# Patient Record
Sex: FEMALE | Race: WHITE | NOT HISPANIC OR LATINO
[De-identification: names, ages, dates, MRNs, and addresses within clinical notes are randomized per-mention and may not be internally consistent; named-entity substitution may affect disease eponyms.]

---

## 2017-05-25 ENCOUNTER — RESULT REVIEW (OUTPATIENT)
Age: 77
End: 2017-05-25

## 2017-05-25 ENCOUNTER — APPOINTMENT (OUTPATIENT)
Dept: HEMATOLOGY ONCOLOGY | Facility: CLINIC | Age: 77
End: 2017-05-25

## 2017-05-25 VITALS
HEART RATE: 91 BPM | BODY MASS INDEX: 29.81 KG/M2 | WEIGHT: 162 LBS | HEIGHT: 62 IN | TEMPERATURE: 98.1 F | RESPIRATION RATE: 14 BRPM | DIASTOLIC BLOOD PRESSURE: 78 MMHG | SYSTOLIC BLOOD PRESSURE: 153 MMHG

## 2017-05-26 ENCOUNTER — OTHER (OUTPATIENT)
Age: 77
End: 2017-05-26

## 2017-05-26 LAB
ALBUMIN SERPL-MCNC: 3.5 G/DL
ALBUMIN/GLOB SERPL: 1.46
ALP SERPL-CCNC: 59 IU/L
ALT SERPL-CCNC: 19 IU/L
ANION GAP SERPL CALC-SCNC: 8 MEQ/L
AST SERPL-CCNC: 33 IU/L
BASOPHILS # BLD: 0.01 TH/MM3
BASOPHILS NFR BLD: 0.5 %
BILIRUB SERPL-MCNC: 1.9 MG/DL
BUN SERPL-MCNC: 8 MG/DL
BUN/CREAT SERPL: 9.2 %
CALCIUM SERPL-MCNC: 9.1 MG/DL
CHLORIDE SERPL-SCNC: 109 MEQ/L
CO2 SERPL-SCNC: 26 MEQ/L
CREAT SERPL-MCNC: 0.87 MG/DL
EOSINOPHIL # BLD: 0.17 TH/MM3
EOSINOPHIL NFR BLD: 8.4 %
ERYTHROCYTE [DISTWIDTH] IN BLOOD BY AUTOMATED COUNT: 15.8 %
GFR SERPL CREATININE-BSD FRML MDRD: 63
GLUCOSE SERPL-MCNC: 131 MG/DL
GRANULOCYTES # BLD: 0.88 TH/MM3
GRANULOCYTES NFR BLD: 43.5 %
HCT VFR BLD AUTO: 36.5 %
HGB BLD-MCNC: 11.8 G/DL
IMM GRANULOCYTES # BLD: 0 TH/MM3
IMM GRANULOCYTES NFR BLD: 0 %
LACTATE DEHYDROGENASE (NORTH): 201 IU/L
LYMPHOCYTES # BLD: 0.67 TH/MM3
LYMPHOCYTES NFR BLD: 33.2 %
MCH RBC QN AUTO: 30 PG
MCHC RBC AUTO-ENTMCNC: 32.3 G/DL
MCV RBC AUTO: 92.9 FL
MONOCYTES # BLD: 0.29 TH/MM3
MONOCYTES NFR BLD: 14.4 %
PLATELET # BLD: 64 TH/MM3
PMV BLD AUTO: 9.7 FL
POTASSIUM SERPL-SCNC: 3.9 MMOL/L
PROT SERPL-MCNC: 5.9 G/DL
RBC # BLD AUTO: 3.93 MIL/MM3
SODIUM SERPL-SCNC: 143 MEQ/L
WBC # BLD: 2.02 TH/MM3

## 2017-05-30 ENCOUNTER — OTHER (OUTPATIENT)
Age: 77
End: 2017-05-30

## 2017-05-30 ENCOUNTER — RESULT REVIEW (OUTPATIENT)
Age: 77
End: 2017-05-30

## 2017-05-30 LAB
ALBUMIN MFR SERPL ELPH: 58.3 %
ALBUMIN SERPL ELPH-MCNC: 3.7 G/DL
ALBUMIN/GLOB SERPL ELPH: 1.4 ZZ
ALPHA1 GLOB MFR SERPL ELPH: 4.6 %
ALPHA1 GLOB SERPL ELPH-MCNC: 0.3 G/DL
ALPHA2 GLOB MFR SERPL ELPH: 8.7 %
ALPHA2 GLOB SERPL ELPH-MCNC: 0.5 G/DL
B-GLOBULIN SERPL ELPH-MCNC: 0.5 G/DL
B2 MICROGLOB SERPL-MCNC: 3.4 MG/L
BETA1 GLOB MFR SERPL ELPH: 8.1 %
GAMMA GLOB MFR SERPL ELPH: 20.3 %
GAMMA GLOB SERPL ELPH-MCNC: 1.3 G/DL
IGA FLD-MCNC: 104 MG/DL
IGG FLD-MCNC: 1380 MG/DL
IGM SERPL-MCNC: 105 MG/DL
INTERPRETATION SERPL IFE-IMP: NORMAL
KAPPA LC FREE SER-MCNC: 19.2 MG/L
KAPPA LC FREE/LAMBDA FREE SER: 1.05
LAMBDA LC FREE SERPL-MCNC: 18.2 MG/L
M PROTEIN MFR SERPL ELPH: 14.4 %
M-SPIKE SPE (NORTH): 0.9 G/DL
PROT PATTERN SERPL ELPH-IMP: 6.3 G/DL
PROT PATTERN SERPL ELPH-IMP: NORMAL
PROT SERPL-MCNC: 6.3 G/DL

## 2017-06-06 ENCOUNTER — OUTPATIENT (OUTPATIENT)
Dept: OUTPATIENT SERVICES | Facility: HOSPITAL | Age: 77
LOS: 1 days | Discharge: HOME | End: 2017-06-06

## 2017-06-06 DIAGNOSIS — M17.10 UNILATERAL PRIMARY OSTEOARTHRITIS, UNSPECIFIED KNEE: ICD-10-CM

## 2017-06-28 DIAGNOSIS — C90.00 MULTIPLE MYELOMA NOT HAVING ACHIEVED REMISSION: ICD-10-CM

## 2017-08-21 ENCOUNTER — RESULT REVIEW (OUTPATIENT)
Age: 77
End: 2017-08-21

## 2017-08-21 ENCOUNTER — OUTPATIENT (OUTPATIENT)
Dept: OUTPATIENT SERVICES | Facility: HOSPITAL | Age: 77
LOS: 1 days | Discharge: HOME | End: 2017-08-21

## 2017-08-21 ENCOUNTER — APPOINTMENT (OUTPATIENT)
Dept: HEMATOLOGY ONCOLOGY | Facility: CLINIC | Age: 77
End: 2017-08-21

## 2017-08-21 VITALS
TEMPERATURE: 97.7 F | RESPIRATION RATE: 14 BRPM | SYSTOLIC BLOOD PRESSURE: 146 MMHG | HEIGHT: 62 IN | BODY MASS INDEX: 28.34 KG/M2 | WEIGHT: 154 LBS | HEART RATE: 73 BPM | DIASTOLIC BLOOD PRESSURE: 69 MMHG

## 2017-08-21 DIAGNOSIS — D69.6 THROMBOCYTOPENIA, UNSPECIFIED: ICD-10-CM

## 2017-08-21 LAB
BASOPHILS # BLD: 0.01 TH/MM3
BASOPHILS NFR BLD: 0.5 %
EOSINOPHIL # BLD: 0.14 TH/MM3
EOSINOPHIL NFR BLD: 7.2 %
ERYTHROCYTE [DISTWIDTH] IN BLOOD BY AUTOMATED COUNT: 15.9 %
GRANULOCYTES # BLD: 0.97 TH/MM3
GRANULOCYTES NFR BLD: 49.7 %
HCT VFR BLD AUTO: 36.5 %
HGB BLD-MCNC: 11.8 G/DL
IMM GRANULOCYTES # BLD: 0 TH/MM3
IMM GRANULOCYTES NFR BLD: 0 %
LYMPHOCYTES # BLD: 0.59 TH/MM3
LYMPHOCYTES NFR BLD: 30.3 %
MCH RBC QN AUTO: 30 PG
MCHC RBC AUTO-ENTMCNC: 32.3 G/DL
MCV RBC AUTO: 92.9 FL
MONOCYTES # BLD: 0.24 TH/MM3
MONOCYTES NFR BLD: 12.3 %
PLATELET # BLD: 52 TH/MM3
PMV BLD AUTO: 10.6 FL
RBC # BLD AUTO: 3.93 MIL/MM3
WBC # BLD: 1.95 TH/MM3

## 2017-08-22 LAB
ALBUMIN SERPL-MCNC: 3.5 G/DL
ALBUMIN/GLOB SERPL: 1.46
ALP SERPL-CCNC: 53 IU/L
ALT SERPL-CCNC: 20 IU/L
ANION GAP SERPL CALC-SCNC: 6 MEQ/L
AST SERPL-CCNC: 33 IU/L
BILIRUB SERPL-MCNC: 1.8 MG/DL
BUN SERPL-MCNC: 12 MG/DL
BUN/CREAT SERPL: 15.2 %
CALCIUM SERPL-MCNC: 9.3 MG/DL
CHLORIDE SERPL-SCNC: 106 MEQ/L
CO2 SERPL-SCNC: 28 MEQ/L
CREAT SERPL-MCNC: 0.79 MG/DL
GFR SERPL CREATININE-BSD FRML MDRD: 71
GLUCOSE SERPL-MCNC: 92 MG/DL
LACTATE DEHYDROGENASE (NORTH): 193 IU/L
POTASSIUM SERPL-SCNC: 4.2 MMOL/L
PROT SERPL-MCNC: 5.9 G/DL
SODIUM SERPL-SCNC: 140 MEQ/L

## 2017-08-23 ENCOUNTER — OTHER (OUTPATIENT)
Age: 77
End: 2017-08-23

## 2017-08-23 LAB
INTERPRETATION SERPL IFE-IMP: DETECTED
KAPPA LC FREE SER-MCNC: 22.9 MG/L
KAPPA LC FREE/LAMBDA FREE SER: 1.21
LAMBDA LC FREE SERPL-MCNC: 19 MG/L

## 2017-08-24 LAB
ALBUMIN MFR SERPL ELPH: 56 %
ALBUMIN SERPL ELPH-MCNC: 3.5 G/DL
ALBUMIN/GLOB SERPL ELPH: 1.2 ZZ
ALPHA1 GLOB MFR SERPL ELPH: 4.4 %
ALPHA1 GLOB SERPL ELPH-MCNC: 0.3 G/DL
ALPHA2 GLOB MFR SERPL ELPH: 8.9 %
ALPHA2 GLOB SERPL ELPH-MCNC: 0.6 G/DL
B-GLOBULIN SERPL ELPH-MCNC: 0.6 G/DL
BETA1 GLOB MFR SERPL ELPH: 8.8 %
GAMMA GLOB MFR SERPL ELPH: 21.9 %
GAMMA GLOB SERPL ELPH-MCNC: 1.4 G/DL
IGA FLD-MCNC: 123 MG/DL
IGG FLD-MCNC: 1430 MG/DL
IGM SERPL-MCNC: 105 MG/DL
M PROTEIN MFR SERPL ELPH: 15.5 %
M-SPIKE SPE (NORTH): 1 G/DL
PROT PATTERN SERPL ELPH-IMP: 6.3 G/DL
PROT PATTERN SERPL ELPH-IMP: NORMAL
PROT SERPL-MCNC: 6.3 G/DL

## 2017-09-10 ENCOUNTER — EMERGENCY (EMERGENCY)
Facility: HOSPITAL | Age: 77
LOS: 0 days | Discharge: HOME | End: 2017-09-10
Admitting: INTERNAL MEDICINE

## 2017-09-10 DIAGNOSIS — Z79.899 OTHER LONG TERM (CURRENT) DRUG THERAPY: ICD-10-CM

## 2017-09-10 DIAGNOSIS — M25.511 PAIN IN RIGHT SHOULDER: ICD-10-CM
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
## 2017-09-10 DIAGNOSIS — M25.532 PAIN IN LEFT WRIST: ICD-10-CM
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
## 2017-09-10 DIAGNOSIS — Z88.1 ALLERGY STATUS TO OTHER ANTIBIOTIC AGENTS STATUS: ICD-10-CM
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
## 2017-09-10 DIAGNOSIS — S70.02XA CONTUSION OF LEFT HIP, INITIAL ENCOUNTER: ICD-10-CM

## 2017-09-10 DIAGNOSIS — M17.10 UNILATERAL PRIMARY OSTEOARTHRITIS, UNSPECIFIED KNEE: ICD-10-CM

## 2017-09-12 ENCOUNTER — OUTPATIENT (OUTPATIENT)
Dept: OUTPATIENT SERVICES | Facility: HOSPITAL | Age: 77
LOS: 1 days | Discharge: HOME | End: 2017-09-12

## 2017-09-12 DIAGNOSIS — C90.00 MULTIPLE MYELOMA NOT HAVING ACHIEVED REMISSION: ICD-10-CM

## 2017-09-12 DIAGNOSIS — M17.10 UNILATERAL PRIMARY OSTEOARTHRITIS, UNSPECIFIED KNEE: ICD-10-CM

## 2017-11-22 ENCOUNTER — OUTPATIENT (OUTPATIENT)
Dept: OUTPATIENT SERVICES | Facility: HOSPITAL | Age: 77
LOS: 1 days | Discharge: HOME | End: 2017-11-22

## 2017-11-22 DIAGNOSIS — Z12.31 ENCOUNTER FOR SCREENING MAMMOGRAM FOR MALIGNANT NEOPLASM OF BREAST: ICD-10-CM

## 2017-11-22 DIAGNOSIS — M17.10 UNILATERAL PRIMARY OSTEOARTHRITIS, UNSPECIFIED KNEE: ICD-10-CM

## 2017-11-30 DIAGNOSIS — M89.9 DISORDER OF BONE, UNSPECIFIED: ICD-10-CM

## 2017-11-30 DIAGNOSIS — Z78.0 ASYMPTOMATIC MENOPAUSAL STATE: ICD-10-CM

## 2017-11-30 DIAGNOSIS — Z09 ENCOUNTER FOR FOLLOW-UP EXAMINATION AFTER COMPLETED TREATMENT FOR CONDITIONS OTHER THAN MALIGNANT NEOPLASM: ICD-10-CM

## 2017-12-29 ENCOUNTER — TRANSCRIPTION ENCOUNTER (OUTPATIENT)
Age: 77
End: 2017-12-29

## 2018-01-18 ENCOUNTER — RESULT REVIEW (OUTPATIENT)
Age: 78
End: 2018-01-18

## 2018-01-18 ENCOUNTER — APPOINTMENT (OUTPATIENT)
Dept: HEMATOLOGY ONCOLOGY | Facility: CLINIC | Age: 78
End: 2018-01-18

## 2018-01-18 ENCOUNTER — OUTPATIENT (OUTPATIENT)
Dept: OUTPATIENT SERVICES | Facility: HOSPITAL | Age: 78
LOS: 1 days | Discharge: HOME | End: 2018-01-18

## 2018-01-18 VITALS
DIASTOLIC BLOOD PRESSURE: 77 MMHG | BODY MASS INDEX: 27.6 KG/M2 | WEIGHT: 150 LBS | HEART RATE: 81 BPM | RESPIRATION RATE: 14 BRPM | TEMPERATURE: 97.3 F | HEIGHT: 62 IN | SYSTOLIC BLOOD PRESSURE: 140 MMHG

## 2018-01-20 ENCOUNTER — TRANSCRIPTION ENCOUNTER (OUTPATIENT)
Age: 78
End: 2018-01-20

## 2018-01-22 ENCOUNTER — OTHER (OUTPATIENT)
Age: 78
End: 2018-01-22

## 2018-01-22 LAB
ALBUMIN MFR SERPL ELPH: 56.4 %
ALBUMIN SERPL ELPH-MCNC: 3.7 G/DL
ALBUMIN SERPL-MCNC: 3.7 G/DL
ALBUMIN/GLOB SERPL ELPH: 1.3
ALBUMIN/GLOB SERPL: 1.54
ALP SERPL-CCNC: 64 IU/L
ALPHA1 GLOB MFR SERPL ELPH: 4.1 %
ALPHA1 GLOB SERPL ELPH-MCNC: 0.3 G/DL
ALPHA2 GLOB MFR SERPL ELPH: 8.4 %
ALPHA2 GLOB SERPL ELPH-MCNC: 0.6 G/DL
ALT SERPL-CCNC: 23 IU/L
ANION GAP SERPL CALC-SCNC: 8 MEQ/L
AST SERPL-CCNC: 43 IU/L
B-GLOBULIN SERPL ELPH-MCNC: 0.5 G/DL
BASOPHILS # BLD: 0.02 TH/MM3
BASOPHILS NFR BLD: 0.9 %
BETA1 GLOB MFR SERPL ELPH: 8.3 %
BILIRUB SERPL-MCNC: 1.9 MG/DL
BUN SERPL-MCNC: 14 MG/DL
BUN/CREAT SERPL: 17.1 %
CALCIUM SERPL-MCNC: 9.4 MG/DL
CHLORIDE SERPL-SCNC: 107 MEQ/L
CO2 SERPL-SCNC: 26 MEQ/L
CREAT SERPL-MCNC: 0.82 MG/DL
DAT RBC QL: NORMAL
EOSINOPHIL # BLD: 0.17 TH/MM3
EOSINOPHIL NFR BLD: 7.6 %
ERYTHROCYTE [DISTWIDTH] IN BLOOD BY AUTOMATED COUNT: 16.1 %
GAMMA GLOB MFR SERPL ELPH: 22.8 %
GAMMA GLOB SERPL ELPH-MCNC: 1.5 G/DL
GFR SERPL CREATININE-BSD FRML MDRD: 67
GLUCOSE SERPL-MCNC: 82 MG/DL
GRANULOCYTES # BLD: 0.87 TH/MM3
GRANULOCYTES NFR BLD: 39 %
HCT VFR BLD AUTO: 34.6 %
HGB BLD-MCNC: 11.9 G/DL
IGA FLD-MCNC: 152 MG/DL
IGG FLD-MCNC: 1490 MG/DL
IGM SERPL-MCNC: 175 MG/DL
IMM GRANULOCYTES # BLD: 0 TH/MM3
IMM GRANULOCYTES NFR BLD: 0 %
KAPPA LC FREE SER-MCNC: 27.7 MG/L
KAPPA LC FREE/LAMBDA FREE SER: 0.97
LACTATE DEHYDROGENASE (NORTH): 217 IU/L
LAMBDA LC FREE SERPL-MCNC: 28.6 MG/L
LYMPHOCYTES # BLD: 0.9 TH/MM3
LYMPHOCYTES NFR BLD: 40.4 %
M PROTEIN MFR SERPL ELPH: 15.1 %
M-SPIKE SPE (NORTH): 1 G/DL
MCH RBC QN AUTO: 29.3 PG
MCHC RBC AUTO-ENTMCNC: 34.4 G/DL
MCV RBC AUTO: 85.2 FL
MONOCYTES # BLD: 0.27 TH/MM3
MONOCYTES NFR BLD: 12.1 %
PLATELET # BLD: 63 TH/MM3
PMV BLD AUTO: 9.8 FL
POTASSIUM SERPL-SCNC: 4.8 MMOL/L
PROT PATTERN SERPL ELPH-IMP: 6.6 G/DL
PROT PATTERN SERPL ELPH-IMP: NORMAL
PROT SERPL-MCNC: 6.1 G/DL
PROT SERPL-MCNC: 6.6 G/DL
RBC # BLD AUTO: 4.06 MIL/MM3
RETICS/RBC NFR: 0.74 %
SODIUM SERPL-SCNC: 141 MEQ/L
WBC # BLD: 2.23 TH/MM3

## 2018-01-25 ENCOUNTER — OUTPATIENT (OUTPATIENT)
Dept: OUTPATIENT SERVICES | Facility: HOSPITAL | Age: 78
LOS: 1 days | Discharge: HOME | End: 2018-01-25

## 2018-01-25 DIAGNOSIS — R63.4 ABNORMAL WEIGHT LOSS: ICD-10-CM

## 2018-01-25 DIAGNOSIS — R93.5 ABNORMAL FINDINGS ON DIAGNOSTIC IMAGING OF OTHER ABDOMINAL REGIONS, INCLUDING RETROPERITONEUM: ICD-10-CM

## 2018-01-25 DIAGNOSIS — K86.9 DISEASE OF PANCREAS, UNSPECIFIED: ICD-10-CM

## 2018-02-04 DIAGNOSIS — M17.10 UNILATERAL PRIMARY OSTEOARTHRITIS, UNSPECIFIED KNEE: ICD-10-CM

## 2018-03-22 DIAGNOSIS — D69.6 THROMBOCYTOPENIA, UNSPECIFIED: ICD-10-CM

## 2018-10-30 ENCOUNTER — LABORATORY RESULT (OUTPATIENT)
Age: 78
End: 2018-10-30

## 2018-10-30 ENCOUNTER — OUTPATIENT (OUTPATIENT)
Dept: OUTPATIENT SERVICES | Facility: HOSPITAL | Age: 78
LOS: 1 days | Discharge: HOME | End: 2018-10-30

## 2018-10-30 ENCOUNTER — APPOINTMENT (OUTPATIENT)
Dept: HEMATOLOGY ONCOLOGY | Facility: CLINIC | Age: 78
End: 2018-10-30

## 2018-10-30 VITALS
WEIGHT: 150 LBS | TEMPERATURE: 97.5 F | HEIGHT: 62 IN | HEART RATE: 80 BPM | DIASTOLIC BLOOD PRESSURE: 59 MMHG | BODY MASS INDEX: 27.6 KG/M2 | RESPIRATION RATE: 14 BRPM | SYSTOLIC BLOOD PRESSURE: 134 MMHG

## 2018-10-30 DIAGNOSIS — R79.89 OTHER SPECIFIED ABNORMAL FINDINGS OF BLOOD CHEMISTRY: ICD-10-CM

## 2018-10-30 LAB
HCT VFR BLD CALC: 37.2 %
HGB BLD-MCNC: 12 G/DL
MCHC RBC-ENTMCNC: 30.3 PG
MCHC RBC-ENTMCNC: 32.3 G/DL
MCV RBC AUTO: 93.9 FL
PLATELET # BLD AUTO: 46 K/UL
PMV BLD: 10.4 FL
RBC # BLD: 3.96 M/UL
RBC # FLD: 15.1 %
RETICS # AUTO: 1.5 %
RETICS AGGREG/RBC NFR: 57.4 K/UL
WBC # FLD AUTO: 2.33 K/UL

## 2018-10-31 LAB
ALBUMIN MFR SERPL ELPH: 55.1 %
ALBUMIN SERPL ELPH-MCNC: 3.8 G/DL
ALBUMIN SERPL-MCNC: 3.6 G/DL
ALBUMIN/GLOB SERPL: 1.2 RATIO
ALP BLD-CCNC: 51 U/L
ALPHA1 GLOB MFR SERPL ELPH: 4.1 %
ALPHA1 GLOB SERPL ELPH-MCNC: 0.3 G/DL
ALPHA2 GLOB MFR SERPL ELPH: 8.9 %
ALPHA2 GLOB SERPL ELPH-MCNC: 0.6 G/DL
ALT SERPL-CCNC: 19 U/L
ANION GAP SERPL CALC-SCNC: 10 MMOL/L
AST SERPL-CCNC: 34 U/L
B-GLOBULIN MFR SERPL ELPH: 8.2 %
B-GLOBULIN SERPL ELPH-MCNC: 0.5 G/DL
BILIRUB SERPL-MCNC: 2.6 MG/DL
BUN SERPL-MCNC: 13 MG/DL
CALCIUM SERPL-MCNC: 9.3 MG/DL
CHLORIDE SERPL-SCNC: 103 MMOL/L
CO2 SERPL-SCNC: 28 MMOL/L
CREAT SERPL-MCNC: 0.8 MG/DL
DEPRECATED KAPPA LC FREE/LAMBDA SER: 1.08 RATIO
GAMMA GLOB FLD ELPH-MCNC: 1.6 G/DL
GAMMA GLOB MFR SERPL ELPH: 23.7 %
GLUCOSE SERPL-MCNC: 105 MG/DL
IGA SER QL IEP: 129 MG/DL
IGG SER QL IEP: 1461 MG/DL
IGM SER QL IEP: 123 MG/DL
INTERPRETATION SERPL IEP-IMP: NORMAL
KAPPA LC CSF-MCNC: 2.35 MG/DL
KAPPA LC SERPL-MCNC: 2.53 MG/DL
LDH SERPL-CCNC: 232
M PROTEIN MFR SERPL ELPH: 17 %
MONOCLON BAND OBS SERPL: 1.1 G/DL
POTASSIUM SERPL-SCNC: 3.9 MMOL/L
PROT SERPL-MCNC: 6.6 G/DL
PROT SERPL-MCNC: 6.6 G/DL
PROT SERPL-MCNC: 6.8 G/DL
SODIUM SERPL-SCNC: 141 MMOL/L

## 2018-11-01 DIAGNOSIS — Z02.9 ENCOUNTER FOR ADMINISTRATIVE EXAMINATIONS, UNSPECIFIED: ICD-10-CM

## 2018-11-30 ENCOUNTER — APPOINTMENT (OUTPATIENT)
Dept: HEMATOLOGY ONCOLOGY | Facility: CLINIC | Age: 78
End: 2018-11-30

## 2018-11-30 ENCOUNTER — OUTPATIENT (OUTPATIENT)
Dept: OUTPATIENT SERVICES | Facility: HOSPITAL | Age: 78
LOS: 1 days | Discharge: HOME | End: 2018-11-30

## 2018-11-30 ENCOUNTER — LABORATORY RESULT (OUTPATIENT)
Age: 78
End: 2018-11-30

## 2018-11-30 ENCOUNTER — RESULT REVIEW (OUTPATIENT)
Age: 78
End: 2018-11-30

## 2018-11-30 DIAGNOSIS — Z85.3 PERSONAL HISTORY OF MALIGNANT NEOPLASM OF BREAST: ICD-10-CM

## 2018-11-30 DIAGNOSIS — Z12.31 ENCOUNTER FOR SCREENING MAMMOGRAM FOR MALIGNANT NEOPLASM OF BREAST: ICD-10-CM

## 2018-11-30 DIAGNOSIS — D69.6 THROMBOCYTOPENIA, UNSPECIFIED: ICD-10-CM

## 2018-11-30 DIAGNOSIS — R79.89 OTHER SPECIFIED ABNORMAL FINDINGS OF BLOOD CHEMISTRY: ICD-10-CM

## 2018-11-30 NOTE — PROCEDURE
[Bone Marrow Biopsy] : bone marrow biopsy [Bone Marrow Aspiration] : bone marrow aspiration  [Patient] : the patient [Patient identification verified] : patient identification verified [Procedure verified and consent obtained] : procedure verified and consent obtained [Laterality verified and correct site marked] : laterality verified and correct site marked [Right] : site: right [Correct positioning] : correct positioning [Left lateral decibitus position] : left lateral decibitus position [The right posterior iliac crest was prepped with betadine and draped, using sterile technique.] : The right posterior iliac crest was prepped with betadine and draped, using sterile technique. [Aspirate] : aspirate [Cytogenetics] : cytogenetics [FISH] : FISH [Biopsy] : biopsy [Flow Cytometry] : flow cytometry [] : The patient was instructed to remove the bandage the following AM. The patient may bathe. Acetaminophen may be taken for discomfort, as per package directions.If there are any other problems, the patient was instructed to call the office. The patient verbalized understanding, and is aware of the office contact numbers.

## 2018-12-05 ENCOUNTER — OUTPATIENT (OUTPATIENT)
Dept: OUTPATIENT SERVICES | Facility: HOSPITAL | Age: 78
LOS: 1 days | Discharge: HOME | End: 2018-12-05

## 2018-12-05 DIAGNOSIS — R92.8 OTHER ABNORMAL AND INCONCLUSIVE FINDINGS ON DIAGNOSTIC IMAGING OF BREAST: ICD-10-CM

## 2018-12-05 LAB — HEMATOPATHOLOGY REPORT: SIGNIFICANT CHANGE UP

## 2018-12-06 DIAGNOSIS — Z12.31 ENCOUNTER FOR SCREENING MAMMOGRAM FOR MALIGNANT NEOPLASM OF BREAST: ICD-10-CM

## 2018-12-06 DIAGNOSIS — Z85.3 PERSONAL HISTORY OF MALIGNANT NEOPLASM OF BREAST: ICD-10-CM

## 2018-12-06 DIAGNOSIS — Z02.9 ENCOUNTER FOR ADMINISTRATIVE EXAMINATIONS, UNSPECIFIED: ICD-10-CM

## 2018-12-10 ENCOUNTER — OTHER (OUTPATIENT)
Age: 78
End: 2018-12-10

## 2018-12-16 ENCOUNTER — FORM ENCOUNTER (OUTPATIENT)
Age: 78
End: 2018-12-16

## 2018-12-17 ENCOUNTER — OUTPATIENT (OUTPATIENT)
Dept: OUTPATIENT SERVICES | Facility: HOSPITAL | Age: 78
LOS: 1 days | Discharge: HOME | End: 2018-12-17

## 2018-12-17 DIAGNOSIS — D47.2 MONOCLONAL GAMMOPATHY: ICD-10-CM

## 2018-12-17 LAB — GLUCOSE BLDC GLUCOMTR-MCNC: 68 MG/DL — LOW (ref 70–99)

## 2018-12-27 ENCOUNTER — TRANSCRIPTION ENCOUNTER (OUTPATIENT)
Age: 78
End: 2018-12-27

## 2018-12-28 DIAGNOSIS — C83.58 LYMPHOBLASTIC (DIFFUSE) LYMPHOMA, LYMPH NODES OF MULTIPLE SITES: ICD-10-CM

## 2019-01-11 ENCOUNTER — OTHER (OUTPATIENT)
Age: 79
End: 2019-01-11

## 2019-01-24 ENCOUNTER — APPOINTMENT (OUTPATIENT)
Dept: OBGYN | Facility: CLINIC | Age: 79
End: 2019-01-24

## 2019-01-29 ENCOUNTER — FORM ENCOUNTER (OUTPATIENT)
Age: 79
End: 2019-01-29

## 2019-01-30 ENCOUNTER — OUTPATIENT (OUTPATIENT)
Dept: OUTPATIENT SERVICES | Facility: HOSPITAL | Age: 79
LOS: 1 days | Discharge: HOME | End: 2019-01-30

## 2019-01-30 VITALS
SYSTOLIC BLOOD PRESSURE: 121 MMHG | DIASTOLIC BLOOD PRESSURE: 63 MMHG | RESPIRATION RATE: 16 BRPM | HEIGHT: 62 IN | HEART RATE: 78 BPM | TEMPERATURE: 99 F | OXYGEN SATURATION: 99 % | WEIGHT: 152.34 LBS

## 2019-01-30 DIAGNOSIS — R93.89 ABNORMAL FINDINGS ON DIAGNOSTIC IMAGING OF OTHER SPECIFIED BODY STRUCTURES: ICD-10-CM

## 2019-01-30 DIAGNOSIS — Z98.890 OTHER SPECIFIED POSTPROCEDURAL STATES: Chronic | ICD-10-CM

## 2019-01-30 DIAGNOSIS — Z01.818 ENCOUNTER FOR OTHER PREPROCEDURAL EXAMINATION: ICD-10-CM

## 2019-01-30 LAB
ALBUMIN SERPL ELPH-MCNC: 3.6 G/DL — SIGNIFICANT CHANGE UP (ref 3.5–5.2)
ALP SERPL-CCNC: 67 U/L — SIGNIFICANT CHANGE UP (ref 30–115)
ALT FLD-CCNC: 21 U/L — SIGNIFICANT CHANGE UP (ref 0–41)
ANION GAP SERPL CALC-SCNC: 13 MMOL/L — SIGNIFICANT CHANGE UP (ref 7–14)
APPEARANCE UR: CLEAR — SIGNIFICANT CHANGE UP
APTT BLD: 36.4 SEC — SIGNIFICANT CHANGE UP (ref 27–39.2)
AST SERPL-CCNC: 36 U/L — SIGNIFICANT CHANGE UP (ref 0–41)
BACTERIA # UR AUTO: ABNORMAL /HPF
BASOPHILS # BLD AUTO: 0.01 K/UL — SIGNIFICANT CHANGE UP (ref 0–0.2)
BASOPHILS NFR BLD AUTO: 0.5 % — SIGNIFICANT CHANGE UP (ref 0–1)
BILIRUB SERPL-MCNC: 2.2 MG/DL — HIGH (ref 0.2–1.2)
BILIRUB UR-MCNC: NEGATIVE — SIGNIFICANT CHANGE UP
BUN SERPL-MCNC: 14 MG/DL — SIGNIFICANT CHANGE UP (ref 10–20)
CALCIUM SERPL-MCNC: 9.3 MG/DL — SIGNIFICANT CHANGE UP (ref 8.5–10.1)
CHLORIDE SERPL-SCNC: 103 MMOL/L — SIGNIFICANT CHANGE UP (ref 98–110)
CO2 SERPL-SCNC: 27 MMOL/L — SIGNIFICANT CHANGE UP (ref 17–32)
COD CRY URNS QL: ABNORMAL /HPF
COLOR SPEC: YELLOW — SIGNIFICANT CHANGE UP
CREAT SERPL-MCNC: 0.8 MG/DL — SIGNIFICANT CHANGE UP (ref 0.7–1.5)
DIFF PNL FLD: ABNORMAL
EOSINOPHIL # BLD AUTO: 0.13 K/UL — SIGNIFICANT CHANGE UP (ref 0–0.7)
EOSINOPHIL NFR BLD AUTO: 6.8 % — SIGNIFICANT CHANGE UP (ref 0–8)
EPI CELLS # UR: ABNORMAL /HPF
GLUCOSE SERPL-MCNC: 124 MG/DL — HIGH (ref 70–99)
GLUCOSE UR QL: NEGATIVE MG/DL — SIGNIFICANT CHANGE UP
HCT VFR BLD CALC: 35.1 % — LOW (ref 37–47)
HGB BLD-MCNC: 11.1 G/DL — LOW (ref 12–16)
IMM GRANULOCYTES NFR BLD AUTO: 0.5 % — HIGH (ref 0.1–0.3)
INR BLD: 1.16 RATIO — SIGNIFICANT CHANGE UP (ref 0.65–1.3)
KETONES UR-MCNC: NEGATIVE — SIGNIFICANT CHANGE UP
LEUKOCYTE ESTERASE UR-ACNC: ABNORMAL
LYMPHOCYTES # BLD AUTO: 0.56 K/UL — LOW (ref 1.2–3.4)
LYMPHOCYTES # BLD AUTO: 29.3 % — SIGNIFICANT CHANGE UP (ref 20.5–51.1)
MCHC RBC-ENTMCNC: 30.3 PG — SIGNIFICANT CHANGE UP (ref 27–31)
MCHC RBC-ENTMCNC: 31.6 G/DL — LOW (ref 32–37)
MCV RBC AUTO: 95.9 FL — SIGNIFICANT CHANGE UP (ref 81–99)
MONOCYTES # BLD AUTO: 0.18 K/UL — SIGNIFICANT CHANGE UP (ref 0.1–0.6)
MONOCYTES NFR BLD AUTO: 9.4 % — HIGH (ref 1.7–9.3)
NEUTROPHILS # BLD AUTO: 1.02 K/UL — LOW (ref 1.4–6.5)
NEUTROPHILS NFR BLD AUTO: 53.5 % — SIGNIFICANT CHANGE UP (ref 42.2–75.2)
NITRITE UR-MCNC: NEGATIVE — SIGNIFICANT CHANGE UP
NRBC # BLD: 0 /100 WBCS — SIGNIFICANT CHANGE UP (ref 0–0)
PH UR: 6 — SIGNIFICANT CHANGE UP (ref 5–8)
PLATELET # BLD AUTO: 55 K/UL — LOW (ref 130–400)
POTASSIUM SERPL-MCNC: 4.1 MMOL/L — SIGNIFICANT CHANGE UP (ref 3.5–5)
POTASSIUM SERPL-SCNC: 4.1 MMOL/L — SIGNIFICANT CHANGE UP (ref 3.5–5)
PROT SERPL-MCNC: 6.5 G/DL — SIGNIFICANT CHANGE UP (ref 6–8)
PROT UR-MCNC: NEGATIVE MG/DL — SIGNIFICANT CHANGE UP
PROTHROM AB SERPL-ACNC: 13.3 SEC — HIGH (ref 9.95–12.87)
RBC # BLD: 3.66 M/UL — LOW (ref 4.2–5.4)
RBC # FLD: 15.9 % — HIGH (ref 11.5–14.5)
RBC CASTS # UR COMP ASSIST: ABNORMAL /HPF
SODIUM SERPL-SCNC: 143 MMOL/L — SIGNIFICANT CHANGE UP (ref 135–146)
SP GR SPEC: 1.02 — SIGNIFICANT CHANGE UP (ref 1.01–1.03)
UROBILINOGEN FLD QL: 0.2 MG/DL — SIGNIFICANT CHANGE UP (ref 0.2–0.2)
WBC # BLD: 1.91 K/UL — LOW (ref 4.8–10.8)
WBC # FLD AUTO: 1.91 K/UL — LOW (ref 4.8–10.8)
WBC UR QL: ABNORMAL /HPF

## 2019-01-30 NOTE — H&P PST ADULT - PMH
Arthritis  OA  Fatty liver  AS PER PATIENT 15YEARS  PT REPORTS- I HAVE AN ENLARGED SPLEEN  LOW PLATELETS.  GERD (gastroesophageal reflux disease)    Malignant neoplasm of areola of left breast in female, unspecified estrogen receptor status

## 2019-01-30 NOTE — H&P PST ADULT - REASON FOR ADMISSION
PT PRESENTS TO PAST WITH NO SOB, CP, PALPITATIONS, DYSURIA, UTI OR URI AT PRESENT.   PT ABLE TO WALK UP 2-3 FLIGHTS OF STEPS WITH NO SOB. VERY SLOW.   AS PER THE PT, THIS IS HIS/HER COMPLETE MEDICAL AND SURGICAL HX, INCLUDING MEDICATIONS PRESCRIBED AND OVER THE COUNTER  PT PRESENTS TO PAST WITH H/O-- ENDOMETRIAL POLYP.

## 2019-01-30 NOTE — H&P PST ADULT - ADMIT DATE
Nutrition Initial Visit    [facility] relating to weight loss. Ht 5'1, wt 228#, BMI 42. Pt w/ PCOS, on Metformin, A1C wnl. Pt reports she has been in the [address] for 1 year, and gained ~50 lbs (80kg/176# when in [facility]). Pt reports eating 3 meals and 1 snack per day. Breakfast  [place] with egg, ham/cheese  Hot oats w/ milk    Lunch  Protein  Rice/pasta/potato  Salad w/ lettuce, carrots    Snack  Banana/Orange  Yogurt    Dinner  Egg/ham/cheese  [place] w/ crackers    If still hungry in evening pt will have water or a smoothie w/ cucumber, pineapple, melon, papaya. Beverages mainly include water and sometimes unsweetened tea. Reviewed Myplate and discussed ideal portions of each food group, especially 1/2 plate non-starchy vegetables since they are low pete, and high fiber, and controlling portions of carb/starchy foods. Suggested a protein component in snacks for increased satiety. Pt reports she likes to dance at home for exercise, and also uses the treadmil for 30 minutes 3-4 days per week. Encouraged pt to do strength exercises as well with hand weights, or pushups/sit ups. Building muscle is beneficial because muscle is more metabolically active than fat. Pt weight at appt was 232.6, which is a slight increase from previous appointment. Pt did not seem discouraged, she reported that her clothes are fitting looser which is great. 30-Jan-2019

## 2019-01-30 NOTE — H&P PST ADULT - RS GEN PE MLT RESP DETAILS PC
breath sounds equal/normal/airway patent/clear to auscultation bilaterally/no intercostal retractions/no rales/good air movement/no chest wall tenderness/respirations non-labored

## 2019-01-31 ENCOUNTER — OTHER (OUTPATIENT)
Age: 79
End: 2019-01-31

## 2019-01-31 DIAGNOSIS — Z96.659 PRESENCE OF UNSPECIFIED ARTIFICIAL KNEE JOINT: ICD-10-CM

## 2019-02-05 ENCOUNTER — RESULT REVIEW (OUTPATIENT)
Age: 79
End: 2019-02-05

## 2019-02-05 ENCOUNTER — OUTPATIENT (OUTPATIENT)
Dept: OUTPATIENT SERVICES | Facility: HOSPITAL | Age: 79
LOS: 1 days | Discharge: HOME | End: 2019-02-05
Payer: MEDICARE

## 2019-02-05 VITALS
DIASTOLIC BLOOD PRESSURE: 75 MMHG | OXYGEN SATURATION: 98 % | RESPIRATION RATE: 18 BRPM | HEART RATE: 86 BPM | TEMPERATURE: 98 F | SYSTOLIC BLOOD PRESSURE: 165 MMHG | HEIGHT: 62 IN | WEIGHT: 152.34 LBS

## 2019-02-05 VITALS
HEART RATE: 77 BPM | OXYGEN SATURATION: 96 % | SYSTOLIC BLOOD PRESSURE: 137 MMHG | RESPIRATION RATE: 19 BRPM | DIASTOLIC BLOOD PRESSURE: 66 MMHG

## 2019-02-05 DIAGNOSIS — D70.4 CYCLIC NEUTROPENIA: ICD-10-CM

## 2019-02-05 DIAGNOSIS — Z98.890 OTHER SPECIFIED POSTPROCEDURAL STATES: Chronic | ICD-10-CM

## 2019-02-05 DIAGNOSIS — N88.8 OTHER SPECIFIED NONINFLAMMATORY DISORDERS OF CERVIX UTERI: ICD-10-CM

## 2019-02-05 PROBLEM — C50.012 MALIGNANT NEOPLASM OF NIPPLE AND AREOLA, LEFT FEMALE BREAST: Chronic | Status: ACTIVE | Noted: 2019-01-30

## 2019-02-05 PROBLEM — K21.9 GASTRO-ESOPHAGEAL REFLUX DISEASE WITHOUT ESOPHAGITIS: Chronic | Status: ACTIVE | Noted: 2019-01-30

## 2019-02-05 PROBLEM — K76.0 FATTY (CHANGE OF) LIVER, NOT ELSEWHERE CLASSIFIED: Chronic | Status: ACTIVE | Noted: 2019-01-30

## 2019-02-05 PROBLEM — M19.90 UNSPECIFIED OSTEOARTHRITIS, UNSPECIFIED SITE: Chronic | Status: ACTIVE | Noted: 2019-01-30

## 2019-02-05 PROCEDURE — 88189 FLOWCYTOMETRY/READ 16 & >: CPT

## 2019-02-05 RX ORDER — ONDANSETRON 8 MG/1
4 TABLET, FILM COATED ORAL ONCE
Qty: 0 | Refills: 0 | Status: DISCONTINUED | OUTPATIENT
Start: 2019-02-05 | End: 2019-02-20

## 2019-02-05 RX ORDER — MORPHINE SULFATE 50 MG/1
2 CAPSULE, EXTENDED RELEASE ORAL
Qty: 0 | Refills: 0 | Status: DISCONTINUED | OUTPATIENT
Start: 2019-02-05 | End: 2019-02-05

## 2019-02-05 RX ORDER — OXYCODONE AND ACETAMINOPHEN 5; 325 MG/1; MG/1
1 TABLET ORAL EVERY 4 HOURS
Qty: 0 | Refills: 0 | Status: DISCONTINUED | OUTPATIENT
Start: 2019-02-05 | End: 2019-02-05

## 2019-02-05 RX ORDER — SODIUM CHLORIDE 9 MG/ML
1000 INJECTION, SOLUTION INTRAVENOUS
Qty: 0 | Refills: 0 | Status: DISCONTINUED | OUTPATIENT
Start: 2019-02-05 | End: 2019-02-20

## 2019-02-05 RX ADMIN — SODIUM CHLORIDE 100 MILLILITER(S): 9 INJECTION, SOLUTION INTRAVENOUS at 11:06

## 2019-02-05 NOTE — ASU DISCHARGE PLAN (ADULT/PEDIATRIC). - NOTIFY
Bleeding that does not stop/GYN Fever>100.4/Pain not relieved by Medications Swelling that continues/Bleeding that does not stop/Unable to Urinate/GYN Fever>100.4/Pain not relieved by Medications

## 2019-02-05 NOTE — BRIEF OPERATIVE NOTE - PROCEDURE
<<-----Click on this checkbox to enter Procedure Hysteroscopy with dilation and curettage of uterus  02/05/2019    Active  LSALONIA

## 2019-02-05 NOTE — ASU DISCHARGE PLAN (ADULT/PEDIATRIC). - SPECIAL INSTRUCTIONS
Nothing in the vagina for 2 weeks (no sex, no tampons, no douching). Avoid tub baths, you may shower.    If you have a fever of 100.4F or greater, severe vaginal bleeding, or severe abdominal pain, call your Ob/Gyn or come to the emergency department immediately.    You may take over the counter Tylenol as needed for pain.    Please follow up at next scheduled visit with Dr. Erickson.

## 2019-02-05 NOTE — CHART NOTE - NSCHARTNOTEFT_GEN_A_CORE
PACU ANESTHESIA ADMISSION NOTE      Procedure: Hysteroscopy with dilation and curettage of uterus/ Bone marrow aspiration and Bx    Post op diagnosis:  Thickened endometrium/ R/O Lymphoma    ____  Intubated  TV:______       Rate: ______      FiO2: ______    _x___  Patent Airway    _x___  Full return of protective reflexes    _x___  Full recovery from anesthesia / back to baseline status    Vitals:            T:  97.4              BP :   148/67             R:    14          Sat:  99%             P: 83      Mental Status:  _x___ Awake   _____ Alert   _____ Drowsy   _____ Sedated    Nausea/Vomiting:  _x___  NO       ______Yes,   See Post - Op Orders         Pain Scale (0-10):  __0___    Treatment: ___ None    _x___ See Post - Op/PCA Orders    Post - Operative Fluids:   __x__ Oral   ____ See Post - Op Orders    Plan: Discharge:   _x___Home       _____Floor     _____Critical Care    _____  Other:_________________    Comments:  No anesthesia issues or complications noted.  Discharge when criteria met.

## 2019-02-06 LAB — SURGICAL PATHOLOGY STUDY: SIGNIFICANT CHANGE UP

## 2019-02-07 LAB — TM INTERPRETATION: SIGNIFICANT CHANGE UP

## 2019-02-08 LAB — HEMATOPATHOLOGY REPORT: SIGNIFICANT CHANGE UP

## 2019-02-11 ENCOUNTER — APPOINTMENT (OUTPATIENT)
Dept: HEMATOLOGY ONCOLOGY | Facility: CLINIC | Age: 79
End: 2019-02-11

## 2019-02-11 VITALS
HEIGHT: 62 IN | TEMPERATURE: 97.8 F | SYSTOLIC BLOOD PRESSURE: 128 MMHG | HEART RATE: 57 BPM | WEIGHT: 135 LBS | DIASTOLIC BLOOD PRESSURE: 74 MMHG | RESPIRATION RATE: 14 BRPM | BODY MASS INDEX: 24.84 KG/M2

## 2019-02-11 VITALS
WEIGHT: 149 LBS | TEMPERATURE: 97.2 F | DIASTOLIC BLOOD PRESSURE: 78 MMHG | SYSTOLIC BLOOD PRESSURE: 122 MMHG | HEIGHT: 62 IN | BODY MASS INDEX: 27.42 KG/M2 | RESPIRATION RATE: 14 BRPM | HEART RATE: 79 BPM

## 2019-02-11 DIAGNOSIS — R93.89 ABNORMAL FINDINGS ON DIAGNOSTIC IMAGING OF OTHER SPECIFIED BODY STRUCTURES: ICD-10-CM

## 2019-02-11 DIAGNOSIS — N85.8 OTHER SPECIFIED NONINFLAMMATORY DISORDERS OF UTERUS: ICD-10-CM

## 2019-02-11 DIAGNOSIS — Z88.1 ALLERGY STATUS TO OTHER ANTIBIOTIC AGENTS STATUS: ICD-10-CM

## 2019-02-11 NOTE — REASON FOR VISIT
[Follow-Up Visit] : a follow-up [Bone Marrow Biopsy] : bone marrow biopsy [Bone Marrow Aspiration] : bone marrow aspiration

## 2019-02-12 NOTE — HISTORY OF PRESENT ILLNESS
[de-identified] : \par 76/F presenting for follow-up for a history of splenomegaly with leukopenia and thrombocytopenia. Since her last visit, she has been evaluated by GI for liver disease as a possible etiology. Fibrosure testing was suggestive of severe liver fibrosis. EGD done showed small esophageal varices and gastric polyps (pathology benign). She was advised nadolol. She sought a second opinion with a different gastroenterologist, who advised regarding false positive results with Fibrosure testing. She had a repeat endoscopy which reportedly was negative for varices. She did not start taking the nadolol. Screening for viral hepatitis, hemochromatosis, Holden's disease, and autoimmune liver disease was negative. Patient was sent for additional blood work by GI. This was performed at an outside laboratory; we will request these results. Currently, she feels well and denies significant history of alcoholism or uncontrolled hypercholesterolemia. \par \par Bone marrow aspiration and biopsy performed in 08/2016 showed the following:\par -plasma cell neoplasm (lambda restricted interstitial plasma cells accounting for ~15% of cells)\par -amyloid staining negative\par -background of relative erythroid hyperplasia\par -FISH positive for CCDN1/IDH rearrangement\par \par Of note, IgG lambda monoclonal protein with M-spike of 0.9 was present in SPEP/SIFE. \par \par PET-CT 6/6/17 Splenomegaly. On the prior CT scan of the abdomen and pelvis done on August 17, 2016 the spleen measured 12.5 cm x 5.6 cm x 16.1 cm, at this time measured 12.5 cm x 7 cm x 15.9 cm. Multiple small nodules are seen in the right upper lobe peripherally new, the largest one measuring 7 mm, FDG avid with a max SUV 3.6 with slight misregistration. Postinflammatory and/or malignancy. \par \par Disease: splenomegaly, monoclonal gammopathy \par  [de-identified] : 10/30/2018 : Patient returns for follow up , she feels well , she denies any B symptoms , no abnormal bleeding , weakness, abdominal pain or increase in girth . No skeletal pain . SHe had an abdominal sonogram today . Last blood work from January showed no change in M spike or free light chains . CBC with stable pancytopenia .\par \par 2/11/19:\par Doing well. No major complaints.\par Denies fever, nausea, vomiting, chest pain, SOB, abdominal pain, bowel and bladder problems.\par S/p BM biopsy. \par \par BM biospy showed, Normocellular marrow (35%) with unremarkable trilineage  hematopoiesis. - Approximately 5% of plasma cells with lambda restriction by flow cytometry. - No evidence of leukemia or lymphoma. Stainable iron is decreased. Ringed sideroblasts are not seen. \par \par PET scan in 12/2018 showed, No definite evidence of pathologic FDG uptake. . Stable splenomegaly, 16.5 cm in length. . Previously described right upper lobe pulmonary nodules have resolved.

## 2019-02-12 NOTE — ASSESSMENT
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 Chronic liver disease less likely , rule out primary splenic lymphoma ,\par \par PLAN:\par - BM biopsy showed normocellular marrow (35%) with unremarkable trilineage  hematopoiesis. 5% plasma cells. No evidence of leukemia or lymphoma.\par - Unknown etiology of splenomegaly. Could be secondary to ?splenic lymphoma. \par - CBC showed stable leukopenia and thrombocytopenia. currently asymptomatic . \par - Discussed the options of watchful waiting vs single agent rituxan vs splenectomy. As she is asymptomatic from splenomegaly we decided on watchful waiting for now. If she becomes symptomatic we will consider doing single agent Rituxan. \par \par All her questions answered in detail.\par \par RTC in 2 months.\par Pt seen and examined with \par \par

## 2019-02-12 NOTE — CONSULT LETTER
[Dear  ___] : Dear  [unfilled], [Courtesy Letter:] : I had the pleasure of seeing your patient, [unfilled], in my office today. [DrGriselda  ___] : Dr. MERCADO [DrGriselda ___] : Dr. MERCADO

## 2019-02-12 NOTE — PHYSICAL EXAM
[Normal] : no peripheral adenopathy appreciated [de-identified] : well preserved female in no acute distress.  [de-identified] : splenomegaly

## 2019-02-21 LAB — CHROM ANALY OVERALL INTERP SPEC-IMP: SIGNIFICANT CHANGE UP

## 2019-03-12 ENCOUNTER — FORM ENCOUNTER (OUTPATIENT)
Age: 79
End: 2019-03-12

## 2019-03-12 ENCOUNTER — APPOINTMENT (OUTPATIENT)
Dept: HEMATOLOGY ONCOLOGY | Facility: CLINIC | Age: 79
End: 2019-03-12

## 2019-03-12 ENCOUNTER — LABORATORY RESULT (OUTPATIENT)
Age: 79
End: 2019-03-12

## 2019-03-12 ENCOUNTER — OUTPATIENT (OUTPATIENT)
Dept: OUTPATIENT SERVICES | Facility: HOSPITAL | Age: 79
LOS: 1 days | Discharge: HOME | End: 2019-03-12

## 2019-03-12 VITALS
SYSTOLIC BLOOD PRESSURE: 137 MMHG | TEMPERATURE: 97.5 F | BODY MASS INDEX: 27.42 KG/M2 | RESPIRATION RATE: 14 BRPM | HEIGHT: 62 IN | HEART RATE: 80 BPM | DIASTOLIC BLOOD PRESSURE: 80 MMHG | WEIGHT: 149 LBS

## 2019-03-12 DIAGNOSIS — D69.6 THROMBOCYTOPENIA, UNSPECIFIED: ICD-10-CM

## 2019-03-12 DIAGNOSIS — Z98.890 OTHER SPECIFIED POSTPROCEDURAL STATES: Chronic | ICD-10-CM

## 2019-03-13 ENCOUNTER — OUTPATIENT (OUTPATIENT)
Dept: OUTPATIENT SERVICES | Facility: HOSPITAL | Age: 79
LOS: 1 days | Discharge: HOME | End: 2019-03-13

## 2019-03-13 DIAGNOSIS — Z98.890 OTHER SPECIFIED POSTPROCEDURAL STATES: Chronic | ICD-10-CM

## 2019-03-13 DIAGNOSIS — R16.1 SPLENOMEGALY, NOT ELSEWHERE CLASSIFIED: ICD-10-CM

## 2019-03-13 LAB
HCT VFR BLD CALC: 38.8 %
HGB BLD-MCNC: 12.7 G/DL
MCHC RBC-ENTMCNC: 30.9 PG
MCHC RBC-ENTMCNC: 32.7 G/DL
MCV RBC AUTO: 94.4 FL
PLATELET # BLD AUTO: 59 K/UL
PMV BLD: 11.2 FL
RBC # BLD: 4.11 M/UL
RBC # FLD: 14.8 %
WBC # FLD AUTO: 2.97 K/UL

## 2019-03-20 NOTE — HISTORY OF PRESENT ILLNESS
[de-identified] : \par 76/F presenting for follow-up for a history of splenomegaly with leukopenia and thrombocytopenia. Since her last visit, she has been evaluated by GI for liver disease as a possible etiology. Fibrosure testing was suggestive of severe liver fibrosis. EGD done showed small esophageal varices and gastric polyps (pathology benign). She was advised nadolol. She sought a second opinion with a different gastroenterologist, who advised regarding false positive results with Fibrosure testing. She had a repeat endoscopy which reportedly was negative for varices. She did not start taking the nadolol. Screening for viral hepatitis, hemochromatosis, Holden's disease, and autoimmune liver disease was negative. Patient was sent for additional blood work by GI. This was performed at an outside laboratory; we will request these results. Currently, she feels well and denies significant history of alcoholism or uncontrolled hypercholesterolemia. \par \par Bone marrow aspiration and biopsy performed in 08/2016 showed the following:\par -plasma cell neoplasm (lambda restricted interstitial plasma cells accounting for ~15% of cells)\par -amyloid staining negative\par -background of relative erythroid hyperplasia\par -FISH positive for CCDN1/IDH rearrangement\par \par Of note, IgG lambda monoclonal protein with M-spike of 0.9 was present in SPEP/SIFE. \par \par PET-CT 6/6/17 Splenomegaly. On the prior CT scan of the abdomen and pelvis done on August 17, 2016 the spleen measured 12.5 cm x 5.6 cm x 16.1 cm, at this time measured 12.5 cm x 7 cm x 15.9 cm. Multiple small nodules are seen in the right upper lobe peripherally new, the largest one measuring 7 mm, FDG avid with a max SUV 3.6 with slight misregistration. Postinflammatory and/or malignancy. \par \par Disease: splenomegaly, monoclonal gammopathy \par  [de-identified] : 10/30/2018 : Patient returns for follow up , she feels well , she denies any B symptoms , no abnormal bleeding , weakness, abdominal pain or increase in girth . No skeletal pain . SHe had an abdominal sonogram today . Last blood work from January showed no change in M spike or free light chains . CBC with stable pancytopenia .\par \par 2/11/19:\par Doing well. No major complaints.\par Denies fever, nausea, vomiting, chest pain, SOB, abdominal pain, bowel and bladder problems.\par S/p BM biopsy. \par \par BM biospy showed, Normocellular marrow (35%) with unremarkable trilineage  hematopoiesis. - Approximately 5% of plasma cells with lambda restriction by flow cytometry. - No evidence of leukemia or lymphoma. Stainable iron is decreased. Ringed sideroblasts are not seen. \par \par PET scan in 12/2018 showed, No definite evidence of pathologic FDG uptake. . Stable splenomegaly, 16.5 cm in length. . Previously described right upper lobe pulmonary nodules have resolved. \par \par 3/12/19:\par C/o LUQ pain, 3/10 on lying on that side\par Denies fever, nausea, vomiting, chest pain, SOB, abdominal pain, bowel and bladder problems.\par C/o feeling cold all the time. \par

## 2019-03-20 NOTE — ASSESSMENT
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 Chronic liver disease less likely , rule out primary splenic lymphoma.\par \par - BM biopsy showed normocellular marrow (35%) with unremarkable trilineage  hematopoiesis. 5% plasma cells. No evidence of leukemia or lymphoma\par \par PLAN:\par - Unknown etiology of splenomegaly. Could be secondary to ?splenic lymphoma. \par - CBC today showed stable leukopenia and thrombocytopenia. \par - Will monitor for now.  \par - C/o mild LUQ pain. Repeat USG abdomen. Prescription given. \par - Will repeat myeloma panel during next visit for monoclonal gammopathy. \par \par All her questions answered in detail.\par \par RTC in 2 months.\par Pt seen and examined with

## 2019-03-20 NOTE — PHYSICAL EXAM
[Normal] : no peripheral adenopathy appreciated [de-identified] : well preserved female in no acute distress.  [de-identified] : splenomegaly

## 2019-04-09 ENCOUNTER — TRANSCRIPTION ENCOUNTER (OUTPATIENT)
Age: 79
End: 2019-04-09

## 2019-06-17 ENCOUNTER — LABORATORY RESULT (OUTPATIENT)
Age: 79
End: 2019-06-17

## 2019-06-17 ENCOUNTER — APPOINTMENT (OUTPATIENT)
Dept: HEMATOLOGY ONCOLOGY | Facility: CLINIC | Age: 79
End: 2019-06-17
Payer: MEDICARE

## 2019-06-17 ENCOUNTER — OUTPATIENT (OUTPATIENT)
Dept: OUTPATIENT SERVICES | Facility: HOSPITAL | Age: 79
LOS: 1 days | Discharge: HOME | End: 2019-06-17
Payer: MEDICARE

## 2019-06-17 VITALS
BODY MASS INDEX: 27.42 KG/M2 | TEMPERATURE: 98.7 F | WEIGHT: 149 LBS | RESPIRATION RATE: 14 BRPM | DIASTOLIC BLOOD PRESSURE: 63 MMHG | HEIGHT: 62 IN | SYSTOLIC BLOOD PRESSURE: 134 MMHG | HEART RATE: 75 BPM

## 2019-06-17 DIAGNOSIS — Z98.890 OTHER SPECIFIED POSTPROCEDURAL STATES: Chronic | ICD-10-CM

## 2019-06-17 DIAGNOSIS — R92.8 OTHER ABNORMAL AND INCONCLUSIVE FINDINGS ON DIAGNOSTIC IMAGING OF BREAST: ICD-10-CM

## 2019-06-17 LAB
ALBUMIN SERPL ELPH-MCNC: 3.6 G/DL
ALP BLD-CCNC: 71 U/L
ALT SERPL-CCNC: 24 U/L
ANION GAP SERPL CALC-SCNC: 9 MMOL/L
AST SERPL-CCNC: 38 U/L
BILIRUB SERPL-MCNC: 2.4 MG/DL
BUN SERPL-MCNC: 20 MG/DL
CALCIUM SERPL-MCNC: 9.2 MG/DL
CHLORIDE SERPL-SCNC: 107 MMOL/L
CO2 SERPL-SCNC: 25 MMOL/L
CREAT SERPL-MCNC: 0.9 MG/DL
GLUCOSE SERPL-MCNC: 119 MG/DL
HCT VFR BLD CALC: 37.4 %
HGB BLD-MCNC: 12 G/DL
LDH SERPL-CCNC: 248
MCHC RBC-ENTMCNC: 30.3 PG
MCHC RBC-ENTMCNC: 32.1 G/DL
MCV RBC AUTO: 94.4 FL
PLATELET # BLD AUTO: 48 K/UL
PMV BLD: 10.6 FL
POTASSIUM SERPL-SCNC: 4 MMOL/L
PROT SERPL-MCNC: 6.5 G/DL
RBC # BLD: 3.96 M/UL
RBC # FLD: 15.2 %
SODIUM SERPL-SCNC: 141 MMOL/L
WBC # FLD AUTO: 2 K/UL

## 2019-06-17 PROCEDURE — G0279: CPT | Mod: 26,LT

## 2019-06-17 PROCEDURE — 99214 OFFICE O/P EST MOD 30 MIN: CPT

## 2019-06-17 PROCEDURE — 77065 DX MAMMO INCL CAD UNI: CPT | Mod: 26,LT

## 2019-06-17 PROCEDURE — 76641 ULTRASOUND BREAST COMPLETE: CPT | Mod: 26,LT

## 2019-06-17 NOTE — HISTORY OF PRESENT ILLNESS
[de-identified] : \par 76/F presenting for follow-up for a history of splenomegaly with leukopenia and thrombocytopenia. Since her last visit, she has been evaluated by GI for liver disease as a possible etiology. Fibrosure testing was suggestive of severe liver fibrosis. EGD done showed small esophageal varices and gastric polyps (pathology benign). She was advised nadolol. She sought a second opinion with a different gastroenterologist, who advised regarding false positive results with Fibrosure testing. She had a repeat endoscopy which reportedly was negative for varices. She did not start taking the nadolol. Screening for viral hepatitis, hemochromatosis, Holden's disease, and autoimmune liver disease was negative. Patient was sent for additional blood work by GI. This was performed at an outside laboratory; we will request these results. Currently, she feels well and denies significant history of alcoholism or uncontrolled hypercholesterolemia. \par \par Bone marrow aspiration and biopsy performed in 08/2016 showed the following:\par -plasma cell neoplasm (lambda restricted interstitial plasma cells accounting for ~15% of cells)\par -amyloid staining negative\par -background of relative erythroid hyperplasia\par -FISH positive for CCDN1/IDH rearrangement\par \par Of note, IgG lambda monoclonal protein with M-spike of 0.9 was present in SPEP/SIFE. \par \par PET-CT 6/6/17 Splenomegaly. On the prior CT scan of the abdomen and pelvis done on August 17, 2016 the spleen measured 12.5 cm x 5.6 cm x 16.1 cm, at this time measured 12.5 cm x 7 cm x 15.9 cm. Multiple small nodules are seen in the right upper lobe peripherally new, the largest one measuring 7 mm, FDG avid with a max SUV 3.6 with slight misregistration. Postinflammatory and/or malignancy. \par \par Disease: splenomegaly, monoclonal gammopathy \par  [de-identified] : 10/30/2018 : Patient returns for follow up , she feels well , she denies any B symptoms , no abnormal bleeding , weakness, abdominal pain or increase in girth . No skeletal pain . SHe had an abdominal sonogram today . Last blood work from January showed no change in M spike or free light chains . CBC with stable pancytopenia .\par \par 06/17/2019 patient returns for follow up for splenomegaly , pancytopenia and monoclonal gammopathy . She feels well and denies new complaints except for bilateral leg pain , worse with activity , no swelling , no relief after she stopped Evista and simvastatin . She denies any B symptoms or new bone pain , Sonogram in march showed spleen 16 cm .\par no abnormal bleeding .

## 2019-06-17 NOTE — PHYSICAL EXAM
[Normal] : RRR, normal S1S2, no murmurs, rubs, gallops [de-identified] : well preserved female in no acute distress.  [de-identified] : pulses adequate .  [de-identified] : spleen palpable 3 cm BCM .

## 2019-06-17 NOTE — ASSESSMENT
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 Chronic liver disease less likely , rule out primary splenic lymphoma ,\par \par - BM biopsy showed normocellular marrow (35%) with unremarkable trilineage  hematopoiesis. 5% plasma cells. No evidence of leukemia or lymphoma.\par \par Today's CBC shows worsening cytopenia , gran : 1000 , platelets : 48 . \par will consider rituxan v/s splenectomy . will discuss with patient .return in 2 months. \par

## 2019-06-18 LAB
DEPRECATED KAPPA LC FREE/LAMBDA SER: 1.17 RATIO
KAPPA LC CSF-MCNC: 2.18 MG/DL
KAPPA LC SERPL-MCNC: 2.55 MG/DL

## 2019-06-19 DIAGNOSIS — Z02.9 ENCOUNTER FOR ADMINISTRATIVE EXAMINATIONS, UNSPECIFIED: ICD-10-CM

## 2019-06-19 LAB
ALBUMIN MFR SERPL ELPH: 56.7 %
ALBUMIN SERPL-MCNC: 3.7 G/DL
ALBUMIN/GLOB SERPL: 1.3 RATIO
ALPHA1 GLOB MFR SERPL ELPH: 3.6 %
ALPHA1 GLOB SERPL ELPH-MCNC: 0.2 G/DL
ALPHA2 GLOB MFR SERPL ELPH: 7.9 %
ALPHA2 GLOB SERPL ELPH-MCNC: 0.5 G/DL
B-GLOBULIN MFR SERPL ELPH: 8.2 %
B-GLOBULIN SERPL ELPH-MCNC: 0.5 G/DL
GAMMA GLOB FLD ELPH-MCNC: 1.5 G/DL
GAMMA GLOB MFR SERPL ELPH: 23.6 %
INTERPRETATION SERPL IEP-IMP: NORMAL
M PROTEIN MFR SERPL ELPH: 15.5 %
MONOCLON BAND OBS SERPL: 1 G/DL
PROT SERPL-MCNC: 6.5 G/DL

## 2019-09-03 ENCOUNTER — LABORATORY RESULT (OUTPATIENT)
Age: 79
End: 2019-09-03

## 2019-09-03 ENCOUNTER — OUTPATIENT (OUTPATIENT)
Dept: OUTPATIENT SERVICES | Facility: HOSPITAL | Age: 79
LOS: 1 days | Discharge: HOME | End: 2019-09-03

## 2019-09-03 ENCOUNTER — APPOINTMENT (OUTPATIENT)
Dept: HEMATOLOGY ONCOLOGY | Facility: CLINIC | Age: 79
End: 2019-09-03
Payer: MEDICARE

## 2019-09-03 VITALS
SYSTOLIC BLOOD PRESSURE: 130 MMHG | BODY MASS INDEX: 27.05 KG/M2 | HEART RATE: 80 BPM | HEIGHT: 62 IN | RESPIRATION RATE: 14 BRPM | DIASTOLIC BLOOD PRESSURE: 62 MMHG | TEMPERATURE: 97.9 F | WEIGHT: 147 LBS

## 2019-09-03 DIAGNOSIS — R92.8 OTHER ABNORMAL AND INCONCLUSIVE FINDINGS ON DIAGNOSTIC IMAGING OF BREAST: ICD-10-CM

## 2019-09-03 DIAGNOSIS — R16.1 SPLENOMEGALY, NOT ELSEWHERE CLASSIFIED: ICD-10-CM

## 2019-09-03 DIAGNOSIS — Z98.890 OTHER SPECIFIED POSTPROCEDURAL STATES: Chronic | ICD-10-CM

## 2019-09-03 LAB
HCT VFR BLD CALC: 38 %
HGB BLD-MCNC: 12.5 G/DL
MCHC RBC-ENTMCNC: 30.7 PG
MCHC RBC-ENTMCNC: 32.9 G/DL
MCV RBC AUTO: 93.4 FL
PLATELET # BLD AUTO: 52 K/UL
PMV BLD: 10 FL
RBC # BLD: 4.07 M/UL
RBC # FLD: 15.5 %
WBC # FLD AUTO: 3.2 K/UL

## 2019-09-03 PROCEDURE — 99214 OFFICE O/P EST MOD 30 MIN: CPT

## 2019-09-04 NOTE — CONSULT LETTER
[Dear  ___] : Dear  [unfilled], [Please see my note below.] : Please see my note below. [Consult Letter:] : I had the pleasure of evaluating your patient, [unfilled]. [Sincerely,] : Sincerely, [Consult Closing:] : Thank you very much for allowing me to participate in the care of this patient.  If you have any questions, please do not hesitate to contact me. [FreeTextEntry3] : Dr Erickson

## 2019-09-04 NOTE — ASSESSMENT
[FreeTextEntry1] : 78 year old female with progressive splenomegaly, MGUS ( BM with 5 % plasma cells) , pancytopenia, currently asymptomatic.\par - BM biopsy showed normocellular marrow (35%) with unremarkable trilineage  hematopoiesis. 5% plasma cells. No evidence of leukemia or lymphoma.\par -Her CBC today showed WBC 3.2, Hb 12, plts 52. Sonogram in march showed spleen 16 cm. Last myeloma panel from Jun 2019 showed stable M-spike of 1 and FLC ratio was 2.55/2.18 = 1.17. \par -Remote h/o breast cancer\par -Elevated t.emilio, unlikely liver cirrhosis. 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 Recommend observation at this point, given the stable counts. Rituxan vs splenectomy can be considered in future. Our preference would be rituxan. She may also benefit from rituixan in case she has ITP. She will f/u with Dr Bruce Fall from [facility] for 2nd opinion.\par Colonoscopy scheduled later this week. Last EGD was 6 years back\par Last mammogram and US were in 6/2019 showed benign calcifications in left breast. Repeat mammogram and breast US in Dec 2019\par RTO in 3 months\par \par \par

## 2019-09-04 NOTE — HISTORY OF PRESENT ILLNESS
[de-identified] : \par 76/F presenting for follow-up for a history of splenomegaly with leukopenia and thrombocytopenia. Since her last visit, she has been evaluated by GI for liver disease as a possible etiology. Fibrosure testing was suggestive of severe liver fibrosis. EGD done showed small esophageal varices and gastric polyps (pathology benign). She was advised nadolol. She sought a second opinion with a different gastroenterologist, who advised regarding false positive results with Fibrosure testing. She had a repeat endoscopy which reportedly was negative for varices. She did not start taking the nadolol. Screening for viral hepatitis, hemochromatosis, Holden's disease, and autoimmune liver disease was negative. Patient was sent for additional blood work by GI. This was performed at an outside laboratory; we will request these results. Currently, she feels well and denies significant history of alcoholism or uncontrolled hypercholesterolemia. \par \par Bone marrow aspiration and biopsy performed in 08/2016 showed the following:\par -plasma cell neoplasm (lambda restricted interstitial plasma cells accounting for ~15% of cells)\par -amyloid staining negative\par -background of relative erythroid hyperplasia\par -FISH positive for CCDN1/IDH rearrangement\par \par Of note, IgG lambda monoclonal protein with M-spike of 0.9 was present in SPEP/SIFE. \par \par PET-CT 6/6/17 Splenomegaly. On the prior CT scan of the abdomen and pelvis done on August 17, 2016 the spleen measured 12.5 cm x 5.6 cm x 16.1 cm, at this time measured 12.5 cm x 7 cm x 15.9 cm. Multiple small nodules are seen in the right upper lobe peripherally new, the largest one measuring 7 mm, FDG avid with a max SUV 3.6 with slight misregistration. Postinflammatory and/or malignancy. \par \par Disease: splenomegaly, monoclonal gammopathy \par  [de-identified] : 10/30/2018 : Patient returns for follow up , she feels well , she denies any B symptoms , no abnormal bleeding , weakness, abdominal pain or increase in girth . No skeletal pain . SHe had an abdominal sonogram today . Last blood work from January showed no change in M spike or free light chains . CBC with stable pancytopenia .\par \par 06/17/2019 patient returns for follow up for splenomegaly , pancytopenia and monoclonal gammopathy . She feels well and denies new complaints except for bilateral leg pain , worse with activity , no swelling , no relief after she stopped Evista and simvastatin . She denies any B symptoms or new bone pain , Sonogram in march showed spleen 16 cm .\par no abnormal bleeding .\par \par 9/3/19: Patient returns for follow up for splenomegaly , pancytopenia and monoclonal gammopathy. She has no fresh complaints. Her CBC today showed WBC 3.2, Hb 12, plts 52. Sonogram in march showed spleen 16 cm. Last myeloma panel from Jun 2019 showed stable M-spike of 1 and FLC ratio was 2.55/2.18 = 1.17.

## 2019-09-04 NOTE — PHYSICAL EXAM
[Restricted in physically strenuous activity but ambulatory and able to carry out work of a light or sedentary nature] : Status 1- Restricted in physically strenuous activity but ambulatory and able to carry out work of a light or sedentary nature, e.g., light house work, office work [Normal] : no peripheral adenopathy appreciated [de-identified] : well preserved female in no acute distress.  [de-identified] : pulses adequate .  [de-identified] : spleen palpable 3 cm BCM .

## 2019-09-09 DIAGNOSIS — D61.818 OTHER PANCYTOPENIA: ICD-10-CM

## 2019-09-09 DIAGNOSIS — D47.2 MONOCLONAL GAMMOPATHY: ICD-10-CM

## 2019-12-09 ENCOUNTER — LABORATORY RESULT (OUTPATIENT)
Age: 79
End: 2019-12-09

## 2019-12-09 ENCOUNTER — OUTPATIENT (OUTPATIENT)
Dept: OUTPATIENT SERVICES | Facility: HOSPITAL | Age: 79
LOS: 1 days | Discharge: HOME | End: 2019-12-09

## 2019-12-09 ENCOUNTER — APPOINTMENT (OUTPATIENT)
Dept: HEMATOLOGY ONCOLOGY | Facility: CLINIC | Age: 79
End: 2019-12-09
Payer: MEDICARE

## 2019-12-09 VITALS
DIASTOLIC BLOOD PRESSURE: 70 MMHG | SYSTOLIC BLOOD PRESSURE: 132 MMHG | RESPIRATION RATE: 14 BRPM | HEIGHT: 62 IN | BODY MASS INDEX: 26.5 KG/M2 | TEMPERATURE: 96.6 F | WEIGHT: 144 LBS | HEART RATE: 86 BPM

## 2019-12-09 DIAGNOSIS — Z98.890 OTHER SPECIFIED POSTPROCEDURAL STATES: Chronic | ICD-10-CM

## 2019-12-09 LAB
ALBUMIN SERPL ELPH-MCNC: 3.7 G/DL
ALP BLD-CCNC: 52 U/L
ALT SERPL-CCNC: 19 U/L
ANION GAP SERPL CALC-SCNC: 14 MMOL/L
AST SERPL-CCNC: 35 U/L
BILIRUB SERPL-MCNC: 3.3 MG/DL
BUN SERPL-MCNC: 14 MG/DL
CALCIUM SERPL-MCNC: 9.4 MG/DL
CHLORIDE SERPL-SCNC: 105 MMOL/L
CO2 SERPL-SCNC: 21 MMOL/L
CREAT SERPL-MCNC: 0.9 MG/DL
GLUCOSE SERPL-MCNC: 106 MG/DL
HCT VFR BLD CALC: 39.3 %
HGB BLD-MCNC: 12.7 G/DL
MCHC RBC-ENTMCNC: 31.2 PG
MCHC RBC-ENTMCNC: 32.3 G/DL
MCV RBC AUTO: 96.6 FL
PLATELET # BLD AUTO: 59 K/UL
PMV BLD: 10.5 FL
POTASSIUM SERPL-SCNC: 4.4 MMOL/L
PROT SERPL-MCNC: 6.6 G/DL
RBC # BLD: 4.07 M/UL
RBC # FLD: 14.8 %
SODIUM SERPL-SCNC: 140 MMOL/L
WBC # FLD AUTO: 3 K/UL

## 2019-12-09 PROCEDURE — 99214 OFFICE O/P EST MOD 30 MIN: CPT

## 2019-12-09 NOTE — ASSESSMENT
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 Chronic liver disease less likely , rule out primary splenic lymphoma ,\par \par - BM biopsy showed normocellular marrow (35%) with unremarkable trilineage  hematopoiesis. 5% plasma cells. No evidence of leukemia or lymphoma.\par \par Today's CBC is stable , continue watchful approach , follow up in 4 months .  \par \par

## 2019-12-09 NOTE — PHYSICAL EXAM
[Normal] : no peripheral adenopathy appreciated [de-identified] : well preserved female in no acute distress.  [de-identified] : spleen palpable 3 cm BCM .  [de-identified] : pulses adequate .

## 2019-12-09 NOTE — HISTORY OF PRESENT ILLNESS
[de-identified] : \par 76/F presenting for follow-up for a history of splenomegaly with leukopenia and thrombocytopenia. Since her last visit, she has been evaluated by GI for liver disease as a possible etiology. Fibrosure testing was suggestive of severe liver fibrosis. EGD done showed small esophageal varices and gastric polyps (pathology benign). She was advised nadolol. She sought a second opinion with a different gastroenterologist, who advised regarding false positive results with Fibrosure testing. She had a repeat endoscopy which reportedly was negative for varices. She did not start taking the nadolol. Screening for viral hepatitis, hemochromatosis, Holden's disease, and autoimmune liver disease was negative. Patient was sent for additional blood work by GI. This was performed at an outside laboratory; we will request these results. Currently, she feels well and denies significant history of alcoholism or uncontrolled hypercholesterolemia. \par \par Bone marrow aspiration and biopsy performed in 08/2016 showed the following:\par -plasma cell neoplasm (lambda restricted interstitial plasma cells accounting for ~15% of cells)\par -amyloid staining negative\par -background of relative erythroid hyperplasia\par -FISH positive for CCDN1/IDH rearrangement\par \par Of note, IgG lambda monoclonal protein with M-spike of 0.9 was present in SPEP/SIFE. \par \par PET-CT 6/6/17 Splenomegaly. On the prior CT scan of the abdomen and pelvis done on August 17, 2016 the spleen measured 12.5 cm x 5.6 cm x 16.1 cm, at this time measured 12.5 cm x 7 cm x 15.9 cm. Multiple small nodules are seen in the right upper lobe peripherally new, the largest one measuring 7 mm, FDG avid with a max SUV 3.6 with slight misregistration. Postinflammatory and/or malignancy. \par \par Disease: splenomegaly, monoclonal gammopathy \par  [de-identified] : 10/30/2018 : Patient returns for follow up , she feels well , she denies any B symptoms , no abnormal bleeding , weakness, abdominal pain or increase in girth . No skeletal pain . SHe had an abdominal sonogram today . Last blood work from January showed no change in M spike or free light chains . CBC with stable pancytopenia .\par \par 06/17/2019 patient returns for follow up for splenomegaly , pancytopenia and monoclonal gammopathy . She feels well and denies new complaints except for bilateral leg pain , worse with activity , no swelling , no relief after she stopped Evista and simvastatin . She denies any B symptoms or new bone pain , Sonogram in march showed spleen 16 cm .\par no abnormal bleeding .\par \par 12/09/2019 Patient returns for follow up , she feels well and denies any B symptoms , abdominal pain or early satiety , she complains only of mild bilateral lower extremity pain after walking , no bleeding or bruising . She was seen by Dr Fall who recommended a watchful approach as long as she remains asymptomatic .

## 2019-12-10 LAB
DEPRECATED KAPPA LC FREE/LAMBDA SER: 1.26 RATIO
KAPPA LC CSF-MCNC: 2.14 MG/DL
KAPPA LC SERPL-MCNC: 2.7 MG/DL

## 2019-12-11 LAB
ALBUMIN MFR SERPL ELPH: 55.6 %
ALBUMIN SERPL-MCNC: 3.7 G/DL
ALBUMIN/GLOB SERPL: 1.3 RATIO
ALPHA1 GLOB MFR SERPL ELPH: 3.4 %
ALPHA1 GLOB SERPL ELPH-MCNC: 0.2 G/DL
ALPHA2 GLOB MFR SERPL ELPH: 7.9 %
ALPHA2 GLOB SERPL ELPH-MCNC: 0.5 G/DL
B-GLOBULIN MFR SERPL ELPH: 8.6 %
B-GLOBULIN SERPL ELPH-MCNC: 0.6 G/DL
GAMMA GLOB FLD ELPH-MCNC: 1.6 G/DL
GAMMA GLOB MFR SERPL ELPH: 24.5 %
INTERPRETATION SERPL IEP-IMP: NORMAL
M PROTEIN MFR SERPL ELPH: 16.8 %
MONOCLON BAND OBS SERPL: 1.1 G/DL
PROT SERPL-MCNC: 6.6 G/DL

## 2019-12-17 ENCOUNTER — FORM ENCOUNTER (OUTPATIENT)
Age: 79
End: 2019-12-17

## 2019-12-18 ENCOUNTER — OUTPATIENT (OUTPATIENT)
Dept: OUTPATIENT SERVICES | Facility: HOSPITAL | Age: 79
LOS: 1 days | Discharge: HOME | End: 2019-12-18
Payer: MEDICARE

## 2019-12-18 DIAGNOSIS — Z98.890 OTHER SPECIFIED POSTPROCEDURAL STATES: Chronic | ICD-10-CM

## 2019-12-18 DIAGNOSIS — R92.8 OTHER ABNORMAL AND INCONCLUSIVE FINDINGS ON DIAGNOSTIC IMAGING OF BREAST: ICD-10-CM

## 2019-12-18 DIAGNOSIS — R91.8 OTHER NONSPECIFIC ABNORMAL FINDING OF LUNG FIELD: ICD-10-CM

## 2019-12-18 PROCEDURE — 77066 DX MAMMO INCL CAD BI: CPT | Mod: 26

## 2019-12-18 PROCEDURE — 71250 CT THORAX DX C-: CPT | Mod: 26

## 2019-12-18 PROCEDURE — G0279: CPT | Mod: 26

## 2019-12-20 DIAGNOSIS — Z02.9 ENCOUNTER FOR ADMINISTRATIVE EXAMINATIONS, UNSPECIFIED: ICD-10-CM

## 2019-12-20 DIAGNOSIS — R91.8 OTHER NONSPECIFIC ABNORMAL FINDING OF LUNG FIELD: ICD-10-CM

## 2020-01-02 ENCOUNTER — RESULT REVIEW (OUTPATIENT)
Age: 80
End: 2020-01-02

## 2020-01-02 ENCOUNTER — OUTPATIENT (OUTPATIENT)
Dept: OUTPATIENT SERVICES | Facility: HOSPITAL | Age: 80
LOS: 1 days | Discharge: HOME | End: 2020-01-02
Payer: MEDICARE

## 2020-01-02 DIAGNOSIS — Z98.890 OTHER SPECIFIED POSTPROCEDURAL STATES: Chronic | ICD-10-CM

## 2020-01-02 PROCEDURE — 88305 TISSUE EXAM BY PATHOLOGIST: CPT | Mod: 26

## 2020-01-02 PROCEDURE — 19081 BX BREAST 1ST LESION STRTCTC: CPT | Mod: LT

## 2020-01-03 LAB — SURGICAL PATHOLOGY STUDY: SIGNIFICANT CHANGE UP

## 2020-01-06 DIAGNOSIS — D24.2 BENIGN NEOPLASM OF LEFT BREAST: ICD-10-CM

## 2020-01-06 DIAGNOSIS — N63.20 UNSPECIFIED LUMP IN THE LEFT BREAST, UNSPECIFIED QUADRANT: ICD-10-CM

## 2020-01-22 DIAGNOSIS — R16.1 SPLENOMEGALY, NOT ELSEWHERE CLASSIFIED: ICD-10-CM

## 2020-01-22 DIAGNOSIS — D61.818 OTHER PANCYTOPENIA: ICD-10-CM

## 2020-01-31 DIAGNOSIS — R92.0 MAMMOGRAPHIC MICROCALCIFICATION FOUND ON DIAGNOSTIC IMAGING OF BREAST: ICD-10-CM

## 2020-05-26 ENCOUNTER — APPOINTMENT (OUTPATIENT)
Dept: HEMATOLOGY ONCOLOGY | Facility: CLINIC | Age: 80
End: 2020-05-26
Payer: MEDICARE

## 2020-05-26 ENCOUNTER — OUTPATIENT (OUTPATIENT)
Dept: OUTPATIENT SERVICES | Facility: HOSPITAL | Age: 80
LOS: 1 days | Discharge: HOME | End: 2020-05-26

## 2020-05-26 ENCOUNTER — LABORATORY RESULT (OUTPATIENT)
Age: 80
End: 2020-05-26

## 2020-05-26 VITALS
RESPIRATION RATE: 16 BRPM | DIASTOLIC BLOOD PRESSURE: 71 MMHG | TEMPERATURE: 97.2 F | HEART RATE: 70 BPM | WEIGHT: 146 LBS | OXYGEN SATURATION: 100 % | BODY MASS INDEX: 26.87 KG/M2 | SYSTOLIC BLOOD PRESSURE: 143 MMHG | HEIGHT: 62 IN

## 2020-05-26 DIAGNOSIS — Z98.890 OTHER SPECIFIED POSTPROCEDURAL STATES: Chronic | ICD-10-CM

## 2020-05-26 LAB
HCT VFR BLD CALC: 38.6 %
HGB BLD-MCNC: 12.4 G/DL
MCHC RBC-ENTMCNC: 31.2 PG
MCHC RBC-ENTMCNC: 32.1 G/DL
MCV RBC AUTO: 97 FL
PLATELET # BLD AUTO: 50 K/UL
PMV BLD: 11.3 FL
RBC # BLD: 3.98 M/UL
RBC # FLD: 15.1 %
WBC # FLD AUTO: 2.36 K/UL

## 2020-05-26 PROCEDURE — 99214 OFFICE O/P EST MOD 30 MIN: CPT

## 2020-05-26 NOTE — ASSESSMENT
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 \par - BM biopsy showed normocellular marrow (35%) with unremarkable trilineage  hematopoiesis. 5% plasma cells. No evidence of leukemia or lymphoma. primary splenic lymphoma not ruled out .\par CBC essentially stable for at least 2 years .\par -Elevated t.emilio, unlikely liver cirrhosis. 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 \par Plan : continue to monitor . follow up in 3 months .

## 2020-05-26 NOTE — HISTORY OF PRESENT ILLNESS
[de-identified] : \par 76/F presenting for follow-up for a history of splenomegaly with leukopenia and thrombocytopenia. Since her last visit, she has been evaluated by GI for liver disease as a possible etiology. Fibrosure testing was suggestive of severe liver fibrosis. EGD done showed small esophageal varices and gastric polyps (pathology benign). She was advised nadolol. She sought a second opinion with a different gastroenterologist, who advised regarding false positive results with Fibrosure testing. She had a repeat endoscopy which reportedly was negative for varices. She did not start taking the nadolol. Screening for viral hepatitis, hemochromatosis, Holden's disease, and autoimmune liver disease was negative. Patient was sent for additional blood work by GI. This was performed at an outside laboratory; we will request these results. Currently, she feels well and denies significant history of alcoholism or uncontrolled hypercholesterolemia. \par \par Bone marrow aspiration and biopsy performed in 08/2016 showed the following:\par -plasma cell neoplasm (lambda restricted interstitial plasma cells accounting for ~15% of cells)\par -amyloid staining negative\par -background of relative erythroid hyperplasia\par -FISH positive for CCDN1/IDH rearrangement\par \par Of note, IgG lambda monoclonal protein with M-spike of 0.9 was present in SPEP/SIFE. \par \par PET-CT 6/6/17 Splenomegaly. On the prior CT scan of the abdomen and pelvis done on August 17, 2016 the spleen measured 12.5 cm x 5.6 cm x 16.1 cm, at this time measured 12.5 cm x 7 cm x 15.9 cm. Multiple small nodules are seen in the right upper lobe peripherally new, the largest one measuring 7 mm, FDG avid with a max SUV 3.6 with slight misregistration. Postinflammatory and/or malignancy. \par \par Disease: splenomegaly, monoclonal gammopathy \par  [de-identified] : 10/30/2018 : Patient returns for follow up , she feels well , she denies any B symptoms , no abnormal bleeding , weakness, abdominal pain or increase in girth . No skeletal pain . SHe had an abdominal sonogram today . Last blood work from January showed no change in M spike or free light chains . CBC with stable pancytopenia .\par \par 06/17/2019 patient returns for follow up for splenomegaly , pancytopenia and monoclonal gammopathy . She feels well and denies new complaints except for bilateral leg pain , worse with activity , no swelling , no relief after she stopped Evista and simvastatin . She denies any B symptoms or new bone pain , Sonogram in march showed spleen 16 cm .\par no abnormal bleeding .\par \par 9/3/19: Patient returns for follow up for splenomegaly , pancytopenia and monoclonal gammopathy. She has no fresh complaints. Her CBC today showed WBC 3.2, Hb 12, plts 52. Sonogram in march showed spleen 16 cm. Last myeloma panel from Jun 2019 showed stable M-spike of 1 and FLC ratio was 2.55/2.18 = 1.17. \par \par 12/09/2019 Patient returns for follow up , she feels well and denies any B symptoms , abdominal pain or early satiety , she complains only of mild bilateral lower extremity pain after walking , no bleeding or bruising. She was seen by Dr Fall who recommended a watchful approach as long as she remains asymptomatic. \par \par 5/26/2020: BRAULIO ESPARZA is a 79 year old female here today for follow up visit for chronic splenomegaly with pancytopenia , no definite evidence of liver disease , early stage myeloma v/s MGUS ( BM with 5 % plasma cells). Patient is asymptomatic at this time. She denies increased fatigue, abnormal bleeding, night sweats, new bone pain, weight loss, or abdominal pain.\par \par

## 2020-05-26 NOTE — PHYSICAL EXAM
[Restricted in physically strenuous activity but ambulatory and able to carry out work of a light or sedentary nature] : Status 1- Restricted in physically strenuous activity but ambulatory and able to carry out work of a light or sedentary nature, e.g., light house work, office work [Normal] : no peripheral adenopathy appreciated [de-identified] : well preserved female in no acute distress.  [de-identified] : pulses adequate .  [de-identified] : spleen not felt on exam today .

## 2020-05-26 NOTE — CONSULT LETTER
[Dear  ___] : Dear  [unfilled], [Consult Letter:] : I had the pleasure of evaluating your patient, [unfilled]. [Please see my note below.] : Please see my note below. [Consult Closing:] : Thank you very much for allowing me to participate in the care of this patient.  If you have any questions, please do not hesitate to contact me. [Sincerely,] : Sincerely, [FreeTextEntry3] : Dr Erickson

## 2020-05-27 LAB
ALBUMIN SERPL ELPH-MCNC: 3.6 G/DL
ALP BLD-CCNC: 57 U/L
ALT SERPL-CCNC: 19 U/L
ANION GAP SERPL CALC-SCNC: 12 MMOL/L
AST SERPL-CCNC: 40 U/L
BILIRUB SERPL-MCNC: 3.6 MG/DL
BUN SERPL-MCNC: 21 MG/DL
CALCIUM SERPL-MCNC: 9.4 MG/DL
CHLORIDE SERPL-SCNC: 105 MMOL/L
CHOLEST SERPL-MCNC: 157 MG/DL
CHOLEST/HDLC SERPL: 2.3 RATIO
CO2 SERPL-SCNC: 25 MMOL/L
CREAT SERPL-MCNC: 1.1 MG/DL
GLUCOSE SERPL-MCNC: 93 MG/DL
HDLC SERPL-MCNC: 68 MG/DL
LDLC SERPL CALC-MCNC: 82 MG/DL
POTASSIUM SERPL-SCNC: 4.6 MMOL/L
PROT SERPL-MCNC: 6.6 G/DL
SODIUM SERPL-SCNC: 142 MMOL/L
TRIGL SERPL-MCNC: 84 MG/DL

## 2020-05-28 LAB — 25(OH)D3 SERPL-MCNC: 44 NG/ML

## 2020-05-29 DIAGNOSIS — R16.1 SPLENOMEGALY, NOT ELSEWHERE CLASSIFIED: ICD-10-CM

## 2020-06-16 ENCOUNTER — RX RENEWAL (OUTPATIENT)
Age: 80
End: 2020-06-16

## 2020-07-03 ENCOUNTER — OUTPATIENT (OUTPATIENT)
Dept: OUTPATIENT SERVICES | Facility: HOSPITAL | Age: 80
LOS: 1 days | Discharge: HOME | End: 2020-07-03
Payer: MEDICARE

## 2020-07-03 ENCOUNTER — RESULT REVIEW (OUTPATIENT)
Age: 80
End: 2020-07-03

## 2020-07-03 DIAGNOSIS — Z98.890 OTHER SPECIFIED POSTPROCEDURAL STATES: Chronic | ICD-10-CM

## 2020-07-03 DIAGNOSIS — C90.00 MULTIPLE MYELOMA NOT HAVING ACHIEVED REMISSION: ICD-10-CM

## 2020-07-03 PROCEDURE — 76700 US EXAM ABDOM COMPLETE: CPT | Mod: 26

## 2020-08-27 ENCOUNTER — OUTPATIENT (OUTPATIENT)
Dept: OUTPATIENT SERVICES | Facility: HOSPITAL | Age: 80
LOS: 1 days | Discharge: HOME | End: 2020-08-27

## 2020-08-27 ENCOUNTER — LABORATORY RESULT (OUTPATIENT)
Age: 80
End: 2020-08-27

## 2020-08-27 ENCOUNTER — APPOINTMENT (OUTPATIENT)
Dept: HEMATOLOGY ONCOLOGY | Facility: CLINIC | Age: 80
End: 2020-08-27
Payer: MEDICARE

## 2020-08-27 VITALS
RESPIRATION RATE: 16 BRPM | BODY MASS INDEX: 25.95 KG/M2 | DIASTOLIC BLOOD PRESSURE: 80 MMHG | SYSTOLIC BLOOD PRESSURE: 133 MMHG | HEIGHT: 62 IN | TEMPERATURE: 97.2 F | WEIGHT: 141 LBS | HEART RATE: 74 BPM

## 2020-08-27 DIAGNOSIS — Z98.890 OTHER SPECIFIED POSTPROCEDURAL STATES: Chronic | ICD-10-CM

## 2020-08-27 LAB
HCT VFR BLD CALC: 37 %
HGB BLD-MCNC: 12.2 G/DL
MCHC RBC-ENTMCNC: 31.3 PG
MCHC RBC-ENTMCNC: 33 G/DL
MCV RBC AUTO: 94.9 FL
PLATELET # BLD AUTO: 66 K/UL
PMV BLD: 10.4 FL
RBC # BLD: 3.9 M/UL
RBC # FLD: 15 %
WBC # FLD AUTO: 3.86 K/UL

## 2020-08-27 PROCEDURE — 99214 OFFICE O/P EST MOD 30 MIN: CPT

## 2020-08-27 NOTE — HISTORY OF PRESENT ILLNESS
[de-identified] : \par 76/F presenting for follow-up for a history of splenomegaly with leukopenia and thrombocytopenia. Since her last visit, she has been evaluated by GI for liver disease as a possible etiology. Fibrosure testing was suggestive of severe liver fibrosis. EGD done showed small esophageal varices and gastric polyps (pathology benign). She was advised nadolol. She sought a second opinion with a different gastroenterologist, who advised regarding false positive results with Fibrosure testing. She had a repeat endoscopy which reportedly was negative for varices. She did not start taking the nadolol. Screening for viral hepatitis, hemochromatosis, Holden's disease, and autoimmune liver disease was negative. Patient was sent for additional blood work by GI. This was performed at an outside laboratory; we will request these results. Currently, she feels well and denies significant history of alcoholism or uncontrolled hypercholesterolemia. \par \par Bone marrow aspiration and biopsy performed in 08/2016 showed the following:\par -plasma cell neoplasm (lambda restricted interstitial plasma cells accounting for ~15% of cells)\par -amyloid staining negative\par -background of relative erythroid hyperplasia\par -FISH positive for CCDN1/IDH rearrangement\par \par Of note, IgG lambda monoclonal protein with M-spike of 0.9 was present in SPEP/SIFE. \par \par PET-CT 6/6/17 Splenomegaly. On the prior CT scan of the abdomen and pelvis done on August 17, 2016 the spleen measured 12.5 cm x 5.6 cm x 16.1 cm, at this time measured 12.5 cm x 7 cm x 15.9 cm. Multiple small nodules are seen in the right upper lobe peripherally new, the largest one measuring 7 mm, FDG avid with a max SUV 3.6 with slight misregistration. Postinflammatory and/or malignancy. \par \par Disease: splenomegaly, monoclonal gammopathy \par  [de-identified] : 10/30/2018 : Patient returns for follow up , she feels well , she denies any B symptoms , no abnormal bleeding , weakness, abdominal pain or increase in girth . No skeletal pain . SHe had an abdominal sonogram today . Last blood work from January showed no change in M spike or free light chains . CBC with stable pancytopenia .\par \par 06/17/2019 patient returns for follow up for splenomegaly , pancytopenia and monoclonal gammopathy . She feels well and denies new complaints except for bilateral leg pain , worse with activity , no swelling , no relief after she stopped Evista and simvastatin . She denies any B symptoms or new bone pain , Sonogram in march showed spleen 16 cm .\par no abnormal bleeding .\par \par 9/3/19: Patient returns for follow up for splenomegaly , pancytopenia and monoclonal gammopathy. She has no fresh complaints. Her CBC today showed WBC 3.2, Hb 12, plts 52. Sonogram in march showed spleen 16 cm. Last myeloma panel from Jun 2019 showed stable M-spike of 1 and FLC ratio was 2.55/2.18 = 1.17. \par \par 12/09/2019 Patient returns for follow up , she feels well and denies any B symptoms , abdominal pain or early satiety , she complains only of mild bilateral lower extremity pain after walking , no bleeding or bruising. She was seen by Dr Fall who recommended a watchful approach as long as she remains asymptomatic. \par \par 5/26/2020: BRAULIO ESPARZA is a 79 year old female here today for follow up visit for chronic splenomegaly with pancytopenia , no definite evidence of liver disease , early stage myeloma v/s MGUS ( BM with 5 % plasma cells). Patient is asymptomatic at this time. She denies increased fatigue, abnormal bleeding, night sweats, new bone pain, weight loss, or abdominal pain.\par \par 08/28/2020 Patient returns for history of splenomegaly and pancytopenia , she offers no complaints except for worsening lower extremity rash ( PPPD ) , No B symptoms , no abnormal bleeding . Spleen size unchanged on ultrasound . \par

## 2020-08-27 NOTE — PHYSICAL EXAM
[Normal] : no peripheral adenopathy appreciated [de-identified] : well preserved female in no acute distress.  [de-identified] : pulses adequate .  [de-identified] : spleen 4 fingers bcm .

## 2020-08-27 NOTE — ASSESSMENT
[FreeTextEntry1] : 1) 78 year old female with  splenomegaly, MGUS ( BM with 5 % plasma cells) , pancytopenia, currently asymptomatic. 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 \par Plan : continue to monitor . follow up in 4 months .

## 2020-09-01 DIAGNOSIS — D61.818 OTHER PANCYTOPENIA: ICD-10-CM

## 2020-09-01 DIAGNOSIS — R16.1 SPLENOMEGALY, NOT ELSEWHERE CLASSIFIED: ICD-10-CM

## 2020-11-16 NOTE — PRE-ANESTHESIA EVALUATION ADULT - WEIGHT IN LBS
Follow-up with ENT in 3 days, call the office to make an appointment. Return the emergency department if your symptoms recur. 152.3

## 2020-12-07 ENCOUNTER — APPOINTMENT (OUTPATIENT)
Dept: HEMATOLOGY ONCOLOGY | Facility: CLINIC | Age: 80
End: 2020-12-07
Payer: MEDICARE

## 2020-12-07 ENCOUNTER — LABORATORY RESULT (OUTPATIENT)
Age: 80
End: 2020-12-07

## 2020-12-07 VITALS
WEIGHT: 144 LBS | HEART RATE: 75 BPM | HEIGHT: 62 IN | SYSTOLIC BLOOD PRESSURE: 143 MMHG | RESPIRATION RATE: 14 BRPM | DIASTOLIC BLOOD PRESSURE: 73 MMHG | BODY MASS INDEX: 26.5 KG/M2 | TEMPERATURE: 97.4 F

## 2020-12-07 LAB
ALBUMIN SERPL ELPH-MCNC: 3.6 G/DL
ALP BLD-CCNC: 68 U/L
ALT SERPL-CCNC: 15 U/L
ANION GAP SERPL CALC-SCNC: 10 MMOL/L
AST SERPL-CCNC: 29 U/L
BILIRUB SERPL-MCNC: 4.4 MG/DL
BUN SERPL-MCNC: 20 MG/DL
CALCIUM SERPL-MCNC: 9.6 MG/DL
CHLORIDE SERPL-SCNC: 107 MMOL/L
CO2 SERPL-SCNC: 25 MMOL/L
CREAT SERPL-MCNC: 1 MG/DL
GLUCOSE SERPL-MCNC: 102 MG/DL
HCT VFR BLD CALC: 40.5 %
HGB BLD-MCNC: 12.9 G/DL
LDH SERPL-CCNC: 284
MCHC RBC-ENTMCNC: 31.3 PG
MCHC RBC-ENTMCNC: 31.9 G/DL
MCV RBC AUTO: 98.3 FL
PLATELET # BLD AUTO: 61 K/UL
PMV BLD: 10.6 FL
POTASSIUM SERPL-SCNC: 3.7 MMOL/L
PROT SERPL-MCNC: 6.4 G/DL
RBC # BLD: 4.12 M/UL
RBC # FLD: 15.9 %
SODIUM SERPL-SCNC: 142 MMOL/L
WBC # FLD AUTO: 3.47 K/UL

## 2020-12-07 PROCEDURE — 99214 OFFICE O/P EST MOD 30 MIN: CPT

## 2020-12-07 RX ORDER — AMOXICILLIN 500 MG/1
500 TABLET, FILM COATED ORAL
Qty: 16 | Refills: 0 | Status: DISCONTINUED | COMMUNITY
Start: 2019-01-31 | End: 2020-12-07

## 2020-12-07 RX ORDER — TRIAMCINOLONE ACETONIDE 5 MG/G
0.5 CREAM TOPICAL 3 TIMES DAILY
Qty: 45 | Refills: 1 | Status: DISCONTINUED | COMMUNITY
Start: 2019-03-14 | End: 2020-12-07

## 2020-12-08 LAB
ALBUMIN SERPL ELPH-MCNC: 3.6 G/DL
ALP BLD-CCNC: 69 U/L
ALT SERPL-CCNC: 16 U/L
AST SERPL-CCNC: 30 U/L
BILIRUB DIRECT SERPL-MCNC: 0.5 MG/DL
BILIRUB DIRECT SERPL-MCNC: 0.5 MG/DL
BILIRUB INDIRECT SERPL-MCNC: 4 MG/DL
BILIRUB SERPL-MCNC: 4.5 MG/DL
BILIRUB SERPL-MCNC: 4.5 MG/DL
DEPRECATED KAPPA LC FREE/LAMBDA SER: 1.35 RATIO
FERRITIN SERPL-MCNC: 240 NG/ML
IRON SATN MFR SERPL: 54 %
IRON SERPL-MCNC: 132 UG/DL
KAPPA LC CSF-MCNC: 3.1 MG/DL
KAPPA LC SERPL-MCNC: 4.2 MG/DL
PROT SERPL-MCNC: 6.4 G/DL
RETICS # AUTO: 2 %
RETICS AGGREG/RBC NFR: 83 K/UL
TIBC SERPL-MCNC: 244 UG/DL
TSH SERPL-ACNC: 3.83 UIU/ML
UIBC SERPL-MCNC: 112 UG/DL

## 2020-12-08 NOTE — ASSESSMENT
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 \par \par Plan :\par - CBC reviewed, counts are stable\par -  Will continue to monitor . follow up in 4 months . \par \par The patient was seen and examined with Dr Erickson who agreed with the above plan

## 2020-12-08 NOTE — HISTORY OF PRESENT ILLNESS
[de-identified] : \par 76/F presenting for follow-up for a history of splenomegaly with leukopenia and thrombocytopenia. Since her last visit, she has been evaluated by GI for liver disease as a possible etiology. Fibrosure testing was suggestive of severe liver fibrosis. EGD done showed small esophageal varices and gastric polyps (pathology benign). She was advised nadolol. She sought a second opinion with a different gastroenterologist, who advised regarding false positive results with Fibrosure testing. She had a repeat endoscopy which reportedly was negative for varices. She did not start taking the nadolol. Screening for viral hepatitis, hemochromatosis, Holdne's disease, and autoimmune liver disease was negative. Patient was sent for additional blood work by GI. This was performed at an outside laboratory; we will request these results. Currently, she feels well and denies significant history of alcoholism or uncontrolled hypercholesterolemia. \par \par Bone marrow aspiration and biopsy performed in 08/2016 showed the following:\par -plasma cell neoplasm (lambda restricted interstitial plasma cells accounting for ~15% of cells)\par -amyloid staining negative\par -background of relative erythroid hyperplasia\par -FISH positive for CCDN1/IDH rearrangement\par \par Of note, IgG lambda monoclonal protein with M-spike of 0.9 was present in SPEP/SIFE. \par \par PET-CT 6/6/17 Splenomegaly. On the prior CT scan of the abdomen and pelvis done on August 17, 2016 the spleen measured 12.5 cm x 5.6 cm x 16.1 cm, at this time measured 12.5 cm x 7 cm x 15.9 cm. Multiple small nodules are seen in the right upper lobe peripherally new, the largest one measuring 7 mm, FDG avid with a max SUV 3.6 with slight misregistration. Postinflammatory and/or malignancy. \par \par Disease: splenomegaly, monoclonal gammopathy \par  [de-identified] : 10/30/2018 : Patient returns for follow up , she feels well , she denies any B symptoms , no abnormal bleeding , weakness, abdominal pain or increase in girth . No skeletal pain . SHe had an abdominal sonogram today . Last blood work from January showed no change in M spike or free light chains . CBC with stable pancytopenia .\par \par 06/17/2019 patient returns for follow up for splenomegaly , pancytopenia and monoclonal gammopathy . She feels well and denies new complaints except for bilateral leg pain , worse with activity , no swelling , no relief after she stopped Evista and simvastatin . She denies any B symptoms or new bone pain , Sonogram in march showed spleen 16 cm .\par no abnormal bleeding .\par \par 9/3/19: Patient returns for follow up for splenomegaly , pancytopenia and monoclonal gammopathy. She has no fresh complaints. Her CBC today showed WBC 3.2, Hb 12, plts 52. Sonogram in march showed spleen 16 cm. Last myeloma panel from Jun 2019 showed stable M-spike of 1 and FLC ratio was 2.55/2.18 = 1.17. \par \par 12/09/2019 Patient returns for follow up , she feels well and denies any B symptoms , abdominal pain or early satiety , she complains only of mild bilateral lower extremity pain after walking , no bleeding or bruising. She was seen by Dr Fall who recommended a watchful approach as long as she remains asymptomatic. \par \par 5/26/2020: BRAULIO ESPARZA is a 79 year old female here today for follow up visit for chronic splenomegaly with pancytopenia , no definite evidence of liver disease , early stage myeloma v/s MGUS ( BM with 5 % plasma cells). Patient is asymptomatic at this time. She denies increased fatigue, abnormal bleeding, night sweats, new bone pain, weight loss, or abdominal pain.\par \par 08/28/2020 Patient returns for history of splenomegaly and pancytopenia , she offers no complaints except for worsening lower extremity rash ( PPPD ) , No B symptoms , no abnormal bleeding . Spleen size unchanged on ultrasound . \par \par 12/7/2020: The patient is here for follow up. SHe has no major complaints. SHe denies any B symptoms , no easy bruising or bleeding. She has chronic lower extremities skin discoloration and is going to see a dermatologist this month

## 2020-12-08 NOTE — PHYSICAL EXAM
[Normal] : normal appearance, no rash, nodules, vesicles, ulcers, erythema [de-identified] : well preserved female in no acute distress.  [de-identified] : pulses adequate .  [de-identified] : spleen 4 fingers bcm .

## 2020-12-09 LAB
ALBUMIN MFR SERPL ELPH: 53.2 %
ALBUMIN SERPL-MCNC: 3.4 G/DL
ALBUMIN/GLOB SERPL: 1.2 RATIO
ALPHA1 GLOB MFR SERPL ELPH: 3.3 %
ALPHA1 GLOB SERPL ELPH-MCNC: 0.2 G/DL
ALPHA2 GLOB MFR SERPL ELPH: 8.3 %
ALPHA2 GLOB SERPL ELPH-MCNC: 0.5 G/DL
B-GLOBULIN MFR SERPL ELPH: 7.7 %
B-GLOBULIN SERPL ELPH-MCNC: 0.5 G/DL
GAMMA GLOB FLD ELPH-MCNC: 1.7 G/DL
GAMMA GLOB MFR SERPL ELPH: 27.5 %
INTERPRETATION SERPL IEP-IMP: NORMAL
M PROTEIN MFR SERPL ELPH: 19.1 %
M PROTEIN SPEC IFE-MCNC: NORMAL
MONOCLON BAND OBS SERPL: 1.2 G/DL
PROT SERPL-MCNC: 6.3 G/DL
PROT SERPL-MCNC: 6.3 G/DL

## 2021-01-15 ENCOUNTER — TRANSCRIPTION ENCOUNTER (OUTPATIENT)
Age: 81
End: 2021-01-15

## 2021-01-16 ENCOUNTER — EMERGENCY (EMERGENCY)
Facility: HOSPITAL | Age: 81
LOS: 0 days | Discharge: HOME | End: 2021-01-16
Attending: EMERGENCY MEDICINE | Admitting: EMERGENCY MEDICINE
Payer: MEDICARE

## 2021-01-16 VITALS
RESPIRATION RATE: 18 BRPM | SYSTOLIC BLOOD PRESSURE: 119 MMHG | OXYGEN SATURATION: 100 % | TEMPERATURE: 98 F | DIASTOLIC BLOOD PRESSURE: 56 MMHG | HEART RATE: 95 BPM

## 2021-01-16 DIAGNOSIS — Z96.653 PRESENCE OF ARTIFICIAL KNEE JOINT, BILATERAL: ICD-10-CM

## 2021-01-16 DIAGNOSIS — Z79.899 OTHER LONG TERM (CURRENT) DRUG THERAPY: ICD-10-CM

## 2021-01-16 DIAGNOSIS — Z90.49 ACQUIRED ABSENCE OF OTHER SPECIFIED PARTS OF DIGESTIVE TRACT: ICD-10-CM

## 2021-01-16 DIAGNOSIS — I10 ESSENTIAL (PRIMARY) HYPERTENSION: ICD-10-CM

## 2021-01-16 DIAGNOSIS — Z98.51 TUBAL LIGATION STATUS: ICD-10-CM

## 2021-01-16 DIAGNOSIS — K21.9 GASTRO-ESOPHAGEAL REFLUX DISEASE WITHOUT ESOPHAGITIS: ICD-10-CM

## 2021-01-16 DIAGNOSIS — U07.1 COVID-19: ICD-10-CM

## 2021-01-16 DIAGNOSIS — R06.02 SHORTNESS OF BREATH: ICD-10-CM

## 2021-01-16 DIAGNOSIS — Z88.1 ALLERGY STATUS TO OTHER ANTIBIOTIC AGENTS STATUS: ICD-10-CM

## 2021-01-16 DIAGNOSIS — Z85.3 PERSONAL HISTORY OF MALIGNANT NEOPLASM OF BREAST: ICD-10-CM

## 2021-01-16 DIAGNOSIS — Z88.8 ALLERGY STATUS TO OTHER DRUGS, MEDICAMENTS AND BIOLOGICAL SUBSTANCES: ICD-10-CM

## 2021-01-16 DIAGNOSIS — Z98.890 OTHER SPECIFIED POSTPROCEDURAL STATES: Chronic | ICD-10-CM

## 2021-01-16 PROCEDURE — 99283 EMERGENCY DEPT VISIT LOW MDM: CPT | Mod: CS

## 2021-01-16 PROCEDURE — 71045 X-RAY EXAM CHEST 1 VIEW: CPT | Mod: 26

## 2021-01-16 NOTE — ED PROVIDER NOTE - CLINICAL SUMMARY MEDICAL DECISION MAKING FREE TEXT BOX
Patient presented with dyspnea x "several months" and cough over the past few days, s/p recent COVID dx. Otherwise HD stable, well appearing, no acute respiratory distress on RA. CXR showed bilateral infiltrates which is likely COVID related, but no focal consolidations to suggest overlying PNA. Patient remained stable on RA, and able to ambulate without difficulty or desaturation. Given the above, will discharge home to quarantine. Patient agreeable with plan. Agrees to return to ED immediately for any new or worsening symptoms.

## 2021-01-16 NOTE — ED PROVIDER NOTE - NS ED MD DISPO DISCHARGE
Home Complex Repair And Flap Additional Text (Will Appearing After The Standard Complex Repair Text): The complex repair was not sufficient to completely close the primary defect. The remaining additional defect was repaired with the flap mentioned below.

## 2021-01-16 NOTE — ED PROVIDER NOTE - OBJECTIVE STATEMENT
81 y/o female with hx HTN, Thrombocytopenia presents to the ED c/o "I've been having SOB for a couple of months. I've seen cardiology and heme-onc. I had a cough and I got a covid test yesterday and it was positive. My family recommended that I get a CXR." no CP/ fever/ chills/ weakness

## 2021-01-16 NOTE — ED PROVIDER NOTE - ATTENDING CONTRIBUTION TO CARE
80 year old female, pmhx as documented, presenting with dyspnea x several months. Patient had a cough over the past few days and had a COVID test yesterday which was positive. Sent in by PMD for CXR. Otherwise denies fevers, chest pain, palpitations, N/V/D, leg swelling or any other symptoms. Has been able to perform activities of daily living.    Vital Signs: I have reviewed the initial vital signs.  Constitutional: NAD, well-nourished, appears stated age, no acute distress.  HEENT: Airway patent, moist MM, no erythema/swelling/deformity of oral structures. EOMI, PERRLA.  CV: regular rate, regular rhythm, well-perfused extremities, 2+ b/l DP and radial pulses equal.  Lungs: BCTA, no increased WOB.  ABD: NTND, no guarding or rebound, no pulsatile mass, no hernias.   MSK: Neck supple, nontender, nl ROM, no stepoff. Chest nontender. Back nontender in TLS spine or to b/l bony structures or flanks. Ext nontender, nl rom, no deformity.   INTEG: Skin warm, dry, no rash.  NEURO: A&Ox3, normal strength, nl sensation throughout, normal speech.   PSYCH: Calm, cooperative, normal affect and interaction.    Patient very well appearing. Will obtain CXR, re-eval.

## 2021-01-16 NOTE — ED PROVIDER NOTE - PATIENT PORTAL LINK FT
You can access the FollowMyHealth Patient Portal offered by [facility] by registering at the following website: [email or web address]. By joining [employer]’s FollowMyHealth portal, you will also be able to view your health information using other applications (apps) compatible with our system.

## 2021-01-16 NOTE — ED PROVIDER NOTE - PROGRESS NOTE DETAILS
Ambulatory pulse ox 95-96%RA Will give home pulse ox- told to return to ED for worsening symptoms- SOB, low pulse ox- patient understands

## 2021-01-16 NOTE — ED PROVIDER NOTE - NSFOLLOWUPINSTRUCTIONS_ED_ALL_ED_FT
Novel Coronavirus (COVID-19)  The Facts  What is a coronavirus?  Coronaviruses are a large family of viruses that cause illnesses ranging from the common cold  to more severe diseases such as Middle East Respiratory Syndrome (MERS) and Severe Acute  Respiratory Syndrome (SARS).  What is Novel Coronavirus (COVID-19)?  COVID-19 is a new strain of Coronavirus that has not been previously identified in humans. COVID-19  was identified in [place], [place], [place] in December 2019 (COVID-19). COVID-19 has  since been identified outside of [place], in a growing number of countries internationally, including  the [place].  Where can I find the most recent information about COVID-19?  The Centers for Disease Control and Prevention (CDC) is closely monitoring the outbreak caused by the  COVID-19. For the latest information about COVID- 19, visit the CDC website at  https://www.cdc.gov/coronavirus/index.html  How are coronaviruses spread?  Coronaviruses can be transmitted from person-to- person, usually after close contact with an infected person,  for example, in a household, workplace, or healthcare setting via droplets that become airborne after a cough  or sneeze by an affected person. These droplets can then infect a nearby person. It is likely transmission also  occurs by touching recently contaminated surfaces.  What are the symptoms of coronavirus infection?  It depends on the virus, but common signs include fever and/or respiratory symptoms such as  cough and shortness of breath. In more severe cases, infection can cause pneumonia, severe acute  respiratory syndrome, kidney failure and even death. Fortunately, most cases of COVID-19 have an  illness no different than the influenza “flu”. With a majority of these patients having mild symptoms  and overall mortality which appears to be not much different than the flu.  Is there a treatment for a COVID-19?  There is no specific treatment for disease caused by COVID-19. However, many of the symptoms can  be treated based on the patient’s clinical condition. Supportive care for infected persons can be highly  effective.  What can I do to protect myself?  Washing your hands, covering your cough, and disinfecting surfaces are the best precautionary  measures. It is also advisable to avoid close contact with anyone showing symptoms of respiratory  illness such as coughing and sneezing. Those with symptoms should wear a surgical mask when  around others.  What can I do to protect those around me?  If you have been identified as someone who may be infected with COVID-19, we recommend you  follow the self-isolation procedures outlined below to protect those around you and limit the spread  of this virus.   March 3, 2020  Recommendations for Patients Advised to Self-Isolate  for Possible COVID-19 Exposure  We recommend the below precautionary steps from now until 14 days from when you  returned from your travel or date of your last known possible contact:  - Do not go to work, school, or public areas. Avoid using public transportation, ride-sharing, or  taxis.  - As much as possible, separate yourself from other people in your home. If you can, you should  stay in a room and away from other people in your home. Also, you should use a separate  bathroom, if available.  - Wear the supplied mask whenever you are around other people.  - If you have a non-urgent medical appointment, please reschedule for a later date. If the  appointment is urgent, please call the healthcare provider and tell them that you are on selfisolation for possible COVID-19. This will help the healthcare provider’s office take steps to keep  other people from getting infected or exposed. If you can reschedule routine appointments, do  so.  - Wash your hands often with soap and water for at least 15 to 20 seconds or clean your hands  with an alcohol-based hand  that contains 60 to 95% alcohol, covering all surfaces of  your hands and rubbing them together until they feel dry. Soap and water should be used  preferentially if hands are visibly dirty.  - Cover your mouth and nose with a tissue when you cough or sneeze. Throw used tissues in a  lined trash can; immediately wash your hands.  - Avoid touching your eyes, nose, and mouth with your hands.  - Avoid sharing personal household items. You should not share dishes, drinking glasses, cups,  eating utensils, towels, or bedding with other people or pets in your home. After using these  items, they should be washed thoroughly with soap and water.  - Clean and disinfect all “high-touch” surfaces every day. High touch surfaces include counters,  tabletops, doorknobs, light switches, remote controls, bathroom fixtures, toilets, phones,  keyboards, tablets, and bedside tables. Also, clean any surfaces that may have blood, stool, or  body fluids on them.       If you develop worsening symptoms:  - If you develop worsening symptoms, such as severe shortness of breath, please call [phone] option #[phone]. They will assist you in determining your next steps.  During your time on self-isolation do the following:  - Work from home if you are able to so.  - Limit social isolation by talking with friends and family on the phone or with face-time  - Talk with friends and relatives who don’t live with you about supporting each other if one  household has to be quarantined. For example, agree to drop groceries or other supplies at the  front door.  - Exercise and spend time outdoors away from others if able to do so.    Why didn’t I get tested for novel coronavirus (COVID-19)?  The number of available tests is very limited so strict rules exist for who is allowed to be tested.  [facility] has been authorized to perform testing and is currently working hard to be  able to start providing the test. Such testing is currently reserved for patients who have had  contact with someone infected with the virus, or those who are very sick a plus those who have  traveled to areas identified by the Centers for Disease Control and Prevention (CD) and will  require hospitalization.  What should I do now?  If you are well enough to be discharged home and are not in a high risk group to have  contracted the COVID-10, you should care for yourself at home exactly like you would if you  have Influenza “flu”. Follow all the standard guidelines about washing your hands, covering  your cough, etc.  You should return to the Emergency Department if you develop worse symptoms, trouble  breathing, chest pain, and/or a fever that doesn’t improve with over the counter  acetaminophen or ibuprofen.

## 2021-01-17 ENCOUNTER — TRANSCRIPTION ENCOUNTER (OUTPATIENT)
Age: 81
End: 2021-01-17

## 2021-01-21 ENCOUNTER — TRANSCRIPTION ENCOUNTER (OUTPATIENT)
Age: 81
End: 2021-01-21

## 2021-01-26 ENCOUNTER — TRANSCRIPTION ENCOUNTER (OUTPATIENT)
Age: 81
End: 2021-01-26

## 2021-03-08 ENCOUNTER — LABORATORY RESULT (OUTPATIENT)
Age: 81
End: 2021-03-08

## 2021-03-08 ENCOUNTER — APPOINTMENT (OUTPATIENT)
Dept: HEMATOLOGY ONCOLOGY | Facility: CLINIC | Age: 81
End: 2021-03-08
Payer: MEDICARE

## 2021-03-08 VITALS
HEART RATE: 85 BPM | WEIGHT: 143 LBS | TEMPERATURE: 97.1 F | BODY MASS INDEX: 26.31 KG/M2 | DIASTOLIC BLOOD PRESSURE: 62 MMHG | SYSTOLIC BLOOD PRESSURE: 134 MMHG | RESPIRATION RATE: 14 BRPM | HEIGHT: 62 IN

## 2021-03-08 LAB
ALBUMIN SERPL ELPH-MCNC: 3.1 G/DL
ALP BLD-CCNC: 47 U/L
ALT SERPL-CCNC: 15 U/L
ANION GAP SERPL CALC-SCNC: 12 MMOL/L
AST SERPL-CCNC: 34 U/L
BILIRUB SERPL-MCNC: 2.9 MG/DL
BUN SERPL-MCNC: 17 MG/DL
CALCIUM SERPL-MCNC: 8.5 MG/DL
CHLORIDE SERPL-SCNC: 104 MMOL/L
CO2 SERPL-SCNC: 22 MMOL/L
CREAT SERPL-MCNC: 1 MG/DL
GLUCOSE SERPL-MCNC: 91 MG/DL
HCT VFR BLD CALC: 33.2 %
HGB BLD-MCNC: 10.7 G/DL
MCHC RBC-ENTMCNC: 32.1 PG
MCHC RBC-ENTMCNC: 32.2 G/DL
MCV RBC AUTO: 99.7 FL
PLATELET # BLD AUTO: 48 K/UL
PMV BLD: 10.1 FL
POTASSIUM SERPL-SCNC: 4.1 MMOL/L
PROT SERPL-MCNC: 5.9 G/DL
RBC # BLD: 3.33 M/UL
RBC # FLD: 15.5 %
SODIUM SERPL-SCNC: 138 MMOL/L
WBC # FLD AUTO: 3 K/UL

## 2021-03-08 PROCEDURE — 99213 OFFICE O/P EST LOW 20 MIN: CPT

## 2021-03-09 NOTE — HISTORY OF PRESENT ILLNESS
[de-identified] : \par 76/F presenting for follow-up for a history of splenomegaly with leukopenia and thrombocytopenia. Since her last visit, she has been evaluated by GI for liver disease as a possible etiology. Fibrosure testing was suggestive of severe liver fibrosis. EGD done showed small esophageal varices and gastric polyps (pathology benign). She was advised nadolol. She sought a second opinion with a different gastroenterologist, who advised regarding false positive results with Fibrosure testing. She had a repeat endoscopy which reportedly was negative for varices. She did not start taking the nadolol. Screening for viral hepatitis, hemochromatosis, Holden's disease, and autoimmune liver disease was negative. Patient was sent for additional blood work by GI. This was performed at an outside laboratory; we will request these results. Currently, she feels well and denies significant history of alcoholism or uncontrolled hypercholesterolemia. \par \par Bone marrow aspiration and biopsy performed in 08/2016 showed the following:\par -plasma cell neoplasm (lambda restricted interstitial plasma cells accounting for ~15% of cells)\par -amyloid staining negative\par -background of relative erythroid hyperplasia\par -FISH positive for CCDN1/IDH rearrangement\par \par Of note, IgG lambda monoclonal protein with M-spike of 0.9 was present in SPEP/SIFE. \par \par PET-CT 6/6/17 Splenomegaly. On the prior CT scan of the abdomen and pelvis done on August 17, 2016 the spleen measured 12.5 cm x 5.6 cm x 16.1 cm, at this time measured 12.5 cm x 7 cm x 15.9 cm. Multiple small nodules are seen in the right upper lobe peripherally new, the largest one measuring 7 mm, FDG avid with a max SUV 3.6 with slight misregistration. Postinflammatory and/or malignancy. \par \par Disease: splenomegaly, monoclonal gammopathy \par  [de-identified] : 10/30/2018 : Patient returns for follow up , she feels well , she denies any B symptoms , no abnormal bleeding , weakness, abdominal pain or increase in girth . No skeletal pain . SHe had an abdominal sonogram today . Last blood work from January showed no change in M spike or free light chains . CBC with stable pancytopenia .\par \par 2019 patient returns for follow up for splenomegaly , pancytopenia and monoclonal gammopathy . She feels well and denies new complaints except for bilateral leg pain , worse with activity , no swelling , no relief after she stopped Evista and simvastatin . She denies any B symptoms or new bone pain , Sonogram in march showed spleen 16 cm .\par no abnormal bleeding .\par \par 9/3/19: Patient returns for follow up for splenomegaly , pancytopenia and monoclonal gammopathy. She has no fresh complaints. Her CBC today showed WBC 3.2, Hb 12, plts 52. Sonogram in march showed spleen 16 cm. Last myeloma panel from 2019 showed stable M-spike of 1 and FLC ratio was 2.55/2.18 = 1.17. \par \par 2019 Patient returns for follow up , she feels well and denies any B symptoms , abdominal pain or early satiety , she complains only of mild bilateral lower extremity pain after walking , no bleeding or bruising. She was seen by Dr Fall who recommended a watchful approach as long as she remains asymptomatic. \par \par 2020: BRAULIO ESPARZA is a 79 year old female here today for follow up visit for chronic splenomegaly with pancytopenia , no definite evidence of liver disease , early stage myeloma v/s MGUS ( BM with 5 % plasma cells). Patient is asymptomatic at this time. She denies increased fatigue, abnormal bleeding, night sweats, new bone pain, weight loss, or abdominal pain.\par \par 2020 Patient returns for history of splenomegaly and pancytopenia , she offers no complaints except for worsening lower extremity rash ( PPPD ) , No B symptoms , no abnormal bleeding . Spleen size unchanged on ultrasound . \par \par 2020: The patient is here for follow up. SHe has no major complaints. SHe denies any B symptoms , no easy bruising or bleeding. She has chronic lower extremities skin discoloration and is going to see a dermatologist this month\par \par 3/8/2021: Patient presents for follow up. She had COVID-19 in January and subsequently has been experiencing fatigue, decreased appetite, increased dyspnea on exertion, and non-productive cough. She has an upcoming appointment with pulmonologist Dr. Martinez. She feels sad because several friends  of COVID-19 over the past few months. Notably, she received her first Moderna vaccine and noticed some hand trembling afterward (not present on today's visit). She has researched potential neurologic effects of COVID-19 vaccination and is very concerned about receiving the second dose. Also reports being significantly overdue for mammogram. She does not wish to follow up with her current gynecologist and has not yet looked for a new one.

## 2021-03-09 NOTE — PHYSICAL EXAM
[Normal] : normal appearance, no rash, nodules, vesicles, ulcers, erythema [de-identified] : well-preserved female in no acute distress.  [de-identified] : wearing LE compression stockings [de-identified] : spleen 4 fingers bcm, non-tender to palpation [de-identified] : No hand tremors present.

## 2021-03-09 NOTE — ASSESSMENT
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 less likely  multiple myeloma.\par - Labs from 12/7/2020 demonstrated WBC 3.47, hemoglobin 12.9 and platelets 61. Repeat CBC from today demonstrated decrease in three cell lines, with WBC of 3, hemoglobin of 10.7, and platelet of 48.\par - 12/7/2020 labs demonstrated SPEP with M spike of 1.2 and serum immunofixation with IgG kappa band. Follow up repeat multiple myeloma panel from today.\par - Advised patient that hand tremors after first COVID-19 vaccine, now resolved, do not indicate a serious neurologic complication. Secondary to age and comorbidities, recommended that patient receive second vaccination as scheduled.\par - Patient is overdue for mammogram. She is currently looking for a new gynecologist. Script for mammogram provided in meantime.\par - Follow up in 3 months.

## 2021-03-09 NOTE — REVIEW OF SYSTEMS
[Fatigue] : fatigue [Cough] : cough [SOB on Exertion] : shortness of breath during exertion [Negative] : Neurological [FreeTextEntry2] : decreased appetite [FreeTextEntry6] : since recovering from COVID-19

## 2021-03-10 ENCOUNTER — OUTPATIENT (OUTPATIENT)
Dept: OUTPATIENT SERVICES | Facility: HOSPITAL | Age: 81
LOS: 1 days | Discharge: HOME | End: 2021-03-10

## 2021-03-10 ENCOUNTER — APPOINTMENT (OUTPATIENT)
Dept: HEMATOLOGY ONCOLOGY | Facility: CLINIC | Age: 81
End: 2021-03-10

## 2021-03-10 ENCOUNTER — LABORATORY RESULT (OUTPATIENT)
Age: 81
End: 2021-03-10

## 2021-03-10 DIAGNOSIS — R16.1 SPLENOMEGALY, NOT ELSEWHERE CLASSIFIED: ICD-10-CM

## 2021-03-10 DIAGNOSIS — D61.818 OTHER PANCYTOPENIA: ICD-10-CM

## 2021-03-10 DIAGNOSIS — Z98.890 OTHER SPECIFIED POSTPROCEDURAL STATES: Chronic | ICD-10-CM

## 2021-03-10 DIAGNOSIS — D47.2 MONOCLONAL GAMMOPATHY: ICD-10-CM

## 2021-03-10 LAB
DEPRECATED KAPPA LC FREE/LAMBDA SER: 1.15 RATIO
HCT VFR BLD CALC: 33.5 %
HGB BLD-MCNC: 10.7 G/DL
IGA SER QL IEP: 192 MG/DL
IGG SER QL IEP: 1789 MG/DL
IGM SER QL IEP: 166 MG/DL
KAPPA LC CSF-MCNC: 6.57 MG/DL
KAPPA LC SERPL-MCNC: 7.58 MG/DL
LDH SERPL-CCNC: 326
MCHC RBC-ENTMCNC: 31.8 PG
MCHC RBC-ENTMCNC: 31.9 G/DL
MCV RBC AUTO: 99.7 FL
PLATELET # BLD AUTO: 74 K/UL
PMV BLD: 10.9 FL
RBC # BLD: 3.36 M/UL
RBC # FLD: 15.3 %
RETICS # AUTO: 2.1 %
RETICS AGGREG/RBC NFR: 69.2 K/UL
WBC # FLD AUTO: 2.56 K/UL

## 2021-03-11 LAB
ALBUMIN MFR SERPL ELPH: 50.2 %
ALBUMIN MFR SERPL ELPH: 52.1 %
ALBUMIN SERPL-MCNC: 3.1 G/DL
ALBUMIN SERPL-MCNC: 3.3 G/DL
ALBUMIN/GLOB SERPL: 1 RATIO
ALBUMIN/GLOB SERPL: 1.1 RATIO
ALPHA1 GLOB MFR SERPL ELPH: 4.1 %
ALPHA1 GLOB MFR SERPL ELPH: 5.2 %
ALPHA1 GLOB SERPL ELPH-MCNC: 0.3 G/DL
ALPHA1 GLOB SERPL ELPH-MCNC: 0.3 G/DL
ALPHA2 GLOB MFR SERPL ELPH: 7.4 %
ALPHA2 GLOB MFR SERPL ELPH: 9.4 %
ALPHA2 GLOB SERPL ELPH-MCNC: 0.5 G/DL
ALPHA2 GLOB SERPL ELPH-MCNC: 0.6 G/DL
B-GLOBULIN MFR SERPL ELPH: 6.1 %
B-GLOBULIN MFR SERPL ELPH: 7.8 %
B-GLOBULIN SERPL ELPH-MCNC: 0.4 G/DL
B-GLOBULIN SERPL ELPH-MCNC: 0.5 G/DL
DEPRECATED KAPPA LC FREE/LAMBDA SER: 0.8 RATIO
FERRITIN SERPL-MCNC: 391 NG/ML
GAMMA GLOB FLD ELPH-MCNC: 1.7 G/DL
GAMMA GLOB FLD ELPH-MCNC: 1.9 G/DL
GAMMA GLOB MFR SERPL ELPH: 27.4 %
GAMMA GLOB MFR SERPL ELPH: 30.3 %
INTERPRETATION SERPL IEP-IMP: NORMAL
INTERPRETATION SERPL IEP-IMP: NORMAL
KAPPA LC CSF-MCNC: 13.18 MG/DL
KAPPA LC SERPL-MCNC: 10.55 MG/DL
M PROTEIN MFR SERPL ELPH: 17.2 %
M PROTEIN MFR SERPL ELPH: 17.4 %
M PROTEIN SPEC IFE-MCNC: NORMAL
MONOCLON BAND OBS SERPL: 1 G/DL
MONOCLON BAND OBS SERPL: 1.1 G/DL
PROT SERPL-MCNC: 6.1 G/DL
PROT SERPL-MCNC: 6.1 G/DL
PROT SERPL-MCNC: 6.3 G/DL
PROT SERPL-MCNC: 6.3 G/DL
VIT B12 SERPL-MCNC: >2000 PG/ML

## 2021-03-20 ENCOUNTER — APPOINTMENT (OUTPATIENT)
Dept: PULMONOLOGY | Facility: CLINIC | Age: 81
End: 2021-03-20
Payer: MEDICARE

## 2021-03-20 VITALS
HEART RATE: 87 BPM | BODY MASS INDEX: 25.58 KG/M2 | OXYGEN SATURATION: 90 % | SYSTOLIC BLOOD PRESSURE: 146 MMHG | RESPIRATION RATE: 14 BRPM | WEIGHT: 139 LBS | DIASTOLIC BLOOD PRESSURE: 86 MMHG | HEIGHT: 62 IN

## 2021-03-20 PROCEDURE — 99204 OFFICE O/P NEW MOD 45 MIN: CPT

## 2021-03-20 NOTE — HISTORY OF PRESENT ILLNESS
[Shortness of Breath] : Shortness of Breath [Initial Evaluation] : an initial evaluation of [Excessive Daytime Sleepiness] : excessive daytime sleepiness [Snoring] : snoring [Sleepy When Sedentary] : sleepy when sedentary [Impaired Concentration] : impaired concentration [Currently Experiencing] : The patient is currently experiencing symptoms. [Good Sleep Hygiene] : good sleep hygiene

## 2021-03-20 NOTE — PHYSICAL EXAM
[No Acute Distress] : no acute distress [Normal Oropharynx] : normal oropharynx [Normal Appearance] : normal appearance [No Neck Mass] : no neck mass [Normal Rate/Rhythm] : normal rate/rhythm [Normal S1, S2] : normal s1, s2 [No Resp Distress] : no resp distress [Clear to Auscultation Bilaterally] : clear to auscultation bilaterally [No Abnormalities] : no abnormalities [Benign] : benign [Normal Gait] : normal gait [No Clubbing] : no clubbing [No Cyanosis] : no cyanosis [No Edema] : no edema [FROM] : FROM [Normal Color/ Pigmentation] : normal color/ pigmentation [No Focal Deficits] : no focal deficits [Oriented x3] : oriented x3 [Normal Affect] : normal affect [TextBox_54] : Aortic systolic murmur

## 2021-03-20 NOTE — ASSESSMENT
[FreeTextEntry1] : PROCTOR with O2 desaturation likely cardiac \par PHTN WHO II \par Possible ABRAHAM \par HO Slightly enlarged pretracheal LN\par ( SP Venous duplex negative )

## 2021-03-26 ENCOUNTER — OUTPATIENT (OUTPATIENT)
Dept: OUTPATIENT SERVICES | Facility: HOSPITAL | Age: 81
LOS: 1 days | Discharge: HOME | End: 2021-03-26

## 2021-03-26 ENCOUNTER — LABORATORY RESULT (OUTPATIENT)
Age: 81
End: 2021-03-26

## 2021-03-26 DIAGNOSIS — Z11.59 ENCOUNTER FOR SCREENING FOR OTHER VIRAL DISEASES: ICD-10-CM

## 2021-03-26 DIAGNOSIS — Z98.890 OTHER SPECIFIED POSTPROCEDURAL STATES: Chronic | ICD-10-CM

## 2021-03-29 ENCOUNTER — OUTPATIENT (OUTPATIENT)
Dept: OUTPATIENT SERVICES | Facility: HOSPITAL | Age: 81
LOS: 1 days | Discharge: HOME | End: 2021-03-29
Payer: MEDICARE

## 2021-03-29 DIAGNOSIS — R06.02 SHORTNESS OF BREATH: ICD-10-CM

## 2021-03-29 DIAGNOSIS — Z98.890 OTHER SPECIFIED POSTPROCEDURAL STATES: Chronic | ICD-10-CM

## 2021-03-29 PROCEDURE — 94060 EVALUATION OF WHEEZING: CPT | Mod: 26

## 2021-03-29 PROCEDURE — 94729 DIFFUSING CAPACITY: CPT | Mod: 26

## 2021-03-29 PROCEDURE — 94727 GAS DIL/WSHOT DETER LNG VOL: CPT | Mod: 26

## 2021-04-01 ENCOUNTER — OUTPATIENT (OUTPATIENT)
Dept: OUTPATIENT SERVICES | Facility: HOSPITAL | Age: 81
LOS: 1 days | Discharge: HOME | End: 2021-04-01
Payer: MEDICARE

## 2021-04-01 ENCOUNTER — RESULT REVIEW (OUTPATIENT)
Age: 81
End: 2021-04-01

## 2021-04-01 DIAGNOSIS — Z98.890 OTHER SPECIFIED POSTPROCEDURAL STATES: Chronic | ICD-10-CM

## 2021-04-01 PROCEDURE — 71250 CT THORAX DX C-: CPT | Mod: 26,MH

## 2021-04-08 DIAGNOSIS — R91.8 OTHER NONSPECIFIC ABNORMAL FINDING OF LUNG FIELD: ICD-10-CM

## 2021-04-20 ENCOUNTER — APPOINTMENT (OUTPATIENT)
Dept: PULMONOLOGY | Facility: CLINIC | Age: 81
End: 2021-04-20
Payer: MEDICARE

## 2021-04-20 VITALS
BODY MASS INDEX: 25.4 KG/M2 | HEART RATE: 81 BPM | RESPIRATION RATE: 14 BRPM | DIASTOLIC BLOOD PRESSURE: 72 MMHG | WEIGHT: 138 LBS | SYSTOLIC BLOOD PRESSURE: 138 MMHG | OXYGEN SATURATION: 95 % | HEIGHT: 62 IN

## 2021-04-20 PROCEDURE — 99213 OFFICE O/P EST LOW 20 MIN: CPT

## 2021-05-04 ENCOUNTER — LABORATORY RESULT (OUTPATIENT)
Age: 81
End: 2021-05-04

## 2021-05-04 ENCOUNTER — APPOINTMENT (OUTPATIENT)
Dept: HEMATOLOGY ONCOLOGY | Facility: CLINIC | Age: 81
End: 2021-05-04

## 2021-05-04 LAB
HCT VFR BLD CALC: 36.6 %
HGB BLD-MCNC: 11.9 G/DL
MCHC RBC-ENTMCNC: 31.2 PG
MCHC RBC-ENTMCNC: 32.5 G/DL
MCV RBC AUTO: 96.1 FL
PLATELET # BLD AUTO: 50 K/UL
PMV BLD: 11.4 FL
RBC # BLD: 3.81 M/UL
RBC # FLD: 15.9 %
WBC # FLD AUTO: 2.89 K/UL

## 2021-05-05 ENCOUNTER — RESULT REVIEW (OUTPATIENT)
Age: 81
End: 2021-05-05

## 2021-05-05 ENCOUNTER — OUTPATIENT (OUTPATIENT)
Dept: OUTPATIENT SERVICES | Facility: HOSPITAL | Age: 81
LOS: 1 days | Discharge: HOME | End: 2021-05-05
Payer: MEDICARE

## 2021-05-05 DIAGNOSIS — Z85.3 PERSONAL HISTORY OF MALIGNANT NEOPLASM OF BREAST: ICD-10-CM

## 2021-05-05 DIAGNOSIS — Z98.890 OTHER SPECIFIED POSTPROCEDURAL STATES: Chronic | ICD-10-CM

## 2021-05-05 PROCEDURE — G0279: CPT | Mod: 26

## 2021-05-05 PROCEDURE — 77066 DX MAMMO INCL CAD BI: CPT | Mod: 26

## 2021-05-17 ENCOUNTER — OUTPATIENT (OUTPATIENT)
Dept: OUTPATIENT SERVICES | Facility: HOSPITAL | Age: 81
LOS: 1 days | Discharge: HOME | End: 2021-05-17

## 2021-05-17 ENCOUNTER — LABORATORY RESULT (OUTPATIENT)
Age: 81
End: 2021-05-17

## 2021-05-17 ENCOUNTER — APPOINTMENT (OUTPATIENT)
Dept: HEMATOLOGY ONCOLOGY | Facility: CLINIC | Age: 81
End: 2021-05-17
Payer: MEDICARE

## 2021-05-17 VITALS
DIASTOLIC BLOOD PRESSURE: 76 MMHG | RESPIRATION RATE: 14 BRPM | HEART RATE: 79 BPM | TEMPERATURE: 97.1 F | WEIGHT: 140 LBS | HEIGHT: 62 IN | BODY MASS INDEX: 25.76 KG/M2 | SYSTOLIC BLOOD PRESSURE: 151 MMHG

## 2021-05-17 DIAGNOSIS — Z98.890 OTHER SPECIFIED POSTPROCEDURAL STATES: Chronic | ICD-10-CM

## 2021-05-17 LAB
ALBUMIN SERPL ELPH-MCNC: 3.6 G/DL
ALP BLD-CCNC: 78 U/L
ALT SERPL-CCNC: 16 U/L
ANION GAP SERPL CALC-SCNC: 13 MMOL/L
AST SERPL-CCNC: 28 U/L
BILIRUB SERPL-MCNC: 4 MG/DL
BUN SERPL-MCNC: 19 MG/DL
CALCIUM SERPL-MCNC: 9.4 MG/DL
CHLORIDE SERPL-SCNC: 107 MMOL/L
CO2 SERPL-SCNC: 22 MMOL/L
CREAT SERPL-MCNC: 1 MG/DL
FERRITIN SERPL-MCNC: 187 NG/ML
GLUCOSE SERPL-MCNC: 89 MG/DL
HCT VFR BLD CALC: 36.4 %
HGB BLD-MCNC: 11.8 G/DL
LDH SERPL-CCNC: 277
MCHC RBC-ENTMCNC: 31.2 PG
MCHC RBC-ENTMCNC: 32.4 G/DL
MCV RBC AUTO: 96.3 FL
PLATELET # BLD AUTO: 48 K/UL
PMV BLD: 9.9 FL
POTASSIUM SERPL-SCNC: 4.1 MMOL/L
PROT SERPL-MCNC: 6.3 G/DL
RBC # BLD: 3.78 M/UL
RBC # FLD: 15.9 %
SODIUM SERPL-SCNC: 142 MMOL/L
VIT B12 SERPL-MCNC: >2000 PG/ML
WBC # FLD AUTO: 2.41 K/UL

## 2021-05-17 PROCEDURE — 99214 OFFICE O/P EST MOD 30 MIN: CPT

## 2021-05-19 NOTE — HISTORY OF PRESENT ILLNESS
[de-identified] : \par 76/F presenting for follow-up for a history of splenomegaly with leukopenia and thrombocytopenia. Since her last visit, she has been evaluated by GI for liver disease as a possible etiology. Fibrosure testing was suggestive of severe liver fibrosis. EGD done showed small esophageal varices and gastric polyps (pathology benign). She was advised nadolol. She sought a second opinion with a different gastroenterologist, who advised regarding false positive results with Fibrosure testing. She had a repeat endoscopy which reportedly was negative for varices. She did not start taking the nadolol. Screening for viral hepatitis, hemochromatosis, Holden's disease, and autoimmune liver disease was negative. Patient was sent for additional blood work by GI. This was performed at an outside laboratory; we will request these results. Currently, she feels well and denies significant history of alcoholism or uncontrolled hypercholesterolemia. \par \par Bone marrow aspiration and biopsy performed in 08/2016 showed the following:\par -plasma cell neoplasm (lambda restricted interstitial plasma cells accounting for ~15% of cells)\par -amyloid staining negative\par -background of relative erythroid hyperplasia\par -FISH positive for CCDN1/IDH rearrangement\par \par Of note, IgG lambda monoclonal protein with M-spike of 0.9 was present in SPEP/SIFE. \par \par PET-CT 6/6/17 Splenomegaly. On the prior CT scan of the abdomen and pelvis done on August 17, 2016 the spleen measured 12.5 cm x 5.6 cm x 16.1 cm, at this time measured 12.5 cm x 7 cm x 15.9 cm. Multiple small nodules are seen in the right upper lobe peripherally new, the largest one measuring 7 mm, FDG avid with a max SUV 3.6 with slight misregistration. Postinflammatory and/or malignancy. \par \par Disease: splenomegaly, monoclonal gammopathy \par  [de-identified] : 10/30/2018 : Patient returns for follow up , she feels well , she denies any B symptoms , no abnormal bleeding , weakness, abdominal pain or increase in girth . No skeletal pain . SHe had an abdominal sonogram today . Last blood work from January showed no change in M spike or free light chains . CBC with stable pancytopenia .\par \par 06/17/2019 patient returns for follow up for splenomegaly , pancytopenia and monoclonal gammopathy . She feels well and denies new complaints except for bilateral leg pain , worse with activity , no swelling , no relief after she stopped Evista and simvastatin . She denies any B symptoms or new bone pain , Sonogram in march showed spleen 16 cm .\par no abnormal bleeding .\par \par 9/3/19: Patient returns for follow up for splenomegaly , pancytopenia and monoclonal gammopathy. She has no fresh complaints. Her CBC today showed WBC 3.2, Hb 12, plts 52. Sonogram in march showed spleen 16 cm. Last myeloma panel from Jun 2019 showed stable M-spike of 1 and FLC ratio was 2.55/2.18 = 1.17. \par \par 12/09/2019 Patient returns for follow up , she feels well and denies any B symptoms , abdominal pain or early satiety , she complains only of mild bilateral lower extremity pain after walking , no bleeding or bruising. She was seen by Dr Fall who recommended a watchful approach as long as she remains asymptomatic. \par \par 5/26/2020: BRAULIO ESPARZA is a 79 year old female here today for follow up visit for chronic splenomegaly with pancytopenia , no definite evidence of liver disease , early stage myeloma v/s MGUS ( BM with 5 % plasma cells). Patient is asymptomatic at this time. She denies increased fatigue, abnormal bleeding, night sweats, new bone pain, weight loss, or abdominal pain.\par \par 08/28/2020 Patient returns for history of splenomegaly and pancytopenia , she offers no complaints except for worsening lower extremity rash ( PPD ) , No B symptoms , no abnormal bleeding . Spleen size unchanged on ultrasound . \par \par 05/17/2021 Patient returns for routine follow up , CBC is stable , She was seen by pulmonary for worsening exertional dyspnea , CT scan shows bronchiectasis and was recommended O2 PRN , as per Dr irwin , she is suspected with pulmonary hypertension , ABRAHAM , she complains of extreme fatigue and daytime somnolence . NO B symptoms . \par

## 2021-05-19 NOTE — ASSESSMENT
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 CBC stable . \par -Exertional dyspnea ( etiology ? ) , fatigue . \par Plan : repeat CT abdomen and pelvis , rule out lymphoma \par          follow up with cardiology and pulmonary ( need for sleep studies ? ) \par

## 2021-05-21 DIAGNOSIS — R16.1 SPLENOMEGALY, NOT ELSEWHERE CLASSIFIED: ICD-10-CM

## 2021-05-21 DIAGNOSIS — D47.2 MONOCLONAL GAMMOPATHY: ICD-10-CM

## 2021-06-01 ENCOUNTER — APPOINTMENT (OUTPATIENT)
Age: 81
End: 2021-06-01
Payer: MEDICARE

## 2021-06-01 VITALS
HEIGHT: 62 IN | SYSTOLIC BLOOD PRESSURE: 148 MMHG | RESPIRATION RATE: 14 BRPM | OXYGEN SATURATION: 91 % | DIASTOLIC BLOOD PRESSURE: 88 MMHG | BODY MASS INDEX: 25.4 KG/M2 | WEIGHT: 138 LBS | HEART RATE: 85 BPM

## 2021-06-01 PROCEDURE — 99213 OFFICE O/P EST LOW 20 MIN: CPT

## 2021-06-01 NOTE — ASSESSMENT
[FreeTextEntry1] : PROCTOR with O2 desaturation likely cardiac \par PHTN WHO II \par Possible ABRAHAM \par ( SP Venous duplex negative )

## 2021-06-02 ENCOUNTER — RESULT REVIEW (OUTPATIENT)
Age: 81
End: 2021-06-02

## 2021-06-02 ENCOUNTER — OUTPATIENT (OUTPATIENT)
Dept: OUTPATIENT SERVICES | Facility: HOSPITAL | Age: 81
LOS: 1 days | Discharge: HOME | End: 2021-06-02
Payer: MEDICARE

## 2021-06-02 DIAGNOSIS — D61.818 OTHER PANCYTOPENIA: ICD-10-CM

## 2021-06-02 DIAGNOSIS — Z98.890 OTHER SPECIFIED POSTPROCEDURAL STATES: Chronic | ICD-10-CM

## 2021-06-02 PROCEDURE — 74177 CT ABD & PELVIS W/CONTRAST: CPT | Mod: 26,MH

## 2021-09-07 ENCOUNTER — APPOINTMENT (OUTPATIENT)
Dept: HEMATOLOGY ONCOLOGY | Facility: CLINIC | Age: 81
End: 2021-09-07
Payer: MEDICARE

## 2021-09-07 ENCOUNTER — LABORATORY RESULT (OUTPATIENT)
Age: 81
End: 2021-09-07

## 2021-09-07 VITALS
RESPIRATION RATE: 14 BRPM | HEIGHT: 61 IN | TEMPERATURE: 98.7 F | DIASTOLIC BLOOD PRESSURE: 69 MMHG | SYSTOLIC BLOOD PRESSURE: 152 MMHG | BODY MASS INDEX: 24.92 KG/M2 | HEART RATE: 83 BPM | WEIGHT: 132 LBS

## 2021-09-07 PROCEDURE — 99213 OFFICE O/P EST LOW 20 MIN: CPT

## 2021-09-08 LAB
ALBUMIN SERPL ELPH-MCNC: 3.5 G/DL
ALP BLD-CCNC: 81 U/L
ALT SERPL-CCNC: 19 U/L
ANION GAP SERPL CALC-SCNC: 12 MMOL/L
AST SERPL-CCNC: 33 U/L
BILIRUB SERPL-MCNC: 4.2 MG/DL
BUN SERPL-MCNC: 21 MG/DL
CALCIUM SERPL-MCNC: 9.4 MG/DL
CHLORIDE SERPL-SCNC: 108 MMOL/L
CO2 SERPL-SCNC: 22 MMOL/L
CREAT SERPL-MCNC: 1.1 MG/DL
FERRITIN SERPL-MCNC: 207 NG/ML
GLUCOSE SERPL-MCNC: 120 MG/DL
HCT VFR BLD CALC: 35.4 %
HGB BLD-MCNC: 11.5 G/DL
MCHC RBC-ENTMCNC: 31.9 PG
MCHC RBC-ENTMCNC: 32.5 G/DL
MCV RBC AUTO: 98.3 FL
PLATELET # BLD AUTO: 49 K/UL
PMV BLD: 9.9 FL
POTASSIUM SERPL-SCNC: 3.7 MMOL/L
PROT SERPL-MCNC: 6.1 G/DL
RBC # BLD: 3.6 M/UL
RBC # FLD: 15.3 %
SODIUM SERPL-SCNC: 142 MMOL/L
TSH SERPL-ACNC: 3.38 UIU/ML
VIT B12 SERPL-MCNC: >2000 PG/ML
WBC # FLD AUTO: 2.92 K/UL

## 2021-09-08 NOTE — PHYSICAL EXAM
[Normal] : no peripheral adenopathy appreciated [de-identified] : well preserved female in no acute distress.  [de-identified] : pulses adequate .  [de-identified] : spleen 4 fingers bcm .  [de-identified] : extensive purpuric lesions both legs .

## 2021-09-08 NOTE — HISTORY OF PRESENT ILLNESS
[de-identified] : \par 76/F presenting for follow-up for a history of splenomegaly with leukopenia and thrombocytopenia. Since her last visit, she has been evaluated by GI for liver disease as a possible etiology. Fibrosure testing was suggestive of severe liver fibrosis. EGD done showed small esophageal varices and gastric polyps (pathology benign). She was advised nadolol. She sought a second opinion with a different gastroenterologist, who advised regarding false positive results with Fibrosure testing. She had a repeat endoscopy which reportedly was negative for varices. She did not start taking the nadolol. Screening for viral hepatitis, hemochromatosis, Holden's disease, and autoimmune liver disease was negative. Patient was sent for additional blood work by GI. This was performed at an outside laboratory; we will request these results. Currently, she feels well and denies significant history of alcoholism or uncontrolled hypercholesterolemia. \par \par Bone marrow aspiration and biopsy performed in 08/2016 showed the following:\par -plasma cell neoplasm (lambda restricted interstitial plasma cells accounting for ~15% of cells)\par -amyloid staining negative\par -background of relative erythroid hyperplasia\par -FISH positive for CCDN1/IDH rearrangement\par \par Of note, IgG lambda monoclonal protein with M-spike of 0.9 was present in SPEP/SIFE. \par \par PET-CT 6/6/17 Splenomegaly. On the prior CT scan of the abdomen and pelvis done on August 17, 2016 the spleen measured 12.5 cm x 5.6 cm x 16.1 cm, at this time measured 12.5 cm x 7 cm x 15.9 cm. Multiple small nodules are seen in the right upper lobe peripherally new, the largest one measuring 7 mm, FDG avid with a max SUV 3.6 with slight misregistration. Postinflammatory and/or malignancy. \par \par Disease: splenomegaly, monoclonal gammopathy \par  [de-identified] : 10/30/2018 : Patient returns for follow up , she feels well , she denies any B symptoms , no abnormal bleeding , weakness, abdominal pain or increase in girth . No skeletal pain . SHe had an abdominal sonogram today . Last blood work from January showed no change in M spike or free light chains . CBC with stable pancytopenia .\par \par 06/17/2019 patient returns for follow up for splenomegaly , pancytopenia and monoclonal gammopathy . She feels well and denies new complaints except for bilateral leg pain , worse with activity , no swelling , no relief after she stopped Evista and simvastatin . She denies any B symptoms or new bone pain , Sonogram in march showed spleen 16 cm .\par no abnormal bleeding .\par \par 9/3/19: Patient returns for follow up for splenomegaly , pancytopenia and monoclonal gammopathy. She has no fresh complaints. Her CBC today showed WBC 3.2, Hb 12, plts 52. Sonogram in march showed spleen 16 cm. Last myeloma panel from Jun 2019 showed stable M-spike of 1 and FLC ratio was 2.55/2.18 = 1.17. \par \par 12/09/2019 Patient returns for follow up , she feels well and denies any B symptoms , abdominal pain or early satiety , she complains only of mild bilateral lower extremity pain after walking , no bleeding or bruising. She was seen by Dr Fall who recommended a watchful approach as long as she remains asymptomatic. \par \par 5/26/2020: BRAULIO ESPARZA is a 79 year old female here today for follow up visit for chronic splenomegaly with pancytopenia , no definite evidence of liver disease , early stage myeloma v/s MGUS ( BM with 5 % plasma cells). Patient is asymptomatic at this time. She denies increased fatigue, abnormal bleeding, night sweats, new bone pain, weight loss, or abdominal pain.\par \par 08/28/2020 Patient returns for history of splenomegaly and pancytopenia , she offers no complaints except for worsening lower extremity rash ( PPD ) , No B symptoms , no abnormal bleeding . Spleen size unchanged on ultrasound . \par \par 05/17/2021 Patient returns for routine follow up , CBC is stable , She was seen by pulmonary for worsening exertional dyspnea , CT scan shows bronchiectasis and was recommended O2 PRN , as per Dr irwin , she is suspected with pulmonary hypertension , ABRAHAM , she complains of extreme fatigue and daytime somnolence . NO B symptoms . \par \par 09/07/2021 Patient returns with complaints of fatigue , slight weight loss, no fever , night sweats , no abnormal bleeding . 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 \par

## 2021-09-08 NOTE — ASSESSMENT
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 \par -ABRAHAM ? \par -Exertional dyspnea ( etiology ? ) , fatigue . weight loss. \par Plan : Will discuss with GI , consider Thrombopoietic agent to increase platelets prior to EGD , colonoscopy and rectal biopsy , differential includes primary splenic lymphoma , amyloidosis .

## 2021-10-14 ENCOUNTER — OUTPATIENT (OUTPATIENT)
Dept: OUTPATIENT SERVICES | Facility: HOSPITAL | Age: 81
LOS: 1 days | Discharge: HOME | End: 2021-10-14

## 2021-10-14 ENCOUNTER — APPOINTMENT (OUTPATIENT)
Dept: HEMATOLOGY ONCOLOGY | Facility: CLINIC | Age: 81
End: 2021-10-14

## 2021-10-14 ENCOUNTER — LABORATORY RESULT (OUTPATIENT)
Age: 81
End: 2021-10-14

## 2021-10-14 DIAGNOSIS — Z98.890 OTHER SPECIFIED POSTPROCEDURAL STATES: Chronic | ICD-10-CM

## 2021-10-14 DIAGNOSIS — D61.818 OTHER PANCYTOPENIA: ICD-10-CM

## 2021-10-14 LAB
ALBUMIN SERPL ELPH-MCNC: 3.5 G/DL
ALP BLD-CCNC: 66 U/L
ALT SERPL-CCNC: 16 U/L
ANION GAP SERPL CALC-SCNC: 12 MMOL/L
AST SERPL-CCNC: 28 U/L
BILIRUB SERPL-MCNC: 3.7 MG/DL
BUN SERPL-MCNC: 19 MG/DL
CALCIUM SERPL-MCNC: 9.1 MG/DL
CHLORIDE SERPL-SCNC: 107 MMOL/L
CO2 SERPL-SCNC: 21 MMOL/L
CREAT SERPL-MCNC: 1.1 MG/DL
GLUCOSE SERPL-MCNC: 168 MG/DL
HCT VFR BLD CALC: 36.7 %
HGB BLD-MCNC: 11.8 G/DL
MCHC RBC-ENTMCNC: 31.7 PG
MCHC RBC-ENTMCNC: 32.2 G/DL
MCV RBC AUTO: 98.7 FL
PLATELET # BLD AUTO: 46 K/UL
PMV BLD: 10.3 FL
POTASSIUM SERPL-SCNC: 4.1 MMOL/L
PROT SERPL-MCNC: 6.3 G/DL
RBC # BLD: 3.72 M/UL
RBC # FLD: 15.8 %
SODIUM SERPL-SCNC: 140 MMOL/L
WBC # FLD AUTO: 2.38 K/UL

## 2021-10-15 LAB
DEPRECATED KAPPA LC FREE/LAMBDA SER: 1.52 RATIO
IGM SER QL IEP: 192 MG/DL
KAPPA LC CSF-MCNC: 3.27 MG/DL
KAPPA LC SERPL-MCNC: 4.97 MG/DL

## 2021-10-26 LAB
ALBUMIN MFR SERPL ELPH: 52.8 %
ALBUMIN SERPL-MCNC: 3.2 G/DL
ALBUMIN/GLOB SERPL: 1.1 RATIO
ALPHA1 GLOB MFR SERPL ELPH: 3.3 %
ALPHA1 GLOB SERPL ELPH-MCNC: 0.2 G/DL
ALPHA2 GLOB MFR SERPL ELPH: 7.5 %
ALPHA2 GLOB SERPL ELPH-MCNC: 0.5 G/DL
B-GLOBULIN MFR SERPL ELPH: 7.9 %
B-GLOBULIN SERPL ELPH-MCNC: 0.5 G/DL
GAMMA GLOB FLD ELPH-MCNC: 1.7 G/DL
GAMMA GLOB MFR SERPL ELPH: 28.5 %
IGA SER QL IEP: 180 MG/DL
IGG SER QL IEP: 1893 MG/DL
INTERPRETATION SERPL IEP-IMP: NORMAL
M PROTEIN MFR SERPL ELPH: 18.5 %
M PROTEIN SPEC IFE-MCNC: NORMAL
MONOCLON BAND OBS SERPL: 1.1 G/DL
PROT SERPL-MCNC: 6.1 G/DL
PROT SERPL-MCNC: 6.1 G/DL

## 2021-12-12 ENCOUNTER — TRANSCRIPTION ENCOUNTER (OUTPATIENT)
Age: 81
End: 2021-12-12

## 2021-12-14 ENCOUNTER — APPOINTMENT (OUTPATIENT)
Age: 81
End: 2021-12-14
Payer: MEDICARE

## 2021-12-14 VITALS
DIASTOLIC BLOOD PRESSURE: 70 MMHG | BODY MASS INDEX: 25.49 KG/M2 | RESPIRATION RATE: 14 BRPM | SYSTOLIC BLOOD PRESSURE: 110 MMHG | WEIGHT: 135 LBS | OXYGEN SATURATION: 98 % | HEART RATE: 76 BPM | HEIGHT: 61 IN

## 2021-12-14 PROCEDURE — 94727 GAS DIL/WSHOT DETER LNG VOL: CPT

## 2021-12-14 PROCEDURE — 94729 DIFFUSING CAPACITY: CPT

## 2021-12-14 PROCEDURE — 99213 OFFICE O/P EST LOW 20 MIN: CPT | Mod: 25

## 2021-12-14 PROCEDURE — 94010 BREATHING CAPACITY TEST: CPT

## 2021-12-14 NOTE — ASSESSMENT
[FreeTextEntry1] : PROCTOR with O2 desaturation likely cardiac, improving \par PHTN WHO II \par Possible ABRAHAM \par ( SP Venous duplex negative ) CT chest no ILD

## 2022-01-17 ENCOUNTER — OUTPATIENT (OUTPATIENT)
Dept: OUTPATIENT SERVICES | Facility: HOSPITAL | Age: 82
LOS: 1 days | Discharge: HOME | End: 2022-01-17

## 2022-01-17 ENCOUNTER — LABORATORY RESULT (OUTPATIENT)
Age: 82
End: 2022-01-17

## 2022-01-17 ENCOUNTER — APPOINTMENT (OUTPATIENT)
Dept: INFUSION THERAPY | Facility: CLINIC | Age: 82
End: 2022-01-17

## 2022-01-17 DIAGNOSIS — Z98.890 OTHER SPECIFIED POSTPROCEDURAL STATES: Chronic | ICD-10-CM

## 2022-01-17 LAB
HCT VFR BLD CALC: 37.4 %
HGB BLD-MCNC: 12.3 G/DL
MCHC RBC-ENTMCNC: 31.6 PG
MCHC RBC-ENTMCNC: 32.9 G/DL
MCV RBC AUTO: 96.1 FL
PLATELET # BLD AUTO: 47 K/UL
PMV BLD: 10.2 FL
RBC # BLD: 3.89 M/UL
RBC # FLD: 16 %
WBC # FLD AUTO: 2.28 K/UL

## 2022-01-18 DIAGNOSIS — D61.818 OTHER PANCYTOPENIA: ICD-10-CM

## 2022-01-18 LAB
APTT BLD: 41.8 SEC
INR PPP: 1.33 RATIO
PT BLD: 15.3 SEC

## 2022-03-28 ENCOUNTER — APPOINTMENT (OUTPATIENT)
Dept: OTOLARYNGOLOGY | Facility: CLINIC | Age: 82
End: 2022-03-28
Payer: MEDICARE

## 2022-03-28 DIAGNOSIS — H61.23 IMPACTED CERUMEN, BILATERAL: ICD-10-CM

## 2022-03-28 PROCEDURE — 69210 REMOVE IMPACTED EAR WAX UNI: CPT

## 2022-03-28 PROCEDURE — 99205 OFFICE O/P NEW HI 60 MIN: CPT | Mod: 25

## 2022-03-28 PROCEDURE — 31231 NASAL ENDOSCOPY DX: CPT

## 2022-03-28 PROCEDURE — 92557 COMPREHENSIVE HEARING TEST: CPT

## 2022-03-28 PROCEDURE — 92550 TYMPANOMETRY & REFLEX THRESH: CPT

## 2022-03-28 NOTE — PHYSICAL EXAM
[Normal] : mucosa is normal [Midline] : trachea located in midline position [de-identified] : b/l cerumen, removed with curette

## 2022-03-28 NOTE — HISTORY OF PRESENT ILLNESS
[de-identified] : Patient presents today with c/o right sided hearing loss. She admits happened after having dental work about a week ago. No otalgia. Dental work was done on the left side. She admits after receiving novocaine injection started having numbness and shoulder pain and sudden hearing loss on right side. denies headache. denies hx of recurrent ear infections. denies otorrhea or fever.

## 2022-03-28 NOTE — ASSESSMENT
[FreeTextEntry1] : I reviewed, interpreted, and discussed the Audiogram done today. Right profound hearing loss.\par \par Part of history and discussion with patient's . \par \par Continue prednisone.\par \par I discussed the benefits of oral steroids for patient's current situation, and made the patient aware of side effects of systemic corticosteroids. I recommended a low sugar and low salt diet while on steroid treatment, while alerting patient about potential temporary increase in sugar levels and blood pressure. Patient aware of risk of impact on sleep quality and mood temporarily while on the medication.\par \par \par I discussed patient's case with Dr Brayden Reyez on the phone today.\par

## 2022-04-01 ENCOUNTER — APPOINTMENT (OUTPATIENT)
Dept: OTOLARYNGOLOGY | Facility: CLINIC | Age: 82
End: 2022-04-01
Payer: MEDICARE

## 2022-04-01 PROCEDURE — 99214 OFFICE O/P EST MOD 30 MIN: CPT

## 2022-04-01 NOTE — HISTORY OF PRESENT ILLNESS
[FreeTextEntry1] : Patient presents today following up on sudden hearing loss. Patient admits no real improvement with steroids. Patient went for MRI of the brain.

## 2022-04-06 ENCOUNTER — APPOINTMENT (OUTPATIENT)
Dept: OTOLARYNGOLOGY | Facility: CLINIC | Age: 82
End: 2022-04-06
Payer: MEDICARE

## 2022-04-06 PROCEDURE — 92557 COMPREHENSIVE HEARING TEST: CPT

## 2022-04-06 PROCEDURE — 99215 OFFICE O/P EST HI 40 MIN: CPT | Mod: 25

## 2022-04-06 NOTE — HISTORY OF PRESENT ILLNESS
[FreeTextEntry1] : Patient presents today following up on sudden hearing loss. Patient had repeat audiogram today. She admits slight improvement with hearing.

## 2022-04-06 NOTE — ASSESSMENT
[FreeTextEntry1] : I reviewed, interpreted, and discussed the Audiogram done today. improvement in hearing loss. \par \par Discussed with the patient an intratympanic injection vs renewing the high dose of steroids. I explained risks yumiko alternatives of each modality. Patient opted for steroids again.\par \par I discussed the benefits of oral steroids for patient's current situation, and made the patient aware of side effects of systemic corticosteroids. I recommended a low sugar and low salt diet while on steroid treatment, while alerting patient about potential temporary increase in sugar levels and blood pressure. Patient aware of risk of impact on sleep quality and mood temporarily while on the medication.\par \par

## 2022-04-15 ENCOUNTER — APPOINTMENT (OUTPATIENT)
Dept: OTOLARYNGOLOGY | Facility: CLINIC | Age: 82
End: 2022-04-15
Payer: MEDICARE

## 2022-04-15 DIAGNOSIS — H91.21 SUDDEN IDIOPATHIC HEARING LOSS, RIGHT EAR: ICD-10-CM

## 2022-04-15 PROCEDURE — 92550 TYMPANOMETRY & REFLEX THRESH: CPT

## 2022-04-15 PROCEDURE — 99213 OFFICE O/P EST LOW 20 MIN: CPT | Mod: 25

## 2022-04-15 PROCEDURE — 92557 COMPREHENSIVE HEARING TEST: CPT

## 2022-04-23 ENCOUNTER — TRANSCRIPTION ENCOUNTER (OUTPATIENT)
Age: 82
End: 2022-04-23

## 2022-04-23 ENCOUNTER — INPATIENT (INPATIENT)
Facility: HOSPITAL | Age: 82
LOS: 2 days | Discharge: HOME HEALTH | End: 2022-04-26
Attending: HOSPITALIST | Admitting: HOSPITALIST
Payer: MEDICARE

## 2022-04-23 VITALS
TEMPERATURE: 98 F | RESPIRATION RATE: 18 BRPM | OXYGEN SATURATION: 95 % | SYSTOLIC BLOOD PRESSURE: 149 MMHG | DIASTOLIC BLOOD PRESSURE: 82 MMHG | HEIGHT: 62 IN | HEART RATE: 97 BPM | WEIGHT: 179.9 LBS

## 2022-04-23 DIAGNOSIS — R60.1 GENERALIZED EDEMA: ICD-10-CM

## 2022-04-23 DIAGNOSIS — H91.91 UNSPECIFIED HEARING LOSS, RIGHT EAR: ICD-10-CM

## 2022-04-23 DIAGNOSIS — L97.811 NON-PRESSURE CHRONIC ULCER OF OTHER PART OF RIGHT LOWER LEG LIMITED TO BREAKDOWN OF SKIN: ICD-10-CM

## 2022-04-23 DIAGNOSIS — I83.018 VARICOSE VEINS OF RIGHT LOWER EXTREMITY WITH ULCER OTHER PART OF LOWER LEG: ICD-10-CM

## 2022-04-23 DIAGNOSIS — Z90.49 ACQUIRED ABSENCE OF OTHER SPECIFIED PARTS OF DIGESTIVE TRACT: ICD-10-CM

## 2022-04-23 DIAGNOSIS — M19.90 UNSPECIFIED OSTEOARTHRITIS, UNSPECIFIED SITE: ICD-10-CM

## 2022-04-23 DIAGNOSIS — K76.6 PORTAL HYPERTENSION: ICD-10-CM

## 2022-04-23 DIAGNOSIS — T38.0X5A ADVERSE EFFECT OF GLUCOCORTICOIDS AND SYNTHETIC ANALOGUES, INITIAL ENCOUNTER: ICD-10-CM

## 2022-04-23 DIAGNOSIS — R16.1 SPLENOMEGALY, NOT ELSEWHERE CLASSIFIED: ICD-10-CM

## 2022-04-23 DIAGNOSIS — K76.0 FATTY (CHANGE OF) LIVER, NOT ELSEWHERE CLASSIFIED: ICD-10-CM

## 2022-04-23 DIAGNOSIS — E80.7 DISORDER OF BILIRUBIN METABOLISM, UNSPECIFIED: ICD-10-CM

## 2022-04-23 DIAGNOSIS — Z88.1 ALLERGY STATUS TO OTHER ANTIBIOTIC AGENTS STATUS: ICD-10-CM

## 2022-04-23 DIAGNOSIS — Z98.890 OTHER SPECIFIED POSTPROCEDURAL STATES: Chronic | ICD-10-CM

## 2022-04-23 DIAGNOSIS — K21.9 GASTRO-ESOPHAGEAL REFLUX DISEASE WITHOUT ESOPHAGITIS: ICD-10-CM

## 2022-04-23 DIAGNOSIS — Z85.3 PERSONAL HISTORY OF MALIGNANT NEOPLASM OF BREAST: ICD-10-CM

## 2022-04-23 DIAGNOSIS — D69.59 OTHER SECONDARY THROMBOCYTOPENIA: ICD-10-CM

## 2022-04-23 DIAGNOSIS — R77.8 OTHER SPECIFIED ABNORMALITIES OF PLASMA PROTEINS: ICD-10-CM

## 2022-04-23 DIAGNOSIS — Z96.653 PRESENCE OF ARTIFICIAL KNEE JOINT, BILATERAL: ICD-10-CM

## 2022-04-23 DIAGNOSIS — E87.79 OTHER FLUID OVERLOAD: ICD-10-CM

## 2022-04-23 DIAGNOSIS — I10 ESSENTIAL (PRIMARY) HYPERTENSION: ICD-10-CM

## 2022-04-23 LAB
ALBUMIN SERPL ELPH-MCNC: 3.2 G/DL — LOW (ref 3.5–5.2)
ALP SERPL-CCNC: 180 U/L — HIGH (ref 30–115)
ALT FLD-CCNC: 37 U/L — SIGNIFICANT CHANGE UP (ref 0–41)
ANION GAP SERPL CALC-SCNC: 7 MMOL/L — SIGNIFICANT CHANGE UP (ref 7–14)
AST SERPL-CCNC: 31 U/L — SIGNIFICANT CHANGE UP (ref 0–41)
BASOPHILS # BLD AUTO: 0.01 K/UL — SIGNIFICANT CHANGE UP (ref 0–0.2)
BASOPHILS NFR BLD AUTO: 0.2 % — SIGNIFICANT CHANGE UP (ref 0–1)
BILIRUB SERPL-MCNC: 4.7 MG/DL — HIGH (ref 0.2–1.2)
BUN SERPL-MCNC: 38 MG/DL — HIGH (ref 10–20)
CALCIUM SERPL-MCNC: 9.4 MG/DL — SIGNIFICANT CHANGE UP (ref 8.5–10.1)
CHLORIDE SERPL-SCNC: 101 MMOL/L — SIGNIFICANT CHANGE UP (ref 98–110)
CK MB CFR SERPL CALC: 5.3 NG/ML — SIGNIFICANT CHANGE UP (ref 0.6–6.3)
CK SERPL-CCNC: 66 U/L — SIGNIFICANT CHANGE UP (ref 0–225)
CO2 SERPL-SCNC: 32 MMOL/L — SIGNIFICANT CHANGE UP (ref 17–32)
CREAT SERPL-MCNC: 1.2 MG/DL — SIGNIFICANT CHANGE UP (ref 0.7–1.5)
EGFR: 45 ML/MIN/1.73M2 — LOW
EOSINOPHIL # BLD AUTO: 0.24 K/UL — SIGNIFICANT CHANGE UP (ref 0–0.7)
EOSINOPHIL NFR BLD AUTO: 4.9 % — SIGNIFICANT CHANGE UP (ref 0–8)
GLUCOSE SERPL-MCNC: 197 MG/DL — HIGH (ref 70–99)
HCT VFR BLD CALC: 41.2 % — SIGNIFICANT CHANGE UP (ref 37–47)
HGB BLD-MCNC: 13.4 G/DL — SIGNIFICANT CHANGE UP (ref 12–16)
IMM GRANULOCYTES NFR BLD AUTO: 0.4 % — HIGH (ref 0.1–0.3)
LYMPHOCYTES # BLD AUTO: 0.85 K/UL — LOW (ref 1.2–3.4)
LYMPHOCYTES # BLD AUTO: 17.5 % — LOW (ref 20.5–51.1)
MAGNESIUM SERPL-MCNC: 2.3 MG/DL — SIGNIFICANT CHANGE UP (ref 1.8–2.4)
MCHC RBC-ENTMCNC: 32.5 G/DL — SIGNIFICANT CHANGE UP (ref 32–37)
MCHC RBC-ENTMCNC: 32.6 PG — HIGH (ref 27–31)
MCV RBC AUTO: 100.2 FL — HIGH (ref 81–99)
MONOCYTES # BLD AUTO: 0.46 K/UL — SIGNIFICANT CHANGE UP (ref 0.1–0.6)
MONOCYTES NFR BLD AUTO: 9.5 % — HIGH (ref 1.7–9.3)
NEUTROPHILS # BLD AUTO: 3.28 K/UL — SIGNIFICANT CHANGE UP (ref 1.4–6.5)
NEUTROPHILS NFR BLD AUTO: 67.5 % — SIGNIFICANT CHANGE UP (ref 42.2–75.2)
NRBC # BLD: 0 /100 WBCS — SIGNIFICANT CHANGE UP (ref 0–0)
NT-PROBNP SERPL-SCNC: 593 PG/ML — HIGH (ref 0–300)
PLATELET # BLD AUTO: 37 K/UL — LOW (ref 130–400)
POTASSIUM SERPL-MCNC: 4.2 MMOL/L — SIGNIFICANT CHANGE UP (ref 3.5–5)
POTASSIUM SERPL-SCNC: 4.2 MMOL/L — SIGNIFICANT CHANGE UP (ref 3.5–5)
PROT SERPL-MCNC: 6.1 G/DL — SIGNIFICANT CHANGE UP (ref 6–8)
RBC # BLD: 4.11 M/UL — LOW (ref 4.2–5.4)
RBC # FLD: 17.1 % — HIGH (ref 11.5–14.5)
SARS-COV-2 RNA SPEC QL NAA+PROBE: SIGNIFICANT CHANGE UP
SODIUM SERPL-SCNC: 140 MMOL/L — SIGNIFICANT CHANGE UP (ref 135–146)
TROPONIN T SERPL-MCNC: 0.02 NG/ML — HIGH
TROPONIN T SERPL-MCNC: 0.03 NG/ML — CRITICAL HIGH
WBC # BLD: 4.86 K/UL — SIGNIFICANT CHANGE UP (ref 4.8–10.8)
WBC # FLD AUTO: 4.86 K/UL — SIGNIFICANT CHANGE UP (ref 4.8–10.8)

## 2022-04-23 PROCEDURE — 76705 ECHO EXAM OF ABDOMEN: CPT | Mod: 26

## 2022-04-23 PROCEDURE — 93010 ELECTROCARDIOGRAM REPORT: CPT

## 2022-04-23 PROCEDURE — 99285 EMERGENCY DEPT VISIT HI MDM: CPT | Mod: FS

## 2022-04-23 PROCEDURE — 99222 1ST HOSP IP/OBS MODERATE 55: CPT

## 2022-04-23 PROCEDURE — 73030 X-RAY EXAM OF SHOULDER: CPT | Mod: 26,RT

## 2022-04-23 PROCEDURE — 71045 X-RAY EXAM CHEST 1 VIEW: CPT | Mod: 26

## 2022-04-23 PROCEDURE — 93970 EXTREMITY STUDY: CPT | Mod: 26

## 2022-04-23 RX ORDER — PREGABALIN 225 MG/1
1000 CAPSULE ORAL DAILY
Refills: 0 | Status: DISCONTINUED | OUTPATIENT
Start: 2022-04-23 | End: 2022-04-26

## 2022-04-23 RX ORDER — OMEGA-3 ACID ETHYL ESTERS 1 G
2 CAPSULE ORAL
Refills: 0 | Status: DISCONTINUED | OUTPATIENT
Start: 2022-04-23 | End: 2022-04-26

## 2022-04-23 RX ORDER — TRAMADOL HYDROCHLORIDE 50 MG/1
50 TABLET ORAL ONCE
Refills: 0 | Status: DISCONTINUED | OUTPATIENT
Start: 2022-04-23 | End: 2022-04-23

## 2022-04-23 RX ORDER — ACETAMINOPHEN 500 MG
650 TABLET ORAL EVERY 6 HOURS
Refills: 0 | Status: DISCONTINUED | OUTPATIENT
Start: 2022-04-23 | End: 2022-04-23

## 2022-04-23 RX ORDER — RALOXIFENE HYDROCHLORIDE 60 MG/1
1 TABLET, COATED ORAL
Qty: 0 | Refills: 0 | DISCHARGE

## 2022-04-23 RX ORDER — ONDANSETRON 8 MG/1
4 TABLET, FILM COATED ORAL EVERY 8 HOURS
Refills: 0 | Status: DISCONTINUED | OUTPATIENT
Start: 2022-04-23 | End: 2022-04-26

## 2022-04-23 RX ORDER — ASCORBIC ACID 60 MG
500 TABLET,CHEWABLE ORAL DAILY
Refills: 0 | Status: DISCONTINUED | OUTPATIENT
Start: 2022-04-23 | End: 2022-04-26

## 2022-04-23 RX ORDER — FUROSEMIDE 40 MG
40 TABLET ORAL EVERY 12 HOURS
Refills: 0 | Status: DISCONTINUED | OUTPATIENT
Start: 2022-04-23 | End: 2022-04-26

## 2022-04-23 RX ORDER — FUROSEMIDE 40 MG
60 TABLET ORAL ONCE
Refills: 0 | Status: COMPLETED | OUTPATIENT
Start: 2022-04-23 | End: 2022-04-23

## 2022-04-23 RX ORDER — FUROSEMIDE 40 MG
40 TABLET ORAL ONCE
Refills: 0 | Status: DISCONTINUED | OUTPATIENT
Start: 2022-04-23 | End: 2022-04-23

## 2022-04-23 RX ORDER — LANOLIN ALCOHOL/MO/W.PET/CERES
3 CREAM (GRAM) TOPICAL AT BEDTIME
Refills: 0 | Status: DISCONTINUED | OUTPATIENT
Start: 2022-04-23 | End: 2022-04-26

## 2022-04-23 RX ORDER — SIMVASTATIN 20 MG/1
1 TABLET, FILM COATED ORAL
Qty: 0 | Refills: 0 | DISCHARGE

## 2022-04-23 RX ORDER — LACTOBACILLUS ACIDOPHILUS 100MM CELL
1 CAPSULE ORAL DAILY
Refills: 0 | Status: DISCONTINUED | OUTPATIENT
Start: 2022-04-23 | End: 2022-04-26

## 2022-04-23 RX ORDER — CHLORHEXIDINE GLUCONATE 213 G/1000ML
1 SOLUTION TOPICAL DAILY
Refills: 0 | Status: DISCONTINUED | OUTPATIENT
Start: 2022-04-23 | End: 2022-04-26

## 2022-04-23 RX ORDER — OMEPRAZOLE 10 MG/1
1 CAPSULE, DELAYED RELEASE ORAL
Qty: 0 | Refills: 0 | DISCHARGE

## 2022-04-23 RX ADMIN — TRAMADOL HYDROCHLORIDE 50 MILLIGRAM(S): 50 TABLET ORAL at 21:40

## 2022-04-23 RX ADMIN — Medication 2 GRAM(S): at 19:13

## 2022-04-23 RX ADMIN — Medication 40 MILLIGRAM(S): at 19:12

## 2022-04-23 RX ADMIN — TRAMADOL HYDROCHLORIDE 50 MILLIGRAM(S): 50 TABLET ORAL at 20:51

## 2022-04-23 RX ADMIN — Medication 60 MILLIGRAM(S): at 13:22

## 2022-04-23 NOTE — H&P ADULT - NSHPPHYSICALEXAM_GEN_ALL_CORE
Vital Signs Last 24 Hrs  T(C): 35.6 (23 Apr 2022 16:25), Max: 36.7 (23 Apr 2022 11:14)  T(F): 96 (23 Apr 2022 16:25), Max: 98 (23 Apr 2022 11:14)  HR: 89 (23 Apr 2022 16:25) (76 - 97)  BP: 145/65 (23 Apr 2022 16:25) (145/65 - 156/68)  RR: 18 (23 Apr 2022 16:25) (18 - 18)  SpO2: 96% (23 Apr 2022 15:02) (95% - 96%)    PHYSICAL EXAM:  GENERAL: elderly, comfortable, NAD, well-developed  HEAD:  Atraumatic, Normocephalic  EYES: EOMI, PERRLA, conjunctiva and sclera clear, +left cheek ecchymosis  NECK: Supple, No JVD  CHEST/LUNG: Clear to auscultation bilaterally; No wheeze  HEART: Regular rate and rhythm; s1,s2  ABDOMEN: Soft, Nontender, Nondistended; Bowel sounds present  EXTREMITIES:  2+ Peripheral Pulses, b/l lower ext pitting edema, +right lower ext blister-dressing in place, +right shoulder ecchymosis  PSYCH: AAOx3  NEUROLOGY: non-focal  SKIN: b/l lower ext brown skin discoloration

## 2022-04-23 NOTE — H&P ADULT - HISTORY OF PRESENT ILLNESS
81y old female with a pmhx of hypertension, fatty liver, enlarged spleen and  thrombocytopenia, presents to the ER complaining of sudden onset of right ear hearing loss since dental implant 1 month ago. pt states placed on daily prednisone 60 mg by ENT now tapered to 20mg  but states noticed b/l legs swelling now progressively worse and associated with abdominal distension, weight and non productive cough.  pt started on lasix 10mg/day increased to 20mg/day with no improvement. pt states weight gain from 133lbs to 150lbs in a month. pt admits to b/l legs pain radiating to groin, right leg blister with clear fluid discharge and b/l legs skin discoloration but denies chest/abdominal pain, headache, SOB, numbness, paraesthesias, n/v, fever or chills.     81y old female with a pmhx of hypertension, fatty liver, enlarged spleen and  thrombocytopenia, presents to the ER complaining of sudden onset of right ear hearing loss since dental implant 1 month ago. pt states placed on daily prednisone 60 mg by ENT now tapered to 20mg  but states noticed b/l legs swelling now progressively worse and associated with abdominal distension, weight gain and non productive cough.  pt started on lasix 10mg/day increased to 20mg/day with no improvement. pt states weight gain from 133lbs to 150lbs in a month. pt admits to b/l legs pain radiating to groin, right leg blister with clear fluid discharge and b/l legs skin discoloration but denies chest/abdominal pain, headache, SOB, numbness, paraesthesias, n/v, fever or chills.

## 2022-04-23 NOTE — ED ADULT NURSE NOTE - NSICDXPASTSURGICALHX_GEN_ALL_CORE_FT
PAST SURGICAL HISTORY:  History of surgery LEFT BREAST LUMPECTOMY  TUBIAL LIGATION  CHOLECYSTECTOMY  RIGHT & LEFT TKR

## 2022-04-23 NOTE — CHART NOTE - NSCHARTNOTEFT_GEN_A_CORE
80 y/o F with PMHx of Thrombocytomia, fatty liver, enlarged spleen, recent dental implant on left side followed by right sided hearing loss currently on Prednisone tapering dose currently on Prednisone 20mg daily presents for worsening b/l LE edema and abdomen swelling. She denies F/C, chest pain, palpitations, SOB, abd pain, N/V. Patient was prescribed lasix 10mg by her PMD without much improvement. Denies any recent travel, calf tenderness, PROCTOR. She reports long history of fatty liver disease but reports that she was told that its "mild."    PE:   Gen: NAD, comfortable, pleasant  HEENT: AT, NC, EOMI, +right sided decreased hearing  CVS: RRR, normal S1s2, no m/r/g, 2-3+ b/l LE edema   Resp: CTAb/l, no w/r/r  Abd: soft, NT, ND, +BS  Neuro: AAOx3, no focal deficit    #b/l LE edema, abdominal swelling likely multifactirial due to long term steroid use, liver failure, r/o underlying CHF  #Elevated bilirubin likely related to hx of fatty liver disease  #Thrombocytopenia likely related to fatty liver disease  #Elevated troponins, less likely ACS- no chest pain, EKG no acute ST changes  #Recent right sided hearing loss- on predisone   -IV lasix 40mg BID  -ECHO  -monitor on tele  -f/u trops   -f/u LE doppler official read  -will trend LFTs  -f/u US abd/liver  -GI eval  -cont prednisone 20mg daily and plan for further taper  -outpatient ENT f/u    Patient was seen and examined at bedside by myself. Case was discussed with PA, agree with her H&P and A/P.

## 2022-04-23 NOTE — ED PROVIDER NOTE - CLINICAL SUMMARY MEDICAL DECISION MAKING FREE TEXT BOX
81-year-old female With past medical history thrombocytopenia, Fatty liver, enlarged spleen who presents to the ER for worsening lower extremity swelling. Significant left greater than right lower extremity edema.  Labs and imaging reviewed.  Duplex negative for DVT, elevated BNP and troponin noted.  Patient was given 60 mg of Lasix and admitted for further mgmt. Recommend 2D echo.

## 2022-04-23 NOTE — ED ADULT NURSE NOTE - NSIMPLEMENTINTERV_GEN_ALL_ED
Implemented All Fall with Harm Risk Interventions:  [place] to call system. Call bell, personal items and telephone within reach. Instruct patient to call for assistance. Room bathroom lighting operational. Non-slip footwear when patient is off stretcher. Physically safe environment: no spills, clutter or unnecessary equipment. Stretcher in lowest position, wheels locked, appropriate side rails in place. Provide visual cue, wrist band, yellow gown, etc. Monitor gait and stability. Monitor for mental status changes and reorient to person, place, and time. Review medications for side effects contributing to fall risk. Reinforce activity limits and safety measures with patient and family. Provide visual clues: red socks.

## 2022-04-23 NOTE — ED PROVIDER NOTE - NS ED ATTENDING STATEMENT MOD
This was a shared visit with the DELILAH. I reviewed and verified the documentation and independently performed the documented:

## 2022-04-23 NOTE — PATIENT PROFILE ADULT - FALL HARM RISK - HARM RISK INTERVENTIONS

## 2022-04-23 NOTE — ED PROVIDER NOTE - ATTENDING APP SHARED VISIT CONTRIBUTION OF CARE
81-year-old female presents to the ER for worsening lower extremity swelling. 81-year-old female With past medical history thrombocytopenia, Fatty liver, enlarged spleen who presents to the ER for worsening lower extremity swelling. She reports falling worsen after she was started on prednisone 60 mg 2 weeks ago for right sided hearing loss.  He has been tapering down she is currently on 20 mg daily. Her doctor had her on lasix 10mg and had her double it. Did not take it today. She reports leakage of clear fluid from her le right lower extremity.  Also reports swelling of her abdomen.  Denies shortness of breath, chest pain, fevers chills, vomiting.    Vitals noted, triage note reviewed    Gen - NAD, Head - NCAT, Pharynx - clear, MMM, Heart - RRR, no m/g/r, Lungs - CTAB, no w/c/r, Abdomen - soft, NT, ND, Skin - No rash, Extremities - FROM, 4+ pitting edema LLE to lower abd, 3+ pitting edema to RLE, no erythema, + ecchymoses to R proximal humerus- appears several days old, mild ecchymoses to L chin, brisk cap refill, Neuro - A&O x3, equal strength and sensation, non-focal exam    a/p:  LE edema  EKG, CXR, labs incl trop, bnp  lasix 60mg x1  reassess

## 2022-04-23 NOTE — ED ADULT NURSE NOTE - NSICDXPASTMEDICALHX_GEN_ALL_CORE_FT
PAST MEDICAL HISTORY:  Arthritis OA    Fatty liver AS PER PATIENT 15YEARS  PT REPORTS- I HAVE AN ENLARGED SPLEEN  LOW PLATELETS.    GERD (gastroesophageal reflux disease)     Malignant neoplasm of areola of left breast in female, unspecified estrogen receptor status

## 2022-04-23 NOTE — H&P ADULT - NS ATTEND AMEND GEN_ALL_CORE FT
82 y/o F with PMHx of Thrombocytomia, fatty liver, enlarged spleen, recent dental implant on left side followed by right sided hearing loss currently on Prednisone tapering dose currently on Prednisone 20mg daily presents for worsening b/l LE edema and abdomen swelling. She denies F/C, chest pain, palpitations, SOB, abd pain, N/V. Patient was prescribed lasix 10mg by her PMD without much improvement. Denies any recent travel, calf tenderness, PROCTOR. She reports long history of fatty liver disease but reports that she was told that its "mild."    PE:   Gen: NAD, comfortable, pleasant  HEENT: AT, NC, EOMI, +right sided decreased hearing  CVS: RRR, normal S1s2, no m/r/g, 2-3+ b/l LE edema   Resp: CTAb/l, no w/r/r  Abd: soft, NT, ND, +BS  Neuro: AAOx3, no focal deficit    #b/l LE edema, abdominal swelling likely multifactirial due to long term steroid use, liver failure, r/o underlying CHF  #Elevated bilirubin likely related to hx of fatty liver disease  #Thrombocytopenia likely related to fatty liver disease  #Elevated troponins, less likely ACS- no chest pain, EKG no acute ST changes  #Recent right sided hearing loss- on predisone   -IV lasix 40mg BID  -ECHO  -monitor on tele  -f/u trops   -f/u LE doppler official read  -will trend LFTs  -f/u US abd/liver  -GI eval  -cont prednisone 20mg daily and plan for further taper  -outpatient ENT f/u    Patient was seen and examined at bedside by myself. Case was discussed with PA, agree with her H&P and A/P.

## 2022-04-23 NOTE — ED PROVIDER NOTE - PHYSICAL EXAMINATION
VITAL SIGNS: I have reviewed nursing notes and confirm.  CONSTITUTIONAL: Well-developed; well-nourished; in no acute distress.   SKIN:  skin exam is warm and dry, no acute rash.    HEAD: Normocephalic; atraumatic.  EYES:  conjunctiva and sclera clear.  ENT: No nasal discharge; airway clear.  NECK: Supple; non tender.  CARD: S1, S2 normal; no murmurs, gallops, or rubs. Regular rate and rhythm.   RESP: No wheezes, rales or rhonchi.  ABD: Normal bowel sounds; soft; non-distended; non-tender  EXT: Normal ROM.  No clubbing, cyanosis 3+ symmetrical pitting edema lower extremities   LYMPH: No acute cervical adenopathy.  NEURO: Alert, oriented, grossly unremarkable  PSYCH: Cooperative, appropriate.

## 2022-04-23 NOTE — ED PROVIDER NOTE - OBJECTIVE STATEMENT
Pt is a 80y/o female with a pmhx of HTN here for eval progressively worsening lower extremity edema x 1 month sicne being started on high dose prednisone taper for right sided hearing loss. Pt denies fever, chills, CP, SOB, N/V/D, abd pain

## 2022-04-23 NOTE — H&P ADULT - NSHPREVIEWOFSYSTEMS_GEN_ALL_CORE
GENERAL: no fever, no chills   HEENT: (+) right ear difficulty hearing, no eye pain, no visual change/discharge, no neck pain/stiffness  RESPIRATORY: (+)cough, no Shortness of Breath  CARDIOVASCULAR: no chest pain, no palpitation, b/l legs edema  GASTROINTESTINAL: No abdominal  pain. (+) abdominal distension, No nausea, vomiting, diarrhea.  GENITOURINARY: No dysuria, frequency, hematuria, or incontinence  NEUROLOGICAL: No headaches, memory loss, loss of strength, numbness, or tremors  SKIN: (+) skin discoloration

## 2022-04-23 NOTE — ED PROVIDER NOTE - PROGRESS NOTE DETAILS
trop 0.03, no previous to compare to,  no ischemic changes on ekg, no chest pain, will admit to elevated trop, progressively worsening leg edema

## 2022-04-23 NOTE — H&P ADULT - NSHPLABSRESULTS_GEN_ALL_CORE
13.4   4.86  )-----------( 37       ( 23 Apr 2022 12:00 )             41.2       04-23    140  |  101  |  38<H>  ----------------------------<  197<H>  4.2   |  32  |  1.2    Ca    9.4      23 Apr 2022 12:00  Mg     2.3     04-23    TPro  6.1  /  Alb  3.2<L>  /  TBili  4.7<H>  /  DBili  x   /  AST  31  /  ALT  37  /  AlkPhos  180<H>  04-23          Magnesium, Serum: 2.3 mg/dL (04-23-22 @ 12:00)                  Lactate Trend      CARDIAC MARKERS ( 23 Apr 2022 12:00 )  x     / 0.03 ng/mL / x     / x     / x

## 2022-04-23 NOTE — ED ADULT NURSE NOTE - HAVE YOU HAD A FIRST COVID-19 BOOSTER?
Problem: Potential for Falls  Goal: Patient will remain free of falls  Description: INTERVENTIONS:  - Educate patient/family on patient safety including physical limitations  - Instruct patient to call for assistance with activity   - Consult OT/PT to assist with strengthening/mobility   - Keep Call bell within reach  - Keep bed low and locked with side rails adjusted as appropriate  - Keep care items and personal belongings within reach  - Initiate and maintain comfort rounds  - Make Fall Risk Sign visible to staff  - Offer Toileting every 2 Hours, in advance of need  - Initiate/Maintain fall alarm  - Obtain necessary fall risk management equipment: fall alarm  - Apply yellow socks and bracelet for high fall risk patients  - Consider moving patient to room near nurses station  Outcome: Progressing     Problem: PAIN - ADULT  Goal: Verbalizes/displays adequate comfort level or baseline comfort level  Description: Interventions:  - Encourage patient to monitor pain and request assistance  - Assess pain using appropriate pain scale  - Administer analgesics based on type and severity of pain and evaluate response  - Implement non-pharmacological measures as appropriate and evaluate response  - Consider cultural and social influences on pain and pain management  - Notify physician/advanced practitioner if interventions unsuccessful or patient reports new pain  Outcome: Progressing     Problem: INFECTION - ADULT  Goal: Absence or prevention of progression during hospitalization  Description: INTERVENTIONS:  - Assess and monitor for signs and symptoms of infection  - Monitor lab/diagnostic results  - Monitor all insertion sites, i e  indwelling lines, tubes, and drains  - Monitor endotracheal if appropriate and nasal secretions for changes in amount and color  - [place] appropriate cooling/warming therapies per order  - Administer medications as ordered  - Instruct and encourage patient and family to use good hand hygiene technique  - Identify and instruct in appropriate isolation precautions for identified infection/condition  Outcome: Progressing  Goal: Absence of fever/infection during neutropenic period  Description: INTERVENTIONS:  - Monitor WBC    Outcome: Progressing     Problem: SAFETY ADULT  Goal: Patient will remain free of falls  Description: INTERVENTIONS:  - Educate patient/family on patient safety including physical limitations  - Instruct patient to call for assistance with activity   - Consult OT/PT to assist with strengthening/mobility   - Keep Call bell within reach  - Keep bed low and locked with side rails adjusted as appropriate  - Keep care items and personal belongings within reach  - Initiate and maintain comfort rounds  - Make Fall Risk Sign visible to staff  - Offer Toileting every 2 Hours, in advance of need  - Initiate/Maintain fall alarm  - Obtain necessary fall risk management equipment: fall alarm  - Apply yellow socks and bracelet for high fall risk patients  - Consider moving patient to room near nurses station  Outcome: Progressing  Goal: Maintain or return to baseline ADL function  Description: INTERVENTIONS:  - Educate patient/family on patient safety including physical limitations  - Instruct patient to call for assistance with activity   - Consult OT/PT to assist with strengthening/mobility   - Keep Call bell within reach  - Keep bed low and locked with side rails adjusted as appropriate  - Keep care items and personal belongings within reach  - Initiate and maintain comfort rounds  - Make Fall Risk Sign visible to staff  - Offer Toileting every 2 Hours, in advance of need  - Initiate/Maintain fall alarm  - Obtain necessary fall risk management equipment: fall alarm  - Apply yellow socks and bracelet for high fall risk patients  - Consider moving patient to room near nurses station  Outcome: Progressing  Goal: Maintains/Returns to pre admission functional level  Description: INTERVENTIONS:  - Perform BMAT or MOVE assessment daily    - Set and communicate daily mobility goal to care team and patient/family/caregiver  - Collaborate with rehabilitation services on mobility goals if consulted  - Perform Range of Motion 3 times a day  - Reposition patient every 2 hours  - Dangle patient 3 times a day  - Stand patient 3 times a day  - Ambulate patient 3 times a day  - Out of bed to chair 3 times a day   - Out of bed for meals 3 times a day  - Out of bed for toileting  - Record patient progress and toleration of activity level   Outcome: Progressing     Problem: DISCHARGE PLANNING  Goal: Discharge to home or other facility with appropriate resources  Description: INTERVENTIONS:  - Identify barriers to discharge w/patient and caregiver  - Arrange for needed discharge resources and transportation as appropriate  - Identify discharge learning needs (meds, wound care, etc )  - Arrange for interpretive services to assist at discharge as needed  - Refer to Case Management Department for coordinating discharge planning if the patient needs post-hospital services based on physician/advanced practitioner order or complex needs related to functional status, cognitive ability, or social support system  Outcome: Progressing     Problem: Knowledge Deficit  Goal: Patient/family/caregiver demonstrates understanding of disease process, treatment plan, medications, and discharge instructions  Description: Complete learning assessment and assess knowledge base    Interventions:  - Provide teaching at level of understanding  - Provide teaching via preferred learning methods  Outcome: Progressing     Problem: MOBILITY - ADULT  Goal: Maintain or return to baseline ADL function  Description: INTERVENTIONS:  - Educate patient/family on patient safety including physical limitations  - Instruct patient to call for assistance with activity   - Consult OT/PT to assist with strengthening/mobility   - Keep Call bell within reach  - Keep bed low and locked with side rails adjusted as appropriate  - Keep care items and personal belongings within reach  - Initiate and maintain comfort rounds  - Make Fall Risk Sign visible to staff  - Offer Toileting every 2 Hours, in advance of need  - Initiate/Maintain fall alarm  - Obtain necessary fall risk management equipment: fall alarm  - Apply yellow socks and bracelet for high fall risk patients  - Consider moving patient to room near nurses station  Outcome: Progressing  Goal: Maintains/Returns to pre admission functional level  Description: INTERVENTIONS:  - Perform BMAT or MOVE assessment daily    - Set and communicate daily mobility goal to care team and patient/family/caregiver  - Collaborate with rehabilitation services on mobility goals if consulted  - Perform Range of Motion 3 times a day  - Reposition patient every 2 hours    - Dangle patient 3 times a day  - Stand patient 3 times a day  - Ambulate patient 3 times a day  - Out of bed to chair 3 times a day   - Out of bed for meals 3 times a day  - Out of bed for toileting  - Record patient progress and toleration of activity level   Outcome: Progressing     Problem: Prexisting or High Potential for Compromised Skin Integrity  Goal: Skin integrity is maintained or improved  Description: INTERVENTIONS:  - Identify patients at risk for skin breakdown  - Assess and monitor skin integrity  - Assess and monitor nutrition and hydration status  - Monitor labs   - Assess for incontinence   - Turn and reposition patient  - Assist with mobility/ambulation  - Relieve pressure over bony prominences  - Avoid friction and shearing  - Provide appropriate hygiene as needed including keeping skin clean and dry  - Evaluate need for skin moisturizer/barrier cream  - Collaborate with interdisciplinary team   - Patient/family teaching  - Consider wound care consult   Outcome: Progressing     Problem: RESPIRATORY - ADULT  Goal: Achieves optimal ventilation and oxygenation  Description: INTERVENTIONS:  - Assess for changes in respiratory status  - Assess for changes in mentation and behavior  - Position to facilitate oxygenation and minimize respiratory effort  - Oxygen administered by appropriate delivery if ordered  - Initiate smoking cessation education as indicated  - Encourage broncho-pulmonary hygiene including cough, deep breathe, Incentive Spirometry  - Assess the need for suctioning and aspirate as needed  - Assess and instruct to report SOB or any respiratory difficulty  - Respiratory Therapy support as indicated  Outcome: Progressing     Problem: Nutrition/Hydration-ADULT  Goal: Nutrient/Hydration intake appropriate for improving, restoring or maintaining nutritional needs  Description: Monitor and assess patient's nutrition/hydration status for malnutrition  Collaborate with interdisciplinary team and initiate plan and interventions as ordered  Monitor patient's weight and dietary intake as ordered or per policy  Utilize nutrition screening tool and intervene as necessary  Determine patient's food preferences and provide high-protein, high-caloric foods as appropriate       INTERVENTIONS:  - Monitor oral intake, urinary output, labs, and treatment plans  - Assess nutrition and hydration status and recommend course of action  - Evaluate amount of meals eaten  - Assist patient with eating if necessary   - Allow adequate time for meals  - Recommend/ encourage appropriate diets, oral nutritional supplements, and vitamin/mineral supplements  - Order, calculate, and assess calorie counts as needed  - Recommend, monitor, and adjust tube feedings and TPN/PPN based on assessed needs  - Assess need for intravenous fluids  - Provide specific nutrition/hydration education as appropriate  - Include patient/family/caregiver in decisions related to nutrition  Outcome: Progressing Yes

## 2022-04-23 NOTE — H&P ADULT - ASSESSMENT
81y old female with a pmhx of hypertension, fatty liver, enlarged spleen and  thrombocytopenia, presents to the ER complaining of sudden onset of right ear hearing loss since dental implant 1 month ago. pt states placed on daily prednisone 60 mg by ENT now tapered to 20mg  but states noticed b/l legs swelling now progressively worse and associated with abdominal distension, weight and non productive cough.  pt started on lasix 10mg/day increased to 20mg/day with no improvement. pt states weight gain from 133lbs to 150lbs in a month. pt admits to b/l legs pain radiating to groin, right leg blister with clear fluid discharge and b/l legs skin discoloration but denies chest/abdominal pain, headache, SOB, numbness, paraesthesias, n/v, fever or chills.    Diagnosis: generalised edema                  possible anasarca  Assesment/plan:   admit to med-surg  labs/ecg/c-xray  IV diuretics  hx of liver disease - will get abd us                               - GI consult  Lower EXT Doppler in ED - neg  elevate b/l legs in bed  wound care of right leg blister    Diagnosis: Elevated troponin I level  Assesment/plan:   tele monitor  trend troponin  cex3  2D echo  cardiology consult    Diagnosis; GERD  Assesment/plan:   continue home meds  GI/DVT prophylaxis  monitor vss  monitor pt 81y old female with a pmhx of hypertension, fatty liver, enlarged spleen and  thrombocytopenia, presents to the ER complaining of sudden onset of right ear hearing loss since dental implant 1 month ago. pt states placed on daily prednisone 60 mg by ENT now tapered to 20mg  but states noticed b/l legs swelling now progressively worse and associated with abdominal distension, weight gain and non productive cough.  pt started on lasix 10mg/day increased to 20mg/day with no improvement. pt states weight gain from 133lbs to 150lbs in a month. pt admits to b/l legs pain radiating to groin, right leg blister with clear fluid discharge and b/l legs skin discoloration but denies chest/abdominal pain, headache, SOB, numbness, paraesthesias, n/v, fever or chills.    Diagnosis: generalised edema                  possible anasarca  Assesment/plan:   admit to med-surg  labs/ecg/c-xray  IV diuretics  hx of liver disease - will get abd us                               - GI consult  Lower EXT Doppler in ED - neg  elevate b/l legs in bed  wound care of right leg blister    Diagnosis: Elevated troponin  level  Assesment/plan:   tele monitor  trend troponin  cex3  2D echo  cardiology consult    Diagnosis; GERD  Assesment/plan:   continue home meds  GI/DVT prophylaxis  monitor vss  monitor pt

## 2022-04-24 LAB
ALBUMIN SERPL ELPH-MCNC: 2.9 G/DL — LOW (ref 3.5–5.2)
ALP SERPL-CCNC: 94 U/L — SIGNIFICANT CHANGE UP (ref 30–115)
ALT FLD-CCNC: 34 U/L — SIGNIFICANT CHANGE UP (ref 0–41)
ANION GAP SERPL CALC-SCNC: 11 MMOL/L — SIGNIFICANT CHANGE UP (ref 7–14)
ANION GAP SERPL CALC-SCNC: 9 MMOL/L — SIGNIFICANT CHANGE UP (ref 7–14)
AST SERPL-CCNC: 29 U/L — SIGNIFICANT CHANGE UP (ref 0–41)
BILIRUB SERPL-MCNC: 6 MG/DL — HIGH (ref 0.2–1.2)
BUN SERPL-MCNC: 35 MG/DL — HIGH (ref 10–20)
BUN SERPL-MCNC: 36 MG/DL — HIGH (ref 10–20)
CALCIUM SERPL-MCNC: 8.3 MG/DL — LOW (ref 8.5–10.1)
CALCIUM SERPL-MCNC: 8.5 MG/DL — SIGNIFICANT CHANGE UP (ref 8.5–10.1)
CHLORIDE SERPL-SCNC: 97 MMOL/L — LOW (ref 98–110)
CHLORIDE SERPL-SCNC: 98 MMOL/L — SIGNIFICANT CHANGE UP (ref 98–110)
CK MB CFR SERPL CALC: 5 NG/ML — SIGNIFICANT CHANGE UP (ref 0.6–6.3)
CK SERPL-CCNC: 79 U/L — SIGNIFICANT CHANGE UP (ref 0–225)
CO2 SERPL-SCNC: 31 MMOL/L — SIGNIFICANT CHANGE UP (ref 17–32)
CO2 SERPL-SCNC: 32 MMOL/L — SIGNIFICANT CHANGE UP (ref 17–32)
CREAT SERPL-MCNC: 1.1 MG/DL — SIGNIFICANT CHANGE UP (ref 0.7–1.5)
CREAT SERPL-MCNC: 1.1 MG/DL — SIGNIFICANT CHANGE UP (ref 0.7–1.5)
EGFR: 50 ML/MIN/1.73M2 — LOW
EGFR: 50 ML/MIN/1.73M2 — LOW
GLUCOSE SERPL-MCNC: 177 MG/DL — HIGH (ref 70–99)
GLUCOSE SERPL-MCNC: 243 MG/DL — HIGH (ref 70–99)
HCT VFR BLD CALC: 40.2 % — SIGNIFICANT CHANGE UP (ref 37–47)
HGB BLD-MCNC: 13.1 G/DL — SIGNIFICANT CHANGE UP (ref 12–16)
MCHC RBC-ENTMCNC: 32.6 G/DL — SIGNIFICANT CHANGE UP (ref 32–37)
MCHC RBC-ENTMCNC: 32.6 PG — HIGH (ref 27–31)
MCV RBC AUTO: 100 FL — HIGH (ref 81–99)
NRBC # BLD: 0 /100 WBCS — SIGNIFICANT CHANGE UP (ref 0–0)
NT-PROBNP SERPL-SCNC: 735 PG/ML — HIGH (ref 0–300)
PLATELET # BLD AUTO: 31 K/UL — LOW (ref 130–400)
POTASSIUM SERPL-MCNC: 3.4 MMOL/L — LOW (ref 3.5–5)
POTASSIUM SERPL-MCNC: 3.8 MMOL/L — SIGNIFICANT CHANGE UP (ref 3.5–5)
POTASSIUM SERPL-SCNC: 3.4 MMOL/L — LOW (ref 3.5–5)
POTASSIUM SERPL-SCNC: 3.8 MMOL/L — SIGNIFICANT CHANGE UP (ref 3.5–5)
PROT SERPL-MCNC: 5.5 G/DL — LOW (ref 6–8)
RBC # BLD: 4.02 M/UL — LOW (ref 4.2–5.4)
RBC # FLD: 17 % — HIGH (ref 11.5–14.5)
SODIUM SERPL-SCNC: 137 MMOL/L — SIGNIFICANT CHANGE UP (ref 135–146)
SODIUM SERPL-SCNC: 141 MMOL/L — SIGNIFICANT CHANGE UP (ref 135–146)
TROPONIN T SERPL-MCNC: 0.02 NG/ML — HIGH
WBC # BLD: 4.25 K/UL — LOW (ref 4.8–10.8)
WBC # FLD AUTO: 4.25 K/UL — LOW (ref 4.8–10.8)

## 2022-04-24 PROCEDURE — 99232 SBSQ HOSP IP/OBS MODERATE 35: CPT

## 2022-04-24 PROCEDURE — 99223 1ST HOSP IP/OBS HIGH 75: CPT

## 2022-04-24 RX ORDER — BACITRACIN ZINC 500 UNIT/G
1 OINTMENT IN PACKET (EA) TOPICAL EVERY 8 HOURS
Refills: 0 | Status: DISCONTINUED | OUTPATIENT
Start: 2022-04-24 | End: 2022-04-26

## 2022-04-24 RX ADMIN — Medication 40 MILLIGRAM(S): at 18:16

## 2022-04-24 RX ADMIN — Medication 2 GRAM(S): at 18:14

## 2022-04-24 RX ADMIN — CHLORHEXIDINE GLUCONATE 1 APPLICATION(S): 213 SOLUTION TOPICAL at 12:02

## 2022-04-24 RX ADMIN — Medication 500 MILLIGRAM(S): at 12:01

## 2022-04-24 RX ADMIN — Medication 1 TABLET(S): at 12:02

## 2022-04-24 RX ADMIN — Medication 2 GRAM(S): at 06:00

## 2022-04-24 RX ADMIN — Medication 1 TABLET(S): at 12:01

## 2022-04-24 RX ADMIN — Medication 40 MILLIGRAM(S): at 05:59

## 2022-04-24 RX ADMIN — Medication 1 APPLICATION(S): at 21:18

## 2022-04-24 RX ADMIN — PREGABALIN 1000 MICROGRAM(S): 225 CAPSULE ORAL at 12:01

## 2022-04-24 RX ADMIN — Medication 20 MILLIGRAM(S): at 05:59

## 2022-04-24 NOTE — CHART NOTE - NSCHARTNOTEFT_GEN_A_CORE
Patient with a RLE lesion   As per patient "I had a bubble and it bursted"   Patient had bacitracin placed    Will continue with bacitracin   Can cleanse with Sterile water   Wound care RN to evaluate

## 2022-04-24 NOTE — CONSULT NOTE ADULT - SUBJECTIVE AND OBJECTIVE BOX
Gastroenterology/Hepatology Consultation      81y Female with liver disease?  Patient is a 81y old  Female who presents with a chief complaint of Generalised edema (24 Apr 2022 12:49)    HPI:    81y old female with a pmhx of hypertension, fatty liver, enlarged spleen and  thrombocytopenia, presents to the ER complaining of sudden onset of right ear hearing loss since dental implant 1 month ago. pt states placed on daily prednisone 60 mg by ENT now tapered to 20mg  but states noticed b/l legs swelling now progressively worse and associated with abdominal distension, weight gain and non productive cough.  pt started on lasix 10mg/day increased to 20mg/day with no improvement. pt states weight gain from 133lbs to 150lbs in a month. pt admits to b/l legs pain radiating to groin, right leg blister with clear fluid discharge and b/l legs skin discoloration but denies chest/abdominal pain, headache, SOB, numbness, paraesthesias, n/v, fever or chills.   (23 Apr 2022 16:03)      No pertinent family history in first degree relatives        Review of system  General:  (-) weight loss, (-) fevers  Eyes:  (-) visual changes  CV:  (-) chest pain  Resp: (-) SOB, (-) wheezing  GI: (-) abdominal pain,  (-) nausea, (-) vomiting, (-) dysphagia, (-) diarrhea, (-) constipation, (-) rectal bleeding, (-) melena, (-) hematemesis.  Neuro: (-) confusion, (-) weakness  Psych:  (-) Hallucinations  Heme:  (-) easy bruisability    Past medical/surgical Hx:  PAST MEDICAL & SURGICAL HISTORY:  GERD (gastroesophageal reflux disease)    Arthritis  OA    Fatty liver  AS PER PATIENT 15YEARS  PT REPORTS- I HAVE AN ENLARGED SPLEEN  LOW PLATELETS.    Malignant neoplasm of areola of left breast in female, unspecified estrogen receptor status    History of surgery  LEFT BREAST LUMPECTOMY  TUBIAL LIGATION  CHOLECYSTECTOMY  RIGHT &amp; LEFT TKR      Home Medications:  Acidophilus oral capsule: 1 cap(s) orally once a day  Calcarb with D 600 mg-400 intl units oral tablet: 1 tab(s) orally 2 times a day  CoQ10 300 mg oral capsule: 1 cap(s) orally once a day  Cranberry oral tablet: orally once a day  Fish Oil 1000 mg oral capsule: 1 cap(s) orally 3 times a day  magnesium carbonate 250 mg oral capsule: orally once a day  Multiple Vitamins oral tablet: 1 tab(s) orally once a day  Vitamin B12 100 mcg oral tablet: 1 tab(s) orally once a day  Vitamin C 500 mg oral tablet: 1 tab(s) orally once a day      Allergies: Cipro (Hives; Rash)  Levaquin (Hives; Rash)      Current Medications:   aluminum hydroxide/magnesium hydroxide/simethicone Suspension 30 milliLiter(s) Oral every 4 hours PRN  ascorbic acid 500 milliGRAM(s) Oral daily  BACItracin   Ointment 1 Application(s) Topical every 8 hours  calcium carbonate 1250 mG  + Vitamin D (OsCal 500 + D) 1 Tablet(s) Oral daily  chlorhexidine 4% Liquid 1 Application(s) Topical daily  cyanocobalamin 1000 MICROGram(s) Oral daily  furosemide   Injectable 40 milliGRAM(s) IV Push every 12 hours  lactobacillus acidophilus 1 Tablet(s) Oral daily  melatonin 3 milliGRAM(s) Oral at bedtime PRN  multivitamin 1 Tablet(s) Oral daily  omega-3-Acid Ethyl Esters 2 Gram(s) Oral two times a day  ondansetron Injectable 4 milliGRAM(s) IV Push every 8 hours PRN  predniSONE   Tablet 20 milliGRAM(s) Oral daily      Physical exam:  T(C): 36.8 (04-24-22 @ 15:49), Max: 36.8 (04-24-22 @ 15:49)  HR: 88 (04-24-22 @ 15:49) (73 - 88)  BP: 143/62 (04-24-22 @ 15:49) (118/- - 143/62)  RR: 18 (04-24-22 @ 15:49) (18 - 18)  SpO2: 94% (04-24-22 @ 07:00) (94% - 94%)  GENERAL: NAD  HEAD:  Atraumatic, Normocephalic  EYES: Sclera:NL  NECK: Supple, no JVD or thyromegaly  CHEST/LUNG: Good bilateral air entry  HEART: normal S1, S2. Regular  ABDOMEN: (-) distended, (-) tender, (-) rebound, (+) BS, (-)HSM  EXTREMITIES: (-) edema  NEUROLOGY: (-) asterixis  SKIN: (-) jaundice      Data:                        13.1   4.25  )-----------( 31       ( 24 Apr 2022 08:29 )             40.2     .0 (04-24-22)    RDW 17.0 (04-24-22)    HGB trend:  13.1  04-24-22 @ 08:29  13.4  04-23-22 @ 12:00        04-24    137  |  97<L>  |  36<H>  ----------------------------<  243<H>  3.8   |  31  |  1.1    Ca    8.5      24 Apr 2022 11:25  Mg     2.3     04-23    TPro  5.5<L>  /  Alb  2.9<L>  /  TBili  6.0<H>  /  DBili  x   /  AST  29  /  ALT  34  /  AlkPhos  94  04-24    Liver panel trend:  TBili 6.0   /   AST 29   /   ALT 34   /   AlkP 94   /   Tptn 5.5   /   Alb 2.9    /   DBili --      04-24  TBili 4.7   /   AST 31   /   ALT 37   /   AlkP 180   /   Tptn 6.1   /   Alb 3.2    /   DBili --      04-23

## 2022-04-24 NOTE — PROGRESS NOTE ADULT - ASSESSMENT
80 y/o F with PMHx of Thrombocytomia, fatty liver, enlarged spleen, recent dental implant on left side followed by right sided hearing loss currently on Prednisone tapering dose currently on Prednisone 20mg daily presents for worsening b/l LE edema and abdomen swelling. She denies F/C, chest pain, palpitations, SOB, abd pain, N/V. Patient was prescribed lasix 10mg by her PMD without much improvement. Denies any recent travel, calf tenderness, PROCTOR. She reports long history of fatty liver disease but reports that she was told that its "mild."    # b/l LE edema, abdominal swelling likely multifactorial due to long term steroid use, liver failure, r/o underlying CHF  # Elevated bilirubin likely related to hx of fatty liver disease  # Thrombocytopenia likely related to fatty liver disease  - IV lasix 40mg BID  - ECHO  - f/u LE doppler official read  - will trend LFTs  - f/u US abd/liver  - GI eval    # Elevated troponins, less likely ACS- no chest pain, EKG no acute ST changes  - trops stable  - will dc telemetry    # Recent right sided hearing loss- on prednisone   - cont prednisone 20mg daily and plan for further taper  - outpatient ENT f/u    Progress Note Handoff  Pending Consults: GI  Pending Tests: ECHO, US abd  Pending Results: clinical improvement   Family Discussion: Patient  Disposition: Home__X___/SNF______/Other_____/Unknown at this time_____  Spent over 35 min reviewing chart and on coordinating patient care during interdisciplinary rounds

## 2022-04-24 NOTE — CONSULT NOTE ADULT - ASSESSMENT
82 yo F  Splenomegaly (cause of thrombocytopenia)  No previous evidence of radiographic cirrhosis (CT in 2021 and US in 2020)  Splenomegaly may be due to portal HTN W or WO cirrhosis- agree with repeat US  Increased total bilirubinemia: please obtain hepatic function panel to fractionate the bili. If D. bili is elevated proceed with chronic liver disease work up (Ferritin, KEVIN, ASMA, AMA, liver soluble Abs, Anti LKM, viral hep panel ( B and C))  May consider MRCP given dilation (though stable and likely secondary to CCY)  Will follow

## 2022-04-25 LAB
ALBUMIN SERPL ELPH-MCNC: 2.8 G/DL — LOW (ref 3.5–5.2)
ALBUMIN SERPL ELPH-MCNC: 2.9 G/DL — LOW (ref 3.5–5.2)
ALP SERPL-CCNC: 111 U/L — SIGNIFICANT CHANGE UP (ref 30–115)
ALP SERPL-CCNC: 113 U/L — SIGNIFICANT CHANGE UP (ref 30–115)
ALT FLD-CCNC: 32 U/L — SIGNIFICANT CHANGE UP (ref 0–41)
ALT FLD-CCNC: 32 U/L — SIGNIFICANT CHANGE UP (ref 0–41)
ANION GAP SERPL CALC-SCNC: 8 MMOL/L — SIGNIFICANT CHANGE UP (ref 7–14)
AST SERPL-CCNC: 29 U/L — SIGNIFICANT CHANGE UP (ref 0–41)
AST SERPL-CCNC: 30 U/L — SIGNIFICANT CHANGE UP (ref 0–41)
BILIRUB DIRECT SERPL-MCNC: 0.6 MG/DL — HIGH (ref 0–0.3)
BILIRUB INDIRECT FLD-MCNC: 5.2 MG/DL — HIGH (ref 0.2–1.2)
BILIRUB SERPL-MCNC: 5.7 MG/DL — HIGH (ref 0.2–1.2)
BILIRUB SERPL-MCNC: 5.8 MG/DL — HIGH (ref 0.2–1.2)
BUN SERPL-MCNC: 40 MG/DL — HIGH (ref 10–20)
CALCIUM SERPL-MCNC: 8.7 MG/DL — SIGNIFICANT CHANGE UP (ref 8.5–10.1)
CHLORIDE SERPL-SCNC: 96 MMOL/L — LOW (ref 98–110)
CO2 SERPL-SCNC: 33 MMOL/L — HIGH (ref 17–32)
CREAT SERPL-MCNC: 1.2 MG/DL — SIGNIFICANT CHANGE UP (ref 0.7–1.5)
EGFR: 45 ML/MIN/1.73M2 — LOW
GLUCOSE SERPL-MCNC: 143 MG/DL — HIGH (ref 70–99)
HCT VFR BLD CALC: 39 % — SIGNIFICANT CHANGE UP (ref 37–47)
HGB BLD-MCNC: 12.3 G/DL — SIGNIFICANT CHANGE UP (ref 12–16)
LDH SERPL L TO P-CCNC: 408 — HIGH (ref 50–242)
MCHC RBC-ENTMCNC: 30.7 PG — SIGNIFICANT CHANGE UP (ref 27–31)
MCHC RBC-ENTMCNC: 31.5 G/DL — LOW (ref 32–37)
MCV RBC AUTO: 97.3 FL — SIGNIFICANT CHANGE UP (ref 81–99)
NRBC # BLD: 0 /100 WBCS — SIGNIFICANT CHANGE UP (ref 0–0)
PLATELET # BLD AUTO: 34 K/UL — LOW (ref 130–400)
POTASSIUM SERPL-MCNC: 3 MMOL/L — LOW (ref 3.5–5)
POTASSIUM SERPL-SCNC: 3 MMOL/L — LOW (ref 3.5–5)
PROT SERPL-MCNC: 5.3 G/DL — LOW (ref 6–8)
PROT SERPL-MCNC: 5.4 G/DL — LOW (ref 6–8)
RBC # BLD: 4.01 M/UL — LOW (ref 4.2–5.4)
RBC # FLD: 16.8 % — HIGH (ref 11.5–14.5)
SODIUM SERPL-SCNC: 137 MMOL/L — SIGNIFICANT CHANGE UP (ref 135–146)
WBC # BLD: 5.48 K/UL — SIGNIFICANT CHANGE UP (ref 4.8–10.8)
WBC # FLD AUTO: 5.48 K/UL — SIGNIFICANT CHANGE UP (ref 4.8–10.8)

## 2022-04-25 PROCEDURE — 99232 SBSQ HOSP IP/OBS MODERATE 35: CPT

## 2022-04-25 PROCEDURE — 93306 TTE W/DOPPLER COMPLETE: CPT | Mod: 26

## 2022-04-25 RX ORDER — POTASSIUM CHLORIDE 20 MEQ
40 PACKET (EA) ORAL ONCE
Refills: 0 | Status: COMPLETED | OUTPATIENT
Start: 2022-04-25 | End: 2022-04-25

## 2022-04-25 RX ADMIN — Medication 40 MILLIGRAM(S): at 18:15

## 2022-04-25 RX ADMIN — Medication 20 MILLIGRAM(S): at 05:01

## 2022-04-25 RX ADMIN — Medication 2 GRAM(S): at 05:02

## 2022-04-25 RX ADMIN — PREGABALIN 1000 MICROGRAM(S): 225 CAPSULE ORAL at 13:28

## 2022-04-25 RX ADMIN — Medication 500 MILLIGRAM(S): at 13:27

## 2022-04-25 RX ADMIN — Medication 2 GRAM(S): at 18:15

## 2022-04-25 RX ADMIN — Medication 40 MILLIGRAM(S): at 05:01

## 2022-04-25 RX ADMIN — Medication 1 TABLET(S): at 13:27

## 2022-04-25 RX ADMIN — CHLORHEXIDINE GLUCONATE 1 APPLICATION(S): 213 SOLUTION TOPICAL at 13:15

## 2022-04-25 RX ADMIN — Medication 1 APPLICATION(S): at 16:00

## 2022-04-25 RX ADMIN — Medication 1 APPLICATION(S): at 05:02

## 2022-04-25 RX ADMIN — Medication 40 MILLIEQUIVALENT(S): at 09:25

## 2022-04-25 RX ADMIN — Medication 1 APPLICATION(S): at 21:15

## 2022-04-25 NOTE — ADVANCED PRACTICE NURSE CONSULT - RECOMMEDATIONS
Plan: Continue bacitracin treatment as ordered   Pressure  injury  preventive  measures  skin care   Assesswound and inform primary provider of any changes   Case discussed with primary Rn  Wound/ ostomy specialist  to f/u as needed     Offloading: [x ] Frequent position changes [ ] Devices/Equipment  Cleansing: [ x] Saline [ ] Soap/Water [ ] Other: ______  Topicals: [x ] Barrier Cream [x ] Antimicrobial [ ] Enzymatic Wound Debridement  Dressings: [ ] Dry, sterile [ ] Foam [ ] Absorbant Pads [ ] Collagenase

## 2022-04-25 NOTE — PROGRESS NOTE ADULT - ASSESSMENT
82 yo F pmh GERD, arthritis admitted with lower extremity edema and elevated troponin levels       Problem 1-Hepatic Steatosis  Elevated T-bili mostly indirect  Splenomegaly (cause of thrombocytopenia)  No previous evidence of radiographic cirrhosis (CT in 2021 and US in 2020)  -patient has hematoma on right upper extremity that can sometimes be source of thrombocytopenia   Rec  -Please consult hematology, patient follows with Dr. Erickson   -Low sodium diet   -Source of splenomegaly remains unclear  -Increased total bilirubinemia: please obtain hepatic function panel to fractionate the bili. If D. bili is elevated proceed with chronic liver disease work up (Ferritin, KEVIN, ASMA, AMA, liver soluble Abs, Anti LKM, viral hep panel ( B and C))  -dietary and lifestyle modifications advised  - Follow up with our [facility] located at [address] [place] [address]. Phone Number: [phone].  80 yo F pmh GERD, arthritis admitted with lower extremity edema and elevated troponin levels       Problem 1-Hepatic Steatosis, abnormal liver functions  Elevated T-bili mostly indirect  Splenomegaly (cause of thrombocytopenia)  No previous evidence of radiographic cirrhosis (CT in 2021 and US in 2020)  -patient has hematoma on right upper extremity that can sometimes be source of thrombocytopenia   Indirect hyperbilirubinemia may be from hemolysis or from effects of hematomas (pt has severe bruising in several areas)      Rec  -Please consult hematology to evaluate for hemolysis/ thrombocytopenia, patient follows with Dr. Erickson   -Low sodium diet   -Source of splenomegaly remains unclear  -Increased total bilirubinemia: please obtain hepatic function panel to fractionate the bili.     Proceed with chronic liver disease work up (Ferritin, KEVIN, ASMA, AMA, liver soluble Abs, Anti LKM, viral hep panel ( B and C))  -dietary and lifestyle modifications advised  - Follow up with our [facility] located at [address] [place] [address]. Phone Number: [phone].

## 2022-04-25 NOTE — PROGRESS NOTE ADULT - ASSESSMENT
80 y/o F with PMHx of Thrombocytomia, fatty liver, enlarged spleen, recent dental implant on left side followed by right sided hearing loss currently on Prednisone tapering dose currently on Prednisone 20mg daily presents for worsening b/l LE edema and abdomen swelling. She denies F/C, chest pain, palpitations, SOB, abd pain, N/V. Patient was prescribed lasix 10mg by her PMD without much improvement. Denies any recent travel, calf tenderness, PROCTOR. She reports long history of fatty liver disease but reports that she was told that its "mild."    # b/l LE edema, abdominal swelling likely multifactorial due to long term steroid use, liver failure, r/o underlying CHF  # Elevated bilirubin likely related to hx of fatty liver disease  # Thrombocytopenia likely related to fatty liver disease  - LE doppler: No evidence of deep venous thrombosis or superficial thrombophlebitis in the bilateral lower extremities  - ECHO: EF 75%; Grade I diastolic dysfunction;   - US abd: Increased echogenicity of the liver which may be seen with hepatic steatosis or hepatocellular disease; Patient is post cholecystectomy.  PLAN  - IV lasix 40mg BID  - will trend LFTs  - GI on board  - will consult hematology, patient follows with Dr. Erickson; source of splenomegaly remains unclear  - will obtain hepatic function panel to fractionate the bili. If D. bili is elevated proceed with chronic liver disease work up (Ferritin, KEVIN, ASMA, AMA, liver soluble Abs, Anti LKM, viral hep panel ( B and C))  - Follow up with our [facility] located at [address] [place] [address]. Phone Number: [phone].     # Elevated troponins, less likely ACS- no chest pain, EKG no acute ST changes  - trops stable  - will dc telemetry    # Recent right sided hearing loss- on prednisone   - cont prednisone 20mg daily and plan for further taper  - outpatient ENT f/u    Progress Note Handoff  Pending Consults: hematology  Pending Tests: None  Pending Results: clinical improvement   Family Discussion: Patient  Disposition: Home__Xlikely dc tomorrow___/SNF______/Other_____/Unknown at this time_____  Spent over 35 min reviewing chart and on coordinating patient care during interdisciplinary rounds

## 2022-04-25 NOTE — PROGRESS NOTE ADULT - NS ATTEND AMEND GEN_ALL_CORE FT
As described above, we are consulted to evaluate the patient for abnormal liver functions.  See above for details  I have reviewed  the above note and agree with the impressions and findings and recommendations

## 2022-04-25 NOTE — ADVANCED PRACTICE NURSE CONSULT - ASSESSMENT
Patient  is a 81y old female with a pmhx of hypertension, fatty liver, enlarged spleen and  thrombocytopenia, presents to the ER complaining of sudden onset of right ear hearing loss since dental implant 1 month ago. pt states placed on daily prednisone 60 mg by ENT now tapered to 20mg  but states noticed b/l legs swelling now progressively worse and associated with abdominal distension, weight gain and non productive cough.  pt started on lasix 10mg/day increased to 20mg/day with no improvement. pt states weight gain from 133lbs to 150lbs in a month. pt admits to b/l legs pain radiating to groin, right leg blister with clear fluid discharge and b/l legs skin discoloration but denies chest/abdominal pain, headache, SOB, numbness, paraesthesias, n/v, fever or chills    PAST MEDICAL & SURGICAL HISTORY:  GERD (gastroesophageal reflux disease)  Arthritis  OA  Fatty liver  AS PER PATIENT 15YEARS  PT REPORTS- I HAVE AN ENLARGED SPLEEN  LOW PLATELETS.  Malignant neoplasm of areola of left breast in female, unspecified estrogen receptor status  History of surgery  LEFT BREAST LUMPECTOMY  TUBIAL LIGATION  CHOLECYSTECTOMY  RIGHT &amp; LEFT TKR      Assessment:  Patient received in bed , A/O responds appropriately to verbal command                       Skin assessed-     Wound #1  Location:  R lateral shin   Type; Partial thickness wound   Size:  2x2  Tissue Description :  Red   Wound Exudate : None    Wound Edge: Intact    Periwound Condition  Dry ,  hemosiderin stain :     Other Etiology:  [ ] Aterial  [x ] Venous- b/L lower extremity hemosiderin stain    [ ] Surgical Incision  [ ] Other: ________

## 2022-04-26 ENCOUNTER — APPOINTMENT (OUTPATIENT)
Dept: OTOLARYNGOLOGY | Facility: CLINIC | Age: 82
End: 2022-04-26

## 2022-04-26 ENCOUNTER — TRANSCRIPTION ENCOUNTER (OUTPATIENT)
Age: 82
End: 2022-04-26

## 2022-04-26 VITALS — SYSTOLIC BLOOD PRESSURE: 127 MMHG | TEMPERATURE: 98 F | DIASTOLIC BLOOD PRESSURE: 57 MMHG | HEART RATE: 86 BPM

## 2022-04-26 LAB
ALBUMIN SERPL ELPH-MCNC: 2.7 G/DL — LOW (ref 3.5–5.2)
ALP SERPL-CCNC: 140 U/L — HIGH (ref 30–115)
ALT FLD-CCNC: 34 U/L — SIGNIFICANT CHANGE UP (ref 0–41)
ANION GAP SERPL CALC-SCNC: 9 MMOL/L — SIGNIFICANT CHANGE UP (ref 7–14)
AST SERPL-CCNC: 33 U/L — SIGNIFICANT CHANGE UP (ref 0–41)
BILIRUB DIRECT SERPL-MCNC: 0.6 MG/DL — HIGH (ref 0–0.3)
BILIRUB INDIRECT FLD-MCNC: 4.7 MG/DL — HIGH (ref 0.2–1.2)
BILIRUB SERPL-MCNC: 5.3 MG/DL — HIGH (ref 0.2–1.2)
BUN SERPL-MCNC: 50 MG/DL — HIGH (ref 10–20)
CALCIUM SERPL-MCNC: 8.6 MG/DL — SIGNIFICANT CHANGE UP (ref 8.5–10.1)
CHLORIDE SERPL-SCNC: 98 MMOL/L — SIGNIFICANT CHANGE UP (ref 98–110)
CO2 SERPL-SCNC: 29 MMOL/L — SIGNIFICANT CHANGE UP (ref 17–32)
CREAT SERPL-MCNC: 1.3 MG/DL — SIGNIFICANT CHANGE UP (ref 0.7–1.5)
EGFR: 41 ML/MIN/1.73M2 — LOW
GLUCOSE SERPL-MCNC: 159 MG/DL — HIGH (ref 70–99)
HCT VFR BLD CALC: 38.1 % — SIGNIFICANT CHANGE UP (ref 37–47)
HGB BLD-MCNC: 12.1 G/DL — SIGNIFICANT CHANGE UP (ref 12–16)
MAGNESIUM SERPL-MCNC: 1.7 MG/DL — LOW (ref 1.8–2.4)
MCHC RBC-ENTMCNC: 31 PG — SIGNIFICANT CHANGE UP (ref 27–31)
MCHC RBC-ENTMCNC: 31.8 G/DL — LOW (ref 32–37)
MCV RBC AUTO: 97.7 FL — SIGNIFICANT CHANGE UP (ref 81–99)
NRBC # BLD: 0 /100 WBCS — SIGNIFICANT CHANGE UP (ref 0–0)
PLATELET # BLD AUTO: 46 K/UL — LOW (ref 130–400)
POTASSIUM SERPL-MCNC: 3.5 MMOL/L — SIGNIFICANT CHANGE UP (ref 3.5–5)
POTASSIUM SERPL-SCNC: 3.5 MMOL/L — SIGNIFICANT CHANGE UP (ref 3.5–5)
PROT SERPL-MCNC: 5.5 G/DL — LOW (ref 6–8)
RBC # BLD: 3.9 M/UL — LOW (ref 4.2–5.4)
RBC # FLD: 17.2 % — HIGH (ref 11.5–14.5)
SODIUM SERPL-SCNC: 136 MMOL/L — SIGNIFICANT CHANGE UP (ref 135–146)
WBC # BLD: 5.6 K/UL — SIGNIFICANT CHANGE UP (ref 4.8–10.8)
WBC # FLD AUTO: 5.6 K/UL — SIGNIFICANT CHANGE UP (ref 4.8–10.8)

## 2022-04-26 PROCEDURE — 99239 HOSP IP/OBS DSCHRG MGMT >30: CPT

## 2022-04-26 RX ORDER — BACITRACIN ZINC 500 UNIT/G
1 OINTMENT IN PACKET (EA) TOPICAL
Qty: 90 | Refills: 0
Start: 2022-04-26 | End: 2022-05-25

## 2022-04-26 RX ORDER — MAGNESIUM SULFATE 500 MG/ML
1 VIAL (ML) INJECTION ONCE
Refills: 0 | Status: COMPLETED | OUTPATIENT
Start: 2022-04-26 | End: 2022-04-26

## 2022-04-26 RX ORDER — FUROSEMIDE 40 MG
1 TABLET ORAL
Qty: 30 | Refills: 0
Start: 2022-04-26 | End: 2022-05-25

## 2022-04-26 RX ADMIN — Medication 1 TABLET(S): at 11:39

## 2022-04-26 RX ADMIN — Medication 1 APPLICATION(S): at 05:07

## 2022-04-26 RX ADMIN — PREGABALIN 1000 MICROGRAM(S): 225 CAPSULE ORAL at 11:43

## 2022-04-26 RX ADMIN — Medication 500 MILLIGRAM(S): at 11:39

## 2022-04-26 RX ADMIN — Medication 100 GRAM(S): at 13:34

## 2022-04-26 RX ADMIN — CHLORHEXIDINE GLUCONATE 1 APPLICATION(S): 213 SOLUTION TOPICAL at 11:43

## 2022-04-26 RX ADMIN — Medication 20 MILLIGRAM(S): at 05:07

## 2022-04-26 RX ADMIN — Medication 2 GRAM(S): at 05:08

## 2022-04-26 RX ADMIN — Medication 40 MILLIGRAM(S): at 05:08

## 2022-04-26 NOTE — PROGRESS NOTE ADULT - SUBJECTIVE AND OBJECTIVE BOX
[place], [place]  81y  Female      Patient is a 81y old  Female who presents with a chief complaint of Generalised edema (23 Apr 2022 16:03)      INTERVAL HPI/OVERNIGHT EVENTS:  Patient seen and examined earlier this morning. She reports she has been urinating alot due to lasix. Denies any new complaints. Denies F/C, CP, palpitations, abd pain.      REVIEW OF SYSTEMS:  CONSTITUTIONAL: No fever, weight loss, or fatigue  EYES: No eye pain, visual disturbances, or discharge  ENMT:  No difficulty hearing, tinnitus, vertigo; No sinus or throat pain  NECK: No pain or stiffness  RESPIRATORY: No cough, wheezing, chills or hemoptysis; No shortness of breath  CARDIOVASCULAR: No chest pain, palpitations, dizziness, or leg swelling  GASTROINTESTINAL: No abdominal or epigastric pain. No nausea, vomiting, or hematemesis; No diarrhea or constipation. No melena or hematochezia.  GENITOURINARY: No dysuria, frequency, hematuria, or incontinence  NEUROLOGICAL: No headaches, memory loss, loss of strength, numbness, or tremors  MUSCULOSKELETAL: No joint pain or swelling; No muscle, back, or extremity pain; +b/l LE swelling      T(C): 35.8 (04-24-22 @ 05:02), Max: 36.1 (04-23-22 @ 15:02)  HR: 73 (04-24-22 @ 05:02) (73 - 89)  BP: 118/57 (04-24-22 @ 05:02) (118/57 - 156/68)  RR: 18 (04-24-22 @ 05:02) (18 - 18)  SpO2: 94% (04-24-22 @ 07:00) (93% - 96%)    PHYSICAL EXAM:  Gen: NAD, comfortable, pleasant  HEENT: AT, NC, EOMI, +right sided decreased hearing  CVS: RRR, normal S1s2, no m/r/g, 2-3+ b/l LE edema   Resp: CTAb/l, no w/r/r  Abd: soft, NT, ND, +BS  Neuro: AAOx3, no focal deficit    Consultant(s) Notes Reviewed:  [x ] YES  [ ] NO  Care Discussed with Consultants/Other Providers [ x] YES  [ ] NO    LAB:                        13.1   4.25  )-----------( 31       ( 24 Apr 2022 08:29 )             40.2     04-24    137  |  97<L>  |  36<H>  ----------------------------<  243<H>  3.8   |  31  |  1.1    Ca    8.5      24 Apr 2022 11:25  Mg     2.3     04-23    TPro  5.5<L>  /  Alb  2.9<L>  /  TBili  6.0<H>  /  DBili  x   /  AST  29  /  ALT  34  /  AlkPhos  94  04-24    LIVER FUNCTIONS - ( 24 Apr 2022 11:25 )  Alb: 2.9 g/dL / Pro: 5.5 g/dL / ALK PHOS: 94 U/L / ALT: 34 U/L / AST: 29 U/L / GGT: x           CARDIAC MARKERS ( 24 Apr 2022 08:29 )  x     / 0.02 ng/mL / 79 U/L / x     / 5.0 ng/mL  CARDIAC MARKERS ( 23 Apr 2022 19:15 )  x     / 0.02 ng/mL / 66 U/L / x     / 5.3 ng/mL  CARDIAC MARKERS ( 23 Apr 2022 12:00 )  x     / 0.03 ng/mL / x     / x     / x                  Drug Dosing Weight  Height (cm): 154.9 (23 Apr 2022 16:25)  Weight (kg): 67 (23 Apr 2022 16:25)  BMI (kg/m2): 27.9 (23 Apr 2022 16:25)  BSA (m2): 1.66 (23 Apr 2022 16:25)  Height (cm): 154.9 (04-23-22 @ 16:25)  Weight (kg): 67 (04-23-22 @ 16:25)  BMI (kg/m2): 27.9 (04-23-22 @ 16:25)  BSA (m2): 1.66 (04-23-22 @ 16:25)  CAPILLARY BLOOD GLUCOSE        I&O's Summary    23 Apr 2022 07:01  -  24 Apr 2022 07:00  --------------------------------------------------------  IN: 0 mL / OUT: 500 mL / NET: -500 mL    24 Apr 2022 07:01  -  24 Apr 2022 12:49  --------------------------------------------------------  IN: 630 mL / OUT: 600 mL / NET: 30 mL          RADIOLOGY & ADDITIONAL TESTS:  Imaging Personally Reviewed:  [x] YES  [ ] NO    HEALTH ISSUES - PROBLEM Dx:          MEDS:  aluminum hydroxide/magnesium hydroxide/simethicone Suspension 30 milliLiter(s) Oral every 4 hours PRN  ascorbic acid 500 milliGRAM(s) Oral daily  calcium carbonate 1250 mG  + Vitamin D (OsCal 500 + D) 1 Tablet(s) Oral daily  chlorhexidine 4% Liquid 1 Application(s) Topical daily  cyanocobalamin 1000 MICROGram(s) Oral daily  furosemide   Injectable 40 milliGRAM(s) IV Push every 12 hours  lactobacillus acidophilus 1 Tablet(s) Oral daily  melatonin 3 milliGRAM(s) Oral at bedtime PRN  multivitamin 1 Tablet(s) Oral daily  omega-3-Acid Ethyl Esters 2 Gram(s) Oral two times a day  ondansetron Injectable 4 milliGRAM(s) IV Push every 8 hours PRN  predniSONE   Tablet 20 milliGRAM(s) Oral daily    
81yFemale  Being followed for hepatic steatosis, elevated T-bili  Interval history: Patient denies nausea, vomiting, hematemesis, melena, blood in stool, diarrhea, constipation, abdominal pain. Patient feeling comfortable tolerating DASH diet.      PAST MEDICAL & SURGICAL HISTORY:   GERD (gastroesophageal reflux disease)    Arthritis  OA    Fatty liver  AS PER PATIENT 15YEARS  PT REPORTS- I HAVE AN ENLARGED SPLEEN  LOW PLATELETS.    Malignant neoplasm of areola of left breast in female, unspecified estrogen receptor status    History of surgery  LEFT BREAST LUMPECTOMY  TUBIAL LIGATION  CHOLECYSTECTOMY  RIGHT &amp; LEFT TKR            Social History: No smoking. No alcohol. No illegal drug use.            MEDICATIONS  (STANDING):  ascorbic acid 500 milliGRAM(s) Oral daily  BACItracin   Ointment 1 Application(s) Topical every 8 hours  calcium carbonate 1250 mG  + Vitamin D (OsCal 500 + D) 1 Tablet(s) Oral daily  chlorhexidine 4% Liquid 1 Application(s) Topical daily  cyanocobalamin 1000 MICROGram(s) Oral daily  furosemide   Injectable 40 milliGRAM(s) IV Push every 12 hours  lactobacillus acidophilus 1 Tablet(s) Oral daily  multivitamin 1 Tablet(s) Oral daily  omega-3-Acid Ethyl Esters 2 Gram(s) Oral two times a day  predniSONE   Tablet 20 milliGRAM(s) Oral daily    MEDICATIONS  (PRN):  aluminum hydroxide/magnesium hydroxide/simethicone Suspension 30 milliLiter(s) Oral every 4 hours PRN Dyspepsia  melatonin 3 milliGRAM(s) Oral at bedtime PRN Insomnia  ondansetron Injectable 4 milliGRAM(s) IV Push every 8 hours PRN Nausea and/or Vomiting      Allergies:   Cipro (Hives; Rash)  Levaquin (Hives; Rash)              REVIEW OF SYSTEMS:  General:  No weight loss, fevers, or chills.  Eyes:  No reported pain or visual changes  ENT:  No sore throat or runny nose.  NECK: No stiffness   CV:  No chest pain or palpitations.  Resp:  No shortness of breath, cough  GI:  No abdominal pain, nausea, vomiting, dysphagia, diarrhea or constipation. No rectal bleeding, melena, or hematemesis.  Neuro:  No tingling, numbness         VITAL SIGNS:   T(F): 96.6 (04-25-22 @ 04:48), Max: 98.6 (04-24-22 @ 19:56)  HR: 82 (04-25-22 @ 04:48) (82 - 92)  BP: 108/53 (04-25-22 @ 04:48) (108/53 - 143/62)  RR: 18 (04-25-22 @ 04:48) (18 - 18)  SpO2: --    PHYSICAL EXAM:  GENERAL: AAOx3, no acute distress.  HEAD:  Atraumatic, Normocephalic  EYES: conjunctiva and sclera clear  NECK: Supple, no JVD or thyromegaly  CHEST/LUNG: Clear to auscultation bilaterally; No wheeze, rhonchi, or rales  HEART: Regular rate and rhythm; normal S1, S2, No murmurs.  ABDOMEN: Soft, nontender, nondistended; Bowel sounds present  NEUROLOGY: No asterixis or tremor.   SKIN: Intact, no jaundice            LABS:                        12.3   5.48  )-----------( 34       ( 25 Apr 2022 05:44 )             39.0     04-25    137  |  96<L>  |  40<H>  ----------------------------<  143<H>  3.0<L>   |  33<H>  |  1.2    Ca    8.7      25 Apr 2022 05:44    TPro  5.3<L>  /  Alb  2.9<L>  /  TBili  5.7<H>  /  DBili  0.6<H>  /  AST  29  /  ALT  32  /  AlkPhos  111  04-25    LIVER FUNCTIONS - ( 25 Apr 2022 05:44 )  Alb: 2.9 g/dL / Pro: 5.3 g/dL / ALK PHOS: 111 U/L / ALT: 32 U/L / AST: 29 U/L / GGT: x               IMAGING:    < from: US Abdomen Upper Quadrant Right (04.23.22 @ 18:32) >    ACC: [id number] EXAM:  US ABDOMEN RT UPR QUADRANT                          PROCEDURE DATE:  04/23/2022          INTERPRETATION:  CLINICAL INFORMATION: Liver disease    COMPARISON: 7/3/2020    TECHNIQUE: Sonography of the right upper quadrant.    FINDINGS:  Liver: Increased echogenicity.  Bile ducts: Normal caliber. Common bile duct measures 7 mm.  Gallbladder: Not visualized reported to be post cholecystectomy.  Pancreas: Visualized portions are within normal limits.  Right kidney: 9.0 cm. No hydronephrosis. 1.6 cm cyst  Ascites: None.  IVC: Visualized portions are within normal limits.    IMPRESSION:  Increased echogenicity of the liver which may be seen with hepatic   steatosis or hepatocellular disease    Patient is post cholecystectomy.        --- End of Report ---            NOEMY NATH MD; Attending Radiologist  This document has been electronically signed. Apr 24 2022 10:38PM    < end of copied text >        
  [place], [place]  81y  Female      Patient is a 81y old  Female who presents with a chief complaint of Generalised edema (23 Apr 2022 16:03)      INTERVAL HPI/OVERNIGHT EVENTS:  Patient seen and examined earlier this morning. She reports she wants to go home soon. Denies any new complaints. Denies F/C, CP, palpitations, abd pain. No acute events overnight. Discharge planning was discussed with her.      REVIEW OF SYSTEMS:  CONSTITUTIONAL: No fever, weight loss, or fatigue  EYES: No eye pain, visual disturbances, or discharge  ENMT:  No difficulty hearing, tinnitus, vertigo; No sinus or throat pain  NECK: No pain or stiffness  RESPIRATORY: No cough, wheezing, chills or hemoptysis; No shortness of breath  CARDIOVASCULAR: No chest pain, palpitations, dizziness, or leg swelling  GASTROINTESTINAL: No abdominal or epigastric pain. No nausea, vomiting, or hematemesis; No diarrhea or constipation. No melena or hematochezia.  GENITOURINARY: No dysuria, frequency, hematuria, or incontinence  NEUROLOGICAL: No headaches, memory loss, loss of strength, numbness, or tremors  MUSCULOSKELETAL: No joint pain or swelling; No muscle, back, or extremity pain; +b/l LE swelling    Vital Signs Last 24 Hrs  T(C): 37 (26 Apr 2022 05:17), Max: 37 (26 Apr 2022 05:17)  T(F): 98.6 (26 Apr 2022 05:17), Max: 98.6 (26 Apr 2022 05:17)  HR: 99 (26 Apr 2022 05:17) (89 - 99)  BP: 122/59 (26 Apr 2022 05:17) (122/59 - 145/65)  BP(mean): --  RR: 16 (26 Apr 2022 05:17) (16 - 18)  SpO2: --  PHYSICAL EXAM:  Gen: NAD, comfortable, pleasant  HEENT: AT, NC, EOMI, +right sided decreased hearing  CVS: RRR, normal S1s2, no m/r/g, 1-2+ b/l LE edema   Resp: CTAb/l, no w/r/r  Abd: soft, NT, ND, +BS  Neuro: AAOx3, no focal deficit    Consultant(s) Notes Reviewed:  [x ] YES  [ ] NO  Care Discussed with Consultants/Other Providers [ x] YES  [ ] NO    LAB:                        12.1   5.60  )-----------( 46       ( 26 Apr 2022 06:05 )             38.1   04-26    136  |  98  |  50<H>  ----------------------------<  159<H>  3.5   |  29  |  1.3    Ca    8.6      26 Apr 2022 06:05  Mg     1.7     04-26    TPro  5.5<L>  /  Alb  2.7<L>  /  TBili  5.3<H>  /  DBili  0.6<H>  /  AST  33  /  ALT  34  /  AlkPhos  140<H>  04-26      Drug Dosing Weight  Height (cm): 154.9 (23 Apr 2022 16:25)  Weight (kg): 67 (23 Apr 2022 16:25)  BMI (kg/m2): 27.9 (23 Apr 2022 16:25)  BSA (m2): 1.66 (23 Apr 2022 16:25)  Height (cm): 154.9 (04-23-22 @ 16:25)  Weight (kg): 67 (04-23-22 @ 16:25)  BMI (kg/m2): 27.9 (04-23-22 @ 16:25)  BSA (m2): 1.66 (04-23-22 @ 16:25)      CAPILLARY BLOOD GLUCOSE          I&O's Summary    23 Apr 2022 07:01  -  24 Apr 2022 07:00  --------------------------------------------------------  IN: 0 mL / OUT: 500 mL / NET: -500 mL    24 Apr 2022 07:01  -  24 Apr 2022 12:49  --------------------------------------------------------  IN: 630 mL / OUT: 600 mL / NET: 30 mL          RADIOLOGY & ADDITIONAL TESTS:  Imaging Personally Reviewed:  [x] YES  [ ] NO    HEALTH ISSUES - PROBLEM Dx:        MEDICATIONS  (STANDING):  ascorbic acid 500 milliGRAM(s) Oral daily  BACItracin   Ointment 1 Application(s) Topical every 8 hours  calcium carbonate 1250 mG  + Vitamin D (OsCal 500 + D) 1 Tablet(s) Oral daily  chlorhexidine 4% Liquid 1 Application(s) Topical daily  cyanocobalamin 1000 MICROGram(s) Oral daily  furosemide   Injectable 40 milliGRAM(s) IV Push every 12 hours  lactobacillus acidophilus 1 Tablet(s) Oral daily  magnesium sulfate  IVPB 1 Gram(s) IV Intermittent once  multivitamin 1 Tablet(s) Oral daily  omega-3-Acid Ethyl Esters 2 Gram(s) Oral two times a day  predniSONE   Tablet 20 milliGRAM(s) Oral daily    MEDICATIONS  (PRN):  aluminum hydroxide/magnesium hydroxide/simethicone Suspension 30 milliLiter(s) Oral every 4 hours PRN Dyspepsia  melatonin 3 milliGRAM(s) Oral at bedtime PRN Insomnia  ondansetron Injectable 4 milliGRAM(s) IV Push every 8 hours PRN Nausea and/or Vomiting  
  [place], [place]  81y  Female      Patient is a 81y old  Female who presents with a chief complaint of Generalised edema (23 Apr 2022 16:03)      INTERVAL HPI/OVERNIGHT EVENTS:  Patient seen and examined earlier this morning. She reports she wants to go home soon. Denies any new complaints. Denies F/C, CP, palpitations, abd pain. No acute events overnight.      REVIEW OF SYSTEMS:  CONSTITUTIONAL: No fever, weight loss, or fatigue  EYES: No eye pain, visual disturbances, or discharge  ENMT:  No difficulty hearing, tinnitus, vertigo; No sinus or throat pain  NECK: No pain or stiffness  RESPIRATORY: No cough, wheezing, chills or hemoptysis; No shortness of breath  CARDIOVASCULAR: No chest pain, palpitations, dizziness, or leg swelling  GASTROINTESTINAL: No abdominal or epigastric pain. No nausea, vomiting, or hematemesis; No diarrhea or constipation. No melena or hematochezia.  GENITOURINARY: No dysuria, frequency, hematuria, or incontinence  NEUROLOGICAL: No headaches, memory loss, loss of strength, numbness, or tremors  MUSCULOSKELETAL: No joint pain or swelling; No muscle, back, or extremity pain; +b/l LE swelling    Vital Signs Last 24 Hrs  T(C): 36.3 (25 Apr 2022 13:02), Max: 37 (24 Apr 2022 19:56)  T(F): 97.3 (25 Apr 2022 13:02), Max: 98.6 (24 Apr 2022 19:56)  HR: 89 (25 Apr 2022 13:02) (82 - 92)  BP: 145/65 (25 Apr 2022 13:02) (108/53 - 145/65)  BP(mean): --  RR: 18 (25 Apr 2022 13:02) (18 - 18)  SpO2: --  PHYSICAL EXAM:  Gen: NAD, comfortable, pleasant  HEENT: AT, NC, EOMI, +right sided decreased hearing  CVS: RRR, normal S1s2, no m/r/g, 2-3+ b/l LE edema   Resp: CTAb/l, no w/r/r  Abd: soft, NT, ND, +BS  Neuro: AAOx3, no focal deficit    Consultant(s) Notes Reviewed:  [x ] YES  [ ] NO  Care Discussed with Consultants/Other Providers [ x] YES  [ ] NO    LAB:                        12.3   5.48  )-----------( 34       ( 25 Apr 2022 05:44 )             39.0   04-25    137  |  96<L>  |  40<H>  ----------------------------<  143<H>  3.0<L>   |  33<H>  |  1.2    Ca    8.7      25 Apr 2022 05:44    TPro  5.3<L>  /  Alb  2.9<L>  /  TBili  5.7<H>  /  DBili  0.6<H>  /  AST  29  /  ALT  32  /  AlkPhos  111  04-25      Drug Dosing Weight  Height (cm): 154.9 (23 Apr 2022 16:25)  Weight (kg): 67 (23 Apr 2022 16:25)  BMI (kg/m2): 27.9 (23 Apr 2022 16:25)  BSA (m2): 1.66 (23 Apr 2022 16:25)  Height (cm): 154.9 (04-23-22 @ 16:25)  Weight (kg): 67 (04-23-22 @ 16:25)  BMI (kg/m2): 27.9 (04-23-22 @ 16:25)  BSA (m2): 1.66 (04-23-22 @ 16:25)  CAPILLARY BLOOD GLUCOSE        I&O's Summary    23 Apr 2022 07:01  -  24 Apr 2022 07:00  --------------------------------------------------------  IN: 0 mL / OUT: 500 mL / NET: -500 mL    24 Apr 2022 07:01  -  24 Apr 2022 12:49  --------------------------------------------------------  IN: 630 mL / OUT: 600 mL / NET: 30 mL          RADIOLOGY & ADDITIONAL TESTS:  Imaging Personally Reviewed:  [x] YES  [ ] NO    HEALTH ISSUES - PROBLEM Dx:        MEDICATIONS  (STANDING):  ascorbic acid 500 milliGRAM(s) Oral daily  BACItracin   Ointment 1 Application(s) Topical every 8 hours  calcium carbonate 1250 mG  + Vitamin D (OsCal 500 + D) 1 Tablet(s) Oral daily  chlorhexidine 4% Liquid 1 Application(s) Topical daily  cyanocobalamin 1000 MICROGram(s) Oral daily  furosemide   Injectable 40 milliGRAM(s) IV Push every 12 hours  lactobacillus acidophilus 1 Tablet(s) Oral daily  multivitamin 1 Tablet(s) Oral daily  omega-3-Acid Ethyl Esters 2 Gram(s) Oral two times a day  predniSONE   Tablet 20 milliGRAM(s) Oral daily    MEDICATIONS  (PRN):  aluminum hydroxide/magnesium hydroxide/simethicone Suspension 30 milliLiter(s) Oral every 4 hours PRN Dyspepsia  melatonin 3 milliGRAM(s) Oral at bedtime PRN Insomnia  ondansetron Injectable 4 milliGRAM(s) IV Push every 8 hours PRN Nausea and/or Vomiting

## 2022-04-26 NOTE — DISCHARGE NOTE PROVIDER - CARE PROVIDERS DIRECT ADDRESSES
[email or web address] [email or web address]

## 2022-04-26 NOTE — DISCHARGE NOTE PROVIDER - NSDCHHATTENDCERT_GEN_ALL_CORE
This was completed and faxed on 6/14/19   Patient's mother informed this has been taken care of.  Copy mailed to home per request.   My signature below certifies that the above stated patient is homebound and upon completion of the Face-To-Face encounter, has the need for intermittent skilled nursing, physical therapy and/or speech or occupational therapy services in their home for their current diagnosis as outlined in their initial plan of care. These services will continue to be monitored by myself or another physician.

## 2022-04-26 NOTE — PHYSICAL THERAPY INITIAL EVALUATION ADULT - ADDITIONAL COMMENTS
pt is 80 y/o female  , lives with   in and apartment  , information obtain from the pt her self , has  Ramp to enter  the  apartment building and Elevator to apartment, Has 13 steps to go downstair bedroom .pt was independent using  with bed mobility , transfer , ambulation ,stair negotiation and basic ADLS w/o AD.

## 2022-04-26 NOTE — PHYSICAL THERAPY INITIAL EVALUATION ADULT - GENERAL OBSERVATIONS, REHAB EVAL
8:30-9:00. Chart reviewed. Order received.  Patient available to be seen for Pt, confirmed with RN. pt encountered  Semi reclined in the bed denies pain, and agrees to participate in session, +Rt arm hematoma + ROMEO Driscoll.

## 2022-04-26 NOTE — PHYSICAL THERAPY INITIAL EVALUATION ADULT - PERTINENT HX OF CURRENT PROBLEM, REHAB EVAL
pt is 80 y/o female with pmhx of HTN , fatty liver , Enlarged spleen and throbocytopenia present with c/o rt ear hearing loss since dental implant 1 month ago and b/l swelling which progressively worse and associated with abdominal distension .

## 2022-04-26 NOTE — PHYSICAL THERAPY INITIAL EVALUATION ADULT - PLANNED THERAPY INTERVENTIONS, PT EVAL
pt is independent with  bed mobility , transfer , ambulation and Stair negotiation using RW for safety , Skill PT not recommended at this time. reconsult PT as indicated

## 2022-04-26 NOTE — DISCHARGE NOTE PROVIDER - NSDCFUADDINST_GEN_ALL_CORE_FT
Things to follow up:  -PCP follow up in 1-2 weeks  -Take lasix 40mg daily until lower extremity swelling/edema resolves  -Elevate lower extremity  -Follow up with your gastroenterologist/ hepatologist and your hematologist Dr. Erickson within 2-4 weeks for further workup of fatty liver, possible portal hypertension, and for thrombocytopenia  -You may need a liver biopsy as outpatient for definitive diagnosis  -You may follow up with our [facility] located at [address] [place] [address]. Phone Number: [phone].   -Take Prednisone 10mg daily for 7 days  -Follow up with your ENT  -Wound care instructions: cleanse wound with Sterile water or sterile saline, apply bacitracin to the wound site and apply Dry, sterile dressing

## 2022-04-26 NOTE — DISCHARGE NOTE PROVIDER - HOSPITAL COURSE
82 y/o F with PMHx of Thrombocytomia, fatty liver, enlarged spleen, recent dental implant on left side followed by right sided hearing loss currently on Prednisone tapering dose currently on Prednisone 20mg daily presents for worsening b/l LE edema and abdomen swelling. She denies F/C, chest pain, palpitations, SOB, abd pain, N/V. Patient was prescribed lasix 10mg by her PMD without much improvement. Denies any recent travel, calf tenderness, PROCTOR. She reports long history of fatty liver disease but reports that she was told that its "mild."  Patient     # b/l LE edema, abdominal swelling likely multifactorial due to long term steroid use, liver failure, r/o underlying CHF  # Elevated bilirubin likely related to hx of fatty liver disease  # Thrombocytopenia likely related to fatty liver disease  - LE doppler: No evidence of deep venous thrombosis or superficial thrombophlebitis in the bilateral lower extremities  - ECHO: EF 75%; Grade I diastolic dysfunction;   - US abd: Increased echogenicity of the liver which may be seen with hepatic steatosis or hepatocellular disease; Patient is post cholecystectomy.  PLAN  - IV lasix 40mg BID, will switch to PO lasix 40mg PO daily upon dc today  - will trend LFTs  - GI on board  - spoke to Dr. Erickson; source of splenomegaly remains unclear, patient has been workup in in past for lymphoma, Gilbert's; currently suspecting portal HTN; liver bx was planned in past but not performed due to low platelets  - will have patient f/u with outpatient hepatologist and Dr. Erickson  - f/u hepatic function panel to fractionate the bili. If D. bili is elevated proceed with chronic liver disease work up (Ferritin, KEVIN, ASMA, AMA, liver soluble Abs, Anti LKM, viral hep panel ( B and C))  - Follow up with our [facility] located at [address] [place] [address]. Phone Number: [phone].     # Elevated troponins, less likely ACS- no chest pain, EKG no acute ST changes  - trops stable  - will dc telemetry    # Recent right sided hearing loss- on prednisone   - cont prednisone 20mg daily and plan for further taper; will discharge home on 10mg Prednisone for 7 days  - outpatient ENT f/u    Progress Note Handoff  Pending Consults: none  Pending Tests: None  Pending Results: none  Family Discussion: Patient  Disposition: Home__[place] dc today___/SNF______/Other_____/Unknown at this time_____  Spent over 35 min reviewing chart and on coordinating patient care during interdisciplinary rounds    80 y/o F with PMHx of Thrombocytomia, fatty liver, enlarged spleen, recent dental implant on left side followed by right sided hearing loss currently on Prednisone tapering dose currently on Prednisone 20mg daily presents for worsening b/l LE edema and abdomen swelling. She denies F/C, chest pain, palpitations, SOB, abd pain, N/V. Patient was prescribed lasix 10mg by her PMD without much improvement. Denies any recent travel, calf tenderness, PROCTOR. She reports long history of fatty liver disease but reports that she was told that its "mild."  Patient was admitted for b/l LE edema, abdominal swelling likely due to long term steroid use; patient was also found to have elevated bilirubin likely related to hx of fatty liver disease and Thrombocytopenia likely related to splenomegaly.  LE doppler: No evidence of deep venous thrombosis or superficial thrombophlebitis in the bilateral lower extremities. ECHO: EF 75%; Grade I diastolic dysfunction. US abd: Increased echogenicity of the liver which may be seen with hepatic steatosis or hepatocellular disease; Patient is post cholecystectomy. Patient was treated with IV lasix with good improvement in her lower extremity swelling. Patient was evaluated by gastroenterology. Case was discussed with patient's primary hematologist and outpatient follow up was recommended. Patient was also evaluated by wound care nurse and PT.  At this time patient is medically stable for a hospital discharge.    Things to follow up:  -PCP follow up in 1-2 weeks  -Take lasix 40mg daily until lower extremity swelling/edema resolves  -Elevate lower extremity  -Follow up with your gastroenterologist/ hepatologist and your hematologist Dr. Erickson within 2-4 weeks for further workup of fatty liver, possible portal hypertension, and for thrombocytopenia  -You may need a liver biopsy as outpatient for definitive diagnosis  -You may follow up with our [facility] located at [address] [place] [address]. Phone Number: [phone].   -Take Prednisone 10mg daily for 7 days  -Follow up with your ENT  -Wound care instructions: cleanse wound with Sterile water or sterile saline, apply bacitracin to the wound site and apply Dry, sterile dressing

## 2022-04-26 NOTE — PHYSICAL THERAPY INITIAL EVALUATION ADULT - PHYSICAL ASSIST/NONPHYSICAL ASSIST: GAIT, REHAB EVAL
Safety, sequencing , hand /foot placement ,safely use AD, Negotiating obstcle during ambulation ./verbal cues

## 2022-04-26 NOTE — PROGRESS NOTE ADULT - ASSESSMENT
82 y/o F with PMHx of Thrombocytomia, fatty liver, enlarged spleen, recent dental implant on left side followed by right sided hearing loss currently on Prednisone tapering dose currently on Prednisone 20mg daily presents for worsening b/l LE edema and abdomen swelling. She denies F/C, chest pain, palpitations, SOB, abd pain, N/V. Patient was prescribed lasix 10mg by her PMD without much improvement. Denies any recent travel, calf tenderness, PROCTOR. She reports long history of fatty liver disease but reports that she was told that its "mild."    # b/l LE edema, abdominal swelling likely multifactorial due to long term steroid use, liver failure, r/o underlying CHF  # Elevated bilirubin likely related to hx of fatty liver disease  # Thrombocytopenia likely related to fatty liver disease  - LE doppler: No evidence of deep venous thrombosis or superficial thrombophlebitis in the bilateral lower extremities  - ECHO: EF 75%; Grade I diastolic dysfunction;   - US abd: Increased echogenicity of the liver which may be seen with hepatic steatosis or hepatocellular disease; Patient is post cholecystectomy.  PLAN  - IV lasix 40mg BID, will switch to PO lasix 40mg PO daily upon dc today  - will trend LFTs  - GI on board  - spoke to Dr. Erickson; source of splenomegaly remains unclear, patient has been workup in in past for lymphoma, Gilbert's; currently suspecting portal HTN; liver bx was planned in past but not performed due to low platelets  - will have patient f/u with outpatient hepatologist and Dr. Erickson  - f/u hepatic function panel to fractionate the bili. If D. bili is elevated proceed with chronic liver disease work up (Ferritin, KEVIN, ASMA, AMA, liver soluble Abs, Anti LKM, viral hep panel ( B and C))  - Follow up with our [facility] located at [address] [place] [address]. Phone Number: [phone].     # Elevated troponins, less likely ACS- no chest pain, EKG no acute ST changes  - trops stable  - will dc telemetry    # Recent right sided hearing loss- on prednisone   - cont prednisone 20mg daily and plan for further taper; will discharge home on 10mg Prednisone for 7 days  - outpatient ENT f/u    Progress Note Handoff  Pending Consults: none  Pending Tests: None  Pending Results: none  Family Discussion: Patient  Disposition: Home__Xlikely dc today___/SNF______/Other_____/Unknown at this time_____  Spent over 35 min reviewing chart and on coordinating patient care during interdisciplinary rounds

## 2022-04-26 NOTE — DISCHARGE NOTE PROVIDER - PROVIDER TOKENS
PROVIDER:[TOKEN:[[id number]],FOLLOWUP:[1 week]],FREE:[LAST:[Gastroenterology/ Hepatologist],PHONE:[(   )    -],FAX:[(   )    -],FOLLOWUP:[1 month]],PROVIDER:[TOKEN:[[id number]],FOLLOWUP:[2 weeks]]

## 2022-04-26 NOTE — DISCHARGE NOTE PROVIDER - CARE PROVIDER_API CALL
Jean Rivas)  Internal Medicine  [address] [place] [id number]  Phone: [phone]  Fax: [phone]  Follow Up Time: 1 week    Gastroenterology/ Hepatologist,   Phone: (   )    -  Fax: (   )    -  Follow Up Time: 1 month    Franky Erickson)  Internal Medicine; Medical Oncology  [address] [place] [address]  Phone: [phone]  Fax: [phone]  Follow Up Time: 2 weeks

## 2022-04-26 NOTE — DISCHARGE NOTE PROVIDER - NSDCMRMEDTOKEN_GEN_ALL_CORE_FT
Acidophilus oral capsule: 1 cap(s) orally once a day  bacitracin 500 units/g topical ointment: 1 application topically every 8 hours  Calcarb with D 600 mg-400 intl units oral tablet: 1 tab(s) orally 2 times a day  CoQ10 300 mg oral capsule: 1 cap(s) orally once a day  Cranberry oral tablet: orally once a day  Fish Oil 1000 mg oral capsule: 1 cap(s) orally 3 times a day  Lasix 40 mg oral tablet: 1 tab(s) orally once a day   magnesium carbonate 250 mg oral capsule: orally once a day  Multiple Vitamins oral tablet: 1 tab(s) orally once a day  predniSONE 10 mg oral tablet: 1 tab(s) orally once a day   Vitamin B12 100 mcg oral tablet: 1 tab(s) orally once a day  Vitamin C 500 mg oral tablet: 1 tab(s) orally once a day

## 2022-04-26 NOTE — DISCHARGE NOTE NURSING/CASE MANAGEMENT/SOCIAL WORK - NSDCPEFALRISK_GEN_ALL_CORE
For information on Fall & Injury Prevention, visit: [email or web address] OR  [email or web address] OR  https://www.cdc.gov/steadi/patient.html

## 2022-04-26 NOTE — DISCHARGE NOTE PROVIDER - NSDCCPCAREPLAN_GEN_ALL_CORE_FT
PRINCIPAL DISCHARGE DIAGNOSIS  Diagnosis: Lower extremity edema  Assessment and Plan of Treatment: Patient was admitted for b/l LE edema, abdominal swelling likely due to long term steroid use; patient was also found to have elevated bilirubin likely related to hx of fatty liver disease and Thrombocytopenia likely related to splenomegaly.  LE doppler: No evidence of deep venous thrombosis or superficial thrombophlebitis in the bilateral lower extremities. ECHO: EF 75%; Grade I diastolic dysfunction. US abd: Increased echogenicity of the liver which may be seen with hepatic steatosis or hepatocellular disease; Patient is post cholecystectomy. Patient was treated with IV lasix with good improvement in her lower extremity swelling. Patient was evaluated by gastroenterology. Case was discussed with patient's primary hematologist and outpatient follow up was recommended. Patient was also evaluated by wound care nurse and PT.  At this time patient is medically stable for a hospital discharge.  Things to follow up:  -PCP follow up in 1-2 weeks  -Take lasix 40mg daily until lower extremity swelling/edema resolves  -Elevate lower extremity  -Follow up with your gastroenterologist/ hepatologist and your hematologist Dr. Erickson within 2-4 weeks for further workup of fatty liver, possible portal hypertension, and for thrombocytopenia  -You may need a liver biopsy as outpatient for definitive diagnosis  -You may follow up with our GI [facility] Clinic located at [address] [place] [address]. Phone Number: [phone].   -Take Prednisone 10mg daily for 7 days  -Follow up with your ENT  -Wound care instructions: cleanse wound with Sterile water or sterile saline, apply bacitracin to the wound site and apply Dry, sterile dressing      SECONDARY DISCHARGE DIAGNOSES  Diagnosis: Elevated troponin I level  Assessment and Plan of Treatment: resolved

## 2022-04-27 LAB
FERRITIN SERPL-MCNC: 267 NG/ML — HIGH (ref 15–150)
HAV IGM SER-ACNC: SIGNIFICANT CHANGE UP
HBV CORE IGM SER-ACNC: SIGNIFICANT CHANGE UP
HBV SURFACE AG SER-ACNC: SIGNIFICANT CHANGE UP
HCV AB S/CO SERPL IA: 0.39 S/CO — SIGNIFICANT CHANGE UP (ref 0–0.99)
HCV AB SERPL-IMP: SIGNIFICANT CHANGE UP
MITOCHONDRIA AB SER-ACNC: SIGNIFICANT CHANGE UP
SMOOTH MUSCLE AB SER-ACNC: SIGNIFICANT CHANGE UP

## 2022-04-28 LAB
LKM AB SER-ACNC: <20.1 UNITS — SIGNIFICANT CHANGE UP (ref 0–20)
SOLUBLE LIVER IGG SER IA-ACNC: 1.1 — SIGNIFICANT CHANGE UP (ref 0–20)

## 2022-05-06 NOTE — CDI QUERY NOTE - NSCDIOTHERTXTBX_GEN_ALL_CORE_HH
H&P 4/24/22:  81y old female with a pmhx of hypertension, fatty liver, enlarged spleen and  thrombocytopenia, presents to the ER complaining of sudden onset of right ear hearing loss since dental implant 1 month ago. pt states placed on daily prednisone 60 mg by ENT now tapered to 20mg  but states noticed b/l legs swelling now progressively worse and associated with abdominal distension, weight gain and non productive cough.  pt started on lasix 10mg/day increased to 20mg/day with no improvement. pt states weight gain from 133lbs to 150lbs in a month.  Assessment:   #b/l LE edema, abdominal swelling likely multifactirial due to long term steroid use, liver failure, r/o underlying CHF    Hospitalist progress notes from 4/24 to 4/26/22:  Assessment: # b/l LE edema, abdominal swelling likely multifactorial due to long term steroid use, liver failure, r/o underlying CHF    Labs:  4/23: Pro-; Troponin .03  4/24 Pro-; Troponin .02    Treatment:   4/23/22 Lasix 60 mg IVP  4/23-4/26 Lasix 40 mg IVP q 12 hours  Diagnostics:  4/23/22 Chest x-ray: Impression:  Interval clearing of bibasilar pulmonary opacities.  No radiographic   evidence of acute cardiopulmonary disease.    4/25/22 echo: Summary:   1. Hyperdynamic global left ventricular systolic function.   2. LV Ejection Fraction by Jackson's Method with a biplane EF of 72 %.   3. Spectral Doppler shows impaired relaxation pattern of left   ventricular myocardial filling (Grade I diastolic dysfunction).   4. Mild left ventricular hypertrophy.   5. Difficult to visualize right atrial chamber. Possible artifact seen   in right atrium.   6. Peak transaortic gradient equals 25.4 mmHg, mean transaortic gradient   equals 16.8 mmHg, the calculated aortic valve area equals 1.83 cm² by the   continuity equation consistent with mild aortic stenosis. The   Dimesionless Index value is 0.68.   7. Mild aortic regurgitati.   8. Mild mitral annular calcification.   9. Moderate thickening and calcification of the posterior mitral valve   leaflet.  10. Trace mitral valve regurgitation.  11. Estimated pulmonary artery systolic pressure is 43.8 mmHg assuming a   right atrial pressure of 3 mmHg, which is consistent with mild pulmonary   hypertension.  12. There is no evidence of pericardial effusion.    QUERY:  Based on the above clinical evidence, can you please clarify if the CHF can be further specified as:    -	Acute diastolic CHF could not be ruled out at the time of discharge  -	Acute diastolic CHF was ruled out  -	Other (please specify)  -	Unable to determine

## 2022-05-12 ENCOUNTER — APPOINTMENT (OUTPATIENT)
Dept: HEMATOLOGY ONCOLOGY | Facility: CLINIC | Age: 82
End: 2022-05-12
Payer: MEDICARE

## 2022-05-12 ENCOUNTER — LABORATORY RESULT (OUTPATIENT)
Age: 82
End: 2022-05-12

## 2022-05-12 VITALS
SYSTOLIC BLOOD PRESSURE: 115 MMHG | DIASTOLIC BLOOD PRESSURE: 58 MMHG | WEIGHT: 152 LBS | HEIGHT: 61 IN | TEMPERATURE: 99 F | HEART RATE: 100 BPM | BODY MASS INDEX: 28.7 KG/M2

## 2022-05-12 PROCEDURE — 99213 OFFICE O/P EST LOW 20 MIN: CPT

## 2022-05-13 LAB
ALBUMIN SERPL ELPH-MCNC: 2.8 G/DL
ALP BLD-CCNC: 75 U/L
ALT SERPL-CCNC: 25 U/L
ANION GAP SERPL CALC-SCNC: 12 MMOL/L
AST SERPL-CCNC: 34 U/L
BILIRUB SERPL-MCNC: 4.5 MG/DL
BUN SERPL-MCNC: 35 MG/DL
CALCIUM SERPL-MCNC: 8.6 MG/DL
CHLORIDE SERPL-SCNC: 101 MMOL/L
CO2 SERPL-SCNC: 26 MMOL/L
CREAT SERPL-MCNC: 1.4 MG/DL
DEPRECATED KAPPA LC FREE/LAMBDA SER: 1.18 RATIO
EGFR: 38 ML/MIN/1.73M2
GLUCOSE SERPL-MCNC: 189 MG/DL
HCT VFR BLD CALC: 35.9 %
HGB BLD-MCNC: 11.6 G/DL
IGA SER QL IEP: 243 MG/DL
IGM SER QL IEP: 197 MG/DL
KAPPA LC CSF-MCNC: 5.1 MG/DL
KAPPA LC SERPL-MCNC: 6 MG/DL
MCHC RBC-ENTMCNC: 31.9 PG
MCHC RBC-ENTMCNC: 32.3 G/DL
MCV RBC AUTO: 98.6 FL
PLATELET # BLD AUTO: 89 K/UL
PMV BLD: 9.7 FL
POTASSIUM SERPL-SCNC: 3.8 MMOL/L
PROT SERPL-MCNC: 5.7 G/DL
RBC # BLD: 3.64 M/UL
RBC # FLD: 18.1 %
SODIUM SERPL-SCNC: 139 MMOL/L
WBC # FLD AUTO: 5.09 K/UL

## 2022-05-16 ENCOUNTER — INPATIENT (INPATIENT)
Facility: HOSPITAL | Age: 82
LOS: 3 days | Discharge: HOME HEALTH | End: 2022-05-20
Attending: HOSPITALIST | Admitting: STUDENT IN AN ORGANIZED HEALTH CARE EDUCATION/TRAINING PROGRAM
Payer: MEDICARE

## 2022-05-16 VITALS
HEART RATE: 98 BPM | WEIGHT: 154.98 LBS | OXYGEN SATURATION: 100 % | TEMPERATURE: 97 F | DIASTOLIC BLOOD PRESSURE: 84 MMHG | RESPIRATION RATE: 18 BRPM | HEIGHT: 61 IN | SYSTOLIC BLOOD PRESSURE: 126 MMHG

## 2022-05-16 DIAGNOSIS — Z98.890 OTHER SPECIFIED POSTPROCEDURAL STATES: Chronic | ICD-10-CM

## 2022-05-16 DIAGNOSIS — Z86.2 PERSONAL HISTORY OF DISEASES OF THE BLOOD AND BLOOD-FORMING ORGANS AND CERTAIN DISORDERS INVOLVING THE IMMUNE MECHANISM: ICD-10-CM

## 2022-05-16 DIAGNOSIS — Z90.49 ACQUIRED ABSENCE OF OTHER SPECIFIED PARTS OF DIGESTIVE TRACT: Chronic | ICD-10-CM

## 2022-05-16 DIAGNOSIS — I49.3 VENTRICULAR PREMATURE DEPOLARIZATION: ICD-10-CM

## 2022-05-16 DIAGNOSIS — E87.70 FLUID OVERLOAD, UNSPECIFIED: ICD-10-CM

## 2022-05-16 LAB
ALBUMIN SERPL ELPH-MCNC: 2.7 G/DL — LOW (ref 3.5–5.2)
ALP SERPL-CCNC: 64 U/L — SIGNIFICANT CHANGE UP (ref 30–115)
ALT FLD-CCNC: 27 U/L — SIGNIFICANT CHANGE UP (ref 0–41)
ANION GAP SERPL CALC-SCNC: 11 MMOL/L — SIGNIFICANT CHANGE UP (ref 7–14)
AST SERPL-CCNC: 52 U/L — HIGH (ref 0–41)
BASOPHILS # BLD AUTO: 0.03 K/UL — SIGNIFICANT CHANGE UP (ref 0–0.2)
BASOPHILS NFR BLD AUTO: 0.6 % — SIGNIFICANT CHANGE UP (ref 0–1)
BILIRUB SERPL-MCNC: 4.4 MG/DL — HIGH (ref 0.2–1.2)
BUN SERPL-MCNC: 35 MG/DL — HIGH (ref 10–20)
CALCIUM SERPL-MCNC: 8 MG/DL — LOW (ref 8.5–10.1)
CHLORIDE SERPL-SCNC: 97 MMOL/L — LOW (ref 98–110)
CK MB CFR SERPL CALC: 2.3 NG/ML — SIGNIFICANT CHANGE UP (ref 0.6–6.3)
CK SERPL-CCNC: 106 U/L — SIGNIFICANT CHANGE UP (ref 0–225)
CO2 SERPL-SCNC: 25 MMOL/L — SIGNIFICANT CHANGE UP (ref 17–32)
CREAT SERPL-MCNC: 1.3 MG/DL — SIGNIFICANT CHANGE UP (ref 0.7–1.5)
EGFR: 41 ML/MIN/1.73M2 — LOW
EOSINOPHIL # BLD AUTO: 0.17 K/UL — SIGNIFICANT CHANGE UP (ref 0–0.7)
EOSINOPHIL NFR BLD AUTO: 3.1 % — SIGNIFICANT CHANGE UP (ref 0–8)
GLUCOSE SERPL-MCNC: 151 MG/DL — HIGH (ref 70–99)
HCT VFR BLD CALC: 33.7 % — LOW (ref 37–47)
HGB BLD-MCNC: 10.6 G/DL — LOW (ref 12–16)
IMM GRANULOCYTES NFR BLD AUTO: 0.4 % — HIGH (ref 0.1–0.3)
LYMPHOCYTES # BLD AUTO: 1.05 K/UL — LOW (ref 1.2–3.4)
LYMPHOCYTES # BLD AUTO: 19.4 % — LOW (ref 20.5–51.1)
MAGNESIUM SERPL-MCNC: 2 MG/DL — SIGNIFICANT CHANGE UP (ref 1.8–2.4)
MCHC RBC-ENTMCNC: 30.7 PG — SIGNIFICANT CHANGE UP (ref 27–31)
MCHC RBC-ENTMCNC: 31.5 G/DL — LOW (ref 32–37)
MCV RBC AUTO: 97.7 FL — SIGNIFICANT CHANGE UP (ref 81–99)
MONOCYTES # BLD AUTO: 0.73 K/UL — HIGH (ref 0.1–0.6)
MONOCYTES NFR BLD AUTO: 13.5 % — HIGH (ref 1.7–9.3)
NEUTROPHILS # BLD AUTO: 3.42 K/UL — SIGNIFICANT CHANGE UP (ref 1.4–6.5)
NEUTROPHILS NFR BLD AUTO: 63 % — SIGNIFICANT CHANGE UP (ref 42.2–75.2)
NRBC # BLD: 0 /100 WBCS — SIGNIFICANT CHANGE UP (ref 0–0)
NT-PROBNP SERPL-SCNC: 1144 PG/ML — HIGH (ref 0–300)
PLATELET # BLD AUTO: 108 K/UL — LOW (ref 130–400)
POTASSIUM SERPL-MCNC: 4.2 MMOL/L — SIGNIFICANT CHANGE UP (ref 3.5–5)
POTASSIUM SERPL-SCNC: 4.2 MMOL/L — SIGNIFICANT CHANGE UP (ref 3.5–5)
PROT SERPL-MCNC: 5.4 G/DL — LOW (ref 6–8)
RBC # BLD: 3.45 M/UL — LOW (ref 4.2–5.4)
RBC # FLD: 17.8 % — HIGH (ref 11.5–14.5)
SARS-COV-2 RNA SPEC QL NAA+PROBE: SIGNIFICANT CHANGE UP
SODIUM SERPL-SCNC: 133 MMOL/L — LOW (ref 135–146)
TROPONIN T SERPL-MCNC: 0.02 NG/ML — HIGH
TROPONIN T SERPL-MCNC: 0.02 NG/ML — HIGH
WBC # BLD: 5.42 K/UL — SIGNIFICANT CHANGE UP (ref 4.8–10.8)
WBC # FLD AUTO: 5.42 K/UL — SIGNIFICANT CHANGE UP (ref 4.8–10.8)

## 2022-05-16 PROCEDURE — 93308 TTE F-UP OR LMTD: CPT | Mod: 26,GC

## 2022-05-16 PROCEDURE — 71045 X-RAY EXAM CHEST 1 VIEW: CPT | Mod: 26

## 2022-05-16 PROCEDURE — 76604 US EXAM CHEST: CPT | Mod: 26,GC

## 2022-05-16 PROCEDURE — 99222 1ST HOSP IP/OBS MODERATE 55: CPT

## 2022-05-16 PROCEDURE — 99285 EMERGENCY DEPT VISIT HI MDM: CPT | Mod: 25,GC

## 2022-05-16 PROCEDURE — 93010 ELECTROCARDIOGRAM REPORT: CPT

## 2022-05-16 RX ORDER — ASCORBIC ACID 60 MG
500 TABLET,CHEWABLE ORAL DAILY
Refills: 0 | Status: DISCONTINUED | OUTPATIENT
Start: 2022-05-16 | End: 2022-05-20

## 2022-05-16 RX ORDER — FUROSEMIDE 40 MG
40 TABLET ORAL ONCE
Refills: 0 | Status: COMPLETED | OUTPATIENT
Start: 2022-05-16 | End: 2022-05-16

## 2022-05-16 RX ORDER — FUROSEMIDE 40 MG
40 TABLET ORAL DAILY
Refills: 0 | Status: DISCONTINUED | OUTPATIENT
Start: 2022-05-17 | End: 2022-05-17

## 2022-05-16 RX ORDER — MAGNESIUM OXIDE 400 MG ORAL TABLET 241.3 MG
400 TABLET ORAL DAILY
Refills: 0 | Status: DISCONTINUED | OUTPATIENT
Start: 2022-05-16 | End: 2022-05-20

## 2022-05-16 RX ADMIN — Medication 40 MILLIGRAM(S): at 17:29

## 2022-05-16 NOTE — H&P ADULT - NS ATTEND AMEND GEN_ALL_CORE FT
On exam General awake, alert NAD, jaundiced sclera, Lungs b/l basal crackles saturating 99 % on 3 LNC Normal resp effort , Heart irregular rhythm, + murmur  Abdomen: soft, non tender distended, Ext +++ edema legs with chronic skin changes and R leg dressing .  - Patient stated that she was told by her doctors that she should never get blood thinner   she stated that she dose not take any prednisone anymore   agree with above plane   cardiac monitor   - cardiology consult for possible new Congestive Heart Failure with elevated ProBNP and volume overload clinically , new PVCs   -  GI consult for elevated emilio, jaundice and hx of portal HTN ? non cirrhotic   c/w lasix   I/O   monitor electolyte   check coagulations   Daily weight   venous duplex LE   DVT ppx , patient refused to get blood thinner given her hx as mentioned above SCD if venous duplex negative   Pt has hx of thrombocytopenia - ? and gammopathy - follow with hematology On exam General awake, alert NAD, jaundiced sclera, Lungs b/l basal crackles saturating 99 % on 3 LNC Normal resp effort , Heart irregular rhythm, + murmur  Abdomen: soft, non tender distended, Ext +++ edema legs with chronic skin changes and R leg dressing .  - Patient stated that she was told by her doctors that she should never get blood thinner , she denies any chest pain   she stated that she dose not take any prednisone anymore   agree with above plane   cardiac monitor   - cardiology consult for possible new Congestive Heart Failure with elevated ProBNP and volume overload clinically , new PVCs   -  GI consult for elevated emilio, jaundice and hx of portal HTN ? non cirrhotic   c/w lasix   I/O   monitor electrolyte   check coagulations   repeat troponin (similar to previous troponin) value  and EKG   Daily weight   venous duplex LE   DVT ppx , patient refused to get blood thinner given her hx as mentioned above SCD if venous duplex negative   Pt has hx of thrombocytopenia - ? and gammopathy - follows with hematology

## 2022-05-16 NOTE — ED PROVIDER NOTE - NS ED ROS FT
Constitutional:  see HPI  Head:  no headache, dizziness, loss of consciousness  Eyes:  no visual changes; no eye pain, redness, or discharge  ENMT:  no ear pain or discharge; no hearing problems; no mouth or throat sores or lesions; no throat pain  Cardiac: no chest pain, tachycardia or palpitations  Respiratory: sob  GI: no nausea, vomiting, diarrhea or abdominal pain  :  no dysuria, frequency, or burning with urination; no change in urine output  MS: LE edema  Neuro: no weakness; no numbness or tingling; no seizure  Skin:  no rashes or color changes; no lacerations or abrasions

## 2022-05-16 NOTE — ED PROVIDER NOTE - OBJECTIVE STATEMENT
82 yo female hx of thrombocytopenia, fatty liver (possible portal hypertension) presenting with worsening lower extremity edema and hypoxia (found to be hypoxic to 87% on RA today), mild sob, abd distension, and weight gain 10-20 pounds over the last 2 weeks. Pt reports compliance with 40 mg lasix. otherwise no chest pain, abd pain, nausea, vomiting, fever.

## 2022-05-16 NOTE — H&P ADULT - NSICDXFAMILYHX_GEN_ALL_CORE_FT
FAMILY HISTORY:  Father  Still living? No  Family history of Parkinson disease, Age at diagnosis: Age Unknown    Mother  Still living? No  Family history of breast cancer in mother, Age at diagnosis: Age Unknown

## 2022-05-16 NOTE — HISTORY OF PRESENT ILLNESS
[de-identified] : \par 76/F presenting for follow-up for a history of splenomegaly with leukopenia and thrombocytopenia. Since her last visit, she has been evaluated by GI for liver disease as a possible etiology. Fibrosure testing was suggestive of severe liver fibrosis. EGD done showed small esophageal varices and gastric polyps (pathology benign). She was advised nadolol. She sought a second opinion with a different gastroenterologist, who advised regarding false positive results with Fibrosure testing. She had a repeat endoscopy which reportedly was negative for varices. She did not start taking the nadolol. Screening for viral hepatitis, hemochromatosis, Holden's disease, and autoimmune liver disease was negative. Patient was sent for additional blood work by GI. This was performed at an outside laboratory; we will request these results. Currently, she feels well and denies significant history of alcoholism or uncontrolled hypercholesterolemia. \par \par Bone marrow aspiration and biopsy performed in 08/2016 showed the following:\par -plasma cell neoplasm (lambda restricted interstitial plasma cells accounting for ~15% of cells)\par -amyloid staining negative\par -background of relative erythroid hyperplasia\par -FISH positive for CCDN1/IDH rearrangement\par \par Of note, IgG lambda monoclonal protein with M-spike of 0.9 was present in SPEP/SIFE. \par \par PET-CT 6/6/17 Splenomegaly. On the prior CT scan of the abdomen and pelvis done on August 17, 2016 the spleen measured 12.5 cm x 5.6 cm x 16.1 cm, at this time measured 12.5 cm x 7 cm x 15.9 cm. Multiple small nodules are seen in the right upper lobe peripherally new, the largest one measuring 7 mm, FDG avid with a max SUV 3.6 with slight misregistration. Postinflammatory and/or malignancy. \par \par Disease: splenomegaly, monoclonal gammopathy \par  [de-identified] : 10/30/2018 : Patient returns for follow up , she feels well , she denies any B symptoms , no abnormal bleeding , weakness, abdominal pain or increase in girth . No skeletal pain . SHe had an abdominal sonogram today . Last blood work from January showed no change in M spike or free light chains . CBC with stable pancytopenia .\par \par 06/17/2019 patient returns for follow up for splenomegaly , pancytopenia and monoclonal gammopathy . She feels well and denies new complaints except for bilateral leg pain , worse with activity , no swelling , no relief after she stopped Evista and simvastatin . She denies any B symptoms or new bone pain , Sonogram in march showed spleen 16 cm .\par no abnormal bleeding .\par \par 9/3/19: Patient returns for follow up for splenomegaly , pancytopenia and monoclonal gammopathy. She has no fresh complaints. Her CBC today showed WBC 3.2, Hb 12, plts 52. Sonogram in march showed spleen 16 cm. Last myeloma panel from Jun 2019 showed stable M-spike of 1 and FLC ratio was 2.55/2.18 = 1.17. \par \par 12/09/2019 Patient returns for follow up , she feels well and denies any B symptoms , abdominal pain or early satiety , she complains only of mild bilateral lower extremity pain after walking , no bleeding or bruising. She was seen by Dr Fall who recommended a watchful approach as long as she remains asymptomatic. \par \par 5/26/2020: BRAULIO ESPARZA is a 79 year old female here today for follow up visit for chronic splenomegaly with pancytopenia , no definite evidence of liver disease , early stage myeloma v/s MGUS ( BM with 5 % plasma cells). Patient is asymptomatic at this time. She denies increased fatigue, abnormal bleeding, night sweats, new bone pain, weight loss, or abdominal pain.\par \par 08/28/2020 Patient returns for history of splenomegaly and pancytopenia , she offers no complaints except for worsening lower extremity rash ( PPD ) , No B symptoms , no abnormal bleeding . Spleen size unchanged on ultrasound . \par \par 05/17/2021 Patient returns for routine follow up , CBC is stable , She was seen by pulmonary for worsening exertional dyspnea , CT scan shows bronchiectasis and was recommended O2 PRN , as per Dr irwin , she is suspected with pulmonary hypertension , ABRAHAM , she complains of extreme fatigue and daytime somnolence . NO B symptoms . \par \par 09/07/2021 Patient returns with complaints of fatigue , slight weight loss, no fever , night sweats , no abnormal bleeding . 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 \par 5/12/22:\par Patient returns for a follow up for  splenomegaly and  pancytopenia .  She is recently had left tooth implant, complicated by  right Ear hearing loss\par She is feeling very  tired , weak,  complaining of LOWER Extremity edema fully wrapped by visiting nurse.\par Patient repots her lower extremities are weeping  fluid , Also she has extended abdomen. No Ascites.  No fever , night sweats .\par  She has bruising  area on right shoulder from her position while doing tooth implant.\par No abnormal bleeding . 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

## 2022-05-16 NOTE — H&P ADULT - NSICDXPASTSURGICALHX_GEN_ALL_CORE_FT
PAST SURGICAL HISTORY:  History of cholecystectomy     History of surgery LEFT BREAST LUMPECTOMY  TUBIAL LIGATION  CHOLECYSTECTOMY  RIGHT & LEFT TKR

## 2022-05-16 NOTE — ED PROVIDER NOTE - NSICDXPASTSURGICALHX_GEN_ALL_CORE_FT
PAST SURGICAL HISTORY:  History of surgery LEFT BREAST LUMPECTOMY  TUBIAL LIGATION  CHOLECYSTECTOMY  RIGHT & LEFT TKR     PAST SURGICAL HISTORY:  History of cholecystectomy     History of surgery LEFT BREAST LUMPECTOMY  TUBIAL LIGATION  CHOLECYSTECTOMY  RIGHT & LEFT TKR

## 2022-05-16 NOTE — ED PROVIDER NOTE - PHYSICAL EXAMINATION
CONSTITUTIONAL: Well-developed; well-nourished; in no acute distress.   SKIN: warm, dry  HEAD: Normocephalic; atraumatic.  EYES: PERRL, EOMI, normal sclera and conjunctiva   ENT: No nasal discharge; airway clear.  NECK: Supple; non tender.  CARD: S1, S2 normal; no murmurs, gallops, or rubs. Regular rate and rhythm.   RESP: crackles b/l  ABD: soft distended, nontender  EXT: b/l LE edema  LYMPH: No acute cervical adenopathy.  NEURO: Alert, oriented, grossly unremarkable  PSYCH: Cooperative, appropriate.

## 2022-05-16 NOTE — ED PROVIDER NOTE - NSICDXPASTMEDICALHX_GEN_ALL_CORE_FT
PAST MEDICAL HISTORY:  Arthritis OA    Fatty liver AS PER PATIENT 15YEARS  PT REPORTS- I HAVE AN ENLARGED SPLEEN  LOW PLATELETS.    GERD (gastroesophageal reflux disease)     Malignant neoplasm of areola of left breast in female, unspecified estrogen receptor status      PAST MEDICAL HISTORY:  Arthritis OA    Blister of right lower leg     Fatty liver AS PER PATIENT 15YEARS  PT REPORTS- I HAVE AN ENLARGED SPLEEN  LOW PLATELETS.    GERD (gastroesophageal reflux disease)     H/O thrombocytopenia     Malignant neoplasm of areola of left breast in female, unspecified estrogen receptor status

## 2022-05-16 NOTE — H&P ADULT - NSICDXPASTMEDICALHX_GEN_ALL_CORE_FT
PAST MEDICAL HISTORY:  Arthritis OA    Blister of right lower leg     Fatty liver AS PER PATIENT 15YEARS  PT REPORTS- I HAVE AN ENLARGED SPLEEN  LOW PLATELETS.    GERD (gastroesophageal reflux disease)     H/O thrombocytopenia     Malignant neoplasm of areola of left breast in female, unspecified estrogen receptor status

## 2022-05-16 NOTE — ASSESSMENT
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 \par -ABRAHAM ? \par -Exertional dyspnea , fatigue . worsening edema,  weight gain. due to portal hypertension ? rule out amyloid . \par echo showed only mild diastolic dysfunction . \par Plan :\par We reviewed Blood work and Ct Scan from the hospital from 4/28/22., echo . \par We will repeat CBC, CMP, MM panel.\par   would need liver biopsy for definitive diagnosis .\par   consider fat biopsy .

## 2022-05-16 NOTE — H&P ADULT - NSHPPHYSICALEXAM_GEN_ALL_CORE
PHYSICAL EXAM:      Constitutional: NAD, A&O x3    Eyes: PERRLA    Respiratory: +air entry, ** + rales at bases, no rhonchi, no wheezes    Cardiovascular: +S1 and S2, **Irregular rate and rhythm, no murmur    Gastrointestinal: +BS, soft, non-tender, not distended    Extremities:  ** Pedal edema, no calf tenderness    Vascular: +dorsal pedis and radial pulses, no extremity cyanosis    Neurological: sensation intact, ROM equal B/L, CN II-XII intact    Skin:  **Bilateral lower leg edema, **Circular 3x3 clean skin wound

## 2022-05-16 NOTE — ED PROVIDER NOTE - CLINICAL SUMMARY MEDICAL DECISION MAKING FREE TEXT BOX
given the patient's presentation most likely consistent with exacerbation of CHF given IV Lasix we obtain labs including EKG and troponin patient has no signs of infection in the bilateral lower extremities nevertheless a 20 pound weight gain in 2 to 3 weeks significant first CHF will need admission for further work-up

## 2022-05-16 NOTE — ED PROVIDER NOTE - NSICDXFAMILYHX_GEN_ALL_CORE_FT
FAMILY HISTORY:  No pertinent family history in first degree relatives     FAMILY HISTORY:  Father  Still living? No  Family history of Parkinson disease, Age at diagnosis: Age Unknown    Mother  Still living? No  Family history of breast cancer in mother, Age at diagnosis: Age Unknown

## 2022-05-16 NOTE — PHYSICAL EXAM
[Normal] : RRR, normal S1S2, no murmurs, rubs, gallops [Ambulatory and capable of all self care but unable to carry out any work activities] : Status 2- Ambulatory and capable of all self care but unable to carry out any work activities. Up and about more than 50% of waking hours [de-identified] : well preserved female in no acute distress.  [de-identified] : pulses adequate .  [de-identified] : spleen 4 fingers bcm .  [de-identified] : extensive purpuric lesions both legs .

## 2022-05-16 NOTE — ED ADULT TRIAGE NOTE - CHIEF COMPLAINT QUOTE
BIBA, as per EMS "two visiting nurses sent in for increase in weight, rales on lung sounds, on diuretic, swelling b/l legs. RA 87%, put on 4L, stating at 96%"

## 2022-05-16 NOTE — H&P ADULT - HISTORY OF PRESENT ILLNESS
82 yo female hx of Thrombocytopenia, fatty liver (possible portal hypertension) presenting with worsening lower extremity edema and hypoxia (found to be hypoxic to 87% on RA today), mild sob, abd distension, and weight gain about 6  pounds over the last 2 weeks. Pt reports compliance with 40 mg lasix PO daily, no chest pain, abd pain, nausea or vomiting.  - patient was admitted  80 yo female hx of Thrombocytopenia, fatty liver (possible portal hypertension) presenting with worsening lower extremity edema and hypoxia (found to be hypoxic to 87% on RA today), mild sob, abd distension, and weight gain about 6  pounds over the last 2 weeks. Pt reports compliance with 40 mg lasix PO daily, no chest pain, abd pain, nausea or vomiting.  - patient was admitted 4/23/22-4/26/22 to [facility] with bilateral lower extremity edema and found to have a RLE water blister, she was medically  managed and discharged home  with VNS for treatment of this leg wound. 80 yo female hx of Thrombocytopenia, fatty liver (possible portal hypertension) presenting with worsening lower extremity edema and hypoxia (found to be hypoxic to 87% on RA today), mild sob, abd distension, and weight gain about 6  pounds over the last 2 weeks. Pt reports compliance with 40 mg lasix PO daily, no chest pain, abd pain, nausea or vomiting.  - patient was admitted 4/23/22-4/26/22 to [facility] with bilateral lower extremity edema and found to have a RLE water blister, she was medically  managed and discharged home  with VNS for treatment of this leg wound and Lasix 40mg PO daily.

## 2022-05-16 NOTE — H&P ADULT - NSHPLABSRESULTS_GEN_ALL_CORE
10.6   5.42  )-----------( 108      ( 16 May 2022 15:26 )             33.7       05-16    133<L>  |  97<L>  |  35<H>  ----------------------------<  151<H>  4.2   |  25  |  1.3    Ca    8.0<L>      16 May 2022 15:26    TPro  5.4<L>  /  Alb  2.7<L>  /  TBili  4.4<H>  /  DBili  x   /  AST  52<H>  /  ALT  27  /  AlkPhos  64  05-16        CARDIAC MARKERS ( 16 May 2022 15:26 )  x     / 0.02 ng/mL        : Xray Chest 1 View- PORTABLE-Urgent (05.16.22 @ 15:34) >    IMPRESSION:    No radiographic evidence of acute cardiopulmonary disease.

## 2022-05-16 NOTE — H&P ADULT - ASSESSMENT
80 y/o female admitted with lower extremity edema and healing RLE skin wound 80 y/o female admitted with Fluid over load + lower extremity edema and healing RLE skin wound 80 y/o female admitted with Fluid over load + lower extremity edema and healing RLE skin wound    Case discussed with Dr Carrillo

## 2022-05-16 NOTE — H&P ADULT - PROBLEM SELECTOR PLAN 2
follow with Hematologist Dr Zapata  - on Prednisone for this Thrombocytopenia: count presently 108 - follow with Hematologist Dr Zapata  - on Prednisone for this Thrombocytopenia: count presently 108 - follow with Hematologist Dr Zapata  - completed Rx Prednisone for this Thrombocytopenia: count presently 108 - follow with Hematologist Dr Zapata  - completed Rx Prednisone for this Thrombocytopenia: count presently 108  - No DVT prophylaxis now due Hx Thrombocytopenia and renal insufficiency

## 2022-05-16 NOTE — ED PROCEDURE NOTE - US IVC
Normal-Respiratory Variation Spine appears normal, range of motion is not limited, no muscle or joint tenderness

## 2022-05-16 NOTE — H&P ADULT - PROBLEM SELECTOR PLAN 1
received Lasix 40mg IV in ER  trend Troponin x 2 more: admission Troponin 0.02  ECHO 4/22/22: EF 72%, normal Systolic and Diastolic function received Lasix 40mg IV in ER, continue 40mg IV daily in AM  trend Troponin x 2 more: admission Troponin 0.02  ECHO 4/22/22: EF 72%, normal Systolic and Diastolic function  N/C 3 LPM,  98 % received Lasix 40mg IV in ER, continue 40mg IV daily in AM  trend Troponin x 2 more: admission Troponin 0.02  ECHO 4/22/22: EF 72%, normal Systolic and Diastolic function  N/C 3 LPM,  98 %  Duplex bilateral lower extremity to R/O DVT received Lasix 40mg IV in ER, continue 40mg IV daily in AM  trend Troponin x 2 more: admission Troponin 0.02  ECHO 4/22/22: EF 72%, normal Systolic and Diastolic function  N/C 3 LPM,  98 %  Duplex bilateral lower extremity to R/O DVT  Strict I+O Q8H  Daily Weights

## 2022-05-16 NOTE — H&P ADULT - PROBLEM SELECTOR PLAN 3
wound care RN evaluation  * patient had a medicated wrap on this RLE wound - wound care RN evaluation  * patient had a medicated wrap on this RLE wound check Mg level due to patient is on Lasix at home  continue supplemental Mg Oxide

## 2022-05-16 NOTE — ED PROVIDER NOTE - ATTENDING CONTRIBUTION TO CARE
I was present for and supervised the key and critical aspects of the procedures performed during the care of the patient. Patient is a 81-year-old female with past medical history of thrombocytopenia presents to the emergency department with worsening lower extremity edema and hypoxia over the past 2 weeks has been worsening she denies any chest pain she denies any cough she denies any fevers or chills patient also notes a weight gain of approximately 10 to 20 pounds over the past 2 weeks she was placed on 40 of Lasix p.o. despite this patient's condition is worsening    On physical exam patient is normocephalic atraumatic pupils equal round react light accommodation extraocular muscle intact oropharynx clear chest reveals scattered rhonchi bilaterally no respiratory distress no accessory use abdomen soft nontender nondistended no guarding no rebound patient does exhibit evidence of edema bilateral pitting edema noted to above the level of the knee 4+ no signs of infection pedal pulses 2+ cap refill 2+    Assessment plan given the patient's presentation most likely consistent with exacerbation of CHF given IV Lasix we obtain labs including EKG and troponin patient has no signs of infection in the bilateral lower extremities nevertheless a 20 pound weight gain in 2 to 3 weeks significant first CHF will need admission for further work-up

## 2022-05-17 LAB
ALBUMIN SERPL ELPH-MCNC: 2.8 G/DL — LOW (ref 3.5–5.2)
ALP SERPL-CCNC: 80 U/L — SIGNIFICANT CHANGE UP (ref 30–115)
ALT FLD-CCNC: 27 U/L — SIGNIFICANT CHANGE UP (ref 0–41)
ANION GAP SERPL CALC-SCNC: 11 MMOL/L — SIGNIFICANT CHANGE UP (ref 7–14)
APTT BLD: 37 SEC — SIGNIFICANT CHANGE UP (ref 27–39.2)
AST SERPL-CCNC: 38 U/L — SIGNIFICANT CHANGE UP (ref 0–41)
BASOPHILS # BLD AUTO: 0.02 K/UL — SIGNIFICANT CHANGE UP (ref 0–0.2)
BASOPHILS NFR BLD AUTO: 0.4 % — SIGNIFICANT CHANGE UP (ref 0–1)
BILIRUB DIRECT SERPL-MCNC: 0.8 MG/DL — HIGH (ref 0–0.3)
BILIRUB INDIRECT FLD-MCNC: 3.8 MG/DL — HIGH (ref 0.2–1.2)
BILIRUB SERPL-MCNC: 4.6 MG/DL — HIGH (ref 0.2–1.2)
BUN SERPL-MCNC: 37 MG/DL — HIGH (ref 10–20)
CALCIUM SERPL-MCNC: 8.2 MG/DL — LOW (ref 8.5–10.1)
CHLORIDE SERPL-SCNC: 97 MMOL/L — LOW (ref 98–110)
CK MB CFR SERPL CALC: 2.4 NG/ML — SIGNIFICANT CHANGE UP (ref 0.6–6.3)
CK SERPL-CCNC: 115 U/L — SIGNIFICANT CHANGE UP (ref 0–225)
CO2 SERPL-SCNC: 28 MMOL/L — SIGNIFICANT CHANGE UP (ref 17–32)
CREAT SERPL-MCNC: 1.6 MG/DL — HIGH (ref 0.7–1.5)
EGFR: 32 ML/MIN/1.73M2 — LOW
EOSINOPHIL # BLD AUTO: 0.16 K/UL — SIGNIFICANT CHANGE UP (ref 0–0.7)
EOSINOPHIL NFR BLD AUTO: 3 % — SIGNIFICANT CHANGE UP (ref 0–8)
GLUCOSE SERPL-MCNC: 187 MG/DL — HIGH (ref 70–99)
HCT VFR BLD CALC: 35.3 % — LOW (ref 37–47)
HGB BLD-MCNC: 11.1 G/DL — LOW (ref 12–16)
IMM GRANULOCYTES NFR BLD AUTO: 0.4 % — HIGH (ref 0.1–0.3)
INR BLD: 1.29 RATIO — SIGNIFICANT CHANGE UP (ref 0.65–1.3)
LYMPHOCYTES # BLD AUTO: 0.86 K/UL — LOW (ref 1.2–3.4)
LYMPHOCYTES # BLD AUTO: 16.3 % — LOW (ref 20.5–51.1)
MAGNESIUM SERPL-MCNC: 1.9 MG/DL — SIGNIFICANT CHANGE UP (ref 1.8–2.4)
MCHC RBC-ENTMCNC: 30.9 PG — SIGNIFICANT CHANGE UP (ref 27–31)
MCHC RBC-ENTMCNC: 31.4 G/DL — LOW (ref 32–37)
MCV RBC AUTO: 98.3 FL — SIGNIFICANT CHANGE UP (ref 81–99)
MONOCYTES # BLD AUTO: 0.53 K/UL — SIGNIFICANT CHANGE UP (ref 0.1–0.6)
MONOCYTES NFR BLD AUTO: 10.1 % — HIGH (ref 1.7–9.3)
NEUTROPHILS # BLD AUTO: 3.67 K/UL — SIGNIFICANT CHANGE UP (ref 1.4–6.5)
NEUTROPHILS NFR BLD AUTO: 69.8 % — SIGNIFICANT CHANGE UP (ref 42.2–75.2)
NRBC # BLD: 0 /100 WBCS — SIGNIFICANT CHANGE UP (ref 0–0)
PLATELET # BLD AUTO: 111 K/UL — LOW (ref 130–400)
POTASSIUM SERPL-MCNC: 3.4 MMOL/L — LOW (ref 3.5–5)
POTASSIUM SERPL-SCNC: 3.4 MMOL/L — LOW (ref 3.5–5)
PROT SERPL-MCNC: 5.6 G/DL — LOW (ref 6–8)
PROTHROM AB SERPL-ACNC: 14.8 SEC — HIGH (ref 9.95–12.87)
RBC # BLD: 3.59 M/UL — LOW (ref 4.2–5.4)
RBC # FLD: 17.8 % — HIGH (ref 11.5–14.5)
SODIUM SERPL-SCNC: 136 MMOL/L — SIGNIFICANT CHANGE UP (ref 135–146)
TROPONIN T SERPL-MCNC: <0.01 NG/ML — SIGNIFICANT CHANGE UP
WBC # BLD: 5.26 K/UL — SIGNIFICANT CHANGE UP (ref 4.8–10.8)
WBC # FLD AUTO: 5.26 K/UL — SIGNIFICANT CHANGE UP (ref 4.8–10.8)

## 2022-05-17 PROCEDURE — 99221 1ST HOSP IP/OBS SF/LOW 40: CPT

## 2022-05-17 PROCEDURE — 99233 SBSQ HOSP IP/OBS HIGH 50: CPT

## 2022-05-17 PROCEDURE — 71045 X-RAY EXAM CHEST 1 VIEW: CPT | Mod: 26

## 2022-05-17 PROCEDURE — 93970 EXTREMITY STUDY: CPT | Mod: 26

## 2022-05-17 RX ORDER — FUROSEMIDE 40 MG
40 TABLET ORAL EVERY 12 HOURS
Refills: 0 | Status: DISCONTINUED | OUTPATIENT
Start: 2022-05-17 | End: 2022-05-20

## 2022-05-17 RX ORDER — POTASSIUM CHLORIDE 20 MEQ
40 PACKET (EA) ORAL ONCE
Refills: 0 | Status: COMPLETED | OUTPATIENT
Start: 2022-05-17 | End: 2022-05-17

## 2022-05-17 RX ADMIN — MAGNESIUM OXIDE 400 MG ORAL TABLET 400 MILLIGRAM(S): 241.3 TABLET ORAL at 12:47

## 2022-05-17 RX ADMIN — Medication 40 MILLIGRAM(S): at 06:05

## 2022-05-17 RX ADMIN — Medication 40 MILLIGRAM(S): at 17:21

## 2022-05-17 RX ADMIN — Medication 1 TABLET(S): at 17:23

## 2022-05-17 RX ADMIN — Medication 500 MILLIGRAM(S): at 12:47

## 2022-05-17 RX ADMIN — Medication 1 TABLET(S): at 06:06

## 2022-05-17 NOTE — ADVANCED PRACTICE NURSE CONSULT - RECOMMEDATIONS
Plan:   Clean wound with saline, pat dry then apply xeroform with Kerlix dressing   Pressure  injury  preventive  measures  skin care   Assess wound and inform primary provider of any changes   Case discussed with primary Rn  Wound/ ostomy specialist  to f/u as needed     Offloading: [x ] Frequent position changes [ ] Devices/Equipment  Cleansing: [ x] Saline [ ] Soap/Water [ ] Other: ______  Topicals: [x ] Barrier Cream [x ] Antimicrobial [ ] Enzymatic Wound Debridement  Dressings: [ ] Dry, sterile [ ] Foam [ ] Absorbant Pads [ ] Collagenase

## 2022-05-17 NOTE — CONSULT NOTE ADULT - ASSESSMENT
80 yo female hx of Thrombocytopenia, fatty liver (possible portal hypertension) presented with lower extremity edema (not improving since last discharge) and hypoxia       Impression:  #Acute on chronic HFpEF?  #Hypoxia    -On clinical exam signs of over volume overload, but not in ADHF. Lungs on ascultation mild B/L rhonchi/rales, likely 2/2 to atelectasis  -Cxr: Not suggestive of pulmonary vascular congestion  -I&O net -1075ml  -B/L LE pitting edema, likely 2/2 low oncotic pressure (hypoalbuminurea, baseline 2.7/2.8), portal HTN?  -ECHO (4/25/2022): EF 72%, Hyperdynamic LV   -BNP 1144    Plan:  -Strict I/Os and standing daily weights, I<O's  -Increase Lasix to 40mg IVP BID. Switch to PO when stable. Monitor BUN/Cr  -Trend BMP 2/2 diuresis and replete as needed  -Incentive spirometer  -Wean of O2 as tolerated     Discussed with Dr Ortega

## 2022-05-17 NOTE — PATIENT PROFILE ADULT - FALL HARM RISK - HARM RISK INTERVENTIONS

## 2022-05-17 NOTE — PROGRESS NOTE ADULT - SUBJECTIVE AND OBJECTIVE BOX
T H I S   I S    N O  T   A    F I N A L I Z E D   N O T E      BRAULIO ESPARZA  81y, Female  Allergy: Cipro (Hives; Rash)  Levaquin (Hives; Rash)    Hospital Day: 1d    Patient seen and examined earlier today.     PMH/PSH:  PAST MEDICAL & SURGICAL HISTORY:  GERD (gastroesophageal reflux disease)      Arthritis  OA      Fatty liver  AS PER PATIENT 15YEARS  PT REPORTS- I HAVE AN ENLARGED SPLEEN  LOW PLATELETS.      Malignant neoplasm of areola of left breast in female, unspecified estrogen receptor status      H/O thrombocytopenia      Blister of right lower leg      History of surgery  LEFT BREAST LUMPECTOMY  TUBIAL LIGATION  CHOLECYSTECTOMY  RIGHT &amp; LEFT TKR      History of cholecystectomy          LAST 24-Hr EVENTS:    VITALS:  T(F): 96.9 (05-17-22 @ 05:12), Max: 98.9 (05-16-22 @ 19:04)  HR: 88 (05-17-22 @ 05:12)  BP: 133/60 (05-17-22 @ 05:12) (103/68 - 133/60)  RR: 20 (05-17-22 @ 05:12)  SpO2: 93% (05-17-22 @ 05:12)          TESTS & MEASUREMENTS:  Weight/BMI  70.3 (05-16-22 @ 13:47)  29.3 (05-16-22 @ 13:47)    05-16-22 @ 07:01  -  05-17-22 @ 07:00  --------------------------------------------------------  IN: 0 mL / OUT: 500 mL / NET: -500 mL    05-17-22 @ 07:01  -  05-17-22 @ 10:49  --------------------------------------------------------  IN: 0 mL / OUT: 575 mL / NET: -575 mL                            10.6   5.42  )-----------( 108      ( 16 May 2022 15:26 )             33.7     PT/INR - ( 17 May 2022 07:06 )   PT: 14.80 sec;   INR: 1.29 ratio         PTT - ( 17 May 2022 07:06 )  PTT:37.0 sec  INR: 1.29 ratio (05-17-22 @ 07:06)    05-16    133<L>  |  97<L>  |  35<H>  ----------------------------<  151<H>  4.2   |  25  |  1.3    Ca    8.0<L>      16 May 2022 15:26  Mg     2.0     05-16    TPro  5.4<L>  /  Alb  2.7<L>  /  TBili  4.4<H>  /  DBili  x   /  AST  52<H>  /  ALT  27  /  AlkPhos  64  05-16    LIVER FUNCTIONS - ( 16 May 2022 15:26 )  Alb: 2.7 g/dL / Pro: 5.4 g/dL / ALK PHOS: 64 U/L / ALT: 27 U/L / AST: 52 U/L / GGT: x           CARDIAC MARKERS ( 17 May 2022 07:06 )  x     / <0.01 ng/mL / 115 U/L / x     / 2.4 ng/mL  CARDIAC MARKERS ( 16 May 2022 21:23 )  x     / 0.02 ng/mL / 106 U/L / x     / 2.3 ng/mL  CARDIAC MARKERS ( 16 May 2022 15:26 )  x     / 0.02 ng/mL / x     / x     / x                    Serum Pro-Brain Natriuretic Peptide: 1144 pg/mL (05-16-22 @ 15:26)    COVID-19 PCR: NotDetec (05-16-22 @ 15:51)                RADIOLOGY, ECG, & ADDITIONAL TESTS:  12 Lead ECG:   Ventricular Rate 87 BPM    Atrial Rate 87 BPM    P-R Interval 142 ms    QRS Duration 82 ms    Q-T Interval 380 ms    QTC Calculation(Bazett) 457 ms    P Axis 17 degrees    R Axis -3 degrees    T Axis 47 degrees    Diagnosis Line Sinus rhythm with occasional Premature ventricular complexes and Premature  atrial complexes  Otherwise normal ECG    Confirmed by Honorio Ortega ([id number]) on 5/17/2022 8:06:35 AM (05-16-22 @ 15:56)      RECENT DIAGNOSTIC ORDERS:  Magnesium, Serum: 11:00 (05-17-22 @ 10:47)  Hepatic Function Panel: 11:00 (05-17-22 @ 10:46)  Basic Metabolic Panel: 11:00 (05-17-22 @ 10:46)  Complete Blood Count + Automated Diff: 11:00 (05-17-22 @ 10:46)  VA Duplex Lower Ext Vein Scan, Bilat: Routine   Indication: Swelling + Leg Edema  Transport: Wheelchair  Exam Completed  Provider's Contact #: [phone] (05-16-22 @ 18:47)  Diet, DASH/TLC:   Sodium & Cholesterol Restricted (05-16-22 @ 18:12)      MEDICATIONS:  MEDICATIONS  (STANDING):  ascorbic acid 500 milliGRAM(s) Oral daily  calcium carbonate 1250 mG  + Vitamin D (OsCal 500 + D) 1 Tablet(s) Oral two times a day  furosemide   Injectable 40 milliGRAM(s) IV Push daily  magnesium oxide 400 milliGRAM(s) Oral daily    MEDICATIONS  (PRN):      HOME MEDICATIONS:  Acidophilus oral capsule (04-23)  bacitracin 500 units/g topical ointment (04-26)  Calcarb with D 600 mg-400 intl units oral tablet (04-23)  CoQ10 300 mg oral capsule (04-23)  Cranberry oral tablet (04-23)  Fish Oil 1000 mg oral capsule (04-23)  Lasix 40 mg oral tablet (04-26)  magnesium carbonate 250 mg oral capsule (04-23)  Multiple Vitamins oral tablet (04-23)  Vitamin B12 100 mcg oral tablet (04-23)  Vitamin C 500 mg oral tablet (04-23)      PHYSICAL EXAM:  GENERAL:   CHEST/LUNG:   HEART:   ABDOMEN:   EXTREMITIES:           [place]  81y, Female  Allergy: Cipro (Hives; Rash)  Levaquin (Hives; Rash)    Hospital Day: 1d    Patient seen and examined earlier today. she stated that she feels better but still having dry cough .     PMH/PSH:  PAST MEDICAL & SURGICAL HISTORY:  GERD (gastroesophageal reflux disease)      Arthritis  OA      Fatty liver  AS PER PATIENT 15YEARS  PT REPORTS- I HAVE AN ENLARGED SPLEEN  LOW PLATELETS.      Malignant neoplasm of areola of left breast in female, unspecified estrogen receptor status      H/O thrombocytopenia      Blister of right lower leg      History of surgery  LEFT BREAST LUMPECTOMY  TUBIAL LIGATION  CHOLECYSTECTOMY  RIGHT &amp; LEFT TKR      History of cholecystectomy          LAST 24-Hr EVENTS:    VITALS:  T(F): 96.9 (05-17-22 @ 05:12), Max: 98.9 (05-16-22 @ 19:04)  HR: 88 (05-17-22 @ 05:12)  BP: 133/60 (05-17-22 @ 05:12) (103/68 - 133/60)  RR: 20 (05-17-22 @ 05:12)  SpO2: 93% (05-17-22 @ 05:12)          TESTS & MEASUREMENTS:  Weight/BMI  70.3 (05-16-22 @ 13:47)  29.3 (05-16-22 @ 13:47)    05-16-22 @ 07:01  -  05-17-22 @ 07:00  --------------------------------------------------------  IN: 0 mL / OUT: 500 mL / NET: -500 mL    05-17-22 @ 07:01  -  05-17-22 @ 10:49  --------------------------------------------------------  IN: 0 mL / OUT: 575 mL / NET: -575 mL                            10.6   5.42  )-----------( 108      ( 16 May 2022 15:26 )             33.7     PT/INR - ( 17 May 2022 07:06 )   PT: 14.80 sec;   INR: 1.29 ratio         PTT - ( 17 May 2022 07:06 )  PTT:37.0 sec  INR: 1.29 ratio (05-17-22 @ 07:06)    05-16    133<L>  |  97<L>  |  35<H>  ----------------------------<  151<H>  4.2   |  25  |  1.3    Ca    8.0<L>      16 May 2022 15:26  Mg     2.0     05-16    TPro  5.4<L>  /  Alb  2.7<L>  /  TBili  4.4<H>  /  DBili  x   /  AST  52<H>  /  ALT  27  /  AlkPhos  64  05-16    LIVER FUNCTIONS - ( 16 May 2022 15:26 )  Alb: 2.7 g/dL / Pro: 5.4 g/dL / ALK PHOS: 64 U/L / ALT: 27 U/L / AST: 52 U/L / GGT: x           CARDIAC MARKERS ( 17 May 2022 07:06 )  x     / <0.01 ng/mL / 115 U/L / x     / 2.4 ng/mL  CARDIAC MARKERS ( 16 May 2022 21:23 )  x     / 0.02 ng/mL / 106 U/L / x     / 2.3 ng/mL  CARDIAC MARKERS ( 16 May 2022 15:26 )  x     / 0.02 ng/mL / x     / x     / x                    Serum Pro-Brain Natriuretic Peptide: 1144 pg/mL (05-16-22 @ 15:26)    COVID-19 PCR: NotDetec (05-16-22 @ 15:51)                RADIOLOGY, ECG, & ADDITIONAL TESTS:  12 Lead ECG:   Ventricular Rate 87 BPM    Atrial Rate 87 BPM    P-R Interval 142 ms    QRS Duration 82 ms    Q-T Interval 380 ms    QTC Calculation(Bazett) 457 ms    P Axis 17 degrees    R Axis -3 degrees    T Axis 47 degrees    Diagnosis Line Sinus rhythm with occasional Premature ventricular complexes and Premature  atrial complexes  Otherwise normal ECG    Confirmed by Honorio Ortega ([id number]) on 5/17/2022 8:06:35 AM (05-16-22 @ 15:56)      RECENT DIAGNOSTIC ORDERS:  Magnesium, Serum: 11:00 (05-17-22 @ 10:47)  Hepatic Function Panel: 11:00 (05-17-22 @ 10:46)  Basic Metabolic Panel: 11:00 (05-17-22 @ 10:46)  Complete Blood Count + Automated Diff: 11:00 (05-17-22 @ 10:46)  VA Duplex Lower Ext Vein Scan, Bilat: Routine   Indication: Swelling + Leg Edema  Transport: Wheelchair  Exam Completed  Provider's Contact #: [phone] (05-16-22 @ 18:47)  Diet, DASH/TLC:   Sodium & Cholesterol Restricted (05-16-22 @ 18:12)      MEDICATIONS:  MEDICATIONS  (STANDING):  ascorbic acid 500 milliGRAM(s) Oral daily  calcium carbonate 1250 mG  + Vitamin D (OsCal 500 + D) 1 Tablet(s) Oral two times a day  furosemide   Injectable 40 milliGRAM(s) IV Push daily  magnesium oxide 400 milliGRAM(s) Oral daily    MEDICATIONS  (PRN):      HOME MEDICATIONS:  Acidophilus oral capsule (04-23)  bacitracin 500 units/g topical ointment (04-26)  Calcarb with D 600 mg-400 intl units oral tablet (04-23)  CoQ10 300 mg oral capsule (04-23)  Cranberry oral tablet (04-23)  Fish Oil 1000 mg oral capsule (04-23)  Lasix 40 mg oral tablet (04-26)  magnesium carbonate 250 mg oral capsule (04-23)  Multiple Vitamins oral tablet (04-23)  Vitamin B12 100 mcg oral tablet (04-23)  Vitamin C 500 mg oral tablet (04-23)      PHYSICAL EXAM:  General awake, alert NAD, jaundiced sclera,   Lungs b/l basal crackles -3 LNC Normal resp effort ,   Heart irregular rhythm, + murmur  Abdomen: soft, non tender distended,   Ext +++ edema legs with chronic skin changes and R leg dressing .

## 2022-05-17 NOTE — ADVANCED PRACTICE NURSE CONSULT - ASSESSMENT
Patient  is a 81y old female with a pmhx of hypertension, fatty liver, enlarged spleen and  thrombocytopenia, presents to the ER complaining of sudden onset of right ear hearing loss since dental implant 1 month ago. pt states placed on daily prednisone 60 mg by ENT now tapered to 20mg  but states noticed b/l legs swelling now progressively worse and associated with abdominal distension, weight gain and non productive cough.  pt started on lasix 10mg/day increased to 20mg/day with no improvement. pt states weight gain from 133lbs to 150lbs in a month. pt admits to b/l legs pain radiating to groin, right leg blister with clear fluid discharge and b/l legs skin discoloration but denies chest/abdominal pain, headache, SOB, numbness, paraesthesias, n/v, fever or chills    PAST MEDICAL & SURGICAL HISTORY:  GERD (gastroesophageal reflux disease)  Arthritis  OA  Fatty liver  AS PER PATIENT 15YEARS  PT REPORTS- I HAVE AN ENLARGED SPLEEN  LOW PLATELETS.  Malignant neoplasm of areola of left breast in female, unspecified estrogen receptor status  History of surgery  LEFT BREAST LUMPECTOMY  TUBIAL LIGATION  CHOLECYSTECTOMY  RIGHT &amp; LEFT TKR      Assessment:  Patient received in bed , A/O responds appropriately to verbal command                       Skin assessed-  Kerlix dressing gently removed     Wound #1  Location:  R lateral shin   Type; Partial thickness wound   Size:  2x2  Tissue Description :  Red   Wound Exudate : None    Wound Edge: Intact    Periwound Condition  Dry ,  hemosiderin stain :     Other Etiology:  [ ] Aterial  [x ] Venous- b/L lower extremity hemosiderin stain    [ ] Surgical Incision  [ ] Other: ________    - Xeroform with Kerlix dressing re-applied

## 2022-05-17 NOTE — PROGRESS NOTE ADULT - ASSESSMENT
80 yo female hx of Thrombocytopenia, fatty liver (possible portal hypertension) presenting with worsening lower extremity edema and hypoxia (found to be hypoxic to 87% on RA today), mild sob, abd distension, and weight gain about       []Problem: Acute hypoxic resp failure liklky secondary to Fluid overload, unclear cause possible acute on chronic Heart Failure with Preserved Ejection Fraction    Troponin trended down  admission Troponin 0.02-->0.01   ECHO 4/22/22: EF 72%, , grade one Diastolic disfunction  N/C 3 LPM,  98 %  Duplex bilateral lower extremity to R/O DVT  Strict I+O Q8H  Daily Weights.  -c/w lasix IV   cardiology consult appreciated - lasix iv q 12 , Pulmonary consult   GI consult for elevated emilio, jaundice and hx of portal HTN ? non cirrhotic   I/O   will check RVP       []Problem: H/O thrombocytopenia. ? and gammopathy   follow with Hematologist Dr Zapata  - completed Rx Prednisone for this Thrombocytopenia: count presently 108  - No DVT prophylaxis now due Hx Thrombocytopenia ( The patient refused dvt ppx as well stated that she was told never to take blood thinner) .    [] Chronic R lower leg wound    wound care RN evaluation      DVT ppx , patient refused to get blood thinner given her hx as mentioned above SCD if venous duplex negative      80 yo female hx of Thrombocytopenia, fatty liver (possible portal hypertension) presenting with worsening lower extremity edema and hypoxia (found to be hypoxic to 87% on RA today), mild sob, abd distension, and weight gain about       []Problem: Acute hypoxic resp failure liklky secondary to Fluid overload, unclear cause possible acute on chronic Heart Failure with Preserved Ejection Fraction    Troponin trended down  admission Troponin 0.02-->0.01   ECHO 4/22/22: EF 72%, , grade one Diastolic disfunction  N/C 3 LPM,  98 %  Duplex bilateral lower extremity to R/O DVT  Strict I+O Q8H  Daily Weights.  -c/w lasix IV   cardiology consult appreciated - lasix iv q 12 , Pulmonary consult   GI consult for elevated emilio, jaundice and hx of portal HTN ? non cirrhotic   I/O   will check RVP and procalcitonin for dry cough       []Problem: H/O thrombocytopenia. ? and gammopathy   follow with Hematologist Dr Zapata  - completed Rx Prednisone for this Thrombocytopenia: count presently 108  - No DVT prophylaxis now due Hx Thrombocytopenia ( The patient refused dvt ppx as well stated that she was told never to take blood thinner) .    [] Chronic R lower leg wound    wound care RN evaluation      DVT ppx , patient refused to get blood thinner given her hx as mentioned above SCD if venous duplex negative      80 yo female hx of Thrombocytopenia, fatty liver (possible portal hypertension) presenting with worsening lower extremity edema and hypoxia (found to be hypoxic to 87% on RA today), mild sob, abd distension, and weight gain about       []Problem: Acute hypoxic resp failure liklky secondary to Fluid overload, unclear cause possible acute on chronic Heart Failure with Preserved Ejection Fraction    Troponin trended down  admission Troponin 0.02-->0.01   ECHO 4/22/22: EF 72%, , grade one Diastolic disfunction  N/C 3 LPM,  98 %  Duplex bilateral lower extremity to R/O DVT  Strict I+O Q8H  Daily Weights.  -c/w lasix IV   cardiology consult appreciated - lasix iv q 12 , Pulmonary consult   GI consult for elevated emilio, jaundice and hx of portal HTN ? non cirrhotic   I/O   will check RVP and procalcitonin for dry cough       []Problem: H/O thrombocytopenia. ? and gammopathy   follow with Hematologist Dr Zapata  - completed Rx Prednisone for this Thrombocytopenia: count presently 108  - No DVT prophylaxis now due Hx Thrombocytopenia ( The patient refused dvt ppx as well stated that she was told never to take blood thinner) .    [] Chronic R lower leg wound    wound care RN evaluation      DVT ppx , patient refused to get blood thinner given her hx as mentioned above SCD if venous duplex negative     Hand off, f/u RVP, GI/Pulmonary/ cardiology consult

## 2022-05-17 NOTE — CONSULT NOTE ADULT - NS ATTEND AMEND GEN_ALL_CORE FT
Patient seen and examined. Pertinent labs, imaging and telemetry reviewed. I agree with the above:     Patient still with some SOB but improved. She also is complaining of cough. Nonproductive and has been worsening.   LE edema also worsening and gained 20 lbs at home despite diuretics.   TTE reviewed with normal EF and only grade I DD which would not explain current edema.   Lung exam with crackles which clear with cough, likely 2/2 to atelectasis.   Minimal troponemia likely due to demand. EKG nonischemic.     81F with fatty liver, possible portal HTN, thrombocytopenia and LE edema presents with worsening LE edema and hypoxia.     Hypoxia: Does not appear to be cardiogenic in nature. No pulmonary edema on exam or CXR  -Agree with repeat RVP/COVID test.  -Pulmonary consult.   -Incentive spirometer.       LE edema: Likely not cardiogenic in nature given only grade I diastolic dysfunction and without pulmonary edema on exam.   -GI consult for portal HTN evaluation.   -Increase lasix 40mg IVP BID.   -Check lytes BID with repletion as needed.    Discussed with NP and patient.

## 2022-05-18 DIAGNOSIS — G47.33 OBSTRUCTIVE SLEEP APNEA (ADULT) (PEDIATRIC): ICD-10-CM

## 2022-05-18 DIAGNOSIS — I27.20 PULMONARY HYPERTENSION, UNSPECIFIED: ICD-10-CM

## 2022-05-18 LAB
ALBUMIN MFR SERPL ELPH: 48.3 %
ALBUMIN SERPL ELPH-MCNC: 3 G/DL — LOW (ref 3.5–5.2)
ALBUMIN SERPL-MCNC: 2.8 G/DL
ALBUMIN/GLOB SERPL: 1 RATIO
ALP SERPL-CCNC: 66 U/L — SIGNIFICANT CHANGE UP (ref 30–115)
ALPHA1 GLOB MFR SERPL ELPH: 6.3 %
ALPHA1 GLOB SERPL ELPH-MCNC: 0.4 G/DL
ALPHA2 GLOB MFR SERPL ELPH: 10 %
ALPHA2 GLOB SERPL ELPH-MCNC: 0.6 G/DL
ALT FLD-CCNC: 26 U/L — SIGNIFICANT CHANGE UP (ref 0–41)
ANION GAP SERPL CALC-SCNC: 12 MMOL/L — SIGNIFICANT CHANGE UP (ref 7–14)
AST SERPL-CCNC: 38 U/L — SIGNIFICANT CHANGE UP (ref 0–41)
B-GLOBULIN MFR SERPL ELPH: 9.4 %
B-GLOBULIN SERPL ELPH-MCNC: 0.5 G/DL
BASOPHILS # BLD AUTO: 0.04 K/UL — SIGNIFICANT CHANGE UP (ref 0–0.2)
BASOPHILS NFR BLD AUTO: 0.5 % — SIGNIFICANT CHANGE UP (ref 0–1)
BILIRUB SERPL-MCNC: 4.7 MG/DL — HIGH (ref 0.2–1.2)
BUN SERPL-MCNC: 37 MG/DL — HIGH (ref 10–20)
CALCIUM SERPL-MCNC: 8.5 MG/DL — SIGNIFICANT CHANGE UP (ref 8.5–10.1)
CHLORIDE SERPL-SCNC: 99 MMOL/L — SIGNIFICANT CHANGE UP (ref 98–110)
CO2 SERPL-SCNC: 28 MMOL/L — SIGNIFICANT CHANGE UP (ref 17–32)
CREAT SERPL-MCNC: 1.3 MG/DL — SIGNIFICANT CHANGE UP (ref 0.7–1.5)
DEPRECATED KAPPA LC FREE/LAMBDA SER: 1.18 RATIO
EGFR: 41 ML/MIN/1.73M2 — LOW
EOSINOPHIL # BLD AUTO: 0.35 K/UL — SIGNIFICANT CHANGE UP (ref 0–0.7)
EOSINOPHIL NFR BLD AUTO: 4.7 % — SIGNIFICANT CHANGE UP (ref 0–8)
GAMMA GLOB FLD ELPH-MCNC: 1.5 G/DL
GAMMA GLOB MFR SERPL ELPH: 26 %
GLUCOSE SERPL-MCNC: 142 MG/DL — HIGH (ref 70–99)
HCT VFR BLD CALC: 37.3 % — SIGNIFICANT CHANGE UP (ref 37–47)
HGB BLD-MCNC: 11.8 G/DL — LOW (ref 12–16)
IGA SER QL IEP: 243 MG/DL
IGG SER QL IEP: 1442 MG/DL
IGM SER QL IEP: 197 MG/DL
IMM GRANULOCYTES NFR BLD AUTO: 0.3 % — SIGNIFICANT CHANGE UP (ref 0.1–0.3)
INTERPRETATION SERPL IEP-IMP: NORMAL
KAPPA LC CSF-MCNC: 5.1 MG/DL
KAPPA LC SERPL-MCNC: 6 MG/DL
LYMPHOCYTES # BLD AUTO: 1.46 K/UL — SIGNIFICANT CHANGE UP (ref 1.2–3.4)
LYMPHOCYTES # BLD AUTO: 19.8 % — LOW (ref 20.5–51.1)
M PROTEIN MFR SERPL ELPH: NORMAL
M PROTEIN SPEC IFE-MCNC: NORMAL
MCHC RBC-ENTMCNC: 30.8 PG — SIGNIFICANT CHANGE UP (ref 27–31)
MCHC RBC-ENTMCNC: 31.6 G/DL — LOW (ref 32–37)
MCV RBC AUTO: 97.4 FL — SIGNIFICANT CHANGE UP (ref 81–99)
MONOCLON BAND OBS SERPL: NORMAL
MONOCYTES # BLD AUTO: 0.77 K/UL — HIGH (ref 0.1–0.6)
MONOCYTES NFR BLD AUTO: 10.4 % — HIGH (ref 1.7–9.3)
NEUTROPHILS # BLD AUTO: 4.73 K/UL — SIGNIFICANT CHANGE UP (ref 1.4–6.5)
NEUTROPHILS NFR BLD AUTO: 64.3 % — SIGNIFICANT CHANGE UP (ref 42.2–75.2)
NRBC # BLD: 0 /100 WBCS — SIGNIFICANT CHANGE UP (ref 0–0)
NT-PROBNP SERPL-SCNC: 1190 PG/ML — HIGH (ref 0–300)
PLATELET # BLD AUTO: 141 K/UL — SIGNIFICANT CHANGE UP (ref 130–400)
POTASSIUM SERPL-MCNC: 3.2 MMOL/L — LOW (ref 3.5–5)
POTASSIUM SERPL-SCNC: 3.2 MMOL/L — LOW (ref 3.5–5)
PROT SERPL-MCNC: 5.7 G/DL
PROT SERPL-MCNC: 5.9 G/DL — LOW (ref 6–8)
RAPID RVP RESULT: SIGNIFICANT CHANGE UP
RBC # BLD: 3.83 M/UL — LOW (ref 4.2–5.4)
RBC # FLD: 17.6 % — HIGH (ref 11.5–14.5)
SARS-COV-2 RNA SPEC QL NAA+PROBE: SIGNIFICANT CHANGE UP
SODIUM SERPL-SCNC: 139 MMOL/L — SIGNIFICANT CHANGE UP (ref 135–146)
WBC # BLD: 7.37 K/UL — SIGNIFICANT CHANGE UP (ref 4.8–10.8)
WBC # FLD AUTO: 7.37 K/UL — SIGNIFICANT CHANGE UP (ref 4.8–10.8)

## 2022-05-18 PROCEDURE — 99233 SBSQ HOSP IP/OBS HIGH 50: CPT

## 2022-05-18 PROCEDURE — 99232 SBSQ HOSP IP/OBS MODERATE 35: CPT

## 2022-05-18 RX ORDER — GUAIFENESIN/DEXTROMETHORPHAN 600MG-30MG
10 TABLET, EXTENDED RELEASE 12 HR ORAL EVERY 6 HOURS
Refills: 0 | Status: DISCONTINUED | OUTPATIENT
Start: 2022-05-18 | End: 2022-05-20

## 2022-05-18 RX ORDER — POTASSIUM CHLORIDE 20 MEQ
40 PACKET (EA) ORAL ONCE
Refills: 0 | Status: COMPLETED | OUTPATIENT
Start: 2022-05-18 | End: 2022-05-18

## 2022-05-18 RX ORDER — POTASSIUM CHLORIDE 20 MEQ
20 PACKET (EA) ORAL
Refills: 0 | Status: COMPLETED | OUTPATIENT
Start: 2022-05-18 | End: 2022-05-18

## 2022-05-18 RX ADMIN — Medication 1 TABLET(S): at 10:37

## 2022-05-18 RX ADMIN — Medication 40 MILLIEQUIVALENT(S): at 18:50

## 2022-05-18 RX ADMIN — Medication 20 MILLIEQUIVALENT(S): at 10:37

## 2022-05-18 RX ADMIN — Medication 20 MILLIEQUIVALENT(S): at 13:27

## 2022-05-18 RX ADMIN — Medication 500 MILLIGRAM(S): at 13:28

## 2022-05-18 RX ADMIN — Medication 40 MILLIGRAM(S): at 06:13

## 2022-05-18 RX ADMIN — Medication 40 MILLIGRAM(S): at 18:33

## 2022-05-18 RX ADMIN — Medication 40 MILLIEQUIVALENT(S): at 04:46

## 2022-05-18 RX ADMIN — MAGNESIUM OXIDE 400 MG ORAL TABLET 400 MILLIGRAM(S): 241.3 TABLET ORAL at 13:27

## 2022-05-18 RX ADMIN — Medication 1 TABLET(S): at 18:33

## 2022-05-18 NOTE — CONSULT NOTE ADULT - SUBJECTIVE AND OBJECTIVE BOX
Patient is a 81y old  Female who presents with a chief complaint of     HPI:  82 yo female hx of Thrombocytopenia, fatty liver (possible portal hypertension) presenting with worsening lower extremity edema and hypoxia (found to be hypoxic to 87% on RA today), mild sob, abd distension, and weight gain about 6  pounds over the last 2 weeks. Pt reports compliance with 40 mg lasix PO daily, no chest pain, abd pain, nausea or vomiting.  - patient was admitted 4/23/22-4/26/22 to [facility] with bilateral lower extremity edema and found to have a RLE water blister, she was medically  managed and discharged home  with VNS for treatment of this leg wound and Lasix 40mg PO daily. (16 May 2022 17:43)      PAST MEDICAL & SURGICAL HISTORY:  GERD (gastroesophageal reflux disease)      Arthritis  OA      Fatty liver  AS PER PATIENT 15YEARS  PT REPORTS- I HAVE AN ENLARGED SPLEEN  LOW PLATELETS.      Malignant neoplasm of areola of left breast in female, unspecified estrogen receptor status      H/O thrombocytopenia      Blister of right lower leg      History of surgery  LEFT BREAST LUMPECTOMY  TUBIAL LIGATION  CHOLECYSTECTOMY  RIGHT &amp; LEFT TKR      History of cholecystectomy          FAMILY HISTORY:  Family history of breast cancer in mother (Mother)    Family history of Parkinson disease (Father)      Family history: No family cardiovascular system   Occupation:  Alochol: Denied  Smoking: Denied  Drug Use: Denied  Marital Status:           Allergies    Cipro (Hives; Rash)  Levaquin (Hives; Rash)    Intolerances        Home Medications:  Acidophilus oral capsule: 1 cap(s) orally once a day (23 Apr 2022 12:08)  Calcarb with D 600 mg-400 intl units oral tablet: 1 tab(s) orally 2 times a day (23 Apr 2022 12:08)  CoQ10 300 mg oral capsule: 1 cap(s) orally once a day (23 Apr 2022 12:08)  Cranberry oral tablet: orally once a day (23 Apr 2022 12:08)  Fish Oil 1000 mg oral capsule: 1 cap(s) orally 3 times a day (23 Apr 2022 12:08)  magnesium carbonate 250 mg oral capsule: orally once a day (23 Apr 2022 12:08)  Multiple Vitamins oral tablet: 1 tab(s) orally once a day (23 Apr 2022 12:08)  Vitamin B12 100 mcg oral tablet: 1 tab(s) orally once a day (23 Apr 2022 12:08)  Vitamin C 500 mg oral tablet: 1 tab(s) orally once a day (23 Apr 2022 12:08)      ROS: as in HPI; All other systems reviewed are negative        PHYSICAL EXAM:  Vital Signs Last 24 Hrs  T(C): 36.2 (18 May 2022 05:00), Max: 36.2 (18 May 2022 05:00)  T(F): 97.2 (18 May 2022 05:00), Max: 97.2 (18 May 2022 05:00)  HR: 86 (18 May 2022 05:00) (86 - 94)  BP: 102/53 (18 May 2022 05:00) (102/53 - 134/64)  BP(mean): --  RR: 18 (18 May 2022 05:00) (18 - 18)  SpO2: --      GENERAL: NAD, well-groomed, well-developed  HEAD:  Atraumatic, Normocephalic  EYES: EOMI, PERRLA, conjunctiva and sclera clear  ENMT: No tonsillar erythema, exudates, or enlargement; Moist mucous membranes, Good dentition, No lesions  NECK: Supple, No JVD, Normal thyroid  NERVOUS SYSTEM:  Alert & Oriented X3, Good concentration; Motor Strength 5/5 B/L upper and lower extremities; DTRs 2+ intact and symmetric  CHEST/LUNG:  decrease bibasilar   HEART: Regular rate and rhythm; No murmurs, rubs, or gallops  ABDOMEN: Soft, Nontender, Nondistended; Bowel sounds present  EXTREMITIES:  1 edema  LYMPH: No lymphadenopathy noted  SKIN: No rashes or lesions    HOSPITAL MEDICATIONS:  MEDICATIONS  (STANDING):  ascorbic acid 500 milliGRAM(s) Oral daily  calcium carbonate 1250 mG  + Vitamin D (OsCal 500 + D) 1 Tablet(s) Oral two times a day  furosemide   Injectable 40 milliGRAM(s) IV Push every 12 hours  magnesium oxide 400 milliGRAM(s) Oral daily  potassium chloride    Tablet ER 20 milliEquivalent(s) Oral every 2 hours    MEDICATIONS  (PRN):      LABS:                        11.8   7.37  )-----------( 141      ( 18 May 2022 06:38 )             37.3     05-18    139  |  99  |  37<H>  ----------------------------<  142<H>  3.2<L>   |  28  |  1.3    Ca    8.5      18 May 2022 06:38  Mg     1.9     05-17    TPro  5.9<L>  /  Alb  3.0<L>  /  TBili  4.7<H>  /  DBili  x   /  AST  38  /  ALT  26  /  AlkPhos  66  05-18    PT/INR - ( 17 May 2022 07:06 )   PT: 14.80 sec;   INR: 1.29 ratio         PTT - ( 17 May 2022 07:06 )  PTT:37.0 sec              RADIOLOGY: no infiltrate   [ ] Reviewed and interpreted by me    ECHO:    Point of Care Ultrasound Findings;    PFT:    
HPI:  80 yo female hx of Thrombocytopenia, fatty liver (possible portal hypertension) presenting with worsening lower extremity edema and hypoxia (found to be hypoxic to 87% on RA today), mild sob, abd distension, and weight gain about 6  pounds over the last 2 weeks. Pt reports compliance with 40 mg lasix PO daily, no chest pain, abd pain, nausea or vomiting.  - patient was admitted 4/23/22-4/26/22 to [facility] with bilateral lower extremity edema and found to have a RLE water blister, she was medically  managed and discharged home  with VNS for treatment of this leg wound and Lasix 40mg PO daily. (16 May 2022 17:43)        HPI-Cardiology   Pt evaluated at bedside with Dr Ortega. Currently admitted in telemetry for evaluation of hypoxia and LE edema. Pt states that she was here in April and was discharged with Lasix for the LE edema. Pt sates that there hasn't been an improvement in the edema on her legs and they look the same, and also states that she has noticed that she has been gaining weight. Pt also endorses increase in abd distention. Pt denies any CP, SOB, palpitations, dizziness/lightheadedness, nausea/vomiting or abd pain.   for Radiology tests and hospital records, were reviewed, as well as previous notes on this patient.       PAST MEDICAL & SURGICAL HISTORY  GERD (gastroesophageal reflux disease)    Arthritis  OA    Fatty liver  AS PER PATIENT 15YEARS  PT REPORTS- I HAVE AN ENLARGED SPLEEN  LOW PLATELETS.    Malignant neoplasm of areola of left breast in female, unspecified estrogen receptor status    H/O thrombocytopenia    Blister of right lower leg    History of surgery  LEFT BREAST LUMPECTOMY  TUBIAL LIGATION  CHOLECYSTECTOMY  RIGHT &amp; LEFT TKR    History of cholecystectomy        FAMILY HISTORY:  FAMILY HISTORY:  Family history of breast cancer in mother (Mother)    Family history of Parkinson disease (Father)        SOCIAL HISTORY:  []smoker-denies  []Alcohol-denies  []Drug-denies      ALLERGIES:  Cipro (Hives; Rash)  Levaquin (Hives; Rash)      MEDICATIONS:  MEDICATIONS  (STANDING):  ascorbic acid 500 milliGRAM(s) Oral daily  calcium carbonate 1250 mG  + Vitamin D (OsCal 500 + D) 1 Tablet(s) Oral two times a day  furosemide   Injectable 40 milliGRAM(s) IV Push daily  magnesium oxide 400 milliGRAM(s) Oral daily    MEDICATIONS  (PRN):      HOME MEDICATIONS:  Home Medications:  Acidophilus oral capsule: 1 cap(s) orally once a day (23 Apr 2022 12:08)  Calcarb with D 600 mg-400 intl units oral tablet: 1 tab(s) orally 2 times a day (23 Apr 2022 12:08)  CoQ10 300 mg oral capsule: 1 cap(s) orally once a day (23 Apr 2022 12:08)  Cranberry oral tablet: orally once a day (23 Apr 2022 12:08)  Fish Oil 1000 mg oral capsule: 1 cap(s) orally 3 times a day (23 Apr 2022 12:08)  magnesium carbonate 250 mg oral capsule: orally once a day (23 Apr 2022 12:08)  Multiple Vitamins oral tablet: 1 tab(s) orally once a day (23 Apr 2022 12:08)  Vitamin B12 100 mcg oral tablet: 1 tab(s) orally once a day (23 Apr 2022 12:08)  Vitamin C 500 mg oral tablet: 1 tab(s) orally once a day (23 Apr 2022 12:08)      VITALS:   T(F): 96.9 (05-17 @ 05:12), Max: 98.9 (05-16 @ 19:04)  HR: 88 (05-17 @ 05:12) (85 - 98)  BP: 133/60 (05-17 @ 05:12) (103/68 - 133/60)  BP(mean): --  RR: 20 (05-17 @ 05:12) (18 - 20)  SpO2: 93% (05-17 @ 05:12) (93% - 100%)    I&O's Summary    16 May 2022 07:01  -  17 May 2022 07:00  --------------------------------------------------------  IN: 0 mL / OUT: 500 mL / NET: -500 mL    17 May 2022 07:01  -  17 May 2022 11:17  --------------------------------------------------------  IN: 0 mL / OUT: 575 mL / NET: -575 mL        REVIEW OF SYSTEMS:  See HPI      PHYSICAL EXAM:  NEURO: patient is awake , alert and oriented  GEN: Not in acute distress  NECK: no thyroid enlargement, no JVD  LUNGS: Mild rales/rhonchi noted B/L at bases    CARDIOVASCULAR: S1/S2 present, RRR , no murmurs or rubs, no carotid bruits,  + PP bilaterally  ABD: Soft, non-tender, distended, +BS  EXT: B/L LE pitting edema +4  SKIN: Intact    LABS:                        10.6   5.42  )-----------( 108      ( 16 May 2022 15:26 )             33.7     05-16    133<L>  |  97<L>  |  35<H>  ----------------------------<  151<H>  4.2   |  25  |  1.3    Ca    8.0<L>      16 May 2022 15:26  Mg     2.0     05-16    TPro  5.4<L>  /  Alb  2.7<L>  /  TBili  4.4<H>  /  DBili  x   /  AST  52<H>  /  ALT  27  /  AlkPhos  64  05-16    PT/INR - ( 17 May 2022 07:06 )   PT: 14.80 sec;   INR: 1.29 ratio         PTT - ( 17 May 2022 07:06 )  PTT:37.0 sec  Troponin T, Serum: <0.01 ng/mL (05-17-22 @ 07:06)  Creatine Kinase, Serum: 115 U/L (05-17-22 @ 07:06)  Troponin T, Serum: 0.02 ng/mL *H* (05-16-22 @ 21:23)  Creatine Kinase, Serum: 106 U/L (05-16-22 @ 21:23)  Troponin T, Serum: 0.02 ng/mL *H* (05-16-22 @ 15:26)    CARDIAC MARKERS ( 17 May 2022 07:06 )  x     / <0.01 ng/mL / 115 U/L / x     / 2.4 ng/mL  CARDIAC MARKERS ( 16 May 2022 21:23 )  x     / 0.02 ng/mL / 106 U/L / x     / 2.3 ng/mL  CARDIAC MARKERS ( 16 May 2022 15:26 )  x     / 0.02 ng/mL / x     / x     / x            Serum Pro-Brain Natriuretic Peptide: 1144 pg/mL (05-16-22 @ 15:26)      RADIOLOGY:  -CXR:  < from: Xray Chest 1 View AP/PA (05.17.22 @ 08:55) >  Impression:    No radiographic evidence of acute cardiopulmonary disease.        --- End of Report ---    < end of copied text >      ECHO:  < from: TTE Echo Complete w/o Contrast w/ Doppler (04.25.22 @ 08:38) >    Summary:   1. Hyperdynamic global left ventricular systolic function.   2. LV Ejection Fraction by Jackson's Method with a biplane EF of 72 %.   3. Spectral Doppler shows impaired relaxation pattern of left   ventricular myocardial filling (Grade I diastolic dysfunction).   4. Mild left ventricular hypertrophy.   5. Difficult to visualize right atrial chamber. Possible artifact seen   in right atrium.   6. Peak transaortic gradient equals 25.4 mmHg, mean transaortic gradient   equals 16.8 mmHg, the calculated aortic valve area equals 1.83 cm² by the   continuity equation consistent with mild aortic stenosis. The   Dimesionless Index value is 0.68.   7. Mild aortic regurgitation.   8. Mild mitral annular calcification.   9. Moderate thickening and calcification of the posterior mitral valve   leaflet.  10. Trace mitral valve regurgitation.  11. Estimated pulmonary artery systolic pressure is 43.8 mmHg assuming a   right atrial pressure of 3 mmHg, which is consistent with mild pulmonary   hypertension.  12. There is no evidence of pericardial effusion.    < end of copied text >    ECG:  < from: 12 Lead ECG (05.16.22 @ 15:56) >  rhythm with occasional Premature ventricular complexes and Premature  atrial complexes  Otherwise normal ECG    Confirmed by Honorio Ortega ([id number]) on 5/17/2022 8:06:35 AM    < end of copied text >

## 2022-05-18 NOTE — CONSULT NOTE ADULT - ASSESSMENT
Impression:          Plan:   Impression:  SOB   pulm htn   ?? mariia         Plan:   patient been followed by Dr Martinez on home oxygen   pending sleep study   PFT reviewed normal except decrease dlco go with pulm htn   keep pox > 92 %   consider gentle diuresis josé antonio lower ext edema   doppler lower ext   follow up as outpatient with Dr martinez patient now at her baseline

## 2022-05-18 NOTE — PROGRESS NOTE ADULT - ASSESSMENT
82 yo female hx of Thrombocytopenia, fatty liver (possible portal hypertension) presenting with worsening lower extremity edema and hypoxia (found to be hypoxic to 87% on RA today), mild sob, abd distension, and weight gain about     # Acute hypoxic respiratory failure likely secondary to Fluid overload, likely related to portal hypertension, less likely CHF  # Mild grade I diastolic dysfunction- less likely acute decompensated CHF  # Mild elevation of troponins likely demand ischemia  # pulm htn// possible ?? mariia   - No pulmonary edema on exam or CXR  - ECHO 4/22/22: EF 72%, , grade one Diastolic disfunction  - LE duplex: no DVT  PLAN  - cardio eval appreciated  - pulm on board; patient follows with Dr Martinez on home oxygen   - outpatient sleep study  - incentive spirometer  - GI was consulted for portal HTN evaluation: In view of questionable varices and cirrhosis, can consider outpatient fibroscan and EGD after seeing hepatology clinic  - cont lasix 40mg IVP BID  - Strict I+O Q8H  - Daily Weights    # H/O thrombocytopenia. ? and gammopathy  - follow with Hematologist Dr Zapata  - completed Rx Prednisone for this Thrombocytopenia: count presently 108  - No DVT prophylaxis now due Hx Thrombocytopenia ( The patient refused dvt ppx as well stated that she was told never to take blood thinner) .    # Chronic R lower leg wound   - wound care RN evaluation    DVT ppx , patient refused to get blood thinner given her hx as mentioned above SCD if venous duplex negative     Dispo: currently acute, likely dc in 3 days 82 yo female hx of Thrombocytopenia, fatty liver (possible portal hypertension) presenting with worsening lower extremity edema and hypoxia (found to be hypoxic to 87% on RA today), mild sob, abd distension, and weight gain about     # Acute hypoxic respiratory failure likely secondary to Fluid overload, likely related to portal hypertension, less likely CHF  # Mild grade I diastolic dysfunction- less likely acute decompensated CHF  # Mild elevation of troponins likely demand ischemia  # pulm htn// possible ?? mariia   - No pulmonary edema on exam or CXR  - ECHO 4/22/22: EF 72%, , grade one Diastolic disfunction  - LE duplex: no DVT  PLAN  - cardio eval appreciated  - pulm on board; patient follows with Dr Martinez on home oxygen   - outpatient sleep study  - incentive spirometer  - GI was consulted for portal HTN evaluation: In view of questionable varices and cirrhosis, can consider outpatient fibroscan and EGD after seeing hepatology clinic  - cont lasix 40mg IVP BID  - f/u AM CXR  - Strict I+O Q8H  - Daily Weights    # H/O thrombocytopenia. ? and gammopathy  - follow with Hematologist Dr Zapata  - completed Rx Prednisone for this Thrombocytopenia: count presently 108  - No DVT prophylaxis now due Hx Thrombocytopenia ( The patient refused dvt ppx as well stated that she was told never to take blood thinner) .    # Chronic R lower leg wound   - wound care RN evaluation    DVT ppx , patient refused to get blood thinner given her hx as mentioned above SCD if venous duplex negative     Dispo: currently acute, likely dc in 3 days

## 2022-05-18 NOTE — PROGRESS NOTE ADULT - SUBJECTIVE AND OBJECTIVE BOX
BRAULIO ESPARZA  81y, Female  Allergy: Cipro (Hives; Rash)  Levaquin (Hives; Rash)    Hospital Day: 2d    Patient seen and examined earlier today. she stated that she feels better but still having dry cough and some shortness of breath; denies F/C, CP, palpitations, N/V, abd pain. Reports of lower extremity swelling.    ROS: All systems were reviewed and are otherwise negative      LAST 24-Hr EVENTS:    VITALS:  Vital Signs Last 24 Hrs  T(C): 36.3 (18 May 2022 13:17), Max: 36.3 (18 May 2022 13:17)  T(F): 97.3 (18 May 2022 13:17), Max: 97.3 (18 May 2022 13:17)  HR: 97 (18 May 2022 13:17) (86 - 97)  BP: 136/70 (18 May 2022 13:17) (102/53 - 136/70)  BP(mean): --  RR: 16 (18 May 2022 13:17) (16 - 18)  SpO2: --    TESTS & MEASUREMENTS:                        11.8   7.37  )-----------( 141      ( 18 May 2022 06:38 )             37.3   05-18    139  |  99  |  37<H>  ----------------------------<  142<H>  3.2<L>   |  28  |  1.3    Ca    8.5      18 May 2022 06:38  Mg     1.9     05-17    TPro  5.9<L>  /  Alb  3.0<L>  /  TBili  4.7<H>  /  DBili  x   /  AST  38  /  ALT  26  /  AlkPhos  66  05-18        RADIOLOGY, ECG, & ADDITIONAL TESTS:  12 Lead ECG:   Ventricular Rate 87 BPM    Atrial Rate 87 BPM    P-R Interval 142 ms    QRS Duration 82 ms    Q-T Interval 380 ms    QTC Calculation(Bazett) 457 ms    P Axis 17 degrees    R Axis -3 degrees    T Axis 47 degrees    Diagnosis Line Sinus rhythm with occasional Premature ventricular complexes and Premature  atrial complexes  Otherwise normal ECG    Confirmed by Honorio Ortega ([id number]) on 5/17/2022 8:06:35 AM (05-16-22 @ 15:56)      RECENT DIAGNOSTIC ORDERS:  Magnesium, Serum: 11:00 (05-17-22 @ 10:47)  Hepatic Function Panel: 11:00 (05-17-22 @ 10:46)  Basic Metabolic Panel: 11:00 (05-17-22 @ 10:46)  Complete Blood Count + Automated Diff: 11:00 (05-17-22 @ 10:46)  VA Duplex Lower Ext Vein Scan, Bilat: Routine   Indication: Swelling + Leg Edema  Transport: Wheelchair  Exam Completed  Provider's Contact #: [phone] (05-16-22 @ 18:47)  Diet, DASH/TLC:   Sodium & Cholesterol Restricted (05-16-22 @ 18:12)      MEDICATIONS:  MEDICATIONS  (STANDING):  ascorbic acid 500 milliGRAM(s) Oral daily  calcium carbonate 1250 mG  + Vitamin D (OsCal 500 + D) 1 Tablet(s) Oral two times a day  furosemide   Injectable 40 milliGRAM(s) IV Push every 12 hours  magnesium oxide 400 milliGRAM(s) Oral daily  potassium chloride    Tablet ER 40 milliEquivalent(s) Oral once    MEDICATIONS  (PRN):  guaifenesin/dextromethorphan Oral Liquid 10 milliLiter(s) Oral every 6 hours PRN Cough        PHYSICAL EXAM:  General awake, alert NAD, jaundiced sclera,   Lungs: CTAb/l, no w/r/r LNC Normal resp effort ,   Heart: regular rate and rhythm, + murmur  Abdomen: soft, non tender distended,   Ext 2+ b/l LE edema with chronic skin changes and R leg dressing  Neuro: AAOX3, no focal deficit

## 2022-05-18 NOTE — CHART NOTE - NSCHARTNOTEFT_GEN_A_CORE
Patient has been previously seen by gastroenterology. In view of questionable varices and cirrhosis, can consider outpatient fibroscan and EGD after seeing hepatology clinic. Please f/u as outpatient. Can c/w diuresis as per primary team with lasix. No inpatient management.  Patient on droplet infection, R/O COVID and RVP.

## 2022-05-19 LAB
ALBUMIN SERPL ELPH-MCNC: 2.4 G/DL — LOW (ref 3.5–5.2)
ALP SERPL-CCNC: 59 U/L — SIGNIFICANT CHANGE UP (ref 30–115)
ALT FLD-CCNC: 23 U/L — SIGNIFICANT CHANGE UP (ref 0–41)
ANION GAP SERPL CALC-SCNC: 10 MMOL/L — SIGNIFICANT CHANGE UP (ref 7–14)
AST SERPL-CCNC: 33 U/L — SIGNIFICANT CHANGE UP (ref 0–41)
BILIRUB SERPL-MCNC: 3.7 MG/DL — HIGH (ref 0.2–1.2)
BUN SERPL-MCNC: 37 MG/DL — HIGH (ref 10–20)
CALCIUM SERPL-MCNC: 8 MG/DL — LOW (ref 8.5–10.1)
CHLORIDE SERPL-SCNC: 100 MMOL/L — SIGNIFICANT CHANGE UP (ref 98–110)
CO2 SERPL-SCNC: 28 MMOL/L — SIGNIFICANT CHANGE UP (ref 17–32)
CREAT SERPL-MCNC: 1.2 MG/DL — SIGNIFICANT CHANGE UP (ref 0.7–1.5)
EGFR: 45 ML/MIN/1.73M2 — LOW
GLUCOSE SERPL-MCNC: 145 MG/DL — HIGH (ref 70–99)
HCT VFR BLD CALC: 32.3 % — LOW (ref 37–47)
HGB BLD-MCNC: 10.2 G/DL — LOW (ref 12–16)
MAGNESIUM SERPL-MCNC: 1.8 MG/DL — SIGNIFICANT CHANGE UP (ref 1.8–2.4)
MCHC RBC-ENTMCNC: 31.1 PG — HIGH (ref 27–31)
MCHC RBC-ENTMCNC: 31.6 G/DL — LOW (ref 32–37)
MCV RBC AUTO: 98.5 FL — SIGNIFICANT CHANGE UP (ref 81–99)
NRBC # BLD: 0 /100 WBCS — SIGNIFICANT CHANGE UP (ref 0–0)
PLATELET # BLD AUTO: 95 K/UL — LOW (ref 130–400)
POTASSIUM SERPL-MCNC: 3.5 MMOL/L — SIGNIFICANT CHANGE UP (ref 3.5–5)
POTASSIUM SERPL-SCNC: 3.5 MMOL/L — SIGNIFICANT CHANGE UP (ref 3.5–5)
PROCALCITONIN SERPL-MCNC: 0.27 NG/ML — HIGH (ref 0.02–0.1)
PROT SERPL-MCNC: 5.1 G/DL — LOW (ref 6–8)
RBC # BLD: 3.28 M/UL — LOW (ref 4.2–5.4)
RBC # FLD: 17.3 % — HIGH (ref 11.5–14.5)
SODIUM SERPL-SCNC: 138 MMOL/L — SIGNIFICANT CHANGE UP (ref 135–146)
WBC # BLD: 4.49 K/UL — LOW (ref 4.8–10.8)
WBC # FLD AUTO: 4.49 K/UL — LOW (ref 4.8–10.8)

## 2022-05-19 PROCEDURE — 71045 X-RAY EXAM CHEST 1 VIEW: CPT | Mod: 26

## 2022-05-19 PROCEDURE — 99232 SBSQ HOSP IP/OBS MODERATE 35: CPT

## 2022-05-19 RX ORDER — POTASSIUM CHLORIDE 20 MEQ
40 PACKET (EA) ORAL ONCE
Refills: 0 | Status: COMPLETED | OUTPATIENT
Start: 2022-05-19 | End: 2022-05-19

## 2022-05-19 RX ORDER — MAGNESIUM SULFATE 500 MG/ML
1 VIAL (ML) INJECTION ONCE
Refills: 0 | Status: COMPLETED | OUTPATIENT
Start: 2022-05-19 | End: 2022-05-19

## 2022-05-19 RX ADMIN — Medication 100 GRAM(S): at 14:01

## 2022-05-19 RX ADMIN — Medication 500 MILLIGRAM(S): at 11:16

## 2022-05-19 RX ADMIN — Medication 1 TABLET(S): at 17:40

## 2022-05-19 RX ADMIN — Medication 40 MILLIEQUIVALENT(S): at 14:03

## 2022-05-19 RX ADMIN — Medication 10 MILLILITER(S): at 04:00

## 2022-05-19 RX ADMIN — Medication 40 MILLIGRAM(S): at 17:40

## 2022-05-19 RX ADMIN — Medication 10 MILLILITER(S): at 11:16

## 2022-05-19 RX ADMIN — MAGNESIUM OXIDE 400 MG ORAL TABLET 400 MILLIGRAM(S): 241.3 TABLET ORAL at 11:17

## 2022-05-19 RX ADMIN — Medication 1 TABLET(S): at 05:09

## 2022-05-19 RX ADMIN — Medication 10 MILLILITER(S): at 19:56

## 2022-05-19 RX ADMIN — Medication 40 MILLIGRAM(S): at 05:10

## 2022-05-19 NOTE — PROGRESS NOTE ADULT - SUBJECTIVE AND OBJECTIVE BOX
BRAULIO ESPARZA  81y, Female  Allergy: Cipro (Hives; Rash)  Levaquin (Hives; Rash)    Hospital Day: 2d    Patient seen and examined earlier today. she stated that she feels better but still having dry cough and some shortness of breath; denies F/C, CP, palpitations, N/V, abd pain. Reports of lower extremity swelling slightly improved.    ROS: All systems were reviewed and are otherwise negative      LAST 24-Hr EVENTS:    VITALS:  Vital Signs Last 24 Hrs  T(C): 36.9 (19 May 2022 04:20), Max: 36.9 (19 May 2022 04:20)  T(F): 98.4 (19 May 2022 04:20), Max: 98.4 (19 May 2022 04:20)  HR: 92 (19 May 2022 04:20) (92 - 100)  BP: 120/58 (19 May 2022 04:20) (115/65 - 136/70)  BP(mean): --  RR: 16 (19 May 2022 04:20) (16 - 16)  SpO2: 96% (19 May 2022 03:04) (96% - 96%)    TESTS & MEASUREMENTS:                        10.2   4.49  )-----------( 95       ( 19 May 2022 06:13 )             32.3   05-19    138  |  100  |  37<H>  ----------------------------<  145<H>  3.5   |  28  |  1.2    Ca    8.0<L>      19 May 2022 06:13  Mg     1.8     05-19    TPro  5.1<L>  /  Alb  2.4<L>  /  TBili  3.7<H>  /  DBili  x   /  AST  33  /  ALT  23  /  AlkPhos  59  05-19      RADIOLOGY, ECG, & ADDITIONAL TESTS:  12 Lead ECG:   Ventricular Rate 87 BPM    Atrial Rate 87 BPM    P-R Interval 142 ms    QRS Duration 82 ms    Q-T Interval 380 ms    QTC Calculation(Bazett) 457 ms    P Axis 17 degrees    R Axis -3 degrees    T Axis 47 degrees    Diagnosis Line Sinus rhythm with occasional Premature ventricular complexes and Premature  atrial complexes  Otherwise normal ECG    Confirmed by Honorio Ortega ([id number]) on 5/17/2022 8:06:35 AM (05-16-22 @ 15:56)      RECENT DIAGNOSTIC ORDERS:  Magnesium, Serum: 11:00 (05-17-22 @ 10:47)  Hepatic Function Panel: 11:00 (05-17-22 @ 10:46)  Basic Metabolic Panel: 11:00 (05-17-22 @ 10:46)  Complete Blood Count + Automated Diff: 11:00 (05-17-22 @ 10:46)  VA Duplex Lower Ext Vein Scan, Bilat: Routine   Indication: Swelling + Leg Edema  Transport: Wheelchair  Exam Completed  Provider's Contact #: [phone] (05-16-22 @ 18:47)  Diet, DASH/TLC:   Sodium & Cholesterol Restricted (05-16-22 @ 18:12)      MEDICATIONS:  MEDICATIONS  (STANDING):  ascorbic acid 500 milliGRAM(s) Oral daily  calcium carbonate 1250 mG  + Vitamin D (OsCal 500 + D) 1 Tablet(s) Oral two times a day  furosemide   Injectable 40 milliGRAM(s) IV Push every 12 hours  magnesium oxide 400 milliGRAM(s) Oral daily  magnesium sulfate  IVPB 1 Gram(s) IV Intermittent once  potassium chloride    Tablet ER 40 milliEquivalent(s) Oral once    MEDICATIONS  (PRN):  guaifenesin/dextromethorphan Oral Liquid 10 milliLiter(s) Oral every 6 hours PRN Cough        PHYSICAL EXAM:  General awake, alert NAD, jaundiced sclera,   Lungs: CTAb/l, no w/r/r LNC Normal resp effort ,   Heart: regular rate and rhythm, + murmur  Abdomen: soft, non tender distended,   Ext 2+ b/l LE edema with chronic skin changes and R leg dressing  Neuro: AAOX3, no focal deficit

## 2022-05-19 NOTE — PROGRESS NOTE ADULT - ASSESSMENT
82 yo female hx of Thrombocytopenia, fatty liver (possible portal hypertension) presenting with worsening lower extremity edema and hypoxia (found to be hypoxic to 87% on RA today), mild sob, abd distension, and weight gain about     # Acute hypoxic respiratory failure likely secondary to Fluid overload, likely related to portal hypertension, less likely CHF  # Mild grade I diastolic dysfunction- less likely acute decompensated CHF  # Mild elevation of troponins likely demand ischemia  # pulm htn// possible ?? mariia   - No pulmonary edema on exam or CXR  - ECHO 4/22/22: EF 72%, , grade one Diastolic disfunction  - LE duplex: no DVT  PLAN  - cardio eval appreciated  - pulm on board; patient follows with Dr Martinez on home oxygen   - outpatient sleep study  - incentive spirometer  - GI was consulted for portal HTN evaluation: In view of questionable varices and cirrhosis, can consider outpatient fibroscan and EGD after seeing hepatology clinic  - cont lasix 40mg IVP BID, will likely switch to PO in next 24-48 hours  - Strict I+O Q8H  - Daily Weights    # H/O thrombocytopenia. ? and gammopathy  - follow with Hematologist Dr Zapata  - completed Rx Prednisone for this Thrombocytopenia: count presently 108  - No DVT prophylaxis now due Hx Thrombocytopenia ( The patient refused dvt ppx as well stated that she was told never to take blood thinner) .    # Chronic R lower leg wound   - wound care RN evaluation    DVT ppx , patient refused to get blood thinner given her hx as mentioned above SCD if venous duplex negative     Dispo: likely dc in 24-48 hours

## 2022-05-20 ENCOUNTER — TRANSCRIPTION ENCOUNTER (OUTPATIENT)
Age: 82
End: 2022-05-20

## 2022-05-20 VITALS
HEART RATE: 97 BPM | TEMPERATURE: 97 F | RESPIRATION RATE: 16 BRPM | DIASTOLIC BLOOD PRESSURE: 56 MMHG | SYSTOLIC BLOOD PRESSURE: 116 MMHG

## 2022-05-20 LAB
ALBUMIN SERPL ELPH-MCNC: 2.5 G/DL — LOW (ref 3.5–5.2)
ALP SERPL-CCNC: 57 U/L — SIGNIFICANT CHANGE UP (ref 30–115)
ALT FLD-CCNC: 24 U/L — SIGNIFICANT CHANGE UP (ref 0–41)
ANION GAP SERPL CALC-SCNC: 11 MMOL/L — SIGNIFICANT CHANGE UP (ref 7–14)
AST SERPL-CCNC: 32 U/L — SIGNIFICANT CHANGE UP (ref 0–41)
BILIRUB SERPL-MCNC: 3.4 MG/DL — HIGH (ref 0.2–1.2)
BUN SERPL-MCNC: 35 MG/DL — HIGH (ref 10–20)
CALCIUM SERPL-MCNC: 8.6 MG/DL — SIGNIFICANT CHANGE UP (ref 8.5–10.1)
CHLORIDE SERPL-SCNC: 101 MMOL/L — SIGNIFICANT CHANGE UP (ref 98–110)
CO2 SERPL-SCNC: 27 MMOL/L — SIGNIFICANT CHANGE UP (ref 17–32)
CREAT SERPL-MCNC: 1.4 MG/DL — SIGNIFICANT CHANGE UP (ref 0.7–1.5)
EGFR: 38 ML/MIN/1.73M2 — LOW
GLUCOSE SERPL-MCNC: 121 MG/DL — HIGH (ref 70–99)
HCT VFR BLD CALC: 32.9 % — LOW (ref 37–47)
HGB BLD-MCNC: 10.1 G/DL — LOW (ref 12–16)
MAGNESIUM SERPL-MCNC: 2 MG/DL — SIGNIFICANT CHANGE UP (ref 1.8–2.4)
MCHC RBC-ENTMCNC: 30 PG — SIGNIFICANT CHANGE UP (ref 27–31)
MCHC RBC-ENTMCNC: 30.7 G/DL — LOW (ref 32–37)
MCV RBC AUTO: 97.6 FL — SIGNIFICANT CHANGE UP (ref 81–99)
NRBC # BLD: 0 /100 WBCS — SIGNIFICANT CHANGE UP (ref 0–0)
PLATELET # BLD AUTO: 109 K/UL — LOW (ref 130–400)
POTASSIUM SERPL-MCNC: 4 MMOL/L — SIGNIFICANT CHANGE UP (ref 3.5–5)
POTASSIUM SERPL-SCNC: 4 MMOL/L — SIGNIFICANT CHANGE UP (ref 3.5–5)
PROT SERPL-MCNC: 5.4 G/DL — LOW (ref 6–8)
RBC # BLD: 3.37 M/UL — LOW (ref 4.2–5.4)
RBC # FLD: 17.2 % — HIGH (ref 11.5–14.5)
SODIUM SERPL-SCNC: 139 MMOL/L — SIGNIFICANT CHANGE UP (ref 135–146)
WBC # BLD: 4.84 K/UL — SIGNIFICANT CHANGE UP (ref 4.8–10.8)
WBC # FLD AUTO: 4.84 K/UL — SIGNIFICANT CHANGE UP (ref 4.8–10.8)

## 2022-05-20 PROCEDURE — 99239 HOSP IP/OBS DSCHRG MGMT >30: CPT

## 2022-05-20 RX ORDER — FUROSEMIDE 40 MG
1 TABLET ORAL
Qty: 60 | Refills: 0
Start: 2022-05-20 | End: 2022-06-18

## 2022-05-20 RX ORDER — GUAIFENESIN/DEXTROMETHORPHAN 600MG-30MG
10 TABLET, EXTENDED RELEASE 12 HR ORAL
Qty: 280 | Refills: 0
Start: 2022-05-20 | End: 2022-05-26

## 2022-05-20 RX ADMIN — Medication 500 MILLIGRAM(S): at 12:16

## 2022-05-20 RX ADMIN — Medication 40 MILLIGRAM(S): at 12:16

## 2022-05-20 RX ADMIN — Medication 1 TABLET(S): at 06:18

## 2022-05-20 RX ADMIN — MAGNESIUM OXIDE 400 MG ORAL TABLET 400 MILLIGRAM(S): 241.3 TABLET ORAL at 12:16

## 2022-05-20 RX ADMIN — Medication 10 MILLILITER(S): at 14:38

## 2022-05-20 NOTE — DISCHARGE NOTE PROVIDER - NSDCMRMEDTOKEN_GEN_ALL_CORE_FT
Acidophilus oral capsule: 1 cap(s) orally once a day  bacitracin 500 units/g topical ointment: 1 application topically every 8 hours  Calcarb with D 600 mg-400 intl units oral tablet: 1 tab(s) orally 2 times a day  CoQ10 300 mg oral capsule: 1 cap(s) orally once a day  Cranberry oral tablet: orally once a day  Fish Oil 1000 mg oral capsule: 1 cap(s) orally 3 times a day  guaifenesin-dextromethorphan 100 mg-10 mg/5 mL oral liquid: 10 milliliter(s) orally every 6 hours, As needed, Cough  Lasix 40 mg oral tablet: 1 tab(s) orally 2 times a day   magnesium carbonate 250 mg oral capsule: orally once a day  Multiple Vitamins oral tablet: 1 tab(s) orally once a day  Vitamin B12 100 mcg oral tablet: 1 tab(s) orally once a day  Vitamin C 500 mg oral tablet: 1 tab(s) orally once a day

## 2022-05-20 NOTE — PHYSICAL THERAPY INITIAL EVALUATION ADULT - ADDITIONAL COMMENTS
pt is 82 y/o old  female , lives with   in [facility] apartment   , information obtain from the pt her self  , has no  steps to enter with elevator to 4 th floor apartment  , has 13 steps to go down to bedromm and shower   , pt was independent  with bed mobility , transfer , ambulation , and basic ADLS using AD . Need Assist for nego stairs

## 2022-05-20 NOTE — DISCHARGE NOTE NURSING/CASE MANAGEMENT/SOCIAL WORK - PATIENT PORTAL LINK FT
You can access the FollowMyHealth Patient Portal offered by [facility] by registering at the following website: [email or web address]. By joining [employer]’s FollowMyHealth portal, you will also be able to view your health information using other applications (apps) compatible with our system.

## 2022-05-20 NOTE — DISCHARGE NOTE PROVIDER - NSDCFUADDINST_GEN_ALL_CORE_FT
Things to follow up:  -PCP follow up in 1-2 weeks  -Take lasix 40mg twice a day  -Follow up with your PCP in 1-2 weeks to monitor renal function and electrolytes while on lasix  -Follow up with Dr. Erickson within 2-4 weeks  -Follow up with Dr. Martinez within 1-2 weeks for an outpatient sleep study test  -Follow up with your hepatologist within 2-4 weeks as outpatient for possible fibroscan and EGD

## 2022-05-20 NOTE — DISCHARGE NOTE PROVIDER - HOSPITAL COURSE
80 yo female hx of Thrombocytopenia, fatty liver (possible portal hypertension) presenting with worsening lower extremity edema and hypoxia (found to be hypoxic to 87% on RA today), mild sob, abd distension, and weight gain. Patient was admitted for acute hypoxic respiratory failure likely secondary to Fluid overload, likely related to portal hypertension, less likely CHF. Her ECHO shwoed Mild grade I diastolic dysfunction- less likely acute decompensated CHF. She had Mild elevation of troponins likely demand ischemia. Her LE duplex showed no DVT; her CXR showed on pulmonary edema. She was evaluated by cardiology and pulmonology. She was treated with IV lasix with improvement in her lower extremity edema. She was also evaluated by gastroenterology for portal HTN and In view of questionable varices and cirrhosis,  outpatient fibroscan and EGD after seeing hepatology clinic was recommended. At this time she is medically stable for a hospital discharge.    Things to follow up:  -PCP follow up in 1-2 weeks  -Take lasix 40mg twice a day  -Follow up with your PCP in 1-2 weeks to monitor renal function and electrolytes while on lasix  -Follow up with Dr. Erickson within 2-4 weeks  -Follow up with Dr. Martinez within 1-2 weeks for an outpatient sleep study test  -Follow up with your hepatologist within 2-4 weeks as outpatient for possible fibroscan and EGD

## 2022-05-20 NOTE — DISCHARGE NOTE PROVIDER - PROVIDER TOKENS
PROVIDER:[TOKEN:[[id number]],FOLLOWUP:[2 weeks]],PROVIDER:[TOKEN:[[id number]],FOLLOWUP:[1 week]],PROVIDER:[TOKEN:[[id number]],FOLLOWUP:[1 week]],FREE:[LAST:[Hepatologist/ gastroenterologist],PHONE:[(   )    -],FAX:[(   )    -],FOLLOWUP:[2 weeks]]

## 2022-05-20 NOTE — PROGRESS NOTE ADULT - SUBJECTIVE AND OBJECTIVE BOX
BRAULIO ESPARZA  81y, Female  Allergy: Cipro (Hives; Rash)  Levaquin (Hives; Rash)    Hospital Day: 4d    Patient seen and examined earlier today. she stated that she feels better but still having dry cough and some shortness of breath; denies F/C, CP, palpitations, N/V, abd pain. Reports of lower extremity swelling slightly improved. Discharge planning for today was discussed with her.    ROS: All systems were reviewed and are otherwise negative      LAST 24-Hr EVENTS:    VITALS:  Vital Signs Last 24 Hrs  T(C): 36 (20 May 2022 12:14), Max: 36.1 (20 May 2022 04:20)  T(F): 96.8 (20 May 2022 12:14), Max: 97 (20 May 2022 04:20)  HR: 97 (20 May 2022 12:14) (89 - 97)  BP: 116/56 (20 May 2022 12:14) (104/51 - 125/60)  BP(mean): --  RR: 16 (20 May 2022 12:14) (16 - 18)  SpO2: 93% (19 May 2022 19:58) (93% - 93%)    TESTS & MEASUREMENTS:                        10.1   4.84  )-----------( 109      ( 20 May 2022 06:16 )             32.9   05-20    139  |  101  |  35<H>  ----------------------------<  121<H>  4.0   |  27  |  1.4    Ca    8.6      20 May 2022 06:16  Mg     2.0     05-20    TPro  5.4<L>  /  Alb  2.5<L>  /  TBili  3.4<H>  /  DBili  x   /  AST  32  /  ALT  24  /  AlkPhos  57  05-20        RADIOLOGY, ECG, & ADDITIONAL TESTS:  12 Lead ECG:   Ventricular Rate 87 BPM    Atrial Rate 87 BPM    P-R Interval 142 ms    QRS Duration 82 ms    Q-T Interval 380 ms    QTC Calculation(Bazett) 457 ms    P Axis 17 degrees    R Axis -3 degrees    T Axis 47 degrees    Diagnosis Line Sinus rhythm with occasional Premature ventricular complexes and Premature  atrial complexes  Otherwise normal ECG    Confirmed by Honorio Ortega ([id number]) on 5/17/2022 8:06:35 AM (05-16-22 @ 15:56)      RECENT DIAGNOSTIC ORDERS:  Magnesium, Serum: 11:00 (05-17-22 @ 10:47)  Hepatic Function Panel: 11:00 (05-17-22 @ 10:46)  Basic Metabolic Panel: 11:00 (05-17-22 @ 10:46)  Complete Blood Count + Automated Diff: 11:00 (05-17-22 @ 10:46)  VA Duplex Lower Ext Vein Scan, Bilat: Routine   Indication: Swelling + Leg Edema  Transport: Wheelchair  Exam Completed  Provider's Contact #: [phone] (05-16-22 @ 18:47)  Diet, DASH/TLC:   Sodium & Cholesterol Restricted (05-16-22 @ 18:12)      MEDICATIONS:  MEDICATIONS  (STANDING):  ascorbic acid 500 milliGRAM(s) Oral daily  calcium carbonate 1250 mG  + Vitamin D (OsCal 500 + D) 1 Tablet(s) Oral two times a day  furosemide   Injectable 40 milliGRAM(s) IV Push every 12 hours  magnesium oxide 400 milliGRAM(s) Oral daily    MEDICATIONS  (PRN):  guaifenesin/dextromethorphan Oral Liquid 10 milliLiter(s) Oral every 6 hours PRN Cough      PHYSICAL EXAM:  General awake, alert NAD, jaundiced sclera,   HEENT: AT, NC, EOMI, MMM  Lungs: CTAb/l, no w/r/r LNC Normal resp effort ,   Heart: regular rate and rhythm, + murmur  Abdomen: soft, non tender distended,   Ext 1+ b/l LE edema with chronic skin changes and R leg dressing  Neuro: AAOX3, no focal deficit

## 2022-05-20 NOTE — PHYSICAL THERAPY INITIAL EVALUATION ADULT - PERTINENT HX OF CURRENT PROBLEM, REHAB EVAL
Pt is 80 y/o female w/pmhx of thrombocytopenia, fatty liver , present with worsening lower extremity edema and hypoxia , SOB and abd. distension

## 2022-05-20 NOTE — DISCHARGE NOTE PROVIDER - CARE PROVIDERS DIRECT ADDRESSES
[email or web address],DirectAddress_Unknown

## 2022-05-20 NOTE — PHYSICAL THERAPY INITIAL EVALUATION ADULT - GENERAL OBSERVATIONS, REHAB EVAL
10:45 -11:30 Chart reviewed. Order received.  Patient available to be seen for Pt, confirmed with RN. pt encountered  Sitting at recliner , denies pain, and agrees to participate in session, +HEplock ,+02 2-3 LPM via NC  ,NAD.

## 2022-05-20 NOTE — DISCHARGE NOTE PROVIDER - NSDCCPCAREPLAN_GEN_ALL_CORE_FT
PRINCIPAL DISCHARGE DIAGNOSIS  Diagnosis: Fluid overload  Assessment and Plan of Treatment: Patient was admitted for acute hypoxic respiratory failure likely secondary to Fluid overload, likely related to portal hypertension, less likely CHF. Her ECHO shwoed Mild grade I diastolic dysfunction- less likely acute decompensated CHF. She had Mild elevation of troponins likely demand ischemia. Her LE duplex showed no DVT; her CXR showed on pulmonary edema. She was evaluated by cardiology and pulmonology. She was treated with IV lasix with improvement in her lower extremity edema. She was also evaluated by gastroenterology for portal HTN and In view of questionable varices and cirrhosis,  outpatient fibroscan and EGD after seeing hepatology clinic was recommended. At this time she is medically stable for a hospital discharge.  Things to follow up:  -PCP follow up in 1-2 weeks  -Take lasix 40mg twice a day  -Follow up with your PCP in 1-2 weeks to monitor renal function and electrolytes while on lasix  -Follow up with Dr. Erickson within 2-4 weeks  -Follow up with Dr. Martinez within 1-2 weeks for an outpatient sleep study test  -Follow up with your hepatologist within 2-4 weeks as outpatient for possible fibroscan and EGD

## 2022-05-20 NOTE — DISCHARGE NOTE PROVIDER - CARE PROVIDER_API CALL
Franky Erickson)  Internal Medicine; Medical Oncology  [address] [address]  Phone: [phone]  Fax: [phone]  Follow Up Time: 2 weeks    Diego Martinez)  Critical Care Medicine; Pulmonary Disease; Sleep Medicine  [address] [place] [address]  Phone: [phone]  Fax: [phone]  Follow Up Time: 1 week    Jean Rivas)  Internal Medicine  [address] [place] [address]  Phone: [phone]  Fax: [phone]  Follow Up Time: 1 week    Hepatologist/ gastroenterologist,   Phone: (   )    -  Fax: (   )    -  Follow Up Time: 2 weeks

## 2022-05-20 NOTE — PROGRESS NOTE ADULT - ASSESSMENT
80 yo female hx of Thrombocytopenia, fatty liver (possible portal hypertension) presenting with worsening lower extremity edema and hypoxia (found to be hypoxic to 87% on RA today), mild sob, abd distension, and weight gain about     # Acute hypoxic respiratory failure likely secondary to Fluid overload, likely related to portal hypertension, less likely CHF  # Mild grade I diastolic dysfunction- less likely acute decompensated CHF  # Mild elevation of troponins likely demand ischemia  # pulm htn// possible ?? mariia   - No pulmonary edema on exam or CXR  - ECHO 4/22/22: EF 72%, , grade one Diastolic disfunction  - LE duplex: no DVT  PLAN  - cardio eval appreciated  - pulm on board; patient follows with Dr Martinez on home oxygen   - outpatient sleep study  - incentive spirometer  - GI was consulted for portal HTN evaluation: In view of questionable varices and cirrhosis, can consider outpatient fibroscan and EGD after seeing hepatology clinic  - will switch to lasix 60mg PO daily upon dc  - Strict I+O Q8H  - Daily Weights    # H/O thrombocytopenia. ? and gammopathy  - follow with Hematologist Dr Erickson  - completed Rx Prednisone for this Thrombocytopenia: count presently 108  - No DVT prophylaxis now due Hx Thrombocytopenia ( The patient refused dvt ppx as well stated that she was told never to take blood thinner) .    # Chronic R lower leg wound   - wound care RN evaluation    DVT ppx , patient refused to get blood thinner given her hx as mentioned above SCD if venous duplex negative     Dispo: likely dc home today 80 yo female hx of Thrombocytopenia, fatty liver (possible portal hypertension) presenting with worsening lower extremity edema and hypoxia (found to be hypoxic to 87% on RA today), mild sob, abd distension, and weight gain about     # Acute hypoxic respiratory failure likely secondary to Fluid overload, likely related to portal hypertension, less likely CHF  # Mild grade I diastolic dysfunction- less likely acute decompensated CHF  # Mild elevation of troponins likely demand ischemia  # pulm htn// possible ?? mariia   - No pulmonary edema on exam or CXR  - ECHO 4/22/22: EF 72%, , grade one Diastolic disfunction  - LE duplex: no DVT  PLAN  - cardio eval appreciated  - pulm on board; patient follows with Dr Martinez on home oxygen   - outpatient sleep study  - incentive spirometer  - GI was consulted for portal HTN evaluation: In view of questionable varices and cirrhosis, can consider outpatient fibroscan and EGD after seeing hepatology clinic  - will switch to lasix 40mg PO BID upon dc and outpatient f/u with PCP to monitor renal function  - Strict I+O Q8H  - Daily Weights    # H/O thrombocytopenia. ? and gammopathy  - follow with Hematologist Dr Erickson  - completed Rx Prednisone for this Thrombocytopenia: count presently 108  - No DVT prophylaxis now due Hx Thrombocytopenia ( The patient refused dvt ppx as well stated that she was told never to take blood thinner) .    # Chronic R lower leg wound   - wound care RN evaluation    DVT ppx , patient refused to get blood thinner given her hx as mentioned above SCD if venous duplex negative     Dispo: likely dc home today

## 2022-05-20 NOTE — DISCHARGE NOTE PROVIDER - NSDCFUSCHEDAPPT_GEN_ALL_CORE_FT
Diego Martinez  [facility]  PULMED [address]  Scheduled Appointment: 06/14/2022    Franky Erickson  [facility]  HEMON[facility] [address]  Scheduled Appointment: 07/14/2022

## 2022-05-20 NOTE — DISCHARGE NOTE NURSING/CASE MANAGEMENT/SOCIAL WORK - NSDCPEFALRISK_GEN_ALL_CORE
For information on Fall & Injury Prevention, visit: [email or web address] OR  [email or web address] OR  https://www.cdc.gov/steadi/patient.html

## 2022-05-21 ENCOUNTER — EMERGENCY (EMERGENCY)
Facility: HOSPITAL | Age: 82
LOS: 1 days | Discharge: HOME | End: 2022-05-23
Attending: EMERGENCY MEDICINE | Admitting: EMERGENCY MEDICINE
Payer: MEDICARE

## 2022-05-21 VITALS
OXYGEN SATURATION: 83 % | DIASTOLIC BLOOD PRESSURE: 62 MMHG | HEART RATE: 102 BPM | SYSTOLIC BLOOD PRESSURE: 129 MMHG | WEIGHT: 154.98 LBS | TEMPERATURE: 100 F | RESPIRATION RATE: 26 BRPM | HEIGHT: 61 IN

## 2022-05-21 DIAGNOSIS — Z80.3 FAMILY HISTORY OF MALIGNANT NEOPLASM OF BREAST: ICD-10-CM

## 2022-05-21 DIAGNOSIS — Z99.81 DEPENDENCE ON SUPPLEMENTAL OXYGEN: ICD-10-CM

## 2022-05-21 DIAGNOSIS — J96.21 ACUTE AND CHRONIC RESPIRATORY FAILURE WITH HYPOXIA: ICD-10-CM

## 2022-05-21 DIAGNOSIS — Z98.890 OTHER SPECIFIED POSTPROCEDURAL STATES: Chronic | ICD-10-CM

## 2022-05-21 DIAGNOSIS — Z90.49 ACQUIRED ABSENCE OF OTHER SPECIFIED PARTS OF DIGESTIVE TRACT: Chronic | ICD-10-CM

## 2022-05-21 DIAGNOSIS — Z20.822 CONTACT WITH AND (SUSPECTED) EXPOSURE TO COVID-19: ICD-10-CM

## 2022-05-21 DIAGNOSIS — R50.9 FEVER, UNSPECIFIED: ICD-10-CM

## 2022-05-21 DIAGNOSIS — K76.0 FATTY (CHANGE OF) LIVER, NOT ELSEWHERE CLASSIFIED: ICD-10-CM

## 2022-05-21 DIAGNOSIS — M19.90 UNSPECIFIED OSTEOARTHRITIS, UNSPECIFIED SITE: ICD-10-CM

## 2022-05-21 DIAGNOSIS — Z88.1 ALLERGY STATUS TO OTHER ANTIBIOTIC AGENTS STATUS: ICD-10-CM

## 2022-05-21 DIAGNOSIS — Z90.49 ACQUIRED ABSENCE OF OTHER SPECIFIED PARTS OF DIGESTIVE TRACT: ICD-10-CM

## 2022-05-21 DIAGNOSIS — I50.30 UNSPECIFIED DIASTOLIC (CONGESTIVE) HEART FAILURE: ICD-10-CM

## 2022-05-21 DIAGNOSIS — Z82.0 FAMILY HISTORY OF EPILEPSY AND OTHER DISEASES OF THE NERVOUS SYSTEM: ICD-10-CM

## 2022-05-21 DIAGNOSIS — K21.9 GASTRO-ESOPHAGEAL REFLUX DISEASE WITHOUT ESOPHAGITIS: ICD-10-CM

## 2022-05-21 DIAGNOSIS — D69.6 THROMBOCYTOPENIA, UNSPECIFIED: ICD-10-CM

## 2022-05-21 DIAGNOSIS — Z96.653 PRESENCE OF ARTIFICIAL KNEE JOINT, BILATERAL: ICD-10-CM

## 2022-05-21 DIAGNOSIS — R77.8 OTHER SPECIFIED ABNORMALITIES OF PLASMA PROTEINS: ICD-10-CM

## 2022-05-21 DIAGNOSIS — Z87.891 PERSONAL HISTORY OF NICOTINE DEPENDENCE: ICD-10-CM

## 2022-05-21 DIAGNOSIS — Z85.3 PERSONAL HISTORY OF MALIGNANT NEOPLASM OF BREAST: ICD-10-CM

## 2022-05-21 DIAGNOSIS — L98.8 OTHER SPECIFIED DISORDERS OF THE SKIN AND SUBCUTANEOUS TISSUE: ICD-10-CM

## 2022-05-21 DIAGNOSIS — R06.02 SHORTNESS OF BREATH: ICD-10-CM

## 2022-05-21 DIAGNOSIS — I27.20 PULMONARY HYPERTENSION, UNSPECIFIED: ICD-10-CM

## 2022-05-21 DIAGNOSIS — I11.0 HYPERTENSIVE HEART DISEASE WITH HEART FAILURE: ICD-10-CM

## 2022-05-21 PROBLEM — S80.821A BLISTER (NONTHERMAL), RIGHT LOWER LEG, INITIAL ENCOUNTER: Chronic | Status: ACTIVE | Noted: 2022-05-16

## 2022-05-21 PROBLEM — Z86.2 PERSONAL HISTORY OF DISEASES OF THE BLOOD AND BLOOD-FORMING ORGANS AND CERTAIN DISORDERS INVOLVING THE IMMUNE MECHANISM: Chronic | Status: ACTIVE | Noted: 2022-05-16

## 2022-05-21 LAB
ALBUMIN SERPL ELPH-MCNC: 2.4 G/DL — LOW (ref 3.5–5.2)
ALP SERPL-CCNC: 71 U/L — SIGNIFICANT CHANGE UP (ref 30–115)
ALT FLD-CCNC: 25 U/L — SIGNIFICANT CHANGE UP (ref 0–41)
ANION GAP SERPL CALC-SCNC: 11 MMOL/L — SIGNIFICANT CHANGE UP (ref 7–14)
APPEARANCE UR: ABNORMAL
AST SERPL-CCNC: 44 U/L — HIGH (ref 0–41)
BACTERIA # UR AUTO: ABNORMAL
BASE EXCESS BLDV CALC-SCNC: 9 MMOL/L — HIGH (ref -2–3)
BASOPHILS # BLD AUTO: 0.04 K/UL — SIGNIFICANT CHANGE UP (ref 0–0.2)
BASOPHILS NFR BLD AUTO: 0.6 % — SIGNIFICANT CHANGE UP (ref 0–1)
BILIRUB SERPL-MCNC: 2.7 MG/DL — HIGH (ref 0.2–1.2)
BILIRUB UR-MCNC: NEGATIVE — SIGNIFICANT CHANGE UP
BUN SERPL-MCNC: 39 MG/DL — HIGH (ref 10–20)
CA-I SERPL-SCNC: 1.17 MMOL/L — SIGNIFICANT CHANGE UP (ref 1.15–1.33)
CALCIUM SERPL-MCNC: 8.2 MG/DL — LOW (ref 8.5–10.1)
CHLORIDE SERPL-SCNC: 98 MMOL/L — SIGNIFICANT CHANGE UP (ref 98–110)
CO2 SERPL-SCNC: 24 MMOL/L — SIGNIFICANT CHANGE UP (ref 17–32)
COLOR SPEC: YELLOW — SIGNIFICANT CHANGE UP
CREAT SERPL-MCNC: 1.4 MG/DL — SIGNIFICANT CHANGE UP (ref 0.7–1.5)
DIFF PNL FLD: ABNORMAL
EGFR: 38 ML/MIN/1.73M2 — LOW
EOSINOPHIL # BLD AUTO: 0.34 K/UL — SIGNIFICANT CHANGE UP (ref 0–0.7)
EOSINOPHIL NFR BLD AUTO: 5.5 % — SIGNIFICANT CHANGE UP (ref 0–8)
EPI CELLS # UR: 8 /HPF — HIGH (ref 0–5)
GAS PNL BLDV: 134 MMOL/L — LOW (ref 136–145)
GAS PNL BLDV: SIGNIFICANT CHANGE UP
GAS PNL BLDV: SIGNIFICANT CHANGE UP
GLUCOSE SERPL-MCNC: 158 MG/DL — HIGH (ref 70–99)
GLUCOSE UR QL: NEGATIVE — SIGNIFICANT CHANGE UP
HCO3 BLDV-SCNC: 34 MMOL/L — HIGH (ref 22–29)
HCT VFR BLD CALC: 35.6 % — LOW (ref 37–47)
HCT VFR BLDA CALC: 40 % — SIGNIFICANT CHANGE UP (ref 39–51)
HGB BLD CALC-MCNC: 13.4 G/DL — SIGNIFICANT CHANGE UP (ref 12.6–17.4)
HGB BLD-MCNC: 11.3 G/DL — LOW (ref 12–16)
HYALINE CASTS # UR AUTO: 64 /LPF — HIGH (ref 0–7)
IMM GRANULOCYTES NFR BLD AUTO: 0.6 % — HIGH (ref 0.1–0.3)
KETONES UR-MCNC: SIGNIFICANT CHANGE UP
LACTATE BLDV-MCNC: 2.1 MMOL/L — HIGH (ref 0.5–2)
LACTATE SERPL-SCNC: 2.1 MMOL/L — HIGH (ref 0.7–2)
LEUKOCYTE ESTERASE UR-ACNC: ABNORMAL
LYMPHOCYTES # BLD AUTO: 1 K/UL — LOW (ref 1.2–3.4)
LYMPHOCYTES # BLD AUTO: 16.2 % — LOW (ref 20.5–51.1)
MCHC RBC-ENTMCNC: 31.2 PG — HIGH (ref 27–31)
MCHC RBC-ENTMCNC: 31.7 G/DL — LOW (ref 32–37)
MCV RBC AUTO: 98.3 FL — SIGNIFICANT CHANGE UP (ref 81–99)
MONOCYTES # BLD AUTO: 0.8 K/UL — HIGH (ref 0.1–0.6)
MONOCYTES NFR BLD AUTO: 12.9 % — HIGH (ref 1.7–9.3)
NEUTROPHILS # BLD AUTO: 3.96 K/UL — SIGNIFICANT CHANGE UP (ref 1.4–6.5)
NEUTROPHILS NFR BLD AUTO: 64.2 % — SIGNIFICANT CHANGE UP (ref 42.2–75.2)
NITRITE UR-MCNC: NEGATIVE — SIGNIFICANT CHANGE UP
NRBC # BLD: 0 /100 WBCS — SIGNIFICANT CHANGE UP (ref 0–0)
NT-PROBNP SERPL-SCNC: 1750 PG/ML — HIGH (ref 0–300)
PCO2 BLDV: 44 MMHG — HIGH (ref 39–42)
PH BLDV: 7.49 — HIGH (ref 7.32–7.43)
PH UR: 6 — SIGNIFICANT CHANGE UP (ref 5–8)
PLATELET # BLD AUTO: 135 K/UL — SIGNIFICANT CHANGE UP (ref 130–400)
PO2 BLDV: 31 MMHG — SIGNIFICANT CHANGE UP
POTASSIUM BLDV-SCNC: 4.4 MMOL/L — SIGNIFICANT CHANGE UP (ref 3.5–5.1)
POTASSIUM SERPL-MCNC: 4.4 MMOL/L — SIGNIFICANT CHANGE UP (ref 3.5–5)
POTASSIUM SERPL-SCNC: 4.4 MMOL/L — SIGNIFICANT CHANGE UP (ref 3.5–5)
PROT SERPL-MCNC: 5.3 G/DL — LOW (ref 6–8)
PROT UR-MCNC: ABNORMAL
RAPID RVP RESULT: SIGNIFICANT CHANGE UP
RBC # BLD: 3.62 M/UL — LOW (ref 4.2–5.4)
RBC # FLD: 17.5 % — HIGH (ref 11.5–14.5)
RBC CASTS # UR COMP ASSIST: SIGNIFICANT CHANGE UP /HPF (ref 0–4)
SAO2 % BLDV: 38.1 % — SIGNIFICANT CHANGE UP
SARS-COV-2 RNA SPEC QL NAA+PROBE: SIGNIFICANT CHANGE UP
SODIUM SERPL-SCNC: 133 MMOL/L — LOW (ref 135–146)
SP GR SPEC: 1.02 — SIGNIFICANT CHANGE UP (ref 1.01–1.03)
TROPONIN T SERPL-MCNC: 0.02 NG/ML — HIGH
UROBILINOGEN FLD QL: SIGNIFICANT CHANGE UP
WBC # BLD: 6.18 K/UL — SIGNIFICANT CHANGE UP (ref 4.8–10.8)
WBC # FLD AUTO: 6.18 K/UL — SIGNIFICANT CHANGE UP (ref 4.8–10.8)
WBC UR QL: SIGNIFICANT CHANGE UP /HPF (ref 0–5)

## 2022-05-21 PROCEDURE — 93010 ELECTROCARDIOGRAM REPORT: CPT

## 2022-05-21 PROCEDURE — 99220: CPT | Mod: FS

## 2022-05-21 PROCEDURE — 71045 X-RAY EXAM CHEST 1 VIEW: CPT | Mod: 26

## 2022-05-21 RX ORDER — ACETAMINOPHEN 500 MG
650 TABLET ORAL ONCE
Refills: 0 | Status: COMPLETED | OUTPATIENT
Start: 2022-05-21 | End: 2022-05-21

## 2022-05-21 RX ORDER — FUROSEMIDE 40 MG
40 TABLET ORAL ONCE
Refills: 0 | Status: COMPLETED | OUTPATIENT
Start: 2022-05-21 | End: 2022-05-21

## 2022-05-21 RX ADMIN — Medication 650 MILLIGRAM(S): at 17:56

## 2022-05-21 RX ADMIN — Medication 650 MILLIGRAM(S): at 18:30

## 2022-05-21 RX ADMIN — Medication 40 MILLIGRAM(S): at 17:56

## 2022-05-21 NOTE — ED PROVIDER NOTE - OBJECTIVE STATEMENT
82 yo f with pmh of thrombocytopenia, breast ca, portal htn?, fluid overload, sent by [facility] for hypoxia at home.  as per note from [facility] pt was 86% on RA and 91% on 4L.  pt was told her concentrator maxes out at 4L and sent to hospital because she needs more oxygen.  pt says has had oxygen concentrator since last year from dr. voss, but doesn't know why.  says she only started using it yesterday since she was dc home from being admitted.  pt admits feels sob.  no increase in leg swelling.  noted ?febrile in triage.  no n/v/d, no abd pain, no cp.  recent TTE as per EMR shows grade I diastolic dysfunction.  cardiologist dr. kuhn

## 2022-05-21 NOTE — ED ADULT NURSE NOTE - OBJECTIVE STATEMENT
Patient sent by visiting nurse for hypoxia at home with a saturation of 91% on 4LNC and 86% on RA.  Patient reports SOB.  BL LE edema +4 noted however patient denies any increase in swelling. Denies n/v/d, abd pain, CP, fever.

## 2022-05-21 NOTE — ED PROVIDER NOTE - CHILD ABUSE FACILITY
Anesthesia Evaluation     Patient summary reviewed and Nursing notes reviewed   no history of anesthetic complications:  NPO Solid Status: > 8 hours  NPO Liquid Status: > 8 hours           Airway   Mallampati: III  TM distance: >3 FB  Neck ROM: full  No difficulty expected  Dental      Pulmonary - negative pulmonary ROS and normal exam   Cardiovascular - normal exam    (+) hypertension,       Neuro/Psych- negative ROS  GI/Hepatic/Renal/Endo    (+) morbid obesity,      Musculoskeletal     Abdominal    Substance History      OB/GYN          Other      history of cancer                    Anesthesia Plan    ASA 3     general   (Rsi)  intravenous induction     Anesthetic plan, all risks, benefits, and alternatives have been provided, discussed and informed consent has been obtained with: patient.    Plan discussed with CRNA.      
[facility]

## 2022-05-21 NOTE — ED PROVIDER NOTE - PROGRESS NOTE DETAILS
pt goes down to 84% on RA, 94% on 3L, 97% on 4L discussed case with dr. elias, recommends obs, diuresis and will reassess in AM

## 2022-05-21 NOTE — ED PROVIDER NOTE - NS ED ROS FT
Review of Systems:  	•	CONSTITUTIONAL - no fever, no diaphoresis, no weight change  	•	SKIN - no rash  	•	HEMATOLOGIC - no bleeding, no bruising  	•	EYES - no eye pain, no blurred vision  	•	ENT - no change in hearing, no pain  	•	RESPIRATORY - + shortness of breath, no cough  	•	CARDIAC - no chest pain, no palpitations  	•	GI - no abd pain, no nausea, no vomiting, no diarrhea, no constipation, no bleeding  	•	 - no dysuria, no hematuria, no flank pain, no urinary retention  	•	MUSCULOSKELETAL - no joint paint, +leg swelling  	•	NEUROLOGIC - no focal weakness or numbness, no headache, no paresthesias, no dizziness, no facial droop, no slurred speech, no vision changes  All other systems negative, unless specified in HPI

## 2022-05-21 NOTE — ED ADULT TRIAGE NOTE - AS HEIGHT TYPE
No additional lab - ecg done on 6/7/2018 is good .  
No additional orders needed, please advise.  
Patient was made aware that appt on 10/24 was changed to a pre-op.  
Surgery Date: 10/29/18  Location: [facility]  Surgery Type: Colonoscopy under Mac   Surgeon: Dr Mayes    Patient has 3 month follow up scheduled on 10/24 with labs prior on 10/17 that can be changed to a pre op.  Per surgeon orders EKG needed in the last 6 months if older that 50.  Last one was 6/7//18.  Please advise if any pre op testing is needed, order, and route back to PSR for scheduling.        
stated

## 2022-05-21 NOTE — ED PROVIDER NOTE - PHYSICAL EXAMINATION
VITAL SIGNS: I have reviewed nursing notes and confirm.  CONSTITUTIONAL: Well-developed; well-nourished; in no acute distress.  SKIN: Skin exam is warm and dry, no acute rash.  HEAD: Normocephalic; atraumatic.  EYES: PERRL, EOM intact; conjunctiva and sclera clear.  ENT: No nasal discharge; airway clear. TMs clear.  NECK: Supple; non tender.  CARD: S1, S2 normal; no murmurs, gallops, or rubs. Regular rate and rhythm.  RESP: mild rales at bases  ABD: Normal bowel sounds; soft; non-distended; non-tender;  EXT: Normal ROM. No clubbing, cyanosis, +3 pitting edema b/l, venous stasis dermatitis  NEURO: aox3, cn2-12 grossly normal, 5/5 strength all ext, sensation intact, finger to nose normal, romberg neg, normal gait, no nystagmus  PSYCH: Cooperative, appropriate.

## 2022-05-21 NOTE — ED PROVIDER NOTE - CLINICAL SUMMARY MEDICAL DECISION MAKING FREE TEXT BOX
Pt with fluid overload, iv diuresis, also found low grade fever with neg rvp and ua.  pt to go to obs and be reassessed by medicine in AM

## 2022-05-22 DIAGNOSIS — I27.20 PULMONARY HYPERTENSION, UNSPECIFIED: ICD-10-CM

## 2022-05-22 DIAGNOSIS — R77.8 OTHER SPECIFIED ABNORMALITIES OF PLASMA PROTEINS: ICD-10-CM

## 2022-05-22 DIAGNOSIS — J96.21 ACUTE AND CHRONIC RESPIRATORY FAILURE WITH HYPOXIA: ICD-10-CM

## 2022-05-22 LAB
CULTURE RESULTS: SIGNIFICANT CHANGE UP
SPECIMEN SOURCE: SIGNIFICANT CHANGE UP

## 2022-05-22 PROCEDURE — 99226: CPT

## 2022-05-22 RX ORDER — FUROSEMIDE 40 MG
40 TABLET ORAL
Refills: 0 | Status: DISCONTINUED | OUTPATIENT
Start: 2022-05-22 | End: 2022-05-23

## 2022-05-22 RX ORDER — FUROSEMIDE 40 MG
40 TABLET ORAL ONCE
Refills: 0 | Status: COMPLETED | OUTPATIENT
Start: 2022-05-22 | End: 2022-05-22

## 2022-05-22 RX ADMIN — Medication 40 MILLIGRAM(S): at 15:10

## 2022-05-22 NOTE — ED CDU PROVIDER INITIAL DAY NOTE - OBJECTIVE STATEMENT
81y F pmh thrombocytopenia, breast ca, portal htn, CHF, sent by [facility] for hypoxia. Pt was recently admitted for CHF exacerbation tx'd with lasix. Today found to be hypoxic at home to 86% on RA, was placed on her max Home O2 of 4L with improvement to 91%. Sent to ED for increased oxygen demand. Now presents with continued sob. Pt had TTE on prior admission with grade I diastolic dysfunction.  Cardiologist Dr. Abrams

## 2022-05-22 NOTE — ED CDU PROVIDER INITIAL DAY NOTE - MEDICAL DECISION MAKING DETAILS
82 y/o F pmh Breast CA, thrombocytopenia, fatty liver, HTN, pulmonary hypertension, CHF, placed in OBS for SOB/ desat. Was discharged on 5/20, had been admitted for similar.    labs reviewed. Trop, bnp elevated- at baseline, COVID neg.  No acute ED events.     Pt placed in OBS for further O2 therapy, Medicine consult.

## 2022-05-22 NOTE — CONSULT NOTE ADULT - ASSESSMENT
81/F with Thrombocytopenia, fatty liver (possible portal hypertension), ?ABRAHAM pending sleep study, pulmonary hypertension WHO class II - on supplemental O2 during ambulation, recently admitted to hospital with respiratory failure due to fluid overload - treated with diuresis presenting with worsening PROCTOR and episode of hypoxia.   Patient currently in obs unit.     # Acute on chronic hypoxic respiratory failure - multifactorial from fluid overload (CHF and portal HTN), pulmonary hypertension and untreated ABRAHAM + deconditioning   - initially placed on 4L, now on 2L NC at rest saturating 92-96%  - no distress on exam   - s/p lasix 40 iv given in ed (patient recently sent home on lasix 40 bid at home)   - chest xray - Low lung volumes with unchanged small bibasilar opacities/atelectasis.  - can give additional dose of iv lasix and restart home dose at this time   - will hold off antibiotics at this time as no signs of infection   - patient will probably need aggressive pulmonary rehab on discharge as this is likely acute on chronic deconditioning   - pulmonary consult     # Elevated troponin   - at baseline compare to previous admission     # H/O thrombocytopenia. ? and gammopathy  - follow with Hematologist Dr Erickson  - at baseline     # Chronic R lower leg wound   - wound care RN evaluation    # DVT ppx   - patient has refused chemical proph in past multiple times, can start on heparin subq/lovenox if agreeable    81/F with Thrombocytopenia, fatty liver (possible portal hypertension), ?ABRAHAM pending sleep study, pulmonary hypertension WHO class II - on supplemental O2 during ambulation, recently admitted to hospital with respiratory failure due to fluid overload - treated with diuresis presenting with worsening PROCTOR and episode of hypoxia.   Patient currently in obs unit.     # Acute on chronic hypoxic respiratory failure - multifactorial from fluid overload (CHF and portal HTN), pulmonary hypertension and untreated ABRAHAM + deconditioning   - initially placed on 4L, now on 2L NC at rest saturating 92-96%  - no distress on exam   - s/p lasix 40 iv given in ed (patient recently sent home on lasix 40 bid at home)   - chest xray - Low lung volumes with unchanged small bibasilar opacities/atelectasis.  - can give additional dose of iv lasix and restart home dose at this time   - will hold off antibiotics at this time as no signs of infection   - patient will probably need aggressive pulmonary rehab on discharge as this is likely acute on chronic deconditioning   - pulmonary consult     # Elevated troponin   - at baseline compare to previous admission     # H/O thrombocytopenia. ? and gammopathy  - follow with Hematologist Dr Erickson  - at baseline     # Chronic R lower leg wound   - wound care RN evaluation    # DVT ppx   - patient has refused chemical proph in past multiple times, can start on heparin subq/lovenox if agreeable     Dispo: currently patient is on 2L NC - if remains stable on or below 2L can be discharge home with pulmonary and physical rehab

## 2022-05-22 NOTE — ED CDU PROVIDER INITIAL DAY NOTE - NSICDXPASTSURGICALHX_GEN_ALL_CORE_FT
PAST SURGICAL HISTORY:  History of cholecystectomy     History of surgery LEFT BREAST LUMPECTOMY  TUBIAL LIGATION  CHOLECYSTECTOMY  RIGHT & LEFT TKR    
No

## 2022-05-22 NOTE — ED CDU PROVIDER INITIAL DAY NOTE - PHYSICAL EXAMINATION
CONST: NAD  EYES: Sclera and conjunctiva clear.   ENT: No nasal discharge. Oropharynx normal appearing  NECK: Non-tender, no meningeal signs. normal ROM. supple   CARD: S1 S2; No jvd  RESP: Rales b/l. spO2 96% on 4L NC  GI: Soft, non-tender, non-distended. no cva tenderness. normal BS  MS: B/L LE edema. Normal ROM in all extremities. pulses equal  SKIN: Warm, dry, no acute rashes. Good turgor  NEURO: A&Ox3, No focal deficits. Strength 5/5 with no sensory deficits.

## 2022-05-22 NOTE — CONSULT NOTE ADULT - SUBJECTIVE AND OBJECTIVE BOX
BRAULIO ESPARZA  81y/Female  [facility] ED    Patient is a 81y old  Female who presents with a chief complaint of sob and hypoxia     INTERVAL HPI/OVERNIGHT EVENTS: patient presented to ed with sob and hypoxia. patient was recently admitted to hospital with acute hypoxic respiratory failure 2/2 CHF exacerbation and pulmonary htn. patient noted to desatting to 84 on room air at home so was sent to hospital by visiting nurse.     On exam this morning patient on 4L nasal canula satting %, denies any distress.   Supplemental O2 tapered down to 2L and pulse ox of 95-99 noted after about 30 mins   on reassessment attempted to take off supplemental O2 after which patient started to drop down to 87-89 % at rest, placed back on 2L NC with improvement in sats to > 92%.     REVIEW OF SYSTEMS: negative  Vital Signs Last 24 Hrs  T(C): 36.3 (22 May 2022 07:52), Max: 38.2 (21 May 2022 13:30)  T(F): 97.3 (22 May 2022 07:52), Max: 100.7 (21 May 2022 13:30)  HR: 75 (22 May 2022 07:52) (71 - 102)  BP: 111/58 (22 May 2022 07:52) (109/59 - 129/62)  BP(mean): --  RR: 16 (22 May 2022 07:52) (16 - 26)  SpO2: 98% (22 May 2022 07:52) (83% - 100%)    PHYSICAL EXAM:   NAD; Normocephalic;   LUNGS - no wheezing, scattered crakles   HEART: S1 S2+   ABDOMEN: Soft, Nontender, non distended  EXTREMITIES: no cyanosis; no edema  NERVOUS SYSTEM:  Awake and alert; no focal neuro deficits appreciated    LABS:                        11.3   6.18  )-----------( 135      ( 21 May 2022 14:10 )             35.6     05-21    133<L>  |  98  |  39<H>  ----------------------------<  158<H>  4.4   |  24  |  1.4    Ca    8.2<L>      21 May 2022 15:13    TPro  5.3<L>  /  Alb  2.4<L>  /  TBili  2.7<H>  /  DBili  x   /  AST  44<H>  /  ALT  25  /  AlkPhos  71  05-21      Urinalysis Basic - ( 21 May 2022 19:07 )    Color: Yellow / Appearance: Slightly Turbid / S.024 / pH: x  Gluc: x / Ketone: Trace  / Bili: Negative / Urobili: <2 mg/dL   Blood: x / Protein: 30 mg/dL / Nitrite: Negative   Leuk Esterase: Moderate / RBC: 10-12 /HPF / WBC 4-6 /HPF   Sq Epi: x / Non Sq Epi: 8 /HPF / Bacteria: Few      CAPILLARY BLOOD GLUCOSE          Medications:  MEDICATIONS  (STANDING):    MEDICATIONS  (PRN):

## 2022-05-22 NOTE — ED CDU PROVIDER INITIAL DAY NOTE - NS ED ROS FT
Constitutional: (-) fever  Eyes/ENT: (-) blurry vision, (-) epistaxis  Cardiovascular: (-) chest pain, (-) syncope  Respiratory: (+) SOB, (-) cough  Gastrointestinal: (-) vomiting, (-) diarrhea  : (-) dysuria, (-) hematuria  Musculoskeletal: (-) neck pain, (-) back pain, (-) joint pain  Integumentary: (-) rash, (-) edema  Neurological: (-) headache, (-) altered mental status  Allergic/Immunologic: (-) pruritus

## 2022-05-22 NOTE — ED CDU PROVIDER INITIAL DAY NOTE - NS ED ATTENDING STATEMENT MOD
I have seen and examined this patient and fully participated in the care of this patient as the teaching attending.  The service was shared with the DELILAH.  I reviewed and verified the documentation and independently performed the documented:

## 2022-05-22 NOTE — ED CDU PROVIDER INITIAL DAY NOTE - PROGRESS NOTE DETAILS
patient signed out from vinayak wilson, no complaint awaiting hospitalist reevaluation will continue to monitor I spoke w/ Dr. Mcgowan. She was able to ween pt down from 4L to 2L NC. She recc cont observation, Pulm consult, IV lasix 40mg. patient comfortable, no complaint, will continue to monitor patient resting comfortably. no complaints. will continue to observe

## 2022-05-23 VITALS
HEART RATE: 93 BPM | OXYGEN SATURATION: 96 % | RESPIRATION RATE: 18 BRPM | SYSTOLIC BLOOD PRESSURE: 136 MMHG | DIASTOLIC BLOOD PRESSURE: 63 MMHG

## 2022-05-23 PROCEDURE — 99217: CPT | Mod: FS

## 2022-05-23 RX ORDER — CEFPODOXIME PROXETIL 100 MG
1 TABLET ORAL
Qty: 20 | Refills: 0
Start: 2022-05-23 | End: 2022-06-01

## 2022-05-23 RX ADMIN — Medication 40 MILLIGRAM(S): at 06:37

## 2022-05-23 NOTE — ED CDU PROVIDER SUBSEQUENT DAY NOTE - MEDICAL DECISION MAKING DETAILS
81-year-old female presents to the ED for increasing oxygen requirements.  Patient was noted to be 86% on room air as per nursing home and sent in for evaluation.  Patient was evaluated by the initial team and placed in observation s/p for diuretic therapy and continued oxygen supplementation.  Patient was evaluated by me on May 23, 2020 2 in the morning and noted to be resting comfortably on 2 L nasal cannula with a saturation of 97% lung sounds clear with chronic lower extremity edema noted.  Patient is otherwise resting comfortably with no other notable physical exam findings.  Previous labs reviewed noted to have mild hyponatremia with elevated BNP–1750, moderate leuk esterase.  Currently patient denies any dysuria but however prior history of a possible low-grade fever will treat for potential UTI.  Status of patient's fluid overload status and diuresis conveyed to patient.  Advised to take antibiotics.  Case discussed with hospitalist who was in collaboration on this case.  No current intervention.  Advised continuous diuresis at nursing home.  Discharged home with return precautions.

## 2022-05-23 NOTE — ED CDU PROVIDER DISPOSITION NOTE - PROVIDER TOKENS
PROVIDER:[TOKEN:[[id number]],FOLLOWUP:[1-3 Days]],PROVIDER:[TOKEN:[[id number]],FOLLOWUP:[1-3 Days]]

## 2022-05-23 NOTE — ED CDU PROVIDER DISPOSITION NOTE - PATIENT PORTAL LINK FT
You can access the FollowMyHealth Patient Portal offered by [facility] by registering at the following website: [email or web address]. By joining [facility]’s FollowMyHealth portal, you will also be able to view your health information using other applications (apps) compatible with our system. You can access the FollowMyHealth Patient Portal offered by [facility] by registering at the following website: [email or web address]. By joining [facility]’s FollowMyHealth portal, you will also be able to view your health information using other applications (apps) compatible with our system.

## 2022-05-23 NOTE — ED CDU PROVIDER SUBSEQUENT DAY NOTE - WR ORDER STATUS 1
----- Message from Audrey Francisco sent at 5/24/2018 12:30 PM CDT -----  [phone] ... Asking for the Dr to request med record's ...from [facility] in [place]  
Resulted

## 2022-05-23 NOTE — ED ADULT NURSE REASSESSMENT NOTE - NS ED NURSE REASSESS COMMENT FT1
PT report received from ASHLEY Mix. PT is a&ox4 and in no distress. Denies pain. Vitals stable. IVL. Remains on continuous cardiac monitoring. Fall precautions in place. Call bell within reach.
Patient assessed.  VSS.  Per MD, SpO2 acceptable if above 90%.  Lung sounds clear and equal bilaterally.  She reports fatigue but no chest pain or shortness of breath at this time.  Patient remains on continuous cardiac monitoring and pulse oximetry.  Safety and comfort measures provided.
Pt report received from ASHLEY Guerrero. Patient a&ox4, in no distress. Vitals stable. IVL. Breathing unlabored and spontaneously on 4L NC. Fall precautions in place. Call bell within reach.
Pt is here for SOb with chf exacerbation. IV lasix given for fluid overload. Pt stated she feels "much better after lasix" and no SOB. Pt is on 2L nc oxygen sat wnr.  Comfort measure in place. will continue to monitor pt.
Patient educated on medical treatment plan and medications. Patient is GCS of 15.

## 2022-05-23 NOTE — ED CDU PROVIDER DISPOSITION NOTE - NSFOLLOWUPCLINICS_GEN_ALL_ED_FT
[facility] Pulmonolgy and Sleep Medicine  Pulmonology  [address] [place] [address], [facility] [address]  [place] [address]  Phone: [phone]  Fax:   Follow Up Time: 1-3 Days

## 2022-05-23 NOTE — ED CDU PROVIDER SUBSEQUENT DAY NOTE - PROGRESS NOTE DETAILS
patient resting comfortably. no complaints. will continue to observe discussed case with dr elias, hospitalist, recommend discharge to NH on 2L with pulmonology and rehab. patient w low grade fever yesterday, +UA, will treat.   patient states she feels well.

## 2022-05-23 NOTE — ED CDU PROVIDER DISPOSITION NOTE - NSFOLLOWUPINSTRUCTIONS_ED_ALL_ED_FT
Why do your lungs fill with fluid when you have heart failure?  When you have heart failure, your heart does not pump as well as it should. It does not squeeze or fill as well as before. As a result, the heart struggles to keep blood moving, but it lags behind. The organs in your body might not get as much blood as they used to, especially when you exercise. Also, fluid builds up in the body.    Fluid can build up in the feet and ankles. That's why people with heart failure sometimes get swollen ankles (figure 1). Fluid can also build up in the lungs. That's why people with heart failure sometimes have trouble breathing. Having fluid in the lungs can be life-threatening. Fluid in the lungs is the number 1 reason people with heart failure end up in the hospital. But it is often avoidable.    To keep heart failure from getting worse, and to keep yourself breathing as well as possible, one of the most important things you can do is to keep your body from holding onto extra fluid. That's why it is so important to take your medicines for heart failure and look for warning signs. These 2 things are explained more below.    Why are medicines for heart failure so important?  Medicines to treat heart failure can help you feel better and live longer. They can help keep fluid out of your lungs and help you breathe better. Missing just one dose of medicine can make a big difference in how you feel.    One type of heart failure medicine is a "diuretic" (sometimes called a water pill). A diuretic helps the body get rid of extra salt and fluid. That way fluid is less likely to build up in the ankles, belly, or – most importantly – the lungs.    Some people skip their diuretic because they do not like having to go to the bathroom all the time. But if you are going to the bathroom a lot to urinate, that means the medicine is working. It means your lungs are less likely to fill with fluid.    Ask your doctor or nurse which of your medicines is a diuretic. The most commonly used diuretic for heart failure is called "furosemide" (brand name: Lasix).    Are there warning signs that my lungs are filling with fluid?  Yes, often there are! When heart failure is getting worse, the body gives off the following clues:    ?Weight gain    ?Increased swelling in the feet, ankles, legs, or other parts of the body    ?Increased tiredness or trouble breathing    If you spot one or more of these signs, you should take action. Your body is telling you that things are starting to go wrong.    You and your doctor or nurse can write up an "action plan." The action plan will tell you what to do if you spot signs that your heart failure is getting worse (figure 2 and figure 3).    Why must I weigh myself every day?  Water is heavy, so your weight will go up if your body is holding onto extra fluid. If you weigh yourself every day, you will know right away when fluid starts to build up. Weight gain can be an early sign that your heart failure is getting worse. If you spot a weight change, you can take steps to keep the problem from getting worse.    Here's what you should do:    ?Weigh yourself every morning after you urinate and before you eat or drink.    ?Use the same scale and wear roughly the same amount of clothing (or wear nothing) every time you weigh yourself.    ?Keep a chart near the scale and write down your weight every day.    ?Each time you write down your weight, check if the number has gone up from the day before and the week before. If so, follow your action plan.    For most people, a weight gain of 2 to 3 pounds in 1 day is cause for concern. Gaining 5 pounds in 1 week is also a bad sign. Weight gains like these are often signs that your body is holding onto extra fluid.    Why must I check for swelling every day?  If you become swollen, that's another sign that your body is holding onto extra fluid. If any part of your body looks more swollen than usual, don't ignore it. Even if you do not feel any differently than usual, this might be a sign that your heart failure is worse.    Follow an action plan!  Now that you know how to spot the clues that your heart failure is getting worse, you need to know what to do if you notice those changes. Ask your doctor or nurse to fill out an action plan with you. An action plan is a set of instructions on what to do if you have changes in symptoms (figure 2 and figure 3).    Take your action plan seriously. Keep it on the refrigerator or someplace where you can easily find it. If you follow your action plan closely, you might be able to avoid a trip to the hospital or know when it is important to call an ambulance to go to the hospital.    Urinary Tract Infection    A urinary tract infection (UTI) is an infection of any part of the urinary tract, which includes the kidneys, ureters, bladder, and urethra. Risk factors include ignoring your need to urinate, wiping back to front if female, being an uncircumcised male, and having diabetes or a weak immune system. Symptoms include frequent urination, pain or burning with urination, foul smelling urine, cloudy urine, pain in the lower abdomen, blood in the urine, and fever. If you were prescribed an antibiotic medicine, take it as told by your health care provider. Do not stop taking the antibiotic even if you start to feel better.    SEEK IMMEDIATE MEDICAL CARE IF YOU HAVE THE FOLLOWING SYMPTOMS: severe back or abdominal pain, inability to keep fluids or medicine down, dizziness/lightheadedness, or a change in mental status.

## 2022-05-23 NOTE — ED CDU PROVIDER DISPOSITION NOTE - CARE PROVIDERS DIRECT ADDRESSES
[email or web address] [email or web address]

## 2022-05-24 ENCOUNTER — APPOINTMENT (OUTPATIENT)
Dept: CARE COORDINATION | Facility: HOME HEALTH | Age: 82
End: 2022-05-24
Payer: MEDICARE

## 2022-05-24 VITALS
SYSTOLIC BLOOD PRESSURE: 120 MMHG | RESPIRATION RATE: 16 BRPM | DIASTOLIC BLOOD PRESSURE: 60 MMHG | BODY MASS INDEX: 28.81 KG/M2 | HEART RATE: 88 BPM | WEIGHT: 152.5 LBS | OXYGEN SATURATION: 96 %

## 2022-05-24 PROCEDURE — 99348 HOME/RES VST EST LOW MDM 30: CPT

## 2022-05-25 RX ORDER — METOPROLOL TARTRATE 25 MG/1
25 TABLET, FILM COATED ORAL
Qty: 90 | Refills: 0 | Status: DISCONTINUED | COMMUNITY
Start: 2020-02-11 | End: 2022-05-25

## 2022-05-25 NOTE — ASSESSMENT
[FreeTextEntry1] : Patient is an 80 y/o female enrolled in the [facility] TCM program s/p a recent discharge for fluid overload at [facility] 5/17- 5/20 , and a representation for possible UTI on abrx.  She has a PMH of thrombocytopenia , sleep apnea , uses 02 at home for extertion followed by pulmonary. On arrival patient is alert in NAD denies c/p, sob, wt gain  , palpitations, fever , LE swelling is present but improved \par

## 2022-05-25 NOTE — COUNSELING
[de-identified] : Pt was informed about CN’s role/ STARS program and overview of transitional care reviewed with patient. In addition, yellow contact card was provided. Pt educated on topics of importance such as compliance with all provider visits, prescribed medication regimen, and low salt diet. Pt encouraged calling CN with any issues, concerns or questions, also educated to notify CN if experiencing CP, SOB , cough, increased mucus/phlegm production, abdominal discomfort/swelling, difficulty sleeping or lying flat, fever, chills, fatigue, weight gain of 2-3lbs in 24 hours or 5lbs in one week,  dizziness, lightheadedness, n/v/d/c, swelling to extremities and/or any signs of CHF exacerbation as reviewed. Reassurance provided. Will continue to monitor\par \par

## 2022-05-25 NOTE — PHYSICAL EXAM
[No Acute Distress] : no acute distress [Well-Appearing] : well-appearing [No Respiratory Distress] : no respiratory distress  [No Accessory Muscle Use] : no accessory muscle use [Clear to Auscultation] : lungs were clear to auscultation bilaterally [Clear Bilaterally] : the lungs were clear to auscultation bilaterally [Rales / Crackles Bilateral] : no rales or crackles were heard [Normal Rate] : normal rate  [Regular Rhythm] : with a regular rhythm [Normal S1, S2] : normal S1 and S2 [Non Tender] : non-tender [Non-distended] : non-distended [No Focal Deficits] : no focal deficits [Normal Affect] : the affect was normal [Alert and Oriented x3] : oriented to person, place, and time [Normal Insight/Judgement] : insight and judgment were intact [de-identified] : trace edema [de-identified] : uses walker [de-identified] : healing wound to LE

## 2022-05-25 NOTE — ED ADULT TRIAGE NOTE - GLASGOW COMA SCALE: BEST VERBAL RESPONSE, MLM
Agency/Facility Name: [facility]  Plan or Request: DPA left vm regarding referral.    1110- Spoke To: Andreina  Agency/Facility Name: [facility]  Plan or Request: declined / insurance exceeds      1111- Agency/Facility Name: [facility]  Plan or Request: DPA left vm regarding referral.         (V5) oriented

## 2022-05-25 NOTE — HISTORY OF PRESENT ILLNESS
[FreeTextEntry1] : follow up Fluid overload [de-identified] : Patient is an 80 y/o female enrolled in the hospitals TCM program s/p a recent discharge for fluid overload at [facility] 5/17- 5/20 , and a representation for possible UTI on abrx.  She has a PMH of thrombocytopenia , sleep apnea , uses 02 at home for extertion followed by pulmonary. On arrival patient is alert in NAD denies c/p, sob, wt gain  , palpitations, fever , LE swelling is present but improved \par \par Hospital Course as per SCM: 80 yo female hx of Thrombocytopenia, fatty liver (possible portal hypertension) presenting with worsening lower extremity edema and hypoxia (found to be hypoxic to 87% on RA today), mild sob, abd distension, and weight gain. Patient was admitted for acute hypoxic respiratory failure likely secondary to Fluid overload, likely related to portal hypertension, less likely CHF. Her ECHO shwoed Mild grade I diastolic dysfunction- less likely acute decompensated CHF. She had Mild elevation of troponins likely demand ischemia. Her LE duplex showed no DVT; her CXR showed on pulmonary edema. She was evaluated by cardiology and pulmonology. She was treated with IV lasix with improvement in her lower extremity edema. She was also evaluated by gastroenterology for portal HTN and In view of questionable varices and cirrhosis, outpatient fibroscan and EGD after seeing hepatology clinic was recommended. At this time she is medically stable for a hospital discharge.\par Things to follow up:\par -PCP follow up in 1-2 weeks\par -Take lasix 40mg twice a day\par -Follow up with your PCP in 1-2 weeks to monitor renal function and electrolytes while on lasix\par -Follow up with Dr. Erickson within 2-4 weeks\par -Follow up with Dr. Martinez within 1-2 weeks for an outpatient sleep study test\par -Follow up with your hepatologist within 2-4 weeks as outpatient for possible fibroscan and EGD

## 2022-05-26 DIAGNOSIS — Z88.1 ALLERGY STATUS TO OTHER ANTIBIOTIC AGENTS STATUS: ICD-10-CM

## 2022-05-26 DIAGNOSIS — I24.8 OTHER FORMS OF ACUTE ISCHEMIC HEART DISEASE: ICD-10-CM

## 2022-05-26 DIAGNOSIS — E87.70 FLUID OVERLOAD, UNSPECIFIED: ICD-10-CM
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
## 2022-05-26 DIAGNOSIS — Z96.653 PRESENCE OF ARTIFICIAL KNEE JOINT, BILATERAL: ICD-10-CM

## 2022-05-26 DIAGNOSIS — I27.20 PULMONARY HYPERTENSION, UNSPECIFIED: ICD-10-CM

## 2022-05-26 DIAGNOSIS — E83.42 HYPOMAGNESEMIA: ICD-10-CM

## 2022-05-26 DIAGNOSIS — Z79.52 LONG TERM (CURRENT) USE OF SYSTEMIC STEROIDS: ICD-10-CM

## 2022-05-26 DIAGNOSIS — S80.821A BLISTER (NONTHERMAL), RIGHT LOWER LEG, INITIAL ENCOUNTER: ICD-10-CM

## 2022-05-26 DIAGNOSIS — J96.01 ACUTE RESPIRATORY FAILURE WITH HYPOXIA: ICD-10-CM

## 2022-05-26 DIAGNOSIS — K76.6 PORTAL HYPERTENSION: ICD-10-CM

## 2022-05-26 DIAGNOSIS — D69.6 THROMBOCYTOPENIA, UNSPECIFIED: ICD-10-CM
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
## 2022-05-26 DIAGNOSIS — Z85.3 PERSONAL HISTORY OF MALIGNANT NEOPLASM OF BREAST: ICD-10-CM

## 2022-05-26 DIAGNOSIS — K21.9 GASTRO-ESOPHAGEAL REFLUX DISEASE WITHOUT ESOPHAGITIS: ICD-10-CM

## 2022-05-26 DIAGNOSIS — Z90.49 ACQUIRED ABSENCE OF OTHER SPECIFIED PARTS OF DIGESTIVE TRACT: ICD-10-CM

## 2022-05-26 DIAGNOSIS — G47.33 OBSTRUCTIVE SLEEP APNEA (ADULT) (PEDIATRIC): ICD-10-CM

## 2022-05-27 LAB
CULTURE RESULTS: SIGNIFICANT CHANGE UP
CULTURE RESULTS: SIGNIFICANT CHANGE UP
SPECIMEN SOURCE: SIGNIFICANT CHANGE UP
SPECIMEN SOURCE: SIGNIFICANT CHANGE UP

## 2022-06-14 ENCOUNTER — APPOINTMENT (OUTPATIENT)
Age: 82
End: 2022-06-14
Payer: MEDICARE

## 2022-06-14 VITALS
DIASTOLIC BLOOD PRESSURE: 80 MMHG | OXYGEN SATURATION: 91 % | WEIGHT: 161 LBS | SYSTOLIC BLOOD PRESSURE: 140 MMHG | HEIGHT: 61 IN | RESPIRATION RATE: 14 BRPM | BODY MASS INDEX: 30.4 KG/M2 | HEART RATE: 85 BPM

## 2022-06-14 PROCEDURE — 99214 OFFICE O/P EST MOD 30 MIN: CPT

## 2022-06-14 NOTE — ASSESSMENT
[FreeTextEntry1] : PROCTOR with O2 desaturation likely cardiac\par More than 30 pds of water weight gain \par PHTN WHO II \par Possible ABRAHAM \par ( SP Venous duplex negative ) CT chest no ILD

## 2022-06-14 NOTE — PHYSICAL EXAM
[No Acute Distress] : no acute distress [Normal Oropharynx] : normal oropharynx [Normal Appearance] : normal appearance [No Neck Mass] : no neck mass [Normal Rate/Rhythm] : normal rate/rhythm [Normal S1, S2] : normal s1, s2 [No Resp Distress] : no resp distress [Clear to Auscultation Bilaterally] : clear to auscultation bilaterally [No Abnormalities] : no abnormalities [Benign] : benign [Normal Gait] : normal gait [No Clubbing] : no clubbing [No Cyanosis] : no cyanosis [FROM] : FROM [3+ Pitting] : 3+ pitting [Normal Color/ Pigmentation] : normal color/ pigmentation [No Focal Deficits] : no focal deficits [Oriented x3] : oriented x3 [Normal Affect] : normal affect [TextBox_54] : Aortic systolic murmur

## 2022-06-15 ENCOUNTER — APPOINTMENT (OUTPATIENT)
Dept: CARDIOLOGY | Facility: CLINIC | Age: 82
End: 2022-06-15

## 2022-06-15 VITALS
TEMPERATURE: 97.8 F | HEIGHT: 61 IN | SYSTOLIC BLOOD PRESSURE: 120 MMHG | OXYGEN SATURATION: 94 % | BODY MASS INDEX: 30.78 KG/M2 | HEART RATE: 81 BPM | DIASTOLIC BLOOD PRESSURE: 64 MMHG | WEIGHT: 163 LBS

## 2022-06-15 PROCEDURE — 99205 OFFICE O/P NEW HI 60 MIN: CPT

## 2022-06-15 PROCEDURE — 93000 ELECTROCARDIOGRAM COMPLETE: CPT

## 2022-06-15 RX ORDER — BENZONATATE 100 MG/1
100 CAPSULE ORAL 3 TIMES DAILY
Refills: 0 | Status: DISCONTINUED | COMMUNITY
End: 2022-06-15

## 2022-06-15 RX ORDER — PREDNISONE 10 MG/1
10 TABLET ORAL
Qty: 50 | Refills: 0 | Status: DISCONTINUED | COMMUNITY
Start: 2022-03-28 | End: 2022-06-15

## 2022-06-15 RX ORDER — BACITRACIN 500 [IU]/G
500 OINTMENT TOPICAL TWICE DAILY
Refills: 0 | Status: DISCONTINUED | COMMUNITY
End: 2022-06-15

## 2022-06-15 RX ORDER — GUAIFENESIN 100 MG/5ML
100 LIQUID ORAL EVERY 4 HOURS
Refills: 0 | Status: DISCONTINUED | COMMUNITY
End: 2022-06-15

## 2022-06-15 RX ORDER — FUROSEMIDE 40 MG/1
40 TABLET ORAL
Refills: 0 | Status: DISCONTINUED | COMMUNITY
End: 2022-06-15

## 2022-06-15 RX ORDER — CEFPODOXIME PROXETIL 100 MG/1
100 TABLET, FILM COATED ORAL
Refills: 0 | Status: DISCONTINUED | COMMUNITY
End: 2022-06-15

## 2022-06-15 RX ORDER — HYDROCORTISONE 1 %
12 CREAM (GRAM) TOPICAL
Qty: 400 | Refills: 0 | Status: DISCONTINUED | COMMUNITY
Start: 2021-01-11 | End: 2022-06-15

## 2022-06-22 NOTE — HISTORY OF PRESENT ILLNESS
[FreeTextEntry1] : 82 y/o female with a PMHx of Pulmonary HTN, leukopenia, thrombocytopenia, ABRAHAM, on home O2, splenomegaly, Liver fibrosis. Here to establish care for pulmonary HTN.\par \par Since March she has been admitted to the hospital multiple times due to hypoxia BLE edema, and fluid overload.\par \par The patient reports up to February 2022 she had no limitations on her activity tolerance. Now, she requires to stop between steps when climbing a flight of stairs or doing house chores at times because of the heaviness on her legs. She had a BLE duplex which was negative for DVT. She does not check her BP at home, and has gained 14 lbs over the past month. She denies CP, bleeding, SOB at rest, PND, abdominal discomfort, LH/dizziness, palpitations, and syncope. \par \par She takes her medications as directed and tries to follow a low sodium diet.

## 2022-06-27 ENCOUNTER — APPOINTMENT (OUTPATIENT)
Dept: CARDIOLOGY | Facility: CLINIC | Age: 82
End: 2022-06-27
Payer: MEDICARE

## 2022-06-27 PROCEDURE — XXXXX: CPT | Mod: 1L

## 2022-06-27 NOTE — HISTORY OF PRESENT ILLNESS
[FreeTextEntry1] : Patient presents today following up on sudden hearing loss. Patient admits no real improvement with steroids. Patient c/o swelling in her legs from steroids.

## 2022-06-27 NOTE — ASSESSMENT
[FreeTextEntry1] : I reviewed, interpreted, and discussed the Audiogram done today. 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 Has had minimal improvement after treatment. \par MRI showed no neuro-otologic abnormality.\par \par I recommended seeing a neuro-otologist at this point and to continue weaning herself off steroids gradually. \par

## 2022-06-29 ENCOUNTER — INPATIENT (INPATIENT)
Facility: HOSPITAL | Age: 82
LOS: 8 days | Discharge: HOME HEALTH | End: 2022-07-08
Attending: INTERNAL MEDICINE | Admitting: INTERNAL MEDICINE

## 2022-06-29 VITALS
DIASTOLIC BLOOD PRESSURE: 64 MMHG | TEMPERATURE: 98 F | WEIGHT: 181 LBS | HEIGHT: 61 IN | SYSTOLIC BLOOD PRESSURE: 139 MMHG | RESPIRATION RATE: 17 BRPM | OXYGEN SATURATION: 96 % | HEART RATE: 84 BPM

## 2022-06-29 DIAGNOSIS — Z88.8 ALLERGY STATUS TO OTHER DRUGS, MEDICAMENTS AND BIOLOGICAL SUBSTANCES STATUS: ICD-10-CM

## 2022-06-29 DIAGNOSIS — D47.2 MONOCLONAL GAMMOPATHY: ICD-10-CM

## 2022-06-29 DIAGNOSIS — K21.9 GASTRO-ESOPHAGEAL REFLUX DISEASE WITHOUT ESOPHAGITIS: ICD-10-CM

## 2022-06-29 DIAGNOSIS — R18.8 OTHER ASCITES: ICD-10-CM

## 2022-06-29 DIAGNOSIS — D61.818 OTHER PANCYTOPENIA: ICD-10-CM

## 2022-06-29 DIAGNOSIS — I35.0 NONRHEUMATIC AORTIC (VALVE) STENOSIS: ICD-10-CM

## 2022-06-29 DIAGNOSIS — I85.00 ESOPHAGEAL VARICES WITHOUT BLEEDING: ICD-10-CM

## 2022-06-29 DIAGNOSIS — Z98.890 OTHER SPECIFIED POSTPROCEDURAL STATES: Chronic | ICD-10-CM

## 2022-06-29 DIAGNOSIS — I70.203 UNSPECIFIED ATHEROSCLEROSIS OF NATIVE ARTERIES OF EXTREMITIES, BILATERAL LEGS: ICD-10-CM

## 2022-06-29 DIAGNOSIS — E66.9 OBESITY, UNSPECIFIED: ICD-10-CM

## 2022-06-29 DIAGNOSIS — K76.6 PORTAL HYPERTENSION: ICD-10-CM

## 2022-06-29 DIAGNOSIS — K74.02 HEPATIC FIBROSIS, ADVANCED FIBROSIS: ICD-10-CM

## 2022-06-29 DIAGNOSIS — L03.116 CELLULITIS OF LEFT LOWER LIMB: ICD-10-CM

## 2022-06-29 DIAGNOSIS — Z88.1 ALLERGY STATUS TO OTHER ANTIBIOTIC AGENTS STATUS: ICD-10-CM

## 2022-06-29 DIAGNOSIS — L97.919 NON-PRESSURE CHRONIC ULCER OF UNSPECIFIED PART OF RIGHT LOWER LEG WITH UNSPECIFIED SEVERITY: ICD-10-CM

## 2022-06-29 DIAGNOSIS — L03.115 CELLULITIS OF RIGHT LOWER LIMB: ICD-10-CM

## 2022-06-29 DIAGNOSIS — G47.33 OBSTRUCTIVE SLEEP APNEA (ADULT) (PEDIATRIC): ICD-10-CM

## 2022-06-29 DIAGNOSIS — Z99.81 DEPENDENCE ON SUPPLEMENTAL OXYGEN: ICD-10-CM

## 2022-06-29 DIAGNOSIS — I87.2 VENOUS INSUFFICIENCY (CHRONIC) (PERIPHERAL): ICD-10-CM

## 2022-06-29 DIAGNOSIS — Z90.49 ACQUIRED ABSENCE OF OTHER SPECIFIED PARTS OF DIGESTIVE TRACT: Chronic | ICD-10-CM

## 2022-06-29 DIAGNOSIS — E03.9 HYPOTHYROIDISM, UNSPECIFIED: ICD-10-CM

## 2022-06-29 DIAGNOSIS — L97.929 NON-PRESSURE CHRONIC ULCER OF UNSPECIFIED PART OF LEFT LOWER LEG WITH UNSPECIFIED SEVERITY: ICD-10-CM

## 2022-06-29 DIAGNOSIS — R16.1 SPLENOMEGALY, NOT ELSEWHERE CLASSIFIED: ICD-10-CM

## 2022-06-29 DIAGNOSIS — E87.70 FLUID OVERLOAD, UNSPECIFIED: ICD-10-CM

## 2022-06-29 LAB
ALBUMIN SERPL ELPH-MCNC: 2.8 G/DL — LOW (ref 3.5–5.2)
ALP SERPL-CCNC: 51 U/L — SIGNIFICANT CHANGE UP (ref 30–115)
ALT FLD-CCNC: 16 U/L — SIGNIFICANT CHANGE UP (ref 0–41)
ANION GAP SERPL CALC-SCNC: 11 MMOL/L — SIGNIFICANT CHANGE UP (ref 7–14)
APTT BLD: 32.9 SEC — SIGNIFICANT CHANGE UP (ref 27–39.2)
AST SERPL-CCNC: 35 U/L — SIGNIFICANT CHANGE UP (ref 0–41)
BASOPHILS # BLD AUTO: 0.01 K/UL — SIGNIFICANT CHANGE UP (ref 0–0.2)
BASOPHILS NFR BLD AUTO: 0.3 % — SIGNIFICANT CHANGE UP (ref 0–1)
BILIRUB DIRECT SERPL-MCNC: 0.8 MG/DL — HIGH (ref 0–0.3)
BILIRUB INDIRECT FLD-MCNC: 3.3 MG/DL — HIGH (ref 0.2–1.2)
BILIRUB SERPL-MCNC: 4.1 MG/DL — HIGH (ref 0.2–1.2)
BUN SERPL-MCNC: 33 MG/DL — HIGH (ref 10–20)
CALCIUM SERPL-MCNC: 8.7 MG/DL — SIGNIFICANT CHANGE UP (ref 8.5–10.1)
CHLORIDE SERPL-SCNC: 97 MMOL/L — LOW (ref 98–110)
CO2 SERPL-SCNC: 30 MMOL/L — SIGNIFICANT CHANGE UP (ref 17–32)
CREAT SERPL-MCNC: 1.2 MG/DL — SIGNIFICANT CHANGE UP (ref 0.7–1.5)
EGFR: 45 ML/MIN/1.73M2 — LOW
EOSINOPHIL # BLD AUTO: 0.17 K/UL — SIGNIFICANT CHANGE UP (ref 0–0.7)
EOSINOPHIL NFR BLD AUTO: 5.9 % — SIGNIFICANT CHANGE UP (ref 0–8)
GLUCOSE SERPL-MCNC: 114 MG/DL — HIGH (ref 70–99)
HCT VFR BLD CALC: 31.8 % — LOW (ref 37–47)
HGB BLD-MCNC: 10.2 G/DL — LOW (ref 12–16)
IMM GRANULOCYTES NFR BLD AUTO: 0.3 % — SIGNIFICANT CHANGE UP (ref 0.1–0.3)
INR BLD: 1.32 RATIO — HIGH (ref 0.65–1.3)
LACTATE SERPL-SCNC: 1.8 MMOL/L — SIGNIFICANT CHANGE UP (ref 0.7–2)
LYMPHOCYTES # BLD AUTO: 0.71 K/UL — LOW (ref 1.2–3.4)
LYMPHOCYTES # BLD AUTO: 24.7 % — SIGNIFICANT CHANGE UP (ref 20.5–51.1)
MCHC RBC-ENTMCNC: 29.7 PG — SIGNIFICANT CHANGE UP (ref 27–31)
MCHC RBC-ENTMCNC: 32.1 G/DL — SIGNIFICANT CHANGE UP (ref 32–37)
MCV RBC AUTO: 92.7 FL — SIGNIFICANT CHANGE UP (ref 81–99)
MONOCYTES # BLD AUTO: 0.36 K/UL — SIGNIFICANT CHANGE UP (ref 0.1–0.6)
MONOCYTES NFR BLD AUTO: 12.5 % — HIGH (ref 1.7–9.3)
NEUTROPHILS # BLD AUTO: 1.62 K/UL — SIGNIFICANT CHANGE UP (ref 1.4–6.5)
NEUTROPHILS NFR BLD AUTO: 56.3 % — SIGNIFICANT CHANGE UP (ref 42.2–75.2)
NRBC # BLD: 0 /100 WBCS — SIGNIFICANT CHANGE UP (ref 0–0)
PLATELET # BLD AUTO: 70 K/UL — LOW (ref 130–400)
POTASSIUM SERPL-MCNC: 3.2 MMOL/L — LOW (ref 3.5–5)
POTASSIUM SERPL-SCNC: 3.2 MMOL/L — LOW (ref 3.5–5)
PROT SERPL-MCNC: 5.9 G/DL — LOW (ref 6–8)
PROTHROM AB SERPL-ACNC: 15.1 SEC — HIGH (ref 9.95–12.87)
RBC # BLD: 3.43 M/UL — LOW (ref 4.2–5.4)
RBC # FLD: 15.7 % — HIGH (ref 11.5–14.5)
SARS-COV-2 RNA SPEC QL NAA+PROBE: SIGNIFICANT CHANGE UP
SODIUM SERPL-SCNC: 138 MMOL/L — SIGNIFICANT CHANGE UP (ref 135–146)
WBC # BLD: 2.88 K/UL — LOW (ref 4.8–10.8)
WBC # FLD AUTO: 2.88 K/UL — LOW (ref 4.8–10.8)

## 2022-06-29 PROCEDURE — 73590 X-RAY EXAM OF LOWER LEG: CPT | Mod: 26,50

## 2022-06-29 PROCEDURE — 99285 EMERGENCY DEPT VISIT HI MDM: CPT | Mod: FS

## 2022-06-29 PROCEDURE — 93010 ELECTROCARDIOGRAM REPORT: CPT

## 2022-06-29 RX ORDER — POTASSIUM CHLORIDE 20 MEQ
40 PACKET (EA) ORAL ONCE
Refills: 0 | Status: COMPLETED | OUTPATIENT
Start: 2022-06-29 | End: 2022-06-29

## 2022-06-29 RX ORDER — PIPERACILLIN AND TAZOBACTAM 4; .5 G/20ML; G/20ML
4.5 INJECTION, POWDER, LYOPHILIZED, FOR SOLUTION INTRAVENOUS ONCE
Refills: 0 | Status: COMPLETED | OUTPATIENT
Start: 2022-06-29 | End: 2022-06-29

## 2022-06-29 RX ADMIN — Medication 40 MILLIEQUIVALENT(S): at 20:55

## 2022-06-29 RX ADMIN — PIPERACILLIN AND TAZOBACTAM 25 GRAM(S): 4; .5 INJECTION, POWDER, LYOPHILIZED, FOR SOLUTION INTRAVENOUS at 19:01

## 2022-06-29 NOTE — H&P ADULT - ASSESSMENT
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    #Cellulitis of the B/L LE 2/2 chronic LE edema    - Patient is afebrile, hemodynamically stable, SIRS not present on admission   - s/p Zosyn in the ED   - F/u Blood culture   - F/u Wound care consult   - F/u Xray to r/o OM   - starting on Vancomycin   - F/u ID consult     #Hepatocellular disease  #splenomegaly with leukopenia and thrombocytopenia  - Patient follows Dr. Erickson for splenomegaly and pancytopenia who is recommending liver biopsy.   - Patient was seen by GI in the past and Fibrosure testing was suggestive of severe liver fibrosis, had EGD done showing small esophageal varices and gastric polyps, and patient was advised to start Nadolol. Patient sought a second opinion and repeated EGD reportedly was negative for varices Patient never took Nadolol. Screening for viral hepatitis, hemachromatosis og' s disease, and autoimmune lives disease was negative.   - TPro  5.9<L>  /  Alb  2.8<L>  /  TBili  4.1<H>  /  DBili  0.8<H>  /  AST  35  /  ALT  16  /  AlkPhos  51   - PT: 15.10 sec; INR: 1.32 ratio; PTT: 32.9 sec  - F/u hepatology consult   - F/u [facility]     #PHTN WHO II  #Possible ABRAHAM on Home O2   - Follows Dr. Simpson   - was recently seen by Dr. Paredes   - ECHO 4/22/22: EF 72%, , grade one Diastolic disfunction  - started on Torsemide 20mg QD   - Strict Is and Os   - Monitor Daily weight     #Misc   - DVT prophylaxis: Lovenox  - GI prophylaxis: not indicated   - Diet: DASH   - Activity: IAT   - Disposition: Admit to medicine

## 2022-06-29 NOTE — H&P ADULT - NSHPPHYSICALEXAM_GEN_ALL_CORE
LOS: 1d    VITALS:   T(C): 36.8 (06-29-22 @ 16:04), Max: 36.8 (06-29-22 @ 16:04)  HR: 84 (06-29-22 @ 16:04) (84 - 84)  BP: 139/64 (06-29-22 @ 16:04) (139/64 - 139/64)  RR: 17 (06-29-22 @ 16:04) (17 - 17)  SpO2: 96% (06-29-22 @ 16:04) (96% - 96%)    GENERAL: NAD, lying in bed comfortably  HEAD:  Atraumatic, Normocephalic  EYES: EOMI, PERRLA, conjunctiva and sclera clear  ENT: Moist mucous membranes  NECK: Supple, No JVD  CHEST/LUNG: Clear to auscultation bilaterally; No rales, rhonchi, wheezing, or rubs. Unlabored respirations  HEART: Regular rate and rhythm; No murmurs, rubs, or gallops  ABDOMEN: BSx4; Soft, nontender, +distended  EXTREMITIES:  2+ Peripheral Pulses, b/l lower ext pitting edema, +B/L lower ext blister and cellulitis with abscess  NERVOUS SYSTEM:  A&Ox3, no focal deficits

## 2022-06-29 NOTE — ED PROVIDER NOTE - OBJECTIVE STATEMENT
81 y.o. female with a PMH of hepatocellular disease and thrombocytopenia presented to the ER c/o increased redness to LLE today.  Pt reports visiting nurse sent her to ER.  Pt denies fever, chills, purulent d/c, chest pain, dizziness, palpitations, worsening SOB.  Pt on at home NC.  No other complaints.

## 2022-06-29 NOTE — H&P ADULT - NSICDXPASTSURGICALHX_GEN_ALL_CORE_FT
Admit to Orthopaedic Service.  Presents today for elective   Pt medically stable and cleared for procedure today by Dr. Jimenez and Dr. Urias PAST SURGICAL HISTORY:  History of cholecystectomy     History of surgery LEFT BREAST LUMPECTOMY  TUBIAL LIGATION  CHOLECYSTECTOMY  RIGHT & LEFT TKR     Admit to Orthopaedic Service.  Presents today for elective R TKR  Pt medically stable and cleared for procedure today by Dr. Jimenez and Dr. Urias

## 2022-06-29 NOTE — H&P ADULT - ATTENDING COMMENTS
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 Patient endorses feeling weak when doing her daily ADLs, patient denies SOB. Patient denies any chest pain, palpitation. Patient denies fever, chills, headache, dizziness. Patient denies abdominal pain but endorsing constipation. Patient denies n/v. Patient was recently seen by Dr. Paredes for weight gain, and started on Torsemide 20mg qd. Patient follows Dr. Erickson for splenomegaly and pancytopenia who is recomending liver biopsy. Patient was seen by GI in the past and Fibrosure testing was suggestive of severe liver fibrosis, had EGD done showing small esophageal varices and gastric polyps, and patient was advised to start Nadolol. Patient sought a second opinion and repeated EGD reportedly was negative for varices Patient never took Nadolol. Screening for viral hepatitis, hemachromatosis og' s disease, and autoimmune lives disease was negative.   patient was admitted 5/16/2022 - 5/20/2022 for worsening lower extremity edema and hypoxia was medically  managed and discharged home  with VNS for treatment of this leg wound and supplemental oxygen   ED vitals T(F): 98.2, HR: 84, BP: 139/64, RR: 17, SpO2: 96% on RA   Labs significant for pancytopenia Hb 10.2, WBC 2.88, PLT 70, Lactate 1.8  TPro  5.9<L>  /  Alb  2.8<L>  /  TBili  4.1<H>  /  DBili  0.8<H>  /  AST  35  /  ALT  16  /  AlkPhos  51   PT: 15.10 sec; INR: 1.32 ratio; PTT: 32.9 sec    138  |  97<L>  |  33<H>  ----------------------------<  114<H>  3.2<L>   |  30  |  1.2    Ca    8.7      29 Jun 2022 18:26         (29 Jun 2022 23:01)    REVIEW OF SYSTEMS: see cc/HPI  CONSTITUTIONAL: No weakness, fevers or chills  EYES/ENT: No visual changes;  No vertigo or throat pain   NECK: No pain or stiffness  RESPIRATORY: No cough, wheezing, hemoptysis; No shortness of breath  CARDIOVASCULAR: No chest pain or palpitations, (+) pulm hypertension   GASTROINTESTINAL: No abdominal or epigastric pain. No nausea, vomiting, or hematemesis; No diarrhea or constipation. No melena or hematochezia.  GENITOURINARY: No dysuria, frequency or hematuria  NEUROLOGICAL: No numbness or weakness  SKIN: No itching, rashes, bilateral venous stasis and skin blistering and ulceration     Physical Exam:   General: WN/WD NAD  Neurology: A&Ox3, nonfocal, follows commands  Eyes: PERRLA/ EOMI  ENT/Neck: Neck supple, trachea midline, No JVD  Respiratory: CTA B/L, No wheezing, rales, rhonchi  CV: Normal rate regular rhythm, S1S2, no murmurs, rubs or gallops  Abdominal: Soft, NT, ND +BS,   Extremities: Bilateral lower ext. edema, chronic venous stasis changes and skin ulcerations, + peripheral pulses  Skin: No Rashes, Hematoma, Ecchymosis  Incisions:   Tubes:    A/p  Bilateral LE cellulitis w/ underlying chronic venous stasis changes  -IV abx   -check blood cultures   -will need local care/ silvadene cream     Hepatocellular disease / Splenomegaly w/ Leukopenia and thrombocytopenia   W/u in the past had been inconclusive ( see above summary)  - Hepatology eval   -RUQ U/S     Pulm HTN   ABRAHAM   -c/w Torsemide / Is and Os / fluid and Na restriction     DVT prophylaxis

## 2022-06-29 NOTE — ED PROVIDER NOTE - SKIN, MLM
(+) bilateral edema with chronic stasis changes, (+) mild erythema RLE, (+) chronic wounds noted bilaterally, DP pulses noted bilaterally

## 2022-06-29 NOTE — ED ADULT NURSE REASSESSMENT NOTE - NS ED NURSE REASSESS COMMENT FT1
Report received from Sola RAMIREZ. Pt received aaox3 in NAD, argatroban infusion via IV pump at 16.9ml/HR with no s/s of infiltrate noted.

## 2022-06-29 NOTE — ED ADULT NURSE NOTE - OBJECTIVE STATEMENT
81 year old female alert and oriented c/o b/l le swelling with open wound, per patient she has been on oral antibiotics and has a visiting nurse come for wound care but wounds are now infected. Sent in by PMD. No s.s of distress noted.

## 2022-06-29 NOTE — H&P ADULT - NSHPREVIEWOFSYSTEMS_GEN_ALL_CORE
REVIEW OF SYSTEMS:    CONSTITUTIONAL: +weakness, fevers or chills  EYES/ENT:  No visual changes;  No vertigo or throat pain   NECK:  No pain or stiffness  RESPIRATORY: +cough, wheezing, hemoptysis; No shortness of breath  CARDIOVASCULAR:  No chest pain or palpitations  GASTROINTESTINAL:  No abdominal or epigastric pain. No nausea, vomiting, or hematemesis; No diarrhea, +constipation. No melena or hematochezia.  GENITOURINARY:  No dysuria, frequency or hematuria  NEUROLOGICAL:  No numbness or weakness  SKIN:  No itching, rashes

## 2022-06-29 NOTE — H&P ADULT - HISTORY OF PRESENT ILLNESS
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 Patient endorses feeling weak when doing her daily ADLs, patient denies SOB. Patient denies any chest pain, palpitation. Patient denies fever, chills, headache, dizziness. Patient denies abdominal pain but endorsing constipation. Patient denies n/v. Patient was recently seen by Dr. Paredes for weight gain, and started on Torsemide 20mg qd. Patient follows Dr. Erickson for splenomegaly and pancytopenia who is recomending liver biopsy. Patient was seen by GI in the past and Fibrosure testing was suggestive of severe liver fibrosis     - patient was admitted 4/23/22-4/26/22 to [facility] with bilateral lower extremity edema and found to have a RLE water blister, she was medically  managed and discharged home  with VNS for treatment of this leg wound and Lasix 40mg PO daily.   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 Patient endorses feeling weak when doing her daily ADLs, patient denies SOB. Patient denies any chest pain, palpitation. Patient denies fever, chills, headache, dizziness. Patient denies abdominal pain but endorsing constipation. Patient denies n/v. Patient was recently seen by Dr. Paredes for weight gain, and started on Torsemide 20mg qd. Patient follows Dr. Erickson for splenomegaly and pancytopenia who is recomending liver biopsy. Patient was seen by GI in the past and Fibrosure testing was suggestive of severe liver fibrosis, had EGD done showing small esophageal varices and gastric polyps, and patient was advised to start Nadolol. Patient sought a second opinion and repeated EGD reportedly was negative for varices Patient never took Nadolol. Screening for viral hepatitis, hemachromatosis og' s disease, and autoimmune lives disease was negative.   patient was admitted 5/16/2022 - 5/20/2022 for worsening lower extremity edema and hypoxia was medically  managed and discharged home  with VNS for treatment of this leg wound and supplemental oxygen     ED vitals     T(C): 36.8 (06-29-22 @ 16:04), Max: 36.8 (06-29-22 @ 16:04)  T(F): 98.2 (06-29-22 @ 16:04), Max: 98.2 (06-29-22 @ 16:04)  HR: 84 (06-29-22 @ 16:04) (84 - 84)  BP: 139/64 (06-29-22 @ 16:04) (139/64 - 139/64)  RR: 17 (06-29-22 @ 16:04) (17 - 17)  SpO2: 96% (06-29-22 @ 16:04) (96% - 96%)  Wt(kg): --   This is a 80 y/o F with PMHx of hepatocellular disease, splenomegaly with leukopenia and thrombocytopenia, 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 Patient endorses feeling weak when doing her daily ADLs, patient denies SOB. Patient denies any chest pain, palpitation. Patient denies fever, chills, headache, dizziness. Patient denies abdominal pain but endorsing constipation. Patient denies n/v. Patient was recently seen by Dr. Paredes for weight gain, and started on Torsemide 20mg qd. Patient follows Dr. Erickson for splenomegaly and pancytopenia who is recomending liver biopsy. Patient was seen by GI in the past and Fibrosure testing was suggestive of severe liver fibrosis, had EGD done showing small esophageal varices and gastric polyps, and patient was advised to start Nadolol. Patient sought a second opinion and repeated EGD reportedly was negative for varices Patient never took Nadolol. Screening for viral hepatitis, hemachromatosis og' s disease, and autoimmune lives disease was negative.   patient was admitted 5/16/2022 - 5/20/2022 for worsening lower extremity edema and hypoxia was medically  managed and discharged home  with VNS for treatment of this leg wound and supplemental oxygen   ED vitals T(F): 98.2, HR: 84, BP: 139/64, RR: 17, SpO2: 96% on RA   Labs significant for pancytopenia Hb 10.2, WBC 2.88, PLT 70, Lactate 1.8  TPro  5.9<L>  /  Alb  2.8<L>  /  TBili  4.1<H>  /  DBili  0.8<H>  /  AST  35  /  ALT  16  /  AlkPhos  51   PT: 15.10 sec; INR: 1.32 ratio; PTT: 32.9 sec    138  |  97<L>  |  33<H>  ----------------------------<  114<H>  3.2<L>   |  30  |  1.2    Ca    8.7      29 Jun 2022 18:26

## 2022-06-29 NOTE — ED PROVIDER NOTE - CLINICAL SUMMARY MEDICAL DECISION MAKING FREE TEXT BOX
I personally evaluated the patient. I reviewed the Resident´s or Physician Assistant´s note (as assigned above), and agree with the findings and plan except as documented in my note.  Patient evaluated for lower extremity erythema.  Labs, x-rays performed in the ED.  Patient started on antibiotics.  Admitted to medicine for further evaluation and treatment.

## 2022-06-29 NOTE — ED PROVIDER NOTE - ATTENDING APP SHARED VISIT CONTRIBUTION OF CARE
81-year-old female past medical history of hepatocellular disease, thrombocytopenia presents with left lower extremity redness.  Patient has history of chronic lower extremity wounds and erythema.  Patient states her visiting nurse noted that the wounds were worse today.  No fever/chills, no nausea/vomiting/diarrhea, no chest pain, no shortness of breath, no leg swelling, no dizziness, no lightheadedness, no decreased range of motion, no trauma to the area.    CONSTITUTIONAL: Well-developed; well-nourished; in no acute distress. Sitting up and providing appropriate history and physical examination  SKIN: + Erythema and open wounds to bilateral lower extremities.  No crepitus, no induration, no fluctuance.  Otherwise skin exam is warm and dry, no acute rash.  HEAD: Normocephalic; atraumatic.  EYES: PERRL, 3 mm bilateral, no nystagmus, EOM intact; conjunctiva and sclera clear.  ENT: No nasal discharge; airway clear.  NECK: Supple; non tender. + full passive ROM in all directions. No JVD  CARD: S1, S2 normal; no murmurs, gallops, or rubs. Regular rate and rhythm. + Symmetric Strong Pulses  RESP: No wheezes, rales or rhonchi. Good air movement bilaterally  ABD: soft; non-distended; non-tender. No Rebound, No Guarding, No signs of peritonitis, No CVA tenderness. No pulsatile abdominal mass. + Strong and Symmetric Pulses  EXT: Normal ROM. No clubbing, cyanosis or edema. Dp and Pt Pulses intact. Cap refill less than 3 seconds  NEURO: CN 2-12 intact, normal finger to nose, normal romberg, no sensory or motor deficits, Alert, oriented, grossly unremarkable. No Focal deficits. GCS 15. NIH 0  PSYCH: Cooperative, appropriate.

## 2022-06-30 LAB
ALBUMIN SERPL ELPH-MCNC: 2.5 G/DL — LOW (ref 3.5–5.2)
ALP SERPL-CCNC: 46 U/L — SIGNIFICANT CHANGE UP (ref 30–115)
ALT FLD-CCNC: 15 U/L — SIGNIFICANT CHANGE UP (ref 0–41)
ANION GAP SERPL CALC-SCNC: 10 MMOL/L — SIGNIFICANT CHANGE UP (ref 7–14)
AST SERPL-CCNC: 34 U/L — SIGNIFICANT CHANGE UP (ref 0–41)
BILIRUB SERPL-MCNC: 3.3 MG/DL — HIGH (ref 0.2–1.2)
BUN SERPL-MCNC: 32 MG/DL — HIGH (ref 10–20)
CALCIUM SERPL-MCNC: 8.2 MG/DL — LOW (ref 8.5–10.1)
CHLORIDE SERPL-SCNC: 102 MMOL/L — SIGNIFICANT CHANGE UP (ref 98–110)
CHOLEST SERPL-MCNC: 138 MG/DL — SIGNIFICANT CHANGE UP
CO2 SERPL-SCNC: 30 MMOL/L — SIGNIFICANT CHANGE UP (ref 17–32)
CREAT SERPL-MCNC: 1.3 MG/DL — SIGNIFICANT CHANGE UP (ref 0.7–1.5)
EGFR: 41 ML/MIN/1.73M2 — LOW
GLUCOSE SERPL-MCNC: 92 MG/DL — SIGNIFICANT CHANGE UP (ref 70–99)
HCT VFR BLD CALC: 29.1 % — LOW (ref 37–47)
HDLC SERPL-MCNC: 43 MG/DL — LOW
HGB BLD-MCNC: 9.2 G/DL — LOW (ref 12–16)
LIPID PNL WITH DIRECT LDL SERPL: 76 MG/DL — SIGNIFICANT CHANGE UP
MAGNESIUM SERPL-MCNC: 1.7 MG/DL — LOW (ref 1.8–2.4)
MCHC RBC-ENTMCNC: 29.9 PG — SIGNIFICANT CHANGE UP (ref 27–31)
MCHC RBC-ENTMCNC: 31.6 G/DL — LOW (ref 32–37)
MCV RBC AUTO: 94.5 FL — SIGNIFICANT CHANGE UP (ref 81–99)
MRSA PCR RESULT.: NEGATIVE — SIGNIFICANT CHANGE UP
NON HDL CHOLESTEROL: 95 MG/DL — SIGNIFICANT CHANGE UP
NRBC # BLD: 0 /100 WBCS — SIGNIFICANT CHANGE UP (ref 0–0)
PLATELET # BLD AUTO: 54 K/UL — LOW (ref 130–400)
POTASSIUM SERPL-MCNC: 3 MMOL/L — LOW (ref 3.5–5)
POTASSIUM SERPL-SCNC: 3 MMOL/L — LOW (ref 3.5–5)
PROT SERPL-MCNC: 5.2 G/DL — LOW (ref 6–8)
RBC # BLD: 3.08 M/UL — LOW (ref 4.2–5.4)
RBC # FLD: 15.6 % — HIGH (ref 11.5–14.5)
SODIUM SERPL-SCNC: 142 MMOL/L — SIGNIFICANT CHANGE UP (ref 135–146)
TRIGL SERPL-MCNC: 93 MG/DL — SIGNIFICANT CHANGE UP
WBC # BLD: 2.12 K/UL — LOW (ref 4.8–10.8)
WBC # FLD AUTO: 2.12 K/UL — LOW (ref 4.8–10.8)

## 2022-06-30 PROCEDURE — 99232 SBSQ HOSP IP/OBS MODERATE 35: CPT | Mod: GC

## 2022-06-30 PROCEDURE — 76705 ECHO EXAM OF ABDOMEN: CPT | Mod: 26

## 2022-06-30 PROCEDURE — 93970 EXTREMITY STUDY: CPT | Mod: 26

## 2022-06-30 PROCEDURE — 93925 LOWER EXTREMITY STUDY: CPT | Mod: 26

## 2022-06-30 PROCEDURE — 99222 1ST HOSP IP/OBS MODERATE 55: CPT

## 2022-06-30 RX ORDER — POTASSIUM CHLORIDE 20 MEQ
20 PACKET (EA) ORAL
Refills: 0 | Status: COMPLETED | OUTPATIENT
Start: 2022-06-30 | End: 2022-06-30

## 2022-06-30 RX ORDER — FUROSEMIDE 40 MG
20 TABLET ORAL
Refills: 0 | Status: DISCONTINUED | OUTPATIENT
Start: 2022-06-30 | End: 2022-07-01

## 2022-06-30 RX ORDER — OMEGA-3 ACID ETHYL ESTERS 1 G
1 CAPSULE ORAL
Qty: 0 | Refills: 0 | DISCHARGE

## 2022-06-30 RX ORDER — ASCORBIC ACID 60 MG
1 TABLET,CHEWABLE ORAL
Qty: 0 | Refills: 0 | DISCHARGE

## 2022-06-30 RX ORDER — UBIDECARENONE 100 MG
1 CAPSULE ORAL
Qty: 0 | Refills: 0 | DISCHARGE

## 2022-06-30 RX ORDER — LACTOBACILLUS ACIDOPHILUS 100MM CELL
1 CAPSULE ORAL
Qty: 0 | Refills: 0 | DISCHARGE

## 2022-06-30 RX ORDER — MAGNESIUM SULFATE 500 MG/ML
1 VIAL (ML) INJECTION ONCE
Refills: 0 | Status: COMPLETED | OUTPATIENT
Start: 2022-06-30 | End: 2022-06-30

## 2022-06-30 RX ORDER — VANCOMYCIN HCL 1 G
1000 VIAL (EA) INTRAVENOUS EVERY 24 HOURS
Refills: 0 | Status: DISCONTINUED | OUTPATIENT
Start: 2022-06-30 | End: 2022-06-30

## 2022-06-30 RX ORDER — PREGABALIN 225 MG/1
1 CAPSULE ORAL
Qty: 0 | Refills: 0 | DISCHARGE

## 2022-06-30 RX ORDER — ENOXAPARIN SODIUM 100 MG/ML
40 INJECTION SUBCUTANEOUS EVERY 24 HOURS
Refills: 0 | Status: DISCONTINUED | OUTPATIENT
Start: 2022-06-30 | End: 2022-07-01

## 2022-06-30 RX ORDER — MAGNESIUM CARBONATE 54 MG/5 ML
0 LIQUID (ML) ORAL
Qty: 0 | Refills: 0 | DISCHARGE

## 2022-06-30 RX ORDER — CEFAZOLIN SODIUM 1 G
1000 VIAL (EA) INJECTION EVERY 8 HOURS
Refills: 0 | Status: DISCONTINUED | OUTPATIENT
Start: 2022-06-30 | End: 2022-07-07

## 2022-06-30 RX ADMIN — Medication 20 MILLIEQUIVALENT(S): at 13:06

## 2022-06-30 RX ADMIN — Medication 20 MILLIGRAM(S): at 05:55

## 2022-06-30 RX ADMIN — Medication 100 MILLIGRAM(S): at 22:03

## 2022-06-30 RX ADMIN — Medication 250 MILLIGRAM(S): at 05:52

## 2022-06-30 RX ADMIN — Medication 20 MILLIGRAM(S): at 18:23

## 2022-06-30 RX ADMIN — ENOXAPARIN SODIUM 40 MILLIGRAM(S): 100 INJECTION SUBCUTANEOUS at 05:59

## 2022-06-30 RX ADMIN — Medication 20 MILLIEQUIVALENT(S): at 17:33

## 2022-06-30 RX ADMIN — Medication 100 GRAM(S): at 17:35

## 2022-06-30 RX ADMIN — Medication 20 MILLIEQUIVALENT(S): at 22:03

## 2022-06-30 NOTE — CONSULT NOTE ADULT - ASSESSMENT
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 Pt was noted to have pancytopenia, splenomegaly and elevated bilirubin w/ concern for cirrhosis and Hepatology was consulted for management    #Decompensated liver cirrhosis (+EV, +Jaundice, ?HCC, -HE, ?Ascites, ?SBP, -HRS) possibly d/t NAFLD/CURRIE - r/o other etiologies  MELD-Na Score - 17  Rachel Score - B  CLD workup thus far - -AMA, -hepatitis, -ASMA, -Anti LKM, -Soluble Liver Ag  Echo: mild portal htn. no evidence of right heart failure  EGD (2017): small esophageal varices. fundic gland polyps    Plan  - Please obtain CTAP w/ IV Contrast - for better evaluation of liver and ascites  - Please send Iron studies   - Please obtain Quantiferon level  - Please send KEVIN, immunoglobulin panel  - Please send CMV PCR, EBV PCR, HSV IgM  - Please send Serum Drug screen and Utox  - Please obtain RUQ sono with Doppler  - Please avoid hepatotoxic medications  - Monitor LFTs and iNR qdaily  - avoid sedatives and opioids     # Esophageal varices  - will need repeat EGD as outpatient     Coagulopathy:  Monitor INR    # HCC screening:  Please f/u as outpatient for US abdomen and AFP every 6 months.    # Lifestyle modifications  Calorie intake 25-40 Kcal/kg/day  Protein intake: 1.2-1.5 gm/kg/day  Avoid smoking, alcohol, NSAIDS.    #Vaccinations:  Please f/u in clinic for being uptodate with HAV/HBV/Influenza/Pneumococcal vaccines.   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 Pt was noted to have pancytopenia, splenomegaly and elevated bilirubin w/ concern for cirrhosis and Hepatology was consulted for management    #Decompensated liver cirrhosis (+EV, +Jaundice, ?HCC, -HE, ?Ascites, ?SBP, -HRS) possibly d/t NAFLD/CURRIE - r/o other etiologies  MELD-Na Score - 17  Rachel Score - B  CLD workup thus far - -AMA, -hepatitis, -ASMA, -Anti LKM, -Soluble Liver Ag  Echo: mild portal htn. no evidence of right heart failure  EGD (2017): small esophageal varices. fundic gland polyps  FIbrosure - advanced fibrosis    Plan  - Please obtain CTAP w/ IV Contrast - for better evaluation of liver and ascites  - Please send Iron studies   - Please obtain Quantiferon level  - Please send KEVIN, immunoglobulin panel  - Please send CMV PCR, EBV PCR, HSV IgM  - Please send Serum Drug screen and Utox  - Please obtain RUQ sono with Doppler  - Please avoid hepatotoxic medications  - Monitor LFTs and iNR qdaily  - avoid sedatives and opioids     # Esophageal varices  - will need repeat EGD as outpatient     Coagulopathy:  Monitor INR    # HCC screening:  Please f/u as outpatient for US abdomen and AFP every 6 months.    # Lifestyle modifications  Calorie intake 25-40 Kcal/kg/day  Protein intake: 1.2-1.5 gm/kg/day  Avoid smoking, alcohol, NSAIDS.    #Vaccinations:  Please f/u in clinic for being uptodate with HAV/HBV/Influenza/Pneumococcal vaccines.   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 Pt was noted to have pancytopenia, splenomegaly and elevated bilirubin w/ concern for cirrhosis and Hepatology was consulted for management    #Decompensated liver cirrhosis (+EV, +Jaundice, ?HCC, -HE, ?Ascites, ?SBP, -HRS) possibly d/t NAFLD/CURRIE - r/o other etiologies  MELD-Na Score - 17  Rachel Score - B  CLD workup thus far - -AMA, -hepatitis, -ASMA, -Anti LKM, -Soluble Liver Ag  Echo: mild portal htn. no evidence of right heart failure  EGD (2017): small esophageal varices. fundic gland polyps  FIbrosure - advanced fibrosis    Plan  - Please obtain CTAP w/ IV Contrast - for better evaluation of liver and ascites  - Please send Iron studies   - Please obtain Quantiferon level  - Please send KEVIN, immunoglobulin panel  - Please send CMV PCR, EBV PCR, HSV IgM  - Please send Serum Drug screen and Utox  - Please obtain RUQ sono with Doppler  - Please avoid hepatotoxic medications  - Monitor LFTs and iNR qdaily  - avoid sedatives and opioids   - will need Liver biopsy if workup is negative    # Esophageal varices  - will need repeat EGD as outpatient     Coagulopathy:  Monitor INR    # HCC screening:  Please f/u as outpatient for US abdomen and AFP every 6 months.    # Lifestyle modifications  Calorie intake 25-40 Kcal/kg/day  Protein intake: 1.2-1.5 gm/kg/day  Avoid smoking, alcohol, NSAIDS.    #Vaccinations:  Please f/u in clinic for being uptodate with HAV/HBV/Influenza/Pneumococcal vaccines.

## 2022-06-30 NOTE — CONSULT NOTE ADULT - ASSESSMENT
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 All final recommendations to follow after interview and examination of the patient. Please follow recommendations noted below.    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, Possible ABRAHAM presents with c/o increased b/l LE edema    IMPRESSION  #Stasis ulcers with cellulitis   #Sepsis ruled out on admission   #Obesity BMI (kg/m2): 34.2  #Abx allergy: Cipro (Hives; Rash)  Levaquin (Hives; Rash)    Creatinine, Serum: 1.3 (06-30-22 @ 06:11)    Weight (kg): 82.1 (06-29-22 @ 16:04)    RECOMMENDATIONS  - D/C Vanc  - Cefazolin 1g q8h IV x 7 days, if d/c prior PO keflex 500mg four times daily  - MRSA nares  - local wound care  - elevation and compression  - rule out PAD    If any questions, please call or send a message on [employer] Teams  Please continue to update ID with any pertinent new laboratory or radiographic findings  Spectra [phone]

## 2022-06-30 NOTE — ADVANCED PRACTICE NURSE CONSULT - RECOMMEDATIONS
1. BLE wounds- Cleanse wound beds w/ hypocholorus wound cleanser/Vashe gently pat dry. Apply Cavilon no-sting barrier film to wound edges & periwound to prevent maceration. Apply silver hdyrofiber w/ Aquacel Ag to absorb wound exudate & prophylactically provide anti-microbial properties to wound bed  (upon removal of old dressing, it will be in gel form since it gels when in contact w/ wound exudate). Cover w/ ABD pad & wrap w/ Kerlex dressing. Change dressing daily and prn for strike-through drainage or soiling.  -Recommend vascular consult for further evaluation & treatment of ? underlying vascular/venous disease.   -Assess skin/wound qshift, report changes to primary provider.     Plan of Care: Primary RN Manasa made aware of above recs. Spoke w/  covering/primary MD Richey (at [id number]) in regards to above.  Signing off on patient, no further needs/recs from [facility] service at this time. Staff RN to perform routine skin/wound assessment and manage wound care. Questions or concerns or if wound worsens and reconsult needed, please contact [facility], Spectra #[phone].

## 2022-06-30 NOTE — PROGRESS NOTE ADULT - SUBJECTIVE AND OBJECTIVE BOX
OVERNIGHT EVENTS: NAD.       VITAL SIGNS:  Vital Signs Last 24 Hrs  T(C): 36.8 (30 Jun 2022 14:00), Max: 36.8 (30 Jun 2022 14:00)  T(F): 98.3 (30 Jun 2022 14:00), Max: 98.3 (30 Jun 2022 14:00)  HR: 79 (30 Jun 2022 14:00) (69 - 79)  BP: 127/60 (30 Jun 2022 14:00) (108/51 - 127/60)  BP(mean): --  RR: 18 (30 Jun 2022 14:00) (18 - 18)  SpO2: 97% (30 Jun 2022 14:00) (95% - 100%)    PHYSICAL EXAM:    General: Well developed, well nourished, no acute distress  HEENT: NC/AT; PERRL, +scleral icterus  Neck: supple, no JVD  Cardiovascular: +S1/S2, RRR, no murmurs, rubs, gallops  Respiratory: diminished breath sound B/L  Gastrointestinal: soft, NT/ND; +BSx4      MEDICATIONS:  MEDICATIONS  (STANDING):  ceFAZolin   IVPB 1000 milliGRAM(s) IV Intermittent every 8 hours  enoxaparin Injectable 40 milliGRAM(s) SubCutaneous every 24 hours  furosemide   Injectable 20 milliGRAM(s) IV Push two times a day  potassium chloride   Powder 20 milliEquivalent(s) Oral every 2 hours    MEDICATIONS  (PRN):      ALLERGIES:  Allergies    Cipro (Hives; Rash)  Levaquin (Hives; Rash)    Intolerances        LABS:                        9.2    2.12  )-----------( 54       ( 30 Jun 2022 06:11 )             29.1     06-30    142  |  102  |  32<H>  ----------------------------<  92  3.0<L>   |  30  |  1.3    Ca    8.2<L>      30 Jun 2022 06:11  Mg     1.7     06-30    TPro  5.2<L>  /  Alb  2.5<L>  /  TBili  3.3<H>  /  DBili  x   /  AST  34  /  ALT  15  /  AlkPhos  46  06-30    PT/INR - ( 29 Jun 2022 18:26 )   PT: 15.10 sec;   INR: 1.32 ratio         PTT - ( 29 Jun 2022 18:26 )  PTT:32.9 sec    CAPILLARY BLOOD GLUCOSE          RADIOLOGY & ADDITIONAL TESTS: Reviewed.    PLAN:

## 2022-06-30 NOTE — CONSULT NOTE ADULT - SUBJECTIVE AND OBJECTIVE BOX
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 Patient endorses feeling weak when doing her daily ADLs, patient denies SOB. Patient denies any chest pain, palpitation. Patient denies fever, chills, headache, dizziness. Patient denies abdominal pain but endorsing constipation. Patient denies n/v. Patient was recently seen by Dr. Paredes for weight gain, and started on Torsemide 20mg qd. Patient follows Dr. Erickson for splenomegaly and pancytopenia who is recomending liver biopsy. Patient was seen by GI in the past and Fibrosure testing was suggestive of severe liver fibrosis, had EGD done showing small esophageal varices and gastric polyps, and patient was advised to start Nadolol. Patient sought a second opinion and repeated EGD reportedly was negative for varices Patient never took Nadolol. Screening for viral hepatitis, hemachromatosis og' s disease, and autoimmune lives disease was negative.   patient was admitted 5/16/2022 - 5/20/2022 for worsening lower extremity edema and hypoxia was medically  managed and discharged home  with VNS for treatment of this leg wound and supplemental oxygen   ED vitals T(F): 98.2, HR: 84, BP: 139/64, RR: 17, SpO2: 96% on RA   Labs significant for pancytopenia Hb 10.2, WBC 2.88, PLT 70, Lactate 1.8  TPro  5.9<L>  /  Alb  2.8<L>  /  TBili  4.1<H>  /  DBili  0.8<H>  /  AST  35  /  ALT  16  /  AlkPhos  51   PT: 15.10 sec; INR: 1.32 ratio; PTT: 32.9 sec    138  |  97<L>  |  33<H>  ----------------------------<  114<H>  3.2<L>   |  30  |  1.2    Ca    8.7      29 Jun 2022 18:26         (29 Jun 2022 23:01)        Prior EGD: (2017): small varices. fundic gland polyp    Prior Colonoscopy: 7 years ago polyps removed per patient      PAST MEDICAL & SURGICAL HISTORY:  GERD (gastroesophageal reflux disease)      Arthritis  OA      Fatty liver  AS PER PATIENT 15YEARS  PT REPORTS- I HAVE AN ENLARGED SPLEEN  LOW PLATELETS.      Malignant neoplasm of areola of left breast in female, unspecified estrogen receptor status      H/O thrombocytopenia      Blister of right lower leg      History of surgery  LEFT BREAST LUMPECTOMY  TUBIAL LIGATION  CHOLECYSTECTOMY  RIGHT &amp; LEFT TKR      History of cholecystectomy            FAMILY HISTORY:  Family history of breast cancer in mother (Mother)    Family history of Parkinson disease (Father)        Social History:  Tobacco: denies   Alcohol: denies   Drugs: denies    Home Medications:  torsemide 20 mg oral tablet: 1 tab(s) orally once a day (30 Jun 2022 00:05)        MEDICATIONS  (STANDING):  enoxaparin Injectable 40 milliGRAM(s) SubCutaneous every 24 hours  magnesium sulfate  IVPB 1 Gram(s) IV Intermittent once  potassium chloride   Powder 20 milliEquivalent(s) Oral every 2 hours  torsemide 20 milliGRAM(s) Oral daily  vancomycin  IVPB 1000 milliGRAM(s) IV Intermittent every 24 hours    MEDICATIONS  (PRN):      Allergies  Cipro (Hives; Rash)  Levaquin (Hives; Rash)              Physical Examination:  T(C): 35.8 (06-30-22 @ 05:00), Max: 36.8 (06-29-22 @ 16:04)  HR: 69 (06-30-22 @ 05:00) (69 - 84)  BP: 108/51 (06-30-22 @ 05:00) (108/51 - 139/64)  RR: 18 (06-30-22 @ 05:00) (17 - 18)  SpO2: 100% (06-30-22 @ 05:00) (95% - 100%)  Height (cm): 154.9 (06-29-22 @ 16:04)  Weight (kg): 82.1 (06-29-22 @ 16:04)        GENERAL: AAOx3, no acute distress.  HEAD:  Atraumatic, Normocephalic  EYES: conjunctiva and sclera clear  NECK: Supple, no JVD or thyromegaly  CHEST/LUNG:  reduced breath sounds b/l  HEART: Regular rate and rhythm; normal S1, S2, No murmurs.  ABDOMEN: Soft, nontender, nondistended; Bowel sounds present  NEUROLOGY: No asterixis or tremor.         Data:                        9.2    2.12  )-----------( 54       ( 30 Jun 2022 06:11 )             29.1     Hgb Trend:  9.2  06-30-22 @ 06:11  10.2  06-29-22 @ 18:26      06-30    142  |  102  |  32<H>  ----------------------------<  92  3.0<L>   |  30  |  1.3    Ca    8.2<L>      30 Jun 2022 06:11  Mg     1.7     06-30    TPro  5.2<L>  /  Alb  2.5<L>  /  TBili  3.3<H>  /  DBili  x   /  AST  34  /  ALT  15  /  AlkPhos  46  06-30    Liver panel trend:  TBili 3.3   /   AST 34   /   ALT 15   /   AlkP 46   /   Tptn 5.2   /   Alb 2.5    /   DBili --      06-30  TBili 4.1   /   AST 35   /   ALT 16   /   AlkP 51   /   Tptn 5.9   /   Alb 2.8    /   DBili 0.8      06-29      PT/INR - ( 29 Jun 2022 18:26 )   PT: 15.10 sec;   INR: 1.32 ratio         PTT - ( 29 Jun 2022 18:26 )  PTT:32.9 sec        Radiology:       Gastroenterology Consultation:    Patient is a 81y old  Female who presents with a chief complaint of Lower extremity Edema and cellulitis (30 Jun 2022 08:46)        Admitted on: 06-29-22      HPI:    This is a 80 y/o F with PMHx of splenomegaly with leukopenia and thrombocytopenia 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 Patient endorses feeling weak when doing her daily ADLs, patient denies SOB. Patient denies any chest pain, palpitation. Patient denies fever, chills, headache, dizziness. Patient denies abdominal pain but endorsing constipation. Patient denies n/v. Patient was recently seen by Dr. Paredes for weight gain, and started on Torsemide 20mg qd. Patient follows Dr. Erickson for splenomegaly and pancytopenia who is recomending liver biopsy. Patient was seen by GI in the past and Fibrosure testing was suggestive of severe liver fibrosis, had EGD done showing small esophageal varices and gastric polyps, and patient was advised to start Nadolol. Patient sought a second opinion and repeated EGD reportedly was negative for varices Patient never took Nadolol. Screening for viral hepatitis, hemachromatosis og' s disease, and autoimmune lives disease was negative.   patient was admitted 5/16/2022 - 5/20/2022 for worsening lower extremity edema and hypoxia was medically  managed and discharged home  with VNS for treatment of this leg wound and supplemental oxygen   ED vitals T(F): 98.2, HR: 84, BP: 139/64, RR: 17, SpO2: 96% on RA   Labs significant for pancytopenia Hb 10.2, WBC 2.88, PLT 70, Lactate 1.8  TPro  5.9<L>  /  Alb  2.8<L>  /  TBili  4.1<H>  /  DBili  0.8<H>  /  AST  35  /  ALT  16  /  AlkPhos  51   PT: 15.10 sec; INR: 1.32 ratio; PTT: 32.9 sec    138  |  97<L>  |  33<H>  ----------------------------<  114<H>  3.2<L>   |  30  |  1.2    Ca    8.7      29 Jun 2022 18:26         (29 Jun 2022 23:01)        Prior EGD: (2017): small varices. fundic gland polyp    Prior Colonoscopy: 7 years ago polyps removed per patient      PAST MEDICAL & SURGICAL HISTORY:  GERD (gastroesophageal reflux disease)      Arthritis  OA      Fatty liver  AS PER PATIENT 15YEARS  PT REPORTS- I HAVE AN ENLARGED SPLEEN  LOW PLATELETS.      Malignant neoplasm of areola of left breast in female, unspecified estrogen receptor status      H/O thrombocytopenia      Blister of right lower leg      History of surgery  LEFT BREAST LUMPECTOMY  TUBIAL LIGATION  CHOLECYSTECTOMY  RIGHT &amp; LEFT TKR      History of cholecystectomy            FAMILY HISTORY:  Family history of breast cancer in mother (Mother)    Family history of Parkinson disease (Father)        Social History:  Tobacco: denies   Alcohol: denies   Drugs: denies    Home Medications:  torsemide 20 mg oral tablet: 1 tab(s) orally once a day (30 Jun 2022 00:05)        MEDICATIONS  (STANDING):  enoxaparin Injectable 40 milliGRAM(s) SubCutaneous every 24 hours  magnesium sulfate  IVPB 1 Gram(s) IV Intermittent once  potassium chloride   Powder 20 milliEquivalent(s) Oral every 2 hours  torsemide 20 milliGRAM(s) Oral daily  vancomycin  IVPB 1000 milliGRAM(s) IV Intermittent every 24 hours    MEDICATIONS  (PRN):      Allergies  Cipro (Hives; Rash)  Levaquin (Hives; Rash)              Physical Examination:  T(C): 35.8 (06-30-22 @ 05:00), Max: 36.8 (06-29-22 @ 16:04)  HR: 69 (06-30-22 @ 05:00) (69 - 84)  BP: 108/51 (06-30-22 @ 05:00) (108/51 - 139/64)  RR: 18 (06-30-22 @ 05:00) (17 - 18)  SpO2: 100% (06-30-22 @ 05:00) (95% - 100%)  Height (cm): 154.9 (06-29-22 @ 16:04)  Weight (kg): 82.1 (06-29-22 @ 16:04)        GENERAL: AAOx3, no acute distress.  HEAD:  Atraumatic, Normocephalic  EYES: Scleral icterus  NECK: Supple, no JVD or thyromegaly  CHEST/LUNG:  reduced breath sounds b/l  HEART: Regular rate and rhythm; normal S1, S2, No murmurs.  ABDOMEN: Soft, nontender, nondistended; Bowel sounds present  NEUROLOGY: No asterixis or tremor.         Data:                        9.2    2.12  )-----------( 54       ( 30 Jun 2022 06:11 )             29.1     Hgb Trend:  9.2  06-30-22 @ 06:11  10.2  06-29-22 @ 18:26      06-30    142  |  102  |  32<H>  ----------------------------<  92  3.0<L>   |  30  |  1.3    Ca    8.2<L>      30 Jun 2022 06:11  Mg     1.7     06-30    TPro  5.2<L>  /  Alb  2.5<L>  /  TBili  3.3<H>  /  DBili  x   /  AST  34  /  ALT  15  /  AlkPhos  46  06-30    Liver panel trend:  TBili 3.3   /   AST 34   /   ALT 15   /   AlkP 46   /   Tptn 5.2   /   Alb 2.5    /   DBili --      06-30  TBili 4.1   /   AST 35   /   ALT 16   /   AlkP 51   /   Tptn 5.9   /   Alb 2.8    /   DBili 0.8      06-29      PT/INR - ( 29 Jun 2022 18:26 )   PT: 15.10 sec;   INR: 1.32 ratio         PTT - ( 29 Jun 2022 18:26 )  PTT:32.9 sec        Radiology:

## 2022-06-30 NOTE — PROGRESS NOTE ADULT - ASSESSMENT
This is a 80 y/o F with PMHx of hepatocellular disease, splenomegaly with leukopenia and thrombocytopenia, 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    #Cellulitis of the B/L LE 2/2 chronic LE edema    - Patient is afebrile, hemodynamically stable, SIRS not present on admission   - Ancef IV 1g q8 (day 1/7-if D/C prior, PO keflex 500mg 4x/day)  - Appreciate ostomy care reccs   - F/u Blood culture   - X-ray: subcutaneous infiltration without gas.      #Hepatocellular disease  #splenomegaly with leukopenia and thrombocytopenia  - Patient follows Dr. Erickson for splenomegaly and pancytopenia who is recommending liver biopsy.   - Patient was seen by GI in the past and Fibrosure testing was suggestive of severe liver fibrosis, had EGD done showing small esophageal varices and gastric polyps, and patient was advised to start Nadolol. Patient sought a second opinion and repeated EGD reportedly was negative for varices Patient never took Nadolol. Screening for viral hepatitis, hemachromatosis og' s disease, and autoimmune lives disease was negative.   - TPro  5.9<L>  /  Alb  2.8<L>  /  TBili  4.1<H>  /  DBili  0.8<H>  /  AST  35  /  ALT  16  /  AlkPhos  51   - PT: 15.10 sec; INR: 1.32 ratio; PTT: 32.9 sec  - hepatology reccs appreciated  - F/u RUQ US     #PHTN WHO II  #Possible ABRAHAM on Home O2   - Follows Dr. Simpson   - was recently seen by Dr. Paredes   - ECHO 4/22/22: EF 72%, , grade one Diastolic disfunction  - On Lasix 20 BID  - Strict Is and Os   - Monitor Daily weight     #Misc   - DVT prophylaxis: Lovenox  - GI prophylaxis: not indicated   - Diet: DASH   - Activity: IAT   - Disposition: Medicine

## 2022-06-30 NOTE — ADVANCED PRACTICE NURSE CONSULT - ASSESSMENT
80 y/o F with PMHx of hepatocellular disease, splenomegaly with leukopenia and thrombocytopenia, PHTN WHO II, Possible ABRAHAM presents (6/29)  with c/o increased b/l LE edema and cellulitis. Patient endorses feeling weak when doing her daily ADLs, patient denies SOB. Patient denies any chest pain, palpitation. Patient denies fever, chills, headache, dizziness. Patient denies abdominal pain but endorsing constipation. Patient denies n/v. Patient was recently seen by Dr. Paredes for weight gain, and started on Torsemide 20mg qd. Patient follows Dr. Erickson for splenomegaly and pancytopenia who is recommending liver biopsy. Patient was seen by GI in the past and Fibrosure testing was suggestive of severe liver fibrosis, had EGD done showing small esophageal varices and gastric polyps, and patient was advised to start Nadolol. Patient sought a second opinion and repeated EGD reportedly was negative for varices Patient never took Nadolol. Screening for viral hepatitis, hemachromatosis og' s disease, and autoimmune lives disease was negative.   patient was admitted 5/16/2022 - 5/20/2022 for worsening lower extremity edema and hypoxia was medically  managed   Currently admitted to medicine with primary diagnosis of lower extremity edema & cellulitis, today is hospital day-1d; on 3A, being managed for Cellulitis of the B/L LE 2/2 chronic LE edema; Hepatocellular disease; splenomegaly with leukopenia and thrombocytopenia; PHTN WHO II; Possible ABRAHAM on Home O2.     Received patient  on 3A, sitting up at edge of bed, awake, A&Ox3, made aware of purpose of WOCN visit, agreeable to consult. Patient reports BLE wounds present x 3 months- managed at home by home care nursing services w/ 2-3x week dressings changes. Patient also reports seeing vascular MD (patient cannot recall MD name at this time) ~ 3 weeks w/ no follow-up appointment at this time.       RLE w/ multiple dried, gelled silver hydrofiber Aquacel Ag dressings to wound beds in place, LLE wrapped w/ kerlex dressings. All dressings gently removed from wound bed w/ saline irrigation.    BLEs edematous, hyperpigmented with dry crusted skin throughout presenting as venous statis dermatitis. Multiple full thickness wounds throughout BLEs presenting as venous statis ulcerations- wound beds irregularly shaped, marbleized w/ dark pink & yellow subcutaneous tissue, edges irregular, periwound hyperpigmented w/ dry crusting skin, removed hydrofiber dressing in gelled form, patient also reports wounds "drain a lot". No odor, induration, erythema, warmth, or crepitus on assessment. Patient reports tenderness to open wound beds upon wound cleansing.

## 2022-06-30 NOTE — CONSULT NOTE ADULT - SUBJECTIVE AND OBJECTIVE BOX
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 Patient endorses feeling weak when doing her daily ADLs, patient denies SOB. Patient denies any chest pain, palpitation. Patient denies fever, chills, headache, dizziness. Patient denies abdominal pain but endorsing constipation. Patient denies n/v. Patient was recently seen by Dr. Paredes for weight gain, and started on Torsemide 20mg qd. Patient follows Dr. Erickson for splenomegaly and pancytopenia who is recomending liver biopsy. Patient was seen by GI in the past and Fibrosure testing was suggestive of severe liver fibrosis, had EGD done showing small esophageal varices and gastric polyps, and patient was advised to start Nadolol. Patient sought a second opinion and repeated EGD reportedly was negative for varices Patient never took Nadolol. Screening for viral hepatitis, hemachromatosis og' s disease, and autoimmune lives disease was negative.   patient was admitted 5/16/2022 - 5/20/2022 for worsening lower extremity edema and hypoxia was medically  managed and discharged home  with VNS for treatment of this leg wound and supplemental oxygen   ED vitals T(F): 98.2, HR: 84, BP: 139/64, RR: 17, SpO2: 96% on RA   Labs significant for pancytopenia Hb 10.2, WBC 2.88, PLT 70, Lactate 1.8  TPro  5.9<L>  /  Alb  2.8<L>  /  TBili  4.1<H>  /  DBili  0.8<H>  /  AST  35  /  ALT  16  /  AlkPhos  51   PT: 15.10 sec; INR: 1.32 ratio; PTT: 32.9 sec    138  |  97<L>  |  33<H>  ----------------------------<  114<H>  3.2<L>   |  30  |  1.2    Ca    8.7      29 Jun 2022 18:26         (29 Jun 2022 23:01)      INFECTIOUS DISEASE HISTORY:  ID consulted for bilateral cellulitis- currently on vanco   Afebrile   No leukocytosis     PMH  PAST MEDICAL & SURGICAL HISTORY:  GERD (gastroesophageal reflux disease)      Arthritis  OA      Fatty liver  AS PER PATIENT 15YEARS  PT REPORTS- I HAVE AN ENLARGED SPLEEN  LOW PLATELETS.      Malignant neoplasm of areola of left breast in female, unspecified estrogen receptor status      H/O thrombocytopenia      Blister of right lower leg      History of surgery  LEFT BREAST LUMPECTOMY  TUBIAL LIGATION  CHOLECYSTECTOMY  RIGHT &amp; LEFT TKR      History of cholecystectomy          FAMILY HISTORY  No pertinent family history in first degree relatives    Family history of breast cancer in mother (Mother)    Family history of Parkinson disease (Father)        SOCIAL HISTORY  Social History:  Tobacco use:  No  EtOH use: Occasional  Illicit drug use: No  Marital Status:  (16 May 2022 17:43)        ROS  ***    VITALS:  T(F): 96.4, Max: 98.2 (06-29-22 @ 16:04)  HR: 69  BP: 108/51  RR: 18Vital Signs Last 24 Hrs  T(C): 35.8 (30 Jun 2022 05:00), Max: 36.8 (29 Jun 2022 16:04)  T(F): 96.4 (30 Jun 2022 05:00), Max: 98.2 (29 Jun 2022 16:04)  HR: 69 (30 Jun 2022 05:00) (69 - 84)  BP: 108/51 (30 Jun 2022 05:00) (108/51 - 139/64)  BP(mean): --  RR: 18 (30 Jun 2022 05:00) (17 - 18)  SpO2: 100% (30 Jun 2022 05:00) (95% - 100%)    PHYSICAL EXAM:  ***    TESTS & MEASUREMENTS:                        9.2    2.12  )-----------( 54       ( 30 Jun 2022 06:11 )             29.1     06-30    142  |  102  |  32<H>  ----------------------------<  92  3.0<L>   |  30  |  1.3    Ca    8.2<L>      30 Jun 2022 06:11  Mg     1.7     06-30    TPro  5.2<L>  /  Alb  2.5<L>  /  TBili  3.3<H>  /  DBili  x   /  AST  34  /  ALT  15  /  AlkPhos  46  06-30      LIVER FUNCTIONS - ( 30 Jun 2022 06:11 )  Alb: 2.5 g/dL / Pro: 5.2 g/dL / ALK PHOS: 46 U/L / ALT: 15 U/L / AST: 34 U/L / GGT: x               Culture - Urine (collected 05-21-22 @ 19:07)  Source: Clean Catch Clean Catch (Midstream)  Final Report (05-22-22 @ 22:52):    <10,000 CFU/mL Normal Urogenital Anali    Culture - Blood (collected 05-21-22 @ 16:15)  Source: .Blood Blood  Final Report (05-27-22 @ 02:00):    No Growth Final    Culture - Blood (collected 05-21-22 @ 14:10)  Source: .Blood Blood  Final Report (05-27-22 @ 02:00):    No Growth Final        Lactate, Blood: 1.8 mmol/L (06-29-22 @ 18:26)      INFECTIOUS DISEASES TESTING  COVID-19 PCR: NotDetec (06-29-22 @ 21:31)  Rapid RVP Result: NotDetec (05-21-22 @ 16:00)  Procalcitonin, Serum: 0.27 ng/mL (05-18-22 @ 06:38)  Rapid RVP Result: NotDetec (05-17-22 @ 14:00)  COVID-19 PCR: NotDetec (05-16-22 @ 15:51)  Hepatitis B Surface Antigen: Nonreact (04-26-22 @ 06:05)  Hepatitis C Virus Interpretation: Nonreact (04-26-22 @ 06:05)  COVID-19 PCR: NotDetec (04-23-22 @ 12:02)      INFLAMMATORY MARKERS      RADIOLOGY & ADDITIONAL TESTS:  I have personally reviewed the last Chest xray  CXR      CT      CARDIOLOGY TESTING  12 Lead ECG:   Ventricular Rate 78 BPM    Atrial Rate 78 BPM    P-R Interval 144 ms    QRS Duration 90 ms    Q-T Interval 440 ms    QTC Calculation(Bazett) 501 ms    P Axis 35 degrees    R Axis -19 degrees    T Axis 64 degrees    Diagnosis Line Sinus rhythm with occasional Premature ventricular complexes and Premature  atrial complexes  Nonspecific ST and T wave abnormality  Abnormal ECG    Confirmed by Alex Reagan ([id number]) on 6/30/2022 7:35:38 AM (06-29-22 @ 19:37)      MEDICATIONS  enoxaparin Injectable 40 SubCutaneous every 24 hours  torsemide 20 Oral daily  vancomycin  IVPB 1000 IV Intermittent every 24 hours        ANTIBIOTICS:  vancomycin  IVPB 1000 milliGRAM(s) IV Intermittent every 24 hours      ALLERGIES:  Cipro (Hives; Rash)  Levaquin (Hives; Rash)         BRAULIO ESPARZA  81y, Female  Allergy: Cipro (Hives; Rash)  Levaquin (Hives; Rash)      CHIEF COMPLAINT:   Lower extremity Edema and cellulitis (29 Jun 2022 23:01)      LOS  1d    HPI  HPI:  This is a 82 y/o F with PMHx of hepatocellular disease, splenomegaly with leukopenia and thrombocytopenia, 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 Patient endorses feeling weak when doing her daily ADLs, patient denies SOB. Patient denies any chest pain, palpitation. Patient denies fever, chills, headache, dizziness. Patient denies abdominal pain but endorsing constipation. Patient denies n/v. Patient was recently seen by Dr. Paredes for weight gain, and started on Torsemide 20mg qd. Patient follows Dr. Erickson for splenomegaly and pancytopenia who is recomending liver biopsy. Patient was seen by GI in the past and Fibrosure testing was suggestive of severe liver fibrosis, had EGD done showing small esophageal varices and gastric polyps, and patient was advised to start Nadolol. Patient sought a second opinion and repeated EGD reportedly was negative for varices Patient never took Nadolol. Screening for viral hepatitis, hemachromatosis og' s disease, and autoimmune lives disease was negative.   patient was admitted 5/16/2022 - 5/20/2022 for worsening lower extremity edema and hypoxia was medically  managed and discharged home  with VNS for treatment of this leg wound and supplemental oxygen   ED vitals T(F): 98.2, HR: 84, BP: 139/64, RR: 17, SpO2: 96% on RA   Labs significant for pancytopenia Hb 10.2, WBC 2.88, PLT 70, Lactate 1.8  TPro  5.9<L>  /  Alb  2.8<L>  /  TBili  4.1<H>  /  DBili  0.8<H>  /  AST  35  /  ALT  16  /  AlkPhos  51   PT: 15.10 sec; INR: 1.32 ratio; PTT: 32.9 sec    138  |  97<L>  |  33<H>  ----------------------------<  114<H>  3.2<L>   |  30  |  1.2    Ca    8.7      29 Jun 2022 18:26         (29 Jun 2022 23:01)      INFECTIOUS DISEASE HISTORY:  ID consulted for bilateral cellulitis- currently on vanco   Afebrile   No leukocytosis     PMH  PAST MEDICAL & SURGICAL HISTORY:  GERD (gastroesophageal reflux disease)      Arthritis  OA      Fatty liver  AS PER PATIENT 15YEARS  PT REPORTS- I HAVE AN ENLARGED SPLEEN  LOW PLATELETS.      Malignant neoplasm of areola of left breast in female, unspecified estrogen receptor status      H/O thrombocytopenia      Blister of right lower leg      History of surgery  LEFT BREAST LUMPECTOMY  TUBIAL LIGATION  CHOLECYSTECTOMY  RIGHT &amp; LEFT TKR      History of cholecystectomy          FAMILY HISTORY  No pertinent family history in first degree relatives    Family history of breast cancer in mother (Mother)    Family history of Parkinson disease (Father)        SOCIAL HISTORY  Social History:  Tobacco use:  No  EtOH use: Occasional  Illicit drug use: No  Marital Status:  (16 May 2022 17:43)        ROS  General: Denies rigors, nightsweats  HEENT: Denies headache, rhinorrhea, sore throat, eye pain  CV: Denies CP, palpitations  PULM: Denies wheezing, hemoptysis  GI: Denies hematemesis, hematochezia, melena  : Denies discharge, hematuria  MSK: Denies arthralgias, myalgias  SKIN: as noted above   NEURO: Denies paresthesias, weakness  PSYCH: Denies depression, anxiety     VITALS:  T(F): 96.4, Max: 98.2 (06-29-22 @ 16:04)  HR: 69  BP: 108/51  RR: 18Vital Signs Last 24 Hrs  T(C): 35.8 (30 Jun 2022 05:00), Max: 36.8 (29 Jun 2022 16:04)  T(F): 96.4 (30 Jun 2022 05:00), Max: 98.2 (29 Jun 2022 16:04)  HR: 69 (30 Jun 2022 05:00) (69 - 84)  BP: 108/51 (30 Jun 2022 05:00) (108/51 - 139/64)  BP(mean): --  RR: 18 (30 Jun 2022 05:00) (17 - 18)  SpO2: 100% (30 Jun 2022 05:00) (95% - 100%)    PHYSICAL EXAM:  Gen: NAD, resting in bed  HEENT: Normocephalic, atraumatic  Neck: supple, no lymphadenopathy  CV: Regular rate & regular rhythm  Lungs: decreased BS at bases, no fremitus  Abdomen: Soft, BS present  Ext: Warm, well perfused b/l LE lymphedema/stasis dermatitis, bilateral superficial weeping ulcers  Neuro: non focal, awake  Skin: no rash, no erythema  Lines: no phlebitis     TESTS & MEASUREMENTS:                        9.2    2.12  )-----------( 54       ( 30 Jun 2022 06:11 )             29.1     06-30    142  |  102  |  32<H>  ----------------------------<  92  3.0<L>   |  30  |  1.3    Ca    8.2<L>      30 Jun 2022 06:11  Mg     1.7     06-30    TPro  5.2<L>  /  Alb  2.5<L>  /  TBili  3.3<H>  /  DBili  x   /  AST  34  /  ALT  15  /  AlkPhos  46  06-30      LIVER FUNCTIONS - ( 30 Jun 2022 06:11 )  Alb: 2.5 g/dL / Pro: 5.2 g/dL / ALK PHOS: 46 U/L / ALT: 15 U/L / AST: 34 U/L / GGT: x               Culture - Urine (collected 05-21-22 @ 19:07)  Source: Clean Catch Clean Catch (Midstream)  Final Report (05-22-22 @ 22:52):    <10,000 CFU/mL Normal Urogenital Anali    Culture - Blood (collected 05-21-22 @ 16:15)  Source: .Blood Blood  Final Report (05-27-22 @ 02:00):    No Growth Final    Culture - Blood (collected 05-21-22 @ 14:10)  Source: .Blood Blood  Final Report (05-27-22 @ 02:00):    No Growth Final        Lactate, Blood: 1.8 mmol/L (06-29-22 @ 18:26)      INFECTIOUS DISEASES TESTING  COVID-19 PCR: NotDetec (06-29-22 @ 21:31)  Rapid RVP Result: NotDetec (05-21-22 @ 16:00)  Procalcitonin, Serum: 0.27 ng/mL (05-18-22 @ 06:38)  Rapid RVP Result: NotDetec (05-17-22 @ 14:00)  COVID-19 PCR: NotDetec (05-16-22 @ 15:51)  Hepatitis B Surface Antigen: Nonreact (04-26-22 @ 06:05)  Hepatitis C Virus Interpretation: Nonreact (04-26-22 @ 06:05)  COVID-19 PCR: NotDetec (04-23-22 @ 12:02)      INFLAMMATORY MARKERS      RADIOLOGY & ADDITIONAL TESTS:  I have personally reviewed the last Chest xray  CXR      CT      CARDIOLOGY TESTING  12 Lead ECG:   Ventricular Rate 78 BPM    Atrial Rate 78 BPM    P-R Interval 144 ms    QRS Duration 90 ms    Q-T Interval 440 ms    QTC Calculation(Bazett) 501 ms    P Axis 35 degrees    R Axis -19 degrees    T Axis 64 degrees    Diagnosis Line Sinus rhythm with occasional Premature ventricular complexes and Premature  atrial complexes  Nonspecific ST and T wave abnormality  Abnormal ECG    Confirmed by Alex Reagan ([id number]) on 6/30/2022 7:35:38 AM (06-29-22 @ 19:37)      MEDICATIONS  enoxaparin Injectable 40 SubCutaneous every 24 hours  torsemide 20 Oral daily  vancomycin  IVPB 1000 IV Intermittent every 24 hours        ANTIBIOTICS:  vancomycin  IVPB 1000 milliGRAM(s) IV Intermittent every 24 hours      ALLERGIES:  Cipro (Hives; Rash)  Levaquin (Hives; Rash)

## 2022-06-30 NOTE — PROGRESS NOTE ADULT - ATTENDING COMMENTS
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    #Cellulitis of the B/L LE 2/2 chronic LE edema    - Patient is afebrile, hemodynamically stable, SIRS not present on admission   - Ancef IV 1g q8 (day 1/7-if D/C prior, PO keflex 500mg 4x/day)  - LWC  - F/u Blood culture   - X-ray: subcutaneous infiltration without gas.      #Hepatocellular disease  #splenomegaly with leukopenia and thrombocytopenia  - Patient follows Dr. Erickson for splenomegaly and pancytopenia who is recommending liver biopsy.   - Liver w/u per hepatology > Outpatient f/u    #Chronic venous stasis:  likely d/t impaired IVC drainage 2/2 Liver fibrosis  AT Home uses torsemide 20 QD.   While here, try lasix 20 IV BID. strict I/O.    #PHTN WHO II  #Possible ABRAHAM on Home O2   - Follows Dr. Simpson   - was recently seen by Dr. Paredes   - ECHO 4/22/22: EF 72%, , grade one Diastolic disfunction    #Misc   - DVT prophylaxis: Lovenox  - GI prophylaxis: not indicated   - Diet: DASH   - Activity: IAT   - Disposition: Medicine

## 2022-06-30 NOTE — PATIENT PROFILE ADULT - FALL HARM RISK - HARM RISK INTERVENTIONS

## 2022-07-01 ENCOUNTER — TRANSCRIPTION ENCOUNTER (OUTPATIENT)
Age: 82
End: 2022-07-01

## 2022-07-01 LAB
ALBUMIN SERPL ELPH-MCNC: 3.1 G/DL — LOW (ref 3.5–5.2)
ALP SERPL-CCNC: 76 U/L — SIGNIFICANT CHANGE UP (ref 30–115)
ALT FLD-CCNC: 18 U/L — SIGNIFICANT CHANGE UP (ref 0–41)
ANION GAP SERPL CALC-SCNC: 12 MMOL/L — SIGNIFICANT CHANGE UP (ref 7–14)
AST SERPL-CCNC: 38 U/L — SIGNIFICANT CHANGE UP (ref 0–41)
BILIRUB SERPL-MCNC: 2.9 MG/DL — HIGH (ref 0.2–1.2)
BUN SERPL-MCNC: 30 MG/DL — HIGH (ref 10–20)
CALCIUM SERPL-MCNC: 8.6 MG/DL — SIGNIFICANT CHANGE UP (ref 8.5–10.1)
CHLORIDE SERPL-SCNC: 103 MMOL/L — SIGNIFICANT CHANGE UP (ref 98–110)
CMV DNA CSF QL NAA+PROBE: SIGNIFICANT CHANGE UP
CMV DNA SPEC NAA+PROBE-LOG#: SIGNIFICANT CHANGE UP LOG10IU/ML
CO2 SERPL-SCNC: 28 MMOL/L — SIGNIFICANT CHANGE UP (ref 17–32)
CREAT SERPL-MCNC: 1.3 MG/DL — SIGNIFICANT CHANGE UP (ref 0.7–1.5)
EGFR: 41 ML/MIN/1.73M2 — LOW
FERRITIN SERPL-MCNC: 258 NG/ML — HIGH (ref 15–150)
GLUCOSE SERPL-MCNC: 119 MG/DL — HIGH (ref 70–99)
HCT VFR BLD CALC: 32.5 % — LOW (ref 37–47)
HGB BLD-MCNC: 10.4 G/DL — LOW (ref 12–16)
IRON SATN MFR SERPL: 34 % — SIGNIFICANT CHANGE UP (ref 15–50)
IRON SATN MFR SERPL: 69 UG/DL — SIGNIFICANT CHANGE UP (ref 35–150)
MAGNESIUM SERPL-MCNC: 1.9 MG/DL — SIGNIFICANT CHANGE UP (ref 1.8–2.4)
MCHC RBC-ENTMCNC: 30.8 PG — SIGNIFICANT CHANGE UP (ref 27–31)
MCHC RBC-ENTMCNC: 32 G/DL — SIGNIFICANT CHANGE UP (ref 32–37)
MCV RBC AUTO: 96.2 FL — SIGNIFICANT CHANGE UP (ref 81–99)
NRBC # BLD: 0 /100 WBCS — SIGNIFICANT CHANGE UP (ref 0–0)
PLATELET # BLD AUTO: 76 K/UL — LOW (ref 130–400)
POTASSIUM SERPL-MCNC: 3.5 MMOL/L — SIGNIFICANT CHANGE UP (ref 3.5–5)
POTASSIUM SERPL-SCNC: 3.5 MMOL/L — SIGNIFICANT CHANGE UP (ref 3.5–5)
PROT SERPL-MCNC: 6.3 G/DL — SIGNIFICANT CHANGE UP (ref 6–8)
RBC # BLD: 3.38 M/UL — LOW (ref 4.2–5.4)
RBC # FLD: 15.9 % — HIGH (ref 11.5–14.5)
SODIUM SERPL-SCNC: 143 MMOL/L — SIGNIFICANT CHANGE UP (ref 135–146)
TIBC SERPL-MCNC: 202 UG/DL — LOW (ref 220–430)
UIBC SERPL-MCNC: 133 UG/DL — SIGNIFICANT CHANGE UP (ref 110–370)
WBC # BLD: 2.7 K/UL — LOW (ref 4.8–10.8)
WBC # FLD AUTO: 2.7 K/UL — LOW (ref 4.8–10.8)

## 2022-07-01 PROCEDURE — 99233 SBSQ HOSP IP/OBS HIGH 50: CPT

## 2022-07-01 PROCEDURE — 74177 CT ABD & PELVIS W/CONTRAST: CPT | Mod: 26

## 2022-07-01 RX ORDER — FUROSEMIDE 40 MG
40 TABLET ORAL EVERY 12 HOURS
Refills: 0 | Status: DISCONTINUED | OUTPATIENT
Start: 2022-07-01 | End: 2022-07-03

## 2022-07-01 RX ORDER — HEPARIN SODIUM 5000 [USP'U]/ML
5000 INJECTION INTRAVENOUS; SUBCUTANEOUS EVERY 12 HOURS
Refills: 0 | Status: DISCONTINUED | OUTPATIENT
Start: 2022-07-01 | End: 2022-07-08

## 2022-07-01 RX ORDER — SPIRONOLACTONE 25 MG/1
100 TABLET, FILM COATED ORAL DAILY
Refills: 0 | Status: DISCONTINUED | OUTPATIENT
Start: 2022-07-01 | End: 2022-07-08

## 2022-07-01 RX ADMIN — Medication 20 MILLIGRAM(S): at 05:37

## 2022-07-01 RX ADMIN — Medication 100 MILLIGRAM(S): at 05:36

## 2022-07-01 RX ADMIN — Medication 100 MILLIGRAM(S): at 21:49

## 2022-07-01 RX ADMIN — Medication 40 MILLIGRAM(S): at 17:11

## 2022-07-01 RX ADMIN — Medication 100 MILLIGRAM(S): at 13:35

## 2022-07-01 NOTE — PROGRESS NOTE ADULT - SUBJECTIVE AND OBJECTIVE BOX
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 ROS notable for constipation. Followed by Cards Dr Paredes and seen for weight gain, started on Torsemide 20mg QD.       Pt has had two EGDs, the first in 2017 which led to recommendation of taking Nadolol which pt did not do, the second of which apparently revealed no varices. Screening for viral hep, hemachromatosis, Holden's dz, autoimmune liver dz all negative. Fibrosure w advanced fibrosis, MELD-Na 17, Rachel score B.       Overnight events      Subjective complaints      Present Today:   - Sesay:  No [  ], Yes [  ] : Indication:     - Type of IV Access:       .. CVC/Piccline:  No [  ], Yes [  ] : Indication:       .. Midline: No [  ], Yes [  ] : Indication:                                              OBJECTIVE  PAST MEDICAL & SURGICAL HISTORY  GERD (gastroesophageal reflux disease)    Arthritis  OA    Fatty liver  AS PER PATIENT 15YEARS  PT REPORTS- I HAVE AN ENLARGED SPLEEN  LOW PLATELETS.    Malignant neoplasm of areola of left breast in female, unspecified estrogen receptor status    H/O thrombocytopenia    Blister of right lower leg    History of surgery  LEFT BREAST LUMPECTOMY  TUBIAL LIGATION  CHOLECYSTECTOMY  RIGHT &amp; LEFT TKR    History of cholecystectomy                                                ALLERGIES:  Cipro (Hives; Rash)  Levaquin (Hives; Rash)                           HOME MEDICATIONS  Home Medications:  torsemide 20 mg oral tablet: 1 tab(s) orally once a day (30 Jun 2022 00:05)                           MEDICATIONS:  STANDING MEDICATIONS  ceFAZolin   IVPB 1000 milliGRAM(s) IV Intermittent every 8 hours  enoxaparin Injectable 40 milliGRAM(s) SubCutaneous every 24 hours  furosemide   Injectable 20 milliGRAM(s) IV Push two times a day    PRN MEDICATIONS                                            ------------------------------------------------------------  VITAL SIGNS: Last 24 Hours  T(C): 36.4 (30 Jun 2022 23:24), Max: 36.8 (30 Jun 2022 14:00)  T(F): 97.6 (30 Jun 2022 23:24), Max: 98.3 (30 Jun 2022 14:00)  HR: 77 (30 Jun 2022 23:24) (77 - 82)  BP: 116/61 (30 Jun 2022 23:24) (112/52 - 127/60)  BP(mean): --  RR: 18 (30 Jun 2022 23:24) (18 - 20)  SpO2: 98% (30 Jun 2022 23:24) (94% - 99%)                                               LABS:                        9.2    2.12  )-----------( 54       ( 30 Jun 2022 06:11 )             29.1     06-30    142  |  102  |  32<H>  ----------------------------<  92  3.0<L>   |  30  |  1.3    Ca    8.2<L>      30 Jun 2022 06:11  Mg     1.7     06-30    TPro  5.2<L>  /  Alb  2.5<L>  /  TBili  3.3<H>  /  DBili  x   /  AST  34  /  ALT  15  /  AlkPhos  46  06-30    PT/INR - ( 29 Jun 2022 18:26 )   PT: 15.10 sec;   INR: 1.32 ratio         PTT - ( 29 Jun 2022 18:26 )  PTT:32.9 sec            Culture - Blood (collected 29 Jun 2022 22:19)  Source: .Blood Blood  Preliminary Report (01 Jul 2022 02:02):    No growth to date.    Culture - Blood (collected 29 Jun 2022 22:19)  Source: .Blood Blood  Preliminary Report (01 Jul 2022 02:02):    No growth to date.                                                    RADIOLOGY:      U/S RUQ 6/30/3033:   Liver: The liver is cirrhotic with mild to moderate perihepatic ascites. The right lobe of liver measures 12.1 cm.  Bile ducts: Normal caliber. Common bile duct measures 1 cm  Gallbladder: Status post cholecystectomy.  Pancreas: Visualized portions are within normal limits.  Right kidney: 8.7 cm. No hydronephrosis.  Ascites: Mild to moderate perihepatic ascites.  IVC: Visualized portions are within normal limits.  IMPRESSION: Cirrhotic liver. Ascites. Status post cholecystectomy.      B/l XR Tib/fib 6/29/2022:   Prior bilateral total knee arthroplasty with intact hardware. There is no acute fracture.  There is no dislocation. Diffuse subcutaneous   infiltration without gas.  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 ROS notable for constipation. Followed by Cards Dr Paredes and seen for weight gain, started on Torsemide 20mg QD.     Pt has had two EGDs, the first in 2017 which led to recommendation of taking Nadolol which pt did not do, the second of which apparently revealed no varices. Screening for viral hep, hemachromatosis, Holden's dz, autoimmune liver dz all negative. Fibrosure w advanced fibrosis, MELD-Na 17, Rachel score B.       Overnight events  As above    Subjective complaints  (+) BLE swelling and pain    Present Today:   - Sesay:  No [ X ], Yes [  ] : Indication:     - Type of IV Access:       .. CVC/Piccline:  No [ X ], Yes [  ] : Indication:       .. Midline: No [ X ], Yes [  ] : Indication:                                              OBJECTIVE  PAST MEDICAL & SURGICAL HISTORY  GERD (gastroesophageal reflux disease)    Arthritis  OA    Fatty liver  AS PER PATIENT 15YEARS  PT REPORTS- I HAVE AN ENLARGED SPLEEN  LOW PLATELETS.    Malignant neoplasm of areola of left breast in female, unspecified estrogen receptor status    H/O thrombocytopenia    Blister of right lower leg    History of surgery  LEFT BREAST LUMPECTOMY  TUBIAL LIGATION  CHOLECYSTECTOMY  RIGHT &amp; LEFT TKR    History of cholecystectomy                                                ALLERGIES:  Cipro (Hives; Rash)  Levaquin (Hives; Rash)                           HOME MEDICATIONS  Home Medications:  torsemide 20 mg oral tablet: 1 tab(s) orally once a day (30 Jun 2022 00:05)                           MEDICATIONS:  STANDING MEDICATIONS  ceFAZolin   IVPB 1000 milliGRAM(s) IV Intermittent every 8 hours  enoxaparin Injectable 40 milliGRAM(s) SubCutaneous every 24 hours  furosemide   Injectable 20 milliGRAM(s) IV Push two times a day    PRN MEDICATIONS                                            ------------------------------------------------------------  VITAL SIGNS: Last 24 Hours  T(C): 36.4 (30 Jun 2022 23:24), Max: 36.8 (30 Jun 2022 14:00)  T(F): 97.6 (30 Jun 2022 23:24), Max: 98.3 (30 Jun 2022 14:00)  HR: 77 (30 Jun 2022 23:24) (77 - 82)  BP: 116/61 (30 Jun 2022 23:24) (112/52 - 127/60)  BP(mean): --  RR: 18 (30 Jun 2022 23:24) (18 - 20)  SpO2: 98% (30 Jun 2022 23:24) (94% - 99%)                                               LABS:                        9.2    2.12  )-----------( 54       ( 30 Jun 2022 06:11 )             29.1     06-30    142  |  102  |  32<H>  ----------------------------<  92  3.0<L>   |  30  |  1.3    Ca    8.2<L>      30 Jun 2022 06:11  Mg     1.7     06-30    TPro  5.2<L>  /  Alb  2.5<L>  /  TBili  3.3<H>  /  DBili  x   /  AST  34  /  ALT  15  /  AlkPhos  46  06-30    PT/INR - ( 29 Jun 2022 18:26 )   PT: 15.10 sec;   INR: 1.32 ratio         PTT - ( 29 Jun 2022 18:26 )  PTT:32.9 sec            Culture - Blood (collected 29 Jun 2022 22:19)  Source: .Blood Blood  Preliminary Report (01 Jul 2022 02:02):    No growth to date.    Culture - Blood (collected 29 Jun 2022 22:19)  Source: .Blood Blood  Preliminary Report (01 Jul 2022 02:02):    No growth to date.                                                    RADIOLOGY:      U/S RUQ 6/30/3033:   Liver: The liver is cirrhotic with mild to moderate perihepatic ascites. The right lobe of liver measures 12.1 cm.  Bile ducts: Normal caliber. Common bile duct measures 1 cm  Gallbladder: Status post cholecystectomy.  Pancreas: Visualized portions are within normal limits.  Right kidney: 8.7 cm. No hydronephrosis.  Ascites: Mild to moderate perihepatic ascites.  IVC: Visualized portions are within normal limits.  IMPRESSION: Cirrhotic liver. Ascites. Status post cholecystectomy.      B/l XR Tib/fib 6/29/2022:   Prior bilateral total knee arthroplasty with intact hardware. There is no acute fracture.  There is no dislocation. Diffuse subcutaneous   infiltration without gas.  No acute fracture or dislocation      PHYSICAL EXAM:  GENERAL: NAD, lying in bed comfortably, well groomed   HEAD:  Atraumatic, Normocephalic  EYES: EOMI, sclera clear - no icterus   ENT: Moist mucous membranes  NECK: Supple  CHEST/LUNG: Clear to auscultation anteriorly bilaterally; Unlabored respirations  HEART: Regular rate and rhythm; No murmurs, rubs, or gallops  ABDOMEN: (+)BS; Softly distended abd, no appreciable TTP, no appreciable mass.   EXTREMITIES:  2+ Peripheral Pulses, brisk capillary refill. No clubbing, cyanosis. Significant edema through BLE up to abd.   NERVOUS SYSTEM:  A&Ox3, no focal deficits   SKIN: BLE wrapped in dressings and ACE wraps - left in place. Surrounding edema as noted.

## 2022-07-01 NOTE — CHART NOTE - NSCHARTNOTEFT_GEN_A_CORE
Request for vascular cardiology consult received.     Stopped by patient's room, but she was off the unit.     Will stop by again tomorrow, 7/2/22.     Melissa Barillas MD  Vascular Cardiology Attending  Please text or call via MS Teams with questions

## 2022-07-01 NOTE — DISCHARGE NOTE NURSING/CASE MANAGEMENT/SOCIAL WORK - NSDCPEFALRISK_GEN_ALL_CORE
For information on Fall & Injury Prevention, visit: [email or web address] OR  [email or web address] OR  https://www.cdc.gov/steadi/patient.html

## 2022-07-01 NOTE — PROGRESS NOTE ADULT - ASSESSMENT
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    # Cellulitis of the B/L LE 2/2 chronic LE edema    Patient is afebrile, hemodynamically stable, SIRS/Sepsis not present on admission   ID noted  abx: Ancef IV 1g q8 (for 7 days - if D/C prior, PO keflex 500mg 4x/day)  follow WOCN reccs   Blood culture neg x2  X-ray: subcutaneous infiltration without gas.  need edema control  local wound care  ace wrap and elevate legs    # liver cirrhosis of unclear etiology w/ splenomegaly with leukopenia and thrombocytopenia  Patient follows Dr. Erickson for splenomegaly and pancytopenia who is recommending liver biopsy.   Patient was seen by GI in the past and Fibrosure testing was suggestive of severe liver fibrosis  had EGD done showing small esophageal varices and gastric polyps  patient was advised to start Nadolol. Patient sought a second opinion and repeated EGD reportedly was negative for varices Patient never took Nadolol.  will need rpt EGD as outpt  prior screening for viral hepatitis, hemachromatosis, og' s disease, and autoimmune lives disease was negative.   hepatology reccs appreciated  send Iron studies   obtain Quantiferon level  send KEVIN, immunoglobulin panel  send CMV PCR, EBV PCR, HSV IgM  send Serum Drug screen and Utox  will need Liver biopsy if workup is negative  RUQ US: mod ascites; liver cirr  CT A/P: done, f/u read  Echo: nl EF (doubt passive congestion)  start inderal 10mg po q12  start aldactone 100mg po q24  for now: Lasix 40mg iv q12 - need to decr once edema better  fluid restrict to 1-1.5 L/day  watch BMP for HRS  watch BP for HoTN    # poss PVD  A Dupl noted  vasc eval  no asa for now    # PHTN WHO II; Possible ABRAHAM on Home O2   Follows Dr. Martinez   was recently seen by Dr. Paredes   ECHO 4/22/22: EF 72%, Grade 1 diastolic dysfunction; mild AS; mild AR; mild Pulm HTN  NC O2 prn    # DVT prophylaxis: hep sc 5000 q12    # GI prophylaxis: not indicated     # Activity: Independent    # Code: full    Disposition: abx: f/u CT A/P; echo; diuretics; local care; CLD w/u; f/u hepatology;   eventually, pt will go home, might need visit RN for wound care, might need home PT if decond - f/u CM

## 2022-07-01 NOTE — DISCHARGE NOTE NURSING/CASE MANAGEMENT/SOCIAL WORK - PATIENT PORTAL LINK FT
You can access the FollowMyHealth Patient Portal offered by [facility] by registering at the following website: [email or web address]. By joining [employer]’s FollowMyHealth portal, you will also be able to view your health information using other applications (apps) compatible with our system.

## 2022-07-01 NOTE — PROGRESS NOTE ADULT - ASSESSMENT
80 y/o woman w PMHx of hepatocellular disease/cirrhosis, splenomegaly w leukopenia and thrombocytopenia, followed by Dr Erickson who has rec'd liver bx, ?known small esophageal varices and gastric polyps, PHTN WHO II, poss ABRAHAM, referred by visiting RN to ED for BLE edema and cellulitis w associated weakness. ROS notable for constipation. Followed by Thomas Paredes and seen for weight gain, started on Torsemide 20mg QD.     Pt has had two EGDs, the first in 2017 which led to recommendation of taking Nadolol which pt did not do, the second of which apparently revealed no varices. Screening for viral hep, hemachromatosis, Holden's dz, autoimmune liver dz all negative. Fibrosure w advanced fibrosis, MELD-Na 17, Rachel score B.       #Cellulitis  Wound care by advanced practice nurse consult   Pending BCx:   VA duplex Vein:   VA duplex Artery:     #Cirrhosis   ID: d/c Vanc, Cefazolin 1g q8 IV x7d, "if d/c prior PO Keflec 500mg 4x/d" Pending MRSA nares (neg), r/o PAD (pending results)  Hepatology: Rec for CT Abd/Pelvis to document cirrhosis (done). Pending iron studies (ferritin 267), QF gold, KEVIN, Ig panel, CMV PCR, EBV PCR, HSV IgM, serum drug screen, utox, RUQ sono w doppler (done) - if all neg, liver bx.   Daily LFTs, INR.   Avoid hepatotoxic agents, sedatives, opioids.     #pulmHTN/ABRAHAM  Continue torsemide, I&Os, fluid restrict, Na restrict                                                                                ----------------------------------------------------  # DVT prophylaxis:   # GI prophylaxis:   # Diet:   # Activity:   # Code status:   # Disposition:                                                                            --------------------------------------------------------    # Handoff:      80 y/o woman w PMHx of hepatocellular disease/cirrhosis, splenomegaly w leukopenia and thrombocytopenia, followed by Dr Erickson who has rec'd liver bx, ?known small esophageal varices and gastric polyps, PHTN WHO II, poss ABRAHAM, referred by visiting RN to ED for BLE edema and cellulitis w associated weakness. ROS notable for constipation. Followed by Thomas Paredes and seen for weight gain, started on Torsemide 20mg QD.     Pt has had two EGDs, the first in 2017 which led to recommendation of taking Nadolol which pt did not do, the second of which apparently revealed no varices. Screening for viral hep, hemachromatosis, Holden's dz, autoimmune liver dz all negative. Fibrosure w advanced fibrosis, MELD-Na 17, Rachel score B.       #Cellulitis 2/2 chronic BLE edema/ venous stasis   Wound care by advanced practice nurse consult   Pending BCx: NGDT x2  VA duplex Vein: no DVT   VA duplex Artery: (+) PAD   Vascular consulted   ID: abx: Ancef IV 1g q8 (for 7 days - if D/C prior, PO keflex 500mg 4x/day)    #Cirrhosis   ID: d/c Vanc, Cefazolin 1g q8 IV x7d, "if d/c prior PO Keflec 500mg 4x/d" Pending MRSA nares (neg)  Hepatology: Rec for CT Abd/Pelvis to document cirrhosis (done). Pending iron studies (ferritin 267), QF gold, KEVIN, Ig panel, CMV PCR, EBV PCR, HSV IgM, serum drug screen, utox, RUQ sono w doppler (done) - if all neg, liver bx.   Daily LFTs, INR.   Avoid hepatotoxic agents, sedatives, opioids.     #pulmHTN/ABRAHAM  Continue torsemide, I&Os, fluid restrict, Na restrict  start inderal 10mg po q12  start aldactone 100mg po q24  for now: Lasix 40mg iv q12 - need to decr once edema better  fluid restrict to 1-1.5 L/day  watch BMP for HRS  watch BP for HoTN                                                                                ----------------------------------------------------  # DVT prophylaxis:   # GI prophylaxis:   # Diet:   # Activity:   # Code status:   # Disposition:                                                                            --------------------------------------------------------    # Handoff:

## 2022-07-01 NOTE — PROGRESS NOTE ADULT - SUBJECTIVE AND OBJECTIVE BOX
VILMA [place]  81y  Female  ***My note supersedes ALL resident notes that I sign.  My corrections for their notes are in my note.***    I can be reached directly on [employer] [id number]. My office number is [phone]. My personal cell number is [phone].    INTERVAL EVENTS: Here for f/u of edema. Pt has severe edema from toes into abd. She looks fairly well compensated, though. No encephalopathy. Pt says edema is causing skin in legs to break open. Pt has pain in both legs. Pt has O2 at home, but does NOT use it that much. Says her RA PO is usually >90%. Pt able to eat/drink.    T(F): 97.3 (07-01-22 @ 12:36), Max: 98.1 (06-30-22 @ 23:23)  HR: 79 (07-01-22 @ 12:36) (77 - 82)  BP: 120/58 (07-01-22 @ 12:36) (112/52 - 124/52)  RR: 18 (07-01-22 @ 12:36) (18 - 20)  SpO2: 98% (07-01-22 @ 07:54) (94% - 99%)    Gen: NAD; + NC O2  HEENT: PERRL, EOMI, mouth clr, nose clr  Neck: no nodes, + JVD, thyroid nl  lungs: bibas crackles  hrt: s1 s2 rrr no murmur  abd: soft, NT/ND, no HS megaly; + sev ascites  ext: 3-4+ edema entire legs, + b/l cellulitis of legs w/ erythema, warmth and pain; dressing in place; no c/c  neuro: aa ox3, cn intact, can move all 4 ext    LABS:                      10.4    (    96.2   2.70  )-----------( ---------      76       ( 01 Jul 2022 07:30 )             32.5    (    15.9     143   (   103   (   119      07-01-22 @ 07:30  ----------------------               3.5   (   28   (   30                             -----                        1.3  Ca  8.6   Mg  1.9    P   --     LFT  6.3  (  2.9  (  38       07-01-22 @ 07:30  -------------------------  3.1  (  76  (  18    PT/INR - ( 29 Jun 2022 18:26 )   PT: 15.10 sec;   INR: 1.32 ratio    PTT - ( 29 Jun 2022 18:26 )  PTT: 32.9 sec    Culture - Blood (collected 06-29-22 @ 22:19)  Source: .Blood Blood  Preliminary Report (07-01-22 @ 02:02):    No growth to date.    Culture - Blood (collected 06-29-22 @ 22:19)  Source: .Blood Blood  Preliminary Report (07-01-22 @ 02:02):    No growth to date.    RADIOLOGY & ADDITIONAL TESTS:  < from: VA Duplex Lower Extrem Arterial, Bilat (06.30.22 @ 19:54) >  Impression:  Mild to moderate multisegmental peripheral arterial disease.  Diminished blood flow in the bilateral peroneal and right posterior tibial arteries.  Moderate to severe stenosis of the right anterior tibial artery.    < end of copied text >    < from: VA Duplex Lower Ext Vein Scan, Bilat (06.30.22 @ 19:53) >  Impression:    No evidence of deep venous thrombosis or superficial thrombophlebitis in the bilateral lower extremities.    < end of copied text >    < from: US Abdomen Upper Quadrant Right (06.30.22 @ 19:40) >  Ascites: Mild to moderate perihepatic ascites.  IVC: Visualized portions are within normal limits.    IMPRESSION:  Cirrhotic liver. Ascites. Status post cholecystectomy.    < end of copied text >    < from: Xray Tibia + Fibula 2 Views, Bilateral (06.29.22 @ 21:56) >  IMPRESSION:     No acute fracture or dislocation    < end of copied text >    < from: TTE Echo Complete w/o Contrast w/ Doppler (04.25.22 @ 08:38) >  Summary:   1. Hyperdynamic global left ventricular systolic function.   2. LV Ejection Fraction by Jackson's Method with a biplane EF of 72 %.   3. Spectral Doppler shows impaired relaxation pattern of left   ventricular myocardial filling (Grade I diastolic dysfunction).   4. Mild left ventricular hypertrophy.   5. Difficult to visualize right atrial chamber. Possible artifact seen in right atrium.   6. Peak transaortic gradient equals 25.4 mmHg, mean transaortic gradient   equals 16.8 mmHg, the calculated aortic valve area equals 1.83 cm² by the   continuity equation consistent with mild aortic stenosis. The   Dimesionless Index value is 0.68.   7. Mild aortic regurgitation.   8. Mild mitral annular calcification.   9. Moderate thickening and calcification of the posterior mitral valve leaflet.  10. Trace mitral valve regurgitation.  11. Estimated pulmonary artery systolic pressure is 43.8 mmHg assuming a   right atrial pressure of 3 mmHg, which is consistent with mild pulmonary hypertension.  12. There is no evidence of pericardial effusion.    < end of copied text >    MEDICATIONS:  ceFAZolin   IVPB 1000 milliGRAM(s) IV Intermittent every 8 hours    enoxaparin Injectable 40 milliGRAM(s) SubCutaneous every 24 hours  furosemide   Injectable 40 milliGRAM(s) IV Push every 12 hours  spironolactone 100 milliGRAM(s) Oral daily

## 2022-07-02 DIAGNOSIS — K76.0 FATTY (CHANGE OF) LIVER, NOT ELSEWHERE CLASSIFIED: ICD-10-CM

## 2022-07-02 DIAGNOSIS — K21.9 GASTRO-ESOPHAGEAL REFLUX DISEASE WITHOUT ESOPHAGITIS: ICD-10-CM

## 2022-07-02 DIAGNOSIS — Z86.2 PERSONAL HISTORY OF DISEASES OF THE BLOOD AND BLOOD-FORMING ORGANS AND CERTAIN DISORDERS INVOLVING THE IMMUNE MECHANISM: ICD-10-CM

## 2022-07-02 LAB
ANION GAP SERPL CALC-SCNC: 12 MMOL/L — SIGNIFICANT CHANGE UP (ref 7–14)
BUN SERPL-MCNC: 27 MG/DL — HIGH (ref 10–20)
CALCIUM SERPL-MCNC: 8.5 MG/DL — SIGNIFICANT CHANGE UP (ref 8.5–10.1)
CHLORIDE SERPL-SCNC: 101 MMOL/L — SIGNIFICANT CHANGE UP (ref 98–110)
CO2 SERPL-SCNC: 28 MMOL/L — SIGNIFICANT CHANGE UP (ref 17–32)
CREAT SERPL-MCNC: 1.1 MG/DL — SIGNIFICANT CHANGE UP (ref 0.7–1.5)
EBV EA AB SER IA-ACNC: >150 U/ML — HIGH
EBV EA AB TITR SER IF: POSITIVE
EBV EA IGG SER-ACNC: POSITIVE
EBV NA IGG SER IA-ACNC: >600 U/ML — HIGH
EBV PATRN SPEC IB-IMP: SIGNIFICANT CHANGE UP
EBV VCA IGG AVIDITY SER QL IA: POSITIVE
EBV VCA IGM SER IA-ACNC: 25 U/ML — SIGNIFICANT CHANGE UP
EBV VCA IGM SER IA-ACNC: >750 U/ML — HIGH
EBV VCA IGM TITR FLD: NEGATIVE — SIGNIFICANT CHANGE UP
EGFR: 50 ML/MIN/1.73M2 — LOW
GLUCOSE SERPL-MCNC: 110 MG/DL — HIGH (ref 70–99)
POTASSIUM SERPL-MCNC: 3.2 MMOL/L — LOW (ref 3.5–5)
POTASSIUM SERPL-SCNC: 3.2 MMOL/L — LOW (ref 3.5–5)
SODIUM SERPL-SCNC: 141 MMOL/L — SIGNIFICANT CHANGE UP (ref 135–146)

## 2022-07-02 PROCEDURE — 99223 1ST HOSP IP/OBS HIGH 75: CPT

## 2022-07-02 PROCEDURE — 99232 SBSQ HOSP IP/OBS MODERATE 35: CPT

## 2022-07-02 RX ORDER — POTASSIUM CHLORIDE 20 MEQ
40 PACKET (EA) ORAL EVERY 4 HOURS
Refills: 0 | Status: COMPLETED | OUTPATIENT
Start: 2022-07-02 | End: 2022-07-02

## 2022-07-02 RX ADMIN — Medication 100 MILLIGRAM(S): at 14:34

## 2022-07-02 RX ADMIN — Medication 40 MILLIEQUIVALENT(S): at 21:48

## 2022-07-02 RX ADMIN — Medication 40 MILLIGRAM(S): at 17:26

## 2022-07-02 RX ADMIN — Medication 40 MILLIEQUIVALENT(S): at 17:26

## 2022-07-02 RX ADMIN — Medication 40 MILLIGRAM(S): at 06:24

## 2022-07-02 RX ADMIN — Medication 40 MILLIEQUIVALENT(S): at 14:58

## 2022-07-02 RX ADMIN — SPIRONOLACTONE 100 MILLIGRAM(S): 25 TABLET, FILM COATED ORAL at 06:24

## 2022-07-02 RX ADMIN — Medication 100 MILLIGRAM(S): at 21:48

## 2022-07-02 RX ADMIN — Medication 100 MILLIGRAM(S): at 06:24

## 2022-07-02 NOTE — CONSULT NOTE ADULT - SUBJECTIVE AND OBJECTIVE BOX
Vascular Cardiology Consult Note    [email or web address]       CC:  leg swelling    HPI:    81F with cirrhosis w/ severe ascites/leg edema; splenomegaly with leukopenia and thrombocytopenia, PHTN WHO II, Possible ABRAHAM presents with admitted for increased b/l LE edema and concern for cellulitis. Vascular cardiology consulted for LE edema and PAD.     Patient reports leg swelling since April 2022. She attributes it to prednisone. Swelling has caused blisters and oozing of her shins. No wounds on feet. Denies claudication, but cannot walk far due to leg heaviness.     Non- smoker. No history of CAD.     Allergies    Cipro (Hives; Rash)  Levaquin (Hives; Rash)    Intolerances    	    MEDICATIONS:  furosemide   Injectable 40 milliGRAM(s) IV Push every 12 hours  heparin   Injectable 5000 Unit(s) SubCutaneous every 12 hours  propranolol 10 milliGRAM(s) Oral every 12 hours  spironolactone 100 milliGRAM(s) Oral daily  ceFAZolin   IVPB 1000 milliGRAM(s) IV Intermittent every 8 hours      PAST MEDICAL & SURGICAL HISTORY:  GERD (gastroesophageal reflux disease)      Arthritis  OA      Fatty liver  AS PER PATIENT 15YEARS  PT REPORTS- I HAVE AN ENLARGED SPLEEN  LOW PLATELETS.      Malignant neoplasm of areola of left breast in female, unspecified estrogen receptor status      H/O thrombocytopenia      Blister of right lower leg      History of surgery  LEFT BREAST LUMPECTOMY  TUBIAL LIGATION  CHOLECYSTECTOMY  RIGHT &amp; LEFT TKR      History of cholecystectomy          FAMILY HISTORY:  Family history of breast cancer in mother (Mother)    Family history of Parkinson disease (Father)        SOCIAL HISTORY:  unchanged    REVIEW OF SYSTEMS:  CONSTITUTIONAL: No fever, weight loss, or fatigue  EYES: No eye pain, visual disturbances, or discharge  ENT:  No difficulty hearing, tinnitus, vertigo; No sinus or throat pain  NECK: No pain or stiffness  RESPIRATORY:  no SOB  CARDIOVASCULAR:  no CP  GASTROINTESTINAL: No abdominal or epigastric pain. No nausea, vomiting, or hematemesis; No diarrhea or constipation. No melena or hematochezia.  GENITOURINARY: No dysuria, frequency, hematuria, or incontinence  NEUROLOGICAL: No headaches, memory loss, loss of strength, numbness, or tremors  SKIN: leg swelling, oozing from legs, blisters on legs  LYMPH Nodes: No enlarged glands  ENDOCRINE: No heat or cold intolerance; No hair loss  MUSCULOSKELETAL: No joint pain or swelling; No muscle, back, or extremity pain  PSYCHIATRIC: No depression, anxiety, mood swings, or difficulty sleeping  HEME/LYMPH: No easy bruising, or bleeding gums  ALLERGY AND IMMUNOLOGIC: No hives or eczema	    [ x] All others negative	  [ ] Unable to obtain    PHYSICAL EXAM:  T(C): 36.2 (07-02-22 @ 13:53), Max: 36.5 (07-02-22 @ 05:56)  HR: 84 (07-02-22 @ 13:53) (83 - 85)  BP: 108/52 (07-02-22 @ 13:53) (98/49 - 122/58)  RR: 18 (07-02-22 @ 13:53) (18 - 18)  SpO2: 98% (07-01-22 @ 20:04) (98% - 98%)  Wt(kg): --  I&O's Summary      Appearance:  sitting up, speaking in complete sentences	  HEENT:   Normal oral mucosa, PERRL, EOMI	  Lymphatic: No lymphadenopathy  Cardiovascular:  RRR, nl S1, S2, no m/r/g  Respiratory:  CTA b/l, no crackles  Psychiatry:  AAO x 3  Gastrointestinal:  Soft, Non-tender, + BS	  Skin: hyperpigmentation of legs due to chronic venous stasis changes  Neurologic:  non-focal  Extremities:  3+ pitting edema b/l    Vascular Pulse Exam:  Right DP: [x]palpable []non-palpable []audible      Left DP :   [x]palpable []non-palpable []audible  Right PT: []palpable [x] non-palpable []audible   Left PT:  [] palpable [x] non-palpable []audible         Foot Exam:  no wounds on feet, significant pitting edema noted      LABS:	 	    CBC Full  -  ( 01 Jul 2022 07:30 )  WBC Count : 2.70 K/uL  Hemoglobin : 10.4 g/dL  Hematocrit : 32.5 %  Platelet Count - Automated : 76 K/uL  Mean Cell Volume : 96.2 fL  Mean Cell Hemoglobin : 30.8 pg  Mean Cell Hemoglobin Concentration : 32.0 g/dL  Auto Neutrophil # : x  Auto Lymphocyte # : x  Auto Monocyte # : x  Auto Eosinophil # : x  Auto Basophil # : x  Auto Neutrophil % : x  Auto Lymphocyte % : x  Auto Monocyte % : x  Auto Eosinophil % : x  Auto Basophil % : x    07-02    141  |  101  |  27<H>  ----------------------------<  110<H>  3.2<L>   |  28  |  1.1  07-01    143  |  103  |  30<H>  ----------------------------<  119<H>  3.5   |  28  |  1.3    Ca    8.5      02 Jul 2022 07:01  Ca    8.6      01 Jul 2022 07:30  Mg     1.9     07-01    TPro  6.3  /  Alb  3.1<L>  /  TBili  2.9<H>  /  DBili  x   /  AST  38  /  ALT  18  /  AlkPhos  76  07-01    < from: VA Duplex Lower Extrem Arterial, Bilat (06.30.22 @ 19:54) >  Impression:  Mild to moderate multisegmental peripheral arterial disease.  Diminished blood flow in the bilateral peroneal and right posterior   tibial arteries.  Moderate to severe stenosis of the right anterior tibial artery    < end of copied text >  < from: VA Duplex Lower Ext Vein Scan, Bilat (06.30.22 @ 19:53) >  Impression:    No evidence of deep venous thrombosis or superficial thrombophlebitis in   the bilateral lower extremities.    < end of copied text >

## 2022-07-02 NOTE — PROGRESS NOTE ADULT - ASSESSMENT
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    # Cellulitis of the B/L LE 2/2 chronic LE edema    Patient is afebrile, hemodynamically stable, SIRS/Sepsis not present on admission   ID noted  abx: Ancef IV 1g q8 (for 7 days - if D/C prior, PO keflex 500mg 4x/day)  follow WOCN reccs   Blood culture neg x2  X-ray: subcutaneous infiltration without gas.  need edema control  local wound care  ace wrap and elevate legs    # liver cirrhosis of unclear etiology w/ splenomegaly with leukopenia and thrombocytopenia  Patient follows Dr. Erickson for splenomegaly and pancytopenia who is recommending liver biopsy.   Patient was seen by GI in the past and Fibrosure testing was suggestive of severe liver fibrosis  had EGD done showing small esophageal varices and gastric polyps  patient was advised to start Nadolol. Patient sought a second opinion and repeated EGD reportedly was negative for varices Patient never took Nadolol.  will need rpt EGD as outpt  prior screening for viral hepatitis, hemachromatosis, og' s disease, and autoimmune lives disease was negative.   hepatology reccs appreciated  Irone studies noted   obtain Quantiferon level  send KEVIN, immunoglobulin panel  EBV and hepatitis panel noted  send Serum Drug screen and Utox  will need Liver biopsy if workup is negative  RUQ US: mod ascites; liver cirr  CT A/P: done, read noted   Echo: nl EF (doubt passive congestion)  start inderal 10mg po q12  start aldactone 100mg po q24  for now: Lasix 40mg iv q12 - need to decr once edema better  fluid restrict to 1-1.5 L/day  watch BMP for HRS  watch BP for HoTN    # poss PVD  A Dupl noted  vasc eval  no asa for now    # PHTN WHO II; Possible ABRAHAM on Home O2   Follows Dr. Martinez   was recently seen by Dr. Paredes   ECHO 4/22/22: EF 72%, Grade 1 diastolic dysfunction; mild AS; mild AR; mild Pulm HTN  NC O2 prn  Vascular Cardiology recommending increasing Lasix but pt hypokalemic . Will replace k first    # DVT prophylaxis: hep sc 5000 q12    # GI prophylaxis: not indicated     # Activity: Independent    # Code: full    Disposition: abx:  echo; diuretics; local care; CLD w/u; f/u hepatology;   eventually, pt will go home, might need visit RN for wound care, might need home PT if decond - f/u CM

## 2022-07-02 NOTE — CONSULT NOTE ADULT - REASON FOR ADMISSION
Lower extremity Edema and cellulitis

## 2022-07-02 NOTE — CONSULT NOTE ADULT - ASSESSMENT
Assessment:  #Lower extremity edema  - 3+ bilaterally, venous stasis, legs do not appear infected  - TTE 4/2022 nl LVEF, Grade 1 diastolic dysfunction  - Multifactorial: venous insufficiency, cirrhosis, possible lymphedema component   #PAD  - Moderate-severe infrapopliteal disease bilaterally, recommend medical management at this time  - LDL at goal  #Cirrhosis  #Pancytopenia    Plan:  - Replete electrolytes and maintain K>4, Mg>2  - Increase Lasix to 80 mg IV BID  - Check TSH  - Aspirin 81 mg daily when able   - Will follow    Melissa Barillas MD  Vascular Cardiology   Please text or call via MS Teams with questions

## 2022-07-02 NOTE — PROGRESS NOTE ADULT - SUBJECTIVE AND OBJECTIVE BOX
SUBJECTIVE:    Patient is a 81y old Female who presents with a chief complaint of Lower extremity Edema and cellulitis (02 Jul 2022 14:58)    Currently admitted to medicine with the primary diagnosis of Cellulitis of right lower extremity       Today is hospital day 3d. This morning she is resting comfortably in bed and reports no new issues or overnight events.     PAST MEDICAL & SURGICAL HISTORY  GERD (gastroesophageal reflux disease)    Arthritis  OA    Fatty liver  AS PER PATIENT 15YEARS  PT REPORTS- I HAVE AN ENLARGED SPLEEN  LOW PLATELETS.    Malignant neoplasm of areola of left breast in female, unspecified estrogen receptor status    H/O thrombocytopenia    Blister of right lower leg    History of surgery  LEFT BREAST LUMPECTOMY  TUBIAL LIGATION  CHOLECYSTECTOMY  RIGHT &amp; LEFT TKR    History of cholecystectomy      SOCIAL HISTORY:  Negative for smoking/alcohol/drug use.     ALLERGIES:  Cipro (Hives; Rash)  Levaquin (Hives; Rash)    MEDICATIONS:  STANDING MEDICATIONS  ceFAZolin   IVPB 1000 milliGRAM(s) IV Intermittent every 8 hours  furosemide   Injectable 40 milliGRAM(s) IV Push every 12 hours  heparin   Injectable 5000 Unit(s) SubCutaneous every 12 hours  propranolol 10 milliGRAM(s) Oral every 12 hours  spironolactone 100 milliGRAM(s) Oral daily    PRN MEDICATIONS    VITALS:   T(F): 97.2  HR: 84  BP: 108/52  RR: 18  SpO2: 98%    PHYSICAL EXAM:  GEN: No acute distress  LUNGS: Clear to auscultation bilaterally   HEART: S1/S2 present. RRR.   ABD: distented  NEURO: AAOX3      LABS:                        10.4   2.70  )-----------( 76       ( 01 Jul 2022 07:30 )             32.5     07-02    141  |  101  |  27<H>  ----------------------------<  110<H>  3.2<L>   |  28  |  1.1    Ca    8.5      02 Jul 2022 07:01  Mg     1.9     07-01    TPro  6.3  /  Alb  3.1<L>  /  TBili  2.9<H>  /  DBili  x   /  AST  38  /  ALT  18  /  AlkPhos  76  07-01                Culture - Blood (collected 29 Jun 2022 22:19)  Source: .Blood Blood  Preliminary Report (01 Jul 2022 02:02):    No growth to date.    Culture - Blood (collected 29 Jun 2022 22:19)  Source: .Blood Blood  Preliminary Report (01 Jul 2022 02:02):    No growth to date.            RADIOLOGY:

## 2022-07-03 LAB
ANION GAP SERPL CALC-SCNC: 10 MMOL/L — SIGNIFICANT CHANGE UP (ref 7–14)
BUN SERPL-MCNC: 26 MG/DL — HIGH (ref 10–20)
CALCIUM SERPL-MCNC: 9 MG/DL — SIGNIFICANT CHANGE UP (ref 8.5–10.1)
CHLORIDE SERPL-SCNC: 102 MMOL/L — SIGNIFICANT CHANGE UP (ref 98–110)
CO2 SERPL-SCNC: 29 MMOL/L — SIGNIFICANT CHANGE UP (ref 17–32)
CREAT SERPL-MCNC: 1.2 MG/DL — SIGNIFICANT CHANGE UP (ref 0.7–1.5)
EGFR: 45 ML/MIN/1.73M2 — LOW
GLUCOSE SERPL-MCNC: 100 MG/DL — HIGH (ref 70–99)
POTASSIUM SERPL-MCNC: 5 MMOL/L — SIGNIFICANT CHANGE UP (ref 3.5–5)
POTASSIUM SERPL-SCNC: 5 MMOL/L — SIGNIFICANT CHANGE UP (ref 3.5–5)
SODIUM SERPL-SCNC: 141 MMOL/L — SIGNIFICANT CHANGE UP (ref 135–146)

## 2022-07-03 PROCEDURE — 99232 SBSQ HOSP IP/OBS MODERATE 35: CPT

## 2022-07-03 RX ORDER — FUROSEMIDE 40 MG
80 TABLET ORAL
Refills: 0 | Status: DISCONTINUED | OUTPATIENT
Start: 2022-07-03 | End: 2022-07-06

## 2022-07-03 RX ADMIN — Medication 100 MILLIGRAM(S): at 21:31

## 2022-07-03 RX ADMIN — Medication 100 MILLIGRAM(S): at 13:48

## 2022-07-03 RX ADMIN — Medication 80 MILLIGRAM(S): at 13:48

## 2022-07-03 RX ADMIN — HEPARIN SODIUM 5000 UNIT(S): 5000 INJECTION INTRAVENOUS; SUBCUTANEOUS at 05:10

## 2022-07-03 RX ADMIN — Medication 40 MILLIGRAM(S): at 05:09

## 2022-07-03 RX ADMIN — SPIRONOLACTONE 100 MILLIGRAM(S): 25 TABLET, FILM COATED ORAL at 05:10

## 2022-07-03 RX ADMIN — Medication 100 MILLIGRAM(S): at 05:05

## 2022-07-03 NOTE — PROGRESS NOTE ADULT - ASSESSMENT
Assessment:  #Lower extremity edema  - 3+ bilaterally, venous stasis, legs do not appear infected  - TTE 4/2022 nl LVEF, Grade 1 diastolic dysfunction  - Multifactorial: venous insufficiency, cirrhosis, possible lymphedema component   #PAD  - Moderate-severe infrapopliteal disease bilaterally, recommend medical management at this time  - LDL at goal  #Cirrhosis  #Pancytopenia    Plan:  - Continue Lasix to 80 mg IV BID  - Replete electrolytes and maintain K>4, Mg>2  - Monitor renal function  - Check TSH  - Aspirin 81 mg daily when able   - Will follow    Melissa Barillas MD  Vascular Cardiology   Please text or call via MS Teams with questions

## 2022-07-03 NOTE — PROGRESS NOTE ADULT - SUBJECTIVE AND OBJECTIVE BOX
Patient is a 81y old  Female who presents with a chief complaint of Lower extremity Edema and cellulitis (03 Jul 2022 13:57)      Patient seen and examined at bedside.    ALLERGIES:  Cipro (Hives; Rash)  Levaquin (Hives; Rash)    MEDICATIONS:  ceFAZolin   IVPB 1000 milliGRAM(s) IV Intermittent every 8 hours  furosemide   Injectable 80 milliGRAM(s) IV Push two times a day  heparin   Injectable 5000 Unit(s) SubCutaneous every 12 hours  propranolol 10 milliGRAM(s) Oral every 12 hours  spironolactone 100 milliGRAM(s) Oral daily    Vital Signs Last 24 Hrs  T(F): 96.9 (03 Jul 2022 13:22), Max: 98.1 (02 Jul 2022 21:09)  HR: 67 (03 Jul 2022 13:22) (65 - 77)  BP: 102/50 (03 Jul 2022 13:22) (102/50 - 124/58)  RR: 18 (03 Jul 2022 13:22) (18 - 19)  SpO2: --  I&O's Summary      PHYSICAL EXAM:  General: NAD, A/O x 3  ENT: MMM  Neck: Supple, No JVD  Lungs: Clear to auscultation bilaterally  Cardio: RRR, S1/S2, 3/6 systolic murmur   Abdomen: Soft, Nontender, Nondistended; Bowel sounds present  Extremities: No cyanosis, 3/6 LE edema     LABS:                        10.4   2.70  )-----------( 76       ( 01 Jul 2022 07:30 )             32.5     07-03    141  |  102  |  26  ----------------------------<  100  5.0   |  29  |  1.2    Ca    9.0      03 Jul 2022 07:33  Mg     1.9     07-01    TPro  6.3  /  Alb  3.1  /  TBili  2.9  /  DBili  x   /  AST  38  /  ALT  18  /  AlkPhos  76  07-01              06-30 Chol 138 mg/dL LDL -- HDL 43 mg/dL Trig 93 mg/dL                      Culture - Blood (collected 29 Jun 2022 22:19)  Source: .Blood Blood  Preliminary Report (01 Jul 2022 02:02):    No growth to date.    Culture - Blood (collected 29 Jun 2022 22:19)  Source: .Blood Blood  Preliminary Report (01 Jul 2022 02:02):    No growth to date.      COVID-19 PCR: NotDetec (06-29-22 @ 21:31)      RADIOLOGY & ADDITIONAL TESTS:    Care Discussed with Consultants/Other Providers:

## 2022-07-03 NOTE — PROGRESS NOTE ADULT - SUBJECTIVE AND OBJECTIVE BOX
Vascular Cardiology  Progress note     [email or web address]     CC:  leg swelling    INTERVAL HISTORY:  LE edema about the same. No chest pain, SOB.          Allergies    Cipro (Hives; Rash)  Levaquin (Hives; Rash)    Intolerances    	    MEDICATIONS:  furosemide   Injectable 80 milliGRAM(s) IV Push two times a day  heparin   Injectable 5000 Unit(s) SubCutaneous every 12 hours  propranolol 10 milliGRAM(s) Oral every 12 hours  spironolactone 100 milliGRAM(s) Oral daily    ceFAZolin   IVPB 1000 milliGRAM(s) IV Intermittent every 8 hours                PAST MEDICAL & SURGICAL HISTORY:  GERD (gastroesophageal reflux disease)      Arthritis  OA      Fatty liver  AS PER PATIENT 15YEARS  PT REPORTS- I HAVE AN ENLARGED SPLEEN  LOW PLATELETS.      Malignant neoplasm of areola of left breast in female, unspecified estrogen receptor status      H/O thrombocytopenia      Blister of right lower leg      History of surgery  LEFT BREAST LUMPECTOMY  TUBIAL LIGATION  CHOLECYSTECTOMY  RIGHT &amp; LEFT TKR      History of cholecystectomy          FAMILY HISTORY:  Family history of breast cancer in mother (Mother)    Family history of Parkinson disease (Father)        SOCIAL HISTORY:  unchanged    REVIEW OF SYSTEMS:  CONSTITUTIONAL: No fever, weight loss, or fatigue  EYES: No eye pain, visual disturbances, or discharge  ENT:  No difficulty hearing, tinnitus, vertigo; No sinus or throat pain  NECK: No pain or stiffness  RESPIRATORY: No cough, wheezing, chills or hemoptysis; No Shortness of Breath  CARDIOVASCULAR: No chest pain, palpitations, passing out, dizziness,  GASTROINTESTINAL: No abdominal or epigastric pain. No nausea, vomiting, or hematemesis; No diarrhea or constipation. No melena or hematochezia.  GENITOURINARY: No dysuria, frequency, hematuria, or incontinence  NEUROLOGICAL: No headaches, memory loss, loss of strength, numbness, or tremors  SKIN: LE wounds wrapped  LYMPH Nodes: No enlarged glands  ENDOCRINE: No heat or cold intolerance; No hair loss  MUSCULOSKELETAL: No joint pain or swelling; No muscle, back, or extremity pain  PSYCHIATRIC: No depression, anxiety, mood swings, or difficulty sleeping  HEME/LYMPH: No easy bruising, or bleeding gums  ALLERGY AND IMMUNOLOGIC: No hives or eczema	    [ x] All others negative	  [ ] Unable to obtain    PHYSICAL EXAM:  T(C): 36.1 (07-03-22 @ 13:22), Max: 36.7 (07-02-22 @ 21:09)  HR: 67 (07-03-22 @ 13:22) (65 - 77)  BP: 102/50 (07-03-22 @ 13:22) (102/50 - 124/58)  RR: 18 (07-03-22 @ 13:22) (18 - 19)  SpO2: --  Wt(kg): --  I&O's Summary    Appearance:  sitting up, speaking in complete sentences	  HEENT:   Normal oral mucosa, PERRL, EOMI	  Cardiovascular:  RRR, nl S1, S2, no m/r/g  Respiratory:  CTA b/l, no crackles  Psychiatry:  AAO x 3  Gastrointestinal:  Soft, Non-tender, + BS	  Skin: hyperpigmentation of legs due to chronic venous stasis changes  Neurologic:  non-focal  Extremities:  3+ pitting edema b/l    Vascular Pulse Exam:  Right DP: [x]palpable []non-palpable []audible      Left DP :   [x]palpable []non-palpable []audible  Right PT: []palpable [x] non-palpable []audible   Left PT:  [] palpable [x] non-palpable []audible         Foot Exam:  no wounds on feet, significant pitting edema noted  Right DP: []palpable []non-palpable []audible      Left DP :   []palpable []non-palpable []audible  Right PT: []palpable [] non-palpable []audible   Left PT:  [] palpable [] non-palpable []audible      LABS:	 	      07-03    141  |  102  |  26<H>  ----------------------------<  100<H>  5.0   |  29  |  1.2  07-02    141  |  101  |  27<H>  ----------------------------<  110<H>  3.2<L>   |  28  |  1.1    Ca    9.0      03 Jul 2022 07:33  Ca    8.5      02 Jul 2022 07:01

## 2022-07-03 NOTE — PROGRESS NOTE ADULT - ASSESSMENT
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 ROS notable for constipation. Followed by Cards Dr Paredes and seen for weight gain, started on Torsemide 20mg QD.     Pt has had two EGDs, the first in 2017 which led to recommendation of taking Nadolol which pt did not do, the second of which apparently revealed no varices. Screening for viral hep, hemachromatosis, Holden's dz, autoimmune liver dz all negative. Fibrosure w advanced fibrosis, MELD-Na 17, Rachel score B.       #Cellulitis 2/2 chronic BLE edema/ venous stasis   Wound care by advanced practice nurse consult   Pending BCx 6/29/22: NGDT x2  VA duplex Vein: no DVT, VA duplex Artery: (+) PAD   Vascular consulted: maintain K>4, Mg>2, Increase Lasix to 80 mg IV BID. Check TSH, ASA 81 QD when able   ID: Ancef IV 1g q8 (for 7 days - if D/C prior, PO keflex 500mg 4x/day)    #Cirrhosis   ID: d/c Vanc, Cefazolin 1g q8 IV x7d, if d/c prior to completion, PO Keflex 500mg 4x/d. MRSA nares (neg)  Hepatology: Rec for CT Abd/Pelvis to document cirrhosis (done). Pending iron studies (ferritin 267), QF gold, KEVIN, Ig panel, CMV PCR, EBV PCR, HSV IgM, serum drug screen, utox, RUQ sono w doppler (done) - if all neg, liver bx.   Daily LFTs, INR.   Avoid hepatotoxic agents, sedatives, opioids.     #pulmHTN/ABRAHAM  Continue torsemide, I&Os, fluid restrict 1-1.5L/d, Na restrict  Start propanolol 10mg po q12, spironolactone 100mg po q24  watch BMP for hepatorenal syndr, watch BP for hypotension

## 2022-07-03 NOTE — PROGRESS NOTE ADULT - ASSESSMENT
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    # Cellulitis of the B/L LE 2/2 chronic LE edema    Patient is afebrile, hemodynamically stable, SIRS/Sepsis not present on admission   ID noted  abx: Ancef IV 1g q8 (for 7 days - if D/C prior, PO keflex 500mg 4x/day)  follow WOCN reccs   Blood culture neg x2  X-ray: subcutaneous infiltration without gas.  need edema control  local wound care  ace wrap and elevate legs    # liver cirrhosis of unclear etiology w/ splenomegaly with leukopenia and thrombocytopenia  Patient follows Dr. Erickson for splenomegaly and pancytopenia who is recommending liver biopsy.   Patient was seen by GI in the past and Fibrosure testing was suggestive of severe liver fibrosis  had EGD done showing small esophageal varices and gastric polyps  patient was advised to start Nadolol. Patient sought a second opinion and repeated EGD reportedly was negative for varices Patient never took Nadolol.  will need rpt EGD as outpt  prior screening for viral hepatitis, hemachromatosis, og' s disease, and autoimmune lives disease was negative.   hepatology reccs appreciated  Iron studies noted   Quantiferon level-pending   KEVIN, immunoglobulin panel-pending   EBV and hepatitis panel noted  send Serum Drug screen and Utox  will need Liver biopsy if workup is negative  RUQ US: mod ascites; liver cirr  CT A/P: done, read noted   Echo: nl EF (doubt passive congestion)  start inderal 10mg po q12  start aldactone 100mg po q24  for now: Lasix 80mg iv q12 - need to decr once edema better  fluid restrict to 1-1.5 L/day  watch BMP for HRS  watch BP for HoTN    # poss PVD  A Dupl noted  vasc eval  no asa for now    # PHTN WHO II; Possible ABRAHAM on Home O2   Follows Dr. Martinez   was recently seen by Dr. Paredes   ECHO 4/22/22: EF 72%, Grade 1 diastolic dysfunction; mild AS; mild AR; mild Pulm HTN  NC O2 prn  Vascular Cardiology recommending increasing Lasix but pt hypokalemic . Will replace k first    # DVT prophylaxis: hep sc 5000 q12    # GI prophylaxis: not indicated     # Activity: Independent    # Code: full    Disposition: abx:  echo; diuretics; local care; CLD w/u; f/u hepatology;   eventually, pt will go home, might need visit RN for wound care, might need home PT if decond - f/u CM

## 2022-07-03 NOTE — PROGRESS NOTE ADULT - SUBJECTIVE AND OBJECTIVE BOX
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 ROS notable for constipation. Followed by Cards Dr Paredes and seen for weight gain, started on Torsemide 20mg QD.     Pt has had two EGDs, the first in 2017 which led to recommendation of taking Nadolol which pt did not do, the second of which apparently revealed no varices. Screening for viral hep, hemachromatosis, Holden's dz, autoimmune liver dz all negative. Fibrosure w advanced fibrosis, MELD-Na 17, Rachel score B.       Overnight events  As above    Subjective complaints  (+) BLE swelling and pain    Present Today:   - Sesay:  No [ X ], Yes [  ] : Indication:     - Type of IV Access:       .. CVC/Piccline:  No [ X ], Yes [  ] : Indication:       .. Midline: No [ X ], Yes [  ] : Indication:                                              OBJECTIVE  PAST MEDICAL & SURGICAL HISTORY  GERD (gastroesophageal reflux disease)    Arthritis  OA    Fatty liver  AS PER PATIENT 15YEARS  PT REPORTS- I HAVE AN ENLARGED SPLEEN  LOW PLATELETS.    Malignant neoplasm of areola of left breast in female, unspecified estrogen receptor status    H/O thrombocytopenia    Blister of right lower leg    History of surgery  LEFT BREAST LUMPECTOMY  TUBIAL LIGATION  CHOLECYSTECTOMY  RIGHT &amp; LEFT TKR    History of cholecystectomy                                                ALLERGIES:  Cipro (Hives; Rash)  Levaquin (Hives; Rash)                           HOME MEDICATIONS  Home Medications:  torsemide 20 mg oral tablet: 1 tab(s) orally once a day (30 Jun 2022 00:05)                           MEDICATIONS:  STANDING MEDICATIONS  ceFAZolin   IVPB 1000 milliGRAM(s) IV Intermittent every 8 hours  enoxaparin Injectable 40 milliGRAM(s) SubCutaneous every 24 hours  furosemide   Injectable 20 milliGRAM(s) IV Push two times a day    PRN MEDICATIONS                                            ------------------------------------------------------------  VITAL SIGNS: Last 24 Hours  T(C): 36.4 (30 Jun 2022 23:24), Max: 36.8 (30 Jun 2022 14:00)  T(F): 97.6 (30 Jun 2022 23:24), Max: 98.3 (30 Jun 2022 14:00)  HR: 77 (30 Jun 2022 23:24) (77 - 82)  BP: 116/61 (30 Jun 2022 23:24) (112/52 - 127/60)  BP(mean): --  RR: 18 (30 Jun 2022 23:24) (18 - 20)  SpO2: 98% (30 Jun 2022 23:24) (94% - 99%)                                               LABS:                        9.2    2.12  )-----------( 54       ( 30 Jun 2022 06:11 )             29.1     06-30    142  |  102  |  32<H>  ----------------------------<  92  3.0<L>   |  30  |  1.3    Ca    8.2<L>      30 Jun 2022 06:11  Mg     1.7     06-30    TPro  5.2<L>  /  Alb  2.5<L>  /  TBili  3.3<H>  /  DBili  x   /  AST  34  /  ALT  15  /  AlkPhos  46  06-30    PT/INR - ( 29 Jun 2022 18:26 )   PT: 15.10 sec;   INR: 1.32 ratio         PTT - ( 29 Jun 2022 18:26 )  PTT:32.9 sec            Culture - Blood (collected 29 Jun 2022 22:19)  Source: .Blood Blood  Preliminary Report (01 Jul 2022 02:02):    No growth to date.    Culture - Blood (collected 29 Jun 2022 22:19)  Source: .Blood Blood  Preliminary Report (01 Jul 2022 02:02):    No growth to date.                                                    RADIOLOGY:      U/S RUQ 6/30/3033:   Liver: The liver is cirrhotic with mild to moderate perihepatic ascites. The right lobe of liver measures 12.1 cm.  Bile ducts: Normal caliber. Common bile duct measures 1 cm  Gallbladder: Status post cholecystectomy.  Pancreas: Visualized portions are within normal limits.  Right kidney: 8.7 cm. No hydronephrosis.  Ascites: Mild to moderate perihepatic ascites.  IVC: Visualized portions are within normal limits.  IMPRESSION: Cirrhotic liver. Ascites. Status post cholecystectomy.      B/l XR Tib/fib 6/29/2022:   Prior bilateral total knee arthroplasty with intact hardware. There is no acute fracture.  There is no dislocation. Diffuse subcutaneous   infiltration without gas.  No acute fracture or dislocation      PHYSICAL EXAM:  GENERAL: NAD, lying in bed comfortably, well groomed   HEAD:  Atraumatic, Normocephalic  EYES: EOMI, sclera clear - no icterus   ENT: Moist mucous membranes  NECK: Supple  CHEST/LUNG: Clear to auscultation anteriorly bilaterally; Unlabored respirations  HEART: Regular rate and rhythm; No murmurs, rubs, or gallops  ABDOMEN: (+)BS; Softly distended abd, no appreciable TTP, no appreciable mass.   EXTREMITIES:  2+ Peripheral Pulses, brisk capillary refill. No clubbing, cyanosis. Significant edema through BLE up to abd.   NERVOUS SYSTEM:  A&Ox3, no focal deficits   SKIN: BLE wrapped in dressings and ACE wraps - left in place. Surrounding edema as noted.

## 2022-07-04 LAB
ALBUMIN SERPL ELPH-MCNC: 3.1 G/DL — LOW (ref 3.5–5.2)
ALP SERPL-CCNC: 69 U/L — SIGNIFICANT CHANGE UP (ref 30–115)
ALT FLD-CCNC: 8 U/L — SIGNIFICANT CHANGE UP (ref 0–41)
ANION GAP SERPL CALC-SCNC: 9 MMOL/L — SIGNIFICANT CHANGE UP (ref 7–14)
AST SERPL-CCNC: 33 U/L — SIGNIFICANT CHANGE UP (ref 0–41)
BASOPHILS # BLD AUTO: 0.03 K/UL — SIGNIFICANT CHANGE UP (ref 0–0.2)
BASOPHILS NFR BLD AUTO: 0.6 % — SIGNIFICANT CHANGE UP (ref 0–1)
BILIRUB SERPL-MCNC: 2.7 MG/DL — HIGH (ref 0.2–1.2)
BUN SERPL-MCNC: 30 MG/DL — HIGH (ref 10–20)
CALCIUM SERPL-MCNC: 9.2 MG/DL — SIGNIFICANT CHANGE UP (ref 8.5–10.1)
CHLORIDE SERPL-SCNC: 102 MMOL/L — SIGNIFICANT CHANGE UP (ref 98–110)
CO2 SERPL-SCNC: 30 MMOL/L — SIGNIFICANT CHANGE UP (ref 17–32)
CREAT SERPL-MCNC: 1.2 MG/DL — SIGNIFICANT CHANGE UP (ref 0.7–1.5)
EGFR: 45 ML/MIN/1.73M2 — LOW
EOSINOPHIL # BLD AUTO: 0.45 K/UL — SIGNIFICANT CHANGE UP (ref 0–0.7)
EOSINOPHIL NFR BLD AUTO: 9 % — HIGH (ref 0–8)
GAMMA INTERFERON BACKGROUND BLD IA-ACNC: 0.01 IU/ML — SIGNIFICANT CHANGE UP
GLUCOSE SERPL-MCNC: 141 MG/DL — HIGH (ref 70–99)
HCT VFR BLD CALC: 36.7 % — LOW (ref 37–47)
HGB BLD-MCNC: 11.5 G/DL — LOW (ref 12–16)
IMM GRANULOCYTES NFR BLD AUTO: 0.4 % — HIGH (ref 0.1–0.3)
LYMPHOCYTES # BLD AUTO: 1.16 K/UL — LOW (ref 1.2–3.4)
LYMPHOCYTES # BLD AUTO: 23.2 % — SIGNIFICANT CHANGE UP (ref 20.5–51.1)
M TB IFN-G BLD-IMP: NEGATIVE — SIGNIFICANT CHANGE UP
M TB IFN-G CD4+ BCKGRND COR BLD-ACNC: 0 IU/ML — SIGNIFICANT CHANGE UP
M TB IFN-G CD4+CD8+ BCKGRND COR BLD-ACNC: 0 IU/ML — SIGNIFICANT CHANGE UP
MAGNESIUM SERPL-MCNC: 2 MG/DL — SIGNIFICANT CHANGE UP (ref 1.8–2.4)
MCHC RBC-ENTMCNC: 29.7 PG — SIGNIFICANT CHANGE UP (ref 27–31)
MCHC RBC-ENTMCNC: 31.3 G/DL — LOW (ref 32–37)
MCV RBC AUTO: 94.8 FL — SIGNIFICANT CHANGE UP (ref 81–99)
MONOCYTES # BLD AUTO: 0.51 K/UL — SIGNIFICANT CHANGE UP (ref 0.1–0.6)
MONOCYTES NFR BLD AUTO: 10.2 % — HIGH (ref 1.7–9.3)
NEUTROPHILS # BLD AUTO: 2.82 K/UL — SIGNIFICANT CHANGE UP (ref 1.4–6.5)
NEUTROPHILS NFR BLD AUTO: 56.6 % — SIGNIFICANT CHANGE UP (ref 42.2–75.2)
NRBC # BLD: 0 /100 WBCS — SIGNIFICANT CHANGE UP (ref 0–0)
PLATELET # BLD AUTO: 109 K/UL — LOW (ref 130–400)
POTASSIUM SERPL-MCNC: 4.4 MMOL/L — SIGNIFICANT CHANGE UP (ref 3.5–5)
POTASSIUM SERPL-SCNC: 4.4 MMOL/L — SIGNIFICANT CHANGE UP (ref 3.5–5)
PROT SERPL-MCNC: 6.3 G/DL — SIGNIFICANT CHANGE UP (ref 6–8)
QUANT TB PLUS MITOGEN MINUS NIL: 2.83 IU/ML — SIGNIFICANT CHANGE UP
RBC # BLD: 3.87 M/UL — LOW (ref 4.2–5.4)
RBC # FLD: 16.1 % — HIGH (ref 11.5–14.5)
SARS-COV-2 RNA SPEC QL NAA+PROBE: SIGNIFICANT CHANGE UP
SODIUM SERPL-SCNC: 141 MMOL/L — SIGNIFICANT CHANGE UP (ref 135–146)
TSH SERPL-MCNC: 7.2 UIU/ML — HIGH (ref 0.27–4.2)
WBC # BLD: 4.99 K/UL — SIGNIFICANT CHANGE UP (ref 4.8–10.8)
WBC # FLD AUTO: 4.99 K/UL — SIGNIFICANT CHANGE UP (ref 4.8–10.8)

## 2022-07-04 PROCEDURE — 99232 SBSQ HOSP IP/OBS MODERATE 35: CPT

## 2022-07-04 RX ADMIN — Medication 80 MILLIGRAM(S): at 13:27

## 2022-07-04 RX ADMIN — Medication 100 MILLIGRAM(S): at 06:37

## 2022-07-04 RX ADMIN — SPIRONOLACTONE 100 MILLIGRAM(S): 25 TABLET, FILM COATED ORAL at 06:38

## 2022-07-04 RX ADMIN — Medication 100 MILLIGRAM(S): at 13:27

## 2022-07-04 RX ADMIN — Medication 80 MILLIGRAM(S): at 06:37

## 2022-07-04 RX ADMIN — Medication 100 MILLIGRAM(S): at 21:26

## 2022-07-04 NOTE — PROGRESS NOTE ADULT - ASSESSMENT
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    # Cellulitis of the B/L LE 2/2 chronic LE edema    Patient is afebrile, hemodynamically stable, SIRS/Sepsis not present on admission   ID noted  abx: Ancef IV 1g q8 (for 7 days - if D/C prior, PO keflex 500mg 4x/day)  follow WOCN reccs   Blood culture neg x2  X-ray: subcutaneous infiltration without gas.  need edema control  local wound care  ace wrap and elevate legs    # liver cirrhosis of unclear etiology w/ splenomegaly with leukopenia and thrombocytopenia  Patient follows Dr. Erickson for splenomegaly and pancytopenia who is recommending liver biopsy.   Patient was seen by GI in the past and Fibrosure testing was suggestive of severe liver fibrosis  had EGD done showing small esophageal varices and gastric polyps  patient was advised to start Nadolol. Patient sought a second opinion and repeated EGD reportedly was negative for varices Patient never took Nadolol.  will need rpt EGD as outpt  prior screening for viral hepatitis, hemachromatosis, og' s disease, and autoimmune lives disease was negative.   hepatology reccs appreciated  Iron studies noted   Pending KEVIN, Ig panel, follow up HSV IgM, serum drug screen, utox, if all neg, liver bx.    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 Martinez   was recently seen by Dr. Paredes   ECHO 4/22/22: EF 72%, Grade 1 diastolic dysfunction; mild AS; mild AR; mild Pulm HTN  NC O2 prn  Vascular Cardiology recommending increasing Lasix but pt hypokalemic . Will replace k first    # DVT prophylaxis: hep sc 5000 q12    # GI prophylaxis: not indicated     # Activity: Independent    #Hypothyroidism  -TSH elevated  -T3, T4 ordered    # Code: full    Disposition: t3, t4, outpatient diuresis plan   eventually, pt will go home, might need visit RN for wound care, might need home PT if decond - f/u CM

## 2022-07-04 NOTE — PROGRESS NOTE ADULT - ASSESSMENT
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 ROS notable for constipation. Followed by Thomas Paredes and seen for weight gain, started on Torsemide 20mg QD.   Pt has had two EGDs, the first in 2017 which led to recommendation of taking Nadolol which pt did not do, the second of which apparently revealed no varices. Screening for viral hep, hemachromatosis, Holden's dz, autoimmune liver dz all negative. Fibrosure w advanced fibrosis, MELD-Na 17, Rachel score B.       # Cellulitis 2/2 chronic BLE edema/ venous stasis   - Wound care by advanced practice nurse consult   - Pending BCx 6/29/22: NGDT x2  - VA duplex Vein: no DVT, VA duplex Artery: (+) PAD   - Vascular consulted: maintain K>4, Mg>2, Increase Lasix to 80 mg IV BID. Check TSH, ASA 81 QD when able   - ID: Ancef IV 1g q8 (for 7 days - if D/C prior, PO keflex 500mg 4x/day)    #Cirrhosis   - ID: d/c Vanc, Cefazolin 1g q8 IV x7d, if d/c prior to completion, PO Keflex 500mg 4x/d. MRSA nares (neg)  - Hepatology: Rec for CT Abd/Pelvis to document cirrhosis (done). Pending iron studies (ferritin 267), QF gold, KEVIN, Ig panel, CMV PCR, EBV PCR, HSV IgM, serum drug screen, utox, RUQ sono w doppler (done) - if all neg, liver bx.   - Daily LFTs, INR.   - Avoid hepatotoxic agents, sedatives, opioids.     # pulm HTN/ABRAHAM  - Continue lasix, I&Os, fluid restrict 1-1.5L/d, Na restrict  - Started propanolol 10mg po q12, spironolactone 100mg po q24  watch BMP for hepatorenal syndr, watch BP for hypotension  - OP Follow up with Dr. Martinez, Dr. Paredes    # Diet: DASH/TLC  # DVT prophylaxis: hep sc 5000 q12  # GI prophylaxis: not indicated   # Activity: Independent  # Code: full       82 y/o woman w PMHx of hepatocellular disease/cirrhosis, splenomegaly w leukopenia and thrombocytopenia, followed by Dr Erickson who has rec'd liver bx, ?known small esophageal varices and gastric polyps, PHTN WHO II, poss ABRAHAM, referred by visiting RN to ED for BLE edema and cellulitis w associated weakness. ROS notable for constipation. Followed by Cards Dr Paredes and seen for weight gain, started on Torsemide 20mg QD.   Pt has had two EGDs, the first in 2017 which led to recommendation of taking Nadolol which pt did not do, the second of which apparently revealed no varices. Screening for viral hep, hemachromatosis, Holden's dz, autoimmune liver dz all negative. Fibrosure w advanced fibrosis, MELD-Na 17, Rachel score B.       # Cellulitis 2/2 chronic BLE edema/ venous stasis   - Wound care by advanced practice nurse consult   - Pending BCx 6/29/22: NGDT x2  - VA duplex Vein: no DVT, VA duplex Artery: (+) PAD   - Vascular consulted: maintain K>4, Mg>2, Increase Lasix to 80 mg IV BID. Check TSH, ASA 81 QD when able   - ID: Ancef IV 1g q8 (for 7 days - if D/C prior, PO keflex 500mg 4x/day) (Started 06/30)    # Cirrhosis   - ID: d/c Vanc, Cefazolin 1g q8 IV x7d, if d/c prior to completion, PO Keflex 500mg 4x/d. MRSA nares (neg)  - Hepatology: Rec for CT Abd/Pelvis - Showed - Sequela of cirrhosis including nodular hepatic contour, splenic/gastroesophageal varices and small volume of ascites surrounding the liver, spleen. Additional ascites within the dependent portion of the pelvis.   - Pending KEVIN, Ig panel, follow up HSV IgM, serum drug screen, utox, if all neg, liver bx.   - Iron studies - unremarkable, CMV PCR - negative, Quantiferon gold - negative, EBV PCR - c/w past infection, Drug screen - negative  - Daily LFTs, INR  - Avoid hepatotoxic agents, sedatives, opioids    # Pulm HTN/ABRAHAM  - Continue lasix, I&Os, fluid restrict 1-1.5L/d, Na restrict  - Started propanolol 10mg po q12, spironolactone 100mg po q24  watch BMP for hepatorenal syndr, watch BP for hypotension  - OP Follow up with Dr. Martinez, Dr. Paredes    # Diet: DASH/TLC  # DVT prophylaxis: hep sc 5000 q12  # GI prophylaxis: not indicated   # Activity: Independent  # Code: full       82 y/o woman w PMHx of hepatocellular disease/cirrhosis, splenomegaly w leukopenia and thrombocytopenia, followed by Dr Erickson who has rec'd liver bx, ?known small esophageal varices and gastric polyps, PHTN WHO II, poss ABRAHAM, referred by visiting RN to ED for BLE edema and cellulitis w associated weakness. ROS notable for constipation. Followed by Thomas Paredes and seen for weight gain, started on Torsemide 20mg QD.   Pt has had two EGDs, the first in 2017 which led to recommendation of taking Nadolol which pt did not do, the second of which apparently revealed no varices. Screening for viral hep, hemachromatosis, Holden's dz, autoimmune liver dz all negative. Fibrosure w advanced fibrosis, MELD-Na 17, Rachel score B.       # Cellulitis 2/2 chronic BLE edema/ venous stasis   - Wound care by advanced practice nurse consult   - Pending BCx 6/29/22: NGDT x2  - VA duplex Vein: no DVT, VA duplex Artery: (+) PAD   - Vascular consulted: maintain K>4, Mg>2, continue with Lasix to 80 mg IV BID. Check TSH, ASA 81 QD when able   - ID: Ancef IV 1g q8 (for 7 days - if D/C prior, PO keflex 500mg 4x/day) (Started 06/30)    # Cirrhosis   - ID: d/c Vanc, Cefazolin 1g q8 IV x7d, if d/c prior to completion, PO Keflex 500mg 4x/d. MRSA nares (neg)  - Hepatology: Rec for CT Abd/Pelvis - Showed - Sequela of cirrhosis including nodular hepatic contour, splenic/gastroesophageal varices and small volume of ascites surrounding the liver, spleen. Additional ascites within the dependent portion of the pelvis.   - Pending KEVIN, Ig panel, follow up HSV IgM, serum drug screen, utox, if all neg, liver bx.   - Iron studies - unremarkable, CMV PCR - negative, Quantiferon gold - negative, EBV PCR - c/w past infection, Drug screen - negative  - Daily LFTs, INR  - Avoid hepatotoxic agents, sedatives, opioids  - Hepatology/GI follow up - ?EGG, ?Liver biopsy    # Pulm HTN/ABRAHAM  - Continue lasix, I&Os, fluid restrict 1-1.5L/d, Na restrict  - Started propanolol 10mg po q12, spironolactone 100mg po q24  watch BMP for hepatorenal syndr, watch BP for hypotension  - OP Follow up with Dr. Martinez, Dr. Paredes    # Diet: DASH/TLC  # DVT prophylaxis: hep sc 5000 q12  # GI prophylaxis: not indicated   # Activity: Independent  # Code: full       82 y/o woman w PMHx of hepatocellular disease/cirrhosis, splenomegaly w leukopenia and thrombocytopenia, followed by Dr Erickson who has rec'd liver bx, ?known small esophageal varices and gastric polyps, PHTN WHO II, poss ABRAHAM, referred by visiting RN to ED for BLE edema and cellulitis w associated weakness. ROS notable for constipation. Followed by Thomas Paredes and seen for weight gain, started on Torsemide 20mg QD.   Pt has had two EGDs, the first in 2017 which led to recommendation of taking Nadolol which pt did not do, the second of which apparently revealed no varices. Screening for viral hep, hemachromatosis, Holden's dz, autoimmune liver dz all negative. Fibrosure w advanced fibrosis, MELD-Na 17, Rachel score B.       # Cellulitis 2/2 chronic BLE edema/ venous stasis   - Wound care by advanced practice nurse consult   - Pending BCx 6/29/22: NGDT x2  - VA duplex Vein: no DVT, VA duplex Artery: (+) PAD   - Vascular following : maintain K>4, Mg>2, continue with Lasix to 80 mg IV BID. Check TSH, ASA 81 QD when able   - ID: Ancef IV 1g q8 (for 7 days - if D/C prior, PO keflex 500mg 4x/day) (Started 06/30)    # Cirrhosis   - ID: d/c Vanc, Cefazolin 1g q8 IV x7d, if d/c prior to completion, PO Keflex 500mg 4x/d. MRSA nares (neg)  - Hepatology: Rec for CT Abd/Pelvis - Showed - Sequela of cirrhosis including nodular hepatic contour, splenic/gastroesophageal varices and small volume of ascites surrounding the liver, spleen. Additional ascites within the dependent portion of the pelvis.   - Pending KEVIN, Ig panel, follow up HSV IgM, serum drug screen, utox, if all neg, liver bx.   - Iron studies - unremarkable, CMV PCR - negative, Quantiferon gold - negative, EBV PCR - c/w past infection, Drug screen - negative  - Daily LFTs, INR  - Avoid hepatotoxic agents, sedatives, opioids  - Discussed with GI - will need outpatient EGD and liver biopsy if CLD workup unrevealing.     # Pulm HTN/ABRAHAM  - Continue lasix, I&Os, fluid restrict 1-1.5L/d, Na restrict  - Started propanolol 10mg po q12, spironolactone 100mg po q24  watch BMP for hepatorenal syndr, watch BP for hypotension  - OP Follow up with Dr. Martinez, Dr. Paredes    # Diet: DASH/TLC  # DVT prophylaxis: hep sc 5000 q12  # GI prophylaxis: not indicated   # Activity: Independent  # Code: full

## 2022-07-04 NOTE — PROGRESS NOTE ADULT - SUBJECTIVE AND OBJECTIVE BOX
SUBJECTIVE:   LENGTH OF HOSPITAL STAY: 5d    CHIEF COMPLAINT:  Patient is a 81y old  Female who presents with a chief complaint of Lower extremity Edema and cellulitis (03 Jul 2022 16:50)      DIAGNOSIS: Lower extremity cellulitis, Decompensated liver cirrhosis      Events over the past 24 hours:  Patient was seen at the bedside this morning. She is sitting comfortably on the bed. There were no acute events overnight.   She denies any chest pain, shortness of breath, cough, fever, chills, abdominal pain, nausea, vomiting, diarrhea, dizziness or headache, itching, rashes. The lower extremity swelling has improved, she denies any pain on lower extremities.     REVIEW OF SYSTEMS  Negative except as above      OBJECTIVE:  VITALS:   T(F): 97.6 (07-04 @ 05:01), Max: 98 (07-03 @ 20:14)  HR: 71 (07-04 @ 05:01) (67 - 71)  BP: 118/52 (07-04 @ 05:01) (102/50 - 118/52)  RR: 18 (07-04 @ 05:01) (18 - 18)  SpO2: 95% (07-03 @ 19:58) (95% - 95%)    I&O's:       PHYSICAL EXAM:  General: No acute distress; Pallor (-), Icterus (-), Cyanosis (-), Clubbing (-)  HEENT: Normocephalic, atraumatic, PERRLA, EOMI  PULM: Bilaterally equal and clear breath sounds, wheeze (-), rubs (-), crackles (-)  CVS: Normal S1 and S2, 3/6 systolic murmur (+), rubs (-), gallops (-)   GI: Soft, distended, nontender, BS +  MSK: bilateral LE Edema (++), wrapped in dressing, chronic venostatic changes on the exposed skin, no muscle, bone or joint tenderness noted  SKIN: Warm and well perfused  NEURO:  Alert and Oriented x 3; No gross focal neurological deficit noted      LABS:                        11.5   4.99  )-----------( 109      ( 04 Jul 2022 07:42 )             36.7             07-04    141  |  102  |  30<H>  ----------------------------<  141<H>  4.4   |  30  |  1.2    Ca    9.2      04 Jul 2022 07:42  Mg     2.0     07-04    TPro  6.3  /  Alb  3.1<L>  /  TBili  2.7<H>  /  DBili  x   /  AST  33  /  ALT  8   /  AlkPhos  69  07-04    LIVER FUNCTIONS - ( 04 Jul 2022 07:42 )  Alb: 3.1 g/dL / Pro: 6.3 g/dL / ALK PHOS: 69 U/L / ALT: 8 U/L / AST: 33 U/L / GGT: x                   Blood Glucose:        RADIOLOGY & ADDITIONAL TESTS:      U/S RUQ 6/30/3033:   Liver: The liver is cirrhotic with mild to moderate perihepatic ascites. The right lobe of liver measures 12.1 cm.  Bile ducts: Normal caliber. Common bile duct measures 1 cm  Gallbladder: Status post cholecystectomy.  Pancreas: Visualized portions are within normal limits.  Right kidney: 8.7 cm. No hydronephrosis.  Ascites: Mild to moderate perihepatic ascites.  IVC: Visualized portions are within normal limits.  IMPRESSION: Cirrhotic liver. Ascites. Status post cholecystectomy.      B/l XR Tib/fib 6/29/2022:   Prior bilateral total knee arthroplasty with intact hardware. There is no acute fracture.  There is no dislocation. Diffuse subcutaneous   infiltration without gas.  No acute fracture or dislocation      ALLERGIES:  Cipro (Hives; Rash)  Levaquin (Hives; Rash)      CURRENT MEDICATIONS:  STANDING MEDICATIONS  ceFAZolin   IVPB 1000 milliGRAM(s) IV Intermittent every 8 hours  furosemide   Injectable 80 milliGRAM(s) IV Push two times a day  heparin   Injectable 5000 Unit(s) SubCutaneous every 12 hours  propranolol 10 milliGRAM(s) Oral every 12 hours  spironolactone 100 milliGRAM(s) Oral daily    PRN MEDICATIONS             SUBJECTIVE:   LENGTH OF HOSPITAL STAY: 5d    CHIEF COMPLAINT:  Patient is a 81y old  Female who presents with a chief complaint of Lower extremity Edema and cellulitis (03 Jul 2022 16:50)      DIAGNOSIS: Lower extremity cellulitis, Decompensated liver cirrhosis      Events over the past 24 hours:  Patient was seen at the bedside this morning. She is sitting comfortably on the bed. There were no acute events overnight.   She denies any chest pain, shortness of breath, cough, fever, chills, abdominal pain, nausea, vomiting, diarrhea, dizziness or headache, itching, rashes. The lower extremity swelling has improved, she denies any pain on lower extremities.     REVIEW OF SYSTEMS  Negative except as above      OBJECTIVE:  VITALS:   T(F): 97.6 (07-04 @ 05:01), Max: 98 (07-03 @ 20:14)  HR: 71 (07-04 @ 05:01) (67 - 71)  BP: 118/52 (07-04 @ 05:01) (102/50 - 118/52)  RR: 18 (07-04 @ 05:01) (18 - 18)  SpO2: 95% (07-03 @ 19:58) (95% - 95%)    I&O's:       PHYSICAL EXAM:  General: No acute distress; Pallor (-), Icterus (-), Cyanosis (-), Clubbing (-)  HEENT: Normocephalic, atraumatic, PERRLA, EOMI  PULM: Bilaterally equal and clear breath sounds, wheeze (-), rubs (-), crackles (-)  CVS: Normal S1 and S2, 3/6 systolic murmur (+), rubs (-), gallops (-)   GI: Soft, distended, nontender, BS +  MSK: bilateral LE Edema (++), wrapped in dressing, chronic venostatic changes on the exposed skin, no muscle, bone or joint tenderness noted  SKIN: Warm and well perfused  NEURO:  Alert and Oriented x 3; No gross focal neurological deficit noted      LABS:                        11.5   4.99  )-----------( 109      ( 04 Jul 2022 07:42 )             36.7             07-04    141  |  102  |  30<H>  ----------------------------<  141<H>  4.4   |  30  |  1.2    Ca    9.2      04 Jul 2022 07:42  Mg     2.0     07-04    TPro  6.3  /  Alb  3.1<L>  /  TBili  2.7<H>  /  DBili  x   /  AST  33  /  ALT  8   /  AlkPhos  69  07-04    LIVER FUNCTIONS - ( 04 Jul 2022 07:42 )  Alb: 3.1 g/dL / Pro: 6.3 g/dL / ALK PHOS: 69 U/L / ALT: 8 U/L / AST: 33 U/L / GGT: x                   Blood Glucose:        RADIOLOGY & ADDITIONAL TESTS:      U/S RUQ 6/30/3033:   Liver: The liver is cirrhotic with mild to moderate perihepatic ascites. The right lobe of liver measures 12.1 cm.  Bile ducts: Normal caliber. Common bile duct measures 1 cm  Gallbladder: Status post cholecystectomy.  Pancreas: Visualized portions are within normal limits.  Right kidney: 8.7 cm. No hydronephrosis.  Ascites: Mild to moderate perihepatic ascites.  IVC: Visualized portions are within normal limits.  IMPRESSION: Cirrhotic liver. Ascites. Status post cholecystectomy.      B/l XR Tib/fib 6/29/2022:   Prior bilateral total knee arthroplasty with intact hardware. There is no acute fracture.  There is no dislocation. Diffuse subcutaneous   infiltration without gas.  No acute fracture or dislocation    < from: CT Abdomen and Pelvis w/ IV Cont (07.01.22 @ 14:57) >  IMPRESSION:    Sequela of cirrhosis including nodular hepatic contour,   splenic/gastroesophageal varices and small volume of ascites surrounding   the liver, spleen.    Additional ascites within the dependent portion of the pelvis.    < end of copied text >      ALLERGIES:  Cipro (Hives; Rash)  Levaquin (Hives; Rash)      CURRENT MEDICATIONS:  STANDING MEDICATIONS  ceFAZolin   IVPB 1000 milliGRAM(s) IV Intermittent every 8 hours  furosemide   Injectable 80 milliGRAM(s) IV Push two times a day  heparin   Injectable 5000 Unit(s) SubCutaneous every 12 hours  propranolol 10 milliGRAM(s) Oral every 12 hours  spironolactone 100 milliGRAM(s) Oral daily    PRN MEDICATIONS

## 2022-07-04 NOTE — PROGRESS NOTE ADULT - SUBJECTIVE AND OBJECTIVE BOX
Patient is a 81y old  Female who presents with a chief complaint of Lower extremity Edema and cellulitis (04 Jul 2022 13:28)      Patient seen and examined at bedside.    ALLERGIES:  Cipro (Hives; Rash)  Levaquin (Hives; Rash)    MEDICATIONS:  ceFAZolin   IVPB 1000 milliGRAM(s) IV Intermittent every 8 hours  furosemide   Injectable 80 milliGRAM(s) IV Push two times a day  heparin   Injectable 5000 Unit(s) SubCutaneous every 12 hours  propranolol 10 milliGRAM(s) Oral every 12 hours  silver sulfADIAZINE 1% Cream 1 Application(s) Topical daily  spironolactone 100 milliGRAM(s) Oral daily    Vital Signs Last 24 Hrs  T(F): 97.6 (04 Jul 2022 05:01), Max: 98 (03 Jul 2022 20:14)  HR: 71 (04 Jul 2022 05:01) (67 - 71)  BP: 118/52 (04 Jul 2022 05:01) (108/52 - 118/52)  RR: 18 (04 Jul 2022 05:01) (18 - 18)  SpO2: 95% (03 Jul 2022 19:58) (95% - 95%)  I&O's Summary      PHYSICAL EXAM:  General: NAD, A/O x 3  ENT: MMM  Neck: Supple, No JVD  Lungs: Clear to auscultation bilaterally, no crackles   Cardio: RRR, S1/S2, 2/6 blowing murmur   Abdomen: Soft, Nontender, +distended; Bowel sounds present  Extremities: No cyanosis, +3 LE edema     LABS:                        11.5   4.99  )-----------( 109      ( 04 Jul 2022 07:42 )             36.7     07-04    141  |  102  |  30  ----------------------------<  141  4.4   |  30  |  1.2    Ca    9.2      04 Jul 2022 07:42  Mg     2.0     07-04    TPro  6.3  /  Alb  3.1  /  TBili  2.7  /  DBili  x   /  AST  33  /  ALT  8   /  AlkPhos  69  07-04              06-30 Chol 138 mg/dL LDL -- HDL 43 mg/dL Trig 93 mg/dL  TSH 7.20   TSH with FT4 reflex --  Total T3 --                      Culture - Blood (collected 29 Jun 2022 22:19)  Source: .Blood Blood  Preliminary Report (01 Jul 2022 02:02):    No growth to date.    Culture - Blood (collected 29 Jun 2022 22:19)  Source: .Blood Blood  Preliminary Report (01 Jul 2022 02:02):    No growth to date.      COVID-19 PCR: NotDetec (07-04-22 @ 12:29)  COVID-19 PCR: NotDetec (06-29-22 @ 21:31)      RADIOLOGY & ADDITIONAL TESTS:    Care Discussed with Consultants/Other Providers:

## 2022-07-04 NOTE — PROGRESS NOTE ADULT - SUBJECTIVE AND OBJECTIVE BOX
Vascular Cardiology  Progress note     EMAIL [email or web address]     CC:  leg swelling    INTERVAL HISTORY:     She thinks her leg swelling is starting to improve. Reports good UOP.       Allergies    Cipro (Hives; Rash)  Levaquin (Hives; Rash)    Intolerances    	    MEDICATIONS:  furosemide   Injectable 80 milliGRAM(s) IV Push two times a day  heparin   Injectable 5000 Unit(s) SubCutaneous every 12 hours  propranolol 10 milliGRAM(s) Oral every 12 hours  spironolactone 100 milliGRAM(s) Oral daily    ceFAZolin   IVPB 1000 milliGRAM(s) IV Intermittent every 8 hours                PAST MEDICAL & SURGICAL HISTORY:  GERD (gastroesophageal reflux disease)      Arthritis  OA      Fatty liver  AS PER PATIENT 15YEARS  PT REPORTS- I HAVE AN ENLARGED SPLEEN  LOW PLATELETS.      Malignant neoplasm of areola of left breast in female, unspecified estrogen receptor status      H/O thrombocytopenia      Blister of right lower leg      History of surgery  LEFT BREAST LUMPECTOMY  TUBIAL LIGATION  CHOLECYSTECTOMY  RIGHT &amp; LEFT TKR      History of cholecystectomy          FAMILY HISTORY:  Family history of breast cancer in mother (Mother)    Family history of Parkinson disease (Father)        SOCIAL HISTORY:  unchanged    REVIEW OF SYSTEMS:  CONSTITUTIONAL: No fever, weight loss, or fatigue  EYES: No eye pain, visual disturbances, or discharge  ENT:  No difficulty hearing, tinnitus, vertigo; No sinus or throat pain  NECK: No pain or stiffness  RESPIRATORY: No cough, wheezing, chills or hemoptysis; No Shortness of Breath  CARDIOVASCULAR: No chest pain, palpitations, passing out, dizziness,  GASTROINTESTINAL: No abdominal or epigastric pain. No nausea, vomiting, or hematemesis; No diarrhea or constipation. No melena or hematochezia.  GENITOURINARY: No dysuria, frequency, hematuria, or incontinence  NEUROLOGICAL: No headaches, memory loss, loss of strength, numbness, or tremors  SKIN: blisters on legs  LYMPH Nodes: No enlarged glands  ENDOCRINE: No heat or cold intolerance; No hair loss  MUSCULOSKELETAL: No joint pain or swelling; No muscle, back, or extremity pain  PSYCHIATRIC: No depression, anxiety, mood swings, or difficulty sleeping  HEME/LYMPH: No easy bruising, or bleeding gums  ALLERGY AND IMMUNOLOGIC: No hives or eczema	    [ x] All others negative	  [ ] Unable to obtain    PHYSICAL EXAM:  T(C): 36.4 (07-04-22 @ 05:01), Max: 36.7 (07-03-22 @ 20:14)  HR: 71 (07-04-22 @ 05:01) (67 - 71)  BP: 118/52 (07-04-22 @ 05:01) (108/52 - 118/52)  RR: 18 (07-04-22 @ 05:01) (18 - 18)  SpO2: 95% (07-03-22 @ 19:58) (95% - 95%)  Wt(kg): --  I&O's Summary      Appearance:  sitting up, speaking in complete sentences	  HEENT:   Normal oral mucosa, PERRL, EOMI	  Cardiovascular:  RRR, nl S1, S2, no m/r/g  Respiratory:  CTA b/l, no crackles  Psychiatry:  AAO x 3  Gastrointestinal:  Soft, Non-tender, + BS	  Skin: hyperpigmentation of legs due to chronic venous stasis changes  Neurologic:  non-focal  Extremities:  3+ pitting edema b/l, starting to see wrinkles on toes    Vascular Pulse Exam:  Right DP: [x]palpable []non-palpable []audible      Left DP :   [x]palpable []non-palpable []audible  Right PT: []palpable [x] non-palpable []audible   Left PT:  [] palpable [x] non-palpable []audible         LABS:	 	    CBC Full  -  ( 04 Jul 2022 07:42 )  WBC Count : 4.99 K/uL  Hemoglobin : 11.5 g/dL  Hematocrit : 36.7 %  Platelet Count - Automated : 109 K/uL  Mean Cell Volume : 94.8 fL  Mean Cell Hemoglobin : 29.7 pg  Mean Cell Hemoglobin Concentration : 31.3 g/dL  Auto Neutrophil # : 2.82 K/uL  Auto Lymphocyte # : 1.16 K/uL  Auto Monocyte # : 0.51 K/uL  Auto Eosinophil # : 0.45 K/uL  Auto Basophil # : 0.03 K/uL  Auto Neutrophil % : 56.6 %  Auto Lymphocyte % : 23.2 %  Auto Monocyte % : 10.2 %  Auto Eosinophil % : 9.0 %  Auto Basophil % : 0.6 %    07-04    141  |  102  |  30<H>  ----------------------------<  141<H>  4.4   |  30  |  1.2  07-03    141  |  102  |  26<H>  ----------------------------<  100<H>  5.0   |  29  |  1.2    Ca    9.2      04 Jul 2022 07:42  Ca    9.0      03 Jul 2022 07:33  Mg     2.0     07-04    TPro  6.3  /  Alb  3.1<L>  /  TBili  2.7<H>  /  DBili  x   /  AST  33  /  ALT  8   /  AlkPhos  69  07-04

## 2022-07-05 LAB
ALBUMIN SERPL ELPH-MCNC: 2.9 G/DL — LOW (ref 3.5–5.2)
ALP SERPL-CCNC: 71 U/L — SIGNIFICANT CHANGE UP (ref 30–115)
ALT FLD-CCNC: <5 U/L — SIGNIFICANT CHANGE UP (ref 0–41)
ANION GAP SERPL CALC-SCNC: 9 MMOL/L — SIGNIFICANT CHANGE UP (ref 7–14)
AST SERPL-CCNC: 29 U/L — SIGNIFICANT CHANGE UP (ref 0–41)
BASOPHILS # BLD AUTO: 0.03 K/UL — SIGNIFICANT CHANGE UP (ref 0–0.2)
BASOPHILS NFR BLD AUTO: 0.8 % — SIGNIFICANT CHANGE UP (ref 0–1)
BILIRUB DIRECT SERPL-MCNC: 0.6 MG/DL — HIGH (ref 0–0.3)
BILIRUB SERPL-MCNC: 2.5 MG/DL — HIGH (ref 0.2–1.2)
BUN SERPL-MCNC: 37 MG/DL — HIGH (ref 10–20)
CALCIUM SERPL-MCNC: 9.5 MG/DL — SIGNIFICANT CHANGE UP (ref 8.5–10.1)
CHLORIDE SERPL-SCNC: 102 MMOL/L — SIGNIFICANT CHANGE UP (ref 98–110)
CO2 SERPL-SCNC: 30 MMOL/L — SIGNIFICANT CHANGE UP (ref 17–32)
CREAT SERPL-MCNC: 1.4 MG/DL — SIGNIFICANT CHANGE UP (ref 0.7–1.5)
CULTURE RESULTS: SIGNIFICANT CHANGE UP
CULTURE RESULTS: SIGNIFICANT CHANGE UP
EGFR: 38 ML/MIN/1.73M2 — LOW
EOSINOPHIL # BLD AUTO: 0.32 K/UL — SIGNIFICANT CHANGE UP (ref 0–0.7)
EOSINOPHIL NFR BLD AUTO: 8.9 % — HIGH (ref 0–8)
GLUCOSE SERPL-MCNC: 111 MG/DL — HIGH (ref 70–99)
HCT VFR BLD CALC: 32.1 % — LOW (ref 37–47)
HGB BLD-MCNC: 10.2 G/DL — LOW (ref 12–16)
IGA FLD-MCNC: 255 MG/DL — SIGNIFICANT CHANGE UP (ref 84–499)
IGG FLD-MCNC: 1611 MG/DL — SIGNIFICANT CHANGE UP (ref 610–1660)
IGM SERPL-MCNC: 180 MG/DL — SIGNIFICANT CHANGE UP (ref 35–242)
IMM GRANULOCYTES NFR BLD AUTO: 0.3 % — SIGNIFICANT CHANGE UP (ref 0.1–0.3)
INR BLD: 1.26 RATIO — SIGNIFICANT CHANGE UP (ref 0.65–1.3)
KAPPA LC SER QL IFE: 5.79 MG/DL — HIGH (ref 0.33–1.94)
KAPPA/LAMBDA FREE LIGHT CHAIN RATIO, SERUM: 1.13 RATIO — SIGNIFICANT CHANGE UP (ref 0.26–1.65)
LAMBDA LC SER QL IFE: 5.13 MG/DL — HIGH (ref 0.57–2.63)
LYMPHOCYTES # BLD AUTO: 0.92 K/UL — LOW (ref 1.2–3.4)
LYMPHOCYTES # BLD AUTO: 25.6 % — SIGNIFICANT CHANGE UP (ref 20.5–51.1)
MAGNESIUM SERPL-MCNC: 2 MG/DL — SIGNIFICANT CHANGE UP (ref 1.8–2.4)
MCHC RBC-ENTMCNC: 29.9 PG — SIGNIFICANT CHANGE UP (ref 27–31)
MCHC RBC-ENTMCNC: 31.8 G/DL — LOW (ref 32–37)
MCV RBC AUTO: 94.1 FL — SIGNIFICANT CHANGE UP (ref 81–99)
MONOCYTES # BLD AUTO: 0.43 K/UL — SIGNIFICANT CHANGE UP (ref 0.1–0.6)
MONOCYTES NFR BLD AUTO: 12 % — HIGH (ref 1.7–9.3)
NEUTROPHILS # BLD AUTO: 1.88 K/UL — SIGNIFICANT CHANGE UP (ref 1.4–6.5)
NEUTROPHILS NFR BLD AUTO: 52.4 % — SIGNIFICANT CHANGE UP (ref 42.2–75.2)
NRBC # BLD: 0 /100 WBCS — SIGNIFICANT CHANGE UP (ref 0–0)
PLATELET # BLD AUTO: 101 K/UL — LOW (ref 130–400)
POTASSIUM SERPL-MCNC: 4.2 MMOL/L — SIGNIFICANT CHANGE UP (ref 3.5–5)
POTASSIUM SERPL-SCNC: 4.2 MMOL/L — SIGNIFICANT CHANGE UP (ref 3.5–5)
PROT SERPL-MCNC: 6.5 G/DL — SIGNIFICANT CHANGE UP (ref 6–8)
PROTHROM AB SERPL-ACNC: 14.5 SEC — HIGH (ref 9.95–12.87)
RBC # BLD: 3.41 M/UL — LOW (ref 4.2–5.4)
RBC # FLD: 16.1 % — HIGH (ref 11.5–14.5)
SODIUM SERPL-SCNC: 141 MMOL/L — SIGNIFICANT CHANGE UP (ref 135–146)
SPECIMEN SOURCE: SIGNIFICANT CHANGE UP
SPECIMEN SOURCE: SIGNIFICANT CHANGE UP
T3 SERPL-MCNC: 61 NG/DL — LOW (ref 80–200)
T4 AB SER-ACNC: 7.8 UG/DL — SIGNIFICANT CHANGE UP (ref 4.6–12)
WBC # BLD: 3.59 K/UL — LOW (ref 4.8–10.8)
WBC # FLD AUTO: 3.59 K/UL — LOW (ref 4.8–10.8)

## 2022-07-05 PROCEDURE — 99232 SBSQ HOSP IP/OBS MODERATE 35: CPT

## 2022-07-05 RX ORDER — LEVOTHYROXINE SODIUM 125 MCG
25 TABLET ORAL DAILY
Refills: 0 | Status: DISCONTINUED | OUTPATIENT
Start: 2022-07-05 | End: 2022-07-08

## 2022-07-05 RX ADMIN — Medication 1 APPLICATION(S): at 11:33

## 2022-07-05 RX ADMIN — Medication 80 MILLIGRAM(S): at 13:17

## 2022-07-05 RX ADMIN — Medication 100 MILLIGRAM(S): at 21:06

## 2022-07-05 RX ADMIN — Medication 80 MILLIGRAM(S): at 05:48

## 2022-07-05 RX ADMIN — HEPARIN SODIUM 5000 UNIT(S): 5000 INJECTION INTRAVENOUS; SUBCUTANEOUS at 05:48

## 2022-07-05 RX ADMIN — SPIRONOLACTONE 100 MILLIGRAM(S): 25 TABLET, FILM COATED ORAL at 05:48

## 2022-07-05 RX ADMIN — Medication 100 MILLIGRAM(S): at 13:13

## 2022-07-05 RX ADMIN — Medication 100 MILLIGRAM(S): at 05:49

## 2022-07-05 RX ADMIN — HEPARIN SODIUM 5000 UNIT(S): 5000 INJECTION INTRAVENOUS; SUBCUTANEOUS at 17:08

## 2022-07-05 NOTE — PROGRESS NOTE ADULT - SUBJECTIVE AND OBJECTIVE BOX
Vascular Cardiology  Progress note     EMAIL [email or web address]     CC:  LE edema    INTERVAL HISTORY:     Continues to have LE edema that is slowly improving. Ascites improved.       Allergies    Cipro (Hives; Rash)  Levaquin (Hives; Rash)    Intolerances    	    MEDICATIONS:  furosemide   Injectable 80 milliGRAM(s) IV Push two times a day  heparin   Injectable 5000 Unit(s) SubCutaneous every 12 hours  propranolol 10 milliGRAM(s) Oral every 12 hours  spironolactone 100 milliGRAM(s) Oral daily    ceFAZolin   IVPB 1000 milliGRAM(s) IV Intermittent every 8 hours            silver sulfADIAZINE 1% Cream 1 Application(s) Topical daily      PAST MEDICAL & SURGICAL HISTORY:  GERD (gastroesophageal reflux disease)      Arthritis  OA      Fatty liver  AS PER PATIENT 15YEARS  PT REPORTS- I HAVE AN ENLARGED SPLEEN  LOW PLATELETS.      Malignant neoplasm of areola of left breast in female, unspecified estrogen receptor status      H/O thrombocytopenia      Blister of right lower leg      History of surgery  LEFT BREAST LUMPECTOMY  TUBIAL LIGATION  CHOLECYSTECTOMY  RIGHT &amp; LEFT TKR      History of cholecystectomy          FAMILY HISTORY:  Family history of breast cancer in mother (Mother)    Family history of Parkinson disease (Father)        SOCIAL HISTORY:  unchanged    REVIEW OF SYSTEMS:  CONSTITUTIONAL: No fever, weight loss, or fatigue  EYES: No eye pain, visual disturbances, or discharge  ENT:  No difficulty hearing, tinnitus, vertigo; No sinus or throat pain  NECK: No pain or stiffness  RESPIRATORY: No cough, wheezing, chills or hemoptysis; No Shortness of Breath  CARDIOVASCULAR: No chest pain, palpitations, passing out, dizziness,  GASTROINTESTINAL: No abdominal or epigastric pain. No nausea, vomiting, or hematemesis; No diarrhea or constipation. No melena or hematochezia.  GENITOURINARY: No dysuria, frequency, hematuria, or incontinence  NEUROLOGICAL: No headaches, memory loss, loss of strength, numbness, or tremors  SKIN: blisters on shins are starting to heal  LYMPH Nodes: No enlarged glands  ENDOCRINE: No heat or cold intolerance; No hair loss  MUSCULOSKELETAL: No joint pain or swelling; No muscle, back, or extremity pain  PSYCHIATRIC: No depression, anxiety, mood swings, or difficulty sleeping  HEME/LYMPH: No easy bruising, or bleeding gums  ALLERGY AND IMMUNOLOGIC: No hives or eczema	    [ x] All others negative	  [ ] Unable to obtain    PHYSICAL EXAM:  T(C): 36.4 (07-05-22 @ 04:00), Max: 36.7 (07-04-22 @ 19:49)  HR: 64 (07-05-22 @ 04:00) (64 - 70)  BP: 103/55 (07-05-22 @ 04:00) (103/55 - 113/56)  RR: 18 (07-05-22 @ 04:00) (18 - 18)  SpO2: --  Wt(kg): --  I&O's Summary      Appearance:  sitting up, speaking in complete sentences	  HEENT:   Normal oral mucosa, PERRL, EOMI	  Cardiovascular:  RRR, nl S1, S2, no m/r/g  Respiratory:  CTA b/l, no crackles  Psychiatry:  AAO x 3  Gastrointestinal:  Soft, Non-tender, + BS	  Skin: shins wrapped in ACE bandage  Neurologic:  non-focal  Extremities:  3+ pitting edema b/l, starting to see wrinkles on toes    Vascular Pulse Exam:  Right DP: [x]palpable []non-palpable []audible      Left DP :   [x]palpable []non-palpable []audible  Right PT: []palpable [x] non-palpable []audible   Left PT:  [] palpable [x] non-palpable []audible      LABS:	 	    CBC Full  -  ( 05 Jul 2022 08:17 )  WBC Count : 3.59 K/uL  Hemoglobin : 10.2 g/dL  Hematocrit : 32.1 %  Platelet Count - Automated : 101 K/uL  Mean Cell Volume : 94.1 fL  Mean Cell Hemoglobin : 29.9 pg  Mean Cell Hemoglobin Concentration : 31.8 g/dL  Auto Neutrophil # : 1.88 K/uL  Auto Lymphocyte # : 0.92 K/uL  Auto Monocyte # : 0.43 K/uL  Auto Eosinophil # : 0.32 K/uL  Auto Basophil # : 0.03 K/uL  Auto Neutrophil % : 52.4 %  Auto Lymphocyte % : 25.6 %  Auto Monocyte % : 12.0 %  Auto Eosinophil % : 8.9 %  Auto Basophil % : 0.8 %    07-05    141  |  102  |  37<H>  ----------------------------<  111<H>  4.2   |  30  |  1.4  07-04    141  |  102  |  30<H>  ----------------------------<  141<H>  4.4   |  30  |  1.2    Ca    9.5      05 Jul 2022 08:17  Ca    9.2      04 Jul 2022 07:42  Mg     2.0     07-05  Mg     2.0     07-04    TPro  6.5  /  Alb  2.9<L>  /  TBili  2.5<H>  /  DBili  0.6<H>  /  AST  29  /  ALT  <5  /  AlkPhos  71  07-05  TPro  6.3  /  Alb  3.1<L>  /  TBili  2.7<H>  /  DBili  x   /  AST  33  /  ALT  8   /  AlkPhos  69  07-04

## 2022-07-05 NOTE — PROGRESS NOTE ADULT - SUBJECTIVE AND OBJECTIVE BOX
Gastroenterology progress note:     Patient is a 81y old  Female who presents with a chief complaint of Lower extremity Edema and cellulitis (04 Jul 2022 18:32)       Admitted on: 06-29-22    We are following the patient for cirrhosis      Interval History:  Denies any complaints        PAST MEDICAL & SURGICAL HISTORY:  GERD (gastroesophageal reflux disease)      Arthritis  OA      Fatty liver  AS PER PATIENT 15YEARS  PT REPORTS- I HAVE AN ENLARGED SPLEEN  LOW PLATELETS.      Malignant neoplasm of areola of left breast in female, unspecified estrogen receptor status      H/O thrombocytopenia      Blister of right lower leg      History of surgery  LEFT BREAST LUMPECTOMY  TUBIAL LIGATION  CHOLECYSTECTOMY  RIGHT &amp; LEFT TKR      History of cholecystectomy          MEDICATIONS  (STANDING):  ceFAZolin   IVPB 1000 milliGRAM(s) IV Intermittent every 8 hours  furosemide   Injectable 80 milliGRAM(s) IV Push two times a day  heparin   Injectable 5000 Unit(s) SubCutaneous every 12 hours  propranolol 10 milliGRAM(s) Oral every 12 hours  silver sulfADIAZINE 1% Cream 1 Application(s) Topical daily  spironolactone 100 milliGRAM(s) Oral daily    MEDICATIONS  (PRN):      Allergies  Cipro (Hives; Rash)  Levaquin (Hives; Rash)      Review of Systems:   Cardiovascular:  No Chest Pain, No Palpitations  Respiratory:  No Cough, No Dyspnea  Gastrointestinal:  As described in HPI    Physical Examination:  T(C): 36.4 (07-05-22 @ 04:00), Max: 36.7 (07-04-22 @ 19:49)  HR: 64 (07-05-22 @ 04:00) (64 - 70)  BP: 103/55 (07-05-22 @ 04:00) (103/55 - 113/56)  RR: 18 (07-05-22 @ 04:00) (18 - 18)  SpO2: --      Constitutional: No acute distress.  Respiratory:  No signs of respiratory distress. Lung sounds are clear bilaterally.  Cardiovascular:  S1 S2, Regular rate and rhythm.  Abdominal: Abdomen is soft, symmetric, and non-tender without distention.   Skin: No rashes, No Jaundice.        Data:                        10.2   3.59  )-----------( 101      ( 05 Jul 2022 08:17 )             32.1     Hgb trend:  10.2  07-05-22 @ 08:17  11.5  07-04-22 @ 07:42      07-04    141  |  102  |  30<H>  ----------------------------<  141<H>  4.4   |  30  |  1.2    Ca    9.2      04 Jul 2022 07:42  Mg     2.0     07-04    TPro  6.3  /  Alb  3.1<L>  /  TBili  2.7<H>  /  DBili  x   /  AST  33  /  ALT  8   /  AlkPhos  69  07-04    Liver panel trend:  TBili 2.7   /   AST 33   /   ALT 8   /   AlkP 69   /   Tptn 6.3   /   Alb 3.1    /   DBili --      07-04  TBili 2.9   /   AST 38   /   ALT 18   /   AlkP 76   /   Tptn 6.3   /   Alb 3.1    /   DBili --      07-01  TBili 3.3   /   AST 34   /   ALT 15   /   AlkP 46   /   Tptn 5.2   /   Alb 2.5    /   DBili --      06-30  TBili 4.1   /   AST 35   /   ALT 16   /   AlkP 51   /   Tptn 5.9   /   Alb 2.8    /   DBili 0.8      06-29             Radiology:    < from: CT Abdomen and Pelvis w/ IV Cont (07.01.22 @ 14:57) >    FINDINGS:    LOWER CHEST: Bibasilar dependent atelectasis.    HEPATOBILIARY: Nodular hepatic contour and perihepatic ascites,   compatible with cirrhosis. Post cholecystectomy.    SPLEEN: Splenomegaly measuring up to 16.8 cm in the craniocaudal   dimension (series 601, image 31). Perisplenic fluid.    PANCREAS: Unremarkable.    ADRENAL GLANDS: Unremarkable.    KIDNEYS: Symmetric enhancement bilaterally. No evidence of   hydronephrosis. Stable right renal lower pole hypodensity.    ABDOMINOPELVIC NODES: Unremarkable.    PELVIC ORGANS: Unremarkable.    PERITONEUM/MESENTERY/BOWEL: Sigmoid diverticulosis. Ascites within the   dependent portions of the pelvis. No obstruction or intraperitoneal free   air.    BONES/SOFT TISSUES: Degenerative changes to the thoracolumbar spine.    VASCULAR: Splenic and gastroesophageal varices. Patent splenic, superior   mesenteric and portal veins.      IMPRESSION:    Sequela of cirrhosis including nodular hepatic contour,   splenic/gastroesophageal varices and small volume of ascites surrounding   the liver, spleen.    Additional ascites within the dependent portion of the pelvis.    --- End of Report ---    < end of copied text >

## 2022-07-05 NOTE — PROGRESS NOTE ADULT - ASSESSMENT
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    # Cellulitis of the B/L LE 2/2 chronic LE edema    Patient is afebrile, hemodynamically stable, SIRS/Sepsis not present on admission   ID noted  abx: Ancef IV 1g q8 (for 7 days - if D/C prior, PO keflex 500mg 4x/day) - dc date 7/6  follow WOCN reccs   Blood culture neg x2  X-ray: subcutaneous infiltration without gas.  need edema control  local wound care  ace wrap and elevate legs    # liver cirrhosis of unclear etiology w/ splenomegaly with leukopenia and thrombocytopenia  Patient follows Dr. Erickson for splenomegaly and pancytopenia who is recommending liver biopsy.   Patient was seen by GI in the past and Fibrosure testing was suggestive of severe liver fibrosis  had EGD done showing small esophageal varices and gastric polyps  patient was advised to start Nadolol. Patient sought a second opinion and repeated EGD reportedly was negative for varices Patient never took Nadolol.  will need rpt EGD as outpt  prior screening for viral hepatitis, hemachromatosis, og' s disease, and autoimmune lives disease was negative.   hepatology reccs appreciated  Iron studies noted   Pending KEVIN, Ig panel, follow up HSV IgM, serum drug screen, utox, if all neg, liver bx.    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 Martinez   was recently seen by Dr. Paredes   ECHO 4/22/22: EF 72%, Grade 1 diastolic dysfunction; mild AS; mild AR; mild Pulm HTN  NC O2 prn  Vascular Cardiology appreciated wants to continue IV lasix 80mg bid for today assess if po ok 7/6  Monitor creatinine been slowly rising     # DVT prophylaxis: hep sc 5000 q12    # GI prophylaxis: not indicated     # Activity: Independent    #Hypothyroidism  -TSH elevated  -T3 decreased   - will start levothyroxine, recommend repeat thyroid panel in 6 weeks      # Code: full    Disposition: t3, t4, outpatient diuresis plan   eventually, pt will go home, might need visit RN for wound care, might need home PT if decond - f/u CM  Pending: diuresis, creatinine level, stop antibiotics 7/6 possible dc home 7/6 with home care

## 2022-07-05 NOTE — PROGRESS NOTE ADULT - SUBJECTIVE AND OBJECTIVE BOX
Patient is a 81y old  Female who presents with a chief complaint of Lower extremity Edema and cellulitis (05 Jul 2022 15:21)      Patient seen and examined at bedside.    ALLERGIES:  Cipro (Hives; Rash)  Levaquin (Hives; Rash)    MEDICATIONS:  ceFAZolin   IVPB 1000 milliGRAM(s) IV Intermittent every 8 hours  furosemide   Injectable 80 milliGRAM(s) IV Push two times a day  heparin   Injectable 5000 Unit(s) SubCutaneous every 12 hours  propranolol 10 milliGRAM(s) Oral every 12 hours  silver sulfADIAZINE 1% Cream 1 Application(s) Topical daily  spironolactone 100 milliGRAM(s) Oral daily    Vital Signs Last 24 Hrs  T(F): 98.7 (05 Jul 2022 14:38), Max: 98.7 (05 Jul 2022 14:38)  HR: 72 (05 Jul 2022 14:38) (64 - 72)  BP: 123/58 (05 Jul 2022 14:38) (103/55 - 123/58)  RR: 18 (05 Jul 2022 14:38) (18 - 18)  SpO2: --  I&O's Summary      PHYSICAL EXAM:  General: NAD, A/O x 3  ENT: MMM  Neck: Supple, No JVD  Lungs: Clear to auscultation bilaterally, no crackles   Cardio: RRR, S1/S2, 2/6 murmur   Abdomen: Soft, Nontender, +distended; Bowel sounds present  Extremities: No cyanosis, +3 LE edema     LABS:                        10.2   3.59  )-----------( 101      ( 05 Jul 2022 08:17 )             32.1     07-05    141  |  102  |  37  ----------------------------<  111  4.2   |  30  |  1.4    Ca    9.5      05 Jul 2022 08:17  Mg     2.0     07-05    TPro  6.5  /  Alb  2.9  /  TBili  2.5  /  DBili  0.6  /  AST  29  /  ALT  <5  /  AlkPhos  71  07-05      PT/INR - ( 05 Jul 2022 08:17 )   PT: 14.50 sec;   INR: 1.26 ratio                 06-30 Chol 138 mg/dL LDL -- HDL 43 mg/dL Trig 93 mg/dL  TSH --   TSH with FT4 reflex --  Total T3 61                      Culture - Blood (collected 29 Jun 2022 22:19)  Source: .Blood Blood  Final Report (05 Jul 2022 02:01):    No Growth Final    Culture - Blood (collected 29 Jun 2022 22:19)  Source: .Blood Blood  Final Report (05 Jul 2022 02:00):    No Growth Final      COVID-19 PCR: NotDetec (07-04-22 @ 12:29)  COVID-19 PCR: NotDetec (06-29-22 @ 21:31)      RADIOLOGY & ADDITIONAL TESTS:    Care Discussed with Consultants/Other Providers:

## 2022-07-05 NOTE — PROGRESS NOTE ADULT - ASSESSMENT
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 Pt was noted to have pancytopenia, splenomegaly and elevated bilirubin w/ concern for cirrhosis and Hepatology was consulted for management    #)Decompensated liver cirrhosis (+EV, +Jaundice, ?HCC, -HE) possibly d/t NAFLD/CURRIE - r/o other etiologies  MELD-Na Score - 17  Rachel Score - B  CLD workup thus far - -AMA, -hepatitis, -ASMA, -Anti LKM, -Soluble Liver Ag  Echo: mild portal htn. no evidence of right heart failure  EGD (2017): small esophageal varices. fundic gland polyps  FIbrosure - advanced fibrosis  CT scan showed Sequela of cirrhosis including nodular hepatic contour, splenic/gastroesophageal varices and small volume of ascites surrounding the liver, spleen.    Recs:   -Monitor LFTs and iNR qdaily  -avoid sedatives and opioids   -Pending KEVIN, Herpes, immunoglobulins  -will need Liver biopsy if workup is negative can be done outpatient vs inpatient   -will need repeat EGD as outpatient   -check AFP  Lifestyle modifications  Calorie intake 25-40 Kcal/kg/day  Protein intake: 1.2-1.5 gm/kg/day  Avoid smoking, alcohol, NSAIDS.

## 2022-07-05 NOTE — PROGRESS NOTE ADULT - ATTENDING COMMENTS
81 year old with hx of MGUS  cirrhosis admitted with infection of LE. No hx of T2DM  No jaundice   Abdomen soft non tender   LE b/l ace wrap    Awaiting work up of CLD  With ascites if plan for liver biopsy will need TJ liver biopsy    Recs:   -Monitor LFTs and iNR qdaily  -avoid sedatives and opioids   -Pending KEVIN, Herpes, immunoglobulins  -will need Liver biopsy if workup is negative can be done outpatient vs inpatient   -will need repeat EGD as outpatient   -check AFP  Lifestyle modifications  Calorie intake 25-40 Kcal/kg/day  Protein intake: 1.2-1.5 gm/kg/day  Avoid smoking, alcohol, NSAIDS. 81 year old with hx of MGUS  cirrhosis admitted with infection of LE. No hx of T2DM  No jaundice   Abdomen soft non tender   LE b/l ace wrap    Awaiting work up of CLD  With ascites if plan for liver biopsy will need TJ liver biopsy

## 2022-07-05 NOTE — PROGRESS NOTE ADULT - PROBLEM SELECTOR PROBLEM 5
R/O GERD (gastroesophageal reflux disease)

## 2022-07-05 NOTE — PROGRESS NOTE ADULT - ASSESSMENT
Assessment:  #Lower extremity edema  - 3+ bilaterally, venous stasis, legs do not appear infected  - TTE 4/2022 nl LVEF, Grade 1 diastolic dysfunction  - Multifactorial: venous insufficiency, cirrhosis, possible lymphedema component   #PAD  - Moderate-severe infrapopliteal disease bilaterally, recommend medical management at this time  - LDL at goal  #Cirrhosis  #Pancytopenia    Plan:  - Continues to have 3+ pitting edema to the knee, continue Lasix to 80 mg IV BID  - Replete electrolytes and maintain K>4, Mg>2  - Monitor renal function (Cr 1.2 -> 1.4)  - Aspirin 81 mg daily when able   - Will follow    Melissa Barillas MD  Vascular Cardiology   Please text or call via MS Teams with questions

## 2022-07-05 NOTE — PROGRESS NOTE ADULT - SUBJECTIVE AND OBJECTIVE BOX
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 Patient endorses feeling weak when doing her daily ADLs, patient denies SOB. Patient denies any chest pain, palpitation. Patient denies fever, chills, headache, dizziness. Patient denies abdominal pain but endorsing constipation. Patient denies n/v. Patient was recently seen by Dr. Paredes for weight gain, and started on Torsemide 20mg qd. Patient follows Dr. Erickson for splenomegaly and pancytopenia who is recomending liver biopsy. Patient was seen by GI in the past and Fibrosure testing was suggestive of severe liver fibrosis, had EGD done showing small esophageal varices and gastric polyps, and patient was advised to start Nadolol. Patient sought a second opinion and repeated EGD reportedly was negative for varices Patient never took Nadolol. Screening for viral hepatitis, hemachromatosis og' s disease, and autoimmune lives disease was negative.   patient was admitted 5/16/2022 - 5/20/2022 for worsening lower extremity edema and hypoxia was medically  managed and discharged home  with VNS for treatment of this leg wound and supplemental oxygen   ED vitals T(F): 98.2, HR: 84, BP: 139/64, RR: 17, SpO2: 96% on RA   Labs significant for pancytopenia Hb 10.2, WBC 2.88, PLT 70, Lactate 1.8  TPro  5.9<L>  /  Alb  2.8<L>  /  TBili  4.1<H>  /  DBili  0.8<H>  /  AST  35  /  ALT  16  /  AlkPhos  51   PT: 15.10 sec; INR: 1.32 ratio; PTT: 32.9 sec    138  |  97<L>  |  33<H>  ----------------------------<  114<H>  3.2<L>   |  30  |  1.2    Ca    8.7      29 Jun 2022 18:26         (29 Jun 2022 23:01)      SUBJECTIVE  Patient is a 81y old Female who presents with a chief complaint of Lower extremity Edema and cellulitis (05 Jul 2022 12:31)  Currently admitted to medicine with the primary diagnosis of Cellulitis of right lower extremity      INTERVAL HPI AND OVERNIGHT EVENTS:  Patient was examined and seen at bedside. This morning she is resting comfortably in bed and reports no issues or overnight events. She reported mild itching in her lower limbs which resolved after changing the dressing     REVIEW OF SYMPTOMS:  CONSTITUTIONAL: No weakness, fevers or chills; No headaches  EYES: No visual changes, eye pain, or discharge  ENT: No vertigo; No ear pain or change in hearing; No sore throat or difficulty swallowing  NECK: No pain or stiffness  RESPIRATORY: No cough, wheezing, or hemoptysis; No shortness of breath  CARDIOVASCULAR: No chest pain or palpitations  GASTROINTESTINAL: No abdominal or epigastric pain; No nausea, vomiting, or hematemesis; No diarrhea or constipation; No melena or hematochezia  GENITOURINARY: No dysuria, frequency or hematuria  MUSCULOSKELETAL: No joint pain, no muscle pain, no weakness  NEUROLOGICAL: No numbness or weakness  SKIN: No itching or rashes    OBJECTIVE  PAST MEDICAL & SURGICAL HISTORY  GERD (gastroesophageal reflux disease)    Arthritis  OA    Fatty liver  AS PER PATIENT 15YEARS  PT REPORTS- I HAVE AN ENLARGED SPLEEN  LOW PLATELETS.    Malignant neoplasm of areola of left breast in female, unspecified estrogen receptor status    H/O thrombocytopenia    Blister of right lower leg    History of surgery  LEFT BREAST LUMPECTOMY  TUBIAL LIGATION  CHOLECYSTECTOMY  RIGHT &amp; LEFT TKR    History of cholecystectomy      ALLERGIES:  Cipro (Hives; Rash)  Levaquin (Hives; Rash)    MEDICATIONS:  STANDING MEDICATIONS  ceFAZolin   IVPB 1000 milliGRAM(s) IV Intermittent every 8 hours  furosemide   Injectable 80 milliGRAM(s) IV Push two times a day  heparin   Injectable 5000 Unit(s) SubCutaneous every 12 hours  propranolol 10 milliGRAM(s) Oral every 12 hours  silver sulfADIAZINE 1% Cream 1 Application(s) Topical daily  spironolactone 100 milliGRAM(s) Oral daily    PRN MEDICATIONS      VITAL SIGNS: Last 24 Hours  T(C): 37.1 (05 Jul 2022 14:38), Max: 37.1 (05 Jul 2022 14:38)  T(F): 98.7 (05 Jul 2022 14:38), Max: 98.7 (05 Jul 2022 14:38)  HR: 72 (05 Jul 2022 14:38) (64 - 72)  BP: 123/58 (05 Jul 2022 14:38) (103/55 - 123/58)  BP(mean): --  RR: 18 (05 Jul 2022 14:38) (18 - 18)  SpO2: --    LABS:                        10.2   3.59  )-----------( 101      ( 05 Jul 2022 08:17 )             32.1     07-05    141  |  102  |  37<H>  ----------------------------<  111<H>  4.2   |  30  |  1.4    Ca    9.5      05 Jul 2022 08:17  Mg     2.0     07-05    TPro  6.5  /  Alb  2.9<L>  /  TBili  2.5<H>  /  DBili  0.6<H>  /  AST  29  /  ALT  <5  /  AlkPhos  71  07-05    PT/INR - ( 05 Jul 2022 08:17 )   PT: 14.50 sec;   INR: 1.26 ratio           PHYSICAL EXAM:  CONSTITUTIONAL: No acute distress, well-developed, well-groomed, AAOx3  HEAD: Atraumatic, normocephalic  EYES: EOM intact, PERRLA, conjunctiva and sclera clear  ENT: Supple, no masses, no thyromegaly, no bruits, no JVD; moist mucous membranes  PULMONARY: Clear to auscultation bilaterally; no wheezes, rales, or rhonchi  CARDIOVASCULAR: Regular rate and rhythm; no murmurs, rubs, or gallops  GASTROINTESTINAL: Soft, non-tender, non-distended; bowel sounds present  MUSCULOSKELETAL: 2+ peripheral pulses; no clubbing, no cyanosis, no edema  NEUROLOGY: non-focal  SKIN: No rashes or lesions; warm and dry    ASSESSMENT & PLAN  · Assessment	  This is a 82 y/o F with PMHx of cirrhosis w/ severe ascites/leg edema; splenomegaly with leukopenia and thrombocytopenia, 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      # Cellulitis 2/2 chronic BLE edema/ venous stasis   - Wound care by advanced practice nurse consult   - Pending BCx 6/29/22: NGDT x2  - VA duplex Vein: no DVT, VA duplex Artery: (+) PAD   - Vascular following : maintain K>4, Mg>2, continue with Lasix to 80 mg IV BID.  TSH (7.2), check T3, T4 , ASA 81 QD when able   - ID: Ancef IV 1g q8 (for 7 days - if D/C prior, PO keflex 500mg 4x/day) (Started 06/30)    # Cirrhosis   - Hepatology: Rec for CT Abd/Pelvis - Showed - Sequela of cirrhosis including nodular hepatic contour, splenic/gastroesophageal varices and small volume of ascites surrounding the liver, spleen. Additional ascites within the dependent portion of the pelvis.   CMV PCR - negative, Quantiferon gold - negative, EBV PCR - c/w past infection, serum Drug screen - negative, utox neg  RUQ US: mod ascites; liver cirrhosis   KEVIN, immunoglobulin panel-pending,   will need Liver biopsy if workup is negative  - Daily LFTs, INR  - Avoid hepatotoxic agents, sedatives, opioids  - Discussed with GI - will need outpatient EGD and liver biopsy if CLD workup unrevealing.   - c.w inderal 10 mg Po Q12H and aldactone 100 mg PO daily   - watch BMP for hepatorenal syndr, watch BP for hypotension    #possible PVD  - arterial duplex: Mild to moderate multisegmental peripheral arterial disease.  Diminished blood flow in the bilateral peroneal and right posterior tibial arteries.  Moderate to severe stenosis of the right anterior tibial artery  - Vascular cardiology recommending aspirin when able to ( low platelets )     # Pulm HTN/ABRAHAM  - Continue lasix, I&Os, fluid restrict 1-1.5L/d, Na restrict  - ECHO 4/22/22: EF 72%, grade 1 diastolic dysfunction, mild PHTN, mild AS, mild AR   - OP Follow up with Dr. Martinez, Dr. Paredes    #possible hypothyroidism  - TSH 7.2  -f/u T3, T4     # Diet: DASH/TLC  # DVT prophylaxis: hep sc 5000 q12  # GI prophylaxis: not indicated   # Activity: Independent  # Code: full  # Dispo: home pending improvement of LLE on IV lasix

## 2022-07-06 ENCOUNTER — TRANSCRIPTION ENCOUNTER (OUTPATIENT)
Age: 82
End: 2022-07-06

## 2022-07-06 LAB
ALBUMIN SERPL ELPH-MCNC: 2.9 G/DL — LOW (ref 3.5–5.2)
ALP SERPL-CCNC: 74 U/L — SIGNIFICANT CHANGE UP (ref 30–115)
ALT FLD-CCNC: <5 U/L — SIGNIFICANT CHANGE UP (ref 0–41)
ANA PAT FLD IF-IMP: ABNORMAL
ANA TITR SER: ABNORMAL
ANION GAP SERPL CALC-SCNC: 11 MMOL/L — SIGNIFICANT CHANGE UP (ref 7–14)
AST SERPL-CCNC: 29 U/L — SIGNIFICANT CHANGE UP (ref 0–41)
BASOPHILS # BLD AUTO: 0.03 K/UL — SIGNIFICANT CHANGE UP (ref 0–0.2)
BASOPHILS NFR BLD AUTO: 0.7 % — SIGNIFICANT CHANGE UP (ref 0–1)
BILIRUB SERPL-MCNC: 2.3 MG/DL — HIGH (ref 0.2–1.2)
BUN SERPL-MCNC: 41 MG/DL — HIGH (ref 10–20)
CALCIUM SERPL-MCNC: 9.3 MG/DL — SIGNIFICANT CHANGE UP (ref 8.5–10.1)
CHLORIDE SERPL-SCNC: 100 MMOL/L — SIGNIFICANT CHANGE UP (ref 98–110)
CO2 SERPL-SCNC: 30 MMOL/L — SIGNIFICANT CHANGE UP (ref 17–32)
CREAT SERPL-MCNC: 1.4 MG/DL — SIGNIFICANT CHANGE UP (ref 0.7–1.5)
EGFR: 38 ML/MIN/1.73M2 — LOW
EOSINOPHIL # BLD AUTO: 0.29 K/UL — SIGNIFICANT CHANGE UP (ref 0–0.7)
EOSINOPHIL NFR BLD AUTO: 7 % — SIGNIFICANT CHANGE UP (ref 0–8)
GLUCOSE SERPL-MCNC: 115 MG/DL — HIGH (ref 70–99)
HCT VFR BLD CALC: 34 % — LOW (ref 37–47)
HGB BLD-MCNC: 10.6 G/DL — LOW (ref 12–16)
IMM GRANULOCYTES NFR BLD AUTO: 0.5 % — HIGH (ref 0.1–0.3)
LYMPHOCYTES # BLD AUTO: 1 K/UL — LOW (ref 1.2–3.4)
LYMPHOCYTES # BLD AUTO: 24.1 % — SIGNIFICANT CHANGE UP (ref 20.5–51.1)
MAGNESIUM SERPL-MCNC: 1.9 MG/DL — SIGNIFICANT CHANGE UP (ref 1.8–2.4)
MCHC RBC-ENTMCNC: 29.6 PG — SIGNIFICANT CHANGE UP (ref 27–31)
MCHC RBC-ENTMCNC: 31.2 G/DL — LOW (ref 32–37)
MCV RBC AUTO: 95 FL — SIGNIFICANT CHANGE UP (ref 81–99)
MONOCYTES # BLD AUTO: 0.4 K/UL — SIGNIFICANT CHANGE UP (ref 0.1–0.6)
MONOCYTES NFR BLD AUTO: 9.6 % — HIGH (ref 1.7–9.3)
NEUTROPHILS # BLD AUTO: 2.41 K/UL — SIGNIFICANT CHANGE UP (ref 1.4–6.5)
NEUTROPHILS NFR BLD AUTO: 58.1 % — SIGNIFICANT CHANGE UP (ref 42.2–75.2)
NRBC # BLD: 0 /100 WBCS — SIGNIFICANT CHANGE UP (ref 0–0)
PLATELET # BLD AUTO: 96 K/UL — LOW (ref 130–400)
POTASSIUM SERPL-MCNC: 3.9 MMOL/L — SIGNIFICANT CHANGE UP (ref 3.5–5)
POTASSIUM SERPL-SCNC: 3.9 MMOL/L — SIGNIFICANT CHANGE UP (ref 3.5–5)
PROT SERPL-MCNC: 6.2 G/DL — SIGNIFICANT CHANGE UP (ref 6–8)
RBC # BLD: 3.58 M/UL — LOW (ref 4.2–5.4)
RBC # FLD: 16.6 % — HIGH (ref 11.5–14.5)
SODIUM SERPL-SCNC: 141 MMOL/L — SIGNIFICANT CHANGE UP (ref 135–146)
WBC # BLD: 4.15 K/UL — LOW (ref 4.8–10.8)
WBC # FLD AUTO: 4.15 K/UL — LOW (ref 4.8–10.8)

## 2022-07-06 PROCEDURE — 99232 SBSQ HOSP IP/OBS MODERATE 35: CPT

## 2022-07-06 RX ORDER — PROPRANOLOL HCL 160 MG
1 CAPSULE, EXTENDED RELEASE 24HR ORAL
Qty: 60 | Refills: 0
Start: 2022-07-06 | End: 2022-08-04

## 2022-07-06 RX ORDER — SPIRONOLACTONE 25 MG/1
1 TABLET, FILM COATED ORAL
Qty: 30 | Refills: 0
Start: 2022-07-06 | End: 2022-08-04

## 2022-07-06 RX ORDER — FUROSEMIDE 40 MG
80 TABLET ORAL EVERY 12 HOURS
Refills: 0 | Status: DISCONTINUED | OUTPATIENT
Start: 2022-07-06 | End: 2022-07-07

## 2022-07-06 RX ORDER — FUROSEMIDE 40 MG
60 TABLET ORAL
Refills: 0 | Status: DISCONTINUED | OUTPATIENT
Start: 2022-07-06 | End: 2022-07-06

## 2022-07-06 RX ORDER — LEVOTHYROXINE SODIUM 125 MCG
1 TABLET ORAL
Qty: 30 | Refills: 0
Start: 2022-07-06 | End: 2022-08-04

## 2022-07-06 RX ORDER — FUROSEMIDE 40 MG
1 TABLET ORAL
Qty: 80 | Refills: 0
Start: 2022-07-06 | End: 2022-08-14

## 2022-07-06 RX ADMIN — SPIRONOLACTONE 100 MILLIGRAM(S): 25 TABLET, FILM COATED ORAL at 05:28

## 2022-07-06 RX ADMIN — Medication 100 MILLIGRAM(S): at 13:32

## 2022-07-06 RX ADMIN — Medication 100 MILLIGRAM(S): at 21:43

## 2022-07-06 RX ADMIN — Medication 1 APPLICATION(S): at 13:26

## 2022-07-06 RX ADMIN — Medication 80 MILLIGRAM(S): at 17:51

## 2022-07-06 RX ADMIN — Medication 60 MILLIGRAM(S): at 13:32

## 2022-07-06 RX ADMIN — Medication 100 MILLIGRAM(S): at 05:28

## 2022-07-06 RX ADMIN — Medication 80 MILLIGRAM(S): at 05:28

## 2022-07-06 RX ADMIN — HEPARIN SODIUM 5000 UNIT(S): 5000 INJECTION INTRAVENOUS; SUBCUTANEOUS at 17:51

## 2022-07-06 RX ADMIN — Medication 25 MICROGRAM(S): at 05:28

## 2022-07-06 NOTE — PROGRESS NOTE ADULT - ATTENDING COMMENTS
81 year old with hx of MGUS  cirrhosis admitted with infection of LE. No hx of T2DM  No jaundice   Abdomen soft non tender   LE b/l ace wrap mild edema   Testing negative for viral hepatitis autoimmune liver disease  Low albumin levels. Bumetanide more effective or could give IV albumin as well.   Has perihepatic ascites so percutaneous liver biopsy will be problem and will need TJ liver biopsy  OP follow up and will consider liver biopsy

## 2022-07-06 NOTE — DISCHARGE NOTE PROVIDER - ATTENDING DISCHARGE PHYSICAL EXAMINATION:
PHYSICAL EXAM:    T(F): 96.8, Max: 96.9 (07-07-22 @ 20:10)  HR: 67  BP: 101/49  BP(mean): 68  RR: 18  SpO2: --  GENERAL: NAD, well-developed  HEAD:  Atraumatic, Normocephalic  EYES: EOMI, PERRLA, conjunctiva and sclera clear  NECK: Supple, No JVD  CHEST/LUNG: Clear to auscultation bilaterally; No wheeze  HEART: Regular rate and rhythm; No murmurs, rubs, or gallops  ABDOMEN: Soft, Nontender, Nondistended; Bowel sounds present  EXTREMITIES:  2+ Peripheral Pulses, No clubbing, + bilateral LE edema  PSYCH: AAOx3  NEUROLOGY: non-focal  SKIN: No rashes or lesions

## 2022-07-06 NOTE — PROGRESS NOTE ADULT - ASSESSMENT
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      # Cellulitis 2/2 chronic BLE edema/ venous stasis   - Wound care by advanced practice nurse consult   - Pending BCx 6/29/22: NGDT x2  - VA duplex Vein: no DVT, VA duplex Artery: (+) PAD   - Vascular following : maintain K>4, Mg>2, continued with Lasix to 80 mg IV BID.  TSH (7.2), check T3, T4 , ASA 81 QD when able   - ID: Ancef IV 1g q8 (for 7 days - if D/C prior, PO keflex 500mg 4x/day) (Started 06/30)  - Cardiology recommends further diuresis - continue IV lasix 80 mg BID.     # Cirrhosis   - Hepatology: Rec for CT Abd/Pelvis - Showed - Sequela of cirrhosis including nodular hepatic contour, splenic/gastroesophageal varices and small volume of ascites surrounding the liver, spleen. Additional ascites within the dependent portion of the pelvis.   CMV PCR - negative, Quantiferon gold - negative, EBV PCR - c/w past infection, serum Drug screen - negative, utox neg  RUQ US: mod ascites; liver cirrhosis   KEVIN, immunoglobulin panel-pending,   will need Liver biopsy if workup is negative  - Daily LFTs, INR  - Avoid hepatotoxic agents, sedatives, opioids  - Discussed with GI - will need outpatient EGD and liver biopsy if CLD workup unrevealing.   - c.w inderal 10 mg Po Q12H and aldactone 100 mg PO daily   - watch BMP for hepatorenal syndr, watch BP for hypotension  - Explained to followup with hepatology as outpatient and get liver biopsy done.    #possible PVD  - arterial duplex: Mild to moderate multisegmental peripheral arterial disease.  Diminished blood flow in the bilateral peroneal and right posterior tibial arteries.  Moderate to severe stenosis of the right anterior tibial artery  Hold aspirin for now(i/v/o Low platelet, plan for liver Bx)    # Pulm HTN/ABRAHAM  - Continue lasix, I&Os, fluid restrict 1-1.5L/d, Na restrict  - ECHO 4/22/22: EF 72%, grade 1 diastolic dysfunction, mild PHTN, mild AS, mild AR   - OP Follow up with Dr. Martinez, Dr. Paredes    #possible hypothyroidism  - TSH 7.2  -f/u T3, T4

## 2022-07-06 NOTE — PROGRESS NOTE ADULT - ASSESSMENT
Assessment:  #Lower extremity edema  - 3+ bilaterally, venous stasis, legs do not appear infected  - TTE 4/2022 nl LVEF, Grade 1 diastolic dysfunction  - Multifactorial: venous insufficiency, cirrhosis  #PAD  - Moderate-severe infrapopliteal disease bilaterally, recommend medical management at this time  - LDL at goal  #Cirrhosis  #Pancytopenia    Plan:  - Continues to have 3+ pitting edema to the knee but legs are softer and no longer weeping  - Continue Lasix to 80 mg IV BID  - Replete electrolytes and maintain K>4, Mg>2  - Monitor renal function (Cr 1.2 -> 1.4 -> 1.4)  - Aspirin 81 mg daily when able   - Will follow    Melissa Barillas MD  Vascular Cardiology   Please text or call via MS Teams with questions

## 2022-07-06 NOTE — PROGRESS NOTE ADULT - SUBJECTIVE AND OBJECTIVE BOX
BRAULIO ESPARZA 81y Female  MRN#: [id number]   CODE STATUS:________    Hospital Day: 7d    Pt is currently admitted with the primary diagnosis of     SUBJECTIVE  Hospital Course    Overnight events   None significant    Subjective complaints   Patient doing well. Says leg swelling better than yesterday.  Present Today:   - Lázaro:  No [  ], Yes [   ] : Indication:     - Type of IV Access:       .. CVC/Piccline:  No [  ], Yes [   ] : Indication:       .. Midline: No [  ], Yes [   ] : Indication:                                             ----------------------------------------------------------  OBJECTIVE  PAST MEDICAL & SURGICAL HISTORY  GERD (gastroesophageal reflux disease)    Arthritis  OA    Fatty liver  AS PER PATIENT 15YEARS  PT REPORTS- I HAVE AN ENLARGED SPLEEN  LOW PLATELETS.    Malignant neoplasm of areola of left breast in female, unspecified estrogen receptor status    H/O thrombocytopenia    Blister of right lower leg    History of surgery  LEFT BREAST LUMPECTOMY  TUBIAL LIGATION  CHOLECYSTECTOMY  RIGHT &amp; LEFT TKR    History of cholecystectomy                                              -----------------------------------------------------------  ALLERGIES:  Cipro (Hives; Rash)  Levaquin (Hives; Rash)                                            ------------------------------------------------------------    HOME MEDICATIONS  Home Medications:  silver sulfADIAZINE 1% topical cream: 1 application topically once a day (06 Jul 2022 11:55)                           MEDICATIONS:  STANDING MEDICATIONS  ceFAZolin   IVPB 1000 milliGRAM(s) IV Intermittent every 8 hours  furosemide   Injectable 80 milliGRAM(s) IV Push every 12 hours  heparin   Injectable 5000 Unit(s) SubCutaneous every 12 hours  levothyroxine 25 MICROGram(s) Oral daily  propranolol 10 milliGRAM(s) Oral every 12 hours  silver sulfADIAZINE 1% Cream 1 Application(s) Topical daily  spironolactone 100 milliGRAM(s) Oral daily    PRN MEDICATIONS                                            ------------------------------------------------------------  VITAL SIGNS: Last 24 Hours  T(C): 36.3 (06 Jul 2022 14:20), Max: 36.8 (06 Jul 2022 05:22)  T(F): 97.4 (06 Jul 2022 14:20), Max: 98.2 (06 Jul 2022 05:22)  HR: 68 (06 Jul 2022 14:20) (68 - 77)  BP: 93/55 (06 Jul 2022 14:20) (93/55 - 108/55)  BP(mean): --  RR: 19 (06 Jul 2022 14:20) (18 - 19)  SpO2: --                                             --------------------------------------------------------------  LABS:                        10.6   4.15  )-----------( 96       ( 06 Jul 2022 07:46 )             34.0     07-06    141  |  100  |  41<H>  ----------------------------<  115<H>  3.9   |  30  |  1.4    Ca    9.3      06 Jul 2022 07:46  Mg     1.9     07-06    TPro  6.2  /  Alb  2.9<L>  /  TBili  2.3<H>  /  DBili  x   /  AST  29  /  ALT  <5  /  AlkPhos  74  07-06    PT/INR - ( 05 Jul 2022 08:17 )   PT: 14.50 sec;   INR: 1.26 ratio                                                                   -------------------------------------------------------------  RADIOLOGY:                                            --------------------------------------------------------------    PHYSICAL EXAM:  General:   HEENT:  LUNGS:  HEART:  ABDOMEN:  EXT:  NEURO:  SKIN:          BRAULIO ESPARZA 81y Female  MRN#: [id number]   CODE STATUS:________    Hospital Day: 7d    Pt is currently admitted with the primary diagnosis of b/l lower limb swelling    SUBJECTIVE  Hospital Course  This is a 80 y/o F with PMHx of hepatocellular disease, splenomegaly with leukopenia and thrombocytopenia, 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 Patient endorses feeling weak when doing her daily ADLs, patient denies SOB. Patient denies any chest pain, palpitation. Patient denies fever, chills, headache, dizziness. Patient denies abdominal pain but endorsing constipation. Patient denies n/v. Patient was recently seen by Dr. Paredes for weight gain, and started on Torsemide 20mg qd. Patient follows Dr. Erickson for splenomegaly and pancytopenia who is recomending liver biopsy. Patient was seen by GI in the past and Fibrosure testing was suggestive of severe liver fibrosis, had EGD done showing small esophageal varices and gastric polyps, and patient was advised to start Nadolol. Patient sought a second opinion and repeated EGD reportedly was negative for varices Patient never took Nadolol. Screening for viral hepatitis, hemachromatosis og' s disease, and autoimmune lives disease was negative.   patient was admitted 5/16/2022 - 5/20/2022 for worsening lower extremity edema and hypoxia was medically  managed and discharged home  with VNS for treatment of this leg wound and supplemental oxygen   ED vitals T(F): 98.2, HR: 84, BP: 139/64, RR: 17, SpO2: 96% on RA   Labs significant for pancytopenia Hb 10.2, WBC 2.88, PLT 70, Lactate 1.8  TPro  5.9<L>  /  Alb  2.8<L>  /  TBili  4.1<H>  /  DBili  0.8<H>  /  AST  35  /  ALT  16  /  AlkPhos  51   PT: 15.10 sec; INR: 1.32 ratio; PTT: 32.9 sec    138  |  97<L>  |  33<H>  ----------------------------<  114<H>  3.2<L>   |  30  |  1.2    Ca    8.7      29 Jun 2022 18:26    Overnight events   None significant    Subjective complaints   Patient doing well. Says leg swelling better than yesterday.  Present Today:   - Lázaro:  No [  ], Yes [   ] : Indication:     - Type of IV Access:       .. CVC/Piccline:  No [  ], Yes [   ] : Indication:       .. Midline: No [  ], Yes [   ] : Indication:                                             ----------------------------------------------------------  OBJECTIVE  PAST MEDICAL & SURGICAL HISTORY  GERD (gastroesophageal reflux disease)    Arthritis  OA    Fatty liver  AS PER PATIENT 15YEARS  PT REPORTS- I HAVE AN ENLARGED SPLEEN  LOW PLATELETS.    Malignant neoplasm of areola of left breast in female, unspecified estrogen receptor status    H/O thrombocytopenia    Blister of right lower leg    History of surgery  LEFT BREAST LUMPECTOMY  TUBIAL LIGATION  CHOLECYSTECTOMY  RIGHT &amp; LEFT TKR    History of cholecystectomy                                              -----------------------------------------------------------  ALLERGIES:  Cipro (Hives; Rash)  Levaquin (Hives; Rash)                                            ------------------------------------------------------------    HOME MEDICATIONS  Home Medications:  silver sulfADIAZINE 1% topical cream: 1 application topically once a day (06 Jul 2022 11:55)                           MEDICATIONS:  STANDING MEDICATIONS  ceFAZolin   IVPB 1000 milliGRAM(s) IV Intermittent every 8 hours  furosemide   Injectable 80 milliGRAM(s) IV Push every 12 hours  heparin   Injectable 5000 Unit(s) SubCutaneous every 12 hours  levothyroxine 25 MICROGram(s) Oral daily  propranolol 10 milliGRAM(s) Oral every 12 hours  silver sulfADIAZINE 1% Cream 1 Application(s) Topical daily  spironolactone 100 milliGRAM(s) Oral daily    PRN MEDICATIONS                                            ------------------------------------------------------------  VITAL SIGNS: Last 24 Hours  T(C): 36.3 (06 Jul 2022 14:20), Max: 36.8 (06 Jul 2022 05:22)  T(F): 97.4 (06 Jul 2022 14:20), Max: 98.2 (06 Jul 2022 05:22)  HR: 68 (06 Jul 2022 14:20) (68 - 77)  BP: 93/55 (06 Jul 2022 14:20) (93/55 - 108/55)  BP(mean): --  RR: 19 (06 Jul 2022 14:20) (18 - 19)  SpO2: --                                             --------------------------------------------------------------  LABS:                        10.6   4.15  )-----------( 96       ( 06 Jul 2022 07:46 )             34.0     07-06    141  |  100  |  41<H>  ----------------------------<  115<H>  3.9   |  30  |  1.4    Ca    9.3      06 Jul 2022 07:46  Mg     1.9     07-06    TPro  6.2  /  Alb  2.9<L>  /  TBili  2.3<H>  /  DBili  x   /  AST  29  /  ALT  <5  /  AlkPhos  74  07-06    PT/INR - ( 05 Jul 2022 08:17 )   PT: 14.50 sec;   INR: 1.26 ratio                                                                   -------------------------------------------------------------  RADIOLOGY:                                            --------------------------------------------------------------    PHYSICAL EXAM:  General:   HEENT:  LUNGS:  HEART:  ABDOMEN:  EXT:  NEURO:  SKIN:          BRAULIO ESPARZA 81y Female  MRN#: [id number]   CODE STATUS:________    Hospital Day: 7d    Pt is currently admitted with the primary diagnosis of b/l lower limb swelling    SUBJECTIVE  Hospital Course  This is a 80 y/o F with PMHx of hepatocellular disease, splenomegaly with leukopenia and thrombocytopenia, 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 Patient endorses feeling weak when doing her daily ADLs, patient denies SOB. Patient denies any chest pain, palpitation. Patient denies fever, chills, headache, dizziness. Patient denies abdominal pain but endorsing constipation. Patient denies n/v. Patient was recently seen by Dr. Paredes for weight gain, and started on Torsemide 20mg qd. Patient follows Dr. Erickson for splenomegaly and pancytopenia who is recomending liver biopsy. Patient was seen by GI in the past and Fibrosure testing was suggestive of severe liver fibrosis, had EGD done showing small esophageal varices and gastric polyps, and patient was advised to start Nadolol. Patient sought a second opinion and repeated EGD reportedly was negative for varices Patient never took Nadolol. Screening for viral hepatitis, hemachromatosis og' s disease, and autoimmune lives disease was negative.   patient was admitted 5/16/2022 - 5/20/2022 for worsening lower extremity edema and hypoxia was medically  managed and discharged home  with VNS for treatment of this leg wound and supplemental oxygen   ED vitals T(F): 98.2, HR: 84, BP: 139/64, RR: 17, SpO2: 96% on RA   Labs significant for pancytopenia Hb 10.2, WBC 2.88, PLT 70, Lactate 1.8  TPro  5.9<L>  /  Alb  2.8<L>  /  TBili  4.1<H>  /  DBili  0.8<H>  /  AST  35  /  ALT  16  /  AlkPhos  51   PT: 15.10 sec; INR: 1.32 ratio; PTT: 32.9 sec    138  |  97<L>  |  33<H>  ----------------------------<  114<H>  3.2<L>   |  30  |  1.2    Ca    8.7      29 Jun 2022 18:26    Overnight events   None significant    Subjective complaints   Patient doing well. Says leg swelling better than yesterday.  Present Today:   - Lázaro:  No [  ], Yes [   ] : Indication:     - Type of IV Access:       .. CVC/Piccline:  No [  ], Yes [   ] : Indication:       .. Midline: No [  ], Yes [   ] : Indication:                                             ----------------------------------------------------------  OBJECTIVE  PAST MEDICAL & SURGICAL HISTORY  GERD (gastroesophageal reflux disease)    Arthritis  OA    Fatty liver  AS PER PATIENT 15YEARS  PT REPORTS- I HAVE AN ENLARGED SPLEEN  LOW PLATELETS.    Malignant neoplasm of areola of left breast in female, unspecified estrogen receptor status    H/O thrombocytopenia    Blister of right lower leg    History of surgery  LEFT BREAST LUMPECTOMY  TUBIAL LIGATION  CHOLECYSTECTOMY  RIGHT &amp; LEFT TKR    History of cholecystectomy                                              -----------------------------------------------------------  ALLERGIES:  Cipro (Hives; Rash)  Levaquin (Hives; Rash)                                            ------------------------------------------------------------    HOME MEDICATIONS  Home Medications:  silver sulfADIAZINE 1% topical cream: 1 application topically once a day (06 Jul 2022 11:55)                           MEDICATIONS:  STANDING MEDICATIONS  ceFAZolin   IVPB 1000 milliGRAM(s) IV Intermittent every 8 hours  furosemide   Injectable 80 milliGRAM(s) IV Push every 12 hours  heparin   Injectable 5000 Unit(s) SubCutaneous every 12 hours  levothyroxine 25 MICROGram(s) Oral daily  propranolol 10 milliGRAM(s) Oral every 12 hours  silver sulfADIAZINE 1% Cream 1 Application(s) Topical daily  spironolactone 100 milliGRAM(s) Oral daily    PRN MEDICATIONS                                            ------------------------------------------------------------  VITAL SIGNS: Last 24 Hours  T(C): 36.3 (06 Jul 2022 14:20), Max: 36.8 (06 Jul 2022 05:22)  T(F): 97.4 (06 Jul 2022 14:20), Max: 98.2 (06 Jul 2022 05:22)  HR: 68 (06 Jul 2022 14:20) (68 - 77)  BP: 93/55 (06 Jul 2022 14:20) (93/55 - 108/55)  BP(mean): --  RR: 19 (06 Jul 2022 14:20) (18 - 19)  SpO2: --                                             --------------------------------------------------------------  LABS:                        10.6   4.15  )-----------( 96       ( 06 Jul 2022 07:46 )             34.0     07-06    141  |  100  |  41<H>  ----------------------------<  115<H>  3.9   |  30  |  1.4    Ca    9.3      06 Jul 2022 07:46  Mg     1.9     07-06    TPro  6.2  /  Alb  2.9<L>  /  TBili  2.3<H>  /  DBili  x   /  AST  29  /  ALT  <5  /  AlkPhos  74  07-06    PT/INR - ( 05 Jul 2022 08:17 )   PT: 14.50 sec;   INR: 1.26 ratio                                                                   -------------------------------------------------------------  RADIOLOGY:                                            --------------------------------------------------------------    PHYSICAL EXAM:  CONSTITUTIONAL: No acute distress, well-developed, well-groomed, AAOx3  HEAD: Atraumatic, normocephalic  EYES: EOM intact, PERRLA, conjunctiva and sclera clear  ENT: Supple, no masses, no thyromegaly, no bruits, no JVD; moist mucous membranes  PULMONARY: Clear to auscultation bilaterally; no wheezes, rales, or rhonchi  CARDIOVASCULAR: Regular rate and rhythm; no murmurs, rubs, or gallops  GASTROINTESTINAL: Soft, non-tender, non-distended; bowel sounds present  MUSCULOSKELETAL: 2+ peripheral pulses; no clubbing, no cyanosis, no edema  NEUROLOGY: non-focal  SKIN: No rashes or lesions; warm and dry

## 2022-07-06 NOTE — PROGRESS NOTE ADULT - ATTENDING COMMENTS
# Cellulitis of the B/L LE 2/2 chronic LE edema     ID noted  finish antibx course today  follow WOCN reccs   need edema control  local wound care  ace wrap and elevate legs    # liver cirrhosis of unclear etiology w/ splenomegaly with leukopenia and thrombocytopenia  Patient follows Dr. Erickson for splenomegaly and pancytopenia who is recommending liver biopsy.   Patient was seen by GI in the past and Fibrosure testing was suggestive of severe liver fibrosis  had EGD done showing small esophageal varices and gastric polyps  patient was advised to start Nadolol. Patient sought a second opinion and repeated EGD reportedly was negative for varices Patient never took Nadolol.  will need rpt EGD as outpt  hepatology reccs appreciated  KEVIN, immunoglobulin panel-pending   will need Liver biopsy if workup is negative  RUQ US: mod ascites; liver cirr  CT A/P: done, read noted   Echo noted  for now: c/w Lasix 80mg iv q12  fluid restrict to 1-1.5 L/day    # poss PVD  A Dupl noted  no asa for now    # PHTN WHO II; Possible ABRAHAM on Home O2   Follows Dr. Martinez   was recently seen by Dr. Paredes   ECHO 4/22/22: EF 72%, Grade 1 diastolic dysfunction; mild AS; mild AR; mild Pulm HTN  NC O2 prn  Vascular Cardiology appreciated wants to continue IV lasix 80mg bid for few more days before safe discharge   Monitor creatinine and kidney function    # DVT prophylaxis: hep sc 5000 q12    # GI prophylaxis: not indicated     # Activity: Independent    #Hypothyroidism  -TSH elevated  -T3 decreased   - will start levothyroxine, recommend repeat thyroid panel in 6 weeks      # Code: full    Disposition: outpatient diuresis plan   eventually, pt will go home with visit RN for wound care- f/u CM  Pending: IV diuresis, creatinine level, possible DC home 7/8 if improvement in edema

## 2022-07-06 NOTE — DISCHARGE NOTE PROVIDER - CARE PROVIDER_API CALL
Violetta Barillas (MD; MPH)  Internal Medicine  [address] [place] [address], [address]  [place] [address]  Phone: [phone]  Fax: [phone]  Follow Up Time: 1 week

## 2022-07-06 NOTE — PROGRESS NOTE ADULT - ASSESSMENT
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 Pt was noted to have pancytopenia, splenomegaly and elevated bilirubin w/ concern for cirrhosis and Hepatology was consulted for management    #)Decompensated liver cirrhosis (+EV, +Jaundice, ?HCC, -HE) possibly d/t NAFLD/CURRIE - r/o other etiologies  MELD-Na Score - 17  Rachel Score - B  CLD workup thus far - -AMA, -hepatitis, -ASMA, -Anti LKM, -Soluble Liver Ag  Echo: mild portal htn. no evidence of right heart failure  EGD (2017): small esophageal varices. fundic gland polyps  FIbrosure - advanced fibrosis  CT scan showed Sequela of cirrhosis including nodular hepatic contour, splenic/gastroesophageal varices and small volume of ascites surrounding the liver, spleen.    Recs:   -Monitor LFTs and iNR qdaily  -avoid sedatives and opioids   -Pending KEVIN, Herpes  -will need Liver biopsy if workup is negative can be done outpatient vs inpatient   -will need repeat EGD as outpatient   -check AFP  Lifestyle modifications  Calorie intake 25-40 Kcal/kg/day  Protein intake: 1.2-1.5 gm/kg/day  Avoid smoking, alcohol, NSAIDS. 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 Pt was noted to have pancytopenia, splenomegaly and elevated bilirubin w/ concern for cirrhosis and Hepatology was consulted for management    #)Decompensated liver cirrhosis (+EV, +Jaundice, ?HCC, -HE) possibly d/t NAFLD/CURRIE - r/o other etiologies  MELD-Na Score - 17  Rachel Score - B  CLD workup thus far - -AMA, -hepatitis, -ASMA, -Anti LKM, -Soluble Liver Ag, normal immunoglobulins  Echo: mild portal htn. no evidence of right heart failure  EGD (2017): small esophageal varices. fundic gland polyps  FIbrosure - advanced fibrosis  CT scan showed Sequela of cirrhosis including nodular hepatic contour, splenic/gastroesophageal varices and small volume of ascites surrounding the liver, spleen.    Recs:   -Monitor LFTs and iNR qdaily  -avoid sedatives and opioids   -Pending KEVIN, Herpes  -will need Liver biopsy if workup is negative can be done outpatient vs inpatient   -will need repeat EGD as outpatient   -check AFP  Lifestyle modifications  Calorie intake 25-40 Kcal/kg/day  Protein intake: 1.2-1.5 gm/kg/day  Avoid smoking, alcohol, NSAIDS.

## 2022-07-06 NOTE — DISCHARGE NOTE PROVIDER - NSDCMRMEDTOKEN_GEN_ALL_CORE_FT
torsemide 20 mg oral tablet: 1 tab(s) orally once a day   Aldactone 100 mg oral tablet: 1 tab(s) orally once a day   furosemide 40 mg oral tablet: 2 tabs orallly twice a day for two days followed by one tablet orally twice a day to continue  levothyroxine 25 mcg (0.025 mg) oral tablet: 1 tab(s) orally once a day  propranolol 10 mg oral tablet: 1 tab(s) orally every 12 hours  silver sulfADIAZINE 1% topical cream: 1 application topically once a day   Aldactone 100 mg oral tablet: 1 tab(s) orally once a day   furosemide 40 mg oral tablet: 1 tabs orallly twice a day for four days followed by one tablet orally once a day to continue  levothyroxine 25 mcg (0.025 mg) oral tablet: 1 tab(s) orally once a day  propranolol 10 mg oral tablet: 1 tab(s) orally every 12 hours  silver sulfADIAZINE 1% topical cream: 1 application topically once a day

## 2022-07-06 NOTE — DISCHARGE NOTE PROVIDER - HOSPITAL COURSE
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      # Cellulitis 2/2 chronic BLE edema/ venous stasis   - Wound care by advanced practice nurse consult   - Pending BCx 6/29/22: NGDT x2  - VA duplex Vein: no DVT, VA duplex Artery: (+) PAD   - Vascular following : maintain K>4, Mg>2, continued with Lasix to 80 mg IV BID.  TSH (7.2), check T3, T4 , ASA 81 QD when able   - ID: Ancef IV 1g q8 (for 7 days - if D/C prior, PO keflex 500mg 4x/day) (Started 06/30)  - Discharged on PO lasix    # Cirrhosis   - Hepatology: Rec for CT Abd/Pelvis - Showed - Sequela of cirrhosis including nodular hepatic contour, splenic/gastroesophageal varices and small volume of ascites surrounding the liver, spleen. Additional ascites within the dependent portion of the pelvis.   CMV PCR - negative, Quantiferon gold - negative, EBV PCR - c/w past infection, serum Drug screen - negative, utox neg  RUQ US: mod ascites; liver cirrhosis   KEVIN, immunoglobulin panel-pending,   will need Liver biopsy if workup is negative  - Daily LFTs, INR  - Avoid hepatotoxic agents, sedatives, opioids  - Discussed with GI - will need outpatient EGD and liver biopsy if CLD workup unrevealing.   - c.w inderal 10 mg Po Q12H and aldactone 100 mg PO daily   - watch BMP for hepatorenal syndr, watch BP for hypotension  - Explained to followup with hepatology as outpatient and get liver biopsy done.    #possible PVD  - arterial duplex: Mild to moderate multisegmental peripheral arterial disease.  Diminished blood flow in the bilateral peroneal and right posterior tibial arteries.  Moderate to severe stenosis of the right anterior tibial artery  Vascular cardiology suggests 80 mg lasix PO BID for 2 days f/b 40 mg BID and followup at outpatient cardiology in 1 week for kidney function evaluation    # Pulm HTN/ABRAHAM  - Continue lasix, I&Os, fluid restrict 1-1.5L/d, Na restrict  - ECHO 4/22/22: EF 72%, grade 1 diastolic dysfunction, mild PHTN, mild AS, mild AR   - OP Follow up with Dr. Martinez, Dr. Paredes    #possible hypothyroidism  - TSH 7.2  -f/u T3, T4    This is a 82 y/o F with PMHx of cirrhosis w/ severe ascites/leg edema; splenomegaly with leukopenia and thrombocytopenia, 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      # Cellulitis 2/2 chronic BLE edema/ venous stasis   - Wound care by advanced practice nurse consult   - Pending BCx 6/29/22: NGDT x2  - VA duplex Vein: no DVT, VA duplex Artery: (+) PAD   - Vascular following : maintain K>4, Mg>2, continued with Lasix to 80 mg IV BID.  TSH (7.2), check T3, T4 , ASA 81 QD when able   - ID: Ancef IV 1g q8 (for 7 days - if D/C prior, PO keflex 500mg 4x/day) (Started 06/30)  - Discharged on PO lasix    # Cirrhosis   - Hepatology: Rec for CT Abd/Pelvis - Showed - Sequela of cirrhosis including nodular hepatic contour, splenic/gastroesophageal varices and small volume of ascites surrounding the liver, spleen. Additional ascites within the dependent portion of the pelvis.   CMV PCR - negative, Quantiferon gold - negative, EBV PCR - c/w past infection, serum Drug screen - negative, utox neg  RUQ US: mod ascites; liver cirrhosis   KEVIN, immunoglobulin panel-pending,   will need Liver biopsy if workup is negative  - Daily LFTs, INR  - Avoid hepatotoxic agents, sedatives, opioids  - Discussed with GI - will need outpatient EGD and liver biopsy if CLD workup unrevealing.   - c.w inderal 10 mg Po Q12H and aldactone 100 mg PO daily   - watch BMP for hepatorenal syndr, watch BP for hypotension  - Explained to followup with hepatology as outpatient and get liver biopsy done.    #possible PVD  - arterial duplex: Mild to moderate multisegmental peripheral arterial disease.  Diminished blood flow in the bilateral peroneal and right posterior tibial arteries.  Moderate to severe stenosis of the right anterior tibial artery  Vascular cardiology suggests 80 mg lasix PO BID for 2 days f/b 40 mg BID and followup at outpatient cardiology in 1 week for kidney function evaluation  Hold aspirin for now(i/v/o Low platelet, plan for liver Bx)    # Pulm HTN/ABRAHAM  - Continue lasix, I&Os, fluid restrict 1-1.5L/d, Na restrict  - ECHO 4/22/22: EF 72%, grade 1 diastolic dysfunction, mild PHTN, mild AS, mild AR   - OP Follow up with Dr. Martinez, Dr. Paredes    #possible hypothyroidism  - TSH 7.2  -f/u T3, T4    This is a 80 y/o F with PMHx of cirrhosis w/ severe ascites/leg edema; splenomegaly with leukopenia and thrombocytopenia, 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      # Cellulitis 2/2 chronic BLE edema/ venous stasis   - Wound care by advanced practice nurse consult   - Pending BCx 6/29/22: NGDT x2  - VA duplex Vein: no DVT, VA duplex Artery: (+) PAD   - Vascular following : maintain K>4, Mg>2, continued with Lasix to 80 mg IV BID.  TSH (7.2), check T3, T4 , ASA 81 QD when able   - ID: Ancef IV 1g q8 (for 7 days - if D/C prior, PO keflex 500mg 4x/day) (Started 06/30)  - Discharged on PO lasix continue lasix 40mg twice a day for 4 days and then 40mg once a day to continue. Follow-up with cardiologist within 2 weeks for medication adjustment and kidney function evaluation    # Cirrhosis   - Hepatology: Rec for CT Abd/Pelvis - Showed - Sequela of cirrhosis including nodular hepatic contour, splenic/gastroesophageal varices and small volume of ascites surrounding the liver, spleen. Additional ascites within the dependent portion of the pelvis.   CMV PCR - negative, Quantiferon gold - negative, EBV PCR - c/w past infection, serum Drug screen - negative, utox neg  RUQ US: mod ascites; liver cirrhosis   KEVIN, immunoglobulin panel-pending,   will need Liver biopsy if workup is negative  - Daily LFTs, INR  - Avoid hepatotoxic agents, sedatives, opioids  - Discussed with GI - will need outpatient EGD and liver biopsy if CLD workup unrevealing.   - c.w inderal 10 mg Po Q12H and aldactone 100 mg PO daily   - watch BMP for hepatorenal syndr, watch BP for hypotension  - Explained to followup with hepatology as outpatient and get liver biopsy done.    #possible PVD  - arterial duplex: Mild to moderate multisegmental peripheral arterial disease.  Diminished blood flow in the bilateral peroneal and right posterior tibial arteries.  Moderate to severe stenosis of the right anterior tibial artery  Vascular cardiology suggests 80 mg lasix PO BID for 2 days f/b 40 mg BID and followup at outpatient cardiology in 1 week for kidney function evaluation  Hold aspirin for now(i/v/o Low platelet, plan for liver Bx)    # Pulm HTN/ABRAHAM  - Continue lasix, I&Os, fluid restrict 1-1.5L/d, Na restrict  - ECHO 4/22/22: EF 72%, grade 1 diastolic dysfunction, mild PHTN, mild AS, mild AR   - OP Follow up with Dr. Martinez, Dr. Paredes    #possible hypothyroidism  - TSH 7.2  -f/u T3, T4

## 2022-07-06 NOTE — DISCHARGE NOTE PROVIDER - NSDCCPCAREPLAN_GEN_ALL_CORE_FT
PRINCIPAL DISCHARGE DIAGNOSIS  Diagnosis: Cellulitis of right lower extremity  Assessment and Plan of Treatment: Completed antibiotic course in the hospital. Continue the medications at home for edema. Continue wound care.  Take the prescribed antibiotic medicine you are given as directed until it is gone. Take it even if you feel better. It treats the infection and stops it from  Not taking all the medicine can make future infections hard to treat. Keep the infected area clean. When possible, raise the infected area above the level of your heart. This helps keep swelling down. Apply clean bandages as advised. Wash your hands often to prevent spreading the infection. In the future, wash your hands before and after you touch cuts, scratches, or bandages. This will help prevent infection.   Call your doctor or returnt o the emergency room or call 911 immediately if you have any of the following: Difficulty or pain when moving the joints above or below the infected area, Discharge or pus draining from the area, rever of 100.4°F (38°C) or higher, or as directed by your healthcare provider, pain that gets worse in or around the infected site, redness that gets worse in or around the infected area, particularly if the area of redness expands to a wider area, shaking chills, swelling of the infected area, vomiting.         PRINCIPAL DISCHARGE DIAGNOSIS  Diagnosis: Cellulitis of right lower extremity  Assessment and Plan of Treatment: Completed antibiotic course in the hospital. Continue the medications at home for edema. Continue wound care. The water pills(lasix) will help the swelling of the legs subside. Continue to take the medication as prescribed and follow-up as outpatient with cardiologist in 1 week for re-assessment.  Take the prescribed antibiotic medicine you are given as directed until it is gone. Take it even if you feel better. It treats the infection and stops it from  Not taking all the medicine can make future infections hard to treat. Keep the infected area clean. When possible, raise the infected area above the level of your heart. This helps keep swelling down. Apply clean bandages as advised. Wash your hands often to prevent spreading the infection. In the future, wash your hands before and after you touch cuts, scratches, or bandages. This will help prevent infection.   Call your doctor or returnt o the emergency room or call 911 immediately if you have any of the following: Difficulty or pain when moving the joints above or below the infected area, Discharge or pus draining from the area, rever of 100.4°F (38°C) or higher, or as directed by your healthcare provider, pain that gets worse in or around the infected site, redness that gets worse in or around the infected area, particularly if the area of redness expands to a wider area, shaking chills, swelling of the infected area, vomiting.        SECONDARY DISCHARGE DIAGNOSES  Diagnosis: Liver cirrhosis  Assessment and Plan of Treatment: Your abdominal scans suggest fibrosis of the liver. Please follow-up with Dr. Erickson as an outpatient for possible liver biopsy to understad what is causing the fibrosis and repeat endoscopy to see any dialatation of blood vessels in the esophagus(food pipe).

## 2022-07-06 NOTE — ED CDU PROVIDER INITIAL DAY NOTE - DATE/TIME 4
The patient has been re-examined and I agree with the above assessment or I updated with my findings. 22-May-2022 22:48

## 2022-07-06 NOTE — PROGRESS NOTE ADULT - SUBJECTIVE AND OBJECTIVE BOX
Vascular Cardiology  Progress note     EMAIL [email or web address]     CC:  [email or web address]    INTERVAL HISTORY:     LE edema slowly improving. Good urine output. LEs no longer weeping.       Allergies    Cipro (Hives; Rash)  Levaquin (Hives; Rash)    Intolerances    	    MEDICATIONS:  furosemide   Injectable 80 milliGRAM(s) IV Push every 12 hours  heparin   Injectable 5000 Unit(s) SubCutaneous every 12 hours  propranolol 10 milliGRAM(s) Oral every 12 hours  spironolactone 100 milliGRAM(s) Oral daily    ceFAZolin   IVPB 1000 milliGRAM(s) IV Intermittent every 8 hours          levothyroxine 25 MICROGram(s) Oral daily    silver sulfADIAZINE 1% Cream 1 Application(s) Topical daily      PAST MEDICAL & SURGICAL HISTORY:  GERD (gastroesophageal reflux disease)      Arthritis  OA      Fatty liver  AS PER PATIENT 15YEARS  PT REPORTS- I HAVE AN ENLARGED SPLEEN  LOW PLATELETS.      Malignant neoplasm of areola of left breast in female, unspecified estrogen receptor status      H/O thrombocytopenia      Blister of right lower leg      History of surgery  LEFT BREAST LUMPECTOMY  TUBIAL LIGATION  CHOLECYSTECTOMY  RIGHT &amp; LEFT TKR      History of cholecystectomy          FAMILY HISTORY:  Family history of breast cancer in mother (Mother)    Family history of Parkinson disease (Father)        SOCIAL HISTORY:  unchanged    REVIEW OF SYSTEMS:  CONSTITUTIONAL: No fever, weight loss, or fatigue  EYES: No eye pain, visual disturbances, or discharge  ENT:  No difficulty hearing, tinnitus, vertigo; No sinus or throat pain  NECK: No pain or stiffness  RESPIRATORY: No cough, wheezing, chills or hemoptysis; No Shortness of Breath  CARDIOVASCULAR: No chest pain, palpitations, passing out, dizziness  GASTROINTESTINAL: No abdominal or epigastric pain. No nausea, vomiting, or hematemesis; No diarrhea or constipation. No melena or hematochezia.  GENITOURINARY: No dysuria, frequency, hematuria, or incontinence  NEUROLOGICAL: No headaches, memory loss, loss of strength, numbness, or tremors  SKIN: blisters on LEs scabbed over, no longer weeping  LYMPH Nodes: No enlarged glands  ENDOCRINE: No heat or cold intolerance; No hair loss  MUSCULOSKELETAL: No joint pain or swelling; No muscle, back, or extremity pain  PSYCHIATRIC: No depression, anxiety, mood swings, or difficulty sleeping  HEME/LYMPH: No easy bruising, or bleeding gums  ALLERGY AND IMMUNOLOGIC: No hives or eczema	    [ x] All others negative	  [ ] Unable to obtain    PHYSICAL EXAM:  T(C): 36.3 (07-06-22 @ 14:20), Max: 36.8 (07-06-22 @ 05:22)  HR: 68 (07-06-22 @ 14:20) (68 - 77)  BP: 93/55 (07-06-22 @ 14:20) (93/55 - 108/55)  RR: 19 (07-06-22 @ 14:20) (18 - 19)  SpO2: --  Wt(kg): --  I&O's Summary      Appearance:  sitting up, speaking in complete sentences	  HEENT:   Normal oral mucosa, PERRL, EOMI	  Cardiovascular:  RRR, nl S1, S2, no m/r/g  Respiratory:  CTA b/l, no crackles  Psychiatry:  AAO x 3  Gastrointestinal:  Soft, Non-tender, + BS	  Skin: shins wrapped in ACE bandage  Neurologic:  non-focal  Extremities:  3+ pitting edema b/l, starting to see wrinkles on toes    Vascular Pulse Exam:  Right DP: [x]palpable []non-palpable []audible      Left DP :   [x]palpable []non-palpable []audible  Right PT: []palpable [x] non-palpable []audible   Left PT:  [] palpable [x] non-palpable []audible      LABS:	 	    CBC Full  -  ( 06 Jul 2022 07:46 )  WBC Count : 4.15 K/uL  Hemoglobin : 10.6 g/dL  Hematocrit : 34.0 %  Platelet Count - Automated : 96 K/uL  Mean Cell Volume : 95.0 fL  Mean Cell Hemoglobin : 29.6 pg  Mean Cell Hemoglobin Concentration : 31.2 g/dL  Auto Neutrophil # : 2.41 K/uL  Auto Lymphocyte # : 1.00 K/uL  Auto Monocyte # : 0.40 K/uL  Auto Eosinophil # : 0.29 K/uL  Auto Basophil # : 0.03 K/uL  Auto Neutrophil % : 58.1 %  Auto Lymphocyte % : 24.1 %  Auto Monocyte % : 9.6 %  Auto Eosinophil % : 7.0 %  Auto Basophil % : 0.7 %    07-06    141  |  100  |  41<H>  ----------------------------<  115<H>  3.9   |  30  |  1.4  07-05    141  |  102  |  37<H>  ----------------------------<  111<H>  4.2   |  30  |  1.4    Ca    9.3      06 Jul 2022 07:46  Ca    9.5      05 Jul 2022 08:17  Mg     1.9     07-06  Mg     2.0     07-05    TPro  6.2  /  Alb  2.9<L>  /  TBili  2.3<H>  /  DBili  x   /  AST  29  /  ALT  <5  /  AlkPhos  74  07-06  TPro  6.5  /  Alb  2.9<L>  /  TBili  2.5<H>  /  DBili  0.6<H>  /  AST  29  /  ALT  <5  /  AlkPhos  71  07-05

## 2022-07-06 NOTE — PROGRESS NOTE ADULT - SUBJECTIVE AND OBJECTIVE BOX
Gastroenterology progress note:     Patient is a 81y old  Female who presents with a chief complaint of Lower extremity Edema and cellulitis (05 Jul 2022 17:53)       Admitted on: 06-29-22    We are following the patient for cirrhosis      Interval History:  no overnight events  Denies nay nausea, vomiting, abdominal pain       PAST MEDICAL & SURGICAL HISTORY:  GERD (gastroesophageal reflux disease)      Arthritis  OA      Fatty liver  AS PER PATIENT 15YEARS  PT REPORTS- I HAVE AN ENLARGED SPLEEN  LOW PLATELETS.      Malignant neoplasm of areola of left breast in female, unspecified estrogen receptor status      H/O thrombocytopenia      Blister of right lower leg      History of surgery  LEFT BREAST LUMPECTOMY  TUBIAL LIGATION  CHOLECYSTECTOMY  RIGHT &amp; LEFT TKR      History of cholecystectomy          MEDICATIONS  (STANDING):  ceFAZolin   IVPB 1000 milliGRAM(s) IV Intermittent every 8 hours  furosemide   Injectable 80 milliGRAM(s) IV Push two times a day  heparin   Injectable 5000 Unit(s) SubCutaneous every 12 hours  levothyroxine 25 MICROGram(s) Oral daily  propranolol 10 milliGRAM(s) Oral every 12 hours  silver sulfADIAZINE 1% Cream 1 Application(s) Topical daily  spironolactone 100 milliGRAM(s) Oral daily    MEDICATIONS  (PRN):      Allergies  Cipro (Hives; Rash)  Levaquin (Hives; Rash)      Review of Systems:   Cardiovascular:  No Chest Pain, No Palpitations  Respiratory:  No Cough, No Dyspnea  Gastrointestinal:  As described in HPI    Physical Examination:  T(C): 36.8 (07-06-22 @ 05:22), Max: 37.1 (07-05-22 @ 14:38)  HR: 74 (07-06-22 @ 05:22) (72 - 77)  BP: 108/55 (07-06-22 @ 05:22) (101/49 - 123/58)  RR: 18 (07-06-22 @ 05:22) (18 - 18)  SpO2: --      Constitutional: No acute distress.  Respiratory:  No signs of respiratory distress. Lung sounds are clear bilaterally.  Cardiovascular:  S1 S2, Regular rate and rhythm.  Abdominal: Abdomen is soft, symmetric, and non-tender without distention.   Skin: No rashes, No Jaundice.        Data:                        10.6   4.15  )-----------( 96       ( 06 Jul 2022 07:46 )             34.0     Hgb trend:  10.6  07-06-22 @ 07:46  10.2  07-05-22 @ 08:17  11.5  07-04-22 @ 07:42      07-05    141  |  102  |  37<H>  ----------------------------<  111<H>  4.2   |  30  |  1.4    Ca    9.5      05 Jul 2022 08:17  Mg     2.0     07-05    TPro  6.5  /  Alb  2.9<L>  /  TBili  2.5<H>  /  DBili  0.6<H>  /  AST  29  /  ALT  <5  /  AlkPhos  71  07-05    Liver panel trend:  TBili 2.5   /   AST 29   /   ALT <5   /   AlkP 71   /   Tptn 6.5   /   Alb 2.9    /   DBili 0.6      07-05  TBili 2.7   /   AST 33   /   ALT 8   /   AlkP 69   /   Tptn 6.3   /   Alb 3.1    /   DBili --      07-04  TBili 2.9   /   AST 38   /   ALT 18   /   AlkP 76   /   Tptn 6.3   /   Alb 3.1    /   DBili --      07-01  TBili 3.3   /   AST 34   /   ALT 15   /   AlkP 46   /   Tptn 5.2   /   Alb 2.5    /   DBili --      06-30  TBili 4.1   /   AST 35   /   ALT 16   /   AlkP 51   /   Tptn 5.9   /   Alb 2.8    /   DBili 0.8      06-29      PT/INR - ( 05 Jul 2022 08:17 )   PT: 14.50 sec;   INR: 1.26 ratio                Radiology:

## 2022-07-06 NOTE — DISCHARGE NOTE PROVIDER - NSDCFUSCHEDAPPT_GEN_ALL_CORE_FT
Nicole Salgado  [facility]  CARDIOLOGY [address]  Scheduled Appointment: 07/12/2022    Franky Erickson  [facility]  HEMONC [address]  Scheduled Appointment: 07/14/2022    Diego Martinez  [facility]  PULMMED [address]  Scheduled Appointment: 09/15/2022

## 2022-07-07 LAB
ALBUMIN SERPL ELPH-MCNC: 2.9 G/DL — LOW (ref 3.5–5.2)
ALP SERPL-CCNC: 70 U/L — SIGNIFICANT CHANGE UP (ref 30–115)
ALT FLD-CCNC: <5 U/L — SIGNIFICANT CHANGE UP (ref 0–41)
ANION GAP SERPL CALC-SCNC: 9 MMOL/L — SIGNIFICANT CHANGE UP (ref 7–14)
AST SERPL-CCNC: 29 U/L — SIGNIFICANT CHANGE UP (ref 0–41)
BASOPHILS # BLD AUTO: 0.03 K/UL — SIGNIFICANT CHANGE UP (ref 0–0.2)
BASOPHILS NFR BLD AUTO: 0.8 % — SIGNIFICANT CHANGE UP (ref 0–1)
BILIRUB SERPL-MCNC: 2 MG/DL — HIGH (ref 0.2–1.2)
BUN SERPL-MCNC: 42 MG/DL — HIGH (ref 10–20)
CALCIUM SERPL-MCNC: 8.9 MG/DL — SIGNIFICANT CHANGE UP (ref 8.5–10.1)
CHLORIDE SERPL-SCNC: 99 MMOL/L — SIGNIFICANT CHANGE UP (ref 98–110)
CO2 SERPL-SCNC: 30 MMOL/L — SIGNIFICANT CHANGE UP (ref 17–32)
CREAT SERPL-MCNC: 1.5 MG/DL — SIGNIFICANT CHANGE UP (ref 0.7–1.5)
DRUG SCREEN, SERUM: SIGNIFICANT CHANGE UP
EGFR: 35 ML/MIN/1.73M2 — LOW
EOSINOPHIL # BLD AUTO: 0.28 K/UL — SIGNIFICANT CHANGE UP (ref 0–0.7)
EOSINOPHIL NFR BLD AUTO: 7 % — SIGNIFICANT CHANGE UP (ref 0–8)
GLUCOSE SERPL-MCNC: 107 MG/DL — HIGH (ref 70–99)
HCT VFR BLD CALC: 32.8 % — LOW (ref 37–47)
HGB BLD-MCNC: 10.3 G/DL — LOW (ref 12–16)
IMM GRANULOCYTES NFR BLD AUTO: 0.3 % — SIGNIFICANT CHANGE UP (ref 0.1–0.3)
LYMPHOCYTES # BLD AUTO: 1.31 K/UL — SIGNIFICANT CHANGE UP (ref 1.2–3.4)
LYMPHOCYTES # BLD AUTO: 32.8 % — SIGNIFICANT CHANGE UP (ref 20.5–51.1)
MAGNESIUM SERPL-MCNC: 1.9 MG/DL — SIGNIFICANT CHANGE UP (ref 1.8–2.4)
MCHC RBC-ENTMCNC: 29.9 PG — SIGNIFICANT CHANGE UP (ref 27–31)
MCHC RBC-ENTMCNC: 31.4 G/DL — LOW (ref 32–37)
MCV RBC AUTO: 95.1 FL — SIGNIFICANT CHANGE UP (ref 81–99)
MONOCYTES # BLD AUTO: 0.39 K/UL — SIGNIFICANT CHANGE UP (ref 0.1–0.6)
MONOCYTES NFR BLD AUTO: 9.8 % — HIGH (ref 1.7–9.3)
NEUTROPHILS # BLD AUTO: 1.98 K/UL — SIGNIFICANT CHANGE UP (ref 1.4–6.5)
NEUTROPHILS NFR BLD AUTO: 49.3 % — SIGNIFICANT CHANGE UP (ref 42.2–75.2)
NRBC # BLD: 0 /100 WBCS — SIGNIFICANT CHANGE UP (ref 0–0)
PLATELET # BLD AUTO: 91 K/UL — LOW (ref 130–400)
POTASSIUM SERPL-MCNC: 3.8 MMOL/L — SIGNIFICANT CHANGE UP (ref 3.5–5)
POTASSIUM SERPL-SCNC: 3.8 MMOL/L — SIGNIFICANT CHANGE UP (ref 3.5–5)
PROT SERPL-MCNC: 6.2 G/DL — SIGNIFICANT CHANGE UP (ref 6–8)
RBC # BLD: 3.45 M/UL — LOW (ref 4.2–5.4)
RBC # FLD: 16.8 % — HIGH (ref 11.5–14.5)
SODIUM SERPL-SCNC: 138 MMOL/L — SIGNIFICANT CHANGE UP (ref 135–146)
WBC # BLD: 4 K/UL — LOW (ref 4.8–10.8)
WBC # FLD AUTO: 4 K/UL — LOW (ref 4.8–10.8)

## 2022-07-07 PROCEDURE — 99232 SBSQ HOSP IP/OBS MODERATE 35: CPT

## 2022-07-07 RX ADMIN — Medication 1 APPLICATION(S): at 12:30

## 2022-07-07 RX ADMIN — Medication 25 MICROGRAM(S): at 06:15

## 2022-07-07 RX ADMIN — HEPARIN SODIUM 5000 UNIT(S): 5000 INJECTION INTRAVENOUS; SUBCUTANEOUS at 06:15

## 2022-07-07 RX ADMIN — Medication 100 MILLIGRAM(S): at 05:17

## 2022-07-07 NOTE — PROGRESS NOTE ADULT - SUBJECTIVE AND OBJECTIVE BOX
[place], [place]    Patient is a 81y old  Female who presents with a chief complaint of Lower extremity Edema and cellulitis (07 Jul 2022 08:46)    HPI:  This is a 82 y/o F with PMHx of hepatocellular disease, splenomegaly with leukopenia and thrombocytopenia, 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 Patient endorses feeling weak when doing her daily ADLs, patient denies SOB. Patient denies any chest pain, palpitation. Patient denies fever, chills, headache, dizziness. Patient denies abdominal pain but endorsing constipation. Patient denies n/v. Patient was recently seen by Dr. Paredes for weight gain, and started on Torsemide 20mg qd. Patient follows Dr. Erickson for splenomegaly and pancytopenia who is recomending liver biopsy. Patient was seen by GI in the past and Fibrosure testing was suggestive of severe liver fibrosis, had EGD done showing small esophageal varices and gastric polyps, and patient was advised to start Nadolol. Patient sought a second opinion and repeated EGD reportedly was negative for varices Patient never took Nadolol. Screening for viral hepatitis, hemachromatosis og' s disease, and autoimmune lives disease was negative.   patient was admitted 5/16/2022 - 5/20/2022 for worsening lower extremity edema and hypoxia was medically  managed and discharged home  with VNS for treatment of this leg wound and supplemental oxygen   ED vitals T(F): 98.2, HR: 84, BP: 139/64, RR: 17, SpO2: 96% on RA   Labs significant for pancytopenia Hb 10.2, WBC 2.88, PLT 70, Lactate 1.8  TPro  5.9<L>  /  Alb  2.8<L>  /  TBili  4.1<H>  /  DBili  0.8<H>  /  AST  35  /  ALT  16  /  AlkPhos  51   PT: 15.10 sec; INR: 1.32 ratio; PTT: 32.9 sec    138  |  97<L>  |  33<H>  ----------------------------<  114<H>  3.2<L>   |  30  |  1.2    Ca    8.7      29 Jun 2022 18:26         (29 Jun 2022 23:01)    INTERVAL HPI/OVERNIGHT EVENTS:    No events overnight. still complains of LE edema    ROS: All ROS negative except    PHYSICAL EXAM:  T(C): 36.4, Max: 36.5 (07-06-22 @ 20:18)  HR: 100 (61 - 100)  BP: 131/89 (92/44 - 140/63)  RR: 18 (18 - 18)  SpO2: --    GENERAL: NAD  NECK: Supple, No JVD  PULMONARY/CHEST: No rales, rhonchi, wheezing  CARDIOVASC: Regular rate and rhythm; No murmurs  GI/ABDOMEN: Soft, Nontender, Nondistended; Bowel sounds present  EXTREMITIES: bilateral LE pitting edema,  SKIN: No rashes or lesions  NERVOUS SYSTEM:  Alert & Oriented X3, no gross neurological  deficit     Consultant(s) Notes Reviewed by me.   Care Discussed with Consultants/Other Providers     LABS:                        10.3   4.00  )-----------( 91       ( 07 Jul 2022 08:23 )             32.8     07-07    138  |  99  |  42<H>  ----------------------------<  107<H>  3.8   |  30  |  1.5    Ca    8.9      07 Jul 2022 08:23  Mg     1.9     07-07    TPro  6.2  /  Alb  2.9<L>  /  TBili  2.0<H>  /  DBili  x   /  AST  29  /  ALT  <5  /  AlkPhos  70  07-07        CAPILLARY BLOOD GLUCOSE                RADIOLOGY & ADDITIONAL TESTS:      MEDICATIONS  (STANDING):  heparin   Injectable 5000 Unit(s) SubCutaneous every 12 hours  levothyroxine 25 MICROGram(s) Oral daily  propranolol 10 milliGRAM(s) Oral every 12 hours  silver sulfADIAZINE 1% Cream 1 Application(s) Topical daily  spironolactone 100 milliGRAM(s) Oral daily    MEDICATIONS  (PRN):

## 2022-07-07 NOTE — PROGRESS NOTE ADULT - SUBJECTIVE AND OBJECTIVE BOX
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 Patient endorses feeling weak when doing her daily ADLs, patient denies SOB. Patient denies any chest pain, palpitation. Patient denies fever, chills, headache, dizziness. Patient denies abdominal pain but endorsing constipation. Patient denies n/v. Patient was recently seen by Dr. Paredes for weight gain, and started on Torsemide 20mg qd. Patient follows Dr. Erickson for splenomegaly and pancytopenia who is recomending liver biopsy. Patient was seen by GI in the past and Fibrosure testing was suggestive of severe liver fibrosis, had EGD done showing small esophageal varices and gastric polyps, and patient was advised to start Nadolol. Patient sought a second opinion and repeated EGD reportedly was negative for varices Patient never took Nadolol. Screening for viral hepatitis, hemachromatosis og' s disease, and autoimmune lives disease was negative.   patient was admitted 5/16/2022 - 5/20/2022 for worsening lower extremity edema and hypoxia was medically  managed and discharged home  with VNS for treatment of this leg wound and supplemental oxygen   ED vitals T(F): 98.2, HR: 84, BP: 139/64, RR: 17, SpO2: 96% on RA   Labs significant for pancytopenia Hb 10.2, WBC 2.88, PLT 70, Lactate 1.8  TPro  5.9<L>  /  Alb  2.8<L>  /  TBili  4.1<H>  /  DBili  0.8<H>  /  AST  35  /  ALT  16  /  AlkPhos  51   PT: 15.10 sec; INR: 1.32 ratio; PTT: 32.9 sec    138  |  97<L>  |  33<H>  ----------------------------<  114<H>  3.2<L>   |  30  |  1.2    Ca    8.7      29 Jun 2022 18:26    Overnight events   None significant    Subjective complaints   Patient doing well. Patient wanted to know about the diagnosis of hypothyroidism, explained the same.  Present Today:   - Lázaro:  No [  ], Yes [   ] : Indication:     - Type of IV Access:       .. CVC/Piccline:  No [  ], Yes [   ] : Indication:       .. Midline: No [  ], Yes [   ] : Indication:                                             ----------------------------------------------------------  OBJECTIVE  PAST MEDICAL & SURGICAL HISTORY  GERD (gastroesophageal reflux disease)    Arthritis  OA    Fatty liver  AS PER PATIENT 15YEARS  PT REPORTS- I HAVE AN ENLARGED SPLEEN  LOW PLATELETS.    Malignant neoplasm of areola of left breast in female, unspecified estrogen receptor status    H/O thrombocytopenia    Blister of right lower leg    History of surgery  LEFT BREAST LUMPECTOMY  TUBIAL LIGATION  CHOLECYSTECTOMY  RIGHT &amp; LEFT TKR    History of cholecystectomy                                              -----------------------------------------------------------  ALLERGIES:  Cipro (Hives; Rash)  Levaquin (Hives; Rash)                                            ------------------------------------------------------------    HOME MEDICATIONS  Home Medications:  silver sulfADIAZINE 1% topical cream: 1 application topically once a day (06 Jul 2022 11:55)                           MEDICATIONS:  STANDING MEDICATIONS  heparin   Injectable 5000 Unit(s) SubCutaneous every 12 hours  levothyroxine 25 MICROGram(s) Oral daily  propranolol 10 milliGRAM(s) Oral every 12 hours  silver sulfADIAZINE 1% Cream 1 Application(s) Topical daily  spironolactone 100 milliGRAM(s) Oral daily    PRN MEDICATIONS                                            ------------------------------------------------------------  VITAL SIGNS: Last 24 Hours  T(C): 36.4 (07 Jul 2022 13:53), Max: 36.5 (06 Jul 2022 20:18)  T(F): 97.5 (07 Jul 2022 13:53), Max: 97.7 (06 Jul 2022 20:18)  HR: 100 (07 Jul 2022 13:53) (61 - 100)  BP: 131/89 (07 Jul 2022 13:53) (92/44 - 140/63)  BP(mean): --  RR: 18 (07 Jul 2022 13:53) (18 - 18)  SpO2: --                                             --------------------------------------------------------------  LABS:                        10.3   4.00  )-----------( 91       ( 07 Jul 2022 08:23 )             32.8     07-07    138  |  99  |  42<H>  ----------------------------<  107<H>  3.8   |  30  |  1.5    Ca    8.9      07 Jul 2022 08:23  Mg     1.9     07-07    TPro  6.2  /  Alb  2.9<L>  /  TBili  2.0<H>  /  DBili  x   /  AST  29  /  ALT  <5  /  AlkPhos  70  07-07                                                              -------------------------------------------------------------  RADIOLOGY:                                            --------------------------------------------------------------    PHYSICAL EXAM:  CONSTITUTIONAL: No acute distress, well-developed, well-groomed, AAOx3  HEAD: Atraumatic, normocephalic  EYES: EOM intact, PERRLA, conjunctiva and sclera clear  ENT: Supple, no masses, no thyromegaly, no bruits, no JVD; moist mucous membranes  PULMONARY: Clear to auscultation bilaterally; no wheezes, rales, or rhonchi  CARDIOVASCULAR: Regular rate and rhythm; no murmurs, rubs, or gallops  GASTROINTESTINAL: Soft, non-tender, non-distended; bowel sounds present  MUSCULOSKELETAL: 2+ peripheral pulses; no clubbing, no cyanosis, no edema  NEUROLOGY: non-focal  SKIN: No rashes or lesions; warm and dry

## 2022-07-07 NOTE — PROGRESS NOTE ADULT - SUBJECTIVE AND OBJECTIVE BOX
Vascular Cardiology  Progress note     EMAIL [email or web address]     CC:  leg swelling    INTERVAL HISTORY:    Surprised by her new diagnosis of hypothyroidism. LE wounds improving. Continues to have leg swelling.          Allergies    Cipro (Hives; Rash)  Levaquin (Hives; Rash)    Intolerances    	    MEDICATIONS:  furosemide   Injectable 80 milliGRAM(s) IV Push every 12 hours  heparin   Injectable 5000 Unit(s) SubCutaneous every 12 hours  propranolol 10 milliGRAM(s) Oral every 12 hours  spironolactone 100 milliGRAM(s) Oral daily            levothyroxine 25 MICROGram(s) Oral daily    silver sulfADIAZINE 1% Cream 1 Application(s) Topical daily      PAST MEDICAL & SURGICAL HISTORY:  GERD (gastroesophageal reflux disease)      Arthritis  OA      Fatty liver  AS PER PATIENT 15YEARS  PT REPORTS- I HAVE AN ENLARGED SPLEEN  LOW PLATELETS.      Malignant neoplasm of areola of left breast in female, unspecified estrogen receptor status      H/O thrombocytopenia      Blister of right lower leg      History of surgery  LEFT BREAST LUMPECTOMY  TUBIAL LIGATION  CHOLECYSTECTOMY  RIGHT &amp; LEFT TKR      History of cholecystectomy          FAMILY HISTORY:  Family history of breast cancer in mother (Mother)    Family history of Parkinson disease (Father)        SOCIAL HISTORY:  unchanged    REVIEW OF SYSTEMS:  CONSTITUTIONAL: No fever, weight loss, or fatigue  EYES: No eye pain, visual disturbances, or discharge  ENT:  No difficulty hearing, tinnitus, vertigo; No sinus or throat pain  NECK: No pain or stiffness  RESPIRATORY: No cough, wheezing, chills or hemoptysis; No Shortness of Breath  CARDIOVASCULAR: No chest pain, palpitations, passing out, dizziness,   GASTROINTESTINAL: No abdominal or epigastric pain. No nausea, vomiting, or hematemesis; No diarrhea or constipation. No melena or hematochezia.  GENITOURINARY: No dysuria, frequency, hematuria, or incontinence  NEUROLOGICAL: No headaches, memory loss, loss of strength, numbness, or tremors  SKIN: see above  LYMPH Nodes: No enlarged glands  ENDOCRINE: No heat or cold intolerance; No hair loss  MUSCULOSKELETAL: No joint pain or swelling; No muscle, back, or extremity pain  PSYCHIATRIC: No depression, anxiety, mood swings, or difficulty sleeping  HEME/LYMPH: No easy bruising, or bleeding gums  ALLERGY AND IMMUNOLOGIC: No hives or eczema	    [ x] All others negative	  [ ] Unable to obtain    PHYSICAL EXAM:  T(C): 35.9 (07-07-22 @ 06:04), Max: 36.5 (07-06-22 @ 20:18)  HR: 61 (07-07-22 @ 06:04) (61 - 77)  BP: 92/44 (07-07-22 @ 06:04) (92/44 - 140/63)  RR: 18 (07-07-22 @ 06:04) (18 - 19)  SpO2: --  Wt(kg): --  I&O's Summary      Appearance:  sitting up, speaking in complete sentences	  HEENT:   Normal oral mucosa, PERRL, EOMI	  Cardiovascular:  RRR, nl S1, S2, no m/r/g  Respiratory:  CTA b/l, no crackles  Psychiatry:  AAO x 3  Gastrointestinal:  Soft, Non-tender, + BS, abdominal swelling improved  Skin: shins wrapped in ACE bandage  Neurologic:  non-focal  Extremities:  3+ pitting edema b/l, starting to see wrinkles on toes        LABS:	 	    CBC Full  -  ( 06 Jul 2022 07:46 )  WBC Count : 4.15 K/uL  Hemoglobin : 10.6 g/dL  Hematocrit : 34.0 %  Platelet Count - Automated : 96 K/uL  Mean Cell Volume : 95.0 fL  Mean Cell Hemoglobin : 29.6 pg  Mean Cell Hemoglobin Concentration : 31.2 g/dL  Auto Neutrophil # : 2.41 K/uL  Auto Lymphocyte # : 1.00 K/uL  Auto Monocyte # : 0.40 K/uL  Auto Eosinophil # : 0.29 K/uL  Auto Basophil # : 0.03 K/uL  Auto Neutrophil % : 58.1 %  Auto Lymphocyte % : 24.1 %  Auto Monocyte % : 9.6 %  Auto Eosinophil % : 7.0 %  Auto Basophil % : 0.7 %    07-06    141  |  100  |  41<H>  ----------------------------<  115<H>  3.9   |  30  |  1.4    Ca    9.3      06 Jul 2022 07:46  Mg     1.9     07-06    TPro  6.2  /  Alb  2.9<L>  /  TBili  2.3<H>  /  DBili  x   /  AST  29  /  ALT  <5  /  AlkPhos  74  07-06

## 2022-07-07 NOTE — PROGRESS NOTE ADULT - ASSESSMENT
Assessment:  #Lower extremity edema  - 3+ bilaterally, venous stasis, legs do not appear infected  - TTE 4/2022 nl LVEF, Grade 1 diastolic dysfunction  - Multifactorial: venous insufficiency, cirrhosis  #PAD  - Moderate-severe infrapopliteal disease bilaterally, recommend medical management at this time  - LDL at goal  #Cirrhosis  #Pancytopenia  #Hypothyroidism    Plan:  - If renal function remains stable, continue Lasix 80 mg IV BID  - Replete electrolytes and maintain K>4, Mg>2  - Monitor renal function (Cr 1.2 -> 1.4 -> 1.4)  - Aspirin 81 mg in the future if/when PLTs are stable and after liver biopsy  - Discussed with Dr. Regina Barillas MD  Vascular Cardiology   Please text or call via MS Teams with questions

## 2022-07-07 NOTE — PROGRESS NOTE ADULT - ASSESSMENT
# Cellulitis of the B/L LE 2/2 chronic LE edema     ID noted  completed antibx course 7/6  follow WOCN reccs   need edema control  local wound care  ace wrap and elevate legs    # liver cirrhosis of unclear etiology w/ splenomegaly with leukopenia and thrombocytopenia  Patient follows Dr. Erickson for splenomegaly and pancytopenia who is recommending liver biopsy.   Patient was seen by GI in the past and Fibrosure testing was suggestive of severe liver fibrosis  had EGD done showing small esophageal varices and gastric polyps  patient was advised to start Nadolol. Patient sought a second opinion and repeated EGD reportedly was negative for varices Patient never took Nadolol.  will need rpt EGD as outpt  hepatology reccs appreciated  KEVIN, immunoglobulin panel-pending   will need Liver biopsy if workup is negative  RUQ US: mod ascites; liver cirr  CT A/P: done, read noted   Echo noted  for now: c/w Lasix 80mg iv q12  fluid restrict to 1-1.5 L/day    # poss PVD  A Dupl noted  no asa for now    # PHTN WHO II; Possible ABRAHAM on Home O2   Follows Dr. Martinez   was recently seen by Dr. Paredes   ECHO 4/22/22: EF 72%, Grade 1 diastolic dysfunction; mild AS; mild AR; mild Pulm HTN  NC O2 prn  Vascular Cardiology appreciated and they are aware of current Cr 1.5  - wants to continue IV lasix 80mg bid until am  - repeat bmp to Monitor creatinine and kidney function  - possible transition to PO lasix in am    # DVT prophylaxis: hep sc 5000 q12    # GI prophylaxis: not indicated     # Activity: Independent    #Hypothyroidism  -TSH elevated  -T3 decreased   - c/w levothyroxine, recommend repeat thyroid panel in 6 weeks      # Code: full    Disposition: outpatient diuresis plan   eventually, pt will go home with visit RN for wound care- f/u CM  Pending: IV diuresis, creatinine level, possible DC home 7/8 if improvement in edema .

## 2022-07-07 NOTE — PROGRESS NOTE ADULT - ASSESSMENT
Attestation Statements:  I have personally seen and examined the patient.  I fully participated in the care of this patient.  I have made amendments to the documentation where necessary, and agree with the history, physical exam, and plan as documented by the Resident.     # Cellulitis of the B/L LE 2/2 chronic LE edema     ID noted  finish antibx course today  follow WOCN reccs   need edema control  local wound care  ace wrap and elevate legs  - Cr 1.5 hold IV Lasix 7/7, give one more dose of lasix IV 7/8 if Cr same or lower.    # liver cirrhosis of unclear etiology w/ splenomegaly with leukopenia and thrombocytopenia  Patient follows Dr. Erickson for splenomegaly and pancytopenia who is recommending liver biopsy.   Patient was seen by GI in the past and Fibrosure testing was suggestive of severe liver fibrosis  had EGD done showing small esophageal varices and gastric polyps  patient was advised to start Nadolol. Patient sought a second opinion and repeated EGD reportedly was negative for varices Patient never took Nadolol.  will need rpt EGD as outpt  hepatology reccs appreciated  KEVIN, immunoglobulin panel-pending   will need Liver biopsy if workup is negative  RUQ US: mod ascites; liver cirr  CT A/P: done, read noted   Echo noted  for now: c/w Lasix 80mg iv q12  fluid restrict to 1-1.5 L/day    # poss PVD  A Dupl noted  no asa for now    # PHTN WHO II; Possible ABRAHAM on Home O2   Follows Dr. Martinez   was recently seen by Dr. Paredes   ECHO 4/22/22: EF 72%, Grade 1 diastolic dysfunction; mild AS; mild AR; mild Pulm HTN  NC O2 prn  Vascular Cardiology appreciated wants to continue IV lasix 80mg bid for few more days before safe discharge   Monitor creatinine and kidney function    # DVT prophylaxis: hep sc 5000 q12    # GI prophylaxis: not indicated     # Activity: Independent    #Hypothyroidism  -TSH elevated  -T3 decreased   - will start levothyroxine, recommend repeat thyroid panel in 6 weeks      # Code: full    Disposition: outpatient diuresis plan   eventually, pt will go home with visit RN for wound care- f/u CM  Pending: IV diuresis, creatinine level, possible DC home 7/8 if improvement in edema .

## 2022-07-08 VITALS — SYSTOLIC BLOOD PRESSURE: 115 MMHG | HEART RATE: 66 BPM | DIASTOLIC BLOOD PRESSURE: 54 MMHG

## 2022-07-08 LAB
ANION GAP SERPL CALC-SCNC: 10 MMOL/L — SIGNIFICANT CHANGE UP (ref 7–14)
ANISOCYTOSIS BLD QL: SLIGHT — SIGNIFICANT CHANGE UP
BASOPHILS # BLD AUTO: 0.03 K/UL — SIGNIFICANT CHANGE UP (ref 0–0.2)
BASOPHILS NFR BLD AUTO: 0.9 % — SIGNIFICANT CHANGE UP (ref 0–1)
BUN SERPL-MCNC: 41 MG/DL — HIGH (ref 10–20)
CALCIUM SERPL-MCNC: 8.7 MG/DL — SIGNIFICANT CHANGE UP (ref 8.5–10.1)
CHLORIDE SERPL-SCNC: 103 MMOL/L — SIGNIFICANT CHANGE UP (ref 98–110)
CO2 SERPL-SCNC: 28 MMOL/L — SIGNIFICANT CHANGE UP (ref 17–32)
CREAT SERPL-MCNC: 1.3 MG/DL — SIGNIFICANT CHANGE UP (ref 0.7–1.5)
EGFR: 41 ML/MIN/1.73M2 — LOW
EOSINOPHIL # BLD AUTO: 0.26 K/UL — SIGNIFICANT CHANGE UP (ref 0–0.7)
EOSINOPHIL NFR BLD AUTO: 8.6 % — HIGH (ref 0–8)
GLUCOSE SERPL-MCNC: 136 MG/DL — HIGH (ref 70–99)
HCT VFR BLD CALC: 31.5 % — LOW (ref 37–47)
HGB BLD-MCNC: 10.1 G/DL — LOW (ref 12–16)
HSV1 AB FLD QL: NEGATIVE — SIGNIFICANT CHANGE UP
HSV2 AB FLD-ACNC: NEGATIVE — SIGNIFICANT CHANGE UP
LYMPHOCYTES # BLD AUTO: 0.44 K/UL — LOW (ref 1.2–3.4)
LYMPHOCYTES # BLD AUTO: 14.7 % — LOW (ref 20.5–51.1)
MAGNESIUM SERPL-MCNC: 2 MG/DL — SIGNIFICANT CHANGE UP (ref 1.8–2.4)
MANUAL SMEAR VERIFICATION: SIGNIFICANT CHANGE UP
MCHC RBC-ENTMCNC: 31 PG — SIGNIFICANT CHANGE UP (ref 27–31)
MCHC RBC-ENTMCNC: 32.1 G/DL — SIGNIFICANT CHANGE UP (ref 32–37)
MCV RBC AUTO: 96.6 FL — SIGNIFICANT CHANGE UP (ref 81–99)
MONOCYTES # BLD AUTO: 0.1 K/UL — SIGNIFICANT CHANGE UP (ref 0.1–0.6)
MONOCYTES NFR BLD AUTO: 3.4 % — SIGNIFICANT CHANGE UP (ref 1.7–9.3)
NEUTROPHILS # BLD AUTO: 2.1 K/UL — SIGNIFICANT CHANGE UP (ref 1.4–6.5)
NEUTROPHILS NFR BLD AUTO: 70.7 % — SIGNIFICANT CHANGE UP (ref 42.2–75.2)
OVALOCYTES BLD QL SMEAR: SLIGHT — SIGNIFICANT CHANGE UP
PLAT MORPH BLD: NORMAL — SIGNIFICANT CHANGE UP
PLATELET # BLD AUTO: 85 K/UL — LOW (ref 130–400)
POIKILOCYTOSIS BLD QL AUTO: SLIGHT — SIGNIFICANT CHANGE UP
POLYCHROMASIA BLD QL SMEAR: SLIGHT — SIGNIFICANT CHANGE UP
POTASSIUM SERPL-MCNC: 3.9 MMOL/L — SIGNIFICANT CHANGE UP (ref 3.5–5)
POTASSIUM SERPL-SCNC: 3.9 MMOL/L — SIGNIFICANT CHANGE UP (ref 3.5–5)
PROMYELOCYTES # FLD: 1.7 % — HIGH (ref 0–0)
RBC # BLD: 3.26 M/UL — LOW (ref 4.2–5.4)
RBC # FLD: 16.8 % — HIGH (ref 11.5–14.5)
RBC BLD AUTO: ABNORMAL
SODIUM SERPL-SCNC: 141 MMOL/L — SIGNIFICANT CHANGE UP (ref 135–146)
WBC # BLD: 2.97 K/UL — LOW (ref 4.8–10.8)
WBC # FLD AUTO: 2.97 K/UL — LOW (ref 4.8–10.8)

## 2022-07-08 PROCEDURE — 99232 SBSQ HOSP IP/OBS MODERATE 35: CPT

## 2022-07-08 PROCEDURE — 99238 HOSP IP/OBS DSCHRG MGMT 30/<: CPT

## 2022-07-08 RX ORDER — FUROSEMIDE 40 MG
80 TABLET ORAL ONCE
Refills: 0 | Status: COMPLETED | OUTPATIENT
Start: 2022-07-08 | End: 2022-07-08

## 2022-07-08 RX ORDER — FUROSEMIDE 40 MG
1 TABLET ORAL
Qty: 60 | Refills: 0
Start: 2022-07-08 | End: 2022-08-06

## 2022-07-08 RX ADMIN — Medication 80 MILLIGRAM(S): at 15:10

## 2022-07-08 RX ADMIN — Medication 1 APPLICATION(S): at 11:50

## 2022-07-08 RX ADMIN — Medication 25 MICROGRAM(S): at 05:24

## 2022-07-08 RX ADMIN — HEPARIN SODIUM 5000 UNIT(S): 5000 INJECTION INTRAVENOUS; SUBCUTANEOUS at 05:25

## 2022-07-08 NOTE — PROGRESS NOTE ADULT - PROVIDER SPECIALTY LIST ADULT
Hospitalist
Internal Medicine
Vascular Cardiology
Hepatology
Hospitalist
Internal Medicine
Hepatology
Internal Medicine
Internal Medicine
Vascular Cardiology
Hospitalist
Internal Medicine
Vascular Cardiology
Hospitalist

## 2022-07-08 NOTE — PROGRESS NOTE ADULT - SUBJECTIVE AND OBJECTIVE BOX
Vascular Cardiology  Progress note     EMAIL [email or web address]     CC:  leg swelling    INTERVAL HISTORY:     LE edema continues to improve. No longer has blisters on LEs.       Allergies    Cipro (Hives; Rash)  Levaquin (Hives; Rash)    Intolerances    	    MEDICATIONS:  heparin   Injectable 5000 Unit(s) SubCutaneous every 12 hours  propranolol 10 milliGRAM(s) Oral every 12 hours  spironolactone 100 milliGRAM(s) Oral daily            levothyroxine 25 MICROGram(s) Oral daily    silver sulfADIAZINE 1% Cream 1 Application(s) Topical daily      PAST MEDICAL & SURGICAL HISTORY:  GERD (gastroesophageal reflux disease)      Arthritis  OA      Fatty liver  AS PER PATIENT 15YEARS  PT REPORTS- I HAVE AN ENLARGED SPLEEN  LOW PLATELETS.      Malignant neoplasm of areola of left breast in female, unspecified estrogen receptor status      H/O thrombocytopenia      Blister of right lower leg      History of surgery  LEFT BREAST LUMPECTOMY  TUBIAL LIGATION  CHOLECYSTECTOMY  RIGHT &amp; LEFT TKR      History of cholecystectomy          FAMILY HISTORY:  Family history of breast cancer in mother (Mother)    Family history of Parkinson disease (Father)        SOCIAL HISTORY:  unchanged    REVIEW OF SYSTEMS:  CONSTITUTIONAL: No fever, weight loss, or fatigue  EYES: No eye pain, visual disturbances, or discharge  ENT:  No difficulty hearing, tinnitus, vertigo; No sinus or throat pain  NECK: No pain or stiffness  RESPIRATORY: No cough, wheezing, chills or hemoptysis; No Shortness of Breath  CARDIOVASCULAR: No chest pain, palpitations, passing out, dizziness  GASTROINTESTINAL: No abdominal or epigastric pain. No nausea, vomiting, or hematemesis; No diarrhea or constipation. No melena or hematochezia.  GENITOURINARY: No dysuria, frequency, hematuria, or incontinence  NEUROLOGICAL: No headaches, memory loss, loss of strength, numbness, or tremors  SKIN: blisters have scabbed over  LYMPH Nodes: No enlarged glands  ENDOCRINE: No heat or cold intolerance; No hair loss  MUSCULOSKELETAL: No joint pain or swelling; No muscle, back, or extremity pain  PSYCHIATRIC: No depression, anxiety, mood swings, or difficulty sleeping  HEME/LYMPH: No easy bruising, or bleeding gums  ALLERGY AND IMMUNOLOGIC: No hives or eczema	    [ x] All others negative	  [ ] Unable to obtain    PHYSICAL EXAM:  T(C): 36.1 (07-08-22 @ 04:33), Max: 36.4 (07-07-22 @ 13:53)  HR: 63 (07-08-22 @ 07:56) (63 - 100)  BP: 118/54 (07-08-22 @ 07:56) (100/49 - 131/89)  RR: 18 (07-08-22 @ 04:33) (18 - 18)  SpO2: --  Wt(kg): --  I&O's Summary      Appearance:  sitting up, speaking in complete sentences	  HEENT:   Normal oral mucosa, PERRL, EOMI	  Cardiovascular:  RRR, nl S1, S2, no m/r/g  Respiratory:  CTA b/l, no crackles  Psychiatry:  AAO x 3  Gastrointestinal:  Soft, Non-tender, + BS, abdominal swelling improved  Skin: shins wrapped in ACE bandage  Neurologic:  non-focal  Extremities:  3+ pitting edema b/l, starting to see wrinkles on toes and dorsum of the foot b/l    LABS:	 	    CBC Full  -  ( 08 Jul 2022 09:10 )  WBC Count : 2.97 K/uL  Hemoglobin : 10.1 g/dL  Hematocrit : 31.5 %  Platelet Count - Automated : 85 K/uL  Mean Cell Volume : 96.6 fL  Mean Cell Hemoglobin : 31.0 pg  Mean Cell Hemoglobin Concentration : 32.1 g/dL  Auto Neutrophil # : 2.10 K/uL  Auto Lymphocyte # : 0.44 K/uL  Auto Monocyte # : 0.10 K/uL  Auto Eosinophil # : 0.26 K/uL  Auto Basophil # : 0.03 K/uL  Auto Neutrophil % : 70.7 %  Auto Lymphocyte % : 14.7 %  Auto Monocyte % : 3.4 %  Auto Eosinophil % : 8.6 %  Auto Basophil % : 0.9 %    07-08    141  |  103  |  41<H>  ----------------------------<  136<H>  3.9   |  28  |  1.3  07-07    138  |  99  |  42<H>  ----------------------------<  107<H>  3.8   |  30  |  1.5    Ca    8.7      08 Jul 2022 09:10  Ca    8.9      07 Jul 2022 08:23  Mg     2.0     07-08  Mg     1.9     07-07    TPro  6.2  /  Alb  2.9<L>  /  TBili  2.0<H>  /  DBili  x   /  AST  29  /  ALT  <5  /  AlkPhos  70  07-07

## 2022-07-08 NOTE — PROGRESS NOTE ADULT - REASON FOR ADMISSION
Lower extremity Edema and cellulitis

## 2022-07-08 NOTE — PROGRESS NOTE ADULT - ASSESSMENT
# Cellulitis of the B/L LE 2/2 chronic LE edema     ID noted  completed antibx course 7/6  follow WOCN reccs   need edema control  local wound care  ace wrap and elevate legs    # liver cirrhosis of unclear etiology w/ splenomegaly with leukopenia and thrombocytopenia  Patient follows Dr. Erickson for splenomegaly and pancytopenia who is recommending liver biopsy.   Patient was seen by GI in the past and Fibrosure testing was suggestive of severe liver fibrosis  had EGD done showing small esophageal varices and gastric polyps  patient was advised to start Nadolol. Patient sought a second opinion and repeated EGD reportedly was negative for varices Patient never took Nadolol.  will need rpt EGD as outpt  hepatology reccs appreciated  KEVIN, immunoglobulin panel-pending   will need Liver biopsy if workup is negative  RUQ US: mod ascites; liver cirr  CT A/P: done, read noted   Echo noted  for now: c/w Lasix  fluid restrict to 1-1.5 L/day    # poss PVD  A Dupl noted  no asa for now    # PHTN WHO II; Possible ABRAHAM on Home O2   Follows Dr. Martinze   was recently seen by Dr. Paredes   ECHO 4/22/22: EF 72%, Grade 1 diastolic dysfunction; mild AS; mild AR; mild Pulm HTN  NC O2 prn  Vascular Cardiology follow up appreciated  plan to give one dose IV lasix 80mg today then after dc can continue lasix 40mg po bid for 4 days followed by 40mg po qd after that.  OP cardiology follow up in 1-2 weeks    # DVT prophylaxis: hep sc 5000 q12    # GI prophylaxis: not indicated     # Activity: Independent    #Hypothyroidism  -TSH elevated  -T3 decreased   - c/w levothyroxine, recommend repeat thyroid panel in 6 weeks      # Code: full    Disposition: outpatient diuresis plan as mentioned above  eventually, pt will go home with visit RN for wound care- f/u CM

## 2022-07-08 NOTE — PROGRESS NOTE ADULT - ASSESSMENT
Assessment:  #Lower extremity edema  - 3+ bilaterally, venous stasis, legs do not appear infected  - TTE 4/2022 nl LVEF, Grade 1 diastolic dysfunction  - Multifactorial: venous insufficiency, cirrhosis  #PAD  - Moderate-severe infrapopliteal disease bilaterally, recommend medical management at this time  - LDL at goal  #Cirrhosis  #Pancytopenia  #Hypothyroidism    Plan:  - If renal function remains stable, give Lasix 80 mg IV x1  - Discharge on Lasix 40 mg PO BID x4 days, then Lasix 40 mg daily  - Repeat BMP next week  - Replete electrolytes and maintain K>4, Mg>2  - Aspirin 81 mg in the future if/when PLTs are stable and after liver biopsy  - F/u with PCP in 1-2 weeks  - f/u with me in 2 weeks      Melissa Barillas MD  Vascular Cardiology   Please text or call via MS Teams with questions

## 2022-07-08 NOTE — PROGRESS NOTE ADULT - SUBJECTIVE AND OBJECTIVE BOX
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 Patient endorses feeling weak when doing her daily ADLs, patient denies SOB. Patient denies any chest pain, palpitation. Patient denies fever, chills, headache, dizziness. Patient denies abdominal pain but endorsing constipation. Patient denies n/v. Patient was recently seen by Dr. Paredes for weight gain, and started on Torsemide 20mg qd. Patient follows Dr. Erickson for splenomegaly and pancytopenia who is recomending liver biopsy. Patient was seen by GI in the past and Fibrosure testing was suggestive of severe liver fibrosis, had EGD done showing small esophageal varices and gastric polyps, and patient was advised to start Nadolol. Patient sought a second opinion and repeated EGD reportedly was negative for varices Patient never took Nadolol. Screening for viral hepatitis, hemachromatosis og' s disease, and autoimmune lives disease was negative.   patient was admitted 5/16/2022 - 5/20/2022 for worsening lower extremity edema and hypoxia was medically  managed and discharged home  with VNS for treatment of this leg wound and supplemental oxygen   ED vitals T(F): 98.2, HR: 84, BP: 139/64, RR: 17, SpO2: 96% on RA   Labs significant for pancytopenia Hb 10.2, WBC 2.88, PLT 70, Lactate 1.8  TPro  5.9<L>  /  Alb  2.8<L>  /  TBili  4.1<H>  /  DBili  0.8<H>  /  AST  35  /  ALT  16  /  AlkPhos  51   PT: 15.10 sec; INR: 1.32 ratio; PTT: 32.9 sec    138  |  97<L>  |  33<H>  ----------------------------<  114<H>  3.2<L>   |  30  |  1.2    Ca    8.7      29 Jun 2022 18:26         (29 Jun 2022 23:01)    INTERVAL HPI/OVERNIGHT EVENTS:    No events. feels better today    ROS: All ROS negative except    PHYSICAL EXAM:  T(C): 36.1, Max: 36.4 (07-07-22 @ 13:53)  HR: 63 (63 - 100)  BP: 118/54 (100/49 - 131/89)  RR: 18 (18 - 18)  SpO2: --    GENERAL: NAD  NECK: Supple, No JVD  PULMONARY/CHEST: No rales, rhonchi, wheezing  CARDIOVASC: Regular rate and rhythm; No murmurs  GI/ABDOMEN: Soft, Nontender, Nondistended; Bowel sounds present  EXTREMITIES:  No clubbing, bilateral LE edema  SKIN: No rashes or lesions  NERVOUS SYSTEM:  Alert & Oriented X3, no gross neurological  deficit     Consultant(s) Notes Reviewed by me.   Care Discussed with Consultants/Other Providers     LABS:                        10.1   2.97  )-----------( 85       ( 08 Jul 2022 09:10 )             31.5     07-08    141  |  103  |  41<H>  ----------------------------<  136<H>  3.9   |  28  |  1.3    Ca    8.7      08 Jul 2022 09:10  Mg     2.0     07-08    TPro  6.2  /  Alb  2.9<L>  /  TBili  2.0<H>  /  DBili  x   /  AST  29  /  ALT  <5  /  AlkPhos  70  07-07        CAPILLARY BLOOD GLUCOSE                RADIOLOGY & ADDITIONAL TESTS:      MEDICATIONS  (STANDING):  furosemide   Injectable 80 milliGRAM(s) IV Push once  heparin   Injectable 5000 Unit(s) SubCutaneous every 12 hours  levothyroxine 25 MICROGram(s) Oral daily  propranolol 10 milliGRAM(s) Oral every 12 hours  silver sulfADIAZINE 1% Cream 1 Application(s) Topical daily  spironolactone 100 milliGRAM(s) Oral daily    MEDICATIONS  (PRN):

## 2022-07-12 ENCOUNTER — APPOINTMENT (OUTPATIENT)
Dept: CARDIOLOGY | Facility: CLINIC | Age: 82
End: 2022-07-12

## 2022-07-12 VITALS
HEART RATE: 65 BPM | BODY MASS INDEX: 26.81 KG/M2 | DIASTOLIC BLOOD PRESSURE: 60 MMHG | SYSTOLIC BLOOD PRESSURE: 100 MMHG | WEIGHT: 142 LBS | HEIGHT: 61 IN

## 2022-07-12 DIAGNOSIS — J96.11 CHRONIC RESPIRATORY FAILURE WITH HYPOXIA: ICD-10-CM

## 2022-07-12 DIAGNOSIS — R06.02 SHORTNESS OF BREATH: ICD-10-CM

## 2022-07-12 DIAGNOSIS — G47.33 OBSTRUCTIVE SLEEP APNEA (ADULT) (PEDIATRIC): ICD-10-CM

## 2022-07-12 PROCEDURE — 93000 ELECTROCARDIOGRAM COMPLETE: CPT

## 2022-07-12 PROCEDURE — 99213 OFFICE O/P EST LOW 20 MIN: CPT

## 2022-07-12 RX ORDER — TORSEMIDE 20 MG/1
20 TABLET ORAL
Qty: 90 | Refills: 2 | Status: DISCONTINUED | COMMUNITY
Start: 2022-06-15 | End: 2022-07-12

## 2022-07-12 RX ORDER — SPIRONOLACTONE 25 MG/1
25 TABLET ORAL DAILY
Qty: 30 | Refills: 4 | Status: DISCONTINUED | COMMUNITY
Start: 2022-06-29 | End: 2022-07-12

## 2022-07-14 ENCOUNTER — LABORATORY RESULT (OUTPATIENT)
Age: 82
End: 2022-07-14

## 2022-07-14 ENCOUNTER — OUTPATIENT (OUTPATIENT)
Dept: OUTPATIENT SERVICES | Facility: HOSPITAL | Age: 82
LOS: 1 days | Discharge: HOME | End: 2022-07-14

## 2022-07-14 ENCOUNTER — APPOINTMENT (OUTPATIENT)
Dept: HEMATOLOGY ONCOLOGY | Facility: CLINIC | Age: 82
End: 2022-07-14

## 2022-07-14 VITALS
HEIGHT: 61 IN | RESPIRATION RATE: 14 BRPM | BODY MASS INDEX: 26.43 KG/M2 | DIASTOLIC BLOOD PRESSURE: 56 MMHG | SYSTOLIC BLOOD PRESSURE: 117 MMHG | WEIGHT: 140 LBS | TEMPERATURE: 97.1 F | HEART RATE: 66 BPM

## 2022-07-14 DIAGNOSIS — Z98.890 OTHER SPECIFIED POSTPROCEDURAL STATES: Chronic | ICD-10-CM

## 2022-07-14 DIAGNOSIS — R91.8 OTHER NONSPECIFIC ABNORMAL FINDING OF LUNG FIELD: ICD-10-CM

## 2022-07-14 DIAGNOSIS — D61.818 OTHER PANCYTOPENIA: ICD-10-CM

## 2022-07-14 DIAGNOSIS — N39.0 URINARY TRACT INFECTION, SITE NOT SPECIFIED: ICD-10-CM

## 2022-07-14 DIAGNOSIS — R16.1 SPLENOMEGALY, NOT ELSEWHERE CLASSIFIED: ICD-10-CM

## 2022-07-14 DIAGNOSIS — Z90.49 ACQUIRED ABSENCE OF OTHER SPECIFIED PARTS OF DIGESTIVE TRACT: Chronic | ICD-10-CM

## 2022-07-14 LAB
ALBUMIN SERPL ELPH-MCNC: 3.2 G/DL
ALP BLD-CCNC: 71 U/L
ALT SERPL-CCNC: 10 U/L
ANION GAP SERPL CALC-SCNC: 11 MMOL/L
AST SERPL-CCNC: 30 U/L
BILIRUB SERPL-MCNC: 3.8 MG/DL
BUN SERPL-MCNC: 40 MG/DL
CALCIUM SERPL-MCNC: 9 MG/DL
CHLORIDE SERPL-SCNC: 99 MMOL/L
CO2 SERPL-SCNC: 30 MMOL/L
CREAT SERPL-MCNC: 1.5 MG/DL
EGFR: 35 ML/MIN/1.73M2
GLUCOSE SERPL-MCNC: 156 MG/DL
MAGNESIUM SERPL-MCNC: 2.1 MG/DL
POTASSIUM SERPL-SCNC: 3.8 MMOL/L
PROT SERPL-MCNC: 6.4 G/DL
SODIUM SERPL-SCNC: 140 MMOL/L

## 2022-07-14 PROCEDURE — 99213 OFFICE O/P EST LOW 20 MIN: CPT

## 2022-07-15 LAB
ANION GAP SERPL CALC-SCNC: 11 MMOL/L
BUN SERPL-MCNC: 33 MG/DL
CALCIUM SERPL-MCNC: 8.5 MG/DL
CHLORIDE SERPL-SCNC: 101 MMOL/L
CO2 SERPL-SCNC: 29 MMOL/L
CREAT SERPL-MCNC: 1.3 MG/DL
EGFR: 41 ML/MIN/1.73M2
GLUCOSE SERPL-MCNC: 111 MG/DL
HCT VFR BLD CALC: 35.1 %
HGB BLD-MCNC: 11.1 G/DL
MAGNESIUM RBC-MCNC: 5.2 MG/DL
MCHC RBC-ENTMCNC: 30.2 PG
MCHC RBC-ENTMCNC: 31.6 G/DL
MCV RBC AUTO: 95.6 FL
PLATELET # BLD AUTO: 79 K/UL
PMV BLD: 10.5 FL
POTASSIUM SERPL-SCNC: 3.4 MMOL/L
RBC # BLD: 3.67 M/UL
RBC # FLD: 16.9 %
SODIUM SERPL-SCNC: 141 MMOL/L
WBC # FLD AUTO: 4.2 K/UL

## 2022-07-16 NOTE — ASSESSMENT
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 \par -ABRAHAM ? \par -Exertional dyspnea , fatigue . worsening edema,  weight gain. due to portal hypertension ? rule out amyloid . \par echo showed only mild diastolic dysfunction . \par Plan :\par We reviewed Blood work with stable component now.\par Recommend to follow up with cardiology.\par Follow up with GI  \par We will repeat CBC, CMP, MM panel.\par   would need liver biopsy for definitive diagnosis  in the near future but not now. \par    Patient seen and examined with  who agree for the above plan. \par

## 2022-07-16 NOTE — HISTORY OF PRESENT ILLNESS
[de-identified] : 10/30/2018 : Patient returns for follow up , she feels well , she denies any B symptoms , no abnormal bleeding , weakness, abdominal pain or increase in girth . No skeletal pain . SHe had an abdominal sonogram today . Last blood work from January showed no change in M spike or free light chains . CBC with stable pancytopenia .\par \par 06/17/2019 patient returns for follow up for splenomegaly , pancytopenia and monoclonal gammopathy . She feels well and denies new complaints except for bilateral leg pain , worse with activity , no swelling , no relief after she stopped Evista and simvastatin . She denies any B symptoms or new bone pain , Sonogram in march showed spleen 16 cm .\par no abnormal bleeding .\par \par 9/3/19: Patient returns for follow up for splenomegaly , pancytopenia and monoclonal gammopathy. She has no fresh complaints. Her CBC today showed WBC 3.2, Hb 12, plts 52. Sonogram in march showed spleen 16 cm. Last myeloma panel from Jun 2019 showed stable M-spike of 1 and FLC ratio was 2.55/2.18 = 1.17. \par \par 12/09/2019 Patient returns for follow up , she feels well and denies any B symptoms , abdominal pain or early satiety , she complains only of mild bilateral lower extremity pain after walking , no bleeding or bruising. She was seen by Dr Fall who recommended a watchful approach as long as she remains asymptomatic. \par \par 5/26/2020: BRAULIO ESPARZA is a 79 year old female here today for follow up visit for chronic splenomegaly with pancytopenia , no definite evidence of liver disease , early stage myeloma v/s MGUS ( BM with 5 % plasma cells). Patient is asymptomatic at this time. She denies increased fatigue, abnormal bleeding, night sweats, new bone pain, weight loss, or abdominal pain.\par \par 08/28/2020 Patient returns for history of splenomegaly and pancytopenia , she offers no complaints except for worsening lower extremity rash ( PPD ) , No B symptoms , no abnormal bleeding . Spleen size unchanged on ultrasound . \par \par 05/17/2021 Patient returns for routine follow up , CBC is stable , She was seen by pulmonary for worsening exertional dyspnea , CT scan shows bronchiectasis and was recommended O2 PRN , as per Dr irwin , she is suspected with pulmonary hypertension , ABRAHAM , she complains of extreme fatigue and daytime somnolence . NO B symptoms . \par \par 09/07/2021 Patient returns with complaints of fatigue , slight weight loss, no fever , night sweats , no abnormal bleeding . 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 \par 5/12/22:\par Patient returns for a follow up for  splenomegaly and  pancytopenia .  She is recently had left tooth implant, complicated by  right Ear hearing loss\par She is feeling very  tired , weak,  complaining of LOWER Extremity edema fully wrapped by visiting nurse.\par Patient repots her lower extremities are weeping  fluid , Also she has extended abdomen. No Ascites.  No fever , night sweats .\par  She has bruising  area on right shoulder from her position while doing tooth implant.\par No abnormal bleeding . 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 \par  7/14/22:\par Patient returns for a follow up for  splenomegaly and  pancytopenia .  She had a recent hospital admission for Ascites and lower extremities edema. She started on Torsemide 20mg qd\par Patient denies n/v. Patient was recently seen Dr. Paredes for weight gain, and she continue on diuretics Lasix 40 mg and Aldactone once daily.\par She is feeling very  tired , weak,   LOWER Extremity fully wrapped by visiting nurse.\par She is asking for possible Liver Biopsy, Cbc reviewed with stable Hemogram. No bleeding or bruises.  [de-identified] : \par 76/F presenting for follow-up for a history of splenomegaly with leukopenia and thrombocytopenia. Since her last visit, she has been evaluated by GI for liver disease as a possible etiology. Fibrosure testing was suggestive of severe liver fibrosis. EGD done showed small esophageal varices and gastric polyps (pathology benign). She was advised nadolol. She sought a second opinion with a different gastroenterologist, who advised regarding false positive results with Fibrosure testing. She had a repeat endoscopy which reportedly was negative for varices. She did not start taking the nadolol. Screening for viral hepatitis, hemochromatosis, Holden's disease, and autoimmune liver disease was negative. Patient was sent for additional blood work by GI. This was performed at an outside laboratory; we will request these results. Currently, she feels well and denies significant history of alcoholism or uncontrolled hypercholesterolemia. \par \par Bone marrow aspiration and biopsy performed in 08/2016 showed the following:\par -plasma cell neoplasm (lambda restricted interstitial plasma cells accounting for ~15% of cells)\par -amyloid staining negative\par -background of relative erythroid hyperplasia\par -FISH positive for CCDN1/IDH rearrangement\par \par Of note, IgG lambda monoclonal protein with M-spike of 0.9 was present in SPEP/SIFE. \par \par PET-CT 6/6/17 Splenomegaly. On the prior CT scan of the abdomen and pelvis done on August 17, 2016 the spleen measured 12.5 cm x 5.6 cm x 16.1 cm, at this time measured 12.5 cm x 7 cm x 15.9 cm. Multiple small nodules are seen in the right upper lobe peripherally new, the largest one measuring 7 mm, FDG avid with a max SUV 3.6 with slight misregistration. Postinflammatory and/or malignancy. \par \par Disease: splenomegaly, monoclonal gammopathy \par

## 2022-07-16 NOTE — PHYSICAL EXAM
[Ambulatory and capable of all self care but unable to carry out any work activities] : Status 2- Ambulatory and capable of all self care but unable to carry out any work activities. Up and about more than 50% of waking hours [Normal] : no peripheral adenopathy appreciated [de-identified] : well preserved female in no acute distress.  [de-identified] : pulses adequate .  [de-identified] : spleen 4 fingers bcm .  [de-identified] : extensive purpuric lesions both legs .

## 2022-07-18 DIAGNOSIS — D47.2 MONOCLONAL GAMMOPATHY: ICD-10-CM

## 2022-07-21 ENCOUNTER — APPOINTMENT (OUTPATIENT)
Dept: CARDIOLOGY | Facility: CLINIC | Age: 82
End: 2022-07-21

## 2022-07-21 VITALS
DIASTOLIC BLOOD PRESSURE: 60 MMHG | HEIGHT: 61 IN | WEIGHT: 129.8 LBS | SYSTOLIC BLOOD PRESSURE: 114 MMHG | TEMPERATURE: 97.1 F | HEART RATE: 75 BPM | BODY MASS INDEX: 24.51 KG/M2 | OXYGEN SATURATION: 93 %

## 2022-07-21 DIAGNOSIS — I87.2 VENOUS INSUFFICIENCY (CHRONIC) (PERIPHERAL): ICD-10-CM

## 2022-07-21 DIAGNOSIS — I89.0 LYMPHEDEMA, NOT ELSEWHERE CLASSIFIED: ICD-10-CM

## 2022-07-21 PROCEDURE — 99214 OFFICE O/P EST MOD 30 MIN: CPT

## 2022-07-21 RX ORDER — HYDROCORTISONE 1 %
12 CREAM (GRAM) TOPICAL TWICE DAILY
Qty: 1 | Refills: 2 | Status: ACTIVE | COMMUNITY
Start: 2022-07-21 | End: 1900-01-01

## 2022-07-21 NOTE — PHYSICAL EXAM
[Well Developed] : well developed [Well Nourished] : well nourished [No Acute Distress] : no acute distress [Normal Conjunctiva] : normal conjunctiva [Normal S1, S2] : normal S1, S2 [No Rub] : no rub [No Gallop] : no gallop [Murmur] : murmur [Clear Lung Fields] : clear lung fields [Good Air Entry] : good air entry [No Respiratory Distress] : no respiratory distress  [Soft] : abdomen soft [Non Tender] : non-tender [Moves all extremities] : moves all extremities [No Focal Deficits] : no focal deficits [Normal Speech] : normal speech [No ulcers] : no ulcers [No cyanosis] : no cyanosis [Diminished] : diminished bilaterally [2+] : Left: 2+ [Calf] : positive on the calf [4] : Left: 4 [Present] : Left Lower Extremity: present [Right Foot] : Right Foot: positive [Left Foot] : Left Foot: positive [Absent] : Left Lower Extremity: absent [de-identified] : 2/6 systolic

## 2022-07-21 NOTE — REVIEW OF SYSTEMS
[Feeling Fatigued] : not feeling fatigued [SOB] : no shortness of breath [Dyspnea on exertion] : dyspnea during exertion [Chest Discomfort] : no chest discomfort [Lower Ext Edema] : lower extremity edema [Leg Claudication] : no intermittent leg claudication [Palpitations] : no palpitations [Orthopnea] : no orthopnea [PND] : no PND [Syncope] : no syncope [Abdominal Pain] : no abdominal pain [Change In Color Of Skin] : change in skin color

## 2022-07-21 NOTE — HISTORY OF PRESENT ILLNESS
[FreeTextEntry1] : 81F with ?cirrhosis of unclear c/b severe ascites/leg edema, splenomegaly, pancytopenia, possible ABRAHAM here for hospital f/u\par \par Doing well. LE edema much improved. Blisters have healed. Legs no longer weaping. No claudication, rest pain, wounds on feet. \par \par She does not know her medications/diuretic regimen. Per pharmacy, she is taking lasix 40 mg PO daily. \par \par 160 lb -> 129 lb\par Weighing herself daily, weight has been steady\par Voids 2x at night, often during \par \par 7/14/22 CMP K 3.8, Cr 1.5, albumin 3.2, AST 30, ALT 10, Mg 2.1\par WBC 4.2, Hgb 11.1, PLTS 79K\par \par Home care nurse has ended. Patient applying silvadene. Is not sure if she needs to continue ACE wrap for compression. \par \par \par \par

## 2022-07-21 NOTE — ASSESSMENT
[FreeTextEntry1] : Assessment:\par #LE edema, CEAP C4\par - TTE 4/22 nl LVEF, G1DD\par - Multifactorial: chronic venous insufficiency, ?cirrhosis, lymphedema\par - Blisters on shins have healed\par - Continues to have 2+ edema, hyperpigmentation bilaterally, +Stemmer sign b/l\par #PAD\par - Moderate- severe infrapopliteal disease bilaterally\par #Cirrhosis of unclear etiology\par #Pancytopenia\par #Hypothyroidism\par \par Plan:\par - Increase Lasix to 40 mg BID x3 days, then resume 40 mg daily\par - Aspirin 81 mg daily for PAD in the future if/when PLTs are stable and after liver biopsy\par - Discontinue Silvadene\par - Start ammonium lactate BID\par - Check MICHEL/PVR, may be a candidate for lymphedema pump if MICHEL/TBI not significantly reduced\par - Continue to ACE wrap legs for gentle compression, elevate legs\par - F/u with hematology and GI\par - Return to clinic in 2 months or sooner PRN

## 2022-07-27 PROBLEM — G47.33 OBSTRUCTIVE SLEEP APNEA, ADULT: Status: ACTIVE | Noted: 2021-03-20

## 2022-07-27 PROBLEM — J96.11 CHRONIC RESPIRATORY FAILURE WITH HYPOXIA: Status: ACTIVE | Noted: 2021-03-20

## 2022-07-27 PROBLEM — R06.02 SHORTNESS OF BREATH: Status: ACTIVE | Noted: 2021-03-20

## 2022-07-27 NOTE — HISTORY OF PRESENT ILLNESS
[FreeTextEntry1] : 82 y/o female with a PMHx of Pulmonary HTN, leukopenia, thrombocytopenia, ABRAHAM, on home O2, splenomegaly, Liver fibrosis. Here to establish care for pulmonary HTN.\par \par Since March she has been admitted to the hospital multiple times due to hypoxia BLE edema, and fluid overload.\par \par Patient is coming for post discharge follow up. She feels well, no c/o significant SOB or chest pain. She reports still having LE edema. \par \par She takes her medications as directed and tries to follow a low sodium diet. \par \par \par

## 2022-08-04 ENCOUNTER — APPOINTMENT (OUTPATIENT)
Dept: GASTROENTEROLOGY | Facility: CLINIC | Age: 82
End: 2022-08-04

## 2022-08-04 VITALS — HEIGHT: 61 IN | BODY MASS INDEX: 24.35 KG/M2 | WEIGHT: 129 LBS

## 2022-08-04 VITALS — BODY MASS INDEX: 23.05 KG/M2 | WEIGHT: 122 LBS

## 2022-08-04 DIAGNOSIS — Z82.49 FAMILY HISTORY OF ISCHEMIC HEART DISEASE AND OTHER DISEASES OF THE CIRCULATORY SYSTEM: ICD-10-CM

## 2022-08-04 DIAGNOSIS — Z78.9 OTHER SPECIFIED HEALTH STATUS: ICD-10-CM

## 2022-08-04 DIAGNOSIS — Z80.3 FAMILY HISTORY OF MALIGNANT NEOPLASM OF BREAST: ICD-10-CM

## 2022-08-04 PROCEDURE — 99215 OFFICE O/P EST HI 40 MIN: CPT

## 2022-08-05 PROBLEM — Z82.49 FAMILY HISTORY OF CORONARY ARTERY DISEASE: Status: ACTIVE | Noted: 2022-08-05

## 2022-08-05 PROBLEM — Z80.3 FAMILY HISTORY OF MALIGNANT NEOPLASM OF BREAST: Status: ACTIVE | Noted: 2022-08-05

## 2022-08-05 PROBLEM — Z78.9 NON-SMOKER: Status: ACTIVE | Noted: 2022-08-05

## 2022-08-05 NOTE — PHYSICAL EXAM
[Splenomegaly] : splenomegaly [General Appearance - Alert] : alert [Sclera] : the sclera and conjunctiva were normal [Neck Cervical Mass (___cm)] : no neck mass was observed [Thyroid Diffuse Enlargement] : the thyroid was not enlarged [Thyroid Nodule] : there were no palpable thyroid nodules [Respiration, Rhythm And Depth] : normal respiratory rhythm and effort [Auscultation Breath Sounds / Voice Sounds] : lungs were clear to auscultation bilaterally [Heart Sounds] : normal S1 and S2 [Murmurs] : no murmurs [Abdomen Soft] : soft [Abdomen Tenderness] : non-tender [Nail Clubbing] : no clubbing  or cyanosis of the fingernails [] : no rash [No Focal Deficits] : no focal deficits [Oriented To Time, Place, And Person] : oriented to person, place, and time [Scleral Icterus] : No Scleral Icterus [Spider Angioma] : No spider angioma(s) were observed [Abdominal  Ascites] : no ascites [Liver Palpable ___ Finger Breadths Below Costal Margin] : was not palpable below costal margin [Asterixis] : no asterixis observed [Jaundice] : No jaundice [FreeTextEntry1] : kyphotic

## 2022-08-05 NOTE — ASSESSMENT
[FreeTextEntry1] : 81 year old hx of pancytopenia MGUS portal HTN PHTN seen post hospital discharge for possible cirrhosis\par \par Portal HTN possible cirrhosis\par Has low plat count and splenomegaly since 2016 70 K.  \par AST ALT ratio reversal Fibrosure suggestive of cirrhosis in 2016 FIB 4 9.8 suggestive of F3-F4 fibrosis. \par EGD from prior notes reportedly small varices. \par Low albumin since 2022 April. Normal INR. CT abdomen in 2022 July shows nodular contour of liver.'\par CLD work up  Hep B C negative. KEVIN+ Ig ASMA AMA negative \par Patient has perihepatic ascites so cannot do fibroscan as will show high LS. \par Echo in April 2022 showed mild PHTN with est PA pressure 43\par Patient likely has cirrhosis with PHTN. NAFLD is possible or congestive hepatopathy from PHTN\par She would need TJ liver biopsy with measurement of portal pressures . Patient is quite frail so will not pursue liver biopsy at this time. \par \par \par MELD 7  Child  B\par \par HE - none\par Ascites - perihepatic. Continue furosemide for leg edema. 2 g Na restricted diet\par No need for spironolactone for now.\par HCC screening - US abdomen AFP every 6 months. US abdomen done in June. \par Variceal screening - Has low platelet. Frail lady. Will defer EGD for now.\par Vaccination status - Was not immune in 2016. Will recheck  immune status.\par Coagulopathy - none\par LT referral -  Frail 81 year old. Not a candidate.\par \par Plan\par Low Na diet Protein 1.5 g/kg\par Early breakfast late night snack small multiple meals\par No NSAID \par Limit tylenol intake to 2 g per day\par Avoid raw foods especially shellfish\par CBC CMP INR on follow up\par \par Health maintenance\par Colonoscopy - beyond screening. \par Vaccination - pneumococcal and seasonal flu shot\par \par Follow up in 1 - 2 weeks. \par

## 2022-08-05 NOTE — HISTORY OF PRESENT ILLNESS
[___] : Performed [unfilled] [IV Drug Use] : no IV drug use [Tattoo] : no tattoos [Body Piercing] : no body piercing [Cocaine Use] : no cocaine use [de-identified] : Patient was referred for portal hypertension and possible cirrhosis but only aware here to see for liver issues. \par Patient was noted to have splenomegaly and pancytopenia  in 2016. She was evaluated by hematology. 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 Fibrosure showed high fibrosis and EGD showed small varices.She was seen by pulmonary and in 2021 and diagnosed with pulmonary HTN.\par \par Patient and  report that was well unto Feb 2020 when she was ambulant and self caring. She had dental work done and developed R sided hearing loss. Was evaluated by ENT and treated with prednisone. This lead to leg swelling and was admitted to [facility] in April and [facility] in May and treated with diuretics. Patient was admitted with leg swelling and possible cellulitis in June and was evaluated by hepatology. Imaging was suggestive of cirrhosis.\par Patient is not doing well. She is fatigued and sleepy all the time. Reports RHONA. No orthopnea or PND. Still has leg swelling. No chest pain. Feels weak and difficulty with ambulation. Feels dizzy at times with diuretics. \par Reports hoarse voice. \par Had gained wt earlier with swelling but now losing weight. Wt has dropped from 160 lb to 122 lb over past few months. Patient was obese few years ago but lost weight with diet control. \par No Fhx of cirrhosis or liver cancer [de-identified] : HEPATOBILIARY: Nodular hepatic contour and perihepatic ascites, \par compatible with cirrhosis. Post cholecystectomy.\par \par SPLEEN: Splenomegaly measuring up to 16.8 cm in the craniocaudal \par dimension (series 601, image 31). Perisplenic fluid.\par  [de-identified] : Echo 1. Hyperdynamic global left ventricular systolic function.\par  2. LV Ejection Fraction by Jackson's Method with a biplane EF of 72 %.\par  3. Spectral Doppler shows impaired relaxation pattern of left \par ventricular myocardial filling (Grade I diastolic dysfunction).\par  4. Mild left ventricular hypertrophy.\par  5. Difficult to visualize right atrial chamber. Possible artifact seen \par in right atrium.\par  6. Peak transaortic gradient equals 25.4 mmHg, mean transaortic gradient \par equals 16.8 mmHg, the calculated aortic valve area equals 1.83 cm² by the \par continuity equation consistent with mild aortic stenosis. The \par Dimesionless Index value is 0.68.\par  7. Mild aortic regurgitation.\par  8. Mild mitral annular calcification.\par  9. Moderate thickening and calcification of the posterior mitral valve \par leaflet.\par 10. Trace mitral valve regurgitation.\par 11. Estimated pulmonary artery systolic pressure is 43.8 mmHg assuming a \par right atrial pressure of 3 mmHg, which is consistent with mild pulmonary \par hypertension.\par 12. There is no evidence of pericardial effusion.\par

## 2022-08-05 NOTE — REASON FOR VISIT
[Consultation] : a consultation visit [FreeTextEntry1] : NPA Referred by  - portal hypertension possible cirrhosis

## 2022-08-05 NOTE — REVIEW OF SYSTEMS
[Eyesight Problems] : eyesight problems [Lower Ext Edema] : lower extremity edema [SOB on Exertion] : shortness of breath during exertion [Fever] : no fever [Chills] : no chills [Eye Pain] : no eye pain [Dry Eyes] : no dryness of the eyes [Nosebleeds] : no nosebleeds [Sore Throat] : no sore throat [Chest Pain] : no chest pain [Palpitations] : no palpitations [Cough] : no cough [Orthopnea] : no orthopnea [PND] : no PND [Abdominal Pain] : no abdominal pain [Vomiting] : no vomiting [Constipation] : no constipation [Diarrhea] : no diarrhea [Heartburn] : no heartburn [Melena] : no melena [Suicidal] : not suicidal [Anxiety] : no anxiety [Depression] : no depression [Proptosis] : no proptosis [Easy Bleeding] : no tendency for easy bleeding [Easy Bruising] : no tendency for easy bruising

## 2022-08-09 ENCOUNTER — APPOINTMENT (OUTPATIENT)
Dept: CARDIOLOGY | Facility: CLINIC | Age: 82
End: 2022-08-09

## 2022-08-09 PROCEDURE — 93923 UPR/LXTR ART STDY 3+ LVLS: CPT

## 2022-08-10 LAB
AFP-TM SERPL-MCNC: <1.8 NG/ML
ALBUMIN SERPL ELPH-MCNC: 4.4 G/DL
ALP BLD-CCNC: 74 U/L
ALT SERPL-CCNC: 17 U/L
ANION GAP SERPL CALC-SCNC: 17 MMOL/L
AST SERPL-CCNC: 31 U/L
BASOPHILS # BLD AUTO: 0.04 K/UL
BASOPHILS NFR BLD AUTO: 0.7 %
BILIRUB SERPL-MCNC: 4.4 MG/DL
BUN SERPL-MCNC: 109 MG/DL
CALCIUM SERPL-MCNC: 10.7 MG/DL
CHLORIDE SERPL-SCNC: 98 MMOL/L
CO2 SERPL-SCNC: 20 MMOL/L
CREAT SERPL-MCNC: 2.3 MG/DL
EGFR: 21 ML/MIN/1.73M2
EOSINOPHIL # BLD AUTO: 0.25 K/UL
EOSINOPHIL NFR BLD AUTO: 4.4 %
GLUCOSE SERPL-MCNC: 151 MG/DL
HCT VFR BLD CALC: 41.9 %
HGB BLD-MCNC: 13.9 G/DL
IMM GRANULOCYTES NFR BLD AUTO: 0.2 %
INR PPP: 1.12 RATIO
LYMPHOCYTES # BLD AUTO: 1.34 K/UL
LYMPHOCYTES NFR BLD AUTO: 23.7 %
MAN DIFF?: NORMAL
MCHC RBC-ENTMCNC: 30.2 PG
MCHC RBC-ENTMCNC: 33.2 G/DL
MCV RBC AUTO: 91.1 FL
MONOCYTES # BLD AUTO: 0.7 K/UL
MONOCYTES NFR BLD AUTO: 12.4 %
NEUTROPHILS # BLD AUTO: 3.32 K/UL
NEUTROPHILS NFR BLD AUTO: 58.6 %
PLATELET # BLD AUTO: 85 K/UL
POTASSIUM SERPL-SCNC: 4.7 MMOL/L
PROT SERPL-MCNC: 8.1 G/DL
PT BLD: 12.9 SEC
RBC # BLD: 4.6 M/UL
RBC # FLD: 15.5 %
SODIUM SERPL-SCNC: 135 MMOL/L
TSH SERPL-ACNC: 2.32 UIU/ML
WBC # FLD AUTO: 5.66 K/UL

## 2022-08-11 ENCOUNTER — NON-APPOINTMENT (OUTPATIENT)
Age: 82
End: 2022-08-11

## 2022-08-11 LAB
HBV SURFACE AB SER QL: NONREACTIVE
HEPATITIS A IGG ANTIBODY: NONREACTIVE
TTG IGA SER IA-ACNC: <1.2 U/ML
TTG IGA SER-ACNC: NEGATIVE

## 2022-08-12 ENCOUNTER — NON-APPOINTMENT (OUTPATIENT)
Age: 82
End: 2022-08-12

## 2022-08-15 LAB
A1AT PHENOTYP SERPL-IMP: NORMAL
A1AT SERPL-MCNC: 143 MG/DL

## 2022-08-18 ENCOUNTER — APPOINTMENT (OUTPATIENT)
Dept: GASTROENTEROLOGY | Facility: CLINIC | Age: 82
End: 2022-08-18

## 2022-08-18 VITALS — BODY MASS INDEX: 23.03 KG/M2 | WEIGHT: 122 LBS | HEIGHT: 61 IN

## 2022-08-18 DIAGNOSIS — Z00.00 ENCOUNTER FOR GENERAL ADULT MEDICAL EXAMINATION W/OUT ABNORMAL FINDINGS: ICD-10-CM

## 2022-08-18 DIAGNOSIS — D61.818 OTHER PANCYTOPENIA: ICD-10-CM

## 2022-08-18 DIAGNOSIS — R79.89 OTHER SPECIFIED ABNORMAL FINDINGS OF BLOOD CHEMISTRY: ICD-10-CM

## 2022-08-18 DIAGNOSIS — K74.69 OTHER CIRRHOSIS OF LIVER: ICD-10-CM

## 2022-08-18 PROCEDURE — 99215 OFFICE O/P EST HI 40 MIN: CPT

## 2022-08-19 PROBLEM — D61.818 PANCYTOPENIA: Status: ACTIVE | Noted: 2019-09-03

## 2022-08-19 NOTE — ASSESSMENT
[FreeTextEntry1] : 81 year old hx of pancytopenia MGUS portal HTN PHTN chronic respiratory failure  seen post hospital discharge for possible cirrhosis\par \par Portal HTN possible cirrhosis\par PHTN chronic respiratory failure \par Has low plat count (70 K)and splenomegaly since 2016. \par AST ALT ratio reversal Fibrosure suggestive of cirrhosis in 2016 FIB 4 9.8 suggestive of F3-F4 fibrosis. \par EGD from prior notes reportedly small varices. \par Low albumin since 2022 April. Normal INR. CT abdomen in 2022 July shows nodular contour of liver.\par CLD work up  Hep B C negative. KEVIN+ Ig ASMA AMA negative \par Patient has perihepatic ascites so cannot do fibroscan as will show high LS.  Will repeat US abdomen and if no ascites will get fibroscan \par Echo in April 2022 showed mild PHTN with est PA pressure 43\par Patient likely has cirrhosis with due to RHF from pulm HTN\par NAFLD is possible. Reports was heavy before.\par She would need TJ liver biopsy with measurement of portal pressures . Patient is quite frail so will not pursue liver biopsy at this time. \par \par \par MELD 7  Child  B\par HE - none\par Ascites - perihepatic. Hold furosemide torsemide and spironolactone due to rise in creatinine to 2.3 with \par  2 g Na restricted diet. \par HCC screening - US abdomen AFP every 6 months. US abdomen done in June. \par Variceal screening - Has low platelet. Frail lady. Will defer EGD for now.\par Vaccination status - Not  immune to hepatitis A and B. Will discuss on follow up.\par Coagulopathy - none\par LT referral -  Frail 81 year old. Not a candidate.\par \par Plan\par Low Na diet Protein 1.5 g/kg\par Early breakfast late night snack small multiple meals\par No NSAID \par Limit tylenol intake to 2 g per day\par Avoid raw foods especially shellfish\par CBC CMP INR on follow up\par \par Health maintenance\par Colonoscopy - beyond screening. \par Vaccination - pneumococcal and seasonal flu shot\par \par Follow up in 1 month\par

## 2022-08-19 NOTE — HISTORY OF PRESENT ILLNESS
[___] : Performed [unfilled] [IV Drug Use] : no IV drug use [Tattoo] : no tattoos [Body Piercing] : no body piercing [Cocaine Use] : no cocaine use [de-identified] : Stopped furosemide torsemide and spironolactone with elevation of creatinine.  [de-identified] : Feels fatigued all the time. Poor appetite. Wt stable around 160 lb. Leg swelling is controlled even off diuretis. Feels may have adrenal insufficiency.  [de-identified] : Patient was referred for portal hypertension and possible cirrhosis but only aware here to see for liver issues. \par Patient was noted to have splenomegaly and pancytopenia  in 2016. She was evaluated by hematology. 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 Fibrosure showed high fibrosis and EGD showed small varices.She was seen by pulmonary and in 2021 and diagnosed with pulmonary HTN.\par \par Patient and  report that was well unto Feb 2020 when she was ambulant and self caring. She had dental work done and developed R sided hearing loss. Was evaluated by ENT and treated with prednisone. This lead to leg swelling and was admitted to [facility] in April and [facility] in May and treated with diuretics. Patient was admitted with leg swelling and possible cellulitis in June and was evaluated by hepatology. Imaging was suggestive of cirrhosis.\par Patient is not doing well. She is fatigued and sleepy all the time. Reports RHONA. No orthopnea or PND. Still has leg swelling. No chest pain. Feels weak and difficulty with ambulation. Feels dizzy at times with diuretics. \par Reports hoarse voice. \par Had gained wt earlier with swelling but now losing weight. Wt has dropped from 160 lb to 122 lb over past few months. Patient was obese few years ago but lost weight with diet control. \par No Fhx of cirrhosis or liver cancer [de-identified] : HEPATOBILIARY: Nodular hepatic contour and perihepatic ascites, \par compatible with cirrhosis. Post cholecystectomy.\par \par SPLEEN: Splenomegaly measuring up to 16.8 cm in the craniocaudal \par dimension (series 601, image 31). Perisplenic fluid.\par  [de-identified] : Echo 1. Hyperdynamic global left ventricular systolic function.\par  2. LV Ejection Fraction by Jackson's Method with a biplane EF of 72 %.\par  3. Spectral Doppler shows impaired relaxation pattern of left \par ventricular myocardial filling (Grade I diastolic dysfunction).\par  4. Mild left ventricular hypertrophy.\par  5. Difficult to visualize right atrial chamber. Possible artifact seen \par in right atrium.\par  6. Peak transaortic gradient equals 25.4 mmHg, mean transaortic gradient \par equals 16.8 mmHg, the calculated aortic valve area equals 1.83 cm² by the \par continuity equation consistent with mild aortic stenosis. The \par Dimesionless Index value is 0.68.\par  7. Mild aortic regurgitation.\par  8. Mild mitral annular calcification.\par  9. Moderate thickening and calcification of the posterior mitral valve \par leaflet.\par 10. Trace mitral valve regurgitation.\par 11. Estimated pulmonary artery systolic pressure is 43.8 mmHg assuming a \par right atrial pressure of 3 mmHg, which is consistent with mild pulmonary \par hypertension.\par 12. There is no evidence of pericardial effusion.\par

## 2022-08-19 NOTE — REVIEW OF SYSTEMS
[Shortness Of Breath] : shortness of breath [Difficulty Walking] : difficulty walking [Negative] : Genitourinary [Fever] : no fever [Chills] : no chills [Eye Pain] : no eye pain [Earache] : no earache [Chest Pain] : no chest pain [Palpitations] : no palpitations [Cough] : no cough [SOB on Exertion] : no shortness of breath during exertion [Abdominal Pain] : no abdominal pain [Constipation] : no constipation [Diarrhea] : no diarrhea [Heartburn] : no heartburn [Melena] : no melena [Confused] : no confusion [Easy Bleeding] : no tendency for easy bleeding [Easy Bruising] : no tendency for easy bruising [FreeTextEntry6] : on LTOT.

## 2022-08-19 NOTE — PHYSICAL EXAM
[Splenomegaly] : splenomegaly [Palmar Erythema] : Palmar Erythema [General Appearance - Alert] : alert [Sclera] : the sclera and conjunctiva were normal [Outer Ear] : the ears and nose were normal in appearance [Neck Cervical Mass (___cm)] : no neck mass was observed [Jugular Venous Distention Increased] : there was no jugular-venous distention [Thyroid Diffuse Enlargement] : the thyroid was not enlarged [Respiration, Rhythm And Depth] : normal respiratory rhythm and effort [Auscultation Breath Sounds / Voice Sounds] : lungs were clear to auscultation bilaterally [Heart Sounds] : normal S1 and S2 [Abdomen Soft] : soft [Abdomen Tenderness] : non-tender [] : no rash [No Focal Deficits] : no focal deficits [Oriented To Time, Place, And Person] : oriented to person, place, and time [Scleral Icterus] : No Scleral Icterus [Spider Angioma] : No spider angioma(s) were observed [Abdominal  Ascites] : no ascites [Liver Palpable ___ Finger Breadths Below Costal Margin] : was not palpable below costal margin [Asterixis] : no asterixis observed [Jaundice] : No jaundice [FreeTextEntry1] : kyphotic, clubbing +

## 2022-08-28 ENCOUNTER — OUTPATIENT (OUTPATIENT)
Dept: OUTPATIENT SERVICES | Facility: HOSPITAL | Age: 82
LOS: 1 days | Discharge: HOME | End: 2022-08-28

## 2022-08-28 DIAGNOSIS — R79.89 OTHER SPECIFIED ABNORMAL FINDINGS OF BLOOD CHEMISTRY: ICD-10-CM

## 2022-08-28 DIAGNOSIS — Z90.49 ACQUIRED ABSENCE OF OTHER SPECIFIED PARTS OF DIGESTIVE TRACT: Chronic | ICD-10-CM

## 2022-08-28 DIAGNOSIS — R10.9 UNSPECIFIED ABDOMINAL PAIN: ICD-10-CM

## 2022-08-28 DIAGNOSIS — Z98.890 OTHER SPECIFIED POSTPROCEDURAL STATES: Chronic | ICD-10-CM

## 2022-08-28 PROCEDURE — 76700 US EXAM ABDOM COMPLETE: CPT | Mod: 26

## 2022-08-30 ENCOUNTER — APPOINTMENT (OUTPATIENT)
Dept: HEMATOLOGY ONCOLOGY | Facility: CLINIC | Age: 82
End: 2022-08-30

## 2022-08-30 ENCOUNTER — LABORATORY RESULT (OUTPATIENT)
Age: 82
End: 2022-08-30

## 2022-08-30 ENCOUNTER — OUTPATIENT (OUTPATIENT)
Dept: OUTPATIENT SERVICES | Facility: HOSPITAL | Age: 82
LOS: 1 days | Discharge: HOME | End: 2022-08-30

## 2022-08-30 VITALS
DIASTOLIC BLOOD PRESSURE: 60 MMHG | SYSTOLIC BLOOD PRESSURE: 115 MMHG | WEIGHT: 136 LBS | OXYGEN SATURATION: 92 % | HEART RATE: 62 BPM | BODY MASS INDEX: 25.68 KG/M2 | RESPIRATION RATE: 14 BRPM | HEIGHT: 61 IN | TEMPERATURE: 98.1 F

## 2022-08-30 DIAGNOSIS — Z90.49 ACQUIRED ABSENCE OF OTHER SPECIFIED PARTS OF DIGESTIVE TRACT: Chronic | ICD-10-CM

## 2022-08-30 DIAGNOSIS — Z98.890 OTHER SPECIFIED POSTPROCEDURAL STATES: Chronic | ICD-10-CM

## 2022-08-30 DIAGNOSIS — D47.2 MONOCLONAL GAMMOPATHY: ICD-10-CM

## 2022-08-30 PROCEDURE — 99213 OFFICE O/P EST LOW 20 MIN: CPT

## 2022-08-31 PROBLEM — D47.2 MONOCLONAL GAMMOPATHY: Status: ACTIVE | Noted: 2017-05-25

## 2022-08-31 LAB
ALBUMIN SERPL ELPH-MCNC: 3.4 G/DL
ALP BLD-CCNC: 70 U/L
ALT SERPL-CCNC: 24 U/L
ANION GAP SERPL CALC-SCNC: 11 MMOL/L
AST SERPL-CCNC: 27 U/L
BILIRUB SERPL-MCNC: 4.2 MG/DL
BUN SERPL-MCNC: 41 MG/DL
CALCIUM SERPL-MCNC: 8.6 MG/DL
CHLORIDE SERPL-SCNC: 100 MMOL/L
CO2 SERPL-SCNC: 25 MMOL/L
CREAT SERPL-MCNC: 1.2 MG/DL
EGFR: 45 ML/MIN/1.73M2
GLUCOSE SERPL-MCNC: 88 MG/DL
HCT VFR BLD CALC: 34.6 %
HGB BLD-MCNC: 11.5 G/DL
MCHC RBC-ENTMCNC: 30.3 PG
MCHC RBC-ENTMCNC: 33.2 G/DL
MCV RBC AUTO: 91.3 FL
PLATELET # BLD AUTO: 58 K/UL
PMV BLD: 9.6 FL
POTASSIUM SERPL-SCNC: 3.2 MMOL/L
PROT SERPL-MCNC: 5.9 G/DL
RBC # BLD: 3.79 M/UL
RBC # FLD: 15.6 %
SODIUM SERPL-SCNC: 136 MMOL/L
WBC # FLD AUTO: 2.59 K/UL

## 2022-08-31 NOTE — ASSESSMENT
[FreeTextEntry1] : 80 y of with splenomegaly, pancytopenia .\par - MGUS .\par -Suspected cirrhosis with portal hypertension .\par -sonogram shows nodular liver , spleen :12.7cm ( previously 16 cm ??) \par -high bilirubin ( mostly indirect . Gilbert dis ? ) \par -ABRAHAM ? echo : mild  Pulmonary hypertension . diastolic dysfunction .\par \par worsening edema . pre-renal azotemia secondary to diuresis , volume contraction , \par rule out secondary to cirrhosis , rule out cardiac  amyloid ( No JVD ? ) \par  . \par Plan :cbc reviewed , slight drop from baseline , platelets still >50 . no abnormal bleeding . \par         continue work up with hepatology . liver biopsy ?\par         will consider fat biopsy , discussed with patient . \par

## 2022-08-31 NOTE — HISTORY OF PRESENT ILLNESS
[de-identified] : \par 76/F presenting for follow-up for a history of splenomegaly with leukopenia and thrombocytopenia. Since her last visit, she has been evaluated by GI for liver disease as a possible etiology. Fibrosure testing was suggestive of severe liver fibrosis. EGD done showed small esophageal varices and gastric polyps (pathology benign). She was advised nadolol. She sought a second opinion with a different gastroenterologist, who advised regarding false positive results with Fibrosure testing. She had a repeat endoscopy which reportedly was negative for varices. She did not start taking the nadolol. Screening for viral hepatitis, hemochromatosis, Holden's disease, and autoimmune liver disease was negative. Patient was sent for additional blood work by GI. This was performed at an outside laboratory; we will request these results. Currently, she feels well and denies significant history of alcoholism or uncontrolled hypercholesterolemia. \par \par Bone marrow aspiration and biopsy performed in 08/2016 showed the following:\par -plasma cell neoplasm (lambda restricted interstitial plasma cells accounting for ~15% of cells)\par -amyloid staining negative\par -background of relative erythroid hyperplasia\par -FISH positive for CCDN1/IDH rearrangement\par \par Of note, IgG lambda monoclonal protein with M-spike of 0.9 was present in SPEP/SIFE. \par \par PET-CT 6/6/17 Splenomegaly. On the prior CT scan of the abdomen and pelvis done on August 17, 2016 the spleen measured 12.5 cm x 5.6 cm x 16.1 cm, at this time measured 12.5 cm x 7 cm x 15.9 cm. Multiple small nodules are seen in the right upper lobe peripherally new, the largest one measuring 7 mm, FDG avid with a max SUV 3.6 with slight misregistration. Postinflammatory and/or malignancy. \par \par Disease: splenomegaly, monoclonal gammopathy \par  [de-identified] : 10/30/2018 : Patient returns for follow up , she feels well , she denies any B symptoms , no abnormal bleeding , weakness, abdominal pain or increase in girth . No skeletal pain . SHe had an abdominal sonogram today . Last blood work from January showed no change in M spike or free light chains . CBC with stable pancytopenia .\par \par 06/17/2019 patient returns for follow up for splenomegaly , pancytopenia and monoclonal gammopathy . She feels well and denies new complaints except for bilateral leg pain , worse with activity , no swelling , no relief after she stopped Evista and simvastatin . She denies any B symptoms or new bone pain , Sonogram in march showed spleen 16 cm .\par no abnormal bleeding .\par \par 9/3/19: Patient returns for follow up for splenomegaly , pancytopenia and monoclonal gammopathy. She has no fresh complaints. Her CBC today showed WBC 3.2, Hb 12, plts 52. Sonogram in march showed spleen 16 cm. Last myeloma panel from Jun 2019 showed stable M-spike of 1 and FLC ratio was 2.55/2.18 = 1.17. \par \par 12/09/2019 Patient returns for follow up , she feels well and denies any B symptoms , abdominal pain or early satiety , she complains only of mild bilateral lower extremity pain after walking , no bleeding or bruising. She was seen by Dr Fall who recommended a watchful approach as long as she remains asymptomatic. \par \par 5/26/2020: BRAULIO ESPARZA is a 79 year old female here today for follow up visit for chronic splenomegaly with pancytopenia , no definite evidence of liver disease , early stage myeloma v/s MGUS ( BM with 5 % plasma cells). Patient is asymptomatic at this time. She denies increased fatigue, abnormal bleeding, night sweats, new bone pain, weight loss, or abdominal pain.\par \par 08/28/2020 Patient returns for history of splenomegaly and pancytopenia , she offers no complaints except for worsening lower extremity rash ( PPD ) , No B symptoms , no abnormal bleeding . Spleen size unchanged on ultrasound . \par \par 05/17/2021 Patient returns for routine follow up , CBC is stable , She was seen by pulmonary for worsening exertional dyspnea , CT scan shows bronchiectasis and was recommended O2 PRN , as per Dr irwin , she is suspected with pulmonary hypertension , ABRAHAM , she complains of extreme fatigue and daytime somnolence . NO B symptoms . \par \par 09/07/2021 Patient returns with complaints of fatigue , slight weight loss, no fever , night sweats , no abnormal bleeding . 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 \par 5/12/22:\par Patient returns for a follow up for  splenomegaly and  pancytopenia .  She is recently had left tooth implant, complicated by  right Ear hearing loss\par She is feeling very  tired , weak,  complaining of LOWER Extremity edema fully wrapped by visiting nurse.\par Patient repots her lower extremities are weeping  fluid , Also she has extended abdomen. No Ascites.  No fever , night sweats .\par  She has bruising  area on right shoulder from her position while doing tooth implant.\par No abnormal bleeding . 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 \par  7/14/22:\par Patient returns for a follow up for  splenomegaly and  pancytopenia .  She had a recent hospital admission for Ascites and lower extremities edema. She started on Torsemide 20mg qd\par Patient denies n/v. Patient was recently seen Dr. Paredes for weight gain, and she continue on diuretics Lasix 40 mg and Aldactone once daily.\par She is feeling very  tired , weak,   LOWER Extremity fully wrapped by visiting nurse.\par She is asking for possible Liver Biopsy, Cbc reviewed with stable Hemogram. No bleeding or bruises. \par \par 8/30/2022 Patient returns for follow up , she was admitted several times for worsening lower extremity edema , ascites, stasis dermatitis/cellulitis. She is weaker , uses a walker to ambulate, swelling has improved only slightly with diuresis , she is currently on torsemide 20 mg , inderal and synthroid. She had also sudden hearing loss in right ear for which she was placed on systemic steroids. She has lost a lot of weight in past year. Diuresis was reduced due to worsening renal function

## 2022-08-31 NOTE — PHYSICAL EXAM
[Ambulatory and capable of all self care but unable to carry out any work activities] : Status 2- Ambulatory and capable of all self care but unable to carry out any work activities. Up and about more than 50% of waking hours [Normal] : no peripheral adenopathy appreciated [de-identified] : slightly pale , chronically ill female in no acute distress.  [de-identified] : no JVD .  [de-identified] : massive lower extremity edema, with erythema , chronic stasis changes .  [de-identified] : spleen 4 fingers bcm  no ascites. .  [de-identified] : extensive purpuric lesions both legs .

## 2022-09-01 DIAGNOSIS — D47.2 MONOCLONAL GAMMOPATHY: ICD-10-CM

## 2022-09-02 LAB
BASOPHILS # BLD AUTO: 0.02 K/UL
BASOPHILS NFR BLD AUTO: 0.8 %
EOSINOPHIL # BLD AUTO: 0.17 K/UL
EOSINOPHIL NFR BLD AUTO: 7.1 %
HCT VFR BLD CALC: 37.9 %
HGB BLD-MCNC: 11.9 G/DL
IMM GRANULOCYTES NFR BLD AUTO: 0.4 %
LYMPHOCYTES # BLD AUTO: 0.71 K/UL
LYMPHOCYTES NFR BLD AUTO: 29.7 %
MAN DIFF?: NORMAL
MCHC RBC-ENTMCNC: 30 PG
MCHC RBC-ENTMCNC: 31.4 G/DL
MCV RBC AUTO: 95.5 FL
MONOCYTES # BLD AUTO: 0.27 K/UL
MONOCYTES NFR BLD AUTO: 11.3 %
NEUTROPHILS # BLD AUTO: 1.21 K/UL
NEUTROPHILS NFR BLD AUTO: 50.7 %
PLATELET # BLD AUTO: 63 K/UL
RBC # BLD: 3.97 M/UL
RBC # FLD: 15.9 %
WBC # FLD AUTO: 2.39 K/UL

## 2022-09-06 LAB — CORTIS SERPL-MCNC: 14.5 UG/DL

## 2022-09-15 ENCOUNTER — APPOINTMENT (OUTPATIENT)
Age: 82
End: 2022-09-15

## 2022-09-19 ENCOUNTER — APPOINTMENT (OUTPATIENT)
Dept: CARDIOLOGY | Facility: CLINIC | Age: 82
End: 2022-09-19

## 2022-09-27 NOTE — ED PROVIDER NOTE - SECONDARY DIAGNOSIS.
Spoke to patient. He reports that he has been using ketoconazole cream bid on his arm with no change. Advised appt for re-evaluation.     Patient reports he is heading to [place] tomorrow.   Will return 10/10/22 and asked if he can schedule his Medicare Wellness Visit along with the appt to check his spot. Scheduled 10/11/22 at 2pm.     Advised that he will get a call back only if Dr. Torres has additional recommendations for him.    Fever

## 2022-09-29 ENCOUNTER — APPOINTMENT (OUTPATIENT)
Dept: GASTROENTEROLOGY | Facility: CLINIC | Age: 82
End: 2022-09-29

## 2022-09-29 VITALS
BODY MASS INDEX: 25.49 KG/M2 | HEART RATE: 60 BPM | WEIGHT: 135 LBS | OXYGEN SATURATION: 95 % | HEIGHT: 61 IN | SYSTOLIC BLOOD PRESSURE: 80 MMHG | DIASTOLIC BLOOD PRESSURE: 43 MMHG

## 2022-09-29 DIAGNOSIS — Z23 ENCOUNTER FOR IMMUNIZATION: ICD-10-CM

## 2022-09-29 DIAGNOSIS — R16.1 SPLENOMEGALY, NOT ELSEWHERE CLASSIFIED: ICD-10-CM

## 2022-09-29 PROCEDURE — 91200 LIVER ELASTOGRAPHY: CPT

## 2022-09-29 PROCEDURE — 99215 OFFICE O/P EST HI 40 MIN: CPT

## 2022-09-29 RX ORDER — PROPRANOLOL HYDROCHLORIDE 10 MG/1
10 TABLET ORAL
Qty: 60 | Refills: 11 | Status: ACTIVE | COMMUNITY
Start: 2022-09-29

## 2022-09-29 NOTE — DISCHARGE NOTE PROVIDER - NSDCHC_MEDRECSTATUS_GEN_ALL_CORE
"Chief Complaint   Patient presents with    Headache     Reporting 10/10 pain in the occipital region.     BP (!) (P) 180/96   Pulse (!) (P) 50   Temp (P) 36.6 °C (97.9 °F) (Temporal)   Resp (P) 14   Ht (P) 1.651 m (5' 5\")   Wt (P) 92.8 kg (204 lb 9.4 oz)   LMP 10/13/2016 (Exact Date)   SpO2 (P) 98%   BMI (P) 34.05 kg/m²    Pt BIB [facility] for weakness and fatigue. Pt has hx of TIA 1 week ago, with left sided weakness which has persisted.   Pt reports 10/10 headache in the occipital region. ERICA.     " Admission Reconciliation is Completed  Discharge Reconciliation is Completed

## 2022-09-30 PROBLEM — R16.1 SPLENOMEGALY: Status: ACTIVE | Noted: 2017-05-25

## 2022-09-30 NOTE — HISTORY OF PRESENT ILLNESS
[___] : Performed [unfilled] [IV Drug Use] : no IV drug use [Tattoo] : no tattoos [Body Piercing] : no body piercing [Cocaine Use] : no cocaine use [de-identified] : Resumed torsemide 20 mg and spironolactone 50 alternate ay as was gaining wt. [de-identified] : Feels better. Gained wt and started diuretics but still 7 lb heavier. Fatigue better. Poor appetite. Leg swelling  better. Would like lymphedema pump. No chest pain and has PROCTOR.  Doing house work.  [de-identified] : Patient was referred for portal hypertension and possible cirrhosis but only aware here to see for liver issues. \par Patient was noted to have splenomegaly and pancytopenia  in 2016. She was evaluated by hematology. 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 Fibrosure showed high fibrosis and EGD showed small varices.She was seen by pulmonary and in 2021 and diagnosed with pulmonary HTN.\par \par Patient and  report that was well unto Feb 2020 when she was ambulant and self caring. She had dental work done and developed R sided hearing loss. Was evaluated by ENT and treated with prednisone. This lead to leg swelling and was admitted to [facility] in April and [facility] N in May and treated with diuretics. Patient was admitted with leg swelling and possible cellulitis in June and was evaluated by hepatology. Imaging was suggestive of cirrhosis.\par Patient is not doing well. She is fatigued and sleepy all the time. Reports RHONA. No orthopnea or PND. Still has leg swelling. No chest pain. Feels weak and difficulty with ambulation. Feels dizzy at times with diuretics. \par Reports hoarse voice. \par Had gained wt earlier with swelling but now losing weight. Wt has dropped from 160 lb to 122 lb over past few months. Patient was obese few years ago but lost weight with diet control. \par No Fhx of cirrhosis or liver cancer [de-identified] : HEPATOBILIARY: Nodular hepatic contour and perihepatic ascites, \par compatible with cirrhosis. Post cholecystectomy.\par \par SPLEEN: Splenomegaly measuring up to 16.8 cm in the craniocaudal \par dimension (series 601, image 31). Perisplenic fluid.\par  [de-identified] : Echo 1. Hyperdynamic global left ventricular systolic function.\par  2. LV Ejection Fraction by Jackson's Method with a biplane EF of 72 %.\par  3. Spectral Doppler shows impaired relaxation pattern of left \par ventricular myocardial filling (Grade I diastolic dysfunction).\par  4. Mild left ventricular hypertrophy.\par  5. Difficult to visualize right atrial chamber. Possible artifact seen \par in right atrium.\par  6. Peak transaortic gradient equals 25.4 mmHg, mean transaortic gradient \par equals 16.8 mmHg, the calculated aortic valve area equals 1.83 cm² by the \par continuity equation consistent with mild aortic stenosis. The \par Dimesionless Index value is 0.68.\par  7. Mild aortic regurgitation.\par  8. Mild mitral annular calcification.\par  9. Moderate thickening and calcification of the posterior mitral valve \par leaflet.\par 10. Trace mitral valve regurgitation.\par 11. Estimated pulmonary artery systolic pressure is 43.8 mmHg assuming a \par right atrial pressure of 3 mmHg, which is consistent with mild pulmonary \par hypertension.\par 12. There is no evidence of pericardial effusion.\par

## 2022-09-30 NOTE — ASSESSMENT
[FreeTextEntry1] : 81 year old hx of pancytopenia MGUS portal HTN PHTN chronic respiratory failure  seen post hospital discharge for possible cirrhosis\par \par Portal HTN possible cirrhosis\par PHTN chronic respiratory failure \par Has low plat count (70 K)and splenomegaly since 2016. \par AST ALT ratio reversal Fibrosure suggestive of cirrhosis in 2016 FIB 4 9.8 suggestive of F3-F4 fibrosis. \par Fibroscan shows F3 fibrosis and no steatosis \par EGD from prior notes reportedly small varices. \par Low albumin since 2022 April. Normal INR. CT abdomen in 2022 July shows nodular contour of liver.\par CLD work up  Hep B C negative. KEVIN+ Ig ASMA AMA negative \par US abdomen showed no liver mass and perisplenic ascites \par Echo in April 2022 showed mild PHTN with est PA pressure 43\par Patient likely has cirrhosis with due to RHF from pulm HTN\par NAFLD is possible. Reports was overweight before. \par She would need TJ liver biopsy with measurement of portal pressures . Patient is quite frail  and not keen for liver biopsy so will not pursue liver biopsy at this time. \par Advised cardiology follow up \par \par \par MELD 7  Child  B\par HE - none\par Ascites - Only small ascites. Mostly has leg edema. If ascites gets bigger will do fluid analysis. \par On torsemide 20 mg and spironolactone 50 on alternate days \par Creatinine was 2.3 and improved to 1.2 Will repeat renal function. \par  2 g Na restricted diet. \par HCC screening - US abdomen AFP every 6 months. US abdomen done in June. showed no mass. AFP normal\par Variceal screening - Has low platelet. Frail lady.  Defer EGD as already on propranolol. \par Vaccination status - Not  immune to hepatitis A and B and advised vaccination. Will do with PCP. \par Coagulopathy - none\par LT referral -  Frail 81 year old. Not a candidate.\par \par Cane ordered\par Advised to see vascular or lymphedema  clinic for lymphedema pump.\par \par Nutriton \par Low Na diet Protein 1.5 g/kg\par Early breakfast late night snack small multiple meals\par No NSAID \par Limit tylenol intake to 2 g per day\par Avoid raw foods especially shellfish\par CBC CMP INR on follow up\par \par Health maintenance\par Colonoscopy - beyond screening. \par Vaccination - pneumococcal and seasonal flu shot\par \par Follow up in 3  months\par

## 2022-10-14 LAB
ALBUMIN SERPL ELPH-MCNC: 3.6 G/DL
ALP BLD-CCNC: 89 U/L
ALT SERPL-CCNC: 13 U/L
ANION GAP SERPL CALC-SCNC: 12 MMOL/L
AST SERPL-CCNC: 30 U/L
BASOPHILS # BLD AUTO: 0.04 K/UL
BASOPHILS NFR BLD AUTO: 1.1 %
BILIRUB SERPL-MCNC: 4.9 MG/DL
BUN SERPL-MCNC: 31 MG/DL
CALCIUM SERPL-MCNC: 9.4 MG/DL
CHLORIDE SERPL-SCNC: 102 MMOL/L
CO2 SERPL-SCNC: 26 MMOL/L
CREAT SERPL-MCNC: 1.4 MG/DL
EGFR: 38 ML/MIN/1.73M2
EOSINOPHIL # BLD AUTO: 0.26 K/UL
EOSINOPHIL NFR BLD AUTO: 7.1 %
GLUCOSE SERPL-MCNC: 113 MG/DL
HCT VFR BLD CALC: 37.6 %
HGB BLD-MCNC: 11.7 G/DL
IMM GRANULOCYTES NFR BLD AUTO: 0.3 %
INR PPP: 1.27 RATIO
LYMPHOCYTES # BLD AUTO: 1.05 K/UL
LYMPHOCYTES NFR BLD AUTO: 28.6 %
MAN DIFF?: NORMAL
MCHC RBC-ENTMCNC: 30.2 PG
MCHC RBC-ENTMCNC: 31.1 G/DL
MCV RBC AUTO: 96.9 FL
MONOCYTES # BLD AUTO: 0.44 K/UL
MONOCYTES NFR BLD AUTO: 12 %
NEUTROPHILS # BLD AUTO: 1.87 K/UL
NEUTROPHILS NFR BLD AUTO: 50.9 %
PLATELET # BLD AUTO: 68 K/UL
POTASSIUM SERPL-SCNC: 3.8 MMOL/L
PROT SERPL-MCNC: 6.5 G/DL
PT BLD: 14.6 SEC
RBC # BLD: 3.88 M/UL
RBC # FLD: 16.6 %
SODIUM SERPL-SCNC: 140 MMOL/L
WBC # FLD AUTO: 3.67 K/UL

## 2022-10-25 NOTE — DISCHARGE NOTE NURSING/CASE MANAGEMENT/SOCIAL WORK - PATIENT PORTAL LINK FT
You can access the FollowMyHealth Patient Portal offered by [facility] by registering at the following website: [email or web address]. By joining [employer]’s FollowMyHealth portal, you will also be able to view your health information using other applications (apps) compatible with our system. Lot # (Optional): [id number] Lot # For Kenalog (Optional): [id number]

## 2022-12-29 ENCOUNTER — APPOINTMENT (OUTPATIENT)
Dept: GASTROENTEROLOGY | Facility: CLINIC | Age: 82
End: 2022-12-29
Payer: MEDICARE

## 2022-12-29 VITALS
SYSTOLIC BLOOD PRESSURE: 120 MMHG | DIASTOLIC BLOOD PRESSURE: 70 MMHG | HEIGHT: 61 IN | BODY MASS INDEX: 23.79 KG/M2 | OXYGEN SATURATION: 98 % | HEART RATE: 70 BPM | WEIGHT: 126 LBS

## 2022-12-29 DIAGNOSIS — Z86.2 PERSONAL HISTORY OF DISEASES OF THE BLOOD AND BLOOD-FORMING ORGANS AND CERTAIN DISORDERS INVOLVING THE IMMUNE MECHANISM: ICD-10-CM

## 2022-12-29 DIAGNOSIS — R41.0 DISORIENTATION, UNSPECIFIED: ICD-10-CM

## 2022-12-29 DIAGNOSIS — I27.20 PULMONARY HYPERTENSION, UNSPECIFIED: ICD-10-CM

## 2022-12-29 PROCEDURE — 99215 OFFICE O/P EST HI 40 MIN: CPT

## 2022-12-29 RX ORDER — LACTULOSE 10 G/15ML
20 SOLUTION ORAL
Qty: 3000 | Refills: 11 | Status: ACTIVE | COMMUNITY
Start: 2022-12-29 | End: 1900-01-01

## 2022-12-30 PROBLEM — I27.20 PULMONARY HYPERTENSION: Status: ACTIVE | Noted: 2021-04-20

## 2022-12-30 PROBLEM — Z86.2 HISTORY OF THROMBOCYTOPENIA: Status: RESOLVED | Noted: 2022-12-30 | Resolved: 2022-12-30

## 2022-12-30 LAB
ALBUMIN SERPL ELPH-MCNC: 3.6 G/DL
ALP BLD-CCNC: 77 U/L
ALT SERPL-CCNC: 13 U/L
ANION GAP SERPL CALC-SCNC: 14 MMOL/L
AST SERPL-CCNC: 30 U/L
BASOPHILS # BLD AUTO: 0.03 K/UL
BASOPHILS NFR BLD AUTO: 0.8 %
BILIRUB SERPL-MCNC: 5.4 MG/DL
BUN SERPL-MCNC: 28 MG/DL
CALCIUM SERPL-MCNC: 10 MG/DL
CHLORIDE SERPL-SCNC: 102 MMOL/L
CO2 SERPL-SCNC: 26 MMOL/L
CREAT SERPL-MCNC: 1.4 MG/DL
EGFR: 37 ML/MIN/1.73M2
EOSINOPHIL # BLD AUTO: 0.33 K/UL
EOSINOPHIL NFR BLD AUTO: 9 %
GLUCOSE SERPL-MCNC: 101 MG/DL
HCT VFR BLD CALC: 38.9 %
HGB BLD-MCNC: 12.3 G/DL
IMM GRANULOCYTES NFR BLD AUTO: 0 %
INR PPP: 1.27 RATIO
LYMPHOCYTES # BLD AUTO: 1.08 K/UL
LYMPHOCYTES NFR BLD AUTO: 29.6 %
MAN DIFF?: NORMAL
MCHC RBC-ENTMCNC: 29.1 PG
MCHC RBC-ENTMCNC: 31.6 G/DL
MCV RBC AUTO: 92.2 FL
MONOCYTES # BLD AUTO: 0.49 K/UL
MONOCYTES NFR BLD AUTO: 13.4 %
NEUTROPHILS # BLD AUTO: 1.72 K/UL
NEUTROPHILS NFR BLD AUTO: 47.2 %
PLATELET # BLD AUTO: 66 K/UL
POTASSIUM SERPL-SCNC: 3.9 MMOL/L
PROT SERPL-MCNC: 6.6 G/DL
PT BLD: 14.6 SEC
RBC # BLD: 4.22 M/UL
RBC # FLD: 15.9 %
SODIUM SERPL-SCNC: 142 MMOL/L
WBC # FLD AUTO: 3.65 K/UL

## 2023-01-26 ENCOUNTER — LABORATORY RESULT (OUTPATIENT)
Age: 83
End: 2023-01-26

## 2023-01-26 ENCOUNTER — APPOINTMENT (OUTPATIENT)
Dept: GASTROENTEROLOGY | Facility: CLINIC | Age: 83
End: 2023-01-26

## 2023-01-27 LAB
AFP-TM SERPL-MCNC: 2.9 NG/ML
ALBUMIN SERPL ELPH-MCNC: 4 G/DL
ALP BLD-CCNC: 86 U/L
ALT SERPL-CCNC: 14 U/L
ANION GAP SERPL CALC-SCNC: 10 MMOL/L
AST SERPL-CCNC: 30 U/L
BASOPHILS # BLD AUTO: 0.05 K/UL
BASOPHILS NFR BLD AUTO: 1.3 %
BILIRUB INDIRECT SERPL-MCNC: 4.6 MG/DL
BILIRUB SERPL-MCNC: 5.2 MG/DL
BUN SERPL-MCNC: 26 MG/DL
CALCIUM SERPL-MCNC: 10.1 MG/DL
CHLORIDE SERPL-SCNC: 104 MMOL/L
CO2 SERPL-SCNC: 27 MMOL/L
CREAT SERPL-MCNC: 1.3 MG/DL
EGFR: 41 ML/MIN/1.73M2
EOSINOPHIL # BLD AUTO: 0.36 K/UL
EOSINOPHIL NFR BLD AUTO: 9.1 %
GLUCOSE SERPL-MCNC: 109 MG/DL
HCT VFR BLD CALC: 42.3 %
HGB BLD-MCNC: 13.3 G/DL
IMM GRANULOCYTES NFR BLD AUTO: 0 %
INR PPP: 1.28 RATIO
LYMPHOCYTES # BLD AUTO: 1.16 K/UL
LYMPHOCYTES NFR BLD AUTO: 29.2 %
MAGNESIUM SERPL-MCNC: 2 MG/DL
MAN DIFF?: NORMAL
MCHC RBC-ENTMCNC: 30.1 PG
MCHC RBC-ENTMCNC: 31.4 G/DL
MCV RBC AUTO: 95.7 FL
MONOCYTES # BLD AUTO: 0.45 K/UL
MONOCYTES NFR BLD AUTO: 11.3 %
NEUTROPHILS # BLD AUTO: 1.95 K/UL
NEUTROPHILS NFR BLD AUTO: 49.1 %
PLATELET # BLD AUTO: 85 K/UL
POTASSIUM SERPL-SCNC: 4.1 MMOL/L
PROT SERPL-MCNC: 7.1 G/DL
PT BLD: 14.7 SEC
RBC # BLD: 4.42 M/UL
RBC # FLD: 16.6 %
SODIUM SERPL-SCNC: 141 MMOL/L
WBC # FLD AUTO: 3.97 K/UL

## 2023-01-30 LAB
HAPTOGLOB SERPL-MCNC: <20 MG/DL
LDH SERPL-CCNC: 293
RBC # BLD: 4.49 M/UL
RETICS # AUTO: 2.3 %
RETICS AGGREG/RBC NFR: 102.4 K/UL

## 2023-02-02 ENCOUNTER — APPOINTMENT (OUTPATIENT)
Dept: GASTROENTEROLOGY | Facility: CLINIC | Age: 83
End: 2023-02-02
Payer: MEDICARE

## 2023-02-02 DIAGNOSIS — R60.0 LOCALIZED EDEMA: ICD-10-CM

## 2023-02-02 DIAGNOSIS — I73.9 PERIPHERAL VASCULAR DISEASE, UNSPECIFIED: ICD-10-CM

## 2023-02-02 PROCEDURE — 99443: CPT | Mod: 95

## 2023-02-03 PROBLEM — I73.9 PAD (PERIPHERAL ARTERY DISEASE): Status: ACTIVE | Noted: 2022-07-21

## 2023-02-03 PROBLEM — R60.0 BILATERAL EDEMA OF LOWER EXTREMITY: Status: ACTIVE | Noted: 2022-06-18

## 2023-02-03 NOTE — ASSESSMENT
[FreeTextEntry1] : 82 year old hx of pancytopenia, MGUS, mild AS AR PHTN chronic respiratory failure  portal HTN possible cirrhosis seen in follow up\par \par Indirect hyperbilirubinemia \par Has bilirubin in 2- 4 range usually. Increased to 5\par Was indirect in the past. Chronic LDH elevation. Haptoglobin normal in 2016\par LDH mild elevation with low haptoglobin elevated indirect bilirubin and elevated retic count. W\par Repeat US abdomen showing cirrhosis. LS 12 kPA\par D/w Dr Erickson feels Gilbert's advised check Ever test.\par Will repeat labs with [facility] and check [id number] gene \par \par Portal HTN - Non cirrhotic PHTN vs  cirrhosis\par Mild AS Mild AR  chronic respiratory failure Pulmonary HTN\par \par Has low plat count (70 K)and splenomegaly since 2016. \par AST ALT ratio reversal Low albumin since 2022 April. Normal INR. \par CLD work up  Hep B C negative. KEVIN+ Ig ASMA AMA negative \par Fibrosure suggestive of cirrhosis in 2016 FIB 4 9.8 suggestive of F3-F4 fibrosis. \par Fibroscan shows F3 fibrosis and no steatosis \par EGD from prior notes reportedly small varices. \par US abdomen showed no liver mass and perisplenic ascites\par CT abdomen in 2022 July shows nodular contour of liver.\par  Echo in April 2022 showed mild AS mild AR mild PHTN with est PA pressure 43\par May have cirrhosis with due to RHF from pulm HTN but very mild findings and now off oxygen. \par NAFLD is possible as reports was overweight before. \par NCPHTN is possible as well. \par Would need TJ liver biopsy with measurement of portal pressures . Patient is quite frail  and not keen for liver biopsy so will not pursue liver biopsy at this time. \par Advised cardiology / pulmonary follow up \par \par MELD 7  Child  B\par HE - reports memory impairment. Advised trial lactulose prior. \par Ascites - none. leg edema resolved. On torsemide 20 mg daily and spironolactone 50 on alternate days. Will change torsemide as well to alternate days. If wt gain can go back to daily torsemide. \par Creatinine stable.  2 g Na restricted diet. \par HCC screening - US abdomen AFP every 6 months. US abdomen done in Jan. showed no mass. AFP normal\par Upset about US abdomen being painful and although not ordered a fibroscan was done twice. \par  Variceal screening - Has low platelet. Frail lady.  Defer EGD as already on propranolol. \par Vaccination status - Not  immune to hepatitis A and B and advised vaccination. Will do with PCP. \par Coagulopathy - none\par LT referral -  Frail 81 year old. Not a candidate.\par \par \par Nutriton \par Low Na diet Protein 1.5 g/kg\par Early breakfast late night snack small multiple meals\par No NSAID \par Limit tylenol intake to 2 g per day\par Avoid raw foods especially shellfish\par CBC CMP INR on follow up\par \par Pancraeatic cyst\par 6 cm pancreatic cyst. With advanced age fraility small size of cyst would not make a difference\par Health maintenance\par Colonoscopy - beyond screening. \par Vaccination - pneumococcal and seasonal flu shot\par \par Follow up in 3 months\par

## 2023-02-03 NOTE — REVIEW OF SYSTEMS
[Confused] : confusion [Negative] : Genitourinary [Fever] : no fever [Chills] : no chills [Eye Pain] : no eye pain [Red Eyes] : eyes not red [Earache] : no earache [Loss Of Hearing] : no hearing loss [Chest Pain] : no chest pain [Palpitations] : no palpitations [Cough] : no cough [SOB on Exertion] : no shortness of breath during exertion [Abdominal Pain] : no abdominal pain [Vomiting] : no vomiting [Diarrhea] : no diarrhea [Joint Swelling] : no joint swelling [Skin Lesions] : no skin lesions [Convulsions] : no convulsions [Easy Bleeding] : no tendency for easy bleeding [Easy Bruising] : no tendency for easy bruising

## 2023-02-03 NOTE — ASSESSMENT
[FreeTextEntry1] : 82 year old hx of pancytopenia, MGUS, mild AS AR PHTN chronic respiratory failure  portal HTN possible cirrhosis seen in follow up\par \par Portal HTN - Non cirrhotic PHTN vs  cirrhosis\par Mild AS Mild AR  chronic respiratory failure Pulmonary HTN\par \par Has low plat count (70 K)and splenomegaly since 2016. \par AST ALT ratio reversal Low albumin since 2022 April. Normal INR. \par CLD work up  Hep B C negative. KEVIN+ Ig ASMA AMA negative \par Fibrosure suggestive of cirrhosis in 2016 FIB 4 9.8 suggestive of F3-F4 fibrosis. \par Fibroscan shows F3 fibrosis and no steatosis \par EGD from prior notes reportedly small varices. \par US abdomen showed no liver mass and perisplenic ascites\par CT abdomen in 2022 July shows nodular contour of liver.\par  Echo in April 2022 showed mild AS mild AR mild PHTN with est PA pressure 43\par May have cirrhosis with due to RHF from pulm HTN but very mild findings and now off oxygen. \par NAFLD is possible as reports was overweight before. \par NCPHTN is possible as well. \par Would need TJ liver biopsy with measurement of portal pressures . Patient is quite frail  and not keen for liver biopsy so will not pursue liver biopsy at this time. \par Advised cardiology / pulmonary follow up \par \par Hyperbilirubinemia \par Has bilirubin in 2- 4 range usually. Increased to 5\par Was indirect in the past. Chronic LDH elevation. Haptoglobin normal in 2016\par  Check fraction,. Will get US abdomen. Defer MRI/ MRCP for now\par \par MELD 7  Child  B\par HE - reports memory impairment. Will trial lactulose to see if better\par Ascites - none. leg edema resolved. On torsemide 20 mg daily and spironolactone 50 on alternate days. Will change torsemide as well to alternate days. If wt gain can go back to daily torsemide. \par Creatinine stable.  2 g Na restricted diet. \par HCC screening - US abdomen AFP every 6 months. US abdomen done in August. showed no mass. AFP normal\par Will repeat. \par Variceal screening - Has low platelet. Frail lady.  Defer EGD as already on propranolol. \par Vaccination status - Not  immune to hepatitis A and B and advised vaccination. Will do with PCP. \par Coagulopathy - none\par LT referral -  Frail 81 year old. Not a candidate.\par \par \par Nutriton \par Low Na diet Protein 1.5 g/kg\par Early breakfast late night snack small multiple meals\par No NSAID \par Limit tylenol intake to 2 g per day\par Avoid raw foods especially shellfish\par CBC CMP INR on follow up\par \par Health maintenance\par Colonoscopy - beyond screening. \par Vaccination - pneumococcal and seasonal flu shot\par \par Follow up in 1  month\par

## 2023-02-03 NOTE — REVIEW OF SYSTEMS
[Arthralgias] : arthralgias [Negative] : Endocrine [Fever] : no fever [Chills] : no chills [Eye Pain] : no eye pain [Red Eyes] : eyes not red [Earache] : no earache [Loss Of Hearing] : no hearing loss [Chest Pain] : no chest pain [Palpitations] : no palpitations [Cough] : no cough [Confused] : no confusion [SOB on Exertion] : no shortness of breath during exertion [Convulsions] : no convulsions [Easy Bleeding] : no tendency for easy bleeding [Easy Bruising] : no tendency for easy bruising

## 2023-02-03 NOTE — PHYSICAL EXAM
[Scleral Icterus] : Scleral Icterus noted [Splenomegaly] : splenomegaly [Palmar Erythema] : Palmar Erythema [General Appearance - Alert] : alert [Sclera] : the sclera and conjunctiva were normal [Outer Ear] : the ears and nose were normal in appearance [Neck Cervical Mass (___cm)] : no neck mass was observed [Jugular Venous Distention Increased] : there was no jugular-venous distention [Thyroid Diffuse Enlargement] : the thyroid was not enlarged [Respiration, Rhythm And Depth] : normal respiratory rhythm and effort [Auscultation Breath Sounds / Voice Sounds] : lungs were clear to auscultation bilaterally [Heart Sounds] : normal S1 and S2 [Edema] : there was no peripheral edema [Abdomen Soft] : soft [Abdomen Tenderness] : non-tender [] : no rash [No Focal Deficits] : no focal deficits [Oriented To Time, Place, And Person] : oriented to person, place, and time [Spider Angioma] : No spider angioma(s) were observed [Abdominal  Ascites] : no ascites [Liver Palpable ___ Finger Breadths Below Costal Margin] : was not palpable below costal margin [Asterixis] : no asterixis observed [Jaundice] : No jaundice [FreeTextEntry1] : kyphotic, clubbing +  heberden and gwen nodes+

## 2023-02-03 NOTE — HISTORY OF PRESENT ILLNESS
[Home] : at home, [unfilled] , at the time of the visit. [Medical Office: ([facility])___] : at the medical office located in  [Verbal consent obtained from patient] : the patient, [unfilled] [___] : Performed [unfilled] [FreeTextEntry4] : Kiersten [IV Drug Use] : no IV drug use [Tattoo] : no tattoos [Body Piercing] : no body piercing [Cocaine Use] : no cocaine use [de-identified] : Patient was referred for portal hypertension and possible cirrhosis\par \par Patient was noted to have splenomegaly and pancytopenia  in 2016. 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 Fibrosure showed advanced fibrosis and EGD showed small varices.She was seen by pulmonary in 2021 and diagnosed with pulmonary HTN. Patient has been on LTOT. \par \par Patient and  report that was well unto Feb 2020 when she was ambulant and self caring. She had dental work done and developed R sided hearing loss. Was evaluated by ENT and treated with prednisone. This lead to leg swelling and was admitted to [facility] in April and [facility] in May and treated with diuretics. Patient was admitted with leg swelling and possible cellulitis in June. Imaging studies suggestive of cirrhosis.\par \par Patient has no complaints. No leg swelling. No wt gain. \par Denies prior jaundice.  No jaundice abdominal distension pain hematemesis or melena. Reports mild memory problems and taking memory supplement.\par Had resumed torsemide 20 mg daily and spironolactone 50 alternate ay as was gaining wt prior to last visit. Using stocking as well.  No abdominal distension.\par She still feels fatigued and sleepy all the time.  pushes to exercise but feels tired. Does a lot of house work which is tiring. \par No dyspnea or orthopnea or PND. No chest pain. Feels weak and difficulty with ambulation. Feels dizzy at times with diuretics.  Has not been using oxygen as no dyspnea and oxygen sats above 90 %\par Reports hoarse voice. \par Wt stable.  Wt has dropped from 160 lb to 122 lb over past few months. Patient was obese few years ago but lost weight with diet control. \par No Fhx of cirrhosis or liver cancer\par Upset about having done 2 US 2 weeks apart. \par Seen by [facility] cardiology. Was seen by pulmonary but has not had follow up yet.  [de-identified] : HEPATOBILIARY: Nodular hepatic contour and perihepatic ascites, \par compatible with cirrhosis. Post cholecystectomy.\par \par SPLEEN: Splenomegaly measuring up to 16.8 cm in the craniocaudal \par dimension (series 601, image 31). Perisplenic fluid.\par  [de-identified] : Echo 1. Hyperdynamic global left ventricular systolic function.\par  2. LV Ejection Fraction by Jackson's Method with a biplane EF of 72 %.\par  3. Spectral Doppler shows impaired relaxation pattern of left \par ventricular myocardial filling (Grade I diastolic dysfunction).\par  4. Mild left ventricular hypertrophy.\par  5. Difficult to visualize right atrial chamber. Possible artifact seen \par in right atrium.\par  6. Peak transaortic gradient equals 25.4 mmHg, mean transaortic gradient \par equals 16.8 mmHg, the calculated aortic valve area equals 1.83 cm² by the \par continuity equation consistent with mild aortic stenosis. The \par Dimesionless Index value is 0.68.\par  7. Mild aortic regurgitation.\par  8. Mild mitral annular calcification.\par  9. Moderate thickening and calcification of the posterior mitral valve \par leaflet.\par 10. Trace mitral valve regurgitation.\par 11. Estimated pulmonary artery systolic pressure is 43.8 mmHg assuming a \par right atrial pressure of 3 mmHg, which is consistent with mild pulmonary \par hypertension.\par 12. There is no evidence of pericardial effusion.\par

## 2023-02-03 NOTE — HISTORY OF PRESENT ILLNESS
[___] : Performed [unfilled] [IV Drug Use] : no IV drug use [Tattoo] : no tattoos [Body Piercing] : no body piercing [Cocaine Use] : no cocaine use [de-identified] : Feels better. Gained wt and started diuretics but still 7 lb heavier. Fatigue better. Poor appetite. Leg swelling  better. Would like lymphedema pump. No chest pain and has PROCTOR.  Doing house work.  [de-identified] : Patient was referred for portal hypertension and possible cirrhosis but only aware here to see for liver issues. \par Patient was noted to have splenomegaly and pancytopenia  in 2016. 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 Fibrosure showed advanced fibrosis and EGD showed small varices.She was seen by pulmonary and in 2021 and diagnosed with pulmonary HTN. Patient has been on LTOT. \par \par Patient and  report that was well unto Feb 2020 when she was ambulant and self caring. She had dental work done and developed R sided hearing loss. Was evaluated by ENT and treated with prednisone. This lead to leg swelling and was admitted to [facility] in April and [facility] in May and treated with diuretics. Patient was admitted with leg swelling and possible cellulitis in June and was evaluated by hepatology. Imaging studies suggestive of cirrhosis.\par \par Patient doing better now. No jaundice abdominal distension pain hematemesis or melena. \par Reports mild memory problems and taking memory supplement.  Leg edema has resolved. \par Had resumed torsemide 20 mg daily and spironolactone 50 alternate ay as was gaining wt prior to last visit. Using stocking as well.  No abdominal distension.\par She still feels fatigued and sleepy all the time.  pushes to exercise but feels tired. Does a lot of house work which is tiring. \par No dyspnea or orthopnea or PND. No chest pain. Feels weak and difficulty with ambulation. Feels dizzy at times with diuretics.  Has not been using oxygen as no dyspnea and oxygen sats above 90 %\par Reports hoarse voice. \par Wt stable.  Wt has dropped from 160 lb to 122 lb over past few months. Patient was obese few years ago but lost weight with diet control. \par No Fhx of cirrhosis or liver cancer\par \par Seen by [facility] cardiology. Was seen by pulmonary but has not had follow up yet.  [de-identified] : HEPATOBILIARY: Nodular hepatic contour and perihepatic ascites, \par compatible with cirrhosis. Post cholecystectomy.\par \par SPLEEN: Splenomegaly measuring up to 16.8 cm in the craniocaudal \par dimension (series 601, image 31). Perisplenic fluid.\par  [de-identified] : Echo 1. Hyperdynamic global left ventricular systolic function.\par  2. LV Ejection Fraction by Jackson's Method with a biplane EF of 72 %.\par  3. Spectral Doppler shows impaired relaxation pattern of left \par ventricular myocardial filling (Grade I diastolic dysfunction).\par  4. Mild left ventricular hypertrophy.\par  5. Difficult to visualize right atrial chamber. Possible artifact seen \par in right atrium.\par  6. Peak transaortic gradient equals 25.4 mmHg, mean transaortic gradient \par equals 16.8 mmHg, the calculated aortic valve area equals 1.83 cm² by the \par continuity equation consistent with mild aortic stenosis. The \par Dimesionless Index value is 0.68.\par  7. Mild aortic regurgitation.\par  8. Mild mitral annular calcification.\par  9. Moderate thickening and calcification of the posterior mitral valve \par leaflet.\par 10. Trace mitral valve regurgitation.\par 11. Estimated pulmonary artery systolic pressure is 43.8 mmHg assuming a \par right atrial pressure of 3 mmHg, which is consistent with mild pulmonary \par hypertension.\par 12. There is no evidence of pericardial effusion.\par

## 2023-03-28 ENCOUNTER — RX RENEWAL (OUTPATIENT)
Age: 83
End: 2023-03-28

## 2023-04-24 NOTE — H&P PST ADULT - PRO PAIN EXPRESSION
Noted, will follow up with patient next week.  
Patient called with concerns about her drain.  She states she hasn't had any output over the weekend, and when she flushes the drain it doesn't go through the tubing.    Drain amylase 4/21: 192  WBC 4/21: 9.9    Plan for removal of drain.  
Patient relayed that she has been scheduled for her drain removal with IR.  
Spoke with pt. She is doing really well. Denies any pain, lightheadedness, N/V. Tolerating diet. Discussed recent lab/amylase level. Will plan for removal. IR aware and will reach out to pt to coordinate.   
none

## 2023-04-27 ENCOUNTER — RX RENEWAL (OUTPATIENT)
Age: 83
End: 2023-04-27

## 2023-04-28 ENCOUNTER — RX RENEWAL (OUTPATIENT)
Age: 83
End: 2023-04-28

## 2023-04-29 NOTE — ED ADULT NURSE NOTE - NSIMPLEMENTINTERV_GEN_ALL_ED
DISPLAY PLAN FREE TEXT
Implemented All Fall with Harm Risk Interventions:  [place] to call system. Call bell, personal items and telephone within reach. Instruct patient to call for assistance. Room bathroom lighting operational. Non-slip footwear when patient is off stretcher. Physically safe environment: no spills, clutter or unnecessary equipment. Stretcher in lowest position, wheels locked, appropriate side rails in place. Provide visual cue, wrist band, yellow gown, etc. Monitor gait and stability. Monitor for mental status changes and reorient to person, place, and time. Review medications for side effects contributing to fall risk. Reinforce activity limits and safety measures with patient and family. Provide visual clues: red socks.

## 2023-05-05 RX ORDER — TORSEMIDE 20 MG/1
20 TABLET ORAL DAILY
Qty: 90 | Refills: 0 | Status: ACTIVE | COMMUNITY
Start: 2022-09-29 | End: 1900-01-01

## 2023-05-11 ENCOUNTER — APPOINTMENT (OUTPATIENT)
Dept: GASTROENTEROLOGY | Facility: CLINIC | Age: 83
End: 2023-05-11
Payer: MEDICARE

## 2023-05-11 VITALS
DIASTOLIC BLOOD PRESSURE: 60 MMHG | WEIGHT: 129 LBS | BODY MASS INDEX: 24.35 KG/M2 | HEIGHT: 61 IN | OXYGEN SATURATION: 98 % | SYSTOLIC BLOOD PRESSURE: 104 MMHG | HEART RATE: 87 BPM

## 2023-05-11 DIAGNOSIS — E80.6 OTHER DISORDERS OF BILIRUBIN METABOLISM: ICD-10-CM

## 2023-05-11 DIAGNOSIS — R18.8 UNSPECIFIED CIRRHOSIS OF LIVER: ICD-10-CM

## 2023-05-11 DIAGNOSIS — K76.6 PORTAL HYPERTENSION: ICD-10-CM

## 2023-05-11 DIAGNOSIS — K74.60 UNSPECIFIED CIRRHOSIS OF LIVER: ICD-10-CM

## 2023-05-11 PROCEDURE — 99215 OFFICE O/P EST HI 40 MIN: CPT

## 2023-05-11 RX ORDER — SPIRONOLACTONE 50 MG/1
50 TABLET ORAL
Qty: 1 | Refills: 0 | Status: DISCONTINUED | OUTPATIENT
Start: 2022-09-29 | End: 2023-05-11

## 2023-05-14 PROBLEM — K74.60 CIRRHOSIS OF LIVER WITH ASCITES, UNSPECIFIED HEPATIC CIRRHOSIS TYPE: Status: ACTIVE | Noted: 2022-09-29

## 2023-05-14 PROBLEM — K76.6 PORTAL HYPERTENSION: Status: ACTIVE | Noted: 2022-08-05

## 2023-05-14 PROBLEM — E80.6 HYPERBILIRUBINEMIA: Status: ACTIVE | Noted: 2022-12-29

## 2023-05-14 NOTE — ASSESSMENT
[FreeTextEntry1] : 82 year old  F with  hx of pancytopenia, splenomegaly MGUS, mild AS AR PHTN,  portal HTN possible cirrhosis seen in follow up\par \par Indirect hyperbilirubinemia \par Has bilirubin in 2- 4 range usually. Increased to 5 and still at 5.\par  Chronic LDH elevation. Haptoglobin normal in 2016\par Indirect hyperbilirubinemia LDH mild elevation with low haptoglobin and elevated retic count. W\par [place] is unlikely with bilirubin above 4 \par D/w Dr Erickson feels Gilbert's advised check Ever test.\par Ordered repeat labs with Ever test and UGT1A1 gene acitivity but did not do it yet.\par \par Portal HTN - Non cirrhotic PHTN vs  cirrhosis\par Mild AS Mild AR  chronic respiratory failure Pulmonary HTN\par \par Has low plat count (70 K)and splenomegaly since 2016. \par AST ALT ratio reversal Low albumin since 2022 April. Normal INR. \par CLD work up  Hep B C negative. KEVIN+ Ig ASMA AMA negative \par Fibrosure suggestive of cirrhosis in 2016 FIB 4 9.8 suggestive of F3-F4 fibrosis. \par Fibroscan shows F3 fibrosis and no steatosis \par EGD from prior notes reportedly small varices. \par US abdomen showed no liver mass and perisplenic ascites\par CT abdomen in 2022 July shows nodular contour of liver.\par  Echo in April 2022 showed mild AS mild AR mild PHTN with est PA pressure 43\par May have cirrhosis with due to RHF from pulm HTN but very mild findings and now off oxygen. \par NAFLD is possible as reports was overweight before. \par NCPHTN is possible as well. \par Would need TJ liver biopsy with measurement of portal pressures . Patient is quite frail  and not keen for liver biopsy so will not pursue liver biopsy at this time. \par Cardiology follow up \par \par MELD 7  Child  B\par HE - reports memory impairment. Advised trial lactulose and target 2 BM per day\par Ascites - none. leg edema resolved. Being managed by cardiology On torsemide 20 mg daily. Has stopped spironolactone. 2 g Na restricted diet. \par HCC screening -. US abdomen done in Jan 2023 showed no mass. AFP normal\par Discussed with patient on continuing  and did not want to continue HCC screening. \par Variceal screening - Has low platelet. Frail lady.  Defer EGD as already on propranolol. \par Vaccination status - Not  immune to hepatitis A and B and advised vaccination. Will do with PCP. \par Coagulopathy - none\par LT referral -  Frail 82 year old. Not a candidate.\par \par Nutriton \par Low Na diet Protein 1.5 g/kg\par Early breakfast late night snack small multiple meals\par No NSAID \par Limit tylenol intake to 2 g per day\par Avoid raw foods especially shellfish\par \par \par Health maintenance\par Colonoscopy - beyond screening. \par Vaccination - pneumococcal and seasonal flu shot\par \par Follow up in 6 months\par

## 2023-05-14 NOTE — HISTORY OF PRESENT ILLNESS
[___] : Performed [unfilled] [IV Drug Use] : no IV drug use [Tattoo] : no tattoos [Body Piercing] : no body piercing [Cocaine Use] : no cocaine use [de-identified] : 82 y o  F with portal hypertension and possible cirrhosis seen in follow up.\par \par Patient was noted to have splenomegaly and pancytopenia  in 2016. 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 Fibrosure showed advanced fibrosis and EGD showed small varices. She was seen by pulmonary in 2021 and diagnosed with pulmonary HTN. Patient was on oxygen but now has come off it. \par \par Patient and  report that was well unto Feb 2020 when she was ambulant and self caring. She had dental work done and developed R sided hearing loss. Was evaluated by ENT and treated with prednisone. This lead to leg swelling and was admitted to [facility] in April and [facility] in May and treated with diuretics. Patient was admitted with leg swelling and possible cellulitis in June 2022 and imaging studies suggestive of cirrhosis.\par \par Patient has no complaints. She is doing well. \par Taking furosemide 20 mg but stopped spironolactone. Has minimal leg swelling. No wt gain. \par Denies prior jaundice.  Not noticed jaundice change in urine or stool. \par No abdominal distension pain hematemesis or melena. Reports mild memory problems and taking memory supplement. Eating much better. \par Fatigue and sleepiness much better. Has been taking OTC Provigil. \par Does a lot of house work which is tiring. \par No dyspnea or orthopnea or PND. No chest pain. \par Wt has dropped from 160 lb to 122 lb. Wt stable. Patient was obese few years ago but lost weight with diet control. \par No fhx of cirrhosis or liver cancer. \par Seen by [facility] cardiology. Was seen by pulmonary but has not had follow up yet.  [de-identified] : HEPATOBILIARY: Nodular hepatic contour and perihepatic ascites, \par compatible with cirrhosis. Post cholecystectomy.\par \par SPLEEN: Splenomegaly measuring up to 16.8 cm in the craniocaudal \par dimension (series 601, image 31). Perisplenic fluid.\par  [de-identified] : Echo 1. Hyperdynamic global left ventricular systolic function.\par  2. LV Ejection Fraction by Jackson's Method with a biplane EF of 72 %.\par  3. Spectral Doppler shows impaired relaxation pattern of left \par ventricular myocardial filling (Grade I diastolic dysfunction).\par  4. Mild left ventricular hypertrophy.\par  5. Difficult to visualize right atrial chamber. Possible artifact seen \par in right atrium.\par  6. Peak transaortic gradient equals 25.4 mmHg, mean transaortic gradient \par equals 16.8 mmHg, the calculated aortic valve area equals 1.83 cm² by the \par continuity equation consistent with mild aortic stenosis. The \par Dimesionless Index value is 0.68.\par  7. Mild aortic regurgitation.\par  8. Mild mitral annular calcification.\par  9. Moderate thickening and calcification of the posterior mitral valve \par leaflet.\par 10. Trace mitral valve regurgitation.\par 11. Estimated pulmonary artery systolic pressure is 43.8 mmHg assuming a \par right atrial pressure of 3 mmHg, which is consistent with mild pulmonary \par hypertension.\par 12. There is no evidence of pericardial effusion.\par

## 2023-05-14 NOTE — REVIEW OF SYSTEMS
[Negative] : Endocrine [Fever] : no fever [Chills] : no chills [Eye Pain] : no eye pain [Red Eyes] : eyes not red [Earache] : no earache [Chest Pain] : no chest pain [Palpitations] : no palpitations [Cough] : no cough [SOB on Exertion] : no shortness of breath during exertion [Orthopnea] : no orthopnea [PND] : no PND [Joint Swelling] : no joint swelling [Joint Stiffness] : no joint stiffness [Skin Lesions] : no skin lesions [Easy Bleeding] : no tendency for easy bleeding [Easy Bruising] : no tendency for easy bruising

## 2023-05-14 NOTE — PHYSICAL EXAM
[Scleral Icterus] : Scleral Icterus noted [Splenomegaly] : splenomegaly [Liver Size (___ Cm)] : Liver size [unfilled] cm [Palmar Erythema] : Palmar Erythema [General Appearance - Alert] : alert [Sclera] : the sclera and conjunctiva were normal [Outer Ear] : the ears and nose were normal in appearance [Neck Cervical Mass (___cm)] : no neck mass was observed [Jugular Venous Distention Increased] : there was no jugular-venous distention [Thyroid Diffuse Enlargement] : the thyroid was not enlarged [Respiration, Rhythm And Depth] : normal respiratory rhythm and effort [Auscultation Breath Sounds / Voice Sounds] : lungs were clear to auscultation bilaterally [Heart Sounds] : normal S1 and S2 [Abdomen Soft] : soft [Edema] : there was no peripheral edema [Abdomen Tenderness] : non-tender [] : no rash [No Focal Deficits] : no focal deficits [Oriented To Time, Place, And Person] : oriented to person, place, and time [Abdominal  Ascites] : no ascites [Spider Angioma] : No spider angioma(s) were observed [Liver Palpable ___ Finger Breadths Below Costal Margin] : was not palpable below costal margin [Asterixis] : no asterixis observed [Jaundice] : No jaundice [FreeTextEntry1] : kyphotic, clubbing +  heberden and gwen nodes+

## 2023-07-26 ENCOUNTER — RX RENEWAL (OUTPATIENT)
Age: 83
End: 2023-07-26

## 2023-07-30 ENCOUNTER — RX RENEWAL (OUTPATIENT)
Age: 83
End: 2023-07-30

## 2023-09-26 ENCOUNTER — LABORATORY RESULT (OUTPATIENT)
Age: 83
End: 2023-09-26

## 2023-09-26 LAB
INR PPP: 1.37 RATIO
PT BLD: 15.8 SEC

## 2023-09-27 LAB
ALBUMIN SERPL ELPH-MCNC: 3.5 G/DL
ALP BLD-CCNC: 86 U/L
ALT SERPL-CCNC: 18 U/L
ANION GAP SERPL CALC-SCNC: 11 MMOL/L
AST SERPL-CCNC: 33 U/L
BILIRUB INDIRECT SERPL-MCNC: 5.3 MG/DL
BILIRUB SERPL-MCNC: 5.9 MG/DL
BUN SERPL-MCNC: 22 MG/DL
CALCIUM SERPL-MCNC: 9.2 MG/DL
CHLORIDE SERPL-SCNC: 104 MMOL/L
CO2 SERPL-SCNC: 26 MMOL/L
CREAT SERPL-MCNC: 1.1 MG/DL
DIRECT COOMBS: NORMAL
EGFR: 50 ML/MIN/1.73M2
GLUCOSE SERPL-MCNC: 92 MG/DL
HAPTOGLOB SERPL-MCNC: <20 MG/DL
LDH SERPL-CCNC: 257
POTASSIUM SERPL-SCNC: 3.4 MMOL/L
PROT SERPL-MCNC: 6.1 G/DL
SODIUM SERPL-SCNC: 141 MMOL/L

## 2023-10-19 ENCOUNTER — NON-APPOINTMENT (OUTPATIENT)
Age: 83
End: 2023-10-19

## 2023-11-20 ENCOUNTER — EMERGENCY (EMERGENCY)
Facility: HOSPITAL | Age: 83
LOS: 0 days | Discharge: HOME | End: 2023-11-20
Attending: EMERGENCY MEDICINE
Payer: MEDICARE

## 2023-11-20 VITALS
DIASTOLIC BLOOD PRESSURE: 60 MMHG | TEMPERATURE: 98 F | WEIGHT: 125 LBS | OXYGEN SATURATION: 100 % | SYSTOLIC BLOOD PRESSURE: 131 MMHG | RESPIRATION RATE: 18 BRPM | HEART RATE: 77 BPM
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
## 2023-11-20 DIAGNOSIS — S05.8X2A OTHER INJURIES OF LEFT EYE AND ORBIT, INITIAL ENCOUNTER: ICD-10-CM

## 2023-11-20 DIAGNOSIS — Z88.8 ALLERGY STATUS TO OTHER DRUGS, MEDICAMENTS AND BIOLOGICAL SUBSTANCES: ICD-10-CM
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
## 2023-11-20 DIAGNOSIS — I10 ESSENTIAL (PRIMARY) HYPERTENSION: ICD-10-CM

## 2023-11-20 DIAGNOSIS — S09.93XA UNSPECIFIED INJURY OF FACE, INITIAL ENCOUNTER: ICD-10-CM

## 2023-11-20 DIAGNOSIS — E03.9 HYPOTHYROIDISM, UNSPECIFIED: ICD-10-CM

## 2023-11-20 DIAGNOSIS — Z90.49 ACQUIRED ABSENCE OF OTHER SPECIFIED PARTS OF DIGESTIVE TRACT: Chronic | ICD-10-CM

## 2023-11-20 DIAGNOSIS — Z88.1 ALLERGY STATUS TO OTHER ANTIBIOTIC AGENTS STATUS: ICD-10-CM

## 2023-11-20 DIAGNOSIS — Z98.890 OTHER SPECIFIED POSTPROCEDURAL STATES: Chronic | ICD-10-CM

## 2023-11-20 PROCEDURE — 99282 EMERGENCY DEPT VISIT SF MDM: CPT

## 2023-11-20 PROCEDURE — 99283 EMERGENCY DEPT VISIT LOW MDM: CPT

## 2023-11-20 NOTE — ED PROVIDER NOTE - PATIENT PORTAL LINK FT
You can access the FollowMyHealth Patient Portal offered by [facility] by registering at the following website: [email or web address]. By joining [facility]’s FollowMyHealth portal, you will also be able to view your health information using other applications (apps) compatible with our system.

## 2023-11-20 NOTE — ED PROVIDER NOTE - OBJECTIVE STATEMENT
84 yo woman w/ hx of peripheral edema, hypothyroid, HTN here w/ facial trauma  yesterday evening, was bending over to take off compression stockings and lost balance falling forward and hitting face on her guitar  no AC or antiplt agents  no loc, no other trauma or complaints  no neck pain  states put cold compress on it  no other issues at this time

## 2023-11-20 NOTE — ED PROVIDER NOTE - CLINICAL SUMMARY MEDICAL DECISION MAKING FREE TEXT BOX
82 yo woman w/ minor trauma to face  no high risk features, no signs of entrapment or fractures    supportive care  states main reason for coming in is needing clearance to go back to rehab

## 2023-11-20 NOTE — ED ADULT TRIAGE NOTE - WEIGHT IN LBS
Patient Instructions by Enrique Rizzo DO at 01/10/17 09:42 AM     Author:  Enrique Rizzo DO Service:  (none) Author Type:  Physician     Filed:  01/10/17 09:43 AM Encounter Date:  1/10/2017 Status:  Signed     :  Enrique Rizzo DO (Physician)            Schedule: Right ESWL with possible cystoscopy and ipsilateral stent placement    No stent will be needed as the stone is < 1cm in size.   We reviewed the treatment options which included medical expulsion therapy, shockwave lithotripsy, ureteroscopy with laser lithotripsy and stone manipulation and percutaneous nephrolithotomy. The risks and benefits of each procedure were discussed.    Risks and complications including but not limited to bleeding, renal hematoma formation, renal scarring, loss of renal function, infection, acute and chronic pain, injury/scarring to adjacent tissues/organs, ureteral injury or scar formation, ureteral stricture formation, clot urinary retention, failure or need for successive procedures, PE/DVT, MI/CVA and Mortality have been discussed with the patient.    Please avoid use of Aspirin/NSAIDs (Ibuprofen/naproxen) for 10 days prior to surgery.  Drink plenty of fluids - at least 2 liters of water daily.  Take pain medications as needed  Continue the flomax  After the procedure, you should try to lay on your opposite side as often as possible to try to help the fragments to pass more easily.       Revision History        User Key Date/Time User Provider Type Action    > [N/A] 01/10/17 09:43 AM Enrique Rizzo DO Physician Sign             [phone]

## 2023-11-20 NOTE — ED PROVIDER NOTE - PHYSICAL EXAMINATION
wd/wn  nc/at  + ecchymosis medial surface of L eye  no bony ttp  no crepitus  eomi, perrl  non-tender c-spine  aao X 3  gait stable

## 2023-11-20 NOTE — ED ADULT TRIAGE NOTE - CHIEF COMPLAINT QUOTE
Last night, Pt was bending over to take off her socks and fell over hitting her face on a guitar case. Pt denies a/c or LOC. Bruising noted to left eye. Denies headache/dizziness. Pt ambulatory

## 2023-12-07 ENCOUNTER — APPOINTMENT (OUTPATIENT)
Dept: GASTROENTEROLOGY | Facility: CLINIC | Age: 83
End: 2023-12-07

## 2024-01-02 ENCOUNTER — OUTPATIENT (OUTPATIENT)
Dept: OUTPATIENT SERVICES | Facility: HOSPITAL | Age: 84
LOS: 1 days | End: 2024-01-02
Payer: MEDICARE

## 2024-01-02 ENCOUNTER — APPOINTMENT (OUTPATIENT)
Dept: OPHTHALMOLOGY | Facility: CLINIC | Age: 84
End: 2024-01-02
Payer: MEDICARE

## 2024-01-02 DIAGNOSIS — Z98.890 OTHER SPECIFIED POSTPROCEDURAL STATES: Chronic | ICD-10-CM

## 2024-01-02 DIAGNOSIS — H25.89 OTHER AGE-RELATED CATARACT: ICD-10-CM

## 2024-01-02 DIAGNOSIS — H25.13 AGE-RELATED NUCLEAR CATARACT, BILATERAL: ICD-10-CM

## 2024-01-02 DIAGNOSIS — Z90.49 ACQUIRED ABSENCE OF OTHER SPECIFIED PARTS OF DIGESTIVE TRACT: Chronic | ICD-10-CM

## 2024-01-02 PROCEDURE — 92136 OPHTHALMIC BIOMETRY: CPT | Mod: 26

## 2024-01-02 PROCEDURE — 92136 OPHTHALMIC BIOMETRY: CPT

## 2024-02-01 NOTE — ED PROVIDER NOTE - EKG ADDITIONAL QUESTION - PERFORMED INDEPENDENT VISUALIZATION
Problem: PAIN - ADULT  Goal: Verbalizes/displays adequate comfort level or baseline comfort level  Description: Interventions:  - Encourage patient to monitor pain and request assistance  - Assess pain using appropriate pain scale  - Administer analgesics based on type and severity of pain and evaluate response  - Implement non-pharmacological measures as appropriate and evaluate response  - Consider cultural and social influences on pain and pain management  - Notify physician/advanced practitioner if interventions unsuccessful or patient reports new pain  Outcome: Progressing     Problem: INFECTION - ADULT  Goal: Absence or prevention of progression during hospitalization  Description: INTERVENTIONS:  - Assess and monitor for signs and symptoms of infection  - Monitor lab/diagnostic results  - Monitor all insertion sites, i.e. indwelling lines, tubes, and drains  - [place] appropriate cooling/warming therapies per order  - Administer medications as ordered  - Instruct and encourage patient and family to use good hand hygiene technique  - Identify and instruct in appropriate isolation precautions for identified infection/condition  Outcome: Progressing     Problem: SAFETY ADULT  Goal: Maintain or return to baseline ADL function  Description: INTERVENTIONS:  -  Assess patient's ability to carry out ADLs; assess patient's baseline for ADL function and identify physical deficits which impact ability to perform ADLs (bathing, care of mouth/teeth, toileting, grooming, dressing, etc.)  - Assess/evaluate cause of self-care deficits   - Assess range of motion  - Assess patient's mobility; develop plan if impaired  - Assess patient's need for assistive devices and provide as appropriate  - Encourage maximum independence but intervene and supervise when necessary  - Involve family in performance of ADLs  - Assess for home care needs following discharge   - Consider OT consult to assist with ADL evaluation and planning  for discharge  - Provide patient education as appropriate  Outcome: Progressing     Problem: DISCHARGE PLANNING  Goal: Discharge to home or other facility with appropriate resources  Description: INTERVENTIONS:  - Identify barriers to discharge w/patient and caregiver  - Arrange for needed discharge resources and transportation as appropriate  - Identify discharge learning needs (meds, wound care, etc.)  - Arrange for interpretive services to assist at discharge as needed  - Refer to Case Management Department for coordinating discharge planning if the patient needs post-hospital services based on physician/advanced practitioner order or complex needs related to functional status, cognitive ability, or social support system  Outcome: Progressing     Problem: METABOLIC, FLUID AND ELECTROLYTES - ADULT  Goal: Electrolytes maintained within normal limits  Description: INTERVENTIONS:  - Monitor labs and assess patient for signs and symptoms of electrolyte imbalances  - Administer electrolyte replacement as ordered  - Monitor response to electrolyte replacements, including repeat lab results as appropriate  - Instruct patient on fluid and nutrition as appropriate  Outcome: Progressing     Problem: GENITOURINARY - ADULT  Goal: Maintains or returns to baseline urinary function  Description: INTERVENTIONS:  - Assess urinary function  - Encourage oral fluids to ensure adequate hydration if ordered  - Administer IV fluids as ordered to ensure adequate hydration  - Administer ordered medications as needed  - Offer frequent toileting  - Follow urinary retention protocol if ordered  Outcome: Progressing     Problem: METABOLIC, FLUID AND ELECTROLYTES - ADULT  Goal: Fluid balance maintained  Description: INTERVENTIONS:  - Monitor labs   - Monitor I/O and WT  - Instruct patient on fluid and nutrition as appropriate  - Assess for signs & symptoms of volume excess or deficit  Outcome: Progressing      Yes

## 2024-03-04 ENCOUNTER — OUTPATIENT (OUTPATIENT)
Dept: OUTPATIENT SERVICES | Facility: HOSPITAL | Age: 84
LOS: 1 days | Discharge: HOME | End: 2024-03-04
Payer: MEDICARE

## 2024-03-04 VITALS — HEART RATE: 75 BPM | SYSTOLIC BLOOD PRESSURE: 125 MMHG | DIASTOLIC BLOOD PRESSURE: 55 MMHG

## 2024-03-04 VITALS
TEMPERATURE: 97 F | HEIGHT: 62 IN | WEIGHT: 121.92 LBS | DIASTOLIC BLOOD PRESSURE: 56 MMHG | RESPIRATION RATE: 17 BRPM | OXYGEN SATURATION: 95 % | SYSTOLIC BLOOD PRESSURE: 120 MMHG | HEART RATE: 76 BPM

## 2024-03-04 DIAGNOSIS — Z90.49 ACQUIRED ABSENCE OF OTHER SPECIFIED PARTS OF DIGESTIVE TRACT: Chronic | ICD-10-CM

## 2024-03-04 DIAGNOSIS — Z98.890 OTHER SPECIFIED POSTPROCEDURAL STATES: Chronic | ICD-10-CM

## 2024-03-04 DIAGNOSIS — H25.22 AGE-RELATED CATARACT, MORGAGNIAN TYPE, LEFT EYE: ICD-10-CM

## 2024-03-04 PROCEDURE — V2632: CPT

## 2024-03-04 RX ORDER — LEVOTHYROXINE SODIUM 125 MCG
37.5 TABLET ORAL
Refills: 0 | DISCHARGE

## 2024-03-04 RX ORDER — CHOLECALCIFEROL (VITAMIN D3) 125 MCG
1 CAPSULE ORAL
Refills: 0 | DISCHARGE

## 2024-03-04 NOTE — ASU PATIENT PROFILE, ADULT - FALL HARM RISK - HARM RISK INTERVENTIONS

## 2024-03-04 NOTE — ASU PREOP CHECKLIST - 1.
PER PHARMACIST, HOLD PREDNISONE VIGAMOX AND ACULAR DUE TO ABOVE NOTED SEVERITY OF ALLERGIES Unna Boot Text: An Unna boot was placed to help immobilize the limb and facilitate more rapid healing.

## 2024-03-04 NOTE — ASU PATIENT PROFILE, ADULT - AS SC BRADEN FRICTION
Pt is scheduled for an EGD with Dr Renee at [facility] on 05/27/2021. TimeTBD. Prep instructions were given verbally along with written instructions to take home. Covid test is scheduled for 05/24/2021 at 9:00am, at the [address] location. Pt is aware of date, place, and time for Covid test.  
(3) no apparent problem

## 2024-03-06 DIAGNOSIS — H25.12 AGE-RELATED NUCLEAR CATARACT, LEFT EYE: ICD-10-CM

## 2024-03-06 DIAGNOSIS — Z88.6 ALLERGY STATUS TO ANALGESIC AGENT: ICD-10-CM

## 2024-03-06 DIAGNOSIS — M19.90 UNSPECIFIED OSTEOARTHRITIS, UNSPECIFIED SITE: ICD-10-CM

## 2024-03-06 DIAGNOSIS — K76.0 FATTY (CHANGE OF) LIVER, NOT ELSEWHERE CLASSIFIED: ICD-10-CM

## 2024-05-01 NOTE — ED PEDIATRIC NURSE NOTE - CAS DISCH ACCOMP BY
Stool daily on miralax one cap  Still some abd pain  Increase miralax to 1 and 1/2 capfuls daily  Check labs;  cbc, chem panel, crp, celiac panel   Self

## 2024-05-29 NOTE — BRIEF OPERATIVE NOTE - ANESTHESIOLOGIST NAME
\"Have you been to the ER, urgent care clinic since your last visit?  Hospitalized since your last visit?\"    NO    “Have you seen or consulted any other health care providers outside of [facility] since your last visit?”    NO           Chief Complaint   Patient presents with    Follow-up     Diabetes.        Pt is ok with scribe.     Depression: Not at risk (8/14/2023)    PHQ-2     PHQ-2 Score: 0       Favian

## 2024-07-03 ENCOUNTER — APPOINTMENT (OUTPATIENT)
Dept: UROGYNECOLOGY | Facility: CLINIC | Age: 84
End: 2024-07-03
Payer: MEDICARE

## 2024-07-03 ENCOUNTER — RESULT REVIEW (OUTPATIENT)
Age: 84
End: 2024-07-03

## 2024-07-03 ENCOUNTER — LABORATORY RESULT (OUTPATIENT)
Age: 84
End: 2024-07-03

## 2024-07-03 VITALS
SYSTOLIC BLOOD PRESSURE: 133 MMHG | WEIGHT: 122 LBS | DIASTOLIC BLOOD PRESSURE: 68 MMHG | HEIGHT: 61 IN | BODY MASS INDEX: 23.03 KG/M2 | HEART RATE: 76 BPM

## 2024-07-03 DIAGNOSIS — F41.9 ANXIETY DISORDER, UNSPECIFIED: ICD-10-CM

## 2024-07-03 DIAGNOSIS — H35.62 RETINAL HEMORRHAGE, LEFT EYE: ICD-10-CM

## 2024-07-03 DIAGNOSIS — R39.15 URGENCY OF URINATION: ICD-10-CM

## 2024-07-03 DIAGNOSIS — F32.A ANXIETY DISORDER, UNSPECIFIED: ICD-10-CM

## 2024-07-03 DIAGNOSIS — R31.29 OTHER MICROSCOPIC HEMATURIA: ICD-10-CM

## 2024-07-03 DIAGNOSIS — H35.30 UNSPECIFIED MACULAR DEGENERATION: ICD-10-CM

## 2024-07-03 DIAGNOSIS — H35.363 DRUSEN (DEGENERATIVE) OF MACULA, BILATERAL: ICD-10-CM

## 2024-07-03 DIAGNOSIS — R35.1 NOCTURIA: ICD-10-CM

## 2024-07-03 PROCEDURE — 99459 PELVIC EXAMINATION: CPT

## 2024-07-03 PROCEDURE — 99205 OFFICE O/P NEW HI 60 MIN: CPT | Mod: 25

## 2024-07-03 PROCEDURE — 51701 INSERT BLADDER CATHETER: CPT

## 2024-07-03 RX ORDER — VIT C/E/ZN/COPPR/LUTEIN/ZEAXAN 250MG-90MG
CAPSULE ORAL
Refills: 0 | Status: ACTIVE | COMMUNITY

## 2024-07-03 RX ORDER — MULTIVIT-MIN/IRON/FA/VIT K/LUT 4MG-200MCG
TABLET ORAL
Refills: 0 | Status: ACTIVE | COMMUNITY

## 2024-07-03 RX ORDER — LEVOTHYROXINE SODIUM 37.5 UG/1
37.5 CAPSULE ORAL
Refills: 0 | Status: ACTIVE | COMMUNITY

## 2024-07-05 LAB
APPEARANCE: CLEAR
BILIRUBIN URINE: NEGATIVE
BLOOD URINE: ABNORMAL
COLOR: NORMAL
GLUCOSE QUALITATIVE U: NEGATIVE MG/DL
KETONES URINE: NEGATIVE MG/DL
LEUKOCYTE ESTERASE URINE: NEGATIVE
NITRITE URINE: NEGATIVE
PH URINE: 5.5
PROTEIN URINE: NEGATIVE MG/DL
SPECIFIC GRAVITY URINE: 1.02
UROBILINOGEN URINE: 0.2 MG/DL

## 2024-07-06 LAB — URINE CULTURE <10: NORMAL

## 2024-07-16 ENCOUNTER — OUTPATIENT (OUTPATIENT)
Dept: OUTPATIENT SERVICES | Facility: HOSPITAL | Age: 84
LOS: 1 days | End: 2024-07-16
Payer: MEDICARE

## 2024-07-16 DIAGNOSIS — Z90.49 ACQUIRED ABSENCE OF OTHER SPECIFIED PARTS OF DIGESTIVE TRACT: Chronic | ICD-10-CM

## 2024-07-16 DIAGNOSIS — R31.29 OTHER MICROSCOPIC HEMATURIA: ICD-10-CM

## 2024-07-16 DIAGNOSIS — Z00.8 ENCOUNTER FOR OTHER GENERAL EXAMINATION: ICD-10-CM

## 2024-07-16 DIAGNOSIS — Z98.890 OTHER SPECIFIED POSTPROCEDURAL STATES: Chronic | ICD-10-CM

## 2024-07-16 PROCEDURE — 76770 US EXAM ABDO BACK WALL COMP: CPT | Mod: 26

## 2024-07-16 PROCEDURE — 76770 US EXAM ABDO BACK WALL COMP: CPT

## 2024-07-17 DIAGNOSIS — R31.29 OTHER MICROSCOPIC HEMATURIA: ICD-10-CM

## 2024-07-19 ENCOUNTER — APPOINTMENT (OUTPATIENT)
Dept: UROGYNECOLOGY | Facility: CLINIC | Age: 84
End: 2024-07-19

## 2024-10-02 ENCOUNTER — APPOINTMENT (OUTPATIENT)
Facility: CLINIC | Age: 84
End: 2024-10-02

## 2024-11-15 ENCOUNTER — NON-APPOINTMENT (OUTPATIENT)
Age: 84
End: 2024-11-15

## 2024-11-15 ENCOUNTER — EMERGENCY (EMERGENCY)
Facility: HOSPITAL | Age: 84
LOS: 0 days | Discharge: HOME | End: 2024-11-15
Attending: STUDENT IN AN ORGANIZED HEALTH CARE EDUCATION/TRAINING PROGRAM
Payer: MEDICARE

## 2024-11-15 VITALS
SYSTOLIC BLOOD PRESSURE: 138 MMHG | OXYGEN SATURATION: 93 % | HEART RATE: 73 BPM | WEIGHT: 119.93 LBS | RESPIRATION RATE: 16 BRPM | DIASTOLIC BLOOD PRESSURE: 70 MMHG | TEMPERATURE: 98 F

## 2024-11-15 DIAGNOSIS — Z88.1 ALLERGY STATUS TO OTHER ANTIBIOTIC AGENTS: ICD-10-CM

## 2024-11-15 DIAGNOSIS — Z90.49 ACQUIRED ABSENCE OF OTHER SPECIFIED PARTS OF DIGESTIVE TRACT: Chronic | ICD-10-CM

## 2024-11-15 DIAGNOSIS — Z98.890 OTHER SPECIFIED POSTPROCEDURAL STATES: Chronic | ICD-10-CM

## 2024-11-15 DIAGNOSIS — Z88.6 ALLERGY STATUS TO ANALGESIC AGENT: ICD-10-CM

## 2024-11-15 DIAGNOSIS — I10 ESSENTIAL (PRIMARY) HYPERTENSION: ICD-10-CM
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
## 2024-11-15 DIAGNOSIS — Z88.8 ALLERGY STATUS TO OTHER DRUGS, MEDICAMENTS AND BIOLOGICAL SUBSTANCES: ICD-10-CM

## 2024-11-15 DIAGNOSIS — E03.9 HYPOTHYROIDISM, UNSPECIFIED: ICD-10-CM

## 2024-11-15 DIAGNOSIS — R51.9 HEADACHE, UNSPECIFIED: ICD-10-CM
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
## 2024-11-15 DIAGNOSIS — S00.83XA CONTUSION OF OTHER PART OF HEAD, INITIAL ENCOUNTER: ICD-10-CM

## 2024-11-15 PROCEDURE — 70450 CT HEAD/BRAIN W/O DYE: CPT | Mod: MC

## 2024-11-15 PROCEDURE — 99284 EMERGENCY DEPT VISIT MOD MDM: CPT | Mod: 25

## 2024-11-15 PROCEDURE — 73030 X-RAY EXAM OF SHOULDER: CPT | Mod: 26,RT

## 2024-11-15 PROCEDURE — 99285 EMERGENCY DEPT VISIT HI MDM: CPT | Mod: FS

## 2024-11-15 PROCEDURE — 73030 X-RAY EXAM OF SHOULDER: CPT | Mod: RT

## 2024-11-15 PROCEDURE — 70486 CT MAXILLOFACIAL W/O DYE: CPT | Mod: MC

## 2024-11-15 PROCEDURE — 70450 CT HEAD/BRAIN W/O DYE: CPT | Mod: 26,MC

## 2024-11-15 PROCEDURE — 70486 CT MAXILLOFACIAL W/O DYE: CPT | Mod: 26,MC

## 2024-11-15 NOTE — ED PROVIDER NOTE - NSFOLLOWUPINSTRUCTIONS_ED_ALL_ED_FT
FOLLOW UP WITH YOUR PRIMARY DOCTOR  RETURN TO ED FOR NEW OR WORSENING SYMPTOMS    Fall Prevention in the Home, Adult  Falls can cause injuries and can happen to people of all ages. There are many things you can do to make your home safer and to help prevent falls.    What actions can I take to prevent falls?  General information    Use good lighting in all rooms. Make sure to:  Replace any light bulbs that burn out.  Turn on the lights in dark areas and use night-lights.  Keep items that you use often in easy-to-reach places. Lower the shelves around your home if needed.  Move furniture so that there are clear paths around it.  Do not use throw rugs or other things on the floor that can make you trip.  If any of your floors are uneven, fix them.  Add color or contrast paint or tape to clearly bandar and help you see:  Grab bars or handrails.  First and last steps of staircases.  Where the edge of each step is.  If you use a ladder or stepladder:  Make sure that it is fully opened. Do not climb a closed ladder.  Make sure the sides of the ladder are locked in place.  Have someone hold the ladder while you use it.  Know where your pets are as you move through your home.  What can I do in the bathroom?    A grab bar next to a toilet.  Shower and bathtub, showing safety grab bars on the walls.  Keep the floor dry. Clean up any water on the floor right away.  Remove soap buildup in the bathtub or shower. Buildup makes bathtubs and showers slippery.  Use non-skid mats or decals on the floor of the bathtub or shower.  Attach bath mats securely with double-sided, non-slip rug tape.  If you need to sit down in the shower, use a non-slip stool.  Install grab bars by the toilet and in the bathtub and shower. Do not use towel bars as grab bars.  What can I do in the bedroom?    Make sure that you have a light by your bed that is easy to reach.  Do not use any sheets or blankets on your bed that hang to the floor.  Have a firm chair or bench with side arms that you can use for support when you get dressed.  What can I do in the kitchen?    Clean up any spills right away.  If you need to reach something above you, use a step stool with a grab bar.  Keep electrical cords out of the way.  Do not use floor polish or wax that makes floors slippery.  What can I do with my stairs?    Do not leave anything on the stairs.  Make sure that you have a light switch at the top and the bottom of the stairs.  Make sure that there are handrails on both sides of the stairs. Fix handrails that are broken or loose.  Install non-slip stair treads on all your stairs if they do not have carpet.  Avoid having throw rugs at the top or bottom of the stairs.  Choose a carpet that does not hide the edge of the steps on the stairs. Make sure that the carpet is firmly attached to the stairs. Fix carpet that is loose or worn.  What can I do on the outside of my home?    Use bright outdoor lighting.  Fix the edges of walkways and driveways and fix any cracks. Clear paths of anything that can make you trip, such as tools or rocks.  Add color or contrast paint or tape to clearly bandar and help you see anything that might make you trip as you walk through a door, such as a raised step or threshold.  Trim any bushes or trees on paths to your home.  Check to see if handrails are loose or broken and that both sides of all steps have handrails. Install guardrails along the edges of any raised decks and porches.  Have leaves, snow, or ice cleared regularly. Use sand, salt, or ice melter on paths if you live where there is ice and snow during the winter.  Clean up any spills in your garage right away. This includes grease or oil spills.  What other actions can I take?    Review your medicines with your doctor. Some medicines can cause dizziness or changes in blood pressure, which increase your risk of falling.  Wear shoes that:  Have a low heel. Do not wear high heels.  Have rubber bottoms and are closed at the toe.  Feel good on your feet and fit well.  Use tools that help you move around if needed. These include:  Canes.  Walkers.  Scooters.  Crutches.  Ask your doctor what else you can do to help prevent falls. This may include seeing a physical therapist to learn to do exercises to move better and get stronger.  Where to find more information  Centers for Disease Control and Prevention, [place]: cdc.gov  National [place] on Aging: [place].nih.gov  National [place] on Aging: shanti.nih.gov  Contact a doctor if:  You are afraid of falling at home.  You feel weak, drowsy, or dizzy at home.  You fall at home.  Get help right away if you:  Lose consciousness or have trouble moving after a fall.  Have a fall that causes a head injury.  These symptoms may be an emergency. Get help right away. Call 911.  Do not wait to see if the symptoms will go away.  Do not drive yourself to the hospital.  This information is not intended to replace advice given to you by your health care provider. Make sure you discuss any questions you have with your health care provider.

## 2024-11-15 NOTE — ED ADULT NURSE NOTE - NSFALLRISKINTERV_ED_ALL_ED

## 2024-11-15 NOTE — ED PROVIDER NOTE - PATIENT PORTAL LINK FT
You can access the FollowMyHealth Patient Portal offered by [facility] by registering at the following website: [email or web address]. By joining [facility]’s FollowMyHealth portal, you will also be able to view your health information using other applications (apps) compatible with our system.

## 2024-11-15 NOTE — ED PROVIDER NOTE - OBJECTIVE STATEMENT
Patient is a 84-year-old female PMH peripheral edema, hypertension, hypothyroid coming to ED after fall.  Patient reports x 2 days ago had mechanical trip and fall outside, landed on right shoulder/right face.  No LOC, was unable to get up on her own, bystander saw fall and helped patient up, patient ambulatory after.  Reports bruising and mild pain to right side of face, mild headache.  Denies LOC, AC use, dizziness, neck pain, chest pain, abdominal pain, unilateral numbness/weakness, paresthesias

## 2024-11-15 NOTE — ED PROVIDER NOTE - CLINICAL SUMMARY MEDICAL DECISION MAKING FREE TEXT BOX
Patient is a 84-year-old female PMH peripheral edema, hypertension, hypothyroid coming to ED after fall.  Patient reports x 2 days ago with mechanical trip and fall, on shoulder, face,    imaging studies negative  XR interpretation: No fx, independently interpretted by Coleman Toledo DO      Appropriate medications for patient's presenting complaints were ordered and effects were reassessed. Patient's external records were reviewed    Escalation to admission and/or observation was considered.  Patient feels much better and is comfortable with discharge.  Appropriate follow-up was arranged.

## 2024-11-15 NOTE — ED PROVIDER NOTE - PHYSICAL EXAMINATION
CONST: Well appearing in NAD  EYES: EOMI, Sclera and conjunctiva clear.   ENT: No nasal discharge. Oropharynx normal appearing, no erythema or exudates. Uvula midline.  NECK: Non-tender  CARD: Normal S1 S2; Normal rate and rhythm  RESP: Equal BS B/L, No wheezes, rhonchi or rales. No distress  GI: Soft, non-tender, non-distended.  MS: Normal ROM in all extremities. No midline spinal tenderness. TTP R zygomatic process  SKIN: Warm, dry, Good turgor. hematoma to R zygomatic process and superficial abrasion R forehead   NEURO: A&Ox3, No focal deficits. Strength 5/5 with no sensory deficits. Steady gait

## 2024-11-15 NOTE — ED ADULT TRIAGE NOTE - CHIEF COMPLAINT QUOTE
Trip and fall 2 days ago onto right side of face. C/o a little bit of right shoulder pain. No LOC/AC use.

## 2025-02-18 ENCOUNTER — EMERGENCY (EMERGENCY)
Facility: HOSPITAL | Age: 85
LOS: 0 days | Discharge: HOME | End: 2025-02-18
Attending: EMERGENCY MEDICINE
Payer: MEDICARE

## 2025-02-18 VITALS
WEIGHT: 119.93 LBS | HEART RATE: 90 BPM | SYSTOLIC BLOOD PRESSURE: 134 MMHG | OXYGEN SATURATION: 95 % | RESPIRATION RATE: 18 BRPM | DIASTOLIC BLOOD PRESSURE: 78 MMHG | TEMPERATURE: 98 F

## 2025-02-18 DIAGNOSIS — R06.02 SHORTNESS OF BREATH: ICD-10-CM

## 2025-02-18 DIAGNOSIS — M79.89 OTHER SPECIFIED SOFT TISSUE DISORDERS: ICD-10-CM

## 2025-02-18 DIAGNOSIS — R14.0 ABDOMINAL DISTENSION (GASEOUS): ICD-10-CM

## 2025-02-18 DIAGNOSIS — Z88.6 ALLERGY STATUS TO ANALGESIC AGENT: ICD-10-CM

## 2025-02-18 DIAGNOSIS — Z88.1 ALLERGY STATUS TO OTHER ANTIBIOTIC AGENTS: ICD-10-CM

## 2025-02-18 DIAGNOSIS — Z88.8 ALLERGY STATUS TO OTHER DRUGS, MEDICAMENTS AND BIOLOGICAL SUBSTANCES: ICD-10-CM

## 2025-02-18 DIAGNOSIS — Z98.890 OTHER SPECIFIED POSTPROCEDURAL STATES: Chronic | ICD-10-CM

## 2025-02-18 DIAGNOSIS — K74.60 UNSPECIFIED CIRRHOSIS OF LIVER: ICD-10-CM

## 2025-02-18 DIAGNOSIS — I10 ESSENTIAL (PRIMARY) HYPERTENSION: ICD-10-CM

## 2025-02-18 DIAGNOSIS — Z90.49 ACQUIRED ABSENCE OF OTHER SPECIFIED PARTS OF DIGESTIVE TRACT: Chronic | ICD-10-CM

## 2025-02-18 DIAGNOSIS — E78.5 HYPERLIPIDEMIA, UNSPECIFIED: ICD-10-CM

## 2025-02-18 LAB
ALBUMIN SERPL ELPH-MCNC: 3.1 G/DL — LOW (ref 3.5–5.2)
ALP SERPL-CCNC: 97 U/L — SIGNIFICANT CHANGE UP (ref 30–115)
ALT FLD-CCNC: 25 U/L — SIGNIFICANT CHANGE UP (ref 0–41)
AMMONIA BLD-MCNC: 50 UMOL/L — SIGNIFICANT CHANGE UP (ref 11–55)
ANION GAP SERPL CALC-SCNC: 11 MMOL/L — SIGNIFICANT CHANGE UP (ref 7–14)
APTT BLD: 37 SEC — SIGNIFICANT CHANGE UP (ref 27–39.2)
AST SERPL-CCNC: 48 U/L — HIGH (ref 0–41)
BASOPHILS # BLD AUTO: 0.03 K/UL — SIGNIFICANT CHANGE UP (ref 0–0.2)
BASOPHILS NFR BLD AUTO: 0.9 % — SIGNIFICANT CHANGE UP (ref 0–1)
BILIRUB SERPL-MCNC: 5.5 MG/DL — HIGH (ref 0.2–1.2)
BUN SERPL-MCNC: 26 MG/DL — HIGH (ref 10–20)
CALCIUM SERPL-MCNC: 8.9 MG/DL — SIGNIFICANT CHANGE UP (ref 8.4–10.4)
CHLORIDE SERPL-SCNC: 109 MMOL/L — SIGNIFICANT CHANGE UP (ref 98–110)
CO2 SERPL-SCNC: 19 MMOL/L — SIGNIFICANT CHANGE UP (ref 17–32)
CREAT SERPL-MCNC: 1 MG/DL — SIGNIFICANT CHANGE UP (ref 0.7–1.5)
EGFR: 55 ML/MIN/1.73M2 — LOW
EOSINOPHIL # BLD AUTO: 0.09 K/UL — SIGNIFICANT CHANGE UP (ref 0–0.7)
EOSINOPHIL NFR BLD AUTO: 2.6 % — SIGNIFICANT CHANGE UP (ref 0–8)
GLUCOSE SERPL-MCNC: 112 MG/DL — HIGH (ref 70–99)
HCT VFR BLD CALC: 34.3 % — LOW (ref 37–47)
HGB BLD-MCNC: 10.7 G/DL — LOW (ref 12–16)
INR BLD: 1.42 RATIO — HIGH (ref 0.65–1.3)
LIDOCAIN IGE QN: 69 U/L — HIGH (ref 7–60)
LYMPHOCYTES # BLD AUTO: 0.27 K/UL — LOW (ref 1.2–3.4)
LYMPHOCYTES # BLD AUTO: 8 % — LOW (ref 20.5–51.1)
MCHC RBC-ENTMCNC: 31.2 G/DL — LOW (ref 32–37)
MCHC RBC-ENTMCNC: 31.4 PG — HIGH (ref 27–31)
MCV RBC AUTO: 100.6 FL — HIGH (ref 81–99)
MONOCYTES # BLD AUTO: 0.21 K/UL — SIGNIFICANT CHANGE UP (ref 0.1–0.6)
MONOCYTES NFR BLD AUTO: 6.2 % — SIGNIFICANT CHANGE UP (ref 1.7–9.3)
NEUTROPHILS # BLD AUTO: 2.81 K/UL — SIGNIFICANT CHANGE UP (ref 1.4–6.5)
NEUTROPHILS NFR BLD AUTO: 82.3 % — HIGH (ref 42.2–75.2)
NT-PROBNP SERPL-SCNC: 819 PG/ML — HIGH (ref 0–300)
PLATELET # BLD AUTO: 64 K/UL — LOW (ref 130–400)
PMV BLD: 10.6 FL — HIGH (ref 7.4–10.4)
POTASSIUM SERPL-MCNC: 4.6 MMOL/L — SIGNIFICANT CHANGE UP (ref 3.5–5)
POTASSIUM SERPL-SCNC: 4.6 MMOL/L — SIGNIFICANT CHANGE UP (ref 3.5–5)
PROT SERPL-MCNC: 6.1 G/DL — SIGNIFICANT CHANGE UP (ref 6–8)
PROTHROM AB SERPL-ACNC: 16.9 SEC — HIGH (ref 9.95–12.87)
RBC # BLD: 3.41 M/UL — LOW (ref 4.2–5.4)
RBC # FLD: 17.7 % — HIGH (ref 11.5–14.5)
SODIUM SERPL-SCNC: 139 MMOL/L — SIGNIFICANT CHANGE UP (ref 135–146)
TROPONIN T, HIGH SENSITIVITY RESULT: 20 NG/L — HIGH (ref 6–13)
WBC # BLD: 3.41 K/UL — LOW (ref 4.8–10.8)
WBC # FLD AUTO: 3.41 K/UL — LOW (ref 4.8–10.8)

## 2025-02-18 PROCEDURE — 83880 ASSAY OF NATRIURETIC PEPTIDE: CPT

## 2025-02-18 PROCEDURE — 93970 EXTREMITY STUDY: CPT

## 2025-02-18 PROCEDURE — 71045 X-RAY EXAM CHEST 1 VIEW: CPT | Mod: 26

## 2025-02-18 PROCEDURE — 82140 ASSAY OF AMMONIA: CPT

## 2025-02-18 PROCEDURE — 36415 COLL VENOUS BLD VENIPUNCTURE: CPT

## 2025-02-18 PROCEDURE — 99285 EMERGENCY DEPT VISIT HI MDM: CPT | Mod: 25

## 2025-02-18 PROCEDURE — 93010 ELECTROCARDIOGRAM REPORT: CPT

## 2025-02-18 PROCEDURE — 99285 EMERGENCY DEPT VISIT HI MDM: CPT | Mod: FS

## 2025-02-18 PROCEDURE — 80053 COMPREHEN METABOLIC PANEL: CPT

## 2025-02-18 PROCEDURE — 85730 THROMBOPLASTIN TIME PARTIAL: CPT

## 2025-02-18 PROCEDURE — 85610 PROTHROMBIN TIME: CPT

## 2025-02-18 PROCEDURE — 93970 EXTREMITY STUDY: CPT | Mod: 26

## 2025-02-18 PROCEDURE — 84484 ASSAY OF TROPONIN QUANT: CPT

## 2025-02-18 PROCEDURE — 85025 COMPLETE CBC W/AUTO DIFF WBC: CPT

## 2025-02-18 PROCEDURE — 83690 ASSAY OF LIPASE: CPT

## 2025-02-18 PROCEDURE — 93005 ELECTROCARDIOGRAM TRACING: CPT

## 2025-02-18 PROCEDURE — 36000 PLACE NEEDLE IN VEIN: CPT

## 2025-02-18 PROCEDURE — 71045 X-RAY EXAM CHEST 1 VIEW: CPT

## 2025-02-18 NOTE — ED PROVIDER NOTE - PATIENT PORTAL LINK FT
You can access the FollowMyHealth Patient Portal offered by [facility] by registering at the following website: [email or web address]. By joining [facility]’s FollowMyHealth portal, you will also be able to view your health information using other applications (apps) compatible with our system.

## 2025-02-18 NOTE — ED PROVIDER NOTE - CLINICAL SUMMARY MEDICAL DECISION MAKING FREE TEXT BOX
Patient with history of cirrhosis presenting for evaluation of swelling to lower extremities.  Mild dyspnea on exertion.  On diuretics currently.  No chest pain fevers chills.  Well appearing, NAD, non toxic. NCAT PERRLA EOMI neck supple non tender normal wob cta bl rrr abdomen s nt nd no rebound no guarding WWPx4 neuro non focal mild anasarca.  Labs imaging reviewed.  Patient ambulatory at baseline status.  Offered admission for further evaluation, declining at this time.   , aware of all results, given a copy of all available results, comfortable with discharge and follow-up outpatient, strict return precautions given. Endorses understanding and aware they can return at any time for further evaluation. No further questions or concerns at this time.

## 2025-02-18 NOTE — ED PROVIDER NOTE - PHYSICAL EXAMINATION
VITAL SIGNS: I have reviewed nursing notes and confirm.  CONSTITUTIONAL: In no acute distress.  SKIN: Skin exam is warm and dry.  HEAD: Normocephalic; atraumatic.  EYES: PERRL, EOM intact; conjunctiva and sclera clear.  ENT: No nasal discharge; airway clear.  NECK: Supple; non tender.  CARD: S1, S2; Regular rate and rhythm.  RESP: No wheezes, rales or rhonchi. Speaking in full sentences.   ABD: Normal bowel sounds; soft; minimally distended; non-tender; No rebound or guarding. No CVA tenderness.  EXT: Normal ROM. BL 2 pitting edema, compression socks in place. + Left calf tenderness.   NEURO: Alert, oriented. Grossly unremarkable. No focal deficits.

## 2025-02-18 NOTE — ED PROVIDER NOTE - PROGRESS NOTE DETAILS
CR: Discussed all findings with patient and patient's  at bedside.  Patient would prefer to follow-up outpatient with her PCP, given her symptoms are at baseline.  Return precaution discussed.  Patient voiced understanding.

## 2025-02-18 NOTE — ED PROVIDER NOTE - OBJECTIVE STATEMENT
Patient is an 84-year-old female PMH HTN, hypothyroidism, peripheral edema, cirrhosis secondary to [place]RI[place], who presents ED with complaints of abdominal bloating and lower extremity swelling.  Patient endorses over the past month she noticed that her abdomen felt more bloated than usual but denied any pain.  Patient also endorses baseline bilateral pitting edema but states that it seems to have worsened, and the left calf was causing her pain.  Patient has been taking her water pills as prescribed.  Patient also endorses baseline shortness of breath with exertion for the past few months, which her doctor is aware of.  Denies any worsening of the symptoms.  Denies fever, chills, CP, nausea, vomiting, abdominal pain, diarrhea, or urinary symptoms.

## 2025-02-18 NOTE — ED PROVIDER NOTE - NSFOLLOWUPINSTRUCTIONS_ED_ALL_ED_FT
FOLLOW UP WITH YOUR PRIMARY CARE DOCTOR THIS WEEK.     WHAT YOU NEED TO KNOW:    Leg edema is swelling caused by fluid buildup. Your legs may swell if you sit or stand for long periods of time, are pregnant, or are injured. Swelling may also occur if you have heart failure or circulation problems. This means that your heart does not pump blood through your body as it should.    DISCHARGE INSTRUCTIONS:    Self-care:     Elevate your legs: Raise your legs above the level of your heart as often as you can. This will help decrease swelling and pain. Prop your legs on pillows or blankets to keep them elevated comfortably.      Wear pressure stockings: These tight stockings put pressure on your legs to promote blood flow and prevent blood clots. Wear the stockings during the day. Do not wear them while you sleep.      Apply heat: Heat helps decrease pain and swelling. Apply heat on the area for 20 to 30 minutes every 2 hours for as many days as directed.       Stay active: Do not stand or sit for long periods of time. Ask your healthcare provider about the best exercise plan for you.      Eat healthy foods: Healthy foods include fruits, vegetables, whole-grain breads, low-fat dairy products, beans, lean meats, and fish. Ask if you need to be on a special diet. Limit salt. Salt will make your body hold even more fluid.    Follow up with your healthcare provider as directed: Write down your questions so you remember to ask them during your visits.     Contact your healthcare provider if:     You have a fever or feel more tired than usual.      The veins in your legs look larger than usual. They may look full or bulging.      Your legs itch or feel heavy.      You have red or white areas or sores on your legs. The skin may also appear dimpled or have indentations.      You are gaining weight.      You have trouble moving your ankles.      The swelling does not go away, or other parts of your body swell.      You have questions or concerns about your condition or care.    Return to the emergency department if:     You cannot walk.      You feel faint or confused.       Your skin turns blue or gray.      Your leg feels warm, tender, and painful. It may be swollen and red.      You have chest pain or trouble breathing that is worse when you lie down.      You suddenly feel lightheaded and have trouble breathing.      You have new and sudden chest pain. You may have more pain when you take deep breaths or cough. You may also cough up blood.    Abdominal Bloating    When you have abdominal bloating, your abdomen may feel full, tight, or painful. It may also look bigger than normal or swollen (distended). Common causes of abdominal bloating include:  Swallowing air.  Constipation.  Problems digesting food.  Eating too much.  Irritable bowel syndrome. This is a condition that affects the large intestine.  Lactose intolerance. This is an inability to digest lactose, a natural sugar in dairy products.  Celiac disease. This is a condition that affects the ability to digest gluten, a protein found in some grains.  Gastroparesis. This is a condition that slows down the movement of food in the stomach and small intestine. It is more common in people with diabetes mellitus.  Gastroesophageal reflux disease (GERD). This is a condition that makes stomach acid flow back into the esophagus.  Urinary retention. This means that the body is holding onto urine, and the bladder cannot be emptied all the way.    Follow these instructions at home:    Eating and drinking    Avoid eating too much.  Try not to swallow air while talking or eating.  Avoid eating while lying down.  Avoid these foods and drinks:  Foods that cause gas, such as broccoli, cabbage, cauliflower, and baked beans.  Carbonated drinks.  Hard candy.  Chewing gum.    Medicines    Take over-the-counter and prescription medicines only as told by your health care provider.  Take probiotic medicines. These medicines contain live bacteria or yeasts that can help digestion.  Take coated peppermint oil capsules.    General instructions    Try to exercise regularly. Exercise may help to relieve bloating that is caused by gas and relieve constipation.  Keep all follow-up visits. This is important.  Contact a health care provider if:  You have nausea and vomiting.  You have diarrhea.  You have abdominal pain.  You have unusual weight loss or weight gain.  You have severe pain, and medicines do not help.    Get help right away if:  You have chest pain.  You have trouble breathing.  You have shortness of breath.  You have trouble urinating.  You have darker urine than normal.  You have blood in your stools or have dark, tarry stools.  These symptoms may represent a serious problem that is an emergency. Do not wait to see if the symptoms will go away. Get medical help right away. Call your local emergency services (911 in the [place] Do not drive yourself to the hospital.    Summary  Abdominal bloating means that the abdomen is swollen.  Common causes of abdominal bloating are swallowing air, constipation, and problems digesting food.  Avoid eating too much and avoid swallowing air.  Avoid foods that cause gas, carbonated drinks, hard candy, and chewing gum.  This information is not intended to replace advice given to you by your health care provider. Make sure you discuss any questions you have with your health care provider.

## 2025-03-13 ENCOUNTER — APPOINTMENT (OUTPATIENT)
Dept: ENDOCRINOLOGY | Facility: CLINIC | Age: 85
End: 2025-03-13
Payer: MEDICARE

## 2025-03-13 VITALS
WEIGHT: 134 LBS | SYSTOLIC BLOOD PRESSURE: 112 MMHG | HEIGHT: 62 IN | RESPIRATION RATE: 96 BRPM | DIASTOLIC BLOOD PRESSURE: 70 MMHG | HEART RATE: 18 BPM | BODY MASS INDEX: 24.66 KG/M2

## 2025-03-13 DIAGNOSIS — E03.9 HYPOTHYROIDISM, UNSPECIFIED: ICD-10-CM

## 2025-03-13 DIAGNOSIS — Z87.39 PERSONAL HISTORY OF OTHER DISEASES OF THE MUSCULOSKELETAL SYSTEM AND CONNECTIVE TISSUE: ICD-10-CM

## 2025-03-13 DIAGNOSIS — H91.90 UNSPECIFIED HEARING LOSS, UNSPECIFIED EAR: ICD-10-CM

## 2025-03-13 DIAGNOSIS — Z78.9 OTHER SPECIFIED HEALTH STATUS: ICD-10-CM

## 2025-03-13 PROCEDURE — 99203 OFFICE O/P NEW LOW 30 MIN: CPT

## 2025-03-13 RX ORDER — FUROSEMIDE 20 MG/1
20 TABLET ORAL
Refills: 0 | Status: ACTIVE | COMMUNITY

## 2025-03-26 ENCOUNTER — APPOINTMENT (OUTPATIENT)
Facility: CLINIC | Age: 85
End: 2025-03-26

## 2025-05-12 ENCOUNTER — INPATIENT (INPATIENT)
Facility: HOSPITAL | Age: 85
LOS: 3 days | Discharge: HOME | DRG: 641 | End: 2025-05-16
Attending: HOSPITALIST | Admitting: STUDENT IN AN ORGANIZED HEALTH CARE EDUCATION/TRAINING PROGRAM
Payer: MEDICARE

## 2025-05-12 VITALS
SYSTOLIC BLOOD PRESSURE: 133 MMHG | RESPIRATION RATE: 18 BRPM | DIASTOLIC BLOOD PRESSURE: 67 MMHG | OXYGEN SATURATION: 95 % | HEART RATE: 81 BPM | TEMPERATURE: 98 F

## 2025-05-12 DIAGNOSIS — Z98.890 OTHER SPECIFIED POSTPROCEDURAL STATES: Chronic | ICD-10-CM

## 2025-05-12 DIAGNOSIS — R62.7 ADULT FAILURE TO THRIVE: ICD-10-CM

## 2025-05-12 DIAGNOSIS — Z90.49 ACQUIRED ABSENCE OF OTHER SPECIFIED PARTS OF DIGESTIVE TRACT: Chronic | ICD-10-CM

## 2025-05-12 LAB
ALBUMIN SERPL ELPH-MCNC: 3.2 G/DL — LOW (ref 3.5–5.2)
ALP SERPL-CCNC: 119 U/L — HIGH (ref 30–115)
ALT FLD-CCNC: 24 U/L — SIGNIFICANT CHANGE UP (ref 0–41)
AMMONIA BLD-MCNC: 44 UMOL/L — SIGNIFICANT CHANGE UP (ref 11–55)
ANION GAP SERPL CALC-SCNC: 10 MMOL/L — SIGNIFICANT CHANGE UP (ref 7–14)
ANISOCYTOSIS BLD QL: SLIGHT — SIGNIFICANT CHANGE UP
APPEARANCE UR: CLEAR — SIGNIFICANT CHANGE UP
AST SERPL-CCNC: 47 U/L — HIGH (ref 0–41)
BASE EXCESS BLDV CALC-SCNC: -0.8 MMOL/L — SIGNIFICANT CHANGE UP (ref -2–3)
BASOPHILS # BLD AUTO: 0 K/UL — SIGNIFICANT CHANGE UP (ref 0–0.2)
BASOPHILS NFR BLD AUTO: 0 % — SIGNIFICANT CHANGE UP (ref 0–1)
BILIRUB DIRECT SERPL-MCNC: 1.1 MG/DL — HIGH (ref 0–0.3)
BILIRUB INDIRECT FLD-MCNC: 6.3 MG/DL — HIGH (ref 0.2–1.2)
BILIRUB SERPL-MCNC: 7.4 MG/DL — HIGH (ref 0.2–1.2)
BILIRUB UR-MCNC: NEGATIVE — SIGNIFICANT CHANGE UP
BUN SERPL-MCNC: 38 MG/DL — HIGH (ref 10–20)
CA-I SERPL-SCNC: 1.22 MMOL/L — SIGNIFICANT CHANGE UP (ref 1.15–1.33)
CALCIUM SERPL-MCNC: 8.8 MG/DL — SIGNIFICANT CHANGE UP (ref 8.4–10.5)
CHLORIDE SERPL-SCNC: 105 MMOL/L — SIGNIFICANT CHANGE UP (ref 98–110)
CO2 SERPL-SCNC: 22 MMOL/L — SIGNIFICANT CHANGE UP (ref 17–32)
COLOR SPEC: YELLOW — SIGNIFICANT CHANGE UP
CREAT SERPL-MCNC: 1.2 MG/DL — SIGNIFICANT CHANGE UP (ref 0.7–1.5)
DIFF PNL FLD: ABNORMAL
EGFR: 44 ML/MIN/1.73M2 — LOW
EGFR: 44 ML/MIN/1.73M2 — LOW
EOSINOPHIL # BLD AUTO: 0.15 K/UL — SIGNIFICANT CHANGE UP (ref 0–0.7)
EOSINOPHIL NFR BLD AUTO: 5 % — SIGNIFICANT CHANGE UP (ref 0–8)
GAS PNL BLDV: 138 MMOL/L — SIGNIFICANT CHANGE UP (ref 136–145)
GAS PNL BLDV: SIGNIFICANT CHANGE UP
GLUCOSE SERPL-MCNC: 106 MG/DL — HIGH (ref 70–99)
GLUCOSE UR QL: NEGATIVE MG/DL — SIGNIFICANT CHANGE UP
HCO3 BLDV-SCNC: 23 MMOL/L — SIGNIFICANT CHANGE UP (ref 22–29)
HCT VFR BLD CALC: 28 % — LOW (ref 37–47)
HCT VFR BLDA CALC: 31 % — LOW (ref 34.5–46.5)
HGB BLD CALC-MCNC: 10.2 G/DL — LOW (ref 11.7–16.1)
HGB BLD-MCNC: 9.1 G/DL — LOW (ref 12–16)
KETONES UR QL: NEGATIVE MG/DL — SIGNIFICANT CHANGE UP
LACTATE BLDV-MCNC: 1.9 MMOL/L — SIGNIFICANT CHANGE UP (ref 0.5–2)
LEUKOCYTE ESTERASE UR-ACNC: NEGATIVE — SIGNIFICANT CHANGE UP
LIDOCAIN IGE QN: 65 U/L — HIGH (ref 7–60)
LYMPHOCYTES # BLD AUTO: 0.76 K/UL — LOW (ref 1.2–3.4)
LYMPHOCYTES # BLD AUTO: 25 % — SIGNIFICANT CHANGE UP (ref 20.5–51.1)
MACROCYTES BLD QL: SLIGHT — SIGNIFICANT CHANGE UP
MANUAL SMEAR VERIFICATION: SIGNIFICANT CHANGE UP
MCHC RBC-ENTMCNC: 31.7 PG — HIGH (ref 27–31)
MCHC RBC-ENTMCNC: 32.5 G/DL — SIGNIFICANT CHANGE UP (ref 32–37)
MCV RBC AUTO: 97.6 FL — SIGNIFICANT CHANGE UP (ref 81–99)
MONOCYTES # BLD AUTO: 0.15 K/UL — SIGNIFICANT CHANGE UP (ref 0.1–0.6)
MONOCYTES NFR BLD AUTO: 5 % — SIGNIFICANT CHANGE UP (ref 1.7–9.3)
NEUTROPHILS # BLD AUTO: 1.98 K/UL — SIGNIFICANT CHANGE UP (ref 1.4–6.5)
NEUTROPHILS NFR BLD AUTO: 65 % — SIGNIFICANT CHANGE UP (ref 42.2–75.2)
NITRITE UR-MCNC: NEGATIVE — SIGNIFICANT CHANGE UP
NRBC # BLD: 0 /100 WBCS — SIGNIFICANT CHANGE UP (ref 0–0)
NRBC BLD AUTO-RTO: SIGNIFICANT CHANGE UP /100 WBCS (ref 0–0)
NRBC BLD-RTO: 0 /100 WBCS — SIGNIFICANT CHANGE UP (ref 0–0)
NT-PROBNP SERPL-SCNC: 955 PG/ML — HIGH (ref 0–300)
PCO2 BLDV: 35 MMHG — LOW (ref 39–42)
PH BLDV: 7.43 — SIGNIFICANT CHANGE UP (ref 7.32–7.43)
PH UR: 5.5 — SIGNIFICANT CHANGE UP (ref 5–8)
PLAT MORPH BLD: ABNORMAL
PLATELET # BLD AUTO: 64 K/UL — LOW (ref 130–400)
PLATELET COUNT - ESTIMATE: ABNORMAL
PMV BLD: 10.3 FL — SIGNIFICANT CHANGE UP (ref 7.4–10.4)
PO2 BLDV: 35 MMHG — SIGNIFICANT CHANGE UP (ref 25–45)
POIKILOCYTOSIS BLD QL AUTO: SIGNIFICANT CHANGE UP
POLYCHROMASIA BLD QL SMEAR: SLIGHT — SIGNIFICANT CHANGE UP
POTASSIUM BLDV-SCNC: 3.7 MMOL/L — SIGNIFICANT CHANGE UP (ref 3.5–5.1)
POTASSIUM SERPL-MCNC: 3.8 MMOL/L — SIGNIFICANT CHANGE UP (ref 3.5–5)
POTASSIUM SERPL-SCNC: 3.8 MMOL/L — SIGNIFICANT CHANGE UP (ref 3.5–5)
PROT SERPL-MCNC: 6.7 G/DL — SIGNIFICANT CHANGE UP (ref 6–8)
PROT UR-MCNC: NEGATIVE MG/DL — SIGNIFICANT CHANGE UP
RBC # BLD: 2.87 M/UL — LOW (ref 4.2–5.4)
RBC # FLD: 18.3 % — HIGH (ref 11.5–14.5)
RBC BLD AUTO: ABNORMAL
SAO2 % BLDV: 56.2 % — LOW (ref 67–88)
SMUDGE CELLS # BLD: PRESENT — SIGNIFICANT CHANGE UP
SODIUM SERPL-SCNC: 137 MMOL/L — SIGNIFICANT CHANGE UP (ref 135–146)
SP GR SPEC: 1.01 — SIGNIFICANT CHANGE UP (ref 1–1.03)
TROPONIN T, HIGH SENSITIVITY RESULT: 28 NG/L — HIGH (ref 6–13)
TROPONIN T, HIGH SENSITIVITY RESULT: 33 NG/L — HIGH (ref 6–13)
UROBILINOGEN FLD QL: 0.2 MG/DL — SIGNIFICANT CHANGE UP (ref 0.2–1)
WBC # BLD: 3.04 K/UL — LOW (ref 4.8–10.8)
WBC # FLD AUTO: 3.04 K/UL — LOW (ref 4.8–10.8)

## 2025-05-12 PROCEDURE — 97162 PT EVAL MOD COMPLEX 30 MIN: CPT | Mod: GP

## 2025-05-12 PROCEDURE — 81404 MOPATH PROCEDURE LEVEL 5: CPT

## 2025-05-12 PROCEDURE — 80053 COMPREHEN METABOLIC PANEL: CPT

## 2025-05-12 PROCEDURE — 86900 BLOOD TYPING SEROLOGIC ABO: CPT

## 2025-05-12 PROCEDURE — 93306 TTE W/DOPPLER COMPLETE: CPT

## 2025-05-12 PROCEDURE — 84439 ASSAY OF FREE THYROXINE: CPT

## 2025-05-12 PROCEDURE — 86880 COOMBS TEST DIRECT: CPT

## 2025-05-12 PROCEDURE — 84100 ASSAY OF PHOSPHORUS: CPT

## 2025-05-12 PROCEDURE — 85730 THROMBOPLASTIN TIME PARTIAL: CPT

## 2025-05-12 PROCEDURE — 71045 X-RAY EXAM CHEST 1 VIEW: CPT | Mod: 26

## 2025-05-12 PROCEDURE — 85025 COMPLETE CBC W/AUTO DIFF WBC: CPT

## 2025-05-12 PROCEDURE — 93010 ELECTROCARDIOGRAM REPORT: CPT

## 2025-05-12 PROCEDURE — 86901 BLOOD TYPING SEROLOGIC RH(D): CPT

## 2025-05-12 PROCEDURE — 82248 BILIRUBIN DIRECT: CPT

## 2025-05-12 PROCEDURE — 82247 BILIRUBIN TOTAL: CPT

## 2025-05-12 PROCEDURE — 76705 ECHO EXAM OF ABDOMEN: CPT

## 2025-05-12 PROCEDURE — 85045 AUTOMATED RETICULOCYTE COUNT: CPT

## 2025-05-12 PROCEDURE — 85027 COMPLETE CBC AUTOMATED: CPT

## 2025-05-12 PROCEDURE — 36415 COLL VENOUS BLD VENIPUNCTURE: CPT

## 2025-05-12 PROCEDURE — 83735 ASSAY OF MAGNESIUM: CPT

## 2025-05-12 PROCEDURE — 99285 EMERGENCY DEPT VISIT HI MDM: CPT | Mod: FS

## 2025-05-12 PROCEDURE — 86850 RBC ANTIBODY SCREEN: CPT

## 2025-05-12 PROCEDURE — 85610 PROTHROMBIN TIME: CPT

## 2025-05-12 PROCEDURE — 83615 LACTATE (LD) (LDH) ENZYME: CPT

## 2025-05-12 PROCEDURE — 80048 BASIC METABOLIC PNL TOTAL CA: CPT

## 2025-05-12 PROCEDURE — 83010 ASSAY OF HAPTOGLOBIN QUANT: CPT

## 2025-05-12 PROCEDURE — 82607 VITAMIN B-12: CPT

## 2025-05-12 PROCEDURE — 84443 ASSAY THYROID STIM HORMONE: CPT

## 2025-05-12 NOTE — ED PROVIDER NOTE - CONSIDERATION OF ADMISSION OBSERVATION
Consideration of Admission/Observation Patient with failure to thrive, worsening cirrhosis. Will require admission

## 2025-05-12 NOTE — ED PROVIDER NOTE - PHYSICAL EXAMINATION
VITAL SIGNS: I have reviewed nursing notes and confirm.  CONSTITUTIONAL: In no acute distress.  SKIN: Skin exam is warm and dry  HEAD: Normocephalic; atraumatic.  EYES: PERRL, EOM intact; + scleral icterus BL.  ENT: No nasal discharge; airway clear.  NECK: Supple; non tender.  CARD: S1, S2; Regular rate and rhythm.  RESP: CTAB. Speaking in full sentences.   ABD: Normal bowel sounds; soft; mildly-distended; non-tender; No rebound or guarding.   EXT: Normal ROM. BL 2+ pitting edema.   NEURO: Alert, oriented. Grossly unremarkable. No focal deficits.

## 2025-05-12 NOTE — ED PROVIDER NOTE - OBJECTIVE STATEMENT
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  Patient was sent in by PMD for evaluation, as patient does not follow with hepatologist for cirrhosis.  Patient also reports intermittent shortness of breath with exertion.  Denies fever, chills, CP, URI symptoms, nausea, vomiting, or urinary symptoms.

## 2025-05-12 NOTE — ED PROVIDER NOTE - CLINICAL SUMMARY MEDICAL DECISION MAKING FREE TEXT BOX
Patient presented with hx cirrhosis, failure to thrive outpatient, decreased PO intake and worsening bodily swelling. No fevers. On arrival patient afebrile, HD stable, abd distended 2/2 ascites but non-tender. Obtained labs which showed (+) expected transaminitis but otherwiseee were grossly unremarkable including no significant leukocytosis, anemia, signs of dehydration/NORA, or significant electrolyte abnormalities. UA negative for infection. EKG non-specific and trop mildly elevated but with negative delta. Ammonia WNL. Chest xray negative for pneumothorax, pneumonia, widened mediastinum, evidence of rib fractures, enlarged cardiac silhouette or any other emergent pathologies. Patient remained stable during ED course but given worsening decline, poor PO intake, patient will require admission for further management. Patient and  agreeable with plan. HD stable at time of admission.

## 2025-05-12 NOTE — ED PROVIDER NOTE - DIFFERENTIAL DIAGNOSIS
Differential Diagnosis Failure to thrive, hx cirrhosis, r/o infection vs ACS vs high ammonia vs dehydration vs anemia vs electrolyte abnormality vs other underlying metabolic derangement.

## 2025-05-12 NOTE — ED PROVIDER NOTE - CARE PLAN
Principal Discharge DX:	Adult failure to thrive  Secondary Diagnosis:	Transaminitis  Secondary Diagnosis:	Cirrhosis   1

## 2025-05-12 NOTE — ED PROVIDER NOTE - IV ALTEPLASE ADMIN OUTSIDE HIDDEN
Discharge instructions given to parents, instructed on all appropriate follow up care and to return to ped upon worsening symptoms. Provided with prescriptions and tylenol/motrin dosing sheet. Parents verbalized understanding, states no further questions at this time. Patient is alert, acting appropriate, breathing easy and nonlabored at this time.   show

## 2025-05-13 LAB
ALBUMIN SERPL ELPH-MCNC: 3 G/DL — LOW (ref 3.5–5.2)
ALP SERPL-CCNC: 82 U/L — SIGNIFICANT CHANGE UP (ref 30–115)
ALT FLD-CCNC: 20 U/L — SIGNIFICANT CHANGE UP (ref 0–41)
ANION GAP SERPL CALC-SCNC: 11 MMOL/L — SIGNIFICANT CHANGE UP (ref 7–14)
AST SERPL-CCNC: 31 U/L — SIGNIFICANT CHANGE UP (ref 0–41)
BACTERIA # UR AUTO: NEGATIVE /HPF — SIGNIFICANT CHANGE UP
BASOPHILS # BLD AUTO: 0.02 K/UL — SIGNIFICANT CHANGE UP (ref 0–0.2)
BASOPHILS NFR BLD AUTO: 0.8 % — SIGNIFICANT CHANGE UP (ref 0–1)
BILIRUB SERPL-MCNC: 6.5 MG/DL — HIGH (ref 0.2–1.2)
BLD GP AB SCN SERPL QL: SIGNIFICANT CHANGE UP
BLD GP AB SCN SERPL QL: SIGNIFICANT CHANGE UP
BUN SERPL-MCNC: 36 MG/DL — HIGH (ref 10–20)
CALCIUM SERPL-MCNC: 8.4 MG/DL — SIGNIFICANT CHANGE UP (ref 8.4–10.5)
CAST: 4 /LPF — SIGNIFICANT CHANGE UP (ref 0–4)
CHLORIDE SERPL-SCNC: 111 MMOL/L — HIGH (ref 98–110)
CO2 SERPL-SCNC: 22 MMOL/L — SIGNIFICANT CHANGE UP (ref 17–32)
COD CRY URNS QL: PRESENT
CREAT SERPL-MCNC: 1 MG/DL — SIGNIFICANT CHANGE UP (ref 0.7–1.5)
EGFR: 55 ML/MIN/1.73M2 — LOW
EGFR: 55 ML/MIN/1.73M2 — LOW
EOSINOPHIL # BLD AUTO: 0.16 K/UL — SIGNIFICANT CHANGE UP (ref 0–0.7)
EOSINOPHIL NFR BLD AUTO: 6.3 % — SIGNIFICANT CHANGE UP (ref 0–8)
GLUCOSE SERPL-MCNC: 90 MG/DL — SIGNIFICANT CHANGE UP (ref 70–99)
HCT VFR BLD CALC: 25.5 % — LOW (ref 37–47)
HGB BLD-MCNC: 8.2 G/DL — LOW (ref 12–16)
IMM GRANULOCYTES NFR BLD AUTO: 0.4 % — HIGH (ref 0.1–0.3)
LYMPHOCYTES # BLD AUTO: 0.47 K/UL — LOW (ref 1.2–3.4)
LYMPHOCYTES # BLD AUTO: 18.4 % — LOW (ref 20.5–51.1)
MAGNESIUM SERPL-MCNC: 1.9 MG/DL — SIGNIFICANT CHANGE UP (ref 1.8–2.4)
MCHC RBC-ENTMCNC: 31.5 PG — HIGH (ref 27–31)
MCHC RBC-ENTMCNC: 32.2 G/DL — SIGNIFICANT CHANGE UP (ref 32–37)
MCV RBC AUTO: 98.1 FL — SIGNIFICANT CHANGE UP (ref 81–99)
MONOCYTES # BLD AUTO: 0.27 K/UL — SIGNIFICANT CHANGE UP (ref 0.1–0.6)
MONOCYTES NFR BLD AUTO: 10.5 % — HIGH (ref 1.7–9.3)
NEUTROPHILS # BLD AUTO: 1.63 K/UL — SIGNIFICANT CHANGE UP (ref 1.4–6.5)
NEUTROPHILS NFR BLD AUTO: 63.6 % — SIGNIFICANT CHANGE UP (ref 42.2–75.2)
NRBC BLD AUTO-RTO: 0 /100 WBCS — SIGNIFICANT CHANGE UP (ref 0–0)
PHOSPHATE SERPL-MCNC: 3.3 MG/DL — SIGNIFICANT CHANGE UP (ref 2.1–4.9)
PLATELET # BLD AUTO: 44 K/UL — LOW (ref 130–400)
PMV BLD: 10.3 FL — SIGNIFICANT CHANGE UP (ref 7.4–10.4)
POTASSIUM SERPL-MCNC: 3.4 MMOL/L — LOW (ref 3.5–5)
POTASSIUM SERPL-SCNC: 3.4 MMOL/L — LOW (ref 3.5–5)
PROT SERPL-MCNC: 5.3 G/DL — LOW (ref 6–8)
RBC # BLD: 2.6 M/UL — LOW (ref 4.2–5.4)
RBC # BLD: 2.6 M/UL — LOW (ref 4.2–5.4)
RBC # FLD: 18.1 % — HIGH (ref 11.5–14.5)
RBC CASTS # UR COMP ASSIST: 1 /HPF — SIGNIFICANT CHANGE UP (ref 0–4)
RETICS #: 122.7 K/UL — SIGNIFICANT CHANGE UP (ref 25–125)
RETICS/RBC NFR: 4.7 % — HIGH (ref 0.5–1.5)
SODIUM SERPL-SCNC: 144 MMOL/L — SIGNIFICANT CHANGE UP (ref 135–146)
SQUAMOUS # UR AUTO: 0 /HPF — SIGNIFICANT CHANGE UP (ref 0–5)
WBC # BLD: 2.56 K/UL — LOW (ref 4.8–10.8)
WBC # FLD AUTO: 2.56 K/UL — LOW (ref 4.8–10.8)
WBC UR QL: 0 /HPF — SIGNIFICANT CHANGE UP (ref 0–5)

## 2025-05-13 PROCEDURE — 99223 1ST HOSP IP/OBS HIGH 75: CPT

## 2025-05-13 PROCEDURE — 76705 ECHO EXAM OF ABDOMEN: CPT | Mod: 26

## 2025-05-13 RX ORDER — TORSEMIDE 10 MG
20 TABLET ORAL DAILY
Refills: 0 | Status: DISCONTINUED | OUTPATIENT
Start: 2025-05-13 | End: 2025-05-16

## 2025-05-13 RX ORDER — LACTULOSE 10 G/15ML
5 SOLUTION ORAL EVERY 4 HOURS
Refills: 0 | Status: COMPLETED | OUTPATIENT
Start: 2025-05-13 | End: 2025-05-14

## 2025-05-13 RX ADMIN — LACTULOSE 5 GRAM(S): 10 SOLUTION ORAL at 21:36

## 2025-05-13 RX ADMIN — LACTULOSE 5 GRAM(S): 10 SOLUTION ORAL at 18:00

## 2025-05-13 NOTE — H&P ADULT - NSHPPHYSICALEXAM_GEN_ALL_CORE
GENERAL: NAD  EYES: scleral icterus  ENMT: Moist mucous membranes  NECK: Supple, No JVD  HEART: Regular rate and rhythm; No murmurs, rubs, or gallops, Normal S1 S2   RESPIRATORY: Clear to auscultation bilaterally, resonant percussion note, no added sounds   ABDOMEN: Soft, Nontender, Nondistended; Bowel sounds present  NEUROLOGY: A&Ox3, no focal deficits  EXTREMITIES:  b/l LE tender, compressors on  SKIN: jaundiced

## 2025-05-13 NOTE — H&P ADULT - HISTORY OF PRESENT ILLNESS
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 Rivas for "liver problem." Patient reports she uses O2 at home and her PCP was concerned that she needs home o2 as well as hospital bed at home. Reports  abdominal bloating and lower extremity swelling, associated with generalized weakness for the past few weeks. On further questioning reports that her lower extr swelling has been present for a long time and was followed by ?ENT. Pt endorses worsening memory for months to years. Reports she does not follow with hepatologist regularly. Patient also endorses intermittent shortness of breath with exertion.      ED:  · BP Systolic	133 mm Hg  · BP Diastolic	67 mm Hg  · Heart Rate	81 /min  · Respiration Rate (breaths/min)	18 /min  · Temp (F)	97.7 Degrees F  · SpO2 (%)	95 %  · O2 Delivery/Oxygen Delivery Method	room air                          8.2    2.56  )-----------( 44       ( 13 May 2025 07:33 )             25.5       05-13    144  |  111[H]  |  36[H]  ----------------------------<  90  3.4[L]   |  22  |  1.0    Ca    8.4      13 May 2025 07:33  Phos  3.3     05-13  Mg     1.9     05-13    TPro  5.3[L]  /  Alb  3.0[L]  /  TBili  6.5[H]  /  DBili  x   /  AST  31  /  ALT  20  /  AlkPhos  82  05-13       CXR 5/12 - Stable opacifications.  RUQ -     Cirrhotic liver.    Pancreatic duct dilated at 4 mm. Recommend evaluation with   contrast-enhanced CT/MR.    Status post cholecystectomy. CBD dilated to 9 mm. Recommend correlation   with LFTs    Small ascites.    Admitted for failure to thrive, CM/SW, and weakness.

## 2025-05-13 NOTE — H&P ADULT - TIME BILLING
Direct clinical care, patient  counseling,  coordination with GI consultants, nursing and case management/social work teams, review of tests and scheduled medications,  independent review of previous medical records and available test results, and resident supervision. Time spent on the encounter excludes teaching time and/or separately reported services.

## 2025-05-13 NOTE — H&P ADULT - ATTENDING COMMENTS
Patient seen and examined independently in ED3-11.   Able to engage in examination and in no apparent   PAST MEDICAL & SURGICAL HISTORY:  GERD (gastroesophageal reflux disease)      Arthritis  OA  Cirrhosis and Ascites   Malignant neoplasm of areola of left breast in female, unspecified estrogen receptor status  H/O thrombocytopenia  Blister of right lower leg  History of surgery  History of cholecystectomy      Vitals:  T(F): 97.6 (05-13-25 @ 15:56)  HR: 84 (05-13-25 @ 15:56)  BP: 125/60 (05-13-25 @ 15:56)  RR: 18 (05-13-25 @ 15:56)  SpO2: 96% (05-13-25 @ 15:56)    TESTS & MEASUREMENTS:                        8.2    2.56  )-----------( 44       ( 13 May 2025 07:33 )             25.5       05-13    144  |  111[H]  |  36[H]  ----------------------------<  90  3.4[L]   |  22  |  1.0    Ca    8.4      13 May 2025 07:33  Phos  3.3     05-13  Mg     1.9     05-13    TPro  5.3[L]  /  Alb  3.0[L]  /  TBili  6.5[H]  /  DBili  x   /  AST  31  /  ALT  20  /  AlkPhos  82  05-13    LIVER FUNCTIONS - ( 13 May 2025 07:33 )  Alb: 3.0 g/dL / Pro: 5.3 g/dL / ALK PHOS: 82 U/L / ALT: 20 U/L / AST: 31 U/L / GGT: x             In summary:  HEALTH ISSUES - PROBLEM Dx:  - known with liver cirrhosis, extensively worked-up in the past, sent by primary care physician for advanced frailty, weakness,  and lack of appetite   - hypothyroidism on therapy   - Transaminitis and hyperbilirubinemia, expected   - Dilated pancreatic duct on liver US  - no clinical suspicion of SBP and no evidence of significant ascites on liver US  - Rest of medical issues as per note above     PLAN  - Will likely need to be on Beta-Blocker therapy and aldactone if tolerated   - Advanced GI and hepatology team eval   - TSH screen   - PT/ rehab   - Case management for safe dispo planning     Case discussed with resident assigned. Patient seen and examined independently in ED3-11.   Able to engage in examination and in no apparent distress     PMH:  Arthritis  OA  Cirrhosis and Ascites   Malignant neoplasm of areola of left breast in female, unspecified estrogen receptor status  H/O thrombocytopenia  Blister of right lower leg  History of surgery  History of cholecystectomy      Vitals:  T(F): 97.6 (05-13-25 @ 15:56)  HR: 84 (05-13-25 @ 15:56)  BP: 125/60 (05-13-25 @ 15:56)  RR: 18 (05-13-25 @ 15:56)  SpO2: 96% (05-13-25 @ 15:56)    TESTS & MEASUREMENTS:                        8.2    2.56  )-----------( 44       ( 13 May 2025 07:33 )             25.5       05-13    144  |  111[H]  |  36[H]  ----------------------------<  90  3.4[L]   |  22  |  1.0    Ca    8.4      13 May 2025 07:33  Phos  3.3     05-13  Mg     1.9     05-13    TPro  5.3[L]  /  Alb  3.0[L]  /  TBili  6.5[H]  /  DBili  x   /  AST  31  /  ALT  20  /  AlkPhos  82  05-13    LIVER FUNCTIONS - ( 13 May 2025 07:33 )  Alb: 3.0 g/dL / Pro: 5.3 g/dL / ALK PHOS: 82 U/L / ALT: 20 U/L / AST: 31 U/L / GGT: x             In summary:  HEALTH ISSUES - PROBLEM Dx:  - known with liver cirrhosis, extensively worked-up in the past, sent by primary care physician for advanced frailty, weakness,  and lack of appetite   - hypothyroidism on therapy   - Known with neutropenia at baseline  - Transaminitis and hyperbilirubinemia, expected   - Dilated pancreatic duct on liver US  - no clinical suspicion of SBP and no evidence of significant ascites on liver US  - no clinical evidence of hepatic encephalopathy  - Rest of medical issues as per note above     PLAN  - Will likely need to be on Beta-Blocker therapy and aldactone if tolerated   - Advanced GI and hepatology team eval   - TSH screen   - PT/ rehab   - Case management for safe dispo planning     Case discussed with resident assigned.

## 2025-05-13 NOTE — H&P ADULT - ASSESSMENT
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 Rivas for "liver problem." Patient reports she uses O2 at home and her PCP was concerned that she needs home o2 as well as hospital bed at home. Reports  abdominal bloating and lower extremity swelling, associated with generalized weakness for the past few weeks. On further questioning reports that her lower extr swelling has been present for a long time and was followed by ?ENT. Pt endorses worsening memory for months to years. Reports she does not follow with hepatologist regularly. Patient also endorses intermittent shortness of breath with exertion.    #Cirrhosis   #Weakness  #CBD + pancreatic duct dilation  - RUQ:  Cirrhotic liver.  Pancreatic duct dilated at 4 mm. Recommend evaluation with   contrast-enhanced CT/MR.  Status post cholecystectomy. CBD dilated to 9 mm. Recommend correlation   with LFTs  Small ascites.  - curbside hepatology recs:  -- get advanced GI for double duct sign on RUQ, hepatology will see after adv GI eval  -- re med regimen: small ascites, monitor off diuretics, lactulose PRN, bb not needed for now, likely needs OP egd  - f/u adv GI then hepatology  - PT  - CM/SW for home needs (o2, hospital bed)      #HTN  #Hypothyroid  - f/u TSH      #misc  code- full  dvt- lovenox  medrec- ***  + pt do not know current meds she is taking. no meds in surescripts.  to bring list of meds she is currently taking.   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 Rivas for "liver problem." Patient reports she uses O2 at home and her PCP was concerned that she needs home o2 as well as hospital bed at home. Reports  abdominal bloating and lower extremity swelling, associated with generalized weakness for the past few weeks. On further questioning reports that her lower extr swelling has been present for a long time and was followed by ?ENT. Pt endorses worsening memory for months to years. Reports she does not follow with hepatologist regularly. Patient also endorses intermittent shortness of breath with exertion.    #Cirrhosis   #Weakness  #CBD + pancreatic duct dilation  - RUQ:  Cirrhotic liver.  Pancreatic duct dilated at 4 mm. Recommend evaluation with   contrast-enhanced CT/MR.  Status post cholecystectomy. CBD dilated to 9 mm. Recommend correlation   with LFTs  Small ascites.  - curbside hepatology recs:  -- get advanced GI for double duct sign on RUQ, hepatology will see after adv GI eval  -- re med regimen: small ascites, monitor off diuretics, lactulose PRN, bb not needed for now, likely needs OP egd  - f/u adv GI then hepatology  - f/u INR  - PT  - CM/SW for home needs (o2, hospital bed)      #HTN  #Hypothyroid  - f/u TSH      #misc  code- full  dvt- f/u INR  gi- ppi  medrec- ***  + pt do not know current meds she is taking. no meds in surescripts.  to bring list of meds she is currently taking.   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 Rivas for "liver problem." Patient reports she uses O2 at home and her PCP was concerned that she needs home o2 as well as hospital bed at home. Reports  abdominal bloating and lower extremity swelling, associated with generalized weakness for the past few weeks. On further questioning reports that her lower extr swelling has been present for a long time and was followed by ?ENT. Pt endorses worsening memory for months to years. Reports she does not follow with hepatologist regularly. Patient also endorses intermittent shortness of breath with exertion.    #Cirrhosis   #Weakness  #CBD + pancreatic duct dilation  - RUQ:  Cirrhotic liver.  Pancreatic duct dilated at 4 mm. Recommend evaluation with   contrast-enhanced CT/MR.  Status post cholecystectomy. CBD dilated to 9 mm. Recommend correlation   with LFTs  Small ascites.  - curbside hepatology recs:  -- get advanced GI for double duct sign on RUQ, hepatology will see after adv GI eval  -- re med regimen: small ascites, monitor on home torsemide for now, lactulose PRN, resume propanolol home dose (verify when  brings meds), likely needs OP egd  - f/u adv GI then hepatology  - f/u INR  - PT  - CM/SW for home needs (o2, hospital bed)      #HTN  #Hypothyroid  - f/u TSH      #misc  code- full  dvt- f/u INR  gi- ppi  medrec- ***  + pt do not know current meds she is taking. no meds in surescripts.  to bring list of meds she is currently taking.   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 Rivas for "liver problem." Patient reports she uses O2 at home and her PCP was concerned that she needs home o2 as well as hospital bed at home. Reports  abdominal bloating and lower extremity swelling, associated with generalized weakness for the past few weeks. On further questioning reports that her lower extr swelling has been present for a long time and was followed by ?ENT. Pt endorses worsening memory for months to years. Reports she does not follow with hepatologist regularly. Patient also endorses intermittent shortness of breath with exertion.    #Cirrhosis   #Weakness  #CBD + pancreatic duct dilation  - RUQ:  Cirrhotic liver.  Pancreatic duct dilated at 4 mm. Recommend evaluation with   contrast-enhanced CT/MR.  Status post cholecystectomy. CBD dilated to 9 mm. Recommend correlation   with LFTs  Small ascites.  - curbside hepatology recs:  -- get advanced GI for double duct sign on RUQ, hepatology will see after adv GI eval  -- re med regimen: small ascites, monitor on home torsemide for now, lactulose PRN, resume propanolol home dose (verify when  brings meds), likely needs OP egd  - f/u adv GI then hepatology  - f/u INR  - PT  - CM/SW for home needs       #HTN  #Hypothyroid  - f/u TSH      #misc  code- full  dvt- f/u INR  gi- ppi  medrec- ***  + pt do not know current meds she is taking. no meds in surescripts.  to bring list of meds she is currently taking.

## 2025-05-14 LAB
ANION GAP SERPL CALC-SCNC: 9 MMOL/L — SIGNIFICANT CHANGE UP (ref 7–14)
APTT BLD: 37.6 SEC — SIGNIFICANT CHANGE UP (ref 27–39.2)
BUN SERPL-MCNC: 35 MG/DL — HIGH (ref 10–20)
CALCIUM SERPL-MCNC: 8.2 MG/DL — LOW (ref 8.4–10.5)
CHLORIDE SERPL-SCNC: 108 MMOL/L — SIGNIFICANT CHANGE UP (ref 98–110)
CO2 SERPL-SCNC: 22 MMOL/L — SIGNIFICANT CHANGE UP (ref 17–32)
CREAT SERPL-MCNC: 1.1 MG/DL — SIGNIFICANT CHANGE UP (ref 0.7–1.5)
EGFR: 49 ML/MIN/1.73M2 — LOW
EGFR: 49 ML/MIN/1.73M2 — LOW
GLUCOSE SERPL-MCNC: 103 MG/DL — HIGH (ref 70–99)
HCT VFR BLD CALC: 28.5 % — LOW (ref 37–47)
HGB BLD-MCNC: 9 G/DL — LOW (ref 12–16)
INR BLD: 1.52 RATIO — HIGH (ref 0.65–1.3)
MCHC RBC-ENTMCNC: 31.5 PG — HIGH (ref 27–31)
MCHC RBC-ENTMCNC: 31.6 G/DL — LOW (ref 32–37)
MCV RBC AUTO: 99.7 FL — HIGH (ref 81–99)
NRBC BLD AUTO-RTO: 0 /100 WBCS — SIGNIFICANT CHANGE UP (ref 0–0)
PLATELET # BLD AUTO: 58 K/UL — LOW (ref 130–400)
PMV BLD: 10.3 FL — SIGNIFICANT CHANGE UP (ref 7.4–10.4)
POTASSIUM SERPL-MCNC: 3.9 MMOL/L — SIGNIFICANT CHANGE UP (ref 3.5–5)
POTASSIUM SERPL-SCNC: 3.9 MMOL/L — SIGNIFICANT CHANGE UP (ref 3.5–5)
PROTHROM AB SERPL-ACNC: 18.1 SEC — HIGH (ref 9.95–12.87)
RBC # BLD: 2.86 M/UL — LOW (ref 4.2–5.4)
RBC # FLD: 17.9 % — HIGH (ref 11.5–14.5)
SODIUM SERPL-SCNC: 139 MMOL/L — SIGNIFICANT CHANGE UP (ref 135–146)
TSH SERPL-MCNC: 4.95 UIU/ML — HIGH (ref 0.27–4.2)
WBC # BLD: 3.94 K/UL — LOW (ref 4.8–10.8)
WBC # FLD AUTO: 3.94 K/UL — LOW (ref 4.8–10.8)

## 2025-05-14 PROCEDURE — 99223 1ST HOSP IP/OBS HIGH 75: CPT

## 2025-05-14 PROCEDURE — 99232 SBSQ HOSP IP/OBS MODERATE 35: CPT

## 2025-05-14 RX ORDER — HEPARIN SODIUM 1000 [USP'U]/ML
5000 INJECTION INTRAVENOUS; SUBCUTANEOUS EVERY 12 HOURS
Refills: 0 | Status: DISCONTINUED | OUTPATIENT
Start: 2025-05-14 | End: 2025-05-16

## 2025-05-14 RX ORDER — SPIRONOLACTONE 25 MG
25 TABLET ORAL DAILY
Refills: 0 | Status: DISCONTINUED | OUTPATIENT
Start: 2025-05-14 | End: 2025-05-16

## 2025-05-14 RX ORDER — LEVOTHYROXINE SODIUM 300 MCG
37.5 TABLET ORAL DAILY
Refills: 0 | Status: DISCONTINUED | OUTPATIENT
Start: 2025-05-14 | End: 2025-05-16

## 2025-05-14 RX ADMIN — LACTULOSE 5 GRAM(S): 10 SOLUTION ORAL at 13:38

## 2025-05-14 RX ADMIN — HEPARIN SODIUM 5000 UNIT(S): 1000 INJECTION INTRAVENOUS; SUBCUTANEOUS at 18:14

## 2025-05-14 RX ADMIN — Medication 20 MILLIGRAM(S): at 07:03

## 2025-05-14 RX ADMIN — Medication 40 MILLIGRAM(S): at 10:49

## 2025-05-14 RX ADMIN — LACTULOSE 5 GRAM(S): 10 SOLUTION ORAL at 10:49

## 2025-05-14 RX ADMIN — Medication 25 MILLIGRAM(S): at 18:14

## 2025-05-14 RX ADMIN — Medication 37.5 MICROGRAM(S): at 13:38

## 2025-05-14 NOTE — PATIENT PROFILE ADULT - NSTRANSFERBELONGINGSRESP_GEN_A_NUR
----- Message from Victoria Aguirre sent at 5/2/2019  2:33 PM CDT -----  Contact: Pt  Please give pt a call at [phone] regarding a message she left on yesterday about her being in the hospital  
Spoke with patient and states it will take 14 days for home health to get processed through the insurance and wants to know if there's anything else that can be done. Instructed patient to call her insurance. Patient verbalized understanding.  
No complaints
yes

## 2025-05-14 NOTE — PATIENT PROFILE ADULT - FUNCTIONAL ASSESSMENT - BASIC MOBILITY 6.
3-calculated by average/Not able to assess (calculate score using [facility] averaging method)

## 2025-05-14 NOTE — PATIENT PROFILE ADULT - FALL HARM RISK - HARM RISK INTERVENTIONS

## 2025-05-14 NOTE — CONSULT NOTE ADULT - SUBJECTIVE AND OBJECTIVE BOX
Hepatology Initial Consult Note      Location: [facility] 3B 013 B [facility]  Patient Name: BRAULIO ESPARZA  Age: 84y  Gender: Female      Chief Complaint  Patient is a 84y old Female who presents with a chief complaint of failure to thrive (14 May 2025 13:14)  Primary diagnosis of Failure to thrive in adult      Reason for Consult  Cirrhosis      History of Present Illness  84 year old female patient known to have a history of hypothyroidism, cryptogenic CURRIE cirrhosis, HE+, ascites hx, EV hx, and HFpEF who was referred to the ED on 05/12 for concern of leg swelling, weight gain, bloating, and RHONA, found to be in volume overload on exam and to have elevated liver enzymes with double duct sign. We are consulted for concern of decompensated cirrhosis.  Summary:  * Hemodynamically stable  * Labs: WBC 2.56, Hb 8.2, Plt 44, , K 3.4, BUN 36, Cr 1 on 05/13   * Liver enzymes: 6.5/82/31/20 on 05/13 (previous 5.5/97/48/25 on 02/18)  * KEVIN 1:320, ASMA -, AMA -, Ig G 1611, Ig A 255, Ig M 180   * INR 1.42 on 02/18  * LA 1.9 on 05/12  * Acute hepatitis ABC - in 04/2022  * US abdomen 05/13/2025 cirrhosis; dilated CBD 9mm; CCY; PD dilated 5mm; small ascites (previous 08/2022: cirrhosis; SPM, CCY, normal CBD 8mm, normal pancreas, mild ascites)  * CT AP IC 2022: cirrhosis, CCY, SPM, normal pancreas, small ascites, diverticulosis, splenic and GE varices  * Previous EGD many years ago: Hx of EV +; patient not sure about banding hx  * Previous colonoscopy many years ago  * FHx of GI cancers negative        Prior EGD:  as below      Prior Colonoscopy:  as below      Past Medical and Surgical History:  GERD (gastroesophageal reflux disease)    Arthritis  OA    Fatty liver  AS PER PATIENT 15YEARS  PT REPORTS- I HAVE AN ENLARGED SPLEEN  LOW PLATELETS.    Malignant neoplasm of areola of left breast in female, unspecified estrogen receptor status    H/O thrombocytopenia    Blister of right lower leg    History of surgery  LEFT BREAST LUMPECTOMY  TUBIAL LIGATION  CHOLECYSTECTOMY  RIGHT & LEFT TKR    History of cholecystectomy        Home Medications:  Home Medications:  Prevagen Extra Strength 50 mcg (2000 intl units) oral capsule: 1 cap(s) orally once a day (04 Mar 2024 08:25)  silver sulfADIAZINE 1% topical cream: 1 application topically once a day (04 Mar 2024 08:37)  Tirosint 37.5 mcg (0.0375 mg) oral capsule: 37.5 microgram(s) orally once a day (04 Mar 2024 08:29)  torsemide 20 mg oral tablet: 1 tab(s) orally once a day (04 Mar 2024 08:29)      Social History:  Social History:    Tobacco: denies  Alcohol: occasional  Drugs: denies      Allergies:  aspirin (Other)  Cipro (Hives; Rash)  predniSONE (Swelling)  Levaquin (Hives; Rash)      Family History:  Family history of breast cancer in mother (Mother)  Family history of Parkinson disease (Father)          Vital Signs in the last 24 hours   Vitals Summary T(C): 36.6 (05-14-25 @ 20:15), Max: 36.7 (05-14-25 @ 17:11)  HR: 71 (05-14-25 @ 20:15) (65 - 86)  BP: 120/67 (05-14-25 @ 20:15) (103/59 - 139/69)  RR: 18 (05-14-25 @ 20:15) (18 - 18)  SpO2: 96% (05-14-25 @ 20:15) (93% - 96%)  Vent Data   Intake/ Output   Measurements       Physical Exam  * General Appearance: Alert, cooperative, interactive, oriented to time, place, and person, in no acute distress  * Eyes: PERRL, conjunctiva/corneas clear, EOM's intact, fundi benign, both eyes  * Lungs: audible crackles  * Heart: Regular Rate and Rhythm, normal S1 and S2, no audible murmur, rub, or gallop  * Abdomen: Symmetric, softly mildly -distended, soft, non-tender, bowel sounds active all four quadrants, no masses  * Extremities: stasis dermatitis, lower extremity pitting edema bilaterally      Investigations   Laboratory Workup      - CBC:                        9.0    3.94  )-----------( 58       ( 14 May 2025 07:48 )             28.5       - Hgb Trend:  9.0  05-14-25 @ 07:48  8.2  05-13-25 @ 07:33  9.1  05-12-25 @ 21:25        - Chemistry:  05-14    139  |  108  |  35[H]  ----------------------------<  103[H]  3.9   |  22  |  1.1    Ca    8.2[L]      14 May 2025 07:48  Phos  3.3     05-13  Mg     1.9     05-13    TPro  5.3[L]  /  Alb  3.0[L]  /  TBili  6.5[H]  /  DBili  x   /  AST  31  /  ALT  20  /  AlkPhos  82  05-13    Liver panel trend:  TBili 6.5   /   AST 31   /   ALT 20   /   AlkP 82   /   Tptn 5.3   /   Alb 3.0    /   DBili --      05-13  TBili 7.4   /   AST 47   /   ALT 24   /   AlkP 119   /   Tptn 6.7   /   Alb 3.2    /   DBili 1.1      05-12      - Coagulation Studies:  PT/INR - ( 14 May 2025 07:48 )   PT: 18.10 sec;   INR: 1.52 ratio         PTT - ( 14 May 2025 07:48 )  PTT:37.6 sec    - ABG:      - Cardiac Markers:        Microbiological Workup  Urinalysis Basic - ( 14 May 2025 07:48 )    Color: x / Appearance: x / SG: x / pH: x  Gluc: 103 mg/dL / Ketone: x  / Bili: x / Urobili: x   Blood: x / Protein: x / Nitrite: x   Leuk Esterase: x / RBC: x / WBC x   Sq Epi: x / Non Sq Epi: x / Bacteria: x        Urinalysis with Rflx Culture (collected 12 May 2025 22:15)        Radiological Workup    US Abdomen Upper Quadrant Right:   ACC: [id number] EXAM:  US ABDOMEN RT UPR QUADRANT   ORDERED BY: ABRAN BHATIA     PROCEDURE DATE:  05/13/2025          INTERPRETATION:  CLINICAL INFORMATION: Cirrhosis    COMPARISON: 8/28/2022    TECHNIQUE: Sonography of the right upper quadrant.    FINDINGS:  Liver: Cirrhotic liver.  Bile ducts: CBD dilated to 9 mm.  Gallbladder: Cholecystectomy.  Pancreas: Pancreatic duct dilated at 4 mm. Limited evaluation of pancreas   by ultrasound modality.  Right kidney: 8.3 cm. No hydronephrosis.  Ascites: Small      IMPRESSION:    Cirrhotic liver.    Pancreatic duct dilated at 4 mm. Recommend evaluation with   contrast-enhanced CT/MR.    Status post cholecystectomy. CBD dilated to 9 mm. Recommend correlation   with LFTs    Small ascites.    --- End of Report ---            SUSAN EWING MD; Attending Radiologist  This document has been electronically signed. May 13 2025 12:52PM (05-13-25 @ 12:32)          Current Medications  Standing Medications  heparin   Injectable 5000 Unit(s) SubCutaneous every 12 hours  levothyroxine 37.5 MICROGram(s) Oral daily  pantoprazole    Tablet 40 milliGRAM(s) Oral before breakfast  propranolol 10 milliGRAM(s) Oral two times a day  spironolactone 25 milliGRAM(s) Oral daily  torsemide 20 milliGRAM(s) Oral daily    PRN Medications    Singles Doses Administered  lactulose Syrup 5 Gram(s) Oral every 4 hours

## 2025-05-14 NOTE — PHYSICAL THERAPY INITIAL EVALUATION ADULT - ADDITIONAL COMMENTS
pt lives with spouse in an apartment with elevator access. she used a walker prior to admission. pt lives with spouse in an apartment with no steps to enter and elevator inside. she was independent with  a walker prior to admission.

## 2025-05-14 NOTE — PHYSICAL THERAPY INITIAL EVALUATION ADULT - PERTINENT HX OF CURRENT PROBLEM, REHAB EVAL
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 Rivas for "liver problem." Patient reports she uses O2 at home and her PCP was concerned that she needs home o2 as well as hospital bed at home. Reports  abdominal bloating and lower extremity swelling, associated with generalized weakness for the past few weeks. On further questioning reports that her lower extr swelling has been present for a long time and was followed by ?ENT. Pt endorses worsening memory for months to years. Reports she does not follow with hepatologist regularly. Patient also endorses intermittent shortness of breath with exertion.

## 2025-05-14 NOTE — PROGRESS NOTE ADULT - CONVERSATION DETAILS
dw the pt the GOCs, focused on CODE STATUS,  she is alert and oriented x4, understands the concept of resuscitations and intubations  she wants to be full code.

## 2025-05-14 NOTE — CONSULT NOTE ADULT - PROVIDER SPECIALTY LIST ADULT
Hepatology Per pt this is a follow up CT for the nodule seen last year in his [facility] [[email or web address]]  For comparison  +hx of smoking in HS, quit 25 yrs ago  No hx of cancer   Pls advise of next plan of care  Thank you

## 2025-05-14 NOTE — CONSULT NOTE ADULT - ATTENDING COMMENTS
82 year old  F with hx of pancytopenia, splenomegaly MGUS, mild AS AR PHTN, portal HTN cirrhosis admitted with leg swelling   seen for concern of decompensated cirrhosis.    Patient has indirect hyperbilirubinemia for several years 2-5 range. Was seen in 2023 with possible cirrhosis on imaging. Fibrosure suggestive of cirrhosis in 2016 FIB 4 9.8 suggestive of F3-F4 fibrosis. Fibroscan shows F3 fibrosis and no steatosis EGD from prior notes reportedly small varices. Patient was overweight before but no hx of T2DM. Has low plat count (70 K)and splenomegaly since 2016. AST ALT ratio reversal Low albumin since 2022 April.  CLD work up negative except for positive KEVIN. Cirrhotic vs non cirrhotic portal HTN ( NR[facility] can resemble cirrhosis).   TJ liver biopsy with measurement of portal pressures would be helpful but quite frail and was not keen for liver biopsy before.   Has been having confusion and needing oxygen again. Patient was on oxygen before. Was thought to have  RHF from pulm HTN but very mild findings and was evaluated by heart failure team.     Confused  Frail looking  Icterus+  Abdomen soft non tender no flank dullness  Ext - mild leg edema  Asterixis +       Portal HTN with HE and small ascites -  probably  cirrhosis - cryptogenic  Indirect hyperbilirubinemia - Bilirubin > 6 not usually seen in Gilbert's   Mild AS Mild AR chronic respiratory failure ? Pulmonary HTN    Lactulose for 3 BM / day.   Echo   Check UGT1a1 enzyme activity  Goals of care discussion 82 year old  F with hx of pancytopenia, splenomegaly MGUS, mild AS AR PHTN, portal HTN cirrhosis admitted with leg swelling   seen for concern of decompensated cirrhosis.    Patient has indirect hyperbilirubinemia for several years 2-5 range. Was seen in 2023 with possible cirrhosis on imaging. Fibrosure suggestive of cirrhosis in 2016 FIB 4 9.8 suggestive of F3-F4 fibrosis. Fibroscan shows F3 fibrosis and no steatosis EGD from prior notes reportedly small varices. Patient was overweight before but no hx of T2DM. Has low plat count (70 K)and splenomegaly since 2016. AST ALT ratio reversal Low albumin since 2022 April.  CLD work up negative except for positive KEVIN. Cirrhotic vs non cirrhotic portal HTN ( NRH can resemble cirrhosis).   TJ liver biopsy with measurement of portal pressures would be helpful but quite frail and was not keen for liver biopsy before.   Has been having confusion and needing oxygen again. Patient was on oxygen before. Was thought to have  RHF from pulm HTN but very mild findings and was evaluated by heart failure team.     Confused  Frail looking  Icterus+  Abdomen soft non tender no flank dullness  Ext - mild leg edema  Asterixis +       Portal HTN with HE and small ascites -  probably  cirrhosis - cryptogenic  Indirect hyperbilirubinemia - Bilirubin > 6 not usually seen in Gilbert's   Mild AS Mild AR chronic respiratory failure ? Pulmonary HTN    Lactulose for 3 BM / day.   Echo   Check UGT1a1 enzyme activity and tests for hemolysis   Continue propranolol   Goals of care discussion

## 2025-05-14 NOTE — CONSULT NOTE ADULT - ASSESSMENT
Assessment and Plan  Case of an 84 year old female patient known to have a history of hypothyroidism, cryptogenic CURRIE cirrhosis, HE+, ascites hx, EV hx, and HFpEF who was referred to the ED on 05/12 for concern of leg swelling, weight gain, bloating, and RHONA, found to be in volume overload on exam and to have elevated liver enzymes with double duct sign. We are consulted for concern of decompensated cirrhosis.      Cryptogenic NAFLD cirrhosis Decompensation; KEVIN + no previous bx due to frailty; Fib 4 9.8 in 2016  Concern for Gilbert syndrome (pending UGT1A) due to indirect hyperbilirubinemia   Small ascites  Hx of HE; no HCC; Hx of EV  Chronic anemia; no overt bleeding; chronic TPN, SPM 2016, splenic varices on CY  Double duct sign s/p CCY; r/o pancreatic malignancy  * Hemodynamically stable  * Labs: WBC 2.56, Hb 8.2, Plt 44, , K 3.4, BUN 36, Cr 1 on 05/13   * Liver enzymes: 6.5/82/31/20 on 05/13 (previous 5.5/97/48/25 on 02/18)  * KEVIN 1:320, ASMA -, AMA -, Ig G 1611, Ig A 255, Ig M 180   * INR 1.42 on 02/18  * LA 1.9 on 05/12  * Acute hepatitis ABC - in 04/2022  * US abdomen 05/13/2025 cirrhosis; dilated CBD 9mm; CCY; PD dilated 5mm; small ascites (previous 08/2022: cirrhosis; SPM, CCY, normal CBD 8mm, normal pancreas, mild ascites)  * CT AP IC 2022: cirrhosis, CCY, SPM, normal pancreas, small ascites, diverticulosis, splenic and GE varices  * Previous EGD many years ago: Hx of EV +; patient not sure about banding hx  * Previous colonoscopy many years ago  * FHx of GI cancers negative    RECOMMENDATIONS  - Will discuss with advanced imaging findings of double duct sign: would likely benefit from close follow up for EUS/MRCP for better evaluation  - Trend hepatic function panel and INR daily  - Counseled about alcohol cessation: has been drinking occasionally; last drink last year. Check Peth  - CLD workup noted. Check A1At in setting of O2 requirements  - Recommend repeat TTE and cardiology evaluation in setting of elevated BNP and O2 requirements. Consider VA duplex  - Consider checking UGT1A per previous recommendation by Dr Erickson in setting of indirect hyperbilirubinemia (r/o Gilbert syndrome)  - Avoid hepatotoxic agents  - For small Ascites: recommend serial abdominal exams. 2g Na diet. On torsemide 20mg QD (off aldactone). Cardiology evaluation  - Monitor MAP and keep >65mmhg  - Monitor for Hepatic encephalopathy (hx HE +): lactulose and titrate to target BM 2-3 per day. Avoid sedatives and opioids  - For Esophageal varices hx: outpatient follow up for EGD; continue propranolol 10mg BID with HR monitoring  - For HCC screening: Please f/u as outpatient for US abdomen and AFP every 6 months.  - For Vaccinations: Please f/u in clinic for being uptodate with HAV/HBV/Influenza/Pneumococcal vaccines.  - For LT evaluation: old and frail  - Trend CBC. Target Hb 7-9 in setting of portal HTN  - Lifestyle/Dietary modifications: Calorie intake 25-40 Kcal/kg/day; Protein intake: 1.2-1.5 gm/kg/day  - Avoid smoking and NSAIDS; Abstain from alcohol and all illicit drugs  - Avoid use of herbal and dietary supplements due to potential hepatotoxicity. Limit use of acetaminophen to <2 grams per day. Avoid use of nonsteroidal antiinflammatory drugs (NSAIDs) . Avoid eating any unpasteurized dairy products; avoid eating any raw or undercooked eggs, fish, poultry, or meat; and avoid eating raw/steamed oysters or other shellfish to avoid risk of Vibrio infection.  - Follow up with our [facility] located at [address] [place] [address]. Phone Number: [phone]    - Follow up with our [facility]at[facility] located at [address] [place] [address], [facility]. Telephone No: [phone]      Thank you for your consult.  - Please note that plan was communicated with medical team.   - Please reach GI on [phone] during weekdays till 5pm.  - Please call the GI service line after 5pm on Weekdays and anytime on Weekends: [phone].      Brett Farr MD  PGY - 5 Gastroenterology Fellow   [facility]     Assessment and Plan  Case of an 84 year old female patient known to have a history of hypothyroidism, cryptogenic CURRIE cirrhosis, HE+, ascites hx, EV hx, and HFpEF who was referred to the ED on 05/12 for concern of leg swelling, weight gain, bloating, and RHONA, found to be in volume overload on exam and to have elevated liver enzymes with double duct sign. We are consulted for concern of decompensated cirrhosis.      Cryptogenic NAFLD cirrhosis Decompensation; KEVIN + no previous bx due to frailty; Fib 4 9.8 in 2016  Concern for Gilbert syndrome (pending UGT1A) due to indirect hyperbilirubinemia   Small ascites  Hx of HE; no HCC; Hx of EV  Chronic anemia; no overt bleeding; chronic TPN, SPM 2016, splenic varices on CY  Double duct sign s/p CCY; r/o pancreatic malignancy  * Hemodynamically stable  * Labs: WBC 2.56, Hb 8.2, Plt 44, , K 3.4, BUN 36, Cr 1 on 05/13   * Liver enzymes: 6.5/82/31/20 on 05/13 (previous 5.5/97/48/25 on 02/18)  * KEVIN 1:320, ASMA -, AMA -, Ig G 1611, Ig A 255, Ig M 180   * INR 1.42 on 02/18  * LA 1.9 on 05/12  * Acute hepatitis ABC - in 04/2022  * US abdomen 05/13/2025 cirrhosis; dilated CBD 9mm; CCY; PD dilated 5mm; small ascites (previous 08/2022: cirrhosis; SPM, CCY, normal CBD 8mm, normal pancreas, mild ascites)  * CT AP IC 2022: cirrhosis, CCY, SPM, normal pancreas, small ascites, diverticulosis, splenic and GE varices  * Previous EGD many years ago: Hx of EV +; patient not sure about banding hx  * Previous colonoscopy many years ago  * FHx of GI cancers negative    RECOMMENDATIONS  - Will discuss with advanced imaging findings of double duct sign: would likely benefit from close follow up for EUS/MRCP for better evaluation  - Trend hepatic function panel and INR daily  - Counseled about alcohol cessation: has been drinking occasionally; last drink last year. Check Peth  - CLD workup noted. A1AT - MM type  - Recommend repeat TTE and cardiology evaluation in setting of elevated BNP and O2 requirements. Consider VA duplex  - Checking UGT1A1 enzyme activity in setting of indirect hyperbilirubinemia (r/o Gilbert syndrome)  - Avoid hepatotoxic agents  - For small Ascites: recommend serial abdominal exams. 2g Na diet. On torsemide 20mg QD (off aldactone). Cardiology evaluation  - Monitor MAP and keep >65mmhg  - Monitor for Hepatic encephalopathy (hx HE +): lactulose and titrate to target BM 2-3 per day. Avoid sedatives and opioids  - For Esophageal varices hx: outpatient follow up for EGD; continue propranolol 10mg BID with HR monitoring  - For HCC screening: Please f/u as outpatient for US abdomen and AFP every 6 months.  - For Vaccinations: Please f/u in clinic for being uptodate with HAV/HBV/Influenza/Pneumococcal vaccines.  - For LT evaluation: old and frail  - Trend CBC. Target Hb 7-9 in setting of portal HTN  - Lifestyle/Dietary modifications: Calorie intake 25-40 Kcal/kg/day; Protein intake: 1.2-1.5 gm/kg/day  - Avoid smoking and NSAIDS; Abstain from alcohol and all illicit drugs  - Avoid use of herbal and dietary supplements due to potential hepatotoxicity. Limit use of acetaminophen to <2 grams per day. Avoid use of nonsteroidal antiinflammatory drugs (NSAIDs) . Avoid eating any unpasteurized dairy products; avoid eating any raw or undercooked eggs, fish, poultry, or meat; and avoid eating raw/steamed oysters or other shellfish to avoid risk of Vibrio infection.  - Follow up with our [facility] located at [address] [place] [address]. Phone Number: [phone]    - Follow up with our [facility] located at [address] [place] [address], [facility]. Telephone No: [phone]      Thank you for your consult.  - Please note that plan was communicated with medical team.   - Please reach GI on [phone] during weekdays till 5pm.  - Please call the GI service line after 5pm on Weekdays and anytime on Weekends: [phone].      Brett Farr MD  PGY - 5 Gastroenterology Fellow   [facility]

## 2025-05-14 NOTE — PHYSICAL THERAPY INITIAL EVALUATION ADULT - GAIT TRAINING, PT EVAL
Goal: pt will ambulate w/ rolling walker independent x100 feet by discharge to facilitate return to PLOF.

## 2025-05-14 NOTE — PROGRESS NOTE ADULT - SUBJECTIVE AND OBJECTIVE BOX
Patient is a 84y old Female who presents with a chief complaint of failure to thrive (13 May 2025 14:57)      No acute or major events overnight. Patient was seen at bedside. Patient is AOx3, following commands. Reports no complaints  ROS was otherwise negative  Patient is HD stable, afebrile, Saturating well on RA.   Patient tolerates PO, and has BM    ALLERGIES:  aspirin (Other)  Cipro (Hives; Rash)  predniSONE (Swelling)  Levaquin (Hives; Rash)    MEDICATIONS:  STANDING MEDICATIONS  lactulose Syrup 5 Gram(s) Oral every 4 hours  pantoprazole    Tablet 40 milliGRAM(s) Oral before breakfast  propranolol 10 milliGRAM(s) Oral two times a day  torsemide 20 milliGRAM(s) Oral daily    PRN MEDICATIONS      VITALS LAST 24H:  Vital Signs Last 24 Hrs  T(C): 36.3 (14 May 2025 07:54), Max: 36.4 (13 May 2025 15:56)  T(F): 97.4 (14 May 2025 07:54), Max: 97.6 (13 May 2025 15:56)  HR: 71 (14 May 2025 07:54) (65 - 86)  BP: 139/69 (14 May 2025 07:54) (103/59 - 139/69)  BP(mean): 81 (14 May 2025 00:18) (81 - 81)  RR: 18 (14 May 2025 07:54) (18 - 18)  SpO2: 93% (14 May 2025 07:54) (93% - 96%)    Parameters below as of 14 May 2025 07:54  Patient On (Oxygen Delivery Method): nasal cannula  O2 Flow (L/min): 2      PHYSICAL EXAM:  GENERAL: NAD, lying in bed comfortably  HEENT:  EOMI, Moist mucous membranes  CHEST/LUNG: Clear to auscultation bilaterally;  HEART: Regular rate and rhythm, systolic murmur 3/6 in the LUSB  ABDOMEN: Soft, nontender, nondistended, Bowel sounds present, Jaundice  EXTREMITIES:  2+ Peripheral Pulses, no lower extremity edema  NERVOUS SYSTEM:  A&Ox3, no focal deficits     LABS:                        9.0    3.94  )-----------( 58       ( 14 May 2025 07:48 )             28.5     05-14    139  |  108  |  35[H]  ----------------------------<  103[H]  3.9   |  22  |  1.1    Ca    8.2[L]      14 May 2025 07:48  Phos  3.3     05-13  Mg     1.9     05-13    TPro  5.3[L]  /  Alb  3.0[L]  /  TBili  6.5[H]  /  DBili  x   /  AST  31  /  ALT  20  /  AlkPhos  82  05-13    PT/INR - ( 14 May 2025 07:48 )   PT: 18.10 sec;   INR: 1.52 ratio         PTT - ( 14 May 2025 07:48 )  PTT:37.6 sec  Urinalysis Basic - ( 14 May 2025 07:48 )    Color: x / Appearance: x / SG: x / pH: x  Gluc: 103 mg/dL / Ketone: x  / Bili: x / Urobili: x   Blood: x / Protein: x / Nitrite: x   Leuk Esterase: x / RBC: x / WBC x   Sq Epi: x / Non Sq Epi: x / Bacteria: x            Urinalysis with Rflx Culture (collected 12 May 2025 22:15)          RADIOLOGY:  CXR  Xray Chest 1 View- PORTABLE-Urgent:   ACC: [id number] EXAM:  XR CHEST PORTABLE URGENT 1V   ORDERED BY: SIRISHA CORDERO     PROCEDURE DATE:  05/12/2025          INTERPRETATION:  CLINICAL HISTORY: Shortness of breath.    COMPARISON: February, 2025..    TECHNIQUE: Portable frontal chest radiograph.    FINDINGS:    Support devices: None.    Cardiac/mediastinum/hilum: Stable.    Lung parenchyma/Pleura: Stable opacities    Skeleton/soft tissues: Stable.      IMPRESSION:    Stable opacifications.    --- End of Report ---            AGUSTINA CORDON MD; Attending Interventional Radiologist  This document has been electronically signed. May 13 2025  7:48AM (05-12-25 @ 20:28)      CT           Patient is a 84y old Female who presents with a chief complaint of failure to thrive (13 May 2025 14:57)      No acute or major events overnight. Patient was seen at bedside. Patient is AOx3, following commands. Reports no complaints but feels weak,   ROS was otherwise negative  Patient is HD stable, afebrile, Saturating well on RA.   Patient tolerates PO, and had BM    ALLERGIES:  aspirin (Other)  Cipro (Hives; Rash)  predniSONE (Swelling)  Levaquin (Hives; Rash)    MEDICATIONS:  STANDING MEDICATIONS  lactulose Syrup 5 Gram(s) Oral every 4 hours  pantoprazole    Tablet 40 milliGRAM(s) Oral before breakfast  propranolol 10 milliGRAM(s) Oral two times a day  torsemide 20 milliGRAM(s) Oral daily    PRN MEDICATIONS      VITALS LAST 24H:  Vital Signs Last 24 Hrs  T(C): 36.3 (14 May 2025 07:54), Max: 36.4 (13 May 2025 15:56)  T(F): 97.4 (14 May 2025 07:54), Max: 97.6 (13 May 2025 15:56)  HR: 71 (14 May 2025 07:54) (65 - 86)  BP: 139/69 (14 May 2025 07:54) (103/59 - 139/69)  BP(mean): 81 (14 May 2025 00:18) (81 - 81)  RR: 18 (14 May 2025 07:54) (18 - 18)  SpO2: 93% (14 May 2025 07:54) (93% - 96%)    Parameters below as of 14 May 2025 07:54  Patient On (Oxygen Delivery Method): nasal cannula  O2 Flow (L/min): 2      PHYSICAL EXAM:  GENERAL: old lady not in distress   HEENT:  EOMI, Moist mucous membranes  CHEST/LUNG: Clear to auscultation bilaterally; no resp distress, no wheeze or crackles   HEART: Regular rate and rhythm, + systolic murmur 4/6 in the R and L USB  ABDOMEN: Soft, nontender, nondistended, Bowel sounds present, Jaundice  EXTREMITIES:  2+ Peripheral Pulses, no lower extremity edema  NERVOUS SYSTEM:  A&Ox3, globally weak, moves all exts, no focal deficits     LABS:                        9.0    3.94  )-----------( 58       ( 14 May 2025 07:48 )             28.5     05-14    139  |  108  |  35[H]  ----------------------------<  103[H]  3.9   |  22  |  1.1    Ca    8.2[L]      14 May 2025 07:48  Phos  3.3     05-13  Mg     1.9     05-13    TPro  5.3[L]  /  Alb  3.0[L]  /  TBili  6.5[H]  /  DBili  x   /  AST  31  /  ALT  20  /  AlkPhos  82  05-13    PT/INR - ( 14 May 2025 07:48 )   PT: 18.10 sec;   INR: 1.52 ratio         PTT - ( 14 May 2025 07:48 )  PTT:37.6 sec  Urinalysis Basic - ( 14 May 2025 07:48 )    Color: x / Appearance: x / SG: x / pH: x  Gluc: 103 mg/dL / Ketone: x  / Bili: x / Urobili: x   Blood: x / Protein: x / Nitrite: x   Leuk Esterase: x / RBC: x / WBC x   Sq Epi: x / Non Sq Epi: x / Bacteria: x            Urinalysis with Rflx Culture (collected 12 May 2025 22:15)          RADIOLOGY:  CXR  Xray Chest 1 View- PORTABLE-Urgent:   ACC: [id number] EXAM:  XR CHEST PORTABLE URGENT 1V   ORDERED BY: SIRISHA CORDERO     PROCEDURE DATE:  05/12/2025          INTERPRETATION:  CLINICAL HISTORY: Shortness of breath.    COMPARISON: February, 2025..    TECHNIQUE: Portable frontal chest radiograph.    FINDINGS:    Support devices: None.    Cardiac/mediastinum/hilum: Stable.    Lung parenchyma/Pleura: Stable opacities    Skeleton/soft tissues: Stable.      IMPRESSION:    Stable opacifications.    --- End of Report ---            AGUSTINA CORDON MD; Attending Interventional Radiologist  This document has been electronically signed. May 13 2025  7:48AM (05-12-25 @ 20:28)      CT

## 2025-05-15 ENCOUNTER — RESULT REVIEW (OUTPATIENT)
Age: 85
End: 2025-05-15

## 2025-05-15 LAB
ALBUMIN SERPL ELPH-MCNC: 2.8 G/DL — LOW (ref 3.5–5.2)
ALP SERPL-CCNC: 95 U/L — SIGNIFICANT CHANGE UP (ref 30–115)
ALT FLD-CCNC: 19 U/L — SIGNIFICANT CHANGE UP (ref 0–41)
ANION GAP SERPL CALC-SCNC: 10 MMOL/L — SIGNIFICANT CHANGE UP (ref 7–14)
AST SERPL-CCNC: 32 U/L — SIGNIFICANT CHANGE UP (ref 0–41)
BASOPHILS # BLD AUTO: 0.03 K/UL — SIGNIFICANT CHANGE UP (ref 0–0.2)
BASOPHILS NFR BLD AUTO: 0.7 % — SIGNIFICANT CHANGE UP (ref 0–1)
BILIRUB SERPL-MCNC: 6.5 MG/DL — HIGH (ref 0.2–1.2)
BUN SERPL-MCNC: 34 MG/DL — HIGH (ref 10–20)
CALCIUM SERPL-MCNC: 8.3 MG/DL — LOW (ref 8.4–10.5)
CHLORIDE SERPL-SCNC: 107 MMOL/L — SIGNIFICANT CHANGE UP (ref 98–110)
CO2 SERPL-SCNC: 23 MMOL/L — SIGNIFICANT CHANGE UP (ref 17–32)
CREAT SERPL-MCNC: 1.2 MG/DL — SIGNIFICANT CHANGE UP (ref 0.7–1.5)
EGFR: 44 ML/MIN/1.73M2 — LOW
EGFR: 44 ML/MIN/1.73M2 — LOW
EOSINOPHIL # BLD AUTO: 0.31 K/UL — SIGNIFICANT CHANGE UP (ref 0–0.7)
EOSINOPHIL NFR BLD AUTO: 7.6 % — SIGNIFICANT CHANGE UP (ref 0–8)
GLUCOSE SERPL-MCNC: 92 MG/DL — SIGNIFICANT CHANGE UP (ref 70–99)
HCT VFR BLD CALC: 30.3 % — LOW (ref 37–47)
HGB BLD-MCNC: 9.6 G/DL — LOW (ref 12–16)
IMM GRANULOCYTES NFR BLD AUTO: 0.5 % — HIGH (ref 0.1–0.3)
LYMPHOCYTES # BLD AUTO: 0.64 K/UL — LOW (ref 1.2–3.4)
LYMPHOCYTES # BLD AUTO: 15.6 % — LOW (ref 20.5–51.1)
MAGNESIUM SERPL-MCNC: 1.8 MG/DL — SIGNIFICANT CHANGE UP (ref 1.8–2.4)
MCHC RBC-ENTMCNC: 31.7 G/DL — LOW (ref 32–37)
MCHC RBC-ENTMCNC: 31.7 PG — HIGH (ref 27–31)
MCV RBC AUTO: 100 FL — HIGH (ref 81–99)
MONOCYTES # BLD AUTO: 0.42 K/UL — SIGNIFICANT CHANGE UP (ref 0.1–0.6)
MONOCYTES NFR BLD AUTO: 10.3 % — HIGH (ref 1.7–9.3)
NEUTROPHILS # BLD AUTO: 2.67 K/UL — SIGNIFICANT CHANGE UP (ref 1.4–6.5)
NEUTROPHILS NFR BLD AUTO: 65.3 % — SIGNIFICANT CHANGE UP (ref 42.2–75.2)
NRBC BLD AUTO-RTO: 0 /100 WBCS — SIGNIFICANT CHANGE UP (ref 0–0)
PLATELET # BLD AUTO: 57 K/UL — LOW (ref 130–400)
PMV BLD: 10.2 FL — SIGNIFICANT CHANGE UP (ref 7.4–10.4)
POTASSIUM SERPL-MCNC: 3.7 MMOL/L — SIGNIFICANT CHANGE UP (ref 3.5–5)
POTASSIUM SERPL-SCNC: 3.7 MMOL/L — SIGNIFICANT CHANGE UP (ref 3.5–5)
PROT SERPL-MCNC: 5.8 G/DL — LOW (ref 6–8)
RBC # BLD: 3.03 M/UL — LOW (ref 4.2–5.4)
RBC # FLD: 17.8 % — HIGH (ref 11.5–14.5)
SODIUM SERPL-SCNC: 140 MMOL/L — SIGNIFICANT CHANGE UP (ref 135–146)
T4 FREE SERPL-MCNC: 1.7 NG/DL — SIGNIFICANT CHANGE UP (ref 0.9–1.8)
WBC # BLD: 4.09 K/UL — LOW (ref 4.8–10.8)
WBC # FLD AUTO: 4.09 K/UL — LOW (ref 4.8–10.8)

## 2025-05-15 PROCEDURE — 99232 SBSQ HOSP IP/OBS MODERATE 35: CPT

## 2025-05-15 PROCEDURE — 93306 TTE W/DOPPLER COMPLETE: CPT | Mod: 26

## 2025-05-15 PROCEDURE — 99233 SBSQ HOSP IP/OBS HIGH 50: CPT

## 2025-05-15 RX ORDER — HYDROMORPHONE/SOD CHLOR,ISO/PF 2 MG/10 ML
0.5 SYRINGE (ML) INJECTION ONCE
Refills: 0 | Status: DISCONTINUED | OUTPATIENT
Start: 2025-05-15 | End: 2025-05-16

## 2025-05-15 RX ORDER — HYDROMORPHONE/SOD CHLOR,ISO/PF 2 MG/10 ML
0.5 SYRINGE (ML) INJECTION ONCE
Refills: 0 | Status: DISCONTINUED | OUTPATIENT
Start: 2025-05-15 | End: 2025-05-15

## 2025-05-15 RX ADMIN — HEPARIN SODIUM 5000 UNIT(S): 1000 INJECTION INTRAVENOUS; SUBCUTANEOUS at 17:55

## 2025-05-15 RX ADMIN — Medication 0.5 MILLIGRAM(S): at 07:35

## 2025-05-15 RX ADMIN — Medication 40 MILLIGRAM(S): at 06:27

## 2025-05-15 RX ADMIN — Medication 0.5 MILLIGRAM(S): at 07:00

## 2025-05-15 RX ADMIN — Medication 20 MILLIGRAM(S): at 06:27

## 2025-05-15 RX ADMIN — HEPARIN SODIUM 5000 UNIT(S): 1000 INJECTION INTRAVENOUS; SUBCUTANEOUS at 06:28

## 2025-05-15 RX ADMIN — Medication 37.5 MICROGRAM(S): at 06:27

## 2025-05-15 RX ADMIN — Medication 25 MILLIGRAM(S): at 08:26

## 2025-05-15 NOTE — PROGRESS NOTE ADULT - SUBJECTIVE AND OBJECTIVE BOX
SUBJECTIVE/OVERNIGHT EVENTS  Today is hospital day 3d. This morning patient was seen and examined at bedside, resting comfortably in bed. No acute or major events overnight.    MEDICATIONS  STANDING MEDICATIONS  heparin   Injectable 5000 Unit(s) SubCutaneous every 12 hours  levothyroxine 37.5 MICROGram(s) Oral daily  pantoprazole    Tablet 40 milliGRAM(s) Oral before breakfast  propranolol 10 milliGRAM(s) Oral two times a day  spironolactone 25 milliGRAM(s) Oral daily  torsemide 20 milliGRAM(s) Oral daily    PRN MEDICATIONS    VITALS  T(F): 97.9 (05-15-25 @ 04:52), Max: 98.1 (05-14-25 @ 17:11)  HR: 84 (05-15-25 @ 08:22) (61 - 84)  BP: 124/69 (05-15-25 @ 08:22) (106/58 - 127/72)  RR: 18 (05-15-25 @ 04:52) (18 - 18)  SpO2: 99% (05-15-25 @ 07:58) (95% - 99%)    PHYSICAL EXAM    LABS             9.6    4.09  )-----------( 57       ( 05-15-25 @ 07:15 )             30.3     140  |  107  |  34  -------------------------<  92   05-15-25 @ 07:15  3.7  |  23  |  1.2    Ca      8.3     05-15-25 @ 07:15  Mg     1.8     05-15-25 @ 07:15    TPro  5.8  /  Alb  2.8  /  TBili  6.5  /  DBili  x   /  AST  32  /  ALT  19  /  AlkPhos  95  /  GGT  x     05-15-25 @ 07:15    PT/INR - ( 05-14-25 @ 07:48 )   PT: 18.10 sec[H];   INR: 1.52 ratio[H]  PTT - ( 05-14-25 @ 07:48 )  PTT:37.6 sec    Troponin T, High Sensitivity Result: 28 ng/L (05-12-25 @ 21:25)  Troponin T, High Sensitivity Result: 33 ng/L (05-12-25 @ 19:38)  Pro-Brain Natriuretic Peptide: 955 pg/mL (05-12-25 @ 19:38)    Urinalysis Basic - ( 15 May 2025 07:15 )    Color: x / Appearance: x / SG: x / pH: x  Gluc: 92 mg/dL / Ketone: x  / Bili: x / Urobili: x   Blood: x / Protein: x / Nitrite: x   Leuk Esterase: x / RBC: x / WBC x   Sq Epi: x / Non Sq Epi: x / Bacteria: x          Urinalysis with Rflx Culture (collected 12 May 2025 22:15)      IMAGING SUBJECTIVE/OVERNIGHT EVENTS  Today is hospital day 3d. This morning patient was seen and examined at bedside, resting comfortably in bed. No acute or major events overnight.    MEDICATIONS  STANDING MEDICATIONS  heparin   Injectable 5000 Unit(s) SubCutaneous every 12 hours  levothyroxine 37.5 MICROGram(s) Oral daily  pantoprazole    Tablet 40 milliGRAM(s) Oral before breakfast  propranolol 10 milliGRAM(s) Oral two times a day  spironolactone 25 milliGRAM(s) Oral daily  torsemide 20 milliGRAM(s) Oral daily    PRN MEDICATIONS    VITALS  T(F): 97.9 (05-15-25 @ 04:52), Max: 98.1 (05-14-25 @ 17:11)  HR: 84 (05-15-25 @ 08:22) (61 - 84)  BP: 124/69 (05-15-25 @ 08:22) (106/58 - 127/72)  RR: 18 (05-15-25 @ 04:52) (18 - 18)  SpO2: 99% (05-15-25 @ 07:58) (95% - 99%)    PHYSICAL EXAM  Gen: NAD, resting in bed  HEENT: Normocephalic, atraumatic; +scleral icterus   CV: Regular rate & regular rhythm  Lungs: CTABL no wheeze  Abdomen: Soft, NTND+ BS present  Ext: Warm, well perfused  Neuro: non focal, awake, CN II-XII intact   Skin: no visible rash, no erythema, +jaundice     LABS             9.6    4.09  )-----------( 57       ( 05-15-25 @ 07:15 )             30.3     140  |  107  |  34  -------------------------<  92   05-15-25 @ 07:15  3.7  |  23  |  1.2    Ca      8.3     05-15-25 @ 07:15  Mg     1.8     05-15-25 @ 07:15    TPro  5.8  /  Alb  2.8  /  TBili  6.5  /  DBili  x   /  AST  32  /  ALT  19  /  AlkPhos  95  /  GGT  x     05-15-25 @ 07:15    PT/INR - ( 05-14-25 @ 07:48 )   PT: 18.10 sec[H];   INR: 1.52 ratio[H]  PTT - ( 05-14-25 @ 07:48 )  PTT:37.6 sec    Troponin T, High Sensitivity Result: 28 ng/L (05-12-25 @ 21:25)  Troponin T, High Sensitivity Result: 33 ng/L (05-12-25 @ 19:38)  Pro-Brain Natriuretic Peptide: 955 pg/mL (05-12-25 @ 19:38)    Urinalysis Basic - ( 15 May 2025 07:15 )    Color: x / Appearance: x / SG: x / pH: x  Gluc: 92 mg/dL / Ketone: x  / Bili: x / Urobili: x   Blood: x / Protein: x / Nitrite: x   Leuk Esterase: x / RBC: x / WBC x   Sq Epi: x / Non Sq Epi: x / Bacteria: x          Urinalysis with Rflx Culture (collected 12 May 2025 22:15)      IMAGING

## 2025-05-15 NOTE — PROGRESS NOTE ADULT - ASSESSMENT
Assessment and Plan  Case of an 84 year old female patient known to have a history of hypothyroidism, cryptogenic CURRIE cirrhosis, HE+, ascites hx, EV hx, and HFpEF who was referred to the ED on 05/12 for concern of leg swelling, weight gain, bloating, and RHONA, found to be in volume overload on exam and to have elevated liver enzymes with double duct sign. We are consulted for concern of decompensated cirrhosis.      Cryptogenic NAFLD cirrhosis Decompensation; KEVIN + no previous bx due to frailty; Fib 4 9.8 in 2016  Concern for Gilbert syndrome (pending UGT1A) due to indirect hyperbilirubinemia   Small ascites  Hx of HE; no HCC; Hx of EV  Chronic anemia; no overt bleeding; chronic TPN, SPM 2016, splenic varices on CY  Double duct sign s/p CCY; r/o pancreatic malignancy  * Hemodynamically stable  * Labs: WBC 2.56, Hb 8.2, Plt 44, , K 3.4, BUN 36, Cr 1 on 05/13   * Liver enzymes: 6.5/82/31/20 on 05/13 (previous 5.5/97/48/25 on 02/18)  * KEVIN 1:320, ASMA -, AMA -, Ig G 1611, Ig A 255, Ig M 180   * INR 1.42 on 02/18  * LA 1.9 on 05/12  * Acute hepatitis ABC - in 04/2022  * US abdomen 05/13/2025 cirrhosis; dilated CBD 9mm; CCY; PD dilated 5mm; small ascites (previous 08/2022: cirrhosis; SPM, CCY, normal CBD 8mm, normal pancreas, mild ascites)  * CT AP IC 2022: cirrhosis, CCY, SPM, normal pancreas, small ascites, diverticulosis, splenic and GE varices  * Previous EGD many years ago: Hx of EV +; patient not sure about banding hx  * TTE 05/2025 EF 60%, DD I, mild-mod MR, mild AR, mild TR, mild PHTN  * Previous colonoscopy many years ago  * FHx of GI cancers negative    RECOMMENDATIONS  - Trend hepatic function panel and INR daily  - Counseled about alcohol cessation: has been drinking occasionally; last drink last year. Check Peth  - CLD workup noted  - Consider cardiology evaluation in setting of elevated BNP and O2 requirements  - Checking UGT1A1 enzyme activity in setting of indirect hyperbilirubinemia (r/o Gilbert syndrome)  - Avoid hepatotoxic agents  - For small Ascites: recommend serial abdominal exams. 2g Na diet. On torsemide 20mg QD; started on aldactone 25mg QD. Cardiology evaluation  - Monitor MAP and keep >65mmhg  - Monitor for Hepatic encephalopathy (hx HE +): lactulose and titrate to target BM 2-3 per day. Avoid sedatives and opioids  - For Esophageal varices hx: outpatient follow up for EGD; continue propranolol 10mg BID with HR monitoring  - For HCC screening: Please f/u as outpatient for US abdomen and AFP every 6 months.  - For Vaccinations: Please f/u in clinic for being uptodate with HAV/HBV/Influenza/Pneumococcal vaccines.  - For LT evaluation: old and frail  - Trend CBC. Target Hb 7-9 in setting of portal HTN  - Lifestyle/Dietary modifications: Calorie intake 25-40 Kcal/kg/day; Protein intake: 1.2-1.5 gm/kg/day  - Avoid smoking and NSAIDS; Abstain from alcohol and all illicit drugs  - Avoid use of herbal and dietary supplements due to potential hepatotoxicity. Limit use of acetaminophen to <2 grams per day. Avoid use of nonsteroidal antiinflammatory drugs (NSAIDs) . Avoid eating any unpasteurized dairy products; avoid eating any raw or undercooked eggs, fish, poultry, or meat; and avoid eating raw/steamed oysters or other shellfish to avoid risk of Vibrio infection.  - Follow up with our [facility] for outpatient EUS for double duct sign (located at [address] [place] [address]. Phone Number: [phone])    - Follow up with our [facility] located at [address] [place] [address], [facility]. Telephone No: [phone]      Thank you for your consult.  - Please note that plan was communicated with medical team.   - Please reach GI on [phone] during weekdays till 5pm.  - Please call the GI service line after 5pm on Weekdays and anytime on Weekends: [phone].      Brett Farr MD  PGY - 5 Gastroenterology Fellow   [facility]

## 2025-05-15 NOTE — PROGRESS NOTE ADULT - SUBJECTIVE AND OBJECTIVE BOX
Hepatology Follow up Note      Location: [facility] 3B 013 B ([facility] 3B)  Patient Name: BRAULIO ESPARZA  Age: 84y  Gender: Female      Chief Complaint  Patient is a 84y old Female who presents with a chief complaint of failure to thrive (14 May 2025 13:14)  Primary diagnosis of Failure to thrive in adult      Reason for Consult  Cirrhosis      Progress Note  Patient seems weak but denies abdominal pain.  She denies nausea or vomiting.  She had 1 formed brown stool on 05/14.      Vital Signs in the last 24 hours   T(C): 36.3 (15 May 2025 17:40), Max: 36.6 (14 May 2025 20:15)  T(F): 97.3 (15 May 2025 17:40), Max: 97.9 (14 May 2025 20:15)  HR: 67 (15 May 2025 17:40) (61 - 84)  BP: 107/56 (15 May 2025 17:40) (106/58 - 126/56)  BP(mean): 80 (15 May 2025 06:00) (80 - 85)  RR: 18 (15 May 2025 04:52) (18 - 18)  SpO2: 95% (15 May 2025 17:40) (95% - 99%)    Parameters below as of 15 May 2025 17:40  Patient On (Oxygen Delivery Method): nasal cannula  O2 Flow (L/min): 2        Physical Exam  * General Appearance: Weak, cooperative, interactive, oriented to time, place, and person, in no acute distress  * Eyes: PERRL, conjunctiva/corneas clear, EOM's intact, fundi benign, both eyes  * Lungs: audible crackles  * Heart: Regular Rate and Rhythm, normal S1 and S2, no audible murmur, rub, or gallop  * Abdomen: Symmetric, softly mildly -distended, soft, non-tender, bowel sounds active all four quadrants, no masses  * Extremities: stasis dermatitis, lower extremity pitting edema bilaterally      Investigations                          9.6    4.09  )-----------( 57       ( 15 May 2025 07:15 )             30.3     05-15    140  |  107  |  34[H]  ----------------------------<  92  3.7   |  23  |  1.2    Ca    8.3[L]      15 May 2025 07:15  Mg     1.8     05-15    TPro  5.8[L]  /  Alb  2.8[L]  /  TBili  6.5[H]  /  DBili  x   /  AST  32  /  ALT  19  /  AlkPhos  95  05-15        LIVER FUNCTIONS - ( 15 May 2025 07:15 )  Alb: 2.8 g/dL / Pro: 5.8 g/dL / ALK PHOS: 95 U/L / ALT: 19 U/L / AST: 32 U/L / GGT: x           PT/INR - ( 14 May 2025 07:48 )   PT: 18.10 sec;   INR: 1.52 ratio         PTT - ( 14 May 2025 07:48 )  PTT:37.6 sec    Urinalysis Basic - ( 15 May 2025 07:15 )    Color: x / Appearance: x / SG: x / pH: x  Gluc: 92 mg/dL / Ketone: x  / Bili: x / Urobili: x   Blood: x / Protein: x / Nitrite: x   Leuk Esterase: x / RBC: x / WBC x   Sq Epi: x / Non Sq Epi: x / Bacteria: x      Current Medications  Standing Medications  heparin   Injectable 5000 Unit(s) SubCutaneous every 12 hours  levothyroxine 37.5 MICROGram(s) Oral daily  pantoprazole    Tablet 40 milliGRAM(s) Oral before breakfast  propranolol 10 milliGRAM(s) Oral two times a day  spironolactone 25 milliGRAM(s) Oral daily  torsemide 20 milliGRAM(s) Oral daily    PRN Medications    Singles Doses Administered  lactulose Syrup 5 Gram(s) Oral every 4 hours

## 2025-05-15 NOTE — PROGRESS NOTE ADULT - ASSESSMENT
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 Rivas for "liver problem." Patient reports she uses O2 at home and her PCP was concerned that she needs home o2 as well as hospital bed at home. Reports  abdominal bloating and lower extremity swelling, associated with generalized weakness for the past few weeks. On further questioning reports that her lower extr swelling has been present for a long time and was followed by ?ENT. Pt endorses worsening memory for months to years. Reports she does not follow with hepatologist regularly. Patient also endorses intermittent shortness of breath with exertion.    #Cryptogenic Cirrhosis   #CBD + pancreatic duct dilation  #Weakness  - RUQ: Cirrhotic liver. Pancreatic duct dilated at 4 mm. Recommend evaluation with contrast-enhanced CT/MR. CBD dilated to 9 mm. Recommend correlation with LFTs. Small ascites.  - curbside hepatology recs:  -- get [facility] for double duct sign on RUQ, hepatology will see after [facility] eval  -- re med regimen: small ascites, monitor on home torsemide for now, lactulose PRN, resume propanolol home dose (verify when  brings meds), likely needs OP egd  - f/u [facility]   - hepatology following  - echo noted; LVEF 60%  - f/u INR  - PT: Home with assistance      #HTN  #Hypothyroid  - TSH 4  - On levothyroxine 37.5    #MEDREC  Tried calling the  for med rec. did not . Called the pharmacy   List from the pharmacy: Furosemide 40 QD, Torsemide 20 QD, spironolactone 25 QD, Levothyroxine 37.5 QD  Probably it is either the furosemide or torsemide, buts spironolactone does not make sense. Please follow up with  for med rec      #misc  code- full  dvt- heparin q12  gi- ppi  Diet: DASH/TLC

## 2025-05-16 ENCOUNTER — TRANSCRIPTION ENCOUNTER (OUTPATIENT)
Age: 85
End: 2025-05-16

## 2025-05-16 VITALS — SYSTOLIC BLOOD PRESSURE: 125 MMHG | DIASTOLIC BLOOD PRESSURE: 71 MMHG | HEART RATE: 67 BPM

## 2025-05-16 LAB
ALBUMIN SERPL ELPH-MCNC: 2.6 G/DL — LOW (ref 3.5–5.2)
ALP SERPL-CCNC: 84 U/L — SIGNIFICANT CHANGE UP (ref 30–115)
ALT FLD-CCNC: 18 U/L — SIGNIFICANT CHANGE UP (ref 0–41)
ANION GAP SERPL CALC-SCNC: 11 MMOL/L — SIGNIFICANT CHANGE UP (ref 7–14)
AST SERPL-CCNC: 27 U/L — SIGNIFICANT CHANGE UP (ref 0–41)
BASOPHILS # BLD AUTO: 0.02 K/UL — SIGNIFICANT CHANGE UP (ref 0–0.2)
BASOPHILS NFR BLD AUTO: 0.5 % — SIGNIFICANT CHANGE UP (ref 0–1)
BILIRUB DIRECT SERPL-MCNC: 1.1 MG/DL — HIGH (ref 0–0.3)
BILIRUB INDIRECT FLD-MCNC: 4.6 MG/DL — HIGH (ref 0.2–1.2)
BILIRUB SERPL-MCNC: 5.3 MG/DL — HIGH (ref 0.2–1.2)
BILIRUB SERPL-MCNC: 5.7 MG/DL — HIGH (ref 0.2–1.2)
BUN SERPL-MCNC: 39 MG/DL — HIGH (ref 10–20)
CALCIUM SERPL-MCNC: 8.1 MG/DL — LOW (ref 8.4–10.5)
CHLORIDE SERPL-SCNC: 104 MMOL/L — SIGNIFICANT CHANGE UP (ref 98–110)
CO2 SERPL-SCNC: 24 MMOL/L — SIGNIFICANT CHANGE UP (ref 17–32)
CREAT SERPL-MCNC: 1.3 MG/DL — SIGNIFICANT CHANGE UP (ref 0.7–1.5)
DIR ANTIGLOB POLYSPECIFIC INTERPRETATION: SIGNIFICANT CHANGE UP
EGFR: 40 ML/MIN/1.73M2 — LOW
EGFR: 40 ML/MIN/1.73M2 — LOW
EOSINOPHIL # BLD AUTO: 0.28 K/UL — SIGNIFICANT CHANGE UP (ref 0–0.7)
EOSINOPHIL NFR BLD AUTO: 6.7 % — SIGNIFICANT CHANGE UP (ref 0–8)
GLUCOSE SERPL-MCNC: 107 MG/DL — HIGH (ref 70–99)
HCT VFR BLD CALC: 29.7 % — LOW (ref 37–47)
HGB BLD-MCNC: 9.3 G/DL — LOW (ref 12–16)
IMM GRANULOCYTES NFR BLD AUTO: 0.2 % — SIGNIFICANT CHANGE UP (ref 0.1–0.3)
INR BLD: 1.47 RATIO — HIGH (ref 0.65–1.3)
LDH SERPL L TO P-CCNC: 290 — HIGH (ref 50–242)
LYMPHOCYTES # BLD AUTO: 0.86 K/UL — LOW (ref 1.2–3.4)
LYMPHOCYTES # BLD AUTO: 20.6 % — SIGNIFICANT CHANGE UP (ref 20.5–51.1)
MCHC RBC-ENTMCNC: 30.9 PG — SIGNIFICANT CHANGE UP (ref 27–31)
MCHC RBC-ENTMCNC: 31.3 G/DL — LOW (ref 32–37)
MCV RBC AUTO: 98.7 FL — SIGNIFICANT CHANGE UP (ref 81–99)
MONOCYTES # BLD AUTO: 0.55 K/UL — SIGNIFICANT CHANGE UP (ref 0.1–0.6)
MONOCYTES NFR BLD AUTO: 13.2 % — HIGH (ref 1.7–9.3)
NEUTROPHILS # BLD AUTO: 2.45 K/UL — SIGNIFICANT CHANGE UP (ref 1.4–6.5)
NEUTROPHILS NFR BLD AUTO: 58.8 % — SIGNIFICANT CHANGE UP (ref 42.2–75.2)
NRBC BLD AUTO-RTO: 0 /100 WBCS — SIGNIFICANT CHANGE UP (ref 0–0)
PLATELET # BLD AUTO: 62 K/UL — LOW (ref 130–400)
PMV BLD: 10 FL — SIGNIFICANT CHANGE UP (ref 7.4–10.4)
POTASSIUM SERPL-MCNC: 3.9 MMOL/L — SIGNIFICANT CHANGE UP (ref 3.5–5)
POTASSIUM SERPL-SCNC: 3.9 MMOL/L — SIGNIFICANT CHANGE UP (ref 3.5–5)
PROT SERPL-MCNC: 6 G/DL — SIGNIFICANT CHANGE UP (ref 6–8)
PROTHROM AB SERPL-ACNC: 17.5 SEC — HIGH (ref 9.95–12.87)
RBC # BLD: 3.01 M/UL — LOW (ref 4.2–5.4)
RBC # BLD: 3.26 M/UL — LOW (ref 4.2–5.4)
RBC # FLD: 17.7 % — HIGH (ref 11.5–14.5)
RETICS #: 139.2 K/UL — HIGH (ref 25–125)
RETICS/RBC NFR: 4.3 % — HIGH (ref 0.5–1.5)
SODIUM SERPL-SCNC: 139 MMOL/L — SIGNIFICANT CHANGE UP (ref 135–146)
WBC # BLD: 4.17 K/UL — LOW (ref 4.8–10.8)
WBC # FLD AUTO: 4.17 K/UL — LOW (ref 4.8–10.8)

## 2025-05-16 PROCEDURE — 99239 HOSP IP/OBS DSCHRG MGMT >30: CPT

## 2025-05-16 PROCEDURE — 99233 SBSQ HOSP IP/OBS HIGH 50: CPT | Mod: FS

## 2025-05-16 RX ORDER — SPIRONOLACTONE 25 MG
1 TABLET ORAL
Refills: 0 | DISCHARGE

## 2025-05-16 RX ORDER — ACETAMINOPHEN 500 MG/5ML
650 LIQUID (ML) ORAL EVERY 8 HOURS
Refills: 0 | Status: DISCONTINUED | OUTPATIENT
Start: 2025-05-16 | End: 2025-05-16

## 2025-05-16 RX ORDER — PROPRANOLOL HCL 60 MG
1 TABLET ORAL
Qty: 60 | Refills: 1
Start: 2025-05-16 | End: 2025-07-14

## 2025-05-16 RX ORDER — LACTULOSE 10 G/15ML
1 SOLUTION ORAL
Qty: 3 | Refills: 2
Start: 2025-05-16

## 2025-05-16 RX ORDER — LEVOTHYROXINE SODIUM 300 MCG
1 TABLET ORAL
Refills: 0 | DISCHARGE

## 2025-05-16 RX ORDER — SPIRONOLACTONE 25 MG
100 TABLET ORAL DAILY
Refills: 0 | Status: DISCONTINUED | OUTPATIENT
Start: 2025-05-16 | End: 2025-05-16

## 2025-05-16 RX ORDER — FUROSEMIDE 10 MG/ML
1 INJECTION INTRAMUSCULAR; INTRAVENOUS
Refills: 0 | DISCHARGE

## 2025-05-16 RX ORDER — LIDOCAINE HYDROCHLORIDE 20 MG/ML
1 JELLY TOPICAL DAILY
Refills: 0 | Status: DISCONTINUED | OUTPATIENT
Start: 2025-05-16 | End: 2025-05-16

## 2025-05-16 RX ORDER — LIDOCAINE HYDROCHLORIDE 20 MG/ML
1 JELLY TOPICAL
Qty: 3 | Refills: 0
Start: 2025-05-16 | End: 2025-05-29

## 2025-05-16 RX ORDER — SPIRONOLACTONE 25 MG
100 TABLET ORAL DAILY
Refills: 0 | Status: DISCONTINUED | OUTPATIENT
Start: 2025-05-17 | End: 2025-05-16

## 2025-05-16 RX ORDER — FUROSEMIDE 10 MG/ML
40 INJECTION INTRAMUSCULAR; INTRAVENOUS DAILY
Refills: 0 | Status: DISCONTINUED | OUTPATIENT
Start: 2025-05-17 | End: 2025-05-16

## 2025-05-16 RX ORDER — LACTULOSE 10 G/15ML
10 SOLUTION ORAL THREE TIMES A DAY
Refills: 0 | Status: DISCONTINUED | OUTPATIENT
Start: 2025-05-16 | End: 2025-05-16

## 2025-05-16 RX ADMIN — HEPARIN SODIUM 5000 UNIT(S): 1000 INJECTION INTRAVENOUS; SUBCUTANEOUS at 05:28

## 2025-05-16 RX ADMIN — HEPARIN SODIUM 5000 UNIT(S): 1000 INJECTION INTRAVENOUS; SUBCUTANEOUS at 17:20

## 2025-05-16 RX ADMIN — LACTULOSE 10 GRAM(S): 10 SOLUTION ORAL at 09:45

## 2025-05-16 RX ADMIN — LACTULOSE 10 GRAM(S): 10 SOLUTION ORAL at 13:55

## 2025-05-16 RX ADMIN — Medication 20 MILLIGRAM(S): at 05:27

## 2025-05-16 RX ADMIN — Medication 0.5 MILLIGRAM(S): at 05:28

## 2025-05-16 RX ADMIN — Medication 650 MILLIGRAM(S): at 19:33

## 2025-05-16 RX ADMIN — Medication 0.5 MILLIGRAM(S): at 06:08

## 2025-05-16 RX ADMIN — Medication 40 MILLIGRAM(S): at 05:27

## 2025-05-16 RX ADMIN — LIDOCAINE HYDROCHLORIDE 1 PATCH: 20 JELLY TOPICAL at 11:42

## 2025-05-16 RX ADMIN — Medication 25 MILLIGRAM(S): at 09:45

## 2025-05-16 RX ADMIN — Medication 37.5 MICROGRAM(S): at 05:27

## 2025-05-16 NOTE — PROGRESS NOTE ADULT - ASSESSMENT
Patient is an 84-year-old female with past medical history of hypothyroid, cryptogenic CURRIE, cirrhosis with decompensation, heart failure, hypertension, osteoarthritis who was sent in by her primary care doctor for ongoing liver issues.  Patient has been followed by her hepatology .  Patient currently followed by hepatology for decompensation from cirrhosis.  [facility] requested because abdominal ultrasound noted for a common bile duct of 9 and a pancreatic duct of 4 mm.  Patient currently comfortable this morning.  Patient finished her whole meal tray.  Patient appears alert and oriented but is a poor historian.  Patient notes no complaints other than neck pain that radiates up to the top of her head.    CBD and PD Dilation  - Prior Contrast study from 2022 noted no Pancreatic Abnormality  - With Hx of CHF and Decompensated Cirrhosis would not be a surgical candidate if mass was identified- would also be a poor candidate for chemotherapeutics  - Encouraged her to follow up when stable on discharge at the [facility] [phone]  - At that time if stable will consider EUS   - Non Tender on exam and tolerating diet well which is reassuring  - Re-Call Advanced as needed

## 2025-05-16 NOTE — DISCHARGE NOTE NURSING/CASE MANAGEMENT/SOCIAL WORK - FINANCIAL ASSISTANCE
[facility] provides services at a reduced cost to those who are determined to be eligible through [facility]’s financial assistance program. Information regarding [facility]’s financial assistance program can be found by going to [email or web address] or by calling [phone].

## 2025-05-16 NOTE — DISCHARGE NOTE PROVIDER - NSDCCPCAREPLAN_GEN_ALL_CORE_FT
PRINCIPAL DISCHARGE DIAGNOSIS  Diagnosis: Adult failure to thrive  Assessment and Plan of Treatment: You came in with failure to thrive likely secondary to your cirrhosis. You were given nutrition supplementation and were seen by the liver doctors. You will need to follow up with both the liver doctors and GI doctors that will be detailed in your discharge paperwork. Ensure to  and avoid using more than 2 grams of tylenol (acetaminophen) per 24 hour period.  Call the [facility] [phone] to schedule an appointment for an endoscopy.  It is necessary to take all of your medications exactly as they are prescribed to you in your discharge paperwork. It is necessary to follow up with your outpatient physician teams as described in your discharge paperwork.  If you experience any chest pain, shortness of breath, sudden weakness, or any concerning symptoms, call [phone] and return to the hospital right away for evaluation.      SECONDARY DISCHARGE DIAGNOSES  Diagnosis: Transaminitis  Assessment and Plan of Treatment:     Diagnosis: Cirrhosis  Assessment and Plan of Treatment:

## 2025-05-16 NOTE — PROGRESS NOTE ADULT - NS ATTEND AMEND GEN_ALL_CORE FT
84-year-old female with a complex medical history including hypothyroidism, cryptogenic CURRIE cirrhosis with decompensation, CHF, HTN, and osteoarthritis, admitted for ongoing liver-related concerns per her PCP. Patient is followed by hepatology (Dr. Chavez). [facility] was consulted for evaluation of biliary and pancreatic ductal dilation noted on abdominal ultrasound (CBD 9 mm, PD 4 mm). Patient is clinically stable and comfortable today. . Abdominal ultrasound raised concern for biliary and pancreatic duct dilation. She is alert and oriented but a poor historian. Finished her meal and appears well-nourished and clinically stable.  Imaging / History Notes:  Abdominal US: CBD 9 mm, PD 4 mm  Prior contrast imaging (2022): No evidence of pancreatic mass    No abdominal tenderness; tolerating diet well — reassuring clinically   no obstructive jaundice   Encouraged outpatient follow-up with [facility] ([phone]) once medically stable and discharged    Consider EUS in the outpatient setting if clinically appropriate and stable    At present, no indication for inpatient endoscopic intervention

## 2025-05-16 NOTE — PROGRESS NOTE ADULT - SUBJECTIVE AND OBJECTIVE BOX
Interval History  Patient is a 84y old Female who presents with a chief complaint of failure to thrive (16 May 2025 09:31)    BRAULIO ESPARZA is admitted with a primary diagnosis of Adult failure to thrive      Hospital Day: 4d  Patient seen and examined at bedside. No overnight events noted. Patient resting comfortably but mentioned having no bowel movement for past 4 days. Mental status intact.    Admission HPI  HPI:  84-year-old female PMH HTN, hypothyroidism, peripheral edema, cirrhosis crytpogenic/ secondary to CURRIE, who presents to the ED after being seen by PCP Dr. Rivas for "liver problem." Patient reports she uses O2 at home and her PCP was concerned that she needs home o2 as well as hospital bed at home. Reports  abdominal bloating and lower extremity swelling, associated with generalized weakness for the past few weeks. On further questioning reports that her lower extr swelling has been present for a long time and was followed by ?ENT. Pt endorses worsening memory for months to years. Reports she does not follow with hepatologist regularly. Patient also endorses intermittent shortness of breath with exertion.      ED:  · BP Systolic	133 mm Hg  · BP Diastolic	67 mm Hg  · Heart Rate	81 /min  · Respiration Rate (breaths/min)	18 /min  · Temp (F)	97.7 Degrees F  · SpO2 (%)	95 %  · O2 Delivery/Oxygen Delivery Method	room air                          8.2    2.56  )-----------( 44       ( 13 May 2025 07:33 )             25.5       05-13    144  |  111[H]  |  36[H]  ----------------------------<  90  3.4[L]   |  22  |  1.0    Ca    8.4      13 May 2025 07:33  Phos  3.3     05-13  Mg     1.9     05-13    TPro  5.3[L]  /  Alb  3.0[L]  /  TBili  6.5[H]  /  DBili  x   /  AST  31  /  ALT  20  /  AlkPhos  82  05-13       CXR 5/12 - Stable opacifications.  RUQ -     Cirrhotic liver.    Pancreatic duct dilated at 4 mm. Recommend evaluation with   contrast-enhanced CT/MR.    Status post cholecystectomy. CBD dilated to 9 mm. Recommend correlation   with LFTs    Small ascites.    Admitted for failure to thrive, CM/SW, and weakness.   (13 May 2025 14:57)      Past Medical and Surgical History  GERD (gastroesophageal reflux disease)    Arthritis  OA    Fatty liver  AS PER PATIENT 15YEARS  PT REPORTS- I HAVE AN ENLARGED SPLEEN  LOW PLATELETS.    Malignant neoplasm of areola of left breast in female, unspecified estrogen receptor status    H/O thrombocytopenia    Blister of right lower leg    History of surgery  LEFT BREAST LUMPECTOMY  TUBIAL LIGATION  CHOLECYSTECTOMY  RIGHT & LEFT TKR    History of cholecystectomy      Family History  FAMILY HISTORY:  Family history of breast cancer in mother (Mother)    Family history of Parkinson disease (Father)      Social History  Social History:      Allergies  aspirin (Other)  Cipro (Hives; Rash)  predniSONE (Swelling)  Levaquin (Hives; Rash)    Medications  Standing Medications  heparin   Injectable 5000 Unit(s) SubCutaneous every 12 hours  lactulose Syrup 10 Gram(s) Oral three times a day  levothyroxine 37.5 MICROGram(s) Oral daily  pantoprazole    Tablet 40 milliGRAM(s) Oral before breakfast  propranolol 10 milliGRAM(s) Oral two times a day  spironolactone 25 milliGRAM(s) Oral daily  torsemide 20 milliGRAM(s) Oral daily    PRN Medications  acetaminophen     Tablet .. 650 milliGRAM(s) Oral every 8 hours PRN    Vitals  T(F): 98.3  HR: 66  BP: 109/67  RR: 18  SpO2: 96%    I&O's    05-15-25 @ 07:01  -  05-16-25 @ 07:00  --------------------------------------------------------  IN: 0 mL / OUT: 900 mL / NET: -900 mL        Physical Examination  General: NAD, lying in bed comfortably  Head: NCAT  Neck: Supple, no JVD  Cardiac: Regular rate and rhythm. No murmurs, gallops, or rubs  Pulmonary: CTAB  Abdominal: Soft, nontender, and nondistended with positive bowel sounds  Extremities: No pitting edema  Neurologic: AOx3, nonfocal  Skin: No rashes or lesions    Labs                        9.6    4.09  )-----------( 57       ( 15 May 2025 07:15 )             30.3     05-16    139  |  104  |  39[H]  ----------------------------<  107[H]  3.9   |  24  |  1.3    Ca    8.1[L]      16 May 2025 07:31  Mg     1.8     05-15    TPro  6.0  /  Alb  2.6[L]  /  TBili  5.3[H]  /  DBili  x   /  AST  27  /  ALT  18  /  AlkPhos  84  05-16      Urinalysis Basic - ( 16 May 2025 07:31 )    Color: x / Appearance: x / SG: x / pH: x  Gluc: 107 mg/dL / Ketone: x  / Bili: x / Urobili: x   Blood: x / Protein: x / Nitrite: x   Leuk Esterase: x / RBC: x / WBC x   Sq Epi: x / Non Sq Epi: x / Bacteria: x      CAPILLARY BLOOD GLUCOSE            Imaging  CXR      CT   Interval History  Patient is a 84y old Female who presents with a chief complaint of failure to thrive (16 May 2025 09:31)    BRAULIO ESPARZA is admitted with a primary diagnosis of Adult failure to thrive      Hospital Day: 4d  Patient seen and examined at bedside. No overnight events noted. Patient resting comfortably but mentioned having no bowel movement for past 4 days. Mental status intact.    Admission HPI  HPI:  84-year-old female PMH HTN, hypothyroidism, peripheral edema, cirrhosis crytpogenic/ secondary to CURRIE, who presents to the ED after being seen by PCP Dr. Rivas for "liver problem." Patient reports she uses O2 at home and her PCP was concerned that she needs home o2 as well as hospital bed at home. Reports  abdominal bloating and lower extremity swelling, associated with generalized weakness for the past few weeks. On further questioning reports that her lower extr swelling has been present for a long time and was followed by ?ENT. Pt endorses worsening memory for months to years. Reports she does not follow with hepatologist regularly. Patient also endorses intermittent shortness of breath with exertion.      ED:  · BP Systolic	133 mm Hg  · BP Diastolic	67 mm Hg  · Heart Rate	81 /min  · Respiration Rate (breaths/min)	18 /min  · Temp (F)	97.7 Degrees F  · SpO2 (%)	95 %  · O2 Delivery/Oxygen Delivery Method	room air                          8.2    2.56  )-----------( 44       ( 13 May 2025 07:33 )             25.5       05-13    144  |  111[H]  |  36[H]  ----------------------------<  90  3.4[L]   |  22  |  1.0    Ca    8.4      13 May 2025 07:33  Phos  3.3     05-13  Mg     1.9     05-13    TPro  5.3[L]  /  Alb  3.0[L]  /  TBili  6.5[H]  /  DBili  x   /  AST  31  /  ALT  20  /  AlkPhos  82  05-13       CXR 5/12 - Stable opacifications.  RUQ -     Cirrhotic liver.    Pancreatic duct dilated at 4 mm. Recommend evaluation with   contrast-enhanced CT/MR.    Status post cholecystectomy. CBD dilated to 9 mm. Recommend correlation   with LFTs    Small ascites.    Admitted for failure to thrive, CM/SW, and weakness.   (13 May 2025 14:57)      Past Medical and Surgical History  GERD (gastroesophageal reflux disease)    Arthritis  OA    Fatty liver  AS PER PATIENT 15YEARS  PT REPORTS- I HAVE AN ENLARGED SPLEEN  LOW PLATELETS.    Malignant neoplasm of areola of left breast in female, unspecified estrogen receptor status    H/O thrombocytopenia    Blister of right lower leg    History of surgery  LEFT BREAST LUMPECTOMY  TUBIAL LIGATION  CHOLECYSTECTOMY  RIGHT & LEFT TKR    History of cholecystectomy      Family History  FAMILY HISTORY:  Family history of breast cancer in mother (Mother)    Family history of Parkinson disease (Father)      Social History  Social History:      Allergies  aspirin (Other)  Cipro (Hives; Rash)  predniSONE (Swelling)  Levaquin (Hives; Rash)    Medications  Standing Medications  heparin   Injectable 5000 Unit(s) SubCutaneous every 12 hours  lactulose Syrup 10 Gram(s) Oral three times a day  levothyroxine 37.5 MICROGram(s) Oral daily  pantoprazole    Tablet 40 milliGRAM(s) Oral before breakfast  propranolol 10 milliGRAM(s) Oral two times a day  spironolactone 25 milliGRAM(s) Oral daily  torsemide 20 milliGRAM(s) Oral daily    PRN Medications  acetaminophen     Tablet .. 650 milliGRAM(s) Oral every 8 hours PRN    Vitals  T(F): 98.3  HR: 66  BP: 109/67  RR: 18  SpO2: 96%    I&O's    05-15-25 @ 07:01  -  05-16-25 @ 07:00  --------------------------------------------------------  IN: 0 mL / OUT: 900 mL / NET: -900 mL        Physical Examination  General: NAD, lying in bed comfortably, appears jaundiced   Head: NCAT, scleral icterus noted   Neck: Supple, no JVD  Cardiac: Regular rate and rhythm. No murmurs, gallops, or rubs  Pulmonary: CTAB  Abdominal: Slightly distended.  nontender with positive bowel sounds  Extremities: 2+ pitting edema noted in bilateral lower extremities   Neurologic: AOx3, nonfocal  Skin: No rashes or lesions    Labs                        9.6    4.09  )-----------( 57       ( 15 May 2025 07:15 )             30.3     05-16    139  |  104  |  39[H]  ----------------------------<  107[H]  3.9   |  24  |  1.3    Ca    8.1[L]      16 May 2025 07:31  Mg     1.8     05-15    TPro  6.0  /  Alb  2.6[L]  /  TBili  5.3[H]  /  DBili  x   /  AST  27  /  ALT  18  /  AlkPhos  84  05-16      Urinalysis Basic - ( 16 May 2025 07:31 )    Color: x / Appearance: x / SG: x / pH: x  Gluc: 107 mg/dL / Ketone: x  / Bili: x / Urobili: x   Blood: x / Protein: x / Nitrite: x   Leuk Esterase: x / RBC: x / WBC x   Sq Epi: x / Non Sq Epi: x / Bacteria: x      CAPILLARY BLOOD GLUCOSE            Imaging  CXR      CT

## 2025-05-16 NOTE — PROGRESS NOTE ADULT - ATTENDING COMMENTS
84 year old  F with hx of pancytopenia, splenomegaly MGUS, mild AS AR PHTN, portal HTN cirrhosis admitted with leg swelling and hypoxia  seen for concern of decompensated cirrhosis.    Patient has indirect hyperbilirubinemia for several years 2-5 range. Was seen in 2023 with possible cirrhosis on imaging. Fibrosure suggestive of cirrhosis in 2016 FIB 4 9.8 suggestive of F3-F4 fibrosis. Fibroscan shows F3 fibrosis and no steatosis EGD from prior notes reportedly small varices. Patient was overweight before but no hx of T2DM. Has low plat count (70 K)and splenomegaly since 2016. AST ALT ratio reversal Low albumin since 2022 April.  CLD work up negative except for positive KEVIN. Cirrhotic vs non cirrhotic portal HTN ( NRH can resemble cirrhosis).   TJ liver biopsy with measurement of portal pressures would be helpful but quite frail and was not keen for liver biopsy before.   Has been having confusion and needing oxygen again. Patient was on oxygen before. Was thought to have  RHF from pulm HTN but very mild findings and was evaluated by heart failure team.     No complaints  Confusion improved. Alert oriented in place person year and month  Frail looking  Icterus+  Abdomen soft non tender no flank dullness  Ext - mild leg edema  Asterixis +       Portal HTN with HE and small ascites -  probably  cirrhosis - cryptogenic  Indirect hyperbilirubinemia - Bilirubin > 6 not usually seen in Gilbert's   Mild AS Mild AR chronic respiratory failure ? Pulmonary HTN      Lactulose for 3 BM / day.   Echo   Check UGT1a1 enzyme activity and tests for hemolysis   Continue propranolol   Goals of care discussion.
82 year old  F with hx of pancytopenia, splenomegaly MGUS, mild AS AR PHTN, portal HTN cirrhosis admitted with leg swelling   seen for concern of decompensated cirrhosis.    Patient has indirect hyperbilirubinemia for several years 2-5 range. Was seen in 2023 with possible cirrhosis on imaging. Fibrosure suggestive of cirrhosis in 2016 FIB 4 9.8 suggestive of F3-F4 fibrosis. Fibroscan shows F3 fibrosis and no steatosis EGD from prior notes reportedly small varices. Patient was overweight before but no hx of T2DM. Has low plat count (70 K)and splenomegaly since 2016. AST ALT ratio reversal Low albumin since 2022 April.  CLD work up negative except for positive KEVIN. Cirrhotic vs non cirrhotic portal HTN ( NRH can resemble cirrhosis).   TJ liver biopsy with measurement of portal pressures would be helpful but quite frail and was not keen for liver biopsy before.   Has been having confusion and needing oxygen again. Patient was on oxygen before. Was thought to have  RHF from pulm HTN but very mild findings and was evaluated by heart failure team.     No complaints  Confused  Frail looking  Icterus+  Abdomen soft non tender no flank dullness  Ext - mild leg edema  Asterixis +       Portal HTN with HE and small ascites -  probably  cirrhosis - cryptogenic  Indirect hyperbilirubinemia - Bilirubin > 6 not usually seen in Gilbert's   Mild AS Mild AR chronic respiratory failure ? Pulmonary HTN    Lactulose for 3 BM / day.   Echo   Check UGT1a1 enzyme activity and tests for hemolysis   Continue propranolol   Goals of care discussion.
83 yo F w HTN, hypothyroidism, cirrhosis cryptogenic VS CURRIE, who presents to the ED after being seen by PCP Dr. Rivas for evaluation of weakness and PROCTOR since few weeks       # sent by primary care physician for advanced frailty, weakness,  and lack of appetite  # PROCTOR  # known with liver cirrhosis, cryptogenic VS CURRIE,  # Known thrombocytopenia and leukopenia at baseline  #Transaminitis and hyperbilirubinemia, expected   # double duct sign, dilated CBD and pancreatic duct   # no clinical suspicion of SBP and no evidence of significant ascites on liver US  # no clinical evidence of hepatic encephalopathy  # hypothyroidism on therapy     PLANs:    - started atenolol, on home Torsemide, add aldactone if BP allows,  - [facility] top eval double duct sign, although CBD is expected w 9mm given age and post ccy   - hepatology team eval   - TSH noted 5, f/u FT4  - check TTE, has PROCTOR and systolic murmur suggested of AS  - on 2 L NC, please wean off, doubt hypoxia   - PT/ rehab  - dvt ppx w heparin sq    - Full code   - lives home w the , Case management for safe dispo planning
I saw and evaluated the patient. I discussed the case with the resident, reviewed and edited the resident's note and agree with the findings and plan as documented.

## 2025-05-16 NOTE — DISCHARGE NOTE PROVIDER - NSDCFUSCHEDAPPT_GEN_ALL_CORE_FT
Diego Martinez  [facility]  PULMMED [address]  Scheduled Appointment: 05/27/2025     [facility]  GASTRO Doc Off [id number]  Scheduled Appointment: 06/06/2025    Diego Martinez  [facility]  PULMMED [address]  Scheduled Appointment: 08/11/2025     Diego Martinez  [facility]  PULMMED [facility] [address]  Scheduled Appointment: 08/11/2025

## 2025-05-16 NOTE — DISCHARGE NOTE PROVIDER - ATTENDING DISCHARGE PHYSICAL EXAMINATION:
GEN: No acute distress  HEENT: normocephalic, atraumatic, anicteric; scleral icterus   LUNGS: b/l breath sounds present, clear to auscultation bilaterally   HEART:  Regular rate & regular rhythm  ABD: Soft, non-tender, non-distended. Bowel sounds present  SKIN: jaundice   EXT: warm   NEURO: awake, no new focal deficits

## 2025-05-16 NOTE — CHART NOTE - NSCHARTNOTEFT_GEN_A_CORE
Ms. Mcadams requires oxygen due to hypoxemia sceondary to pulmonary hypertension. Her O2 readings were as follows:    At Rest (No O2): 90%  Ambulating (No O2): 88%  At Rest (With O2): 97%  Ambulating (With O2): 92%    She requires oxygen via NC as an outpatient for safe discharge from the hospital and to reduce chance of mortality and re-admission.

## 2025-05-16 NOTE — DISCHARGE NOTE PROVIDER - CARE PROVIDER_API CALL
Jr Chavez  Internal Medicine  [address] [place] [address]  Phone: [phone]  Fax: [phone]  Follow Up Time: 2 weeks    Jean Rivas  Internal Medicine  [address] [place] [id number]  Phone: [phone]  Fax: [phone]  Established Patient  Follow Up Time: 2 weeks

## 2025-05-16 NOTE — DISCHARGE NOTE PROVIDER - NSDCMRMEDTOKEN_GEN_ALL_CORE_FT
levothyroxine 25 mcg (0.025 mg) oral tablet: 1 tab(s) orally once a day  Prevagen Extra Strength 50 mcg (2000 intl units) oral capsule: 1 cap(s) orally once a day  silver sulfADIAZINE 1% topical cream: 1 application topically once a day  Tirosint 37.5 mcg (0.0375 mg) oral capsule: 37.5 microgram(s) orally once a day  torsemide 20 mg oral tablet: 1 tab(s) orally once a day   Asperflex 4% topical film: Apply topically to affected area once a day Apply to area of neck pain  Kristalose 10 g oral powder for reconstitution: 1 packet(s) orally 3 times a day Take 3 times per day, ensue you have 3 bowel movements per day. If you have had 3 bowel movements, you do not need to take more of this medication until the next day  Lasix 40 mg oral tablet: 1 tab(s) orally once a day  levothyroxine 37.5 mcg (0.0375 mg) oral capsule: 1 cap(s) orally once a day  pantoprazole 40 mg oral delayed release tablet: 1 tab(s) orally once a day (before a meal)  propranolol 10 mg oral tablet: 1 tab(s) orally 2 times a day  spironolactone 100 mg oral tablet: 1 tab(s) orally once a day   amoxicillin-clavulanate 875 mg-125 mg oral tablet: 1 tab(s) orally 2 times a day 5/27 end date  Asperflex 4% topical film: Apply topically to affected area once a day Apply to area of neck pain  Kristalose 10 g oral powder for reconstitution: 1 packet(s) orally 3 times a day Take 3 times per day, ensue you have 3 bowel movements per day. If you have had 3 bowel movements, you do not need to take more of this medication until the next day  Lasix 40 mg oral tablet: 1 tab(s) orally once a day  levothyroxine 37.5 mcg (0.0375 mg) oral capsule: 1 cap(s) orally once a day  pantoprazole 40 mg oral delayed release tablet: 1 tab(s) orally once a day (before a meal)  propranolol 10 mg oral tablet: 1 tab(s) orally 2 times a day  spironolactone 100 mg oral tablet: 1 tab(s) orally once a day  traMADol 50 mg oral tablet: 1 tab(s) orally every 8 hours As needed Moderate Pain (4 - 6)

## 2025-05-16 NOTE — DISCHARGE NOTE PROVIDER - INSTRUCTIONS
Lifestyle/Dietary modifications: Calorie intake 25-40 Kcal/kg/day; Protein intake: 1.2-1.5 gm/kg/day

## 2025-05-16 NOTE — PROGRESS NOTE ADULT - SUBJECTIVE AND OBJECTIVE BOX
Hepatology Follow up Note      Location: [facility] 3B 013 B ([facility] 3B)  Patient Name: BRAULIO ESPARZA  Age: 84y  Gender: Female      Chief Complaint  Patient is a 84y old Female who presents with a chief complaint of failure to thrive (14 May 2025 13:14)  Primary diagnosis of Failure to thrive in adult      Reason for Consult  Cirrhosis      Progress Note  Patient seems weak but denies abdominal pain.  She denies nausea or vomiting.  She had 1 formed brown stool on 05/14.      Vital Signs in the last 24 hours   Vital Signs Last 24 Hrs  T(C): 36.7 (16 May 2025 13:53), Max: 36.8 (16 May 2025 04:56)  T(F): 98.1 (16 May 2025 13:53), Max: 98.3 (16 May 2025 04:56)  HR: 67 (16 May 2025 17:21) (62 - 72)  BP: 125/71 (16 May 2025 17:21) (109/56 - 125/71)  BP(mean): --  RR: 18 (16 May 2025 13:53) (18 - 18)  SpO2: 96% (16 May 2025 13:53) (94% - 96%)    Parameters below as of 16 May 2025 09:30  Patient On (Oxygen Delivery Method): nasal cannula  O2 Flow (L/min): 2        Physical Exam  * General Appearance: Weak, cooperative, interactive, oriented to time, place, and person, in no acute distress  * Eyes: PERRL, conjunctiva/corneas clear, EOM's intact, fundi benign, both eyes  * Lungs: audible crackles  * Heart: Regular Rate and Rhythm, normal S1 and S2, no audible murmur, rub, or gallop  * Abdomen: Symmetric, softly mildly -distended, soft, non-tender, bowel sounds active all four quadrants, no masses  * Extremities: stasis dermatitis, lower extremity pitting edema bilaterally      Investigations               9.3    4.17  )-----------( 62       ( 16 May 2025 07:31 )             29.7     05-16    139  |  104  |  39[H]  ----------------------------<  107[H]  3.9   |  24  |  1.3    Ca    8.1[L]      16 May 2025 07:31  Mg     1.8     05-15    TPro  x   /  Alb  x   /  TBili  5.7[H]  /  DBili  1.1[H]  /  AST  x   /  ALT  x   /  AlkPhos  x   05-16        LIVER FUNCTIONS - ( 16 May 2025 07:31 )  Alb: 2.6 g/dL / Pro: 6.0 g/dL / ALK PHOS: 84 U/L / ALT: 18 U/L / AST: 27 U/L / GGT: x           PT/INR - ( 16 May 2025 11:56 )   PT: 17.50 sec;   INR: 1.47 ratio             Urinalysis Basic - ( 16 May 2025 07:31 )    Color: x / Appearance: x / SG: x / pH: x  Gluc: 107 mg/dL / Ketone: x  / Bili: x / Urobili: x   Blood: x / Protein: x / Nitrite: x   Leuk Esterase: x / RBC: x / WBC x   Sq Epi: x / Non Sq Epi: x / Bacteria: x            Current Medications  Standing Medications  heparin   Injectable 5000 Unit(s) SubCutaneous every 12 hours  levothyroxine 37.5 MICROGram(s) Oral daily  pantoprazole    Tablet 40 milliGRAM(s) Oral before breakfast  propranolol 10 milliGRAM(s) Oral two times a day  spironolactone 25 milliGRAM(s) Oral daily  torsemide 20 milliGRAM(s) Oral daily    PRN Medications    Singles Doses Administered  lactulose Syrup 5 Gram(s) Oral every 4 hours

## 2025-05-16 NOTE — PROGRESS NOTE ADULT - ASSESSMENT
Assessment and Plan  Case of an 84 year old female patient known to have a history of hypothyroidism, cryptogenic CURRIE cirrhosis, HE+, ascites hx, EV hx, and HFpEF who was referred to the ED on 05/12 for concern of leg swelling, weight gain, bloating, and RHONA, found to be in volume overload on exam and to have elevated liver enzymes with double duct sign. We are consulted for concern of decompensated cirrhosis.      Cryptogenic NAFLD cirrhosis Decompensation; KEVIN + no previous bx due to frailty; Fib 4 9.8 in 2016  Concern for Gilbert syndrome (pending UGT1A) due to indirect hyperbilirubinemia   Small ascites  Hx of HE; no HCC; Hx of EV  Chronic anemia; no overt bleeding; chronic TPN, SPM 2016, splenic varices on CY  Double duct sign s/p CCY; r/o pancreatic malignancy  * Hemodynamically stable  * Labs: WBC 2.56, Hb 8.2, Plt 44, , K 3.4, BUN 36, Cr 1 on 05/13   * Liver enzymes: 6.5/82/31/20 on 05/13 (previous 5.5/97/48/25 on 02/18)  * KEVIN 1:320, ASMA -, AMA -, Ig G 1611, Ig A 255, Ig M 180   * INR 1.42 on 02/18  * LA 1.9 on 05/12  * Acute hepatitis ABC - in 04/2022  * US abdomen 05/13/2025 cirrhosis; dilated CBD 9mm; CCY; PD dilated 5mm; small ascites (previous 08/2022: cirrhosis; SPM, CCY, normal CBD 8mm, normal pancreas, mild ascites)  * CT AP IC 2022: cirrhosis, CCY, SPM, normal pancreas, small ascites, diverticulosis, splenic and GE varices  * Previous EGD many years ago: Hx of EV +; patient not sure about banding hx  * TTE 05/2025 EF 60%, DD I, mild-mod MR, mild AR, mild TR, mild PHTN  * Previous colonoscopy many years ago  * FHx of GI cancers negative    RECOMMENDATIONS  - Trend hepatic function panel and INR daily  - Counseled about alcohol cessation: has been drinking occasionally; last drink last year. Check Peth  - CLD workup noted  - Consider cardiology evaluation in setting of elevated BNP and O2 requirements  - Checking UGT1A1 enzyme activity in setting of indirect hyperbilirubinemia (r/o Gilbert syndrome)  - Avoid hepatotoxic agents  - For small Ascites: recommend serial abdominal exams. 2g Na diet. On torsemide 20mg QD; started on lasix 40mg QD and increased aldactone to 100mg QD. Cardiology evaluation  - Monitor MAP and keep >65mmhg  - Monitor for Hepatic encephalopathy (hx HE +): lactulose and titrate to target BM 2-3 per day. Avoid sedatives and opioids  - For Esophageal varices hx: outpatient follow up for EGD; continue propranolol 10mg BID with HR monitoring  - For HCC screening: Please f/u as outpatient for US abdomen and AFP every 6 months.  - For Vaccinations: Please f/u in clinic for being uptodate with HAV/HBV/Influenza/Pneumococcal vaccines.  - For LT evaluation: old and frail  - Trend CBC. Target Hb 7-9 in setting of portal HTN  - Lifestyle/Dietary modifications: Calorie intake 25-40 Kcal/kg/day; Protein intake: 1.2-1.5 gm/kg/day  - Avoid smoking and NSAIDS; Abstain from alcohol and all illicit drugs  - Avoid use of herbal and dietary supplements due to potential hepatotoxicity. Limit use of acetaminophen to <2 grams per day. Avoid use of nonsteroidal antiinflammatory drugs (NSAIDs) . Avoid eating any unpasteurized dairy products; avoid eating any raw or undercooked eggs, fish, poultry, or meat; and avoid eating raw/steamed oysters or other shellfish to avoid risk of Vibrio infection.  - Follow up with our [facility] for outpatient EUS for double duct sign (located at [address] [place] [address]. Phone Number: [phone])    - Follow up with our [facility] located at [address] [place] [address]. Telephone No: [phone]      Thank you for your consult.  - Please note that plan was communicated with medical team.   - Please reach GI on [phone] during weekdays till 5pm.  - Please call the GI service line after 5pm on Weekdays and anytime on Weekends: [phone].      Brett Farr MD  PGY - 5 Gastroenterology Fellow   [facility]

## 2025-05-16 NOTE — DISCHARGE NOTE PROVIDER - CARE PROVIDERS DIRECT ADDRESSES
[email or web address]

## 2025-05-16 NOTE — DISCHARGE NOTE NURSING/CASE MANAGEMENT/SOCIAL WORK - PATIENT PORTAL LINK FT
You can access the FollowMyHealth Patient Portal offered by [facility] by registering at the following website: [email or web address]. By joining [employer]’s FollowMyHealth portal, you will also be able to view your health information using other applications (apps) compatible with our system.

## 2025-05-16 NOTE — DISCHARGE NOTE PROVIDER - HOSPITAL COURSE
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 Rivas for "liver problem." Patient reports she uses O2 at home and her PCP was concerned that she needs home o2 as well as hospital bed at home. Reports  abdominal bloating and lower extremity swelling, associated with generalized weakness for the past few weeks. On further questioning reports that her lower extr swelling has been present for a long time and was followed by ?ENT. Pt endorses worsening memory for months to years. Reports she does not follow with hepatologist regularly. Patient also endorses intermittent shortness of breath with exertion.    She had a RUQ US which showed pancreatic duct dilation to 4mm and biliary duct dilation to 9mm. He was seen by Hepatology and the [facility] team; she will need to follow up as an outpatient for EUS/MRCP.    Additionally, genetic testing for UGT1A1 (Gilbert Syndrome) is pending. She is medically stable for discharge with outpatient followup.    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 Rivas for "liver problem." Patient reports she uses O2 at home and her PCP was concerned that she needs home o2 as well as hospital bed at home. Reports  abdominal bloating and lower extremity swelling, associated with generalized weakness for the past few weeks. On further questioning reports that her lower extr swelling has been present for a long time and was followed by ?ENT. Pt endorses worsening memory for months to years. Reports she does not follow with hepatologist regularly. Patient also endorses intermittent shortness of breath with exertion.    #Cryptogenic Cirrhosis   #CBD + pancreatic duct dilation  #Weakness  - RUQ: Cirrhotic liver. Pancreatic duct dilated at 4 mm. Recommend evaluation with contrast-enhanced CT/MR. CBD dilated to 9 mm. Recommend correlation with LFTs. Small ascites.  - curbside hepatology recs:  -- get advanced GI for double duct sign on RUQ, hepatology will see after adv GI eval  -- re med regimen: small ascites, monitor on home torsemide for now, lactulose PRN, resume propanolol home dose (verify when  brings meds), likely needs OP egd  - f/u adv GI   - hepatology following  - echo noted; LVEF 60%  - f/u INR  - PT: Home with assistance      #HTN  #Hypothyroid  - TSH 4  - On levothyroxine 37.5   84-year-old female PMH HTN, hypothyroidism, peripheral edema, cirrhosis cryptogenic/ secondary to CURRIE, who presents to the ED after being seen by PCP Dr. Rivas for "liver problem." Patient reports she uses O2 at home and her PCP was concerned that she needs home o2 as well as hospital bed at home. Reports  abdominal bloating and lower extremity swelling, associated with generalized weakness for the past few weeks. On further questioning reports that her lower extremity swelling has been present for a long time and was followed by ?ENT. Pt endorses worsening memory for months to years. Reports she does not follow with hepatologist regularly. Patient also endorses intermittent shortness of breath with exertion.    She had a RUQ US which showed cirrhotic liver, pancreatic duct dilation to 4mm and biliary duct dilation to 9mm. She was seen by Hepatology and the [facility] team; she will need to follow up as an outpatient for EUS/MRCP. She was seen by PT who recommend home with assistance. She had an Echo which shows LVEF 60% and mild pulm HTN. She was found to be hypoxemic to 88% with ambulation so she qualified for home oxygen. She did report she needed home oxygen previously but was no longer on it. She was set up for home oxygen. Case management worked on obtaining hospital bed for home (still pending at time of discharge). 84-year-old female PMH HTN, hypothyroidism, peripheral edema, cirrhosis cryptogenic/ secondary to CURRIE, who presents to the ED after being seen by PCP Dr. Rivas for "liver problem." Patient reports she uses O2 at home and her PCP was concerned that she needs home o2 as well as hospital bed at home. Reports  abdominal bloating and lower extremity swelling, associated with generalized weakness for the past few weeks. On further questioning reports that her lower extremity swelling has been present for a long time and was followed by ?ENT. Pt endorses worsening memory for months to years. Reports she does not follow with hepatologist regularly. Patient also endorses intermittent shortness of breath with exertion.    She had a RUQ US which showed cirrhotic liver, pancreatic duct dilation to 4mm and biliary duct dilation to 9mm. She was seen by Hepatology and the [facility] team; she will need to follow up as an outpatient for EUS/MRCP. She was seen by PT who recommend home with assistance. She had an Echo which shows LVEF 60% and mild pulm HTN. She was found to be hypoxemic to 88% with ambulation so she qualified for home oxygen. She did report she needed home oxygen previously but was no longer on it. She was set up for home oxygen. Case management worked on obtaining hospital bed for home (still pending at time of discharge).   At the time of discharge following labs are still pending:  -UGA1T1 testing sent as per hepatology rec (to r/o Gilbert's disease)  -hemolysis labs (haptoglobin)

## 2025-05-16 NOTE — DISCHARGE NOTE PROVIDER - PROVIDER TOKENS
PROVIDER:[TOKEN:[[id number]],FOLLOWUP:[2 weeks]],PROVIDER:[TOKEN:[[id number]],FOLLOWUP:[2 weeks],ESTABLISHEDPATIENT:[T]]

## 2025-05-16 NOTE — PROGRESS NOTE ADULT - SUBJECTIVE AND OBJECTIVE BOX
Patient is an 84-year-old female with past medical history of hypothyroid, cryptogenic CURRIE, cirrhosis with decompensation, heart failure, hypertension, osteoarthritis who was sent in by her primary care doctor for ongoing liver issues.  Patient has been followed by her hepatology .  Patient currently followed by hepatology for decompensation from cirrhosis.  Advanced GI requested because abdominal ultrasound noted for a common bile duct of 9 and a pancreatic duct of 4 mm.  Patient currently comfortable this morning.  Patient finished her whole meal tray.  Patient appears alert and oriented but is a poor historian.  Patient notes no complaints other than neck pain that radiates up to the top of her head.      PAST MEDICAL & SURGICAL HISTORY:  GERD (gastroesophageal reflux disease)  Malignant neoplasm of areola of left breast in female, unspecified estrogen receptor status  Cirrhosis   H/O thrombocytopenia  RIGHT & LEFT TKR  History of cholecystectomy        MEDICATIONS  (STANDING):  heparin   Injectable 5000 Unit(s) SubCutaneous every 12 hours  lactulose Syrup 10 Gram(s) Oral three times a day  levothyroxine 37.5 MICROGram(s) Oral daily  pantoprazole    Tablet 40 milliGRAM(s) Oral before breakfast  propranolol 10 milliGRAM(s) Oral two times a day  spironolactone 25 milliGRAM(s) Oral daily  torsemide 20 milliGRAM(s) Oral daily    MEDICATIONS  (PRN):  acetaminophen     Tablet .. 650 milliGRAM(s) Oral every 8 hours PRN Temp greater or equal to 38C (100.4F), Mild Pain (1 - 3), Moderate Pain (4 - 6)      Allergies  aspirin (Other)  Cipro (Hives; Rash)  predniSONE (Swelling)  Levaquin (Hives; Rash)        Review of Systems  General:  Denies Fatigue, Denies Fever, Denies Weakness ,Denies Weight Loss   HEENT: Denies Trouble Swallowing ,Denies  Sore Throat , Denies Change in hearing/vision/speech ,Denies Dizziness    Cardio: Denies  Chest Pain , Palpitations    Respiratory: Denies worsening of SOB, Denies Cough  Abdomen: See detailed HPI  Neuro: Denies Headache Denies Dizziness, Denies Paresthesias  MSK: Denies pain in Bones/Joints/Muscles   Psych: Patient denies depression, denies suicidal or homicidal ideations  Integ: Patient Denies rash, or new skin lesions     Vital Signs Last 24 Hrs  T(C): 36.8 (16 May 2025 04:56), Max: 36.8 (16 May 2025 04:56)  T(F): 98.3 (16 May 2025 04:56), Max: 98.3 (16 May 2025 04:56)  HR: 66 (16 May 2025 04:56) (66 - 72)  BP: 109/67 (16 May 2025 04:56) (107/56 - 109/67)  BP(mean): --  RR: 18 (16 May 2025 04:56) (18 - 18)  SpO2: 96% (16 May 2025 04:56) (94% - 96%)    Parameters below as of 15 May 2025 22:43  Patient On (Oxygen Delivery Method): nasal cannula  O2 Flow (L/min): 2      PHYSICAL EXAM:  GENERAL: NAD, frail appearing, jaundice   CHEST/LUNG: Clear to auscultation bilaterally  HEART: Regular rate and rhythm  ABDOMEN: Soft, Nontender, Nondistended  EXTREMITIES:  No clubbing, cyanosis, or edema        Labs:                          9.6    4.09  )-----------( 57       ( 15 May 2025 07:15 )             30.3         05-15    140  |  107  |  34[H]  ----------------------------<  92  3.7   |  23  |  1.2      LFTs:             5.8  | 6.5  | 32       ------------------[95      ( 15 May 2025 07:15 )  2.8  | x    | 19            Urinalysis Basic - ( 15 May 2025 07:15 )  Color: x / Appearance: x / SG: x / pH: x  Gluc: 92 mg/dL / Ketone: x  / Bili: x / Urobili: x   Blood: x / Protein: x / Nitrite: x   Leuk Esterase: x / RBC: x / WBC x   Sq Epi: x / Non Sq Epi: x / Bacteria: x      RADIOLOGY & ADDITIONAL STUDIES:

## 2025-05-16 NOTE — CHART NOTE - NSCHARTNOTEFT_GEN_A_CORE
Patient requires a hospital bed as an outpatient. She requires frequent positioning changes due to her multiple medical comorbidities that is not possible with a standard bed. A hospital bed will reduce her risk of complications during sleep and will reduce the chance for need for re-hospitalization.

## 2025-05-16 NOTE — DISCHARGE NOTE NURSING/CASE MANAGEMENT/SOCIAL WORK - NSDCPEFALRISK_GEN_ALL_CORE
For information on Fall & Injury Prevention, visit: [email or web address] OR  [email or web address] OR  https://www.cdc.gov/steadi/patient.html

## 2025-05-16 NOTE — PROGRESS NOTE ADULT - ASSESSMENT
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 Rivas for "liver problem." Patient reports she uses O2 at home and her PCP was concerned that she needs home o2 as well as hospital bed at home. Reports  abdominal bloating and lower extremity swelling, associated with generalized weakness for the past few weeks. On further questioning reports that her lower extr swelling has been present for a long time and was followed by ?ENT. Pt endorses worsening memory for months to years. Reports she does not follow with hepatologist regularly. Patient also endorses intermittent shortness of breath with exertion.    #Cryptogenic Cirrhosis   #CBD + pancreatic duct dilation  #Weakness  - RUQ: Cirrhotic liver. Pancreatic duct dilated at 4 mm. Recommend evaluation with contrast-enhanced CT/MR. CBD dilated to 9 mm. Recommend correlation with LFTs. Small ascites.  - curbside hepatology recs:  -- get advanced GI for double duct sign on RUQ, hepatology will see after adv GI eval  -- re med regimen: small ascites, monitor on home torsemide for now, lactulose 10 g 3x daily-patient also reported no BM since arriving in the hospital, resume propanolol home dose (verify when  brings meds),  -  echo noted; LVEF 60%  - hepatology following: recommend OP follow up for chronic management of cirrhosis EGD, and further workup for double duct sign evident on RUQ.   -UGA1T1 testing sent as per hepatology rec  -hemolysis labs sent as per hepatology results   -f/u with advanced GI   - f/u INR  - PT: Home with assistance      #HTN  #Hypothyroid  - TSH 4  - On levothyroxine 37.5    #MEDREC  Tried calling the  for med rec. did not . Called the pharmacy   List from the pharmacy: Furosemide 40 QD, Torsemide 20 QD, spironolactone 25 QD, Levothyroxine 37.5 QD  Probably it is either the furosemide or torsemide, buts spironolactone does not make sense. Please follow up with  for med rec      #misc  code- full  dvt- heparin q12  gi- ppi  Diet: DASH/TLC   84-year-old female PMH HTN, hypothyroidism, peripheral edema, cirrhosis crytpogenic/ secondary to CURRIE, who presents to the ED after being seen by PCP Dr. Rivas for "liver problem." Patient reports she uses O2 at home and her PCP was concerned that she needs home o2 as well as hospital bed at home. Reports  abdominal bloating and lower extremity swelling, associated with generalized weakness for the past few weeks. On further questioning reports that her lower extr swelling has been present for a long time and was followed by ?ENT. Pt endorses worsening memory for months to years. Reports she does not follow with hepatologist regularly. Patient also endorses intermittent shortness of breath with exertion.    #Cryptogenic Cirrhosis   #CBD + pancreatic duct dilation  #Weakness  - RUQ: Cirrhotic liver. Pancreatic duct dilated at 4 mm. Recommend evaluation with contrast-enhanced CT/MR. CBD dilated to 9 mm. Recommend correlation with LFTs. Small ascites.  - curbside hepatology recs:  -- get advanced GI for double duct sign on RUQ, hepatology will see after adv GI eval  -- re med regimen: small ascites, monitor on home torsemide for now, lactulose 10 g 3x daily-patient also reported no BM since arriving in the hospital, resume propanolol home dose (verify when  brings meds),  -  echo noted; LVEF 60%  - hepatology following: recommend OP follow up for chronic management of cirrhosis EGD, and further workup for double duct sign evident on RUQ.   -UGA1T1 testing sent as per hepatology rec  -hemolysis labs sent as per hepatology results   -f/u with advanced GI   - f/u INR  - PT: Home with assistance. d/w  for home health aide, hospital bed, and possible home oxygen     #hypoxia  -currently on 2L [place]   -walk test today. consider home oxygen if patient qualifies.     #HTN  #Hypothyroid  - TSH 4  - On levothyroxine 37.5    #MEDREC  Tried calling the  for med rec. did not . Called the pharmacy   List from the pharmacy: Furosemide 40 QD, Torsemide 20 QD, spironolactone 25 QD, Levothyroxine 37.5 QD  Probably it is either the furosemide or torsemide, buts spironolactone does not make sense. Please follow up with  for med rec    #Misc  #Code Status: Full code   #DVT ppx: Heparin q12   #GI ppx: pantoprazole 40 mg   #Diet: DASH/TLC  #Activity: activity as tolerated   #Inpatient Dispo: med/surg   #Discharge Dispo: PT recommends home with assistance     #Pending: f/u advanced GI, f/u walk test results, f/u pending serologies and lab work, d/w  for home services     #progress note handoff      84-year-old female PMH HTN, hypothyroidism, peripheral edema, cirrhosis crytpogenic/ secondary to CURRIE, who presents to the ED after being seen by PCP Dr. Rivas for "liver problem." Patient reports she uses O2 at home and her PCP was concerned that she needs home o2 as well as hospital bed at home. Reports  abdominal bloating and lower extremity swelling, associated with generalized weakness for the past few weeks. On further questioning reports that her lower extr swelling has been present for a long time and was followed by ?ENT. Pt endorses worsening memory for months to years. Reports she does not follow with hepatologist regularly. Patient also endorses intermittent shortness of breath with exertion.    #Cryptogenic Cirrhosis   #CBD + pancreatic duct dilation  #Weakness  - RUQ: Cirrhotic liver. Pancreatic duct dilated at 4 mm. Recommend evaluation with contrast-enhanced CT/MR. CBD dilated to 9 mm. Recommend correlation with LFTs. Small ascites.  - curbside hepatology recs:  -- get advanced GI for double duct sign on RUQ, hepatology will see after adv GI eval  -- re med regimen: small ascites, monitor on home torsemide for now, lactulose 10 g 3x daily-patient also reported no BM since arriving in the hospital, resume propanolol home dose (verify when  brings meds),  -  echo noted; LVEF 60%  - hepatology following: recommend OP follow up for chronic management of cirrhosis EGD, and further workup for double duct sign evident on RUQ.   -UGA1T1 testing sent as per hepatology rec  -hemolysis labs sent as per hepatology results   -f/u with advanced GI   - f/u INR  - PT: Home with assistance. d/w  for home health aide, hospital bed, and possible home oxygen     #hypoxia  -currently on 2L [place]   -walk test today. consider home oxygen if patient qualifies.     #HTN  #Hypothyroid  - TSH 4  - On levothyroxine 37.5    #MEDREC  - called for med rec. He was only able to confirm some of the medications. He confirmed spironolactone 100 mg QD and levothyroxine 37.5 mg qd. Was unable to confirm the other medications from the pharmacy  (furosemide 20 qd, torsemide 20 qd)    #Misc  #Code Status: Full code   #DVT ppx: Heparin q12   #GI ppx: pantoprazole 40 mg   #Diet: DASH/TLC  #Activity: activity as tolerated   #Inpatient Dispo: med/surg   #Discharge Dispo: PT recommends home with assistance     #Pending: f/u advanced GI, f/u walk test results, f/u pending serologies and lab work, d/w  for home services     #progress note handoff      84-year-old female PMH HTN, hypothyroidism, peripheral edema, cirrhosis crytpogenic/ secondary to CURRIE, who presents to the ED after being seen by PCP Dr. Rivas for "liver problem." Patient reports she uses O2 at home and her PCP was concerned that she needs home o2 as well as hospital bed at home. Reports  abdominal bloating and lower extremity swelling, associated with generalized weakness for the past few weeks. On further questioning reports that her lower extr swelling has been present for a long time and was followed by ?ENT. Pt endorses worsening memory for months to years. Reports she does not follow with hepatologist regularly. Patient also endorses intermittent shortness of breath with exertion.    #Cryptogenic Cirrhosis   #CBD + pancreatic duct dilation  #Weakness  - RUQ: Cirrhotic liver. Pancreatic duct dilated at 4 mm. Recommend evaluation with contrast-enhanced CT/MR. CBD dilated to 9 mm. Recommend correlation with LFTs. Small ascites.  - curbside hepatology recs:  -- get advanced GI for double duct sign on RUQ, hepatology will see after adv GI eval  -- re med regimen: small ascites, monitor on home torsemide for now, lactulose 10 g 3x daily-patient also reported no BM since arriving in the hospital, resume propanolol home dose (verify when  brings meds),  -  echo noted; LVEF 60%  - hepatology following: recommend OP follow up for chronic management of cirrhosis EGD, and further workup for double duct sign evident on RUQ.   -UGA1T1 testing sent as per hepatology rec  -hemolysis labs sent as per hepatology results   -Advanced [facility] recommends no intervention at this time. Further workup needed OP  - f/u INR  - PT: Home with assistance. d/w  for home health aide, hospital bed, and possible home oxygen     #hypoxia  -currently on 2L [place]   -walk test today. consider home oxygen if patient qualifies.     #HTN  #Hypothyroid  - TSH 4  - On levothyroxine 37.5    #MEDREC  - called for med rec. He was only able to confirm some of the medications. He confirmed spironolactone 100 mg QD and levothyroxine 37.5 mg qd. Was unable to confirm the other medications from the pharmacy  (furosemide 20 qd, torsemide 20 qd)    #Misc  #Code Status: Full code   #DVT ppx: Heparin q12   #GI ppx: pantoprazole 40 mg   #Diet: DASH/TLC  #Activity: activity as tolerated   #Inpatient Dispo: med/surg   #Discharge Dispo: PT recommends home with assistance     #Pending: f/u advanced GI, f/u walk test results, f/u pending serologies and lab work, d/w  for home services     #progress note handoff      84-year-old female PMH HTN, hypothyroidism, peripheral edema, cirrhosis crytpogenic/ secondary to CURRIE, who presents to the ED after being seen by PCP Dr. Rivas for "liver problem." Patient reports she uses O2 at home and her PCP was concerned that she needs home o2 as well as hospital bed at home. Reports  abdominal bloating and lower extremity swelling, associated with generalized weakness for the past few weeks. On further questioning reports that her lower extr swelling has been present for a long time and was followed by ?ENT. Pt endorses worsening memory for months to years. Reports she does not follow with hepatologist regularly. Patient also endorses intermittent shortness of breath with exertion.    #Cryptogenic Cirrhosis   #CBD + pancreatic duct dilation  #Weakness  - RUQ: Cirrhotic liver. Pancreatic duct dilated at 4 mm. Recommend evaluation with contrast-enhanced CT/MR. CBD dilated to 9 mm. Recommend correlation with LFTs. Small ascites.  - curbside hepatology recs:  -- get advanced GI for double duct sign on RUQ, hepatology will see after adv GI eval  -- re med regimen: small ascites, monitor on home torsemide for now, lactulose 10 g 3x daily-patient also reported no BM since arriving in the hospital, resume propanolol home dose (verify when  brings meds),  -  echo noted; LVEF 60%  - hepatology following: recommend OP follow up for chronic management of cirrhosis EGD, and further workup for double duct sign evident on RUQ.   -UGA1T1 testing sent as per hepatology rec  -hemolysis labs sent as per hepatology results   -INR: 1.47--> elevated consistently with prior results.  -Advanced [facility] recommends no intervention at this time. Further workup needed OP  - PT: Home with assistance. d/w  for home health aide, hospital bed, and possible home oxygen     #hypoxia  -currently on 2L [place]   -walk test today. consider home oxygen if patient qualifies.     #HTN  #Hypothyroid  - TSH 4  - On levothyroxine 37.5    #MEDREC  - called for med rec. He was only able to confirm some of the medications. He confirmed spironolactone 100 mg QD and levothyroxine 37.5 mg qd. Was unable to confirm the other medications from the pharmacy  (furosemide 20 qd, torsemide 20 qd)    #Misc  #Code Status: Full code   #DVT ppx: Heparin q12   #GI ppx: pantoprazole 40 mg   #Diet: DASH/TLC  #Activity: activity as tolerated   #Inpatient Dispo: med/surg   #Discharge Dispo: PT recommends home with assistance     #Pending: f/u advanced GI, f/u walk test results, f/u pending serologies and lab work, d/w  for home services     #progress note handoff      84-year-old female PMH HTN, hypothyroidism, peripheral edema, cirrhosis crytpogenic/ secondary to CURRIE, who presents to the ED after being seen by PCP Dr. Rivas for "liver problem." Patient reports she uses O2 at home and her PCP was concerned that she needs home o2 as well as hospital bed at home. Reports  abdominal bloating and lower extremity swelling, associated with generalized weakness for the past few weeks. On further questioning reports that her lower extr swelling has been present for a long time and was followed by ?ENT. Pt endorses worsening memory for months to years. Reports she does not follow with hepatologist regularly. Patient also endorses intermittent shortness of breath with exertion.    #Cryptogenic Cirrhosis   #CBD + pancreatic duct dilation  #Weakness  #Pulmonary Hypertension with Hypoxemia  - RUQ: Cirrhotic liver. Pancreatic duct dilated at 4 mm. Recommend evaluation with contrast-enhanced CT/MR. CBD dilated to 9 mm. Recommend correlation with LFTs. Small ascites.  - curbside hepatology recs:  -- get advanced GI for double duct sign on RUQ, hepatology will see after adv GI eval  -- re med regimen: small ascites, monitor on home torsemide for now, lactulose 10 g 3x daily-patient also reported no BM since arriving in the hospital, resume propanolol home dose (verify when  brings meds),  -  echo noted; LVEF 60%  - hepatology following: recommend OP follow up for chronic management of cirrhosis EGD, and further workup for double duct sign evident on RUQ.   -UGA1T1 testing sent as per hepatology rec  -hemolysis labs sent as per hepatology results   -INR: 1.47--> elevated consistently with prior results.  -Advanced GI recommends no intervention at this time. Further workup needed OP  - PT: Home with assistance. d/w  for home health aide, hospital bed, and possible home oxygen     #hypoxia  -currently on 2L [place]   -walk test today. consider home oxygen if patient qualifies.     #HTN  #Hypothyroid  - TSH 4  - On levothyroxine 37.5    #MEDREC  - called for med rec. He was only able to confirm some of the medications. He confirmed spironolactone 100 mg QD and levothyroxine 37.5 mg qd. Was unable to confirm the other medications from the pharmacy  (furosemide 20 qd, torsemide 20 qd)    #Misc  #Code Status: Full code   #DVT ppx: Heparin q12   #GI ppx: pantoprazole 40 mg   #Diet: DASH/TLC  #Activity: activity as tolerated   #Inpatient Dispo: med/surg   #Discharge Dispo: PT recommends home with assistance     #Pending: f/u advanced GI, f/u walk test results, f/u pending serologies and lab work, d/w  for home services     #progress note handoff

## 2025-05-17 LAB
HAPTOGLOB SERPL-MCNC: <20 MG/DL — LOW (ref 34–200)
VIT B12 SERPL-MCNC: 1596 PG/ML — HIGH (ref 232–1245)

## 2025-05-18 ENCOUNTER — EMERGENCY (EMERGENCY)
Facility: HOSPITAL | Age: 85
LOS: 0 days | Discharge: HOME | End: 2025-05-18
Attending: STUDENT IN AN ORGANIZED HEALTH CARE EDUCATION/TRAINING PROGRAM
Payer: MEDICARE

## 2025-05-18 VITALS
OXYGEN SATURATION: 98 % | RESPIRATION RATE: 18 BRPM | DIASTOLIC BLOOD PRESSURE: 70 MMHG | SYSTOLIC BLOOD PRESSURE: 117 MMHG | HEART RATE: 70 BPM | TEMPERATURE: 98 F

## 2025-05-18 VITALS
TEMPERATURE: 98 F | SYSTOLIC BLOOD PRESSURE: 128 MMHG | HEART RATE: 74 BPM | RESPIRATION RATE: 18 BRPM | OXYGEN SATURATION: 98 % | DIASTOLIC BLOOD PRESSURE: 62 MMHG

## 2025-05-18 DIAGNOSIS — K76.0 FATTY (CHANGE OF) LIVER, NOT ELSEWHERE CLASSIFIED: ICD-10-CM

## 2025-05-18 DIAGNOSIS — E03.9 HYPOTHYROIDISM, UNSPECIFIED: ICD-10-CM

## 2025-05-18 DIAGNOSIS — M19.90 UNSPECIFIED OSTEOARTHRITIS, UNSPECIFIED SITE: ICD-10-CM

## 2025-05-18 DIAGNOSIS — S09.90XA UNSPECIFIED INJURY OF HEAD, INITIAL ENCOUNTER: ICD-10-CM
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
## 2025-05-18 DIAGNOSIS — K21.9 GASTRO-ESOPHAGEAL REFLUX DISEASE WITHOUT ESOPHAGITIS: ICD-10-CM

## 2025-05-18 DIAGNOSIS — K74.60 UNSPECIFIED CIRRHOSIS OF LIVER: ICD-10-CM
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
## 2025-05-18 DIAGNOSIS — Z98.890 OTHER SPECIFIED POSTPROCEDURAL STATES: Chronic | ICD-10-CM

## 2025-05-18 DIAGNOSIS — Z88.8 ALLERGY STATUS TO OTHER DRUGS, MEDICAMENTS AND BIOLOGICAL SUBSTANCES: ICD-10-CM

## 2025-05-18 DIAGNOSIS — Z90.49 ACQUIRED ABSENCE OF OTHER SPECIFIED PARTS OF DIGESTIVE TRACT: Chronic | ICD-10-CM

## 2025-05-18 DIAGNOSIS — I12.9 HYPERTENSIVE CHRONIC KIDNEY DISEASE WITH STAGE 1 THROUGH STAGE 4 CHRONIC KIDNEY DISEASE, OR UNSPECIFIED CHRONIC KIDNEY DISEASE: ICD-10-CM

## 2025-05-18 DIAGNOSIS — Z88.1 ALLERGY STATUS TO OTHER ANTIBIOTIC AGENTS: ICD-10-CM

## 2025-05-18 DIAGNOSIS — Z88.6 ALLERGY STATUS TO ANALGESIC AGENT: ICD-10-CM

## 2025-05-18 PROBLEM — Z86.2 PERSONAL HISTORY OF DISEASES OF THE BLOOD AND BLOOD-FORMING ORGANS AND CERTAIN DISORDERS INVOLVING THE IMMUNE MECHANISM: Chronic | Status: INACTIVE | Noted: 2022-05-16 | Resolved: 2025-05-18

## 2025-05-18 LAB
ALBUMIN SERPL ELPH-MCNC: 2.7 G/DL — LOW (ref 3.5–5.2)
ALP SERPL-CCNC: 84 U/L — SIGNIFICANT CHANGE UP (ref 30–115)
ALT FLD-CCNC: 22 U/L — SIGNIFICANT CHANGE UP (ref 0–41)
AMORPH URATE CRY # URNS: PRESENT
ANION GAP SERPL CALC-SCNC: 13 MMOL/L — SIGNIFICANT CHANGE UP (ref 7–14)
ANISOCYTOSIS BLD QL: SLIGHT — SIGNIFICANT CHANGE UP
APPEARANCE UR: CLEAR — SIGNIFICANT CHANGE UP
AST SERPL-CCNC: 37 U/L — SIGNIFICANT CHANGE UP (ref 0–41)
BACTERIA # UR AUTO: ABNORMAL /HPF
BASE EXCESS BLDV CALC-SCNC: 2.9 MMOL/L — SIGNIFICANT CHANGE UP (ref -2–3)
BASOPHILS # BLD AUTO: 0.01 K/UL — SIGNIFICANT CHANGE UP (ref 0–0.2)
BASOPHILS NFR BLD AUTO: 0.2 % — SIGNIFICANT CHANGE UP (ref 0–1)
BILIRUB SERPL-MCNC: 7.7 MG/DL — HIGH (ref 0.2–1.2)
BILIRUB UR-MCNC: NEGATIVE — SIGNIFICANT CHANGE UP
BUN SERPL-MCNC: 50 MG/DL — HIGH (ref 10–20)
CA-I SERPL-SCNC: 1.17 MMOL/L — SIGNIFICANT CHANGE UP (ref 1.15–1.33)
CALCIUM SERPL-MCNC: 8.8 MG/DL — SIGNIFICANT CHANGE UP (ref 8.4–10.5)
CHLORIDE SERPL-SCNC: 102 MMOL/L — SIGNIFICANT CHANGE UP (ref 98–110)
CO2 SERPL-SCNC: 22 MMOL/L — SIGNIFICANT CHANGE UP (ref 17–32)
COLOR SPEC: SIGNIFICANT CHANGE UP
CREAT SERPL-MCNC: 1.5 MG/DL — SIGNIFICANT CHANGE UP (ref 0.7–1.5)
DIFF PNL FLD: ABNORMAL
EGFR: 34 ML/MIN/1.73M2 — LOW
EGFR: 34 ML/MIN/1.73M2 — LOW
EOSINOPHIL # BLD AUTO: 0.06 K/UL — SIGNIFICANT CHANGE UP (ref 0–0.7)
EOSINOPHIL NFR BLD AUTO: 1.3 % — SIGNIFICANT CHANGE UP (ref 0–8)
EPI CELLS # UR: PRESENT
ETHANOL SERPL-MCNC: <10 MG/DL — SIGNIFICANT CHANGE UP
GAS PNL BLDV: 135 MMOL/L — LOW (ref 136–145)
GAS PNL BLDV: SIGNIFICANT CHANGE UP
GLUCOSE SERPL-MCNC: 144 MG/DL — HIGH (ref 70–99)
GLUCOSE UR QL: NEGATIVE MG/DL — SIGNIFICANT CHANGE UP
HCO3 BLDV-SCNC: 26 MMOL/L — SIGNIFICANT CHANGE UP (ref 22–29)
HCT VFR BLD CALC: 34.1 % — LOW (ref 37–47)
HCT VFR BLDA CALC: 42 % — SIGNIFICANT CHANGE UP (ref 34.5–46.5)
HGB BLD CALC-MCNC: 13.9 G/DL — SIGNIFICANT CHANGE UP (ref 11.7–16.1)
HGB BLD-MCNC: 10.9 G/DL — LOW (ref 12–16)
HYALINE CASTS # UR AUTO: SIGNIFICANT CHANGE UP
IMM GRANULOCYTES NFR BLD AUTO: 0.4 % — HIGH (ref 0.1–0.3)
KETONES UR QL: NEGATIVE MG/DL — SIGNIFICANT CHANGE UP
LACTATE BLDV-MCNC: 2.2 MMOL/L — HIGH (ref 0.5–2)
LEUKOCYTE ESTERASE UR-ACNC: ABNORMAL
LIDOCAIN IGE QN: 47 U/L — SIGNIFICANT CHANGE UP (ref 7–60)
LYMPHOCYTES # BLD AUTO: 0.6 K/UL — LOW (ref 1.2–3.4)
LYMPHOCYTES # BLD AUTO: 13.2 % — LOW (ref 20.5–51.1)
MCHC RBC-ENTMCNC: 31.1 PG — HIGH (ref 27–31)
MCHC RBC-ENTMCNC: 32 G/DL — SIGNIFICANT CHANGE UP (ref 32–37)
MCV RBC AUTO: 97.2 FL — SIGNIFICANT CHANGE UP (ref 81–99)
MONOCYTES # BLD AUTO: 0.34 K/UL — SIGNIFICANT CHANGE UP (ref 0.1–0.6)
MONOCYTES NFR BLD AUTO: 7.5 % — SIGNIFICANT CHANGE UP (ref 1.7–9.3)
NEUTROPHILS # BLD AUTO: 3.52 K/UL — SIGNIFICANT CHANGE UP (ref 1.4–6.5)
NEUTROPHILS NFR BLD AUTO: 77.4 % — HIGH (ref 42.2–75.2)
NITRITE UR-MCNC: NEGATIVE — SIGNIFICANT CHANGE UP
NRBC BLD AUTO-RTO: 0 /100 WBCS — SIGNIFICANT CHANGE UP (ref 0–0)
PCO2 BLDV: 33 MMHG — LOW (ref 39–42)
PH BLDV: 7.5 — HIGH (ref 7.32–7.43)
PH UR: 5.5 — SIGNIFICANT CHANGE UP (ref 5–8)
PLAT MORPH BLD: NORMAL — SIGNIFICANT CHANGE UP
PLATELET # BLD AUTO: 73 K/UL — LOW (ref 130–400)
PLATELET COUNT - ESTIMATE: ABNORMAL
PMV BLD: 9.7 FL — SIGNIFICANT CHANGE UP (ref 7.4–10.4)
PO2 BLDV: 37 MMHG — SIGNIFICANT CHANGE UP (ref 25–45)
POLYCHROMASIA BLD QL SMEAR: SLIGHT — SIGNIFICANT CHANGE UP
POTASSIUM BLDV-SCNC: 4.1 MMOL/L — SIGNIFICANT CHANGE UP (ref 3.5–5.1)
POTASSIUM SERPL-MCNC: 4.2 MMOL/L — SIGNIFICANT CHANGE UP (ref 3.5–5)
POTASSIUM SERPL-SCNC: 4.2 MMOL/L — SIGNIFICANT CHANGE UP (ref 3.5–5)
PROT SERPL-MCNC: 6.4 G/DL — SIGNIFICANT CHANGE UP (ref 6–8)
PROT UR-MCNC: NEGATIVE MG/DL — SIGNIFICANT CHANGE UP
RBC # BLD: 3.51 M/UL — LOW (ref 4.2–5.4)
RBC # FLD: 17.7 % — HIGH (ref 11.5–14.5)
RBC BLD AUTO: ABNORMAL
RBC CASTS # UR COMP ASSIST: 12 /HPF — HIGH (ref 0–4)
SAO2 % BLDV: 63.1 % — LOW (ref 67–88)
SODIUM SERPL-SCNC: 137 MMOL/L — SIGNIFICANT CHANGE UP (ref 135–146)
SP GR SPEC: 1.02 — SIGNIFICANT CHANGE UP (ref 1–1.03)
SQUAMOUS # UR AUTO: SIGNIFICANT CHANGE UP /HPF (ref 0–5)
UROBILINOGEN FLD QL: 1 MG/DL — SIGNIFICANT CHANGE UP (ref 0.2–1)
WBC # BLD: 4.55 K/UL — LOW (ref 4.8–10.8)
WBC # FLD AUTO: 4.55 K/UL — LOW (ref 4.8–10.8)
WBC UR QL: 5 /HPF — SIGNIFICANT CHANGE UP (ref 0–5)

## 2025-05-18 PROCEDURE — 74177 CT ABD & PELVIS W/CONTRAST: CPT

## 2025-05-18 PROCEDURE — 70450 CT HEAD/BRAIN W/O DYE: CPT

## 2025-05-18 PROCEDURE — 71045 X-RAY EXAM CHEST 1 VIEW: CPT

## 2025-05-18 PROCEDURE — 80307 DRUG TEST PRSMV CHEM ANLYZR: CPT

## 2025-05-18 PROCEDURE — 36000 PLACE NEEDLE IN VEIN: CPT | Mod: XU

## 2025-05-18 PROCEDURE — 80053 COMPREHEN METABOLIC PANEL: CPT

## 2025-05-18 PROCEDURE — 71045 X-RAY EXAM CHEST 1 VIEW: CPT | Mod: 26

## 2025-05-18 PROCEDURE — 36415 COLL VENOUS BLD VENIPUNCTURE: CPT

## 2025-05-18 PROCEDURE — 70450 CT HEAD/BRAIN W/O DYE: CPT | Mod: 26

## 2025-05-18 PROCEDURE — 85025 COMPLETE CBC W/AUTO DIFF WBC: CPT

## 2025-05-18 PROCEDURE — 74177 CT ABD & PELVIS W/CONTRAST: CPT | Mod: 26

## 2025-05-18 PROCEDURE — 83690 ASSAY OF LIPASE: CPT

## 2025-05-18 PROCEDURE — 99285 EMERGENCY DEPT VISIT HI MDM: CPT | Mod: FS

## 2025-05-18 PROCEDURE — 84132 ASSAY OF SERUM POTASSIUM: CPT

## 2025-05-18 PROCEDURE — 72170 X-RAY EXAM OF PELVIS: CPT | Mod: 26

## 2025-05-18 PROCEDURE — 82330 ASSAY OF CALCIUM: CPT

## 2025-05-18 PROCEDURE — 83605 ASSAY OF LACTIC ACID: CPT

## 2025-05-18 PROCEDURE — 84295 ASSAY OF SERUM SODIUM: CPT

## 2025-05-18 PROCEDURE — 71260 CT THORAX DX C+: CPT

## 2025-05-18 PROCEDURE — 71260 CT THORAX DX C+: CPT | Mod: 26

## 2025-05-18 PROCEDURE — 99284 EMERGENCY DEPT VISIT MOD MDM: CPT | Mod: 25

## 2025-05-18 PROCEDURE — 72125 CT NECK SPINE W/O DYE: CPT

## 2025-05-18 PROCEDURE — 85018 HEMOGLOBIN: CPT

## 2025-05-18 PROCEDURE — 81001 URINALYSIS AUTO W/SCOPE: CPT

## 2025-05-18 PROCEDURE — 85014 HEMATOCRIT: CPT

## 2025-05-18 PROCEDURE — 82803 BLOOD GASES ANY COMBINATION: CPT

## 2025-05-18 PROCEDURE — 72170 X-RAY EXAM OF PELVIS: CPT

## 2025-05-18 PROCEDURE — 72125 CT NECK SPINE W/O DYE: CPT | Mod: 26

## 2025-05-18 RX ORDER — ACETAMINOPHEN 500 MG/5ML
950 LIQUID (ML) ORAL ONCE
Refills: 0 | Status: COMPLETED | OUTPATIENT
Start: 2025-05-18 | End: 2025-05-18

## 2025-05-18 RX ADMIN — Medication 250 MILLILITER(S): at 06:07

## 2025-05-18 RX ADMIN — Medication 950 MILLIGRAM(S): at 09:22

## 2025-05-19 PROBLEM — D69.6 THROMBOCYTOPENIA, UNSPECIFIED: Chronic | Status: ACTIVE | Noted: 2025-05-18

## 2025-05-19 PROBLEM — M19.90 UNSPECIFIED OSTEOARTHRITIS, UNSPECIFIED SITE: Chronic | Status: ACTIVE | Noted: 2025-05-18

## 2025-05-19 PROBLEM — K74.60 UNSPECIFIED CIRRHOSIS OF LIVER: Chronic | Status: ACTIVE | Noted: 2025-05-18

## 2025-05-19 PROBLEM — E03.9 HYPOTHYROIDISM, UNSPECIFIED: Chronic | Status: ACTIVE | Noted: 2025-05-18

## 2025-05-20 DIAGNOSIS — E80.4 GILBERT SYNDROME: ICD-10-CM

## 2025-05-20 DIAGNOSIS — I25.10 ATHEROSCLEROTIC HEART DISEASE OF NATIVE CORONARY ARTERY WITHOUT ANGINA PECTORIS: ICD-10-CM

## 2025-05-20 DIAGNOSIS — Z99.81 DEPENDENCE ON SUPPLEMENTAL OXYGEN: ICD-10-CM

## 2025-05-20 DIAGNOSIS — R18.8 OTHER ASCITES: ICD-10-CM

## 2025-05-20 DIAGNOSIS — K75.81 NONALCOHOLIC STEATOHEPATITIS (NASH): ICD-10-CM

## 2025-05-20 DIAGNOSIS — Z96.653 PRESENCE OF ARTIFICIAL KNEE JOINT, BILATERAL: ICD-10-CM

## 2025-05-20 DIAGNOSIS — Z88.8 ALLERGY STATUS TO OTHER DRUGS, MEDICAMENTS AND BIOLOGICAL SUBSTANCES: ICD-10-CM

## 2025-05-20 DIAGNOSIS — R62.7 ADULT FAILURE TO THRIVE: ICD-10-CM

## 2025-05-20 DIAGNOSIS — Z79.890 HORMONE REPLACEMENT THERAPY: ICD-10-CM

## 2025-05-20 DIAGNOSIS — K86.89 OTHER SPECIFIED DISEASES OF PANCREAS: ICD-10-CM

## 2025-05-20 DIAGNOSIS — Z79.82 LONG TERM (CURRENT) USE OF ASPIRIN: ICD-10-CM

## 2025-05-20 DIAGNOSIS — K76.6 PORTAL HYPERTENSION: ICD-10-CM

## 2025-05-20 DIAGNOSIS — K21.9 GASTRO-ESOPHAGEAL REFLUX DISEASE WITHOUT ESOPHAGITIS: ICD-10-CM

## 2025-05-20 DIAGNOSIS — E03.9 HYPOTHYROIDISM, UNSPECIFIED: ICD-10-CM
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
## 2025-05-20 DIAGNOSIS — M19.90 UNSPECIFIED OSTEOARTHRITIS, UNSPECIFIED SITE: ICD-10-CM

## 2025-05-20 DIAGNOSIS — Z86.2 PERSONAL HISTORY OF DISEASES OF THE BLOOD AND BLOOD-FORMING ORGANS AND CERTAIN DISORDERS INVOLVING THE IMMUNE MECHANISM: ICD-10-CM

## 2025-05-20 DIAGNOSIS — R09.02 HYPOXEMIA: ICD-10-CM

## 2025-05-20 DIAGNOSIS — I10 ESSENTIAL (PRIMARY) HYPERTENSION: ICD-10-CM

## 2025-05-20 DIAGNOSIS — Z80.3 FAMILY HISTORY OF MALIGNANT NEOPLASM OF BREAST: ICD-10-CM

## 2025-05-20 DIAGNOSIS — Z90.49 ACQUIRED ABSENCE OF OTHER SPECIFIED PARTS OF DIGESTIVE TRACT: ICD-10-CM

## 2025-05-20 DIAGNOSIS — R16.1 SPLENOMEGALY, NOT ELSEWHERE CLASSIFIED: ICD-10-CM

## 2025-05-20 DIAGNOSIS — D64.9 ANEMIA, UNSPECIFIED: ICD-10-CM

## 2025-05-20 DIAGNOSIS — Z98.890 OTHER SPECIFIED POSTPROCEDURAL STATES: ICD-10-CM

## 2025-05-20 DIAGNOSIS — S80.821A BLISTER (NONTHERMAL), RIGHT LOWER LEG, INITIAL ENCOUNTER: ICD-10-CM

## 2025-05-22 ENCOUNTER — INPATIENT (INPATIENT)
Facility: HOSPITAL | Age: 85
LOS: 2 days | Discharge: NURSING, REHAB OR LONG-TERM CARE | DRG: 92 | End: 2025-05-25
Attending: STUDENT IN AN ORGANIZED HEALTH CARE EDUCATION/TRAINING PROGRAM | Admitting: STUDENT IN AN ORGANIZED HEALTH CARE EDUCATION/TRAINING PROGRAM
Payer: MEDICARE

## 2025-05-22 VITALS
SYSTOLIC BLOOD PRESSURE: 119 MMHG | RESPIRATION RATE: 16 BRPM | OXYGEN SATURATION: 97 % | DIASTOLIC BLOOD PRESSURE: 53 MMHG | HEART RATE: 62 BPM

## 2025-05-22 DIAGNOSIS — Z90.49 ACQUIRED ABSENCE OF OTHER SPECIFIED PARTS OF DIGESTIVE TRACT: Chronic | ICD-10-CM

## 2025-05-22 DIAGNOSIS — Z98.890 OTHER SPECIFIED POSTPROCEDURAL STATES: Chronic | ICD-10-CM

## 2025-05-22 DIAGNOSIS — R29.6 REPEATED FALLS: ICD-10-CM

## 2025-05-22 LAB
ALBUMIN SERPL ELPH-MCNC: 3.1 G/DL — LOW (ref 3.5–5.2)
ALP SERPL-CCNC: 92 U/L — SIGNIFICANT CHANGE UP (ref 30–115)
ALT FLD-CCNC: 25 U/L — SIGNIFICANT CHANGE UP (ref 0–41)
ANION GAP SERPL CALC-SCNC: 13 MMOL/L — SIGNIFICANT CHANGE UP (ref 7–14)
APTT BLD: 36.3 SEC — SIGNIFICANT CHANGE UP (ref 27–39.2)
AST SERPL-CCNC: 50 U/L — HIGH (ref 0–41)
BASOPHILS # BLD AUTO: 0.01 K/UL — SIGNIFICANT CHANGE UP (ref 0–0.2)
BASOPHILS NFR BLD AUTO: 0.2 % — SIGNIFICANT CHANGE UP (ref 0–1)
BILIRUB SERPL-MCNC: 7.7 MG/DL — HIGH (ref 0.2–1.2)
BUN SERPL-MCNC: 50 MG/DL — HIGH (ref 10–20)
CALCIUM SERPL-MCNC: 8.8 MG/DL — SIGNIFICANT CHANGE UP (ref 8.4–10.5)
CHLORIDE SERPL-SCNC: 101 MMOL/L — SIGNIFICANT CHANGE UP (ref 98–110)
CO2 SERPL-SCNC: 22 MMOL/L — SIGNIFICANT CHANGE UP (ref 17–32)
CREAT SERPL-MCNC: 1.5 MG/DL — SIGNIFICANT CHANGE UP (ref 0.7–1.5)
EGFR: 34 ML/MIN/1.73M2 — LOW
EGFR: 34 ML/MIN/1.73M2 — LOW
EOSINOPHIL # BLD AUTO: 0.17 K/UL — SIGNIFICANT CHANGE UP (ref 0–0.7)
EOSINOPHIL NFR BLD AUTO: 3.5 % — SIGNIFICANT CHANGE UP (ref 0–8)
FULL GENE SEQUENCE RESULT: SIGNIFICANT CHANGE UP
GLUCOSE BLDC GLUCOMTR-MCNC: 141 MG/DL — HIGH (ref 70–99)
GLUCOSE SERPL-MCNC: 121 MG/DL — HIGH (ref 70–99)
HCT VFR BLD CALC: 33.1 % — LOW (ref 37–47)
HGB BLD-MCNC: 10.5 G/DL — LOW (ref 12–16)
IMM GRANULOCYTES NFR BLD AUTO: 0.4 % — HIGH (ref 0.1–0.3)
INR BLD: 1.44 RATIO — HIGH (ref 0.65–1.3)
LACTATE SERPL-SCNC: 1.7 MMOL/L — SIGNIFICANT CHANGE UP (ref 0.7–2)
LYMPHOCYTES # BLD AUTO: 0.54 K/UL — LOW (ref 1.2–3.4)
LYMPHOCYTES # BLD AUTO: 11.2 % — LOW (ref 20.5–51.1)
MANUAL SMEAR VERIFICATION: SIGNIFICANT CHANGE UP
MCHC RBC-ENTMCNC: 31.1 PG — HIGH (ref 27–31)
MCHC RBC-ENTMCNC: 31.7 G/DL — LOW (ref 32–37)
MCV RBC AUTO: 97.9 FL — SIGNIFICANT CHANGE UP (ref 81–99)
METHOD:: SIGNIFICANT CHANGE UP
MOL DX INTERP BLD/T QL: SIGNIFICANT CHANGE UP
MONOCYTES # BLD AUTO: 0.57 K/UL — SIGNIFICANT CHANGE UP (ref 0.1–0.6)
MONOCYTES NFR BLD AUTO: 11.9 % — HIGH (ref 1.7–9.3)
NEUTROPHILS # BLD AUTO: 3.5 K/UL — SIGNIFICANT CHANGE UP (ref 1.4–6.5)
NEUTROPHILS NFR BLD AUTO: 72.8 % — SIGNIFICANT CHANGE UP (ref 42.2–75.2)
NRBC BLD AUTO-RTO: 0 /100 WBCS — SIGNIFICANT CHANGE UP (ref 0–0)
PLAT MORPH BLD: NORMAL — SIGNIFICANT CHANGE UP
PLATELET # BLD AUTO: 60 K/UL — LOW (ref 130–400)
PLATELET CLUMP BLD QL SMEAR: SIGNIFICANT CHANGE UP
PLATELET COUNT - ESTIMATE: ABNORMAL
PMV BLD: 8.5 FL — SIGNIFICANT CHANGE UP (ref 7.4–10.4)
POTASSIUM SERPL-MCNC: 3.6 MMOL/L — SIGNIFICANT CHANGE UP (ref 3.5–5)
POTASSIUM SERPL-SCNC: 3.6 MMOL/L — SIGNIFICANT CHANGE UP (ref 3.5–5)
PROT SERPL-MCNC: 6.1 G/DL — SIGNIFICANT CHANGE UP (ref 6–8)
PROTHROM AB SERPL-ACNC: 17.1 SEC — HIGH (ref 9.95–12.87)
RBC # BLD: 3.38 M/UL — LOW (ref 4.2–5.4)
RBC # FLD: 17.3 % — HIGH (ref 11.5–14.5)
RBC BLD AUTO: ABNORMAL
REVIEWED BY: SIGNIFICANT CHANGE UP
SODIUM SERPL-SCNC: 136 MMOL/L — SIGNIFICANT CHANGE UP (ref 135–146)
TA REPEAT RESULT: ABNORMAL
TEST PERFORMANCE INFO SPEC: SIGNIFICANT CHANGE UP
UGT1A1 GENE MUT ANL BLD/T: SIGNIFICANT CHANGE UP
WBC # BLD: 4.81 K/UL — SIGNIFICANT CHANGE UP (ref 4.8–10.8)
WBC # FLD AUTO: 4.81 K/UL — SIGNIFICANT CHANGE UP (ref 4.8–10.8)

## 2025-05-22 PROCEDURE — 92610 EVALUATE SWALLOWING FUNCTION: CPT | Mod: GN

## 2025-05-22 PROCEDURE — 99285 EMERGENCY DEPT VISIT HI MDM: CPT | Mod: GC

## 2025-05-22 PROCEDURE — 82570 ASSAY OF URINE CREATININE: CPT

## 2025-05-22 PROCEDURE — 85025 COMPLETE CBC W/AUTO DIFF WBC: CPT

## 2025-05-22 PROCEDURE — 86850 RBC ANTIBODY SCREEN: CPT

## 2025-05-22 PROCEDURE — 76775 US EXAM ABDO BACK WALL LIM: CPT

## 2025-05-22 PROCEDURE — 83935 ASSAY OF URINE OSMOLALITY: CPT

## 2025-05-22 PROCEDURE — 71260 CT THORAX DX C+: CPT | Mod: 26

## 2025-05-22 PROCEDURE — 81001 URINALYSIS AUTO W/SCOPE: CPT

## 2025-05-22 PROCEDURE — 87389 HIV-1 AG W/HIV-1&-2 AB AG IA: CPT

## 2025-05-22 PROCEDURE — 72125 CT NECK SPINE W/O DYE: CPT | Mod: 26

## 2025-05-22 PROCEDURE — 74177 CT ABD & PELVIS W/CONTRAST: CPT | Mod: 26

## 2025-05-22 PROCEDURE — 97165 OT EVAL LOW COMPLEX 30 MIN: CPT | Mod: GO

## 2025-05-22 PROCEDURE — 99223 1ST HOSP IP/OBS HIGH 75: CPT

## 2025-05-22 PROCEDURE — 86901 BLOOD TYPING SEROLOGIC RH(D): CPT

## 2025-05-22 PROCEDURE — 36415 COLL VENOUS BLD VENIPUNCTURE: CPT

## 2025-05-22 PROCEDURE — 84133 ASSAY OF URINE POTASSIUM: CPT

## 2025-05-22 PROCEDURE — 84156 ASSAY OF PROTEIN URINE: CPT

## 2025-05-22 PROCEDURE — 93010 ELECTROCARDIOGRAM REPORT: CPT | Mod: 76

## 2025-05-22 PROCEDURE — 86900 BLOOD TYPING SEROLOGIC ABO: CPT

## 2025-05-22 PROCEDURE — 70450 CT HEAD/BRAIN W/O DYE: CPT | Mod: 26

## 2025-05-22 PROCEDURE — 82962 GLUCOSE BLOOD TEST: CPT

## 2025-05-22 PROCEDURE — 84540 ASSAY OF URINE/UREA-N: CPT

## 2025-05-22 PROCEDURE — 93005 ELECTROCARDIOGRAM TRACING: CPT

## 2025-05-22 PROCEDURE — 84300 ASSAY OF URINE SODIUM: CPT

## 2025-05-22 PROCEDURE — 86403 PARTICLE AGGLUT ANTBDY SCRN: CPT

## 2025-05-22 PROCEDURE — 83735 ASSAY OF MAGNESIUM: CPT

## 2025-05-22 PROCEDURE — 80048 BASIC METABOLIC PNL TOTAL CA: CPT

## 2025-05-22 RX ORDER — TRAMADOL HYDROCHLORIDE 50 MG/1
50 TABLET, FILM COATED ORAL EVERY 8 HOURS
Refills: 0 | Status: DISCONTINUED | OUTPATIENT
Start: 2025-05-22 | End: 2025-05-22

## 2025-05-22 RX ORDER — TRAMADOL HYDROCHLORIDE 50 MG/1
50 TABLET, FILM COATED ORAL EVERY 8 HOURS
Refills: 0 | Status: DISCONTINUED | OUTPATIENT
Start: 2025-05-22 | End: 2025-05-25

## 2025-05-22 RX ORDER — LACTULOSE 10 G/15ML
20 SOLUTION ORAL THREE TIMES A DAY
Refills: 0 | Status: DISCONTINUED | OUTPATIENT
Start: 2025-05-22 | End: 2025-05-25

## 2025-05-22 RX ORDER — ONDANSETRON HCL/PF 4 MG/2 ML
4 VIAL (ML) INJECTION EVERY 8 HOURS
Refills: 0 | Status: DISCONTINUED | OUTPATIENT
Start: 2025-05-22 | End: 2025-05-25

## 2025-05-22 RX ORDER — FUROSEMIDE 10 MG/ML
40 INJECTION INTRAMUSCULAR; INTRAVENOUS DAILY
Refills: 0 | Status: DISCONTINUED | OUTPATIENT
Start: 2025-05-22 | End: 2025-05-24

## 2025-05-22 RX ORDER — SPIRONOLACTONE 25 MG
100 TABLET ORAL DAILY
Refills: 0 | Status: DISCONTINUED | OUTPATIENT
Start: 2025-05-22 | End: 2025-05-25

## 2025-05-22 RX ORDER — HEPARIN SODIUM 1000 [USP'U]/ML
5000 INJECTION INTRAVENOUS; SUBCUTANEOUS EVERY 12 HOURS
Refills: 0 | Status: DISCONTINUED | OUTPATIENT
Start: 2025-05-22 | End: 2025-05-25

## 2025-05-22 RX ORDER — LEVOTHYROXINE SODIUM 300 MCG
37.5 TABLET ORAL DAILY
Refills: 0 | Status: DISCONTINUED | OUTPATIENT
Start: 2025-05-22 | End: 2025-05-25

## 2025-05-22 RX ADMIN — HEPARIN SODIUM 5000 UNIT(S): 1000 INJECTION INTRAVENOUS; SUBCUTANEOUS at 17:29

## 2025-05-22 RX ADMIN — LACTULOSE 20 GRAM(S): 10 SOLUTION ORAL at 21:21

## 2025-05-22 RX ADMIN — LACTULOSE 20 GRAM(S): 10 SOLUTION ORAL at 13:33

## 2025-05-22 RX ADMIN — TRAMADOL HYDROCHLORIDE 50 MILLIGRAM(S): 50 TABLET, FILM COATED ORAL at 14:46

## 2025-05-22 RX ADMIN — TRAMADOL HYDROCHLORIDE 50 MILLIGRAM(S): 50 TABLET, FILM COATED ORAL at 13:33

## 2025-05-22 NOTE — ED PROVIDER NOTE - IV ALTEPLASE EXCL REL HIDDEN
This writer left  for [facility] to find out what their wait time is like for their residential program. Asked for phone call back.    show

## 2025-05-22 NOTE — ED PROVIDER NOTE - OBJECTIVE STATEMENT
84-year-old female PMH cirrhosis, hypothyroidism, thrombocytopenia, fatty liver, GERD, breast cancer, osteoarthritis presents to the ED for evaluation status post mechanical fall.  Patient states this morning she slipped and fell, landing on her butt as she was trying to walk to the bathroom.  Fall was witnessed by  who takes care of the patient at home.  Of note patient was evaluated at [facility] on 5/18 for fall, and had a recent admission 5/12-5/16 for cirrhosis.  Patient denies any head trauma or LOC.  Patient is not on any anticoagulation.  Patient states she is not sure if her  is adequately able to care for her and states she does not feel completely safe going home as she is weak and often requires assistance to complete her ADLs. Reports she gets often gets nosebleeds when she blows her nose in the mornings and has been occurring for several years.

## 2025-05-22 NOTE — H&P ADULT - ASSESSMENT
84F w/PMHX of cirrhosis, hypothyroidism, thrombocytopenia, fatty liver, GERD, breast cancer, osteoarthritis presents to ED s/p fall, admitted to medicine service for further management.    #Frequent falls  #Worsening Weakness  - admit to medicine service  - trauma workup negative for Fx  - pain control  - PT/PMR c/s  - CM/SW consult for possible placement    #Abnormal EKG  - poor quality with artifact will repeat    #Chronic Hypoxia  - chronic o2 at 2L via [place]    #Hx of Liver Cirrhosis  - at baseline  - c/w Lactulose TID, titrate to 3 BM QD  - c/w Furosemide/Aldactone  - c/w Propranolol     #Hypothyroid  - c/w Levo  - TSH 4.95 5/14/25    #GERD  - c/w PPI    Diet: DASH  Activity:  OOB  DVT PPX: Heparin  GI PPX: PPI  Code status: Full  Dispo: Unknown  CHG 2% Wipes QD      84F w/PMHX of cirrhosis, hypothyroidism, thrombocytopenia, fatty liver, GERD, breast cancer, osteoarthritis presents to ED s/p fall, admitted to medicine service for further management.    #Mechanical fall  #Frequent falls  #Worsening Weakness  - admit to medicine service  - trauma workup negative for Fx  - pain control  - PT/PMR c/s  - CM/SW consult for possible placement  -Hospice referral  -Palliative eval     #Abnormal EKG  - poor quality with artifact reading as afib though clearly with lots of artifact and not accurate, repeat with NSR     #Chronic Hypoxia  - chronic o2 at 2L via [place]    #Hx of Liver Cirrhosis  - at baseline  - c/w Lactulose TID, titrate to 3 BM QD  - c/w Furosemide/Aldactone  - c/w Propranolol     #Hypothyroid  - c/w Levo  - TSH 4.95 5/14/25    #GERD  - c/w PPI    Diet: DASH  Activity:  OOB  DVT PPX: Heparin  GI PPX: PPI  Code status: Full

## 2025-05-22 NOTE — ED ADULT NURSE NOTE - NSICDXPASTMEDICALHX_GEN_ALL_CORE_FT
PAST MEDICAL HISTORY:  Blister of right lower leg     Cirrhosis     Fatty liver     GERD (gastroesophageal reflux disease)     Hypothyroidism     Malignant neoplasm of areola of left breast in female, unspecified estrogen receptor status     Osteoarthritis     Thrombocytopenia

## 2025-05-22 NOTE — H&P ADULT - NSHPLABSRESULTS_GEN_ALL_CORE
10.5   4.81  )-----------( 60       ( 22 May 2025 08:10 )             33.1       05-22    136  |  101  |  50[H]  ----------------------------<  121[H]  3.6   |  22  |  1.5    Ca    8.8      22 May 2025 08:10    TPro  6.1  /  Alb  3.1[L]  /  TBili  7.7[H]  /  DBili  x   /  AST  50[H]  /  ALT  25  /  AlkPhos  92  05-22          Urinalysis Basic - ( 22 May 2025 08:10 )    Color: x / Appearance: x / SG: x / pH: x  Gluc: 121 mg/dL / Ketone: x  / Bili: x / Urobili: x   Blood: x / Protein: x / Nitrite: x   Leuk Esterase: x / RBC: x / WBC x   Sq Epi: x / Non Sq Epi: x / Bacteria: x        PT/INR - ( 22 May 2025 08:10 )   PT: 17.10 sec;   INR: 1.44 ratio         PTT - ( 22 May 2025 08:10 )  PTT:36.3 sec    Lactate Trend  05-22 @ 08:10 Lactate:1.7

## 2025-05-22 NOTE — ED ADULT TRIAGE NOTE - CHIEF COMPLAINT QUOTE
Pt brought in by ambulance from home. As per EMT "She was feeling weak this morning and her legs gave out. She fell on her bottom." Pt noted to be jaundice upon arrival. Pt with history of liver disease.

## 2025-05-22 NOTE — ED PROVIDER NOTE - ATTENDING CONTRIBUTION TO CARE
see mdm for attending note    85-year-old female presents to the ED for mechanical fall.  Recent fall with CT imaging which was unremarkable.  Today patient reports she fell and hit her back and buttock area.  Diffuse tenderness along the lumbar and hip areas.  No midline spinal tenderness.  No focal neurological deficits noted.  History of cirrhosis and multiple fall falls.  Patient reports she has poor support at home even though the  tries to help.  Does have some home care help.

## 2025-05-22 NOTE — H&P ADULT - HISTORY OF PRESENT ILLNESS
84F w/PMHX of cirrhosis, hypothyroidism, thrombocytopenia, fatty liver, GERD, breast cancer, osteoarthritis presents to ED s/p fall.  Patient's niece at bedside, provides Hx.  Reporting patient s/p mechanical fall last night.  SHe reports that the patient's  is weak as well, tried to help her back into the recliner from going to the bathroom and they both fell over.  Patient landed on her buttock area and remained on the floor for 2 hours until she was discovered and brought to the ED for evaluation.  Patient with complaints of thoracic and low back pain, otherwise at baseline.  No complaints of CP/SOB.  No abdominal or urinary complaints.  Patient has had recent falls, last 5/18, went to ED and discharged home.  Also s/p recent admission 5/12-5/16.     84F w/PMHX of cirrhosis, hypothyroidism, thrombocytopenia, fatty liver, GERD, breast cancer, osteoarthritis presents to ED s/p fall.  Patient's niece at bedside, provides Hx.  Reporting patient s/p mechanical fall last night.  SHe reports that the patient's  is weak as well, tried to help her back into the recliner from going to the bathroom and they both fell over.  Patient landed on her buttock area and remained on the floor for 2 hours until she was discovered and brought to the ED for evaluation.  Patient with complaints of thoracic and low back pain, otherwise at baseline.  No complaints of CP/SOB.  No abdominal or urinary complaints.  Patient has had recent falls, last 5/18, went to ED and discharged home.  Also s/p recent admission 5/12-5/16. Trauma w/u neg in ED.

## 2025-05-22 NOTE — ED PROVIDER NOTE - CLINICAL SUMMARY MEDICAL DECISION MAKING FREE TEXT BOX
Independent interpretation of the EKG performed by Kevin Carreon: Sinus, HR: 60, IN: 182, QTc: 462, ST-T: no ST-T wave changes

## 2025-05-22 NOTE — H&P ADULT - NS ATTEND AMEND GEN_ALL_CORE FT
Plan as above, I edited the note.    In short, 85 y/o pt with cirrhosis and rest of pmh as above, presents after mechanical fall. Discussed with son aurea at bedside, pt has been having progressively worsening decline, very little ambulation-only walks with walker to bathroom but otherwise mostly in bed or recliner next to bed. They have discussed hospice as a family in the past and Aurea ([phone]) is interested in a referral and discussing options with them, also agreeable to palliative eval, will think about code status. PT, CM/SW for hospice referral, rest as above.

## 2025-05-22 NOTE — H&P ADULT - CONVERSATION DETAILS
fair balance
Pt with very poor functional status, essentially is in bed or recliner in her room all day, only ambulates to go to the bathroom which has been more difficult. Discussed with son Alexis at bedside and pt, they are interested in hospice and would want referral to discuss with them. Discussed code status, they will think about it and get back to team, for now remains full code.

## 2025-05-22 NOTE — ED ADULT NURSE NOTE - NSFALLUNIVINTERV_ED_ALL_ED
Bed/Stretcher in lowest position, wheels locked, appropriate side rails in place/Call bell, personal items and telephone in reach/Instruct patient to call for assistance before getting out of bed/chair/stretcher/Non-slip footwear applied when patient is off stretcher/[place] to call system/Physically safe environment - no spills, clutter or unnecessary equipment/Purposeful proactive rounding/Room/bathroom lighting operational, light cord in reach

## 2025-05-22 NOTE — H&P ADULT - NSHPPHYSICALEXAM_GEN_ALL_CORE
Vital Signs (24 Hrs):  T(C): 36.4 (05-22-25 @ 11:10), Max: 36.4 (05-22-25 @ 11:10)  HR: 68 (05-22-25 @ 11:10) (62 - 68)  BP: 119/64 (05-22-25 @ 11:10) (119/53 - 119/64)  RR: 19 (05-22-25 @ 11:10) (16 - 19)  SpO2: 96% (05-22-25 @ 11:10) (96% - 97%)    PHYSICAL EXAM:  GENERAL: NAD, well-developed  SKIN: wounds to BLE bandaged C/D/I  HEAD:  Atraumatic, Normocephalic  EYES: EOMI, PERRLA, conjunctiva and sclera clear  NECK: Supple, No JVD  CHEST/LUNG: Clear to auscultation bilaterally; No wheeze  HEART: Regular rate and rhythm; No murmurs, rubs, or gallops  ABDOMEN: Soft, Nontender, Nondistended; Bowel sounds present  EXTREMITIES:  No clubbing, cyanosis, trace edema BLE  CNS: AAOx3 Vital Signs (24 Hrs):  T(C): 36.4 (05-22-25 @ 11:10), Max: 36.4 (05-22-25 @ 11:10)  HR: 68 (05-22-25 @ 11:10) (62 - 68)  BP: 119/64 (05-22-25 @ 11:10) (119/53 - 119/64)  RR: 19 (05-22-25 @ 11:10) (16 - 19)  SpO2: 96% (05-22-25 @ 11:10) (96% - 97%)    PHYSICAL EXAM:  GENERAL: NAD  SKIN: wounds to BLE bandaged C/D/I  CHEST/LUNG: decreased breath sounds  HEART: Regular rate and rhythm; No murmurs, rubs, or gallops  ABDOMEN: Soft, Nontender, Nondistended; Bowel sounds present  EXTREMITIES:  No clubbing, cyanosis, trace edema BLE  CNS: AAOx3

## 2025-05-22 NOTE — PATIENT PROFILE ADULT - FALL HARM RISK - HARM RISK INTERVENTIONS
Assistance with ambulation/Assistance OOB with selected safe patient handling equipment/Communicate Risk of Fall with Harm to all staff/Discuss with provider need for PT consult/Monitor for mental status changes/Monitor gait and stability/Provide patient with walking aids - walker, cane, crutches/Reinforce activity limits and safety measures with patient and family/Review medications for side effects contributing to fall risk/Tailored Fall Risk Interventions/Use of alarms - bed, chair and/or voice tab/Visual Cue: Yellow wristband and red socks/Bed in lowest position, wheels locked, appropriate side rails in place/Call bell, personal items and telephone in reach/Instruct patient to call for assistance before getting out of bed or chair/Non-slip footwear when patient is out of bed/[place] to call system/Physically safe environment - no spills, clutter or unnecessary equipment/Purposeful Proactive Rounding/Room/bathroom lighting operational, light cord in reach

## 2025-05-22 NOTE — ED PROVIDER NOTE - PHYSICAL EXAMINATION
VITAL SIGNS: I have reviewed nursing notes and confirm.  CONSTITUTIONAL: chronically ill-appearing, frail, NAD  SKIN: Warm dry. Jaundiced. Scattered ecchymosis to arms.   HEAD: NCAT  EYES: EOMI, PERRL, + scleral icterus  ENT: Moist mucous membranes. Dried blood to nares, no septal hematoma.   NECK: Supple. No cervical midline spinal tenderness to palpation.  CARD: RRR, no murmurs, rubs or gallops  RESP: clear to ausculation b/l.  No rales, rhonchi, or wheezing.  ABD: soft, non-tender, non-distended.   BACK: No T/L/S midline spinal tenderness to palpation.  EXT: Full ROM, no bony tenderness, b/l pedal edema.  NEURO: normal motor. normal sensory. CN II-XII grossly.  PSYCH: Cooperative, appropriate.

## 2025-05-22 NOTE — PATIENT PROFILE ADULT - VISION (WITH CORRECTIVE LENSES IF THE PATIENT USUALLY WEARS THEM):
Date: Pending clearance    Cardiologist: Bobby Velasquez    Procedure: Lid Procedure    Surgeon: Diamante Wisdom    Last Office Visit: 9/11/18  Reason for office visit and medical concerns addressed at this office visit: Mild CAD, HTN, Hyperlipidemia, PVD    Testing Performed and Date of Service:  Heart Cath 5/30/18    RCRI = (1pt, CAD) Low risk at 0.9%  METs seen in office 9/11    Current Medications: ASA, Toprol xl, Neurontin, Nexium, Nitrolingual, Topamax, Qvar, Proair HFA, Spiriva, Advair    Is the patient currently taking an anticoagulant? If so, what is the diagnosis the patient has been given to warrant the need for the anticoagulant?   mg    Additional Notes: Cardiac risk request Normal vision: sees adequately in most situations; can see medication labels, newsprint

## 2025-05-22 NOTE — ED ADULT NURSE NOTE - CODE STROKE ACTIVE YN
Neurocritical Care Code Stroke Documentation      Symptoms:  Headache, dizziness, nausea, blurred vision, was diaphoretic in triage   Baseline mRS:      Last Known Well:  [address] hx: Past Medical History:   Diagnosis Date    Acid reflux     [facility]     Asthma     bronchitis    Bronchitis     Chronic back pain     Kidney stones     Morbid obesity with BMI of 50.0-59.9, adult ([facility]     Prediabetes     Psychiatric disorder     bipolar, PTSD, thoughts of suicide     Respiratory abnormalities     Stroke ([facility]       Anticoagulation:  None   VAN:   Negative   NIHSS:   1a-LOC:0    1b-Month/Age:0    1c-Open/Close Hand:0    2-Best Gaze:0    3-Visual Fields:0    4-Facial Palsy:0    5a-Left Arm:0    5b-Right Arm:0    6a-Left Le    6b-Right Le    7-Limb Ataxia:0    8-Sensory:1    9-Best Language:0    10-Dysarthria:0    11-Extinction/Inattention:0  TOTAL SCORE:1   Imaging:   CT head negative for acute process    CTA negative for LVO. Plan:   TNK Candidate: NO    Mechanical thrombectomy Candidate: YES/NO     *Perform dysphagia screening prior to any PO intake*    Discussed with: Dr Ayesha Felix time: 1605  Time spent: 25 minutes.      Dmitri Kaye NP  Neurocritical Care Nurse Practitioner  [phone] No

## 2025-05-23 DIAGNOSIS — Z51.5 ENCOUNTER FOR PALLIATIVE CARE: ICD-10-CM
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
## 2025-05-23 DIAGNOSIS — Z71.89 OTHER SPECIFIED COUNSELING: ICD-10-CM

## 2025-05-23 DIAGNOSIS — R52 PAIN, UNSPECIFIED: ICD-10-CM

## 2025-05-23 PROCEDURE — 99222 1ST HOSP IP/OBS MODERATE 55: CPT

## 2025-05-23 PROCEDURE — 99233 SBSQ HOSP IP/OBS HIGH 50: CPT

## 2025-05-23 RX ORDER — AMOXICILLIN AND CLAVULANATE POTASSIUM 500; 125 MG/1; MG/1
1 TABLET, FILM COATED ORAL
Refills: 0 | Status: DISCONTINUED | OUTPATIENT
Start: 2025-05-23 | End: 2025-05-24

## 2025-05-23 RX ORDER — AMOXICILLIN AND CLAVULANATE POTASSIUM 500; 125 MG/1; MG/1
1 TABLET, FILM COATED ORAL
Refills: 0 | Status: DISCONTINUED | OUTPATIENT
Start: 2025-05-23 | End: 2025-05-23

## 2025-05-23 RX ADMIN — Medication 100 MILLIGRAM(S): at 06:06

## 2025-05-23 RX ADMIN — LACTULOSE 20 GRAM(S): 10 SOLUTION ORAL at 21:08

## 2025-05-23 RX ADMIN — FUROSEMIDE 40 MILLIGRAM(S): 10 INJECTION INTRAMUSCULAR; INTRAVENOUS at 06:11

## 2025-05-23 RX ADMIN — AMOXICILLIN AND CLAVULANATE POTASSIUM 1 TABLET(S): 500; 125 TABLET, FILM COATED ORAL at 22:57

## 2025-05-23 RX ADMIN — TRAMADOL HYDROCHLORIDE 50 MILLIGRAM(S): 50 TABLET, FILM COATED ORAL at 14:51

## 2025-05-23 RX ADMIN — Medication 40 MILLIGRAM(S): at 06:12

## 2025-05-23 RX ADMIN — TRAMADOL HYDROCHLORIDE 50 MILLIGRAM(S): 50 TABLET, FILM COATED ORAL at 14:41

## 2025-05-23 RX ADMIN — HEPARIN SODIUM 5000 UNIT(S): 1000 INJECTION INTRAVENOUS; SUBCUTANEOUS at 21:11

## 2025-05-23 RX ADMIN — Medication 1 APPLICATION(S): at 05:46

## 2025-05-23 RX ADMIN — LACTULOSE 20 GRAM(S): 10 SOLUTION ORAL at 06:00

## 2025-05-23 RX ADMIN — HEPARIN SODIUM 5000 UNIT(S): 1000 INJECTION INTRAVENOUS; SUBCUTANEOUS at 06:00

## 2025-05-23 RX ADMIN — Medication 37.5 MICROGRAM(S): at 06:11

## 2025-05-23 RX ADMIN — LACTULOSE 20 GRAM(S): 10 SOLUTION ORAL at 13:35

## 2025-05-23 NOTE — CONSULT NOTE ADULT - SUBJECTIVE AND OBJECTIVE BOX
HPI: 84F with PMH including cirrhosis, hypothyroidism, thrombocytopenia, GERD, OA, breast cancer here after fall, and admitted for further workup. Patient with frequent falls prior to admission. Family is interested in hospice, palliative care consulted for [facility]. Full code.    PERTINENT PM/SXH:   GERD (gastroesophageal reflux disease)    Arthritis    Fatty liver    Malignant neoplasm of areola of left breast in female, unspecified estrogen receptor status    H/O thrombocytopenia    Blister of right lower leg    Osteoarthritis    Thrombocytopenia    Hypothyroidism    Cirrhosis      History of surgery    History of cholecystectomy      FAMILY HISTORY:  Family history of breast cancer in mother (Mother)    Family history of Parkinson disease (Father)    no pertinent family history      SOCIAL HISTORY:   Significant other/partner[ ]  Children[ ]  Restoration/Spirituality:  Substance hx:  [ ]   Tobacco hx:  [ ]   Alcohol hx: [ ]   Living Situation: [ ]Home  [ ]Long term care  [ ]Rehab [ ]Other  Home Services: [ ] HHA [ ] Visting RN [ ] Hospice  Occupation:  Home Opioid hx:  [ ] Y [ ] N [ ] I-Stop Reference No: [id number]    ADVANCE DIRECTIVES:    [ ] Full Code [ ] DNR  MOLST  [ ]  Living Will  [ ]   DECISION MAKER(s):  [ ] Health Care Proxy(s)  [ ] Surrogate(s)  [ ] Guardian           Name(s): Phone Number(s): Alexis ([phone])     BASELINE (I)ADL(s) (prior to admission):  [place]: [ ]Total  [ ] Moderate [ ]Dependent  Palliative Performance Status Version 2:         %    http://npcrc.org/files/news/palliative_performance_scale_ppsv2.pdf    Allergies    Levaquin (Hives; Rash)  predniSONE (Swelling)  Cipro (Hives; Rash)  aspirin (Other)    Intolerances    MEDICATIONS  (STANDING):  chlorhexidine 2% Cloths 1 Application(s) Topical <User Schedule>  furosemide    Tablet 40 milliGRAM(s) Oral daily  heparin   Injectable 5000 Unit(s) SubCutaneous every 12 hours  lactulose Syrup 20 Gram(s) Oral three times a day  levothyroxine 37.5 MICROGram(s) Oral daily  pantoprazole    Tablet 40 milliGRAM(s) Oral before breakfast  propranolol 10 milliGRAM(s) Oral two times a day  spironolactone 100 milliGRAM(s) Oral daily    MEDICATIONS  (PRN):  ondansetron Injectable 4 milliGRAM(s) IV Push every 8 hours PRN Nausea and/or Vomiting  oxycodone    5 mG/acetaminophen 325 mG 2 Tablet(s) Oral every 6 hours PRN Severe Pain (7 - 10)  traMADol 50 milliGRAM(s) Oral every 8 hours PRN Moderate Pain (4 - 6)      PRESENT SYMPTOMS: [ ]Unable to obtain due to poor mentation   Source if other than patient:  [ ]Family   [ ]Team   [ X]All review of systems negative including pain and dyspnea unless noted below    All components of pain assessment below addressed. Blank spaces indicate that the patient did/could not complete the assessment.  Pain: [ ]yes [ ]no  QOL impact -   Location -                    Aggravating factors -  Quality -  Radiation -  Timing-  Severity (0-10 scale):  Minimal acceptable level (0-10 scale):     CPOT:    https://www.[facility].org/getattachment/[email or web address]/Critical-Care-Pain-Observation-Tool-(CPOT)    PAIN AD Score:   http://geriatrictoolkit.[facility]/cog/painad.pdf (press ctrl +  left click to view)    Dyspnea:                           [ ]None[ ]Mild [ ]Moderate [ ]Severe     Respiratory Distress Observation Scale (RDOS):   A score of 0 to 2 signifies little or no respiratory distress, 3 signifies mild distress, scores 4 to 6 indicate moderate distress, and scores greater than 7 signify severe distress  https://www.[facility].ca/sites/default/files/PDFS/987209-ttyhixqxfkv-txprwosh-hkwekcxvnon-wbzfw.pdf    Anxiety:                             [ ]None[ ]Mild [ ]Moderate [ ]Severe   Fatigue:                             [ ]None[ ]Mild [ ]Moderate [ ]Severe   Nausea:                             [ ]None[ ]Mild [ ]Moderate [ ]Severe   Loss of appetite:              [ ]None[ ]Mild [ ]Moderate [ ]Severe   Constipation:                    [ ]None[ ]Mild [ ]Moderate [ ]Severe    Other Symptoms:    PHYSICAL EXAM:  Vital Signs Last 24 Hrs  T(C): 36.4 (23 May 2025 04:31), Max: 36.4 (22 May 2025 11:10)  T(F): 97.6 (23 May 2025 04:31), Max: 97.6 (23 May 2025 04:31)  HR: 64 (23 May 2025 04:31) (64 - 72)  BP: 95/56 (23 May 2025 04:31) (95/56 - 121/63)  BP(mean): --  RR: 18 (23 May 2025 04:31) (18 - 19)  SpO2: 98% (23 May 2025 04:31) (94% - 98%)    Parameters below as of 22 May 2025 20:22  Patient On (Oxygen Delivery Method): nasal cannula  O2 Flow (L/min): 1   I&O's Summary      GENERAL:  [X ] No acute distress [ ]Lethargic  [ ]Unarousable  [ ]Verbal  [ ]Non-Verbal [ ]Cachexia    BEHAVIORAL/PSYCH:  [ ]Alert and Oriented x  [ ] Anxiety [ ] Delirium [ ] Agitation [X ] Calm   EYES: [ ] No scleral icterus [ ] Scleral icterus [ ] Closed  ENMT:  [ ]Dry mouth  [ ]No external oral lesions [ X] No external ear or nose lesions  CARDIOVASCULAR:  [ ]Regular [ ]Irregular [ ]Tachy [ ]Not Tachy  [ ]Carlos Eduardo [ ] Edema [ ] No edema  PULMONARY:  [ ]Tachypnea  [ ]Audible excessive secretions [X ] No labored breathing [ ] labored breathing  GASTROINTESTINAL: [ ]Soft  [ ]Distended  [ X]Not distended [ ]Non tender [ ]Tender  MUSCULOSKELETAL: [ ]No clubbing [ ] clubbing  [ X] No cyanosis [ ] cyanosis  NEUROLOGIC: [ ]No focal deficits  [ ]Follows commands  [ ]Does not follow commands  [ ]Cognitive impairment  [ ]Dysphagia  [ ]Dysarthria  [ ]Paresis   SKIN: [ ] Jaundiced [X ] Non-jaundiced [ ]Rash [ ]No Rash [ ] Warm [ ] Dry  MISC/LINES: [ ] ET tube [ ] Trach [ ]NGT/OGT [ ]PEG [ ]Sesay    CRITICAL CARE:  [ ] Shock Present  [ ]Septic [ ]Cardiogenic [ ]Neurologic [ ]Hypovolemic  [ ]  Vasopressors [ ]  Inotropes   [ ]Respiratory failure present [ ]Mechanical ventilation [ ]Non-invasive ventilatory support [ ]High flow  [ ]Acute  [ ]Chronic [ ]Hypoxic  [ ]Hypercarbic [ ]Other  [ ]Other organ failure     LABS: reviewed by me, notable for: CBC WNL, borderline  SCr, UA WNL                        10.5   4.81  )-----------( 60       ( 22 May 2025 08:10 )             33.1   05-22    136  |  101  |  50[H]  ----------------------------<  121[H]  3.6   |  22  |  1.5    Ca    8.8      22 May 2025 08:10    TPro  6.1  /  Alb  3.1[L]  /  TBili  7.7[H]  /  DBili  x   /  AST  50[H]  /  ALT  25  /  AlkPhos  92  05-22  PT/INR - ( 22 May 2025 08:10 )   PT: 17.10 sec;   INR: 1.44 ratio         PTT - ( 22 May 2025 08:10 )  PTT:36.3 sec    Urinalysis Basic - ( 22 May 2025 08:10 )    Color: x / Appearance: x / SG: x / pH: x  Gluc: 121 mg/dL / Ketone: x  / Bili: x / Urobili: x   Blood: x / Protein: x / Nitrite: x   Leuk Esterase: x / RBC: x / WBC x   Sq Epi: x / Non Sq Epi: x / Bacteria: x      RADIOLOGY & ADDITIONAL STUDIES: CXR personally reviewed by me: 5/18: patchy opacities    PROTEIN CALORIE MALNUTRITION PRESENT: [ ]mild [ ]moderate [ ]severe [ ]underweight [ ]morbid obesity  https://www.andeal.org/vault/2440/web/files/ONC/Table_Clinical%20Characteristics%20to%20Document%20Malnutrition-White%20JV%20et%20al%202012.pdf    Height (cm): 157.5 (05-22-25 @ 16:12)  Weight (kg): 63.5 (05-22-25 @ 16:12), 64.5 (05-15-25 @ 22:43), 54.4 (02-18-25 @ 14:04)  BMI (kg/m2): 25.6 (05-22-25 @ 16:12)    [ ]PPSV2 < or = to 30% [ ]significant weight loss  [ ]poor nutritional intake  [ ]anasarca      [ ]Artificial Nutrition          Palliative Care Spiritual/Emotional Screening Tool Question  Severity (0-4):                    OR                    [X ] Unable to determine/NA  Score of 2 or greater indicates recommendation of Chaplaincy referral  Chaplaincy Referral: [ ] Yes [ ] Refused [ ] Following     Caregiver [place]:  [ ] Yes [ ] No    OR    [x ] Unable to determine. Will assess at later time if appropriate.  Social Work Referral [ ]  Patient and Family Centered Care Referral [ ]    Anticipatory Grief Present: [ ] Yes [ ] No    OR     [ x] Unable to determine. Will assess at later time if appropriate.  Social Work Referral [ ]  Patient and Family Centered Care Referral [ ]    REFERRALS:   [ ]Chaplaincy  [ ]Hospice  [ ]Child Life  [ ]Social Work  [ ]Case management [ ]Holistic Therapy     Palliative care education provided to patient and/or family    Goals of Care Document:     ______________  Derek Torres MD  Palliative Medicine  [facility]   of Geriatric and Palliative Medicine  [phone]   HPI: 84F with PMH including cirrhosis, hypothyroidism, thrombocytopenia, GERD, OA, breast cancer here after fall, and admitted for further workup. Patient with frequent falls prior to admission. Family is interested in hospice, palliative care consulted for [facility]. Full code.    PERTINENT PM/SXH:   GERD (gastroesophageal reflux disease)    Arthritis    Fatty liver    Malignant neoplasm of areola of left breast in female, unspecified estrogen receptor status    H/O thrombocytopenia    Blister of right lower leg    Osteoarthritis    Thrombocytopenia    Hypothyroidism    Cirrhosis      History of surgery    History of cholecystectomy      FAMILY HISTORY:  Family history of breast cancer in mother (Mother)    Family history of Parkinson disease (Father)    no pertinent family history      SOCIAL HISTORY:   Significant other/partner[X ]  Children[X ]  Confucianist/Spirituality:  Substance hx:  [ ]   Tobacco hx:  [ ]   Alcohol hx: [ ]   Living Situation: [X ]Home  [ ]Long term care  [ ]Rehab [ ]Other  Home Services: [ ] HHA [ ] Visting RN [ ] Hospice  Occupation:  Home Opioid hx:  [ ] Y [ ] N [ ] I-Stop Reference No: [id number]    ADVANCE DIRECTIVES:    [ X] Full Code [ ] DNR  MOLST  [ ]  Living Will  [ ]   DECISION MAKER(s):  [ ] Health Care Proxy(s)  [ ] Surrogate(s)  [ ] Guardian           Name(s): Phone Number(s): Alexis ([phone])     BASELINE (I)ADL(s) (prior to admission):  [place]: [ ]Total  [ ] Moderate [ ]Dependent  Palliative Performance Status Version 2:         %    http://npcrc.org/files/news/palliative_performance_scale_ppsv2.pdf    Allergies    Levaquin (Hives; Rash)  predniSONE (Swelling)  Cipro (Hives; Rash)  aspirin (Other)    Intolerances    MEDICATIONS  (STANDING):  chlorhexidine 2% Cloths 1 Application(s) Topical <User Schedule>  furosemide    Tablet 40 milliGRAM(s) Oral daily  heparin   Injectable 5000 Unit(s) SubCutaneous every 12 hours  lactulose Syrup 20 Gram(s) Oral three times a day  levothyroxine 37.5 MICROGram(s) Oral daily  pantoprazole    Tablet 40 milliGRAM(s) Oral before breakfast  propranolol 10 milliGRAM(s) Oral two times a day  spironolactone 100 milliGRAM(s) Oral daily    MEDICATIONS  (PRN):  ondansetron Injectable 4 milliGRAM(s) IV Push every 8 hours PRN Nausea and/or Vomiting  oxycodone    5 mG/acetaminophen 325 mG 2 Tablet(s) Oral every 6 hours PRN Severe Pain (7 - 10)  traMADol 50 milliGRAM(s) Oral every 8 hours PRN Moderate Pain (4 - 6)      PRESENT SYMPTOMS: [ ]Unable to obtain due to poor mentation   Source if other than patient:  [ ]Family   [ ]Team   [ X]All review of systems negative including pain and dyspnea unless noted below    All components of pain assessment below addressed. Blank spaces indicate that the patient did/could not complete the assessment.  Pain: [X ]yes [ ]no  QOL impact -   Location -         back           Aggravating factors -  Quality - aching  Radiation -  Timing-  Severity (0-10 scale): mod-severe  Minimal acceptable level (0-10 scale):     CPOT:    https://www.[facility].org/getattachment/[email or web address]/Critical-Care-Pain-Observation-Tool-(CPOT)    PAIN AD Score:   http://geriatrictoolkit.[facility]/cog/painad.pdf (press ctrl +  left click to view)    Dyspnea:                           [ ]None[ ]Mild [ ]Moderate [ ]Severe     Respiratory Distress Observation Scale (RDOS):   A score of 0 to 2 signifies little or no respiratory distress, 3 signifies mild distress, scores 4 to 6 indicate moderate distress, and scores greater than 7 signify severe distress  https://www.[facility].ca/sites/default/files/PDFS/200473-ryrvyvqkwqg-kjnovnly-mmfznqobhno-jhqfk.pdf    Anxiety:                             [ ]None[ ]Mild [ ]Moderate [ ]Severe   Fatigue:                             [ ]None[ ]Mild [ ]Moderate [ ]Severe   Nausea:                             [ ]None[ ]Mild [ ]Moderate [ ]Severe   Loss of appetite:              [ ]None[ ]Mild [ ]Moderate [ ]Severe   Constipation:                    [ ]None[ ]Mild [ ]Moderate [ ]Severe    Other Symptoms:    PHYSICAL EXAM:  Vital Signs Last 24 Hrs  T(C): 36.4 (23 May 2025 04:31), Max: 36.4 (22 May 2025 11:10)  T(F): 97.6 (23 May 2025 04:31), Max: 97.6 (23 May 2025 04:31)  HR: 64 (23 May 2025 04:31) (64 - 72)  BP: 95/56 (23 May 2025 04:31) (95/56 - 121/63)  BP(mean): --  RR: 18 (23 May 2025 04:31) (18 - 19)  SpO2: 98% (23 May 2025 04:31) (94% - 98%)    Parameters below as of 22 May 2025 20:22  Patient On (Oxygen Delivery Method): nasal cannula  O2 Flow (L/min): 1   I&O's Summary      GENERAL:  [X ] No acute distress [ ]Lethargic  [ ]Unarousable  [ ]Verbal  [ ]Non-Verbal [ ]Cachexia    BEHAVIORAL/PSYCH:  [ ]Alert and Oriented x  [ ] Anxiety [ ] Delirium [ ] Agitation [X ] Calm   EYES: [ X] No scleral icterus [ ] Scleral icterus [ ] Closed  ENMT:  [ ]Dry mouth  [ ]No external oral lesions [ X] No external ear or nose lesions  CARDIOVASCULAR:  [ ]Regular [ ]Irregular [ ]Tachy [ ]Not Tachy  [ ]Carlos Eduardo [ ] Edema [ ] No edema  PULMONARY:  [ ]Tachypnea  [ ]Audible excessive secretions [X ] No labored breathing [ ] labored breathing  GASTROINTESTINAL: [ ]Soft  [ ]Distended  [ X]Not distended [ ]Non tender [ ]Tender  MUSCULOSKELETAL: [ ]No clubbing [ ] clubbing  [ X] No cyanosis [ ] cyanosis  NEUROLOGIC: [ ]No focal deficits  [ X]Follows commands  [ ]Does not follow commands  [ ]Cognitive impairment  [ ]Dysphagia  [ ]Dysarthria  [ ]Paresis   SKIN: [ ] Jaundiced [X ] Non-jaundiced [ ]Rash [ ]No Rash [ ] Warm [ ] Dry  MISC/LINES: [ ] ET tube [ ] Trach [ ]NGT/OGT [ ]PEG [ ]Sesay    CRITICAL CARE:  [ ] Shock Present  [ ]Septic [ ]Cardiogenic [ ]Neurologic [ ]Hypovolemic  [ ]  Vasopressors [ ]  Inotropes   [ ]Respiratory failure present [ ]Mechanical ventilation [ ]Non-invasive ventilatory support [ ]High flow  [ ]Acute  [ ]Chronic [ ]Hypoxic  [ ]Hypercarbic [ ]Other  [ ]Other organ failure     LABS: reviewed by me, notable for: CBC WNL, borderline  SCr, UA WNL                        10.5   4.81  )-----------( 60       ( 22 May 2025 08:10 )             33.1   05-22    136  |  101  |  50[H]  ----------------------------<  121[H]  3.6   |  22  |  1.5    Ca    8.8      22 May 2025 08:10    TPro  6.1  /  Alb  3.1[L]  /  TBili  7.7[H]  /  DBili  x   /  AST  50[H]  /  ALT  25  /  AlkPhos  92  05-22  PT/INR - ( 22 May 2025 08:10 )   PT: 17.10 sec;   INR: 1.44 ratio         PTT - ( 22 May 2025 08:10 )  PTT:36.3 sec    Urinalysis Basic - ( 22 May 2025 08:10 )    Color: x / Appearance: x / SG: x / pH: x  Gluc: 121 mg/dL / Ketone: x  / Bili: x / Urobili: x   Blood: x / Protein: x / Nitrite: x   Leuk Esterase: x / RBC: x / WBC x   Sq Epi: x / Non Sq Epi: x / Bacteria: x      RADIOLOGY & ADDITIONAL STUDIES: CXR personally reviewed by me: 5/18: patchy opacities    PROTEIN CALORIE MALNUTRITION PRESENT: [ ]mild [ ]moderate [ ]severe [ ]underweight [ ]morbid obesity  https://www.andeal.org/vault/2440/web/files/ONC/Table_Clinical%20Characteristics%20to%20Document%20Malnutrition-White%20JV%20et%20al%202012.pdf    Height (cm): 157.5 (05-22-25 @ 16:12)  Weight (kg): 63.5 (05-22-25 @ 16:12), 64.5 (05-15-25 @ 22:43), 54.4 (02-18-25 @ 14:04)  BMI (kg/m2): 25.6 (05-22-25 @ 16:12)    [ ]PPSV2 < or = to 30% [ ]significant weight loss  [ ]poor nutritional intake  [ ]anasarca      [ ]Artificial Nutrition          Palliative Care Spiritual/Emotional Screening Tool Question  Severity (0-4):                    OR                    [X ] Unable to determine/NA  Score of 2 or greater indicates recommendation of Chaplaincy referral  Chaplaincy Referral: [ ] Yes [ ] Refused [ ] Following     Caregiver Randolph:  [ ] Yes [ ] No    OR    [x ] Unable to determine. Will assess at later time if appropriate.  Social Work Referral [ ]  Patient and Family Centered Care Referral [ ]    Anticipatory Grief Present: [ ] Yes [ ] No    OR     [ x] Unable to determine. Will assess at later time if appropriate.  Social Work Referral [ ]  Patient and Family Centered Care Referral [ ]    REFERRALS:   [ ]Chaplaincy  [ ]Hospice  [ ]Child Life  [ ]Social Work  [ ]Case management [ ]Holistic Therapy     Palliative care education provided to patient and/or family    Goals of Care Document:     ______________  Derek Torres MD  Palliative Medicine  [facility]   of Geriatric and Palliative Medicine  [phone]

## 2025-05-23 NOTE — PHYSICAL THERAPY INITIAL EVALUATION ADULT - PERTINENT HX OF CURRENT PROBLEM, REHAB EVAL
84F w/PMHX of cirrhosis, hypothyroidism, thrombocytopenia, fatty liver, GERD, breast cancer, osteoarthritis presents to ED s/p fall.  Patient's niece at bedside, provides Hx.  Reporting patient s/p mechanical fall last night.  SHe reports that the patient's  is weak as well, tried to help her back into the recliner from going to the bathroom and they both fell over.  Patient landed on her buttock area and remained on the floor for 2 hours until she was discovered and brought to the ED for evaluation.  Patient with complaints of thoracic and low back pain, otherwise at baseline.  No complaints of CP/SOB.  No abdominal or urinary complaints.  Patient has had recent falls, last 5/18, went to ED and discharged home.  Also s/p recent admission 5/12-5/16. Trauma w/u neg in ED.

## 2025-05-23 NOTE — PHYSICAL THERAPY INITIAL EVALUATION ADULT - ADDITIONAL COMMENTS
Pt states PTA she lives in a condo with her .  Pt states she sits most of the day but walks a little bit to the bathroom.  Pt uses a rollator.  Pt has a chair lift in condo and has elevator access to enter so no stairs required.

## 2025-05-23 NOTE — CONSULT NOTE ADULT - CONVERSATION DETAILS
Discussed with patient, , and son (via phone) alongside other family members. Lives at home with , and has family for support. Discussed ACP, and son states that she would want attempt at CPR and full measures. Explained chances of meaningful success to ensure informed consent, and they want to maintain her current code status at this time. Per son, patient did express some depression, and are open to  referral. [employer] Pregnancy And Lactation Text: This medication is Pregnancy Category D and isn't considered safe during pregnancy. It is unknown if this medication is excreted in breast milk.

## 2025-05-23 NOTE — OCCUPATIONAL THERAPY INITIAL EVALUATION ADULT - LIVES WITH, PROFILE
in an elevator accessible condo building; stairs inside living area with chair lift; (+) walk in shower with chair and grab bars; (+) comfort toilet seat with grab bars/spouse

## 2025-05-23 NOTE — CONSULT NOTE ADULT - SUBJECTIVE AND OBJECTIVE BOX
HPI: 84 y.o F w/PMHX of cirrhosis, hypothyroidism, thrombocytopenia, fatty liver, GERD, breast cancer, osteoarthritis presents to ED s/p fall.    patient s/p mechanical fall last night.  SHe reports that the patient's  is weak as well, tried to help her back into the recliner from going to the bathroom and they both fell over.  Patient landed on her buttock area and remained on the floor for 2 hours until she was discovered and brought to the ED for evaluation.  Patient with complaints of thoracic and low back pain, otherwise at baseline.  No complaints of CP/SOB.  No abdominal or urinary complaints.  Patient has had recent falls, last 5/18, went to ED and discharged home.  Also s/p recent admission 5/12-5/16. Trauma w/u neg in ED.  ptn seen and exam at  bed  side  nad no  new  c.o  nasal  o2  in  place  ptn labs  , imaging  and  medical  notes are  appreciated  and reviewed  .    PTN  REFERRED TO ACUTE  REHAB  FOR  EVAL AND  TX   PAST MEDICAL & SURGICAL HISTORY:  GERD (gastroesophageal reflux disease)      Fatty liver      Malignant neoplasm of areola of left breast in female, unspecified estrogen receptor status      Blister of right lower leg      Osteoarthritis      Thrombocytopenia      Hypothyroidism      Cirrhosis      History of surgery  LEFT BREAST LUMPECTOMY  TUBIAL LIGATION  CHOLECYSTECTOMY  RIGHT & LEFT TKR      History of cholecystectomy          Hospital Course:    TODAY'S SUBJECTIVE & REVIEW OF SYMPTOMS:     Constitutional WNL   Cardio WNL   Resp WNL   GI WNL  Heme WNL  Endo WNL  Skin WNL  MSK WNL  Neuro WNL  Cognitive WNL  Psych WNL      MEDICATIONS  (STANDING):  chlorhexidine 2% Cloths 1 Application(s) Topical <User Schedule>  furosemide    Tablet 40 milliGRAM(s) Oral daily  heparin   Injectable 5000 Unit(s) SubCutaneous every 12 hours  lactulose Syrup 20 Gram(s) Oral three times a day  levothyroxine 37.5 MICROGram(s) Oral daily  pantoprazole    Tablet 40 milliGRAM(s) Oral before breakfast  propranolol 10 milliGRAM(s) Oral two times a day  spironolactone 100 milliGRAM(s) Oral daily    MEDICATIONS  (PRN):  ondansetron Injectable 4 milliGRAM(s) IV Push every 8 hours PRN Nausea and/or Vomiting  oxycodone    5 mG/acetaminophen 325 mG 2 Tablet(s) Oral every 6 hours PRN Severe Pain (7 - 10)  traMADol 50 milliGRAM(s) Oral every 8 hours PRN Moderate Pain (4 - 6)      FAMILY HISTORY:  Family history of breast cancer in mother (Mother)    Family history of Parkinson disease (Father)        Allergies    Levaquin (Hives; Rash)  predniSONE (Swelling)  Cipro (Hives; Rash)  aspirin (Other)    Intolerances        SOCIAL HISTORY:    [  ] Etoh  [  ] Smoking  [  ] Substance abuse     Home Environment:  [  ] Home Alone  [ x ] Lives with Family     [  ] Home Health Aid    Dwelling:  [  ] Apartment  [x  ] Private House  [  ] Adult Home  [  ] Skilled Nursing Facility      [  ] Short Term  [  ] Long Term  [ x ] Stairs ptn  has  chair  lift        Elevator [  ]    FUNCTIONAL STATUS PTA: (Check all that apply)  Ambulation: [   ]Independent    [x  ] Dependent     [  ] Non-Ambulatory  Assistive Device: [  ] SA Cane  [  ]  Q Cane  [  x Walker  [  ]  Wheelchair  ADL : [x  ] Independent  [  ]  Dependent       Vital Signs Last 24 Hrs  T(C): 36.4 (23 May 2025 04:31), Max: 36.4 (22 May 2025 11:10)  T(F): 97.6 (23 May 2025 04:31), Max: 97.6 (23 May 2025 04:31)  HR: 64 (23 May 2025 04:31) (64 - 72)  BP: 95/56 (23 May 2025 04:31) (95/56 - 121/63)  BP(mean): --  RR: 18 (23 May 2025 04:31) (18 - 19)  SpO2: 98% (23 May 2025 04:31) (94% - 98%)    Parameters below as of 22 May 2025 20:22  Patient On (Oxygen Delivery Method): nasal cannula  O2 Flow (L/min): 1        PHYSICAL EXAM: Alert & Oriented X3  GENERAL: NAD, well-groomed, well-developed  HEAD:  Atraumatic, Normocephalic  EYES: EOMI, PERRLA, conjunctiva and sclera clear  NECK: Supple, No JVD, Normal thyroid  CHEST/LUNG: Clear to percussion bilaterally; No rales, rhonchi, wheezing, or rubs  HEART: Regular rate and rhythm; No murmurs, rubs, or gallops  ABDOMEN: Soft, Nontender, Nondistended; Bowel sounds present  EXTREMITIES:  2+ Peripheral Pulses, No clubbing, cyanosis, or edema    NERVOUS SYSTEM:  Cranial Nerves 2-12 intact [ x ] Abnormal  [  ]  ROM: WFL all extremities [  ]  Abnormal [ x ]  Motor Strength: WFL all extremities  [  ]  Abnormal [x  ]  Sensation: intact to light touch [  ] Abnormal [x  ]  Reflexes: Symmetric [  ]  Abnormal [x]    FUNCTIONAL STATUS:  Bed Mobility: Independent [  ]  Supervision [  ]  Needs Assistance [ x ]  N/A [  ]  Transfers: Independent [  ]  Supervision [  ]  Needs Assistance [  x]  N/A [  ]   Ambulation: Independent [  ]  Supervision [  ]  Needs Assistance [ x ]  N/A [  ]  ADL: Independent [  ] Requires Assistance [  ] N/A [x  ]  SEE PT/OT IE NOTES    LABS:                        10.5   4.81  )-----------( 60       ( 22 May 2025 08:10 )             33.1     05-22    136  |  101  |  50[H]  ----------------------------<  121[H]  3.6   |  22  |  1.5    Ca    8.8      22 May 2025 08:10    TPro  6.1  /  Alb  3.1[L]  /  TBili  7.7[H]  /  DBili  x   /  AST  50[H]  /  ALT  25  /  AlkPhos  92  05-22    PT/INR - ( 22 May 2025 08:10 )   PT: 17.10 sec;   INR: 1.44 ratio         PTT - ( 22 May 2025 08:10 )  PTT:36.3 sec  Urinalysis Basic - ( 22 May 2025 08:10 )    Color: x / Appearance: x / SG: x / pH: x  Gluc: 121 mg/dL / Ketone: x  / Bili: x / Urobili: x   Blood: x / Protein: x / Nitrite: x   Leuk Esterase: x / RBC: x / WBC x   Sq Epi: x / Non Sq Epi: x / Bacteria: x        RADIOLOGY & ADDITIONAL STUDIES:< from: CT Head No Cont (05.22.25 @ 09:11) >  CT HEAD:  No acute intracranial findings.  CT CERVICAL SPINE:  No acute cervical fracture or facet subluxation.      < end of copied text >      Assesment:

## 2025-05-23 NOTE — PHYSICAL THERAPY INITIAL EVALUATION ADULT - TRANSFER SAFETY CONCERNS NOTED: BED/CHAIR, REHAB EVAL
decreased balance during turns/decreased safety awareness/decreased proprioception/decreased step length/decreased weight-shifting ability

## 2025-05-23 NOTE — OCCUPATIONAL THERAPY INITIAL EVALUATION ADULT - PERTINENT HX OF CURRENT PROBLEM, REHAB EVAL
84F w/PMHX of cirrhosis, hypothyroidism, thrombocytopenia, fatty liver, GERD, breast cancer, osteoarthritis presents to ED s/p fall.  Patient's niece at bedside, provides Hx.  Reporting patient s/p mechanical fall last night.  SHe reports that the patient's  is weak as well, tried to help her back into the recliner from going to the bathroom and they both fell over.  Patient landed on her buttock area and remained on the floor for 2 hours until she was discovered and brought to the ED for evaluation.  Patient with complaints of thoracic and low back pain, otherwise at baseline.  No complaints of CP/SOB.  No abdominal or urinary complaints.  Patient has had recent falls, last 5/18, went to ED and discharged home.  Also s/p recent admission 5/12-5/16. Trauma w/u neg in ED.    CT HEAD: No acute intracranial findings  CT CERVICAL SPINE: No acute cervical fracture or facet subluxation.

## 2025-05-23 NOTE — PHYSICAL THERAPY INITIAL EVALUATION ADULT - IMPAIRMENTS FOUND, PT EVAL
aerobic capacity/endurance/ergonomics and body mechanics/gait, locomotion, and balance/gross motor/joint integrity and mobility/muscle strength/poor safety awareness/posture

## 2025-05-23 NOTE — OCCUPATIONAL THERAPY INITIAL EVALUATION ADULT - RANGE OF MOTION EXAMINATION
emilio shoulders flexion/abduction 1/2 range, external/internal rotation 3/4 range; wrists and hands WFLs/deficits as listed below

## 2025-05-23 NOTE — DIETITIAN INITIAL EVALUATION ADULT - PERTINENT LABORATORY DATA
05-22    136  |  101  |  50[H]  ----------------------------<  121[H]  3.6   |  22  |  1.5    Ca    8.8      22 May 2025 08:10    TPro  6.1  /  Alb  3.1[L]  /  TBili  7.7[H]  /  DBili  x   /  AST  50[H]  /  ALT  25  /  AlkPhos  92  05-22                            10.5   4.81  )-----------( 60       ( 22 May 2025 08:10 )             33.1

## 2025-05-23 NOTE — DIETITIAN INITIAL EVALUATION ADULT - ORAL INTAKE PTA/DIET HISTORY
as per family at bedside pt consumed a regular diet PTA with gradual decline in po intake over last few weeks, NKFA or intolerances. as per EMR review pt weighed 54.4 kgs in November of 2024 vs 63.5 kgs today    presently on a DASH/TLC diet tolerating well consumes 50% of meals. Family requesting ensure

## 2025-05-23 NOTE — OCCUPATIONAL THERAPY INITIAL EVALUATION ADULT - LEVEL OF INDEPENDENCE:TOILET, OT EVAL
"September 27, 2023    Return visit    Patient returns today for follow up, She denies any changes in her health since last visit.    BP 90/61   Pulse 72   Ht 1.715 m (5' 7.5\")   Wt 74.8 kg (165 lb)   SpO2 99%   BMI 25.46 kg/m    She is comfortable, in no distress, non-labored breathing.  Abdomen is soft, non-tender, non-distended.  Normal external female genitalia.  Negative CST.  Pelvic exam is unremarkable.    Cystoscopy Note: After informed consent was obtained patient was prepped and draped in the standard fashion.  The flexible cystoscope was inserted into a normal appearing urethral meatus.  The urothelium was carefully examined and there were scattered areas most consistent with cystitis but there were no tumors, masses, stones, foreign bodies, or other urothelial abnormalities noted.  Bilateral ureteral orifices were noted in the normal orthotopic position and both effluxed clear urine.  The cystoscope was retroflexed and the bladder neck was unremarkable.  The urethra was carefully examined upon removing the cystoscope and was unremarkable.  Patient tolerated the procedure without complications noted.      Urine dip with moderate blood and trace leuks    A/P: 68 year old F with history cervical cancer, microscopic hematuria with cystoscopy findings of possible cystitis, urgency incontinence and OAB.      Urine cyology    UCx    For her OAB symptoms we discussed options and she has elected medications . We discussed anticholinergics versus beta 3 agonists and she has elected beta 3 agonist to decrease risk of cognitive side effects. Monitor BP after she starts medications    RTC 3 months to see Sheila, sooner if needed    20 minutes were spent today on the date of the encounter in reviewing the EMR, direct patient care, coordination of care and documentation in addition to the cystoscopy procedure    Darline English MD MPH  (she/her/hers)   of Urology  [facility]" [facility]  Patient Care Team:  Neli Lara DO as PCP - General (Internal Medicine)  Agbeh, Cephas Mawuena, MD as MD (OB/Gyn)  Darline English MD as Assigned Surgical Provider  Kyle Santoyo MD as MD (Nephrology)  Nicolle Villanueva PA as Referring Physician (Internal Medicine)  Dmitriy Pitt MD as MD (Otolaryngology)  Avery, Jonathan Baires MD as MD (Cardiovascular Disease)  Gabriele Campos MD as Assigned Rheumatology Provider  Neli Lara DO as Assigned PCP  Marcus Simmons MD as Assigned Allergy Provider  Avery, Jonathan Baires MD as Assigned Heart and Vascular Provider  Gini Bradford PA-C as Assigned Nephrology Provider  Erwin Araujo MD as MD (Dermatology)  Erwin Araujo MD as MD (Dermatology)                 moderate assist (50% patients effort)

## 2025-05-23 NOTE — PHYSICAL THERAPY INITIAL EVALUATION ADULT - TRANSFER SAFETY CONCERNS NOTED: SIT/STAND, REHAB EVAL
decreased balance during turns/decreased safety awareness/decreased proprioception/decreased sequencing ability/decreased step length

## 2025-05-23 NOTE — DIETITIAN INITIAL EVALUATION ADULT - OTHER INFO
pt is 84 year old female with hx of cirrhosis, hypothyroidism, thrombocytopenia, fatty liver, gerd, breast CA, OA presents s/p fall. admitted with mechanical fall, worsening weakness, abnormal EKG, + colitis, d/c to STR.

## 2025-05-23 NOTE — PHYSICAL THERAPY INITIAL EVALUATION ADULT - GAIT DEVIATIONS NOTED, PT EVAL
decreased kofi/increased time in double stance/decreased velocity of limb motion/decreased step length/decreased stride length

## 2025-05-23 NOTE — OCCUPATIONAL THERAPY INITIAL EVALUATION ADULT - GENERAL OBSERVATIONS, REHAB EVAL
09:47-10:10 Chart reviewed, ok to treat by Occupational Therapist as confirmed by RN Erika, Pt received semi-العلي's in bed (+) O2 1L via NC (+) primafit (+) heplock in NAD. Pt in agreement with OT IE.

## 2025-05-23 NOTE — DIETITIAN INITIAL EVALUATION ADULT - NS FNS DIET ORDER
Diet, Regular:   DASH/TLC {Sodium & Cholesterol Restricted} (DASH) (05-22-25 @ 13:12) [Active]

## 2025-05-23 NOTE — PHYSICAL THERAPY INITIAL EVALUATION ADULT - GENERAL OBSERVATIONS, REHAB EVAL
9:50-10:15, chart thoroughly reviewed, pt ok to be seen for PT as per RN Johnna, pt in agreement with PT.  Pt received in bed +O2, +heplock, +primafit, in NAD.

## 2025-05-23 NOTE — CONSULT NOTE ADULT - ASSESSMENT
84F with PMH including cirrhosis, hypothyroidism, thrombocytopenia, GERD, OA, breast cancer here after fall, and admitted for further workup. Patient with frequent falls prior to admission. Family is interested in hospice, palliative care consulted for GOC. Full code.   84F with PMH including cirrhosis, hypothyroidism, thrombocytopenia, GERD, OA, breast cancer here after fall, and admitted for further workup. Patient with frequent falls prior to admission. Family is interested in hospice, palliative care consulted for GOC. Full code.    - remains full code for now  - recommend behavioral health referral for depression  - hospice referral, family in contact with hospice   - on percocet 10/650 for pain, recommend using oxyIR 10mg Q6 PRN instead to avoid potential tylenol overuse  - will follow    ______________  Derek Torres MD  Palliative Medicine  [facility]   of Geriatric and Palliative Medicine  [phone]

## 2025-05-23 NOTE — CONSULT NOTE ADULT - ASSESSMENT
IMPRESSION: Rehab of 83 y/o  f  rehab  for gd  sp  fall   OA     PRECAUTIONS: [  ] Cardiac  [  ] Respiratory  [  ] Seizures [  ] Contact Isolation  [  ] Droplet Isolation  [ FALL ] Other    Weight Bearing Status:     RECOMMENDATION:    Out of Bed to Chair     DVT/Decubiti Prophylaxis    REHAB PLAN:     [  x ] Bedside P/T 3-5 times a week   [  x ]   Bedside O/T  2-3 times a week             [   ] No Rehab Therapy Indicated                   [   ]  Speech Therapy   Conditioning/ROM                                    ADL  Bed Mobility                                               Conditioning/ROM  Transfers                                                     Bed Mobility  Sitting /Standing Balance                         Transfers                                        Gait Training                                               Sitting/Standing Balance  Stair Training [   ]Applicable                    Home equipment Eval                                                                        Splinting  [   ] Only      GOALS:   ADL   [ x  ]   Independent                    Transfers  [ x  ] Independent                          Ambulation  [ x  ] Independent     [    ] With device                            [   x]  CG                                                         [  x]  CG                                                                  [x   ] CG                            [    ] Min A                                                   [   ] Min A                                                              [   ] Min  A          DISCHARGE PLAN:   [   ]  Good candidate for Intensive Rehabilitation/Hospital based-4A [facility]                                             Will tolerate 3hrs Intensive Rehab Daily                                       [  xx  ]  Short Term Rehab in Skilled Nursing Facility and  cont current care                                        [    ]  Home with Outpatient or VN services                                         [    ]  Possible Candidate for Intensive Hospital based Rehab

## 2025-05-23 NOTE — DIETITIAN INITIAL EVALUATION ADULT - PERTINENT MEDS FT
MEDICATIONS  (STANDING):  amoxicillin  500 milliGRAM(s)/clavulanate 1 Tablet(s) Oral two times a day  chlorhexidine 2% Cloths 1 Application(s) Topical <User Schedule>  furosemide    Tablet 40 milliGRAM(s) Oral daily  heparin   Injectable 5000 Unit(s) SubCutaneous every 12 hours  lactulose Syrup 20 Gram(s) Oral three times a day  levothyroxine 37.5 MICROGram(s) Oral daily  pantoprazole    Tablet 40 milliGRAM(s) Oral before breakfast  propranolol 10 milliGRAM(s) Oral two times a day  spironolactone 100 milliGRAM(s) Oral daily    MEDICATIONS  (PRN):  ondansetron Injectable 4 milliGRAM(s) IV Push every 8 hours PRN Nausea and/or Vomiting  oxycodone    5 mG/acetaminophen 325 mG 2 Tablet(s) Oral every 6 hours PRN Severe Pain (7 - 10)  traMADol 50 milliGRAM(s) Oral every 8 hours PRN Moderate Pain (4 - 6)

## 2025-05-23 NOTE — PROGRESS NOTE ADULT - SUBJECTIVE AND OBJECTIVE BOX
SUBJECTIVE:    Patient is a 84y old Female who presents with a chief complaint of Fall (23 May 2025 08:53)    Currently admitted to medicine with the primary diagnosis of Frequent falls       Today is hospital day 1d. This morning she is resting comfortably in bed and reports no new issues or overnight events.     ROS:   CONSTITUTIONAL: No weakness, fevers or chills   EYES/ENT: No visual changes; No vertigo or throat pain   NECK: No pain or stiffness   RESPIRATORY: No cough, wheezing, hemoptysis; No shortness of breath   CARDIOVASCULAR: No chest pain or palpitations   GASTROINTESTINAL: No abdominal or epigastric pain. No nausea, vomiting, or hematemesis; No diarrhea or constipation. No melena or hematochezia.  GENITOURINARY: No dysuria, frequency or hematuria  NEUROLOGICAL: No numbness or weakness  SKIN: No itching, rashes      PAST MEDICAL & SURGICAL HISTORY  GERD (gastroesophageal reflux disease)    Fatty liver    Malignant neoplasm of areola of left breast in female, unspecified estrogen receptor status    Blister of right lower leg    Osteoarthritis    Thrombocytopenia    Hypothyroidism    Cirrhosis    History of surgery  LEFT BREAST LUMPECTOMY  TUBIAL LIGATION  CHOLECYSTECTOMY  RIGHT & LEFT TKR    History of cholecystectomy      SOCIAL HISTORY:    ALLERGIES:  Levaquin (Hives; Rash)  predniSONE (Swelling)  Cipro (Hives; Rash)  aspirin (Other)    MEDICATIONS:  STANDING MEDICATIONS  chlorhexidine 2% Cloths 1 Application(s) Topical <User Schedule>  furosemide    Tablet 40 milliGRAM(s) Oral daily  heparin   Injectable 5000 Unit(s) SubCutaneous every 12 hours  lactulose Syrup 20 Gram(s) Oral three times a day  levothyroxine 37.5 MICROGram(s) Oral daily  pantoprazole    Tablet 40 milliGRAM(s) Oral before breakfast  propranolol 10 milliGRAM(s) Oral two times a day  spironolactone 100 milliGRAM(s) Oral daily    PRN MEDICATIONS  ondansetron Injectable 4 milliGRAM(s) IV Push every 8 hours PRN  oxycodone    5 mG/acetaminophen 325 mG 2 Tablet(s) Oral every 6 hours PRN  traMADol 50 milliGRAM(s) Oral every 8 hours PRN    VITALS:   T(F): 97.5  HR: 62  BP: 126/59  RR: 16  SpO2: 96%    LABS:                          10.5   4.81  )-----------( 60       ( 22 May 2025 08:10 )             33.1     05-22    136  |  101  |  50[H]  ----------------------------<  121[H]  3.6   |  22  |  1.5    Ca    8.8      22 May 2025 08:10    TPro  6.1  /  Alb  3.1[L]  /  TBili  7.7[H]  /  DBili  x   /  AST  50[H]  /  ALT  25  /  AlkPhos  92  05-22    PT/INR - ( 22 May 2025 08:10 )   PT: 17.10 sec;   INR: 1.44 ratio         PTT - ( 22 May 2025 08:10 )  PTT:36.3 sec  Urinalysis Basic - ( 22 May 2025 08:10 )    Color: x / Appearance: x / SG: x / pH: x  Gluc: 121 mg/dL / Ketone: x  / Bili: x / Urobili: x   Blood: x / Protein: x / Nitrite: x   Leuk Esterase: x / RBC: x / WBC x   Sq Epi: x / Non Sq Epi: x / Bacteria: x    RADIOLOGY:    PHYSICAL EXAM:  GEN: No acute distress  HEENT: normocephalic, atraumatic, aniceteric  LUNGS: bl breath sounds   HEART: S1/S2 present. RRR, no murmurs  ABD: Soft, non-tender, non-distended. Bowel sounds present  EXT: warm   NEURO: AAOX3, normal affect      ASSESSMENT AND PLAN:    84F w/PMHX of cirrhosis, hypothyroidism, thrombocytopenia, fatty liver, GERD, breast cancer, osteoarthritis presents to ED s/p fall, admitted to medicine service for further management.    #Generalized weakness   #Mechanical fall  #H/O Frequent falls  -trauma workup negative for Fx  -supportive care  -PT EVAL - rehab  - needs 3 midnights     #Abnormal EKG - poor quality with artifact reading as afib though clearly with lots of artifact and not accurate, repeat with NSR     #H/O Cryptogenic Liver Cirrhosis  #H/O Pancreatic duct dilation   #Pulmonary Hypertension with Chronic Hypoxemia on 2L home O2   - c/w Lactulose TID, titrate 2- 3 BM QD  - c/w Furosemide/Aldactone/  Propranolol   - hepatology saw patient last admission - recommend OP follow up for chronic management of cirrhosis EGD, and further workup for double duct sign evident on RUQ.       #Hypothyroid  - synthroid - TSH 4.95 5/14/25    #H/O GERD - c/w PPI    dvt/ gi ppx/diet  dispo: dc ready pending dispo to rehab   family discussion: spoke to son Alexis [phone] - all questions answered and concerns addressed  5/23  SUBJECTIVE:    Patient is a 84y old Female who presents with a chief complaint of Fall (23 May 2025 08:53)    Currently admitted to medicine with the primary diagnosis of Frequent falls       Today is hospital day 1d. This morning she is resting comfortably in bed and reports no new issues or overnight events.     ROS:   CONSTITUTIONAL: No weakness, fevers or chills   EYES/ENT: No visual changes; No vertigo or throat pain   NECK: No pain or stiffness   RESPIRATORY: No cough, wheezing, hemoptysis; No shortness of breath   CARDIOVASCULAR: No chest pain or palpitations   GASTROINTESTINAL: No abdominal or epigastric pain. No nausea, vomiting, or hematemesis; No diarrhea or constipation. No melena or hematochezia.  GENITOURINARY: No dysuria, frequency or hematuria  NEUROLOGICAL: No numbness or weakness  SKIN: No itching, rashes      PAST MEDICAL & SURGICAL HISTORY  GERD (gastroesophageal reflux disease)    Fatty liver    Malignant neoplasm of areola of left breast in female, unspecified estrogen receptor status    Blister of right lower leg    Osteoarthritis    Thrombocytopenia    Hypothyroidism    Cirrhosis    History of surgery  LEFT BREAST LUMPECTOMY  TUBIAL LIGATION  CHOLECYSTECTOMY  RIGHT & LEFT TKR    History of cholecystectomy      SOCIAL HISTORY:    ALLERGIES:  Levaquin (Hives; Rash)  predniSONE (Swelling)  Cipro (Hives; Rash)  aspirin (Other)    MEDICATIONS:  STANDING MEDICATIONS  chlorhexidine 2% Cloths 1 Application(s) Topical <User Schedule>  furosemide    Tablet 40 milliGRAM(s) Oral daily  heparin   Injectable 5000 Unit(s) SubCutaneous every 12 hours  lactulose Syrup 20 Gram(s) Oral three times a day  levothyroxine 37.5 MICROGram(s) Oral daily  pantoprazole    Tablet 40 milliGRAM(s) Oral before breakfast  propranolol 10 milliGRAM(s) Oral two times a day  spironolactone 100 milliGRAM(s) Oral daily    PRN MEDICATIONS  ondansetron Injectable 4 milliGRAM(s) IV Push every 8 hours PRN  oxycodone    5 mG/acetaminophen 325 mG 2 Tablet(s) Oral every 6 hours PRN  traMADol 50 milliGRAM(s) Oral every 8 hours PRN    VITALS:   T(F): 97.5  HR: 62  BP: 126/59  RR: 16  SpO2: 96%    LABS:                          10.5   4.81  )-----------( 60       ( 22 May 2025 08:10 )             33.1     05-22    136  |  101  |  50[H]  ----------------------------<  121[H]  3.6   |  22  |  1.5    Ca    8.8      22 May 2025 08:10    TPro  6.1  /  Alb  3.1[L]  /  TBili  7.7[H]  /  DBili  x   /  AST  50[H]  /  ALT  25  /  AlkPhos  92  05-22    PT/INR - ( 22 May 2025 08:10 )   PT: 17.10 sec;   INR: 1.44 ratio         PTT - ( 22 May 2025 08:10 )  PTT:36.3 sec  Urinalysis Basic - ( 22 May 2025 08:10 )    Color: x / Appearance: x / SG: x / pH: x  Gluc: 121 mg/dL / Ketone: x  / Bili: x / Urobili: x   Blood: x / Protein: x / Nitrite: x   Leuk Esterase: x / RBC: x / WBC x   Sq Epi: x / Non Sq Epi: x / Bacteria: x    RADIOLOGY:    PHYSICAL EXAM:  GEN: No acute distress  HEENT: normocephalic, atraumatic, aniceteric  LUNGS: bl breath sounds   HEART: S1/S2 present. RRR, no murmurs  ABD: Soft, non-tender, non-distended. Bowel sounds present  EXT: warm   NEURO: AAOX3, normal affect      ASSESSMENT AND PLAN:    84F w/PMHX of cirrhosis, hypothyroidism, thrombocytopenia, fatty liver, GERD, breast cancer, osteoarthritis presents to ED s/p fall, admitted to medicine service for further management.    #Generalized weakness   #Mechanical fall  #H/O Frequent falls  -trauma workup negative for Fx  -supportive care  -PT EVAL - rehab  - needs 3 midnights     # Colitis on CTAP  - augmentin bid for 5 days     # Dense material in urine bladder on CTAP - asymptomatic , denies urinary symptoms,. monitor     #Abnormal EKG - poor quality with artifact reading as afib though clearly with lots of artifact and not accurate, repeat with NSR     #H/O Cryptogenic Liver Cirrhosis  #H/O Pancreatic duct dilation   #Pulmonary Hypertension with Chronic Hypoxemia on 2L home O2   - c/w Lactulose TID, titrate 2- 3 BM QD  - c/w Furosemide/Aldactone/  Propranolol   - hepatology saw patient last admission - recommend OP follow up for chronic management of cirrhosis EGD, and further workup for double duct sign evident on RUQ.     #Hypothyroid  - synthroid - TSH 4.95 5/14/25    #H/O GERD - c/w PPI    dvt/ gi ppx/diet  dispo: tod ready pending dispo to rehab   family discussion: spoke to son Alexis [phone] - all questions answered and concerns addressed  5/23

## 2025-05-23 NOTE — PHYSICAL THERAPY INITIAL EVALUATION ADULT - IMPAIRED TRANSFERS: BED/CHAIR, REHAB EVAL
Care Management Initial Consult    General Information  Assessment completed with: Patient,         Primary Care Provider verified and updated as needed: Yes   Readmission within the last 30 days: no previous admission in last 30 days      Reason for Consult: discharge planning  Advance Care Planning: Advance Care Planning Reviewed: no concerns identified          Communication Assessment  Patient's communication style: spoken language (English or Bilingual)    Hearing Difficulty or Deaf: no   Wear Glasses or Blind: no    Cognitive  Cognitive/Neuro/Behavioral: WDL  Level of Consciousness: alert                   Living Environment:   People in home: child(liza), dependent, spouse     Current living Arrangements: house      Able to return to prior arrangements: yes       Family/Social Support:  Care provided by: self  Provides care for: child(liza)  Marital Status:     Nabil       Description of Support System: Supportive         Current Resources:   Patient receiving home care services: No     Community Resources:    Equipment currently used at home: none  Supplies currently used at home:      Employment/Financial:  Employment Status: homemaker        Financial Concerns: none   Referral to Financial Worker: Yes       Does the patient's insurance plan have a 3 day qualifying hospital stay waiver?  No    Lifestyle & Psychosocial Needs:  Social Determinants of Health     Food Insecurity: Not on file   Depression: Not on file   Housing Stability: Not on file   Tobacco Use: Low Risk  (12/26/2023)    Patient History     Smoking Tobacco Use: Never     Smokeless Tobacco Use: Never     Passive Exposure: Not on file   Financial Resource Strain: Not on file   Alcohol Use: Not on file   Transportation Needs: Not on file   Physical Activity: Not on file   Interpersonal Safety: Not on file   Stress: Not on file   Social Connections: Not on file       Functional Status:  Prior to admission patient needed assistance:    Dependent ADLs:: Independent  Dependent IADLs:: Independent       Mental Health Status:  Mental Health Status: No Current Concerns       Chemical Dependency Status:  Chemical Dependency Status: No Current Concerns             Values/Beliefs:  Spiritual, Cultural Beliefs, Rastafarian Practices, Values that affect care: no       Cultural/Rastafarian Practices Patient Routinely Participates In: prayer       Additional Information:  Met with patient to discuss plan of care and discharge plans. Patient states she moved from [facility] to be with her spouse few weeks back. She lives with her spouse and her 2 1/2 yr old that she cares. Spouse and family caring for the child with this admission.   Noted patient did not have insurance. SW notified financial counselor ..Patient states she will discharge home when stable and pain controlled. States feeling weak at present.   Follow up appointment  completed..  Julio Oreilly RN  Inpatient Care Coordinator  [facility]  # [phone]      Julio Oreilly RN      impaired balance/cognition/impaired coordination/impaired postural control/decreased strength

## 2025-05-24 LAB
ANION GAP SERPL CALC-SCNC: 11 MMOL/L — SIGNIFICANT CHANGE UP (ref 7–14)
APPEARANCE UR: ABNORMAL
BASOPHILS # BLD AUTO: 0.02 K/UL — SIGNIFICANT CHANGE UP (ref 0–0.2)
BASOPHILS NFR BLD AUTO: 0.4 % — SIGNIFICANT CHANGE UP (ref 0–1)
BILIRUB UR-MCNC: NEGATIVE — SIGNIFICANT CHANGE UP
BUN SERPL-MCNC: 52 MG/DL — HIGH (ref 10–20)
CALCIUM SERPL-MCNC: 8.6 MG/DL — SIGNIFICANT CHANGE UP (ref 8.4–10.5)
CHLORIDE SERPL-SCNC: 97 MMOL/L — LOW (ref 98–110)
CO2 SERPL-SCNC: 24 MMOL/L — SIGNIFICANT CHANGE UP (ref 17–32)
COLOR SPEC: SIGNIFICANT CHANGE UP
CREAT ?TM UR-MCNC: 95 MG/DL — SIGNIFICANT CHANGE UP
CREAT SERPL-MCNC: 1.9 MG/DL — HIGH (ref 0.7–1.5)
DIFF PNL FLD: ABNORMAL
EGFR: 26 ML/MIN/1.73M2 — LOW
EGFR: 26 ML/MIN/1.73M2 — LOW
EOSINOPHIL # BLD AUTO: 0.48 K/UL — SIGNIFICANT CHANGE UP (ref 0–0.7)
EOSINOPHIL NFR BLD AUTO: 8.4 % — HIGH (ref 0–8)
GLUCOSE SERPL-MCNC: 119 MG/DL — HIGH (ref 70–99)
GLUCOSE UR QL: NEGATIVE MG/DL — SIGNIFICANT CHANGE UP
HCT VFR BLD CALC: 29.9 % — LOW (ref 37–47)
HGB BLD-MCNC: 9.3 G/DL — LOW (ref 12–16)
IMM GRANULOCYTES NFR BLD AUTO: 0.2 % — SIGNIFICANT CHANGE UP (ref 0.1–0.3)
KETONES UR QL: ABNORMAL MG/DL
LEUKOCYTE ESTERASE UR-ACNC: ABNORMAL
LYMPHOCYTES # BLD AUTO: 0.94 K/UL — LOW (ref 1.2–3.4)
LYMPHOCYTES # BLD AUTO: 16.5 % — LOW (ref 20.5–51.1)
MAGNESIUM SERPL-MCNC: 2.3 MG/DL — SIGNIFICANT CHANGE UP (ref 1.8–2.4)
MCHC RBC-ENTMCNC: 30.4 PG — SIGNIFICANT CHANGE UP (ref 27–31)
MCHC RBC-ENTMCNC: 31.1 G/DL — LOW (ref 32–37)
MCV RBC AUTO: 97.7 FL — SIGNIFICANT CHANGE UP (ref 81–99)
MONOCYTES # BLD AUTO: 0.61 K/UL — HIGH (ref 0.1–0.6)
MONOCYTES NFR BLD AUTO: 10.7 % — HIGH (ref 1.7–9.3)
NEUTROPHILS # BLD AUTO: 3.63 K/UL — SIGNIFICANT CHANGE UP (ref 1.4–6.5)
NEUTROPHILS NFR BLD AUTO: 63.8 % — SIGNIFICANT CHANGE UP (ref 42.2–75.2)
NITRITE UR-MCNC: NEGATIVE — SIGNIFICANT CHANGE UP
NRBC BLD AUTO-RTO: 0 /100 WBCS — SIGNIFICANT CHANGE UP (ref 0–0)
OSMOLALITY UR: 410 MOS/KG — SIGNIFICANT CHANGE UP (ref 50–1200)
PH UR: 5.5 — SIGNIFICANT CHANGE UP (ref 5–8)
PLATELET # BLD AUTO: 65 K/UL — LOW (ref 130–400)
PMV BLD: 9.7 FL — SIGNIFICANT CHANGE UP (ref 7.4–10.4)
POTASSIUM SERPL-MCNC: 4 MMOL/L — SIGNIFICANT CHANGE UP (ref 3.5–5)
POTASSIUM SERPL-SCNC: 4 MMOL/L — SIGNIFICANT CHANGE UP (ref 3.5–5)
POTASSIUM UR-SCNC: 26 MMOL/L — SIGNIFICANT CHANGE UP
PROT ?TM UR-MCNC: 18 MG/DL — SIGNIFICANT CHANGE UP
PROT UR-MCNC: SIGNIFICANT CHANGE UP MG/DL
PROT/CREAT UR-RTO: 0.2 RATIO — SIGNIFICANT CHANGE UP (ref 0–0.2)
RBC # BLD: 3.06 M/UL — LOW (ref 4.2–5.4)
RBC # FLD: 16.8 % — HIGH (ref 11.5–14.5)
SODIUM SERPL-SCNC: 132 MMOL/L — LOW (ref 135–146)
SODIUM UR-SCNC: <20 MMOL/L — SIGNIFICANT CHANGE UP
SP GR SPEC: 1.03 — SIGNIFICANT CHANGE UP (ref 1–1.03)
UROBILINOGEN FLD QL: 1 MG/DL — SIGNIFICANT CHANGE UP (ref 0.2–1)
UUN UR-MCNC: 580 MG/DL — SIGNIFICANT CHANGE UP
WBC # BLD: 5.69 K/UL — SIGNIFICANT CHANGE UP (ref 4.8–10.8)
WBC # FLD AUTO: 5.69 K/UL — SIGNIFICANT CHANGE UP (ref 4.8–10.8)

## 2025-05-24 PROCEDURE — 76775 US EXAM ABDO BACK WALL LIM: CPT | Mod: 26

## 2025-05-24 PROCEDURE — 99233 SBSQ HOSP IP/OBS HIGH 50: CPT

## 2025-05-24 RX ORDER — MAGNESIUM CITRATE
296 SOLUTION, ORAL ORAL ONCE
Refills: 0 | Status: COMPLETED | OUTPATIENT
Start: 2025-05-24 | End: 2025-05-24

## 2025-05-24 RX ORDER — CEFTRIAXONE 500 MG/1
INJECTION, POWDER, FOR SOLUTION INTRAMUSCULAR; INTRAVENOUS
Refills: 0 | Status: DISCONTINUED | OUTPATIENT
Start: 2025-05-24 | End: 2025-05-25

## 2025-05-24 RX ORDER — CEFTRIAXONE 500 MG/1
1000 INJECTION, POWDER, FOR SOLUTION INTRAMUSCULAR; INTRAVENOUS EVERY 24 HOURS
Refills: 0 | Status: DISCONTINUED | OUTPATIENT
Start: 2025-05-25 | End: 2025-05-25

## 2025-05-24 RX ORDER — METRONIDAZOLE 250 MG
500 TABLET ORAL EVERY 12 HOURS
Refills: 0 | Status: DISCONTINUED | OUTPATIENT
Start: 2025-05-24 | End: 2025-05-25

## 2025-05-24 RX ORDER — CEFTRIAXONE 500 MG/1
1000 INJECTION, POWDER, FOR SOLUTION INTRAMUSCULAR; INTRAVENOUS ONCE
Refills: 0 | Status: COMPLETED | OUTPATIENT
Start: 2025-05-24 | End: 2025-05-24

## 2025-05-24 RX ADMIN — Medication 50 MILLILITER(S): at 11:14

## 2025-05-24 RX ADMIN — AMOXICILLIN AND CLAVULANATE POTASSIUM 1 TABLET(S): 500; 125 TABLET, FILM COATED ORAL at 05:36

## 2025-05-24 RX ADMIN — Medication 75 MILLILITER(S): at 10:09

## 2025-05-24 RX ADMIN — HEPARIN SODIUM 5000 UNIT(S): 1000 INJECTION INTRAVENOUS; SUBCUTANEOUS at 05:34

## 2025-05-24 RX ADMIN — Medication 37.5 MICROGRAM(S): at 05:34

## 2025-05-24 RX ADMIN — Medication 40 MILLIGRAM(S): at 05:35

## 2025-05-24 RX ADMIN — CEFTRIAXONE 100 MILLIGRAM(S): 500 INJECTION, POWDER, FOR SOLUTION INTRAMUSCULAR; INTRAVENOUS at 11:42

## 2025-05-24 RX ADMIN — Medication 100 MILLIGRAM(S): at 05:36

## 2025-05-24 RX ADMIN — LACTULOSE 20 GRAM(S): 10 SOLUTION ORAL at 05:36

## 2025-05-24 RX ADMIN — Medication 500 MILLIGRAM(S): at 17:07

## 2025-05-24 RX ADMIN — HEPARIN SODIUM 5000 UNIT(S): 1000 INJECTION INTRAVENOUS; SUBCUTANEOUS at 17:07

## 2025-05-24 RX ADMIN — FUROSEMIDE 40 MILLIGRAM(S): 10 INJECTION INTRAMUSCULAR; INTRAVENOUS at 05:35

## 2025-05-24 RX ADMIN — LACTULOSE 20 GRAM(S): 10 SOLUTION ORAL at 13:09

## 2025-05-24 RX ADMIN — Medication 1 APPLICATION(S): at 05:34

## 2025-05-24 RX ADMIN — Medication 296 MILLILITER(S): at 12:07

## 2025-05-24 RX ADMIN — LACTULOSE 20 GRAM(S): 10 SOLUTION ORAL at 21:50

## 2025-05-24 NOTE — CONSULT NOTE ADULT - SUBJECTIVE AND OBJECTIVE BOX
[place], [place]  84y, Female  Allergies    Levaquin (Hives; Rash)  predniSONE (Swelling)  Cipro (Hives; Rash)  aspirin (Other)    Intolerances    LOS  2d    HPI  HPI:  84F w/PMHX of cirrhosis, hypothyroidism, thrombocytopenia, fatty liver, GERD, breast cancer, osteoarthritis presents to ED s/p fall.  Patient's niece at bedside, provides Hx.  Reporting patient s/p mechanical fall last night.  SHe reports that the patient's  is weak as well, tried to help her back into the recliner from going to the bathroom and they both fell over.  Patient landed on her buttock area and remained on the floor for 2 hours until she was discovered and brought to the ED for evaluation.  Patient with complaints of thoracic and low back pain, otherwise at baseline.  No complaints of CP/SOB.  No abdominal or urinary complaints.  Patient has had recent falls, last , went to ED and discharged home.  Also s/p recent admission -. Trauma w/u neg in ED.     (22 May 2025 12:47)      INFECTIOUS DISEASE HISTORY:  ID consulted for antimicrobial recommendations.     Prior hospital charts reviewed [Yes]  Primary team notes reviewed [Yes]  Other consultant notes reviewed [Yes]    REVIEW OF SYSTEMS:  CONSTITUTIONAL: No fever or chills  HEENT: No sore throat  RESPIRATORY: No cough, no shortness of breath  CARDIOVASCULAR: No chest pain or palpitations  GASTROINTESTINAL: No abdominal or epigastric pain  GENITOURINARY: No dysuria  NEUROLOGICAL: No headache/dizziness  MSK: No joint pain, erythema, or swelling; no back pain  SKIN: No itching, rashes  All other ROS negative except noted above    PAST MEDICAL & SURGICAL HISTORY:  GERD (gastroesophageal reflux disease)      Fatty liver      Malignant neoplasm of areola of left breast in female, unspecified estrogen receptor status      Blister of right lower leg      Osteoarthritis      Thrombocytopenia      Hypothyroidism      Cirrhosis      History of surgery  LEFT BREAST LUMPECTOMY  TUBIAL LIGATION  CHOLECYSTECTOMY  RIGHT & LEFT TKR      History of cholecystectomy          SOCIAL HISTORY:  - No recent travel    FAMILY HISTORY:  Family history of breast cancer in mother (Mother)    Family history of Parkinson disease (Father)    ANTIMICROBIALS:  cefTRIAXone   IVPB    metroNIDAZOLE    Tablet 500 every 12 hours      ANTIMICROBIALS (past 90 days):  MEDICATIONS  (STANDING):  amoxicillin  500 milliGRAM(s)/clavulanate   1 Tablet(s) Oral (25 @ 05:36)   1 Tablet(s) Oral (25 @ 22:57)    cefTRIAXone   IVPB   100 mL/Hr IV Intermittent (25 @ 11:42)    metroNIDAZOLE    Tablet   500 milliGRAM(s) Oral (25 @ 17:07)        OTHER MEDS:   MEDICATIONS  (STANDING):  heparin   Injectable 5000 every 12 hours  lactulose Syrup 20 three times a day  levothyroxine 37.5 daily  ondansetron Injectable 4 every 8 hours PRN  oxycodone    5 mG/acetaminophen 325 mG 2 every 6 hours PRN  pantoprazole    Tablet 40 before breakfast  propranolol 10 two times a day  spironolactone 100 daily  traMADol 50 every 8 hours PRN      VITALS:  Vital Signs Last 24 Hrs  T(F): 97.4 (25 @ 14:46), Max: 98.5 (25 @ 04:42)    Vital Signs Last 24 Hrs  HR: 64 (25 @ 14:46) (64 - 65)  BP: 128/65 (25 @ 14:46) (98/58 - 128/65)  RR: 18 (25 @ 14:46)  SpO2: 93% (25 @ 14:46) (93% - 94%)  Wt(kg): --    EXAM:  GENERAL: Frail looking on NC.   HEAD: No head lesions  NECK: Dry mouth. Poor dentition.  CHEST/LUNG: Shallow breath sounds.  HEART: S1 S2  ABDOMEN: Soft, nontender.   EXTREMITIES: No clubbing  NERVOUS SYSTEM: Awake. Intermittently sleeping.  MSK: No joint erythema, swelling or pain  Labs:                        9.3    5.69  )-----------( 65       ( 24 May 2025 07:55 )             29.9         132[L]  |  97[L]  |  52[H]  ----------------------------<  119[H]  4.0   |  24  |  1.9[H]    Ca    8.6      24 May 2025 07:55  Mg     2.3             WBC Trend:  WBC Count: 5.69 (25 @ 07:55)  WBC Count: 4.81 (25 @ 08:10)          Creatine Trend:  Creatinine: 1.9 ()  Creatinine: 1.5 ()  Creatinine: 1.5 ()      Liver Biochemical Testing Trend:  Alanine Aminotransferase (ALT/SGPT): 25 (-)  Alanine Aminotransferase (ALT/SGPT): 22 (-)  Alanine Aminotransferase (ALT/SGPT): 18 ()  Alanine Aminotransferase (ALT/SGPT): 19 (05-15)  Alanine Aminotransferase (ALT/SGPT): 20 (-)  Aspartate Aminotransferase (AST/SGOT): 50 (25 @ 08:10)  Aspartate Aminotransferase (AST/SGOT): 37 (25 @ 05:36)  Aspartate Aminotransferase (AST/SGOT): 27 (25 @ 07:31)  Aspartate Aminotransferase (AST/SGOT): 32 (05-15-25 @ 07:15)  Aspartate Aminotransferase (AST/SGOT): 31 (25 @ 07:33)  Bilirubin Total: 7.7 ()  Bilirubin Total: 7.7 ()  Bilirubin Total: 5.7 ()  Bilirubin Direct: 1.1 ()  Bilirubin Total: 5.3 ()      Trend LDH  25 @ 11:56  290[H]          Urinalysis Basic - ( 24 May 2025 12:15 )    Color: Dark Yellow / Appearance: Cloudy / S.027 / pH: x  Gluc: x / Ketone: x  / Bili: Negative / Urobili: 1.0 mg/dL   Blood: x / Protein: Trace mg/dL / Nitrite: Negative   Leuk Esterase: Large / RBC: 25 /HPF / WBC 25 /HPF   Sq Epi: x / Non Sq Epi: 15 /HPF / Bacteria: Many /HPF        MICROBIOLOGY:    Female    Lactate, Blood: 1.7 ( @ 08:10)        INFLAMMATORY MARKERS      RADIOLOGY & ADDITIONAL TESTS:  I have personally reviewed the imagings.  CXR      CT  CT Chest w/ IV Cont:   ACC: [id number] EXAM:  CT ABDOMEN AND PELVIS IC   ORDERED BY: RENETTA DIXON     ACC: [id number] EXAM:  CT CHEST IC   ORDERED BY: RENETTA DIXON     PROCEDURE DATE:  2025          INTERPRETATION:  CLINICAL STATEMENT: Trauma  history of breast cancer.    TECHNIQUE: Contiguous axial CT images were obtained from the thoracic   inlet to the pubic symphysis following administration of intravenous   contrast.  95 cc administered of Omnipaque 350 (accession [id number]), IV   contrast documented in unlinked concurrent exam (accession [id number]) (5   cc discarded).  Oral contrast was not administered.  Reformatted images   in the coronal and sagittal planes, as well as MIP reconstructed images,   were acquired.      COMPARISON CT: CT dated 2025      FINDINGS:    CHEST:    LUNGS/PLEURA/AIRWAYS: Atelectatic changes. No pulmonary consolidation   pleural effusion or pneumothorax. Multiple pulmonary nodules,   predominantly right-sided measuring up to 5 mm.    MEDIASTINUM/THORACIC NODES: No lymphadenopathy..    HEART/GREAT VESSELS: Coronary and aortic calcifications. Enlarged   pulmonary arteries which can be seen in the setting of pulmonary   hypertension..      ABDOMEN/PELVIS:    HEPATOBILIARY: Cirrhotic liver. Subcentimeter hepatic hypodensity too   small to characterize. Abdominal varices redemonstrated. Status post   cholecystectomy..    SPLEEN: Splenomegaly. Splenic hypodensity too small to characterize..    PANCREAS: Unremarkable.    ADRENAL GLANDS: Unremarkable.    KIDNEYS: No hydronephrosis. Renal hypodensities too small to   characterize..    ABDOMINOPELVIC NODES: Unremarkable.    PELVIC ORGANS: Dense material in the bladder lumen, new from prior CT.    PERITONEUM/MESENTERY/BOWEL: Right colon wall thickening may be on the   basis of portal colopathy or represent colitis. Moderate ascites   redemonstrated. No free air..    BONES/SOFT TISSUES: Anasarca.. No acute osseous abnormality. Degenerative   changes.    OTHER: Atherosclerotic calcifications.      IMPRESSION:    Cirrhotic liver with sequela of portal hypertension.    Right colon wall thickening may be on the basis of portal colopathy or   represent colitis. Recommend clinical correlation.    Dense material in the bladder lumen, new from prior CT. Correlate   clinically including with urinalysis.    Pulmonary nodules measuring up to 5 mm. Follow-up per oncology protocol   in this patient with reported history of breast cancer.    See body of the report for multiple additional findings.    --- End of Report ---            SUSAN EWING MD; Attending Radiologist  This document has been electronically signed. May 22 2025 10:29AM (25 @ 09:11)    < from: US Renal (25 @ 10:54) >  IMPRESSION:  No hydronephrosis bilaterally.    < end of copied text >  < from: Xray Pelvis AP only (25 @ 05:46) >  impression:    Bony demineralization with mild degenerative change. No acute displaced   fracture.    Note that in such osteopenic patients, a nondisplaced fracture can be   under evaluated and if pain persists, an MRI of the region should be   considered.    --- End of Report ---    < end of copied text >  < from: TTE Echo Complete w/o Contrast w/ Doppler (05.15.25 @ 12:30) >  Summary:   1. LV Ejection Fraction by Jackson's Method with a biplane EF of 60 %.   2. Mild septal left ventricular hypertrophy.   3. Spectral Doppler shows impaired relaxation pattern of left   ventricular myocardial filling (Grade I diastolic dysfunction).   4. Mildly enlarged right atrium.   5. Moderately enlarged left atrium.   6. Mild thickening of the anterior and posterior mitral valve leaflets.   7. Mild to moderate mitral valve regurgitation.   8. Mild tricuspid regurgitation.   9. Mild aortic regurgitation.  10. Mild aortic valve stenosis.  11. Estimated pulmonary artery systolic pressure is 45.0 mmHg assuming a   right atrial pressure of 3 mmHg, which is consistent with mild pulmonary   hypertension.    < end of copied text >    CARDIOLOGY TESTING  12 Lead ECG:   Ventricular Rate 64 BPM    Atrial Rate 64 BPM    P-R Interval 170 ms    QRS Duration 90 ms    Q-T Interval 432 ms    QTC Calculation(Bazett) 445 ms    P Axis 83 degrees    R Axis 28 degrees    T Axis 62 degrees    Diagnosis Line Normal sinus rhythm  Otherwise normal ECG    Confirmed by Honorio Ortega ([id number]) on 2025 2:40:53 PM (25 @ 13:40)  12 Lead ECG:   Ventricular Rate 62 BPM    Atrial Rate 69 BPM    QRS Duration 78 ms    Q-T Interval 418 ms    QTC Calculation(Bazett) 424 ms    R Axis 83 degrees    T Axis 33 degrees    Diagnosis Line    Atrial fibrillation  Nonspecific ST and T wave abnormality ,probably digitalis effect  Abnormal ECG    Confirmed by Honorio Ortega ([id number]) on 2025 8:13:11 AM (25 @ 07:53)

## 2025-05-24 NOTE — CONSULT NOTE ADULT - ASSESSMENT
ASSESSMENT  This is a 84 year old female with PMH of cirrhosis, hypothyroidism, thrombocytopenia, fatty liver, GERD, breast cancer, osteoarthritis presents to ED s/p fall.    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    RECOMMENDATIONS  -On IV Ceftriaxone 1 gram q 24 hours+Flagyll 500mg q 12 hours now.  -Can D/C on PO Augmentin 500-125 BID for total of 5 days from 5/23.  -Off loading to prevent pressure sores and preventive measures to avoid aspiration. GOC.    If any questions, please send a message or call on [employer] Teams  Please continue to update ID with any pertinent new laboratory or radiographic findings.    Carmella Mi M.D  Infectious Diseases Attending/   [employer] at [facility]   ASSESSMENT  This is a 84 year old female with PMH of cirrhosis, hypothyroidism, thrombocytopenia, fatty liver, GERD, breast cancer, osteoarthritis presents to ED s/p fall.    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    RECOMMENDATIONS  -On IV Ceftriaxone 1 gram q 24 hours+Flagyll 500mg q 12 hours now.  -Can D/C on PO Augmentin 500-125 BID for total of 5 days from 5/23.  -Off loading to prevent pressure sores and preventive measures to avoid aspiration. GOC.    If any questions, please send a message or call on [employer] Teams  Please continue to update ID with any pertinent new laboratory or radiographic findings.    Carmella Mi M.D  Infectious Diseases Attending/   [employer] at [facility]

## 2025-05-24 NOTE — PROGRESS NOTE ADULT - SUBJECTIVE AND OBJECTIVE BOX
SUBJECTIVE:    Patient is a 84y old Female who presents with a chief complaint of Fall (23 May 2025 21:20)    Currently admitted to medicine with the primary diagnosis of Frequent falls       Today is hospital day 2d. This morning she is resting comfortably in bed  - has no complaints - staff reported hematuria in toilet         PAST MEDICAL & SURGICAL HISTORY  GERD (gastroesophageal reflux disease)    Fatty liver    Malignant neoplasm of areola of left breast in female, unspecified estrogen receptor status    Blister of right lower leg    Osteoarthritis    Thrombocytopenia    Hypothyroidism    Cirrhosis    History of surgery  LEFT BREAST LUMPECTOMY  TUBIAL LIGATION  CHOLECYSTECTOMY  RIGHT & LEFT TKR    History of cholecystectomy      SOCIAL HISTORY:    ALLERGIES:  Levaquin (Hives; Rash)  predniSONE (Swelling)  Cipro (Hives; Rash)  aspirin (Other)    MEDICATIONS:  STANDING MEDICATIONS  amoxicillin  500 milliGRAM(s)/clavulanate 1 Tablet(s) Oral two times a day  chlorhexidine 2% Cloths 1 Application(s) Topical <User Schedule>  heparin   Injectable 5000 Unit(s) SubCutaneous every 12 hours  lactulose Syrup 20 Gram(s) Oral three times a day  levothyroxine 37.5 MICROGram(s) Oral daily  pantoprazole    Tablet 40 milliGRAM(s) Oral before breakfast  propranolol 10 milliGRAM(s) Oral two times a day  spironolactone 100 milliGRAM(s) Oral daily    PRN MEDICATIONS  ondansetron Injectable 4 milliGRAM(s) IV Push every 8 hours PRN  oxycodone    5 mG/acetaminophen 325 mG 2 Tablet(s) Oral every 6 hours PRN  traMADol 50 milliGRAM(s) Oral every 8 hours PRN    VITALS:   T(F): 97.8  HR: 64  BP: 103/59  RR: 18  SpO2: 93%    LABS:  Negative for smoking/alcohol/drug use.                         9.3    5.69  )-----------( 65       ( 24 May 2025 07:55 )             29.9     05-24    132[L]  |  97[L]  |  52[H]  ----------------------------<  119[H]  4.0   |  24  |  1.9[H]    Ca    8.6      24 May 2025 07:55  Mg     2.3     05-24        Urinalysis Basic - ( 24 May 2025 07:55 )    Color: x / Appearance: x / SG: x / pH: x  Gluc: 119 mg/dL / Ketone: x  / Bili: x / Urobili: x   Blood: x / Protein: x / Nitrite: x   Leuk Esterase: x / RBC: x / WBC x   Sq Epi: x / Non Sq Epi: x / Bacteria: x                RADIOLOGY:    PHYSICAL EXAM:  GEN: No acute distress  HEENT: normocephalic, atraumatic, aniceteric  LUNGS: Clear to auscultation bilaterally, no rales/wheezing/ rhonchi  HEART: S1/S2 present. RRR, no murmurs  ABD: Soft, non-tender, non-distended. Bowel sounds present  EXT: warm   NEURO: AAOX3, normal affect      ASSESSMENT AND PLAN:    84F w/PMHX of cirrhosis, hypothyroidism, thrombocytopenia, fatty liver, GERD, breast cancer, osteoarthritis presents to ED s/p fall, admitted to medicine service for further management.    #Generalized weakness   #Mechanical fall  #H/O Frequent falls  -trauma workup negative for Fx  -supportive care  -PT EVAL - rehab  - needs 3 midnights     # Hematuria 5/24 AM   # Acute on chronic blood loss anemia   # NORA  - likely pre-renal   # Dense material in urine bladder on CTAP - ro UTI   - denies urinary frequency/ dysuria   - BUN/ Cr 27   - Hb 10.5 > 9.3   - active type and screen   - RBUS   - HOLD Furosemide   - IVF for 12 hrs   - monitor bmp   - check ua , urine studies   - avoid nephrotoxic medications   - Rocephin     # Colitis on CTAP  - on Rocephin / Flagyl     # Abnormal EKG - poor quality with artifact reading as afib though clearly with lots of artifact and not accurate, repeat with NSR     #H/O Cryptogenic Liver Cirrhosis  #H/O Pancreatic duct dilation   #Pulmonary Hypertension with Chronic Hypoxemia on 2L home O2   - c/w Lactulose TID, titrate 2- 3 BM QD  - c/w /Aldactone/  Propranolol  ; LASIX HELD given NORA   - hepatology saw patient last admission - recommend OP follow up for chronic management of cirrhosis EGD, and further workup for double duct sign evident on RUQ    #Hypothyroid  - synthroid - TSH 4.95 5/14/25    #H/O GERD - c/w PPI    dvt/ gi ppx/diet  dispo: dc ready pending dispo to rehab   family discussion: spoke to son Alexis [phone] - all questions answered and concerns addressed  5/24

## 2025-05-24 NOTE — CONSULT NOTE ADULT - NS ATTEST RISK PROBLEM GEN_ALL_CORE FT
I have personally seen and examined this patient.    I have reviewed all pertinent clinical information and reviewed all relevant imaging and diagnostic studies personally.   I discussed recommendations with the primary team. I discussed recommendations with the primary team.

## 2025-05-24 NOTE — SWALLOW BEDSIDE ASSESSMENT ADULT - SLP PERTINENT HISTORY OF CURRENT PROBLEM
84F w/PMHX of cirrhosis, hypothyroidism, thrombocytopenia, fatty liver, GERD, breast cancer, osteoarthritis presents to ED s/p fall, admitted to medicine service for further management.

## 2025-05-24 NOTE — BH CONSULTATION LIAISON ASSESSMENT NOTE - NSBHCHARTREVIEWLAB_PSY_A_CORE FT
9.3    5.69  )-----------( 65       ( 24 May 2025 07:55 )             29.9   05-24    132[L]  |  97[L]  |  52[H]  ----------------------------<  119[H]  4.0   |  24  |  1.9[H]    Ca    8.6      24 May 2025 07:55  Mg     2.3     05-24

## 2025-05-24 NOTE — BH CONSULTATION LIAISON ASSESSMENT NOTE - NSBHCONSULTRECOMMENDOTHER_PSY_A_CORE FT
- Patient does not meet criteria for IPP involuntary admission, not recommended at this time  - Patient is naive to psychotropic medications and declines starting them now  - Patient has support at home with friends and family, and prefers to rely on her social connections at this time  - Can provide referral for outpatient follow up if patient wants to pursue further mental health care on discharge  ---[facility]  [address] [place] [address]  [phone]

## 2025-05-24 NOTE — BH CONSULTATION LIAISON ASSESSMENT NOTE - CURRENT MEDICATION
MEDICATIONS  (STANDING):  cefTRIAXone   IVPB      chlorhexidine 2% Cloths 1 Application(s) Topical <User Schedule>  heparin   Injectable 5000 Unit(s) SubCutaneous every 12 hours  lactulose Syrup 20 Gram(s) Oral three times a day  levothyroxine 37.5 MICROGram(s) Oral daily  metroNIDAZOLE    Tablet 500 milliGRAM(s) Oral every 12 hours  pantoprazole    Tablet 40 milliGRAM(s) Oral before breakfast  propranolol 10 milliGRAM(s) Oral two times a day  sodium chloride 0.9%. 1000 milliLiter(s) (50 mL/Hr) IV Continuous <Continuous>  spironolactone 100 milliGRAM(s) Oral daily    MEDICATIONS  (PRN):  ondansetron Injectable 4 milliGRAM(s) IV Push every 8 hours PRN Nausea and/or Vomiting  oxycodone    5 mG/acetaminophen 325 mG 2 Tablet(s) Oral every 6 hours PRN Severe Pain (7 - 10)  traMADol 50 milliGRAM(s) Oral every 8 hours PRN Moderate Pain (4 - 6)

## 2025-05-24 NOTE — BH CONSULTATION LIAISON ASSESSMENT NOTE - NSBHCHARTREVIEWVS_PSY_A_CORE FT
Vital Signs Last 24 Hrs  T(C): 36.3 (24 May 2025 14:46), Max: 36.7 (23 May 2025 20:10)  T(F): 97.4 (24 May 2025 14:46), Max: 98.1 (23 May 2025 20:10)  HR: 64 (24 May 2025 14:46) (64 - 65)  BP: 128/65 (24 May 2025 14:46) (98/58 - 128/65)  BP(mean): --  RR: 18 (24 May 2025 14:46) (18 - 18)  SpO2: 93% (24 May 2025 14:46) (93% - 94%)    Parameters below as of 24 May 2025 14:46  Patient On (Oxygen Delivery Method): nasal cannula  O2 Flow (L/min): 2

## 2025-05-24 NOTE — BH CONSULTATION LIAISON ASSESSMENT NOTE - SUMMARY
Patient is a 83yo F, domiciled w/  on [address], 3 adult children, retired , w/ PMHx of Cirrhosis, hypothyroidism, thrombocytopenia, osteoarthritis, breat cancer, GERD, frequent falls, and w/ no significant PPHx, no prior IPP admissions, no prior hx of outpatient psychiatry or therapy, no prior hx of psychotropic medications, no reported hx of substance misuse, who is admitted to the medical floors for recent fall without LOC or HT. Psychiatry was consulted for evaluation of depression due to reports of patient endorsing low mood.    On initial psychiatric evaluation, patient is presenting with depressed mood and labile affect, but in the context of recent medical issues and ongoing guilt of requiring care from her . Patient however denies any safety concerns, and has no prior psychiatric history nor ever endorses thoughts of self harm or suicide. Patient is not fully oriented to time nor situation but is able to follow instruction and conversation. Patient has good mood at times, but down moods when she is feeling overwhelmed with social and medical problems. Patient does display good behavioral control and endorses and strong familial and friend network that she relies on for help. Patient is not interested in starting psychotropic medication at this time. Patient does not meet criteria for involuntary inpatient psychiatric admission so plan to provide referral for outpatient psychiatric followup if patient is interested in additional mental health treatment on discharge.

## 2025-05-24 NOTE — BH CONSULTATION LIAISON ASSESSMENT NOTE - HPI (INCLUDE ILLNESS QUALITY, SEVERITY, DURATION, TIMING, CONTEXT, MODIFYING FACTORS, ASSOCIATED SIGNS AND SYMPTOMS)
Patient is a 85yo F, domiciled w/  on [place], 3 adult children, retired , w/ PMHx of Cirrhosis, hypothyroidism, thrombocytopenia, osteoarthritis, breat cancer, GERD, frequent falls, and w/ no significant PPHx, no prior IPP admissions, no prior hx of outpatient psychiatry or therapy, no prior hx of psychotropic medications, no reported hx of substance misuse, who is admitted to the medical floors for recent fall without LOC or HT. Psychiatry was consulted for evaluation of depression due to reports of patient endorsing low mood.    Patient states "Corin been depressed some of the time, it'll get you down when you are sick". Patient was alert and oriented to self, location, but had difficulty with time and situation. Patient was calm and pleasant with interview, stating that she has had many recent medical issues and that it saddens her to be in and out of the hospital and having to rely on her elderly  so much for daily living and activities. patient reports that she never had thoughts of wanting to harm herself or end her life, but that she does get safe and cry at times, and she notably teared up when talking about her daughter who is blind and now lives in a facility, stating "I wish I could help her but I cant". 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 Patient reports she feels safe at home and that she does not need connection to therapy at this time. Patient also not interested in medication treatment for depression, preferring to rely on her social network.     In regards to prior psychiatric history, patient reports that this is her first contact with psychiatry and she has not had a therapist in the past. patient also denies ever using psychotropic medications.    On psychiatric ROS, patient is currently denying thoughts of suicide but reporting their mood to be at times depressed, primarily in the context of ongoing medical problems. Patient is denying thoughts of wanting to harm other people or homicide. Patient is sleeping okay but has poor appetite. Patient is not endorsing anxiety at this time. Patient is not endorsing symptoms of isael or bipolar disorder at this time. Patient is not endorsing symptoms of PTSD at this time. Patient is not endorsing symptoms of psychosis at this time, specifically denying audio or visual hallucinations, and feelings of paranoia or distressing thoughts.

## 2025-05-25 VITALS
OXYGEN SATURATION: 97 % | RESPIRATION RATE: 18 BRPM | DIASTOLIC BLOOD PRESSURE: 54 MMHG | TEMPERATURE: 97 F | HEART RATE: 54 BPM | SYSTOLIC BLOOD PRESSURE: 108 MMHG

## 2025-05-25 DIAGNOSIS — Z87.19 PERSONAL HISTORY OF OTHER DISEASES OF THE DIGESTIVE SYSTEM: ICD-10-CM

## 2025-05-25 DIAGNOSIS — R31.0 GROSS HEMATURIA: ICD-10-CM

## 2025-05-25 DIAGNOSIS — K52.9 NONINFECTIVE GASTROENTERITIS AND COLITIS, UNSPECIFIED: ICD-10-CM

## 2025-05-25 DIAGNOSIS — N39.0 URINARY TRACT INFECTION, SITE NOT SPECIFIED: ICD-10-CM

## 2025-05-25 LAB
ANION GAP SERPL CALC-SCNC: 11 MMOL/L — SIGNIFICANT CHANGE UP (ref 7–14)
BASOPHILS # BLD AUTO: 0.02 K/UL — SIGNIFICANT CHANGE UP (ref 0–0.2)
BASOPHILS NFR BLD AUTO: 0.4 % — SIGNIFICANT CHANGE UP (ref 0–1)
BLD GP AB SCN SERPL QL: SIGNIFICANT CHANGE UP
BUN SERPL-MCNC: 51 MG/DL — HIGH (ref 10–20)
CALCIUM SERPL-MCNC: 8.7 MG/DL — SIGNIFICANT CHANGE UP (ref 8.4–10.5)
CHLORIDE SERPL-SCNC: 99 MMOL/L — SIGNIFICANT CHANGE UP (ref 98–110)
CO2 SERPL-SCNC: 23 MMOL/L — SIGNIFICANT CHANGE UP (ref 17–32)
CREAT SERPL-MCNC: 1.6 MG/DL — HIGH (ref 0.7–1.5)
EGFR: 31 ML/MIN/1.73M2 — LOW
EGFR: 31 ML/MIN/1.73M2 — LOW
EOSINOPHIL # BLD AUTO: 0.27 K/UL — SIGNIFICANT CHANGE UP (ref 0–0.7)
EOSINOPHIL NFR BLD AUTO: 4.8 % — SIGNIFICANT CHANGE UP (ref 0–8)
GLUCOSE BLDC GLUCOMTR-MCNC: 154 MG/DL — HIGH (ref 70–99)
GLUCOSE SERPL-MCNC: 132 MG/DL — HIGH (ref 70–99)
HCT VFR BLD CALC: 32 % — LOW (ref 37–47)
HGB BLD-MCNC: 10.1 G/DL — LOW (ref 12–16)
HIV 1+2 AB+HIV1 P24 AG SERPL QL IA: SIGNIFICANT CHANGE UP
IMM GRANULOCYTES NFR BLD AUTO: 0.4 % — HIGH (ref 0.1–0.3)
LYMPHOCYTES # BLD AUTO: 0.74 K/UL — LOW (ref 1.2–3.4)
LYMPHOCYTES # BLD AUTO: 13.1 % — LOW (ref 20.5–51.1)
MCHC RBC-ENTMCNC: 30.3 PG — SIGNIFICANT CHANGE UP (ref 27–31)
MCHC RBC-ENTMCNC: 31.6 G/DL — LOW (ref 32–37)
MCV RBC AUTO: 96.1 FL — SIGNIFICANT CHANGE UP (ref 81–99)
MONOCYTES # BLD AUTO: 0.52 K/UL — SIGNIFICANT CHANGE UP (ref 0.1–0.6)
MONOCYTES NFR BLD AUTO: 9.2 % — SIGNIFICANT CHANGE UP (ref 1.7–9.3)
NEUTROPHILS # BLD AUTO: 4.09 K/UL — SIGNIFICANT CHANGE UP (ref 1.4–6.5)
NEUTROPHILS NFR BLD AUTO: 72.1 % — SIGNIFICANT CHANGE UP (ref 42.2–75.2)
NRBC BLD AUTO-RTO: 0 /100 WBCS — SIGNIFICANT CHANGE UP (ref 0–0)
PLATELET # BLD AUTO: 66 K/UL — LOW (ref 130–400)
PMV BLD: 9.5 FL — SIGNIFICANT CHANGE UP (ref 7.4–10.4)
POTASSIUM SERPL-MCNC: 4 MMOL/L — SIGNIFICANT CHANGE UP (ref 3.5–5)
POTASSIUM SERPL-SCNC: 4 MMOL/L — SIGNIFICANT CHANGE UP (ref 3.5–5)
RBC # BLD: 3.33 M/UL — LOW (ref 4.2–5.4)
RBC # FLD: 16.8 % — HIGH (ref 11.5–14.5)
SODIUM SERPL-SCNC: 133 MMOL/L — LOW (ref 135–146)
WBC # BLD: 5.66 K/UL — SIGNIFICANT CHANGE UP (ref 4.8–10.8)
WBC # FLD AUTO: 5.66 K/UL — SIGNIFICANT CHANGE UP (ref 4.8–10.8)

## 2025-05-25 PROCEDURE — 99239 HOSP IP/OBS DSCHRG MGMT >30: CPT

## 2025-05-25 RX ORDER — AMOXICILLIN AND CLAVULANATE POTASSIUM 500; 125 MG/1; MG/1
1 TABLET, FILM COATED ORAL
Qty: 6 | Refills: 0
Start: 2025-05-25 | End: 2025-05-27

## 2025-05-25 RX ORDER — TRAMADOL HYDROCHLORIDE 50 MG/1
1 TABLET, FILM COATED ORAL
Qty: 0 | Refills: 0 | DISCHARGE
Start: 2025-05-25

## 2025-05-25 RX ADMIN — LACTULOSE 20 GRAM(S): 10 SOLUTION ORAL at 06:06

## 2025-05-25 RX ADMIN — Medication 37.5 MICROGRAM(S): at 06:07

## 2025-05-25 RX ADMIN — HEPARIN SODIUM 5000 UNIT(S): 1000 INJECTION INTRAVENOUS; SUBCUTANEOUS at 06:11

## 2025-05-25 RX ADMIN — Medication 100 MILLIGRAM(S): at 06:06

## 2025-05-25 RX ADMIN — LACTULOSE 20 GRAM(S): 10 SOLUTION ORAL at 13:10

## 2025-05-25 RX ADMIN — Medication 500 MILLIGRAM(S): at 06:07

## 2025-05-25 RX ADMIN — CEFTRIAXONE 100 MILLIGRAM(S): 500 INJECTION, POWDER, FOR SOLUTION INTRAMUSCULAR; INTRAVENOUS at 11:46

## 2025-05-25 RX ADMIN — Medication 40 MILLIGRAM(S): at 08:12

## 2025-05-25 NOTE — DISCHARGE NOTE PROVIDER - HOSPITAL COURSE
84F w/PMHX of cirrhosis, hypothyroidism, thrombocytopenia, fatty liver, GERD, breast cancer, osteoarthritis presents to ED s/p fall, admitted to medicine service for further management.    #Generalized weakness   #Mechanical fall  #H/O Frequent falls  -trauma workup negative for Fx  -supportive care  -PT EVAL - rehab  - needs 3 midnights     # Hematuria 5/24 AM  - resolved   # Acute on chronic blood loss anemia - stable   # NORA  - likely pre-renal  - resolved   # UTI   - denies urinary frequency/ dysuria   - BUN/ Cr 27   - Hb 10.5 > 9.3   - active type and screen   - RBUS - no hydro   - resume lasix   - avoid nephrotoxic medications   - ID EVAL Appreciated- Can D/C on PO Augmentin 500-125 BID for total of 5 days from 5/23.    # Colitis on CTAP  - on Rocephin / Flagyl     # Abnormal EKG - poor quality with artifact reading as afib though clearly with lots of artifact and not accurate, repeat with NSR     #H/O Cryptogenic Liver Cirrhosis  #H/O Pancreatic duct dilation   #Pulmonary Hypertension with Chronic Hypoxemia on 2L home O2   - c/w Lactulose TID, titrate 2- 3 BM QD  - c/w lasix  /Aldactone/  Propranolol  - hepatology saw patient last admission - recommend OP follow up for chronic management of cirrhosis EGD, and further workup for double duct sign evident on RUQ    #Hypothyroid  - synthroid - TSH 4.95 5/14/25    #H/O GERD - c/w PPI    attending attestation:  patient seen and examined on day of discharge 5/24   labs and vitals reviewed  patient has no complaints  plan of care discussed with patient/family in agreement and understanding

## 2025-05-25 NOTE — DISCHARGE NOTE PROVIDER - CARE PROVIDER_API CALL
Jr Chavez  Internal Medicine  [address] [place] [address]  Phone: [phone]  Fax: [phone]  Follow Up Time:     Joselin Reeder  Nephrology  [address] [facility] [facility] [address]  [place] [address]  Phone: [phone]  Fax: [phone]  Follow Up Time:     Jean Rivas  Internal Medicine  [address] [place] [address]  Phone: [phone]  Fax: [phone]  Follow Up Time:

## 2025-05-25 NOTE — DISCHARGE NOTE NURSING/CASE MANAGEMENT/SOCIAL WORK - PATIENT PORTAL LINK FT
You can access the FollowMyHealth Patient Portal offered by [facility] by registering at the following website: [email or web address]. By joining [employer]’s FollowMyHealth portal, you will also be able to view your health information using other applications (apps) compatible with our system.

## 2025-05-25 NOTE — DISCHARGE NOTE PROVIDER - NSDCCPCAREPLAN_GEN_ALL_CORE_FT
PRINCIPAL DISCHARGE DIAGNOSIS  Diagnosis: Frequent falls  Assessment and Plan of Treatment: trauma work up negative   supportive care      SECONDARY DISCHARGE DIAGNOSES  Diagnosis: Kidney failure, acute  Assessment and Plan of Treatment: resolved after holding diuretics and giving ivf  follow up with nephrology outpatient

## 2025-05-25 NOTE — DISCHARGE NOTE PROVIDER - PROVIDER TOKENS
PROVIDER:[TOKEN:[[id number]]],PROVIDER:[TOKEN:[[id number]]],PROVIDER:[TOKEN:[[id number]]]

## 2025-05-25 NOTE — CHART NOTE - NSCHARTNOTEFT_GEN_A_CORE
RN reported patient w/ hematuria  Patient seen at bedside. Reports noted dark urine this morning. Denies any other urinary sxs including dysuria, frequency, urgency, abdominal pain. Hematuria noted in the bathroom.     - UA from 05/18 noted w/ moderate blood and RBCs  - CT AP done yesterday:   IMPRESSION:  Cirrhotic liver with sequela of portal hypertension.  Right colon wall thickening may be on the basis of portal colopathy or   represent colitis. Recommend clinical correlation.  Dense material in the bladder lumen, new from prior CT. Correlate   clinically including with urinalysis.  Pulmonary nodules measuring up to 5 mm. Follow-up per oncology protocol   in this patient with reported history of breast cancer.  See body of the report for multiple additional findings.  - AM labs noted w/ worsening renal function     Plan:  - Will order STAT UA w/ urine studies  - Renal/Bladder US   - IVF @75ml/hr x12 hrs  - Trend renal function, Avoid Nephrotoxic agents
spoke to [facility] daughter in law and son Alexis , updates given , patient to be discharged
pt c/o tramadol for pain is not working  and very short acting. PT ASKING FOR STRONGER MEDS.  plan:  percocet

## 2025-05-25 NOTE — DISCHARGE NOTE PROVIDER - ATTENDING DISCHARGE PHYSICAL EXAMINATION:
PHYSICAL EXAM:  GENERAL: NAD, lying in bed comfortably  HEAD:  Atraumatic, Normocephalic  EYES: EOMI, PERRLA, conjunctiva and sclera clear  ENT: Moist mucous membranes  NECK: Supple, No JVD  CHEST/LUNG: bl breath sounds   HEART: Regular rate and rhythm; No murmurs, rubs, or gallops  ABDOMEN: Bowel sounds present; Soft, Nontender, Nondistended. No hepatomegally  EXTREMITIES:  warm   NERVOUS SYSTEM:  Alert & Oriented X3, speech clear. No deficits

## 2025-05-25 NOTE — DISCHARGE NOTE PROVIDER - NSDCMRMEDTOKEN_GEN_ALL_CORE_FT
amoxicillin-clavulanate 875 mg-125 mg oral tablet: 1 tab(s) orally 2 times a day 5/27 end date  Asperflex 4% topical film: Apply topically to affected area once a day Apply to area of neck pain  Kristalose 10 g oral powder for reconstitution: 1 packet(s) orally 3 times a day Take 3 times per day, ensue you have 3 bowel movements per day. If you have had 3 bowel movements, you do not need to take more of this medication until the next day  Lasix 40 mg oral tablet: 1 tab(s) orally once a day  levothyroxine 37.5 mcg (0.0375 mg) oral capsule: 1 cap(s) orally once a day  pantoprazole 40 mg oral delayed release tablet: 1 tab(s) orally once a day (before a meal)  propranolol 10 mg oral tablet: 1 tab(s) orally 2 times a day  spironolactone 100 mg oral tablet: 1 tab(s) orally once a day  traMADol 50 mg oral tablet: 1 tab(s) orally every 8 hours As needed Moderate Pain (4 - 6)

## 2025-05-25 NOTE — DISCHARGE NOTE PROVIDER - CARE PROVIDERS DIRECT ADDRESSES
[email or web address]

## 2025-05-25 NOTE — DISCHARGE NOTE NURSING/CASE MANAGEMENT/SOCIAL WORK - FINANCIAL ASSISTANCE
[facility] provides services at a reduced cost to those who are determined to be eligible through [facility]’s financial assistance program. Information regarding [facility]’s financial assistance program can be found by going to [email or web address] or by calling [phone].

## 2025-05-25 NOTE — DISCHARGE NOTE PROVIDER - NSDCFUSCHEDAPPT_GEN_ALL_CORE_FT
Diego Martinez  [facility] Physician [facility]  PULMMED [address]  Scheduled Appointment: 05/27/2025    [facility]  GASTRO Doc Off [address]  Scheduled Appointment: 06/06/2025

## 2025-05-26 LAB — CRYPTOC AG FLD QL: NEGATIVE — SIGNIFICANT CHANGE UP

## 2025-06-05 DIAGNOSIS — R94.31 ABNORMAL ELECTROCARDIOGRAM [ECG] [EKG]: ICD-10-CM

## 2025-06-05 DIAGNOSIS — F43.20 ADJUSTMENT DISORDER, UNSPECIFIED: ICD-10-CM

## 2025-06-05 DIAGNOSIS — K86.89 OTHER SPECIFIED DISEASES OF PANCREAS: ICD-10-CM

## 2025-06-05 DIAGNOSIS — N17.9 ACUTE KIDNEY FAILURE, UNSPECIFIED: ICD-10-CM

## 2025-06-05 DIAGNOSIS — N39.0 URINARY TRACT INFECTION, SITE NOT SPECIFIED: ICD-10-CM

## 2025-06-05 DIAGNOSIS — Z88.1 ALLERGY STATUS TO OTHER ANTIBIOTIC AGENTS: ICD-10-CM

## 2025-06-05 DIAGNOSIS — R53.1 WEAKNESS: ICD-10-CM

## 2025-06-05 DIAGNOSIS — R29.6 REPEATED FALLS: ICD-10-CM

## 2025-06-05 DIAGNOSIS — K21.9 GASTRO-ESOPHAGEAL REFLUX DISEASE WITHOUT ESOPHAGITIS: ICD-10-CM

## 2025-06-05 DIAGNOSIS — F32.9 MAJOR DEPRESSIVE DISORDER, SINGLE EPISODE, UNSPECIFIED: ICD-10-CM

## 2025-06-05 DIAGNOSIS — C50.919 MALIGNANT NEOPLASM OF UNSPECIFIED SITE OF UNSPECIFIED FEMALE BREAST: ICD-10-CM

## 2025-06-05 DIAGNOSIS — I27.20 PULMONARY HYPERTENSION, UNSPECIFIED: ICD-10-CM

## 2025-06-05 DIAGNOSIS — Z79.890 HORMONE REPLACEMENT THERAPY: ICD-10-CM

## 2025-06-05 DIAGNOSIS — R31.9 HEMATURIA, UNSPECIFIED: ICD-10-CM

## 2025-06-05 DIAGNOSIS — E03.9 HYPOTHYROIDISM, UNSPECIFIED: ICD-10-CM

## 2025-06-05 DIAGNOSIS — K74.69 OTHER CIRRHOSIS OF LIVER: ICD-10-CM

## 2025-06-05 DIAGNOSIS — K52.9 NONINFECTIVE GASTROENTERITIS AND COLITIS, UNSPECIFIED: ICD-10-CM

## 2025-06-05 DIAGNOSIS — Z88.6 ALLERGY STATUS TO ANALGESIC AGENT: ICD-10-CM

## 2025-06-05 DIAGNOSIS — Z88.8 ALLERGY STATUS TO OTHER DRUGS, MEDICAMENTS AND BIOLOGICAL SUBSTANCES: ICD-10-CM

## 2025-06-05 DIAGNOSIS — D84.89 OTHER IMMUNODEFICIENCIES: ICD-10-CM

## 2025-06-06 ENCOUNTER — APPOINTMENT (OUTPATIENT)
Dept: GASTROENTEROLOGY | Facility: CLINIC | Age: 85
End: 2025-06-06

## 2025-07-01 ENCOUNTER — INPATIENT (INPATIENT)
Facility: HOSPITAL | Age: 85
LOS: 1 days | Discharge: HOME | DRG: 87 | End: 2025-07-03
Attending: STUDENT IN AN ORGANIZED HEALTH CARE EDUCATION/TRAINING PROGRAM | Admitting: STUDENT IN AN ORGANIZED HEALTH CARE EDUCATION/TRAINING PROGRAM
Payer: MEDICARE

## 2025-07-01 VITALS
RESPIRATION RATE: 20 BRPM | WEIGHT: 130.07 LBS | DIASTOLIC BLOOD PRESSURE: 85 MMHG | HEART RATE: 99 BPM | HEIGHT: 62 IN | SYSTOLIC BLOOD PRESSURE: 124 MMHG | TEMPERATURE: 97 F | OXYGEN SATURATION: 100 %

## 2025-07-01 DIAGNOSIS — Z90.49 ACQUIRED ABSENCE OF OTHER SPECIFIED PARTS OF DIGESTIVE TRACT: Chronic | ICD-10-CM

## 2025-07-01 DIAGNOSIS — Z98.890 OTHER SPECIFIED POSTPROCEDURAL STATES: Chronic | ICD-10-CM

## 2025-07-01 LAB
ALBUMIN SERPL ELPH-MCNC: 2.8 G/DL — LOW (ref 3.5–5.2)
ALP SERPL-CCNC: 129 U/L — HIGH (ref 30–115)
ALT FLD-CCNC: 20 U/L — SIGNIFICANT CHANGE UP (ref 0–41)
ANION GAP SERPL CALC-SCNC: 13 MMOL/L — SIGNIFICANT CHANGE UP (ref 7–14)
APPEARANCE UR: CLEAR — SIGNIFICANT CHANGE UP
APTT BLD: 40.7 SEC — HIGH (ref 27–39.2)
AST SERPL-CCNC: 28 U/L — SIGNIFICANT CHANGE UP (ref 0–41)
BASOPHILS # BLD AUTO: 0.02 K/UL — SIGNIFICANT CHANGE UP (ref 0–0.2)
BASOPHILS NFR BLD AUTO: 0.4 % — SIGNIFICANT CHANGE UP (ref 0–2)
BILIRUB SERPL-MCNC: 4 MG/DL — HIGH (ref 0.2–1.2)
BILIRUB UR-MCNC: NEGATIVE — SIGNIFICANT CHANGE UP
BUN SERPL-MCNC: 55 MG/DL — HIGH (ref 10–20)
CALCIUM SERPL-MCNC: 8.5 MG/DL — SIGNIFICANT CHANGE UP (ref 8.4–10.5)
CHLORIDE SERPL-SCNC: 101 MMOL/L — SIGNIFICANT CHANGE UP (ref 98–110)
CK SERPL-CCNC: 30 U/L — SIGNIFICANT CHANGE UP (ref 0–225)
CO2 SERPL-SCNC: 20 MMOL/L — SIGNIFICANT CHANGE UP (ref 17–32)
COLOR SPEC: YELLOW — SIGNIFICANT CHANGE UP
CREAT SERPL-MCNC: 1.4 MG/DL — SIGNIFICANT CHANGE UP (ref 0.7–1.5)
DIFF PNL FLD: ABNORMAL
EGFR: 37 ML/MIN/1.73M2 — LOW
EGFR: 37 ML/MIN/1.73M2 — LOW
EOSINOPHIL # BLD AUTO: 0.34 K/UL — SIGNIFICANT CHANGE UP (ref 0–0.5)
EOSINOPHIL NFR BLD AUTO: 6.4 % — HIGH (ref 0–6)
GLUCOSE BLDC GLUCOMTR-MCNC: 181 MG/DL — HIGH (ref 70–99)
GLUCOSE BLDC GLUCOMTR-MCNC: 194 MG/DL — HIGH (ref 70–99)
GLUCOSE SERPL-MCNC: 158 MG/DL — HIGH (ref 70–99)
GLUCOSE UR QL: NEGATIVE MG/DL — SIGNIFICANT CHANGE UP
HCT VFR BLD CALC: 32.6 % — LOW (ref 34.5–45)
HGB BLD-MCNC: 10.1 G/DL — LOW (ref 11.5–15.5)
IMM GRANULOCYTES # BLD AUTO: 0.03 K/UL — SIGNIFICANT CHANGE UP (ref 0–0.07)
IMM GRANULOCYTES NFR BLD AUTO: 0.6 % — SIGNIFICANT CHANGE UP (ref 0–0.9)
IMMATURE PLATELET FRACTION #: 1.1 K/UL — LOW (ref 4.7–11.1)
IMMATURE PLATELET FRACTION %: 1.5 % — LOW (ref 1.6–4.9)
INR BLD: 1.4 RATIO — HIGH (ref 0.65–1.3)
KETONES UR QL: NEGATIVE MG/DL — SIGNIFICANT CHANGE UP
LACTATE SERPL-SCNC: 1.9 MMOL/L — SIGNIFICANT CHANGE UP (ref 0.7–2)
LEUKOCYTE ESTERASE UR-ACNC: NEGATIVE — SIGNIFICANT CHANGE UP
LIDOCAIN IGE QN: 140 U/L — HIGH (ref 7–60)
LYMPHOCYTES # BLD AUTO: 0.77 K/UL — LOW (ref 1–3.3)
LYMPHOCYTES NFR BLD AUTO: 14.5 % — SIGNIFICANT CHANGE UP (ref 13–44)
MCHC RBC-ENTMCNC: 28.8 PG — SIGNIFICANT CHANGE UP (ref 27–34)
MCHC RBC-ENTMCNC: 31 G/DL — LOW (ref 32–36)
MCV RBC AUTO: 92.9 FL — SIGNIFICANT CHANGE UP (ref 80–100)
MONOCYTES # BLD AUTO: 0.58 K/UL — SIGNIFICANT CHANGE UP (ref 0–0.9)
MONOCYTES NFR BLD AUTO: 10.9 % — SIGNIFICANT CHANGE UP (ref 2–14)
NEUTROPHILS # BLD AUTO: 3.56 K/UL — SIGNIFICANT CHANGE UP (ref 1.8–7.4)
NEUTROPHILS NFR BLD AUTO: 67.2 % — SIGNIFICANT CHANGE UP (ref 43–77)
NITRITE UR-MCNC: NEGATIVE — SIGNIFICANT CHANGE UP
NRBC # BLD AUTO: 0 K/UL — SIGNIFICANT CHANGE UP (ref 0–0)
NRBC # FLD: 0 K/UL — SIGNIFICANT CHANGE UP (ref 0–0)
NRBC BLD AUTO-RTO: 0 /100 WBCS — SIGNIFICANT CHANGE UP (ref 0–0)
PH UR: 5.5 — SIGNIFICANT CHANGE UP (ref 5–8)
PLATELET # BLD AUTO: 73 K/UL — LOW (ref 150–400)
PMV BLD: 9.5 FL — SIGNIFICANT CHANGE UP (ref 7–13)
POTASSIUM SERPL-MCNC: 4.6 MMOL/L — SIGNIFICANT CHANGE UP (ref 3.5–5)
POTASSIUM SERPL-SCNC: 4.6 MMOL/L — SIGNIFICANT CHANGE UP (ref 3.5–5)
PROT SERPL-MCNC: 6.2 G/DL — SIGNIFICANT CHANGE UP (ref 6–8)
PROT UR-MCNC: NEGATIVE MG/DL — SIGNIFICANT CHANGE UP
PROTHROM AB SERPL-ACNC: 16.6 SEC — HIGH (ref 9.95–12.87)
RBC # BLD: 3.51 M/UL — LOW (ref 3.8–5.2)
RBC # FLD: 17.3 % — HIGH (ref 10.3–14.5)
SODIUM SERPL-SCNC: 134 MMOL/L — LOW (ref 135–146)
SP GR SPEC: 1.01 — SIGNIFICANT CHANGE UP (ref 1–1.03)
UROBILINOGEN FLD QL: 0.2 MG/DL — SIGNIFICANT CHANGE UP (ref 0.2–1)
WBC # BLD: 5.3 K/UL — SIGNIFICANT CHANGE UP (ref 3.8–10.5)
WBC # FLD AUTO: 5.3 K/UL — SIGNIFICANT CHANGE UP (ref 3.8–10.5)

## 2025-07-01 PROCEDURE — 72125 CT NECK SPINE W/O DYE: CPT | Mod: 26

## 2025-07-01 PROCEDURE — 72170 X-RAY EXAM OF PELVIS: CPT | Mod: 26

## 2025-07-01 PROCEDURE — 99285 EMERGENCY DEPT VISIT HI MDM: CPT | Mod: FS

## 2025-07-01 PROCEDURE — 74177 CT ABD & PELVIS W/CONTRAST: CPT | Mod: 26

## 2025-07-01 PROCEDURE — 70450 CT HEAD/BRAIN W/O DYE: CPT | Mod: 26

## 2025-07-01 PROCEDURE — 70450 CT HEAD/BRAIN W/O DYE: CPT | Mod: 26,77

## 2025-07-01 PROCEDURE — 71045 X-RAY EXAM CHEST 1 VIEW: CPT | Mod: 26

## 2025-07-01 PROCEDURE — 71260 CT THORAX DX C+: CPT | Mod: 26

## 2025-07-01 NOTE — ED PROVIDER NOTE - ATTENDING APP SHARED VISIT CONTRIBUTION OF CARE
84-year-old female PMH HTN, hypothyroidism, peripheral edema, cirrhosis cryptogenic/ secondary to CURRIE, frequent falls  pt presents from NH for fall.  pt states she was waiting to be cleaned. pt states she tried to walk to the bathroom and had a mechanical fall. pt fell backwards and hit head/neck on ground. no loc/n/v/hip pain/leg pain/UE pain. pt was otherwise in USOH today.  no preceding cp, sob, palpitations.    vss  gen- NAD, aaox3  card-rrr  lungs-ctab, no wheezing or rhonchi  abd-sntnd, no guarding or rebound  neuro- full str/sensation, cn ii-xii grossly intact, normal coordination   Spine- no midline t/l spine ttp, + mid c/s ttp  hent- occipital hematoma

## 2025-07-01 NOTE — CONSULT NOTE ADULT - SUBJECTIVE AND OBJECTIVE BOX
Neurosurgery  Consult    Patient is a 84y old  Female who presents with a chief complaint of TSAH    HPI:  84F w/PMHX of cirrhosis, hypothyroidism, thrombocytopenia, fatty liver, GERD, breast cancer, osteoarthritis presents to ED s/p mechanical fall today striking back or head and back. no LOC. no sob, chest pain or abdominal pain . patient c/o weakness to lower legs. no headache , vomiting, visual changes. no bleeding wounds .    Interval Hx: pt is a NH resident was waiting for help to transfer upright; pt attempted to move herself and fell forward. Pt states she hit her head - no LOC noted. Pt has large scalp hematoma and CT shows TSAH in setting of plts of 70k. Pt denies nausea/vomiting or double vision.       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, unspecified estrogen receptor status  Blister of right lower leg  Osteoarthritis  Thrombocytopenia  Hypothyroidism  Cirrhosis      History of surgery  LEFT BREAST LUMPECTOMY  TUBIAL LIGATION  CHOLECYSTECTOMY  RIGHT & LEFT TKR      History of cholecystectomy          FAMILY HISTORY:  Family history of breast cancer in mother (Mother)    Family history of Parkinson disease (Father)        Social History: (-) x 3    Allergies    Levaquin (Hives; Rash)  aspirin (Other)  predniSONE (Swelling)  Cipro (Hives; Rash)    Intolerances        MEDICATIONS  (STANDING):    MEDICATIONS  (PRN):      Review of systems:    Constitutional: No fever, weight loss or fatigue    Eyes: No eye pain or discharge  ENMT:  No difficulty hearing; No sinus or throat pain  Neck: No pain or stiffness  Respiratory: No cough, wheezing, chills or hemoptysis  Cardiovascular: No chest pain, palpitations, shortness of breath, dyspnea on exertion  Gastrointestinal: No abdominal pain, nausea, vomiting or hematemesis; No diarrhea or constipation.   Genitourinary: No dysuria, frequency, hematuria or incontinence  Neurological: As per HPI  Skin: No rashes or lesions   Endocrine: No heat or cold intolerance; No hair loss  Musculoskeletal: No joint pain or swelling  Psychiatric: No depression, anxiety, mood swings  Heme/Lymph: No easy bruising or bleeding gums    Vital Signs Last 24 Hrs  T(C): 36.4 (2025 19:48), Max: 36.4 (2025 19:48)  T(F): 97.6 (2025 19:48), Max: 97.6 (2025 19:48)  HR: 64 (2025 19:48) (64 - 99)  BP: 120/62 (2025 19:48) (120/62 - 127/67)  BP(mean): --  RR: 20 (2025 19:48) (20 - 20)  SpO2: 96% (2025 19:48) (96% - 100%)    Parameters below as of 2025 19:48  Patient On (Oxygen Delivery Method): nasal cannula  O2 Flow (L/min): 3      Neurologic Examination:  General:  Appearance is consistent with chronologic age.  No abnormal facies.   General: The patient is oriented to person, place, time and date.  Recent and remote memory intact. comprehension and naming.  Nondysarthric.    Cranial nerves: intact VA, VFF.  EOMI w/o nystagmus, skew or reported double vision.  PERRL.  No ptosis/weakness of eyelid closure.  Facial sensation is normal with normal bite.  No facial asymmetry.  Hearing grossly intact b/l.  Palate elevates midline.  Tongue midline.  Motor examination:   Normal tone, bulk and range of motion.  No tenderness, twitching, tremors or involuntary movements.  Formal Muscle Strength Testing: (MRC grade R/L) 5/5 UE; 5/5 LE.  No observable drift.  Sensory examination:   Intact to light touch and  in all extremities.      Labs:   CBC Full  -  ( 2025 16:25 )  WBC Count : 5.30 K/uL  RBC Count : 3.51 M/uL  Hemoglobin : 10.1 g/dL  Hematocrit : 32.6 %  Platelet Count - Automated : 73 K/uL  Mean Cell Volume : 92.9 fl  Mean Cell Hemoglobin : 28.8 pg  Mean Cell Hemoglobin Concentration : 31.0 g/dL  Auto Neutrophil # : 3.56 K/uL  Auto Lymphocyte # : 0.77 K/uL  Auto Monocyte # : 0.58 K/uL  Auto Eosinophil # : 0.34 K/uL  Auto Basophil # : 0.02 K/uL  Auto Neutrophil % : 67.2 %  Auto Lymphocyte % : 14.5 %  Auto Monocyte % : 10.9 %  Auto Eosinophil % : 6.4 %  Auto Basophil % : 0.4 %        134[L]  |  101  |  55[H]  ----------------------------<  158[H]  4.6   |  20  |  1.4    Ca    8.5      2025 16:25    TPro  6.2  /  Alb  2.8[L]  /  TBili  4.0[H]  /  DBili  x   /  AST  28  /  ALT  20  /  AlkPhos  129[H]  07-01    LIVER FUNCTIONS - ( 2025 16:25 )  Alb: 2.8 g/dL / Pro: 6.2 g/dL / ALK PHOS: 129 U/L / ALT: 20 U/L / AST: 28 U/L / GGT: x           PT/INR - ( 2025 16:25 )   PT: 16.60 sec;   INR: 1.40 ratio         PTT - ( 2025 16:25 )  PTT:40.7 sec  Urinalysis Basic - ( 2025 17:35 )    Color: Yellow / Appearance: Clear / S.014 / pH: x  Gluc: x / Ketone: x  / Bili: Negative / Urobili: 0.2 mg/dL   Blood: x / Protein: Negative mg/dL / Nitrite: Negative   Leuk Esterase: Negative / RBC: 15 /HPF / WBC 1 /HPF   Sq Epi: x / Non Sq Epi: x / Bacteria: Moderate /HPF          Neuroimaging:  NCHCT: CT Head No Cont:   ACC: [id number] EXAM:  CT CERVICAL SPINE   ORDERED BY: GIOVANI BABCOCK     ACC: [id number] EXAM:  CT BRAIN   ORDERED BY: GIOVANI BABCOCK     PROCEDURE DATE:  2025          INTERPRETATION:  CLINICAL INDICATION: Trauma code. Post fall.    TECHNIQUE: CT of the head and cervical spine was performed without the   administration of intravenous contrast.    COMPARISON: CT head and C-spine 2025.    FINDINGS:    CT HEAD:    There is suggestion of trace hyperdensity in the posterior right   temporoparietal sulci. For example series 2 image 38. There is no   associated mass effect or midline shift.    There is stable prominence of the sulci, sylvian fissures, and   ventricles, reflecting mild diffuse parenchymal volume loss.    Status post left cataract surgery. The visualized right intraorbital   contents are normal. There is scattered paranasal sinus mucosal   thickening noted. The mastoid air cells are clear. The visualized soft   tissues and osseous structures appear normal.      CTCERVICAL SPINE:    The alignment is within normal limits. There is diffuse osteopenia.    There is no evidence of acute fracture, compression deformity or facet   subluxation of the cervical spine.    The atlantoaxial relationships are maintained. The posterior elements are   intact. There is no significant prevertebral soft tissue swelling. The   visualized paraspinal soft tissues are unremarkable.    There is multilevel endplate degenerative changes with marginal   osteophyte formation, uncovertebral spurring, loss of normal disc space   height as well as facet joint space compartment narrowing.    There is no high-grade spinal canal stenosis. Please note spinal canal   contents are suboptimally evaluated inherent to CT technique.      IMPRESSION:    CT HEAD:  Suggestion of trace acute subarachnoid hemorrhage in the right   temporoparietal region without mass effect or midline shift.    CT CERVICAL SPINE:  No acute cervical fracture or facet subluxation.    Communication: The summary ofabove findings were discussed with readback   confirmation with Dr. Babcock by resident Marion Contreras MD on 2025 at   6:30 PM.    --- End of Report ---    MARION CONTRERAS MD; Resident Radiologist  This document has been electronically signed.  TABATHA BROCK MD; Attending Radiologist  This document has been electronically signed. 2025  8:12PM (25 @ 17:56)    Assessment:  This is a 84y Female with h/o htn, thrombocytopenia,           Plan:   -   25 @ 21:00           Neurosurgery  Consult    Patient is a 84y old  Female who presents with a chief complaint of TSAH    HPI:  84F w/PMHX of cirrhosis, hypothyroidism, thrombocytopenia, fatty liver, GERD, breast cancer, osteoarthritis presents to ED s/p mechanical fall today striking back or head and back. no LOC. no sob, chest pain or abdominal pain . patient c/o weakness to lower legs. no headache , vomiting, visual changes. no bleeding wounds .    Interval Hx: pt is a NH resident was waiting for help to transfer upright; pt attempted to move herself and fell forward. Pt states she hit her head - no LOC noted. Pt has large scalp hematoma and CT shows TSAH in setting of plts of 70k. Pt denies nausea/vomiting or double vision.       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, unspecified estrogen receptor status  Blister of right lower leg  Osteoarthritis  Thrombocytopenia  Hypothyroidism  Cirrhosis      History of surgery  LEFT BREAST LUMPECTOMY  TUBIAL LIGATION  CHOLECYSTECTOMY  RIGHT & LEFT TKR      History of cholecystectomy          FAMILY HISTORY:  Family history of breast cancer in mother (Mother)    Family history of Parkinson disease (Father)        Social History: (-) x 3    Allergies    Levaquin (Hives; Rash)  aspirin (Other)  predniSONE (Swelling)  Cipro (Hives; Rash)    Intolerances        MEDICATIONS  (STANDING):    MEDICATIONS  (PRN):      Review of systems:    Constitutional: No fever, weight loss or fatigue    Eyes: No eye pain or discharge  ENMT:  No difficulty hearing; No sinus or throat pain  Neck: No pain or stiffness  Respiratory: No cough, wheezing, chills or hemoptysis  Cardiovascular: No chest pain, palpitations, shortness of breath, dyspnea on exertion  Gastrointestinal: No abdominal pain, nausea, vomiting or hematemesis; No diarrhea or constipation.   Genitourinary: No dysuria, frequency, hematuria or incontinence  Neurological: As per HPI  Skin: No rashes or lesions   Endocrine: No heat or cold intolerance; No hair loss  Musculoskeletal: No joint pain or swelling  Psychiatric: No depression, anxiety, mood swings  Heme/Lymph: No easy bruising or bleeding gums    Vital Signs Last 24 Hrs  T(C): 36.4 (2025 19:48), Max: 36.4 (2025 19:48)  T(F): 97.6 (2025 19:48), Max: 97.6 (2025 19:48)  HR: 64 (2025 19:48) (64 - 99)  BP: 120/62 (2025 19:48) (120/62 - 127/67)  BP(mean): --  RR: 20 (2025 19:48) (20 - 20)  SpO2: 96% (2025 19:48) (96% - 100%)    Parameters below as of 2025 19:48  Patient On (Oxygen Delivery Method): nasal cannula  O2 Flow (L/min): 3      Neurologic Examination:  General:  Appearance is consistent with chronologic age.  No abnormal facies.   General: The patient is oriented to person, place, time and date.  Recent and remote memory intact. comprehension and naming.  Nondysarthric.    Cranial nerves: intact VA, VFF.  EOMI w/o nystagmus, skew or reported double vision.  PERRL.  No ptosis/weakness of eyelid closure.  Facial sensation is normal with normal bite.  No facial asymmetry.  Hearing grossly intact b/l.  Palate elevates midline.  Tongue midline.  Motor examination:   Normal tone, bulk and range of motion.  No tenderness, twitching, tremors or involuntary movements.  Formal Muscle Strength Testing: (MRC grade R/L) 5/5 UE; 5/5 LE.  No observable drift.  Sensory examination:   Intact to light touch and  in all extremities.      Labs:   CBC Full  -  ( 2025 16:25 )  WBC Count : 5.30 K/uL  RBC Count : 3.51 M/uL  Hemoglobin : 10.1 g/dL  Hematocrit : 32.6 %  Platelet Count - Automated : 73 K/uL  Mean Cell Volume : 92.9 fl  Mean Cell Hemoglobin : 28.8 pg  Mean Cell Hemoglobin Concentration : 31.0 g/dL  Auto Neutrophil # : 3.56 K/uL  Auto Lymphocyte # : 0.77 K/uL  Auto Monocyte # : 0.58 K/uL  Auto Eosinophil # : 0.34 K/uL  Auto Basophil # : 0.02 K/uL  Auto Neutrophil % : 67.2 %  Auto Lymphocyte % : 14.5 %  Auto Monocyte % : 10.9 %  Auto Eosinophil % : 6.4 %  Auto Basophil % : 0.4 %        134[L]  |  101  |  55[H]  ----------------------------<  158[H]  4.6   |  20  |  1.4    Ca    8.5      2025 16:25    TPro  6.2  /  Alb  2.8[L]  /  TBili  4.0[H]  /  DBili  x   /  AST  28  /  ALT  20  /  AlkPhos  129[H]  07-01    LIVER FUNCTIONS - ( 2025 16:25 )  Alb: 2.8 g/dL / Pro: 6.2 g/dL / ALK PHOS: 129 U/L / ALT: 20 U/L / AST: 28 U/L / GGT: x           PT/INR - ( 2025 16:25 )   PT: 16.60 sec;   INR: 1.40 ratio         PTT - ( 2025 16:25 )  PTT:40.7 sec  Urinalysis Basic - ( 2025 17:35 )    Color: Yellow / Appearance: Clear / S.014 / pH: x  Gluc: x / Ketone: x  / Bili: Negative / Urobili: 0.2 mg/dL   Blood: x / Protein: Negative mg/dL / Nitrite: Negative   Leuk Esterase: Negative / RBC: 15 /HPF / WBC 1 /HPF   Sq Epi: x / Non Sq Epi: x / Bacteria: Moderate /HPF          Neuroimaging:  NCHCT: CT Head No Cont:   ACC: [id number] EXAM:  CT CERVICAL SPINE   ORDERED BY: GIOVANI BABCOCK     ACC: [id number] EXAM:  CT BRAIN   ORDERED BY: GIOVANI BABCOCK     PROCEDURE DATE:  2025          INTERPRETATION:  CLINICAL INDICATION: Trauma code. Post fall.    TECHNIQUE: CT of the head and cervical spine was performed without the   administration of intravenous contrast.    COMPARISON: CT head and C-spine 2025.    FINDINGS:    CT HEAD:    There is suggestion of trace hyperdensity in the posterior right   temporoparietal sulci. For example series 2 image 38. There is no   associated mass effect or midline shift.    There is stable prominence of the sulci, sylvian fissures, and   ventricles, reflecting mild diffuse parenchymal volume loss.    Status post left cataract surgery. The visualized right intraorbital   contents are normal. There is scattered paranasal sinus mucosal   thickening noted. The mastoid air cells are clear. The visualized soft   tissues and osseous structures appear normal.      CTCERVICAL SPINE:    The alignment is within normal limits. There is diffuse osteopenia.    There is no evidence of acute fracture, compression deformity or facet   subluxation of the cervical spine.    The atlantoaxial relationships are maintained. The posterior elements are   intact. There is no significant prevertebral soft tissue swelling. The   visualized paraspinal soft tissues are unremarkable.    There is multilevel endplate degenerative changes with marginal   osteophyte formation, uncovertebral spurring, loss of normal disc space   height as well as facet joint space compartment narrowing.    There is no high-grade spinal canal stenosis. Please note spinal canal   contents are suboptimally evaluated inherent to CT technique.      IMPRESSION:    CT HEAD:  Suggestion of trace acute subarachnoid hemorrhage in the right   temporoparietal region without mass effect or midline shift.    CT CERVICAL SPINE:  No acute cervical fracture or facet subluxation.    Communication: The summary ofabove findings were discussed with readback   confirmation with Dr. Babcock by resident Marion Contreras MD on 2025 at   6:30 PM.    --- End of Report ---    MARION CONTRERAS MD; Resident Radiologist  This document has been electronically signed.  TABATHA BROCK MD; Attending Radiologist  This document has been electronically signed. 2025  8:12PM (25 @ 17:56)    Assessment:  This is a 84y Female with h/o htn, thrombocytopenia, Breast Ca in remission presents with TSAH after mechanical fall          Plan:  No acute surgical intervention warranted           Recommend Rpt HCT in 6H           Keep Plts 75-100K          Strict Blood Pressure control below 140          Neuro checks q 4H    Case discussed and films reviewed with Dr Joseph..  -   25 @ 21:00

## 2025-07-01 NOTE — ED PROVIDER NOTE - PROGRESS NOTE DETAILS
Authored by Alexandrea Rivera DO: Patient arrived at [facility]. Radiology reporting small SAH on right side. Trauma bedside. Neurosurgery aware. CO- call from rad that pt has possible brain bleed, transfer process started for pt to [facility] for nsurg/trauma eval. pt accepted by Dr. Rivera Authored by Alexandrea Rivera DO: Trauma team recommending rpt CT head at this now. Will obtain. all incidental findings from CT discussed with son Alexis and copies of results given to him. Authored by Alexandrea Rivera DO: Patient signed out to Dr. Zayas pending rpt ct read, dispo

## 2025-07-01 NOTE — ED ADULT NURSE NOTE - CHIEF COMPLAINT QUOTE
pt BIBA from [facility] for fall, +HT, denies LOC, denies AC use, uses wheelchair at baseline

## 2025-07-01 NOTE — ED ADULT TRIAGE NOTE - CHIEF COMPLAINT QUOTE
pt BIBA from [facility] for fall, +HT, denies LOC, denies AC use, uses wheelchair at baseline

## 2025-07-01 NOTE — ED ADULT NURSE REASSESSMENT NOTE - NS ED NURSE REASSESS COMMENT FT1
Pt BIBA from [facility] for fall at nursing home for trauma consult. C collar in place prior to transfer

## 2025-07-01 NOTE — ED ADULT NURSE NOTE - NSFALLHARMRISKINTERV_ED_ALL_ED
Assistance OOB with selected safe patient handling equipment if applicable/Assistance with ambulation/Communicate risk of Fall with Harm to all staff, patient, and family/Monitor gait and stability/Monitor for mental status changes and reorient to person, place, and time, as needed/Move patient closer to nursing station/within visual sight of ED staff/Provide visual cue: red socks, yellow wristband, yellow gown, etc/Reinforce activity limits and safety measures with patient and family/Bed in lowest position, wheels locked, appropriate side rails in place/Call bell, personal items and telephone in reach/Instruct patient to call for assistance before getting out of bed/chair/stretcher/Non-slip footwear applied when patient is off stretcher/[place] to call system/Physically safe environment - no spills, clutter or unnecessary equipment/Purposeful Proactive Rounding/Room/bathroom lighting operational, light cord in reach

## 2025-07-01 NOTE — ED ADULT NURSE NOTE - NSSUHOSCREENINGYN_ED_ALL_ED
Discussion/Summary   The PSA is normal        Verified Results  PSA - PROSTATE SPECIFIC AG [id number] 12:30PM [facility]ILDS     Test Name Result Flag Reference   PROSTATE SPECIFIC AG 2.47 ng/ml  <4.01   SUGGESTED AGE SPECIFIC REFERENCE RANGES     AGE                NG/ML  40-49              0. 01-2.50  50-59              0. 01-3.50  60-69              0. 01-4.50  70-79              0. 01-6.50     REFERENCE: JOURNAL OF UROLOGY 155, [id number]     Herman Chemiluminescence
Yes - the patient is able to be screened

## 2025-07-01 NOTE — CONSULT NOTE ADULT - SUBJECTIVE AND OBJECTIVE BOX
TRAUMA ACTIVATION LEVEL:  CODE / ALERT  / CONSULT  ACTIVATED BY: EMS**  /  ED**  INTUBATED: YES** / NO**      MECHANISM OF INJURY:   [] Blunt     [] MVC	  [X] Fall	  [] Pedestrian Struck	  [] Motorcycle     [] Assault     [] Bicycle collision    [] Sports injury    [] Penetrating    [] Gun Shot Wound      [] Stab Wound    GCS: 15 	E: 4	V: 5	M: 6    HPI:    84yF w/ pmh of cirrhosis, hypothyroidism, thrombocytopenia, lymphedema seen as Trauma Alert s/p unwitnessed mechanical fall +HT, -LOC, -AC found down at [facility]. Patient reports getting up to use the restroom .  Trauma assessment in ED: ABCs intact , GCS 15 , AAOx3.    PAST MEDICAL & SURGICAL HISTORY:  GERD (gastroesophageal reflux disease)      Fatty liver      Malignant neoplasm of areola of left breast in female, unspecified estrogen receptor status      Blister of right lower leg      Osteoarthritis      Thrombocytopenia      Hypothyroidism      Cirrhosis      History of surgery  LEFT BREAST LUMPECTOMY  TUBIAL LIGATION  CHOLECYSTECTOMY  RIGHT & LEFT TKR      History of cholecystectomy          Allergies    Levaquin (Hives; Rash)  aspirin (Other)  predniSONE (Swelling)  Cipro (Hives; Rash)    Intolerances        Home Medications:  Lasix 40 mg oral tablet: 1 tab(s) orally once a day (16 May 2025 13:18)  levothyroxine 37.5 mcg (0.0375 mg) oral capsule: 1 cap(s) orally once a day (16 May 2025 13:15)  spironolactone 100 mg oral tablet: 1 tab(s) orally once a day (16 May 2025 13:15)  traMADol 50 mg oral tablet: 1 tab(s) orally every 8 hours As needed Moderate Pain (4 - 6) (25 May 2025 12:20)      ROS: 10-system review is otherwise negative except HPI above.      Primary Survey:    A - airway intact  B - bilateral breath sounds and good chest rise  C - palpable pulses in all extremities  D - GCS 15 on arrival, THOMAS  Exposure obtained    Vital Signs Last 24 Hrs  T(C): 36.4 (2025 19:48), Max: 36.4 (2025 19:48)  T(F): 97.6 (2025 19:48), Max: 97.6 (2025 19:48)  HR: 64 (2025 19:48) (64 - 99)  BP: 120/62 (2025 19:48) (120/62 - 127/67)  BP(mean): --  RR: 20 (2025 19:48) (20 - 20)  SpO2: 96% (2025 19:48) (96% - 100%)    Parameters below as of 2025 19:48  Patient On (Oxygen Delivery Method): nasal cannula  O2 Flow (L/min): 3      Secondary Survey:   General: NAD  HEENT: Normocephalic, atraumatic, EOMI, PEERLA. no scalp lacerations   Neck: Soft, midline trachea. no c-spine tenderness  Chest: No chest wall tenderness, no subcutaneous emphysema   Cardiac: S1, S2, RRR  Respiratory: Bilateral breath sounds, clear and equal bilaterally  Abdomen: Soft, non-distended, non-tender, no rebound, no guarding.  Groin: Normal appearing, pelvis stable   Ext:  Moving b/l upper and lower extremities. Palpable Radial b/l UE, b/l DP palpable in LE.   Back: No T/L/S spine tenderness, No palpable runoff/stepoff/deformity  Rectal: No aurea blood, MARIELENA with good tone    FAST: *****/Negative    ACCESS / DEVICES:  [ ] Peripheral IV  [ ] Central Venous Line	[ ] R	[ ] L	[ ] IJ	[ ] Fem	[ ] SC	Placed:   [ ] Arterial Line		[ ] R	[ ] L	[ ] Fem	[ ] Rad	[ ] Ax	Placed:   [ ] PICC:					[ ] Mediport  [ ] Urinary Catheter,  Date Placed:   [ ] Chest tube: [ ] Right, [ ] Left  [ ] ANTONI/Ja Drains    Labs:  CAPILLARY BLOOD GLUCOSE      POCT Blood Glucose.: 194 mg/dL (2025 20:01)  POCT Blood Glucose.: 181 mg/dL (2025 16:27)                          10.1   5.30  )-----------( 73       ( 2025 16:25 )             32.6       Auto Neutrophil %: 67.2 % (25 @ 16:25)  Auto Immature Granulocyte %: 0.6 % (25 @ 16:25)        134[L]  |  101  |  55[H]  ----------------------------<  158[H]  4.6   |  20  |  1.4      Calcium: 8.5 mg/dL (25 @ 16:25)      LFTs:             6.2  | 4.0  | 28       ------------------[129     ( 2025 16:25 )  2.8  | x    | 20          Lipase:140    Amylase:x         Lactate, Blood: 1.9 mmol/L (25 @ 16:25)      Coags:     16.60  ----< 1.40    ( 2025 16:25 )     40.7                Urinalysis Basic - ( 2025 17:35 )    Color: Yellow / Appearance: Clear / S.014 / pH: x  Gluc: x / Ketone: x  / Bili: Negative / Urobili: 0.2 mg/dL   Blood: x / Protein: Negative mg/dL / Nitrite: Negative   Leuk Esterase: Negative / RBC: 15 /HPF / WBC 1 /HPF   Sq Epi: x / Non Sq Epi: x / Bacteria: Moderate /HPF                RADIOLOGY & ADDITIONAL STUDIES:  ---------------------------------------------------------------------------------------    ASSESSMENT:  84y Female  w/ PMHx of *** seen as (Code Trauma / Trauma Alert / Trauma Consult) s/p ****** with complaint of *** , external signs of trauma include *** . Trauma assessment in ED: ABCs intact , GCS 15 , AAOx3,  THOMAS.     Injuries identified:   -   -   -     PLAN:   - Trauma Labs: (CBC, BMP, Coags, T&S, UA, EtOH level)  Additional studies:  EKG  Utox    Trauma Imaging to include the following:  - CXR, Pelvic Xray  - CT Head,  CT C-spine, CT Max/Face, CT Chest, CT Abd/Pelvis  - Extremity films: None    Additional consultations:  - Neurosurgery  - Orthopedics  - OMFS  - PT/Rehab/SW  - Hospitalist/Medicine     Disposition pending results of above labs and imaging  Above plan discussed with Trauma attending,  ***  , patient, patient family, and ED team  --------------------------------------------------------------------------------------  25 @ 20:28 TRAUMA ACTIVATION LEVEL:  CODE / ALERT  / CONSULT  ACTIVATED BY: EMS**  /  ED**  INTUBATED: YES** / NO**      MECHANISM OF INJURY:   [] Blunt     [] MVC	  [X] Fall	  [] Pedestrian Struck	  [] Motorcycle     [] Assault     [] Bicycle collision    [] Sports injury    [] Penetrating    [] Gun Shot Wound      [] Stab Wound    GCS: 15 	E: 4	V: 5	M: 6    HPI:    84yF transfer from [facility] with concern for brain bleed w/ pmh of cirrhosis, hypothyroidism, thrombocytopenia, lymphedema seen as Trauma Alert s/p unwitnessed mechanical fall +HT, -LOC, -AC found down at [facility] after getting up to use the bathroom. Trauma assessment in ED: ABCs intact , GCS 15 , AAOx2    PAST MEDICAL & SURGICAL HISTORY:  GERD (gastroesophageal reflux disease)      Fatty liver      Malignant neoplasm of areola of left breast in female, unspecified estrogen receptor status      Blister of right lower leg      Osteoarthritis      Thrombocytopenia      Hypothyroidism      Cirrhosis      History of surgery  LEFT BREAST LUMPECTOMY  TUBIAL LIGATION  CHOLECYSTECTOMY  RIGHT & LEFT TKR      History of cholecystectomy          Allergies    Levaquin (Hives; Rash)  aspirin (Other)  predniSONE (Swelling)  Cipro (Hives; Rash)    Intolerances        Home Medications:  Lasix 40 mg oral tablet: 1 tab(s) orally once a day (16 May 2025 13:18)  levothyroxine 37.5 mcg (0.0375 mg) oral capsule: 1 cap(s) orally once a day (16 May 2025 13:15)  spironolactone 100 mg oral tablet: 1 tab(s) orally once a day (16 May 2025 13:15)  traMADol 50 mg oral tablet: 1 tab(s) orally every 8 hours As needed Moderate Pain (4 - 6) (25 May 2025 12:20)      ROS: 10-system review is otherwise negative except HPI above.      Primary Survey:    A - airway intact  B - bilateral breath sounds and good chest rise  C - palpable pulses in all extremities  D - GCS 15 on arrival, THOMAS  Exposure obtained    Vital Signs Last 24 Hrs  T(C): 36.4 (2025 19:48), Max: 36.4 (2025 19:48)  T(F): 97.6 (2025 19:48), Max: 97.6 (2025 19:48)  HR: 64 (2025 19:48) (64 - 99)  BP: 120/62 (2025 19:48) (120/62 - 127/67)  BP(mean): --  RR: 20 (2025 19:48) (20 - 20)  SpO2: 96% (2025 19:48) (96% - 100%)    Parameters below as of 2025 19:48  Patient On (Oxygen Delivery Method): nasal cannula  O2 Flow (L/min): 3      Secondary Survey:   General: NAD  HEENT: Normocephalic, atraumatic, EOMI, PEERLA. no scalp lacerations   Neck: Soft, midline trachea. no c-spine tenderness  Chest: No chest wall tenderness, no subcutaneous emphysema   Cardiac: S1, S2, RRR  Respiratory: Bilateral breath sounds, clear and equal bilaterally  Abdomen: Soft, non-distended, non-tender, no rebound, no guarding.  Groin: Normal appearing, pelvis stable   Ext:  Moving b/l upper and lower extremities. Palpable Radial b/l UE, b/l DP palpable in LE.   Back: No T/L/S spine tenderness, No palpable runoff/stepoff/deformity      ACCESS / DEVICES:  [X ] Peripheral IV  [ ] Central Venous Line	[ ] R	[ ] L	[ ] IJ	[ ] Fem	[ ] SC	Placed:   [ ] Arterial Line		[ ] R	[ ] L	[ ] Fem	[ ] Rad	[ ] Ax	Placed:   [ ] PICC:					[ ] Mediport  [ ] Urinary Catheter,  Date Placed:   [ ] Chest tube: [ ] Right, [ ] Left  [ ] ANTONI/Ja Drains    Labs:  CAPILLARY BLOOD GLUCOSE      POCT Blood Glucose.: 194 mg/dL (2025 20:01)  POCT Blood Glucose.: 181 mg/dL (2025 16:27)                          10.1   5.30  )-----------( 73       ( 2025 16:25 )             32.6       Auto Neutrophil %: 67.2 % (25 @ 16:25)  Auto Immature Granulocyte %: 0.6 % (25 @ 16:25)        134[L]  |  101  |  55[H]  ----------------------------<  158[H]  4.6   |  20  |  1.4      Calcium: 8.5 mg/dL (25 @ 16:25)      LFTs:             6.2  | 4.0  | 28       ------------------[129     ( 2025 16:25 )  2.8  | x    | 20          Lipase:140    Amylase:x         Lactate, Blood: 1.9 mmol/L (25 @ 16:25)      Coags:     16.60  ----< 1.40    ( 2025 16:25 )     40.7                Urinalysis Basic - ( 2025 17:35 )    Color: Yellow / Appearance: Clear / S.014 / pH: x  Gluc: x / Ketone: x  / Bili: Negative / Urobili: 0.2 mg/dL   Blood: x / Protein: Negative mg/dL / Nitrite: Negative   Leuk Esterase: Negative / RBC: 15 /HPF / WBC 1 /HPF   Sq Epi: x / Non Sq Epi: x / Bacteria: Moderate /HPF                RADIOLOGY & ADDITIONAL STUDIES:    pending   ---------------------------------------------------------------------------------------

## 2025-07-01 NOTE — ED PROVIDER NOTE - DISCUSSED CASE WITH MULTISELECT OPTIONS
She is having more episodes of skipping beats. Two week monitor 12/08/2020 with PVC burden <1%.    1. Continue with same management.   Consultant

## 2025-07-01 NOTE — ED PROVIDER NOTE - PHYSICAL EXAMINATION
generalized: comfortable, alert , normocephalic  eyes: PERRLA, EOMI, no orbital tenderness, no bony step off  ENT: no septal hematoma, no nasal bone tenderness,  oropharynx clear with petechiae hard palate  resp: CTA, no bony step off , no chest wall tenderness, no bruising to chest wall  cardiac: RRR S1S2  abd: non tender RUQ or LUQ, non distended, no bruising   msk: neck supple, no cervical tenderness, pelvis stable , no vertebral tenderness- flexion and extension in tact, gait steady, upper extremities - good rom no tenderness, lower extremities- good rom , no tenderness  skin: no lacerations, bruising or swelling   neuro: alert and oriented, follows commands, no sensory or motor deficits

## 2025-07-01 NOTE — ED PROVIDER NOTE - OBJECTIVE STATEMENT
84F w/PMHX of cirrhosis, hypothyroidism, thrombocytopenia, fatty liver, GERD, breast cancer, osteoarthritis presents to ED s/p mechanical fall today striking back or head and back. no LOC. no sob, chest pain or abdominal pain . patient c/o weakness to lower legs. no headache , vomiting, visual changes. no bleeding wounds .

## 2025-07-01 NOTE — CONSULT NOTE ADULT - ASSESSMENT
ASSESSMENT:  84yF transfer from [facility] with concern for brain bleed seen on CT head w/ pmh of cirrhosis, hypothyroidism, thrombocytopenia, lymphedema seen as Trauma Alert s/p unwitnessed mechanical fall +HT, -LOC, -AC found down at [facility] after getting up to use the bathroom. Trauma assessment in ED: ABCs intact , GCS 15 , AAOx2.     No external signs of injury      PLAN:   - Trauma Labs: (CBC, BMP, Coags, T&S)  - Pending pan scan       Additional consultations:  - Neurosurgery    Disposition pending results of above labs and imaging  Above plan discussed with Trauma attending, Dr. Man   --------------------------------------------------------------------------------------  07-01-25 @ 20:28

## 2025-07-02 DIAGNOSIS — S06.6X0A TRAUMATIC SUBARACHNOID HEMORRHAGE WITHOUT LOSS OF CONSCIOUSNESS, INITIAL ENCOUNTER: ICD-10-CM

## 2025-07-02 LAB
A1C WITH ESTIMATED AVERAGE GLUCOSE RESULT: 5.1 % — SIGNIFICANT CHANGE UP (ref 4–5.6)
ALBUMIN SERPL ELPH-MCNC: 2.5 G/DL — LOW (ref 3.5–5.2)
ALP SERPL-CCNC: 112 U/L — SIGNIFICANT CHANGE UP (ref 30–115)
ALT FLD-CCNC: 20 U/L — SIGNIFICANT CHANGE UP (ref 0–41)
ANION GAP SERPL CALC-SCNC: 9 MMOL/L — SIGNIFICANT CHANGE UP (ref 7–14)
APTT BLD: 35.8 SEC — SIGNIFICANT CHANGE UP (ref 27–39.2)
APTT BLD: 36.5 SEC — SIGNIFICANT CHANGE UP (ref 27–39.2)
AST SERPL-CCNC: 29 U/L — SIGNIFICANT CHANGE UP (ref 0–41)
BASOPHILS # BLD AUTO: 0.02 K/UL — SIGNIFICANT CHANGE UP (ref 0–0.2)
BASOPHILS NFR BLD AUTO: 0.6 % — SIGNIFICANT CHANGE UP (ref 0–1)
BILIRUB SERPL-MCNC: 3.8 MG/DL — HIGH (ref 0.2–1.2)
BLD GP AB SCN SERPL QL: SIGNIFICANT CHANGE UP
BUN SERPL-MCNC: 49 MG/DL — HIGH (ref 10–20)
CALCIUM SERPL-MCNC: 8.5 MG/DL — SIGNIFICANT CHANGE UP (ref 8.4–10.5)
CHLORIDE SERPL-SCNC: 101 MMOL/L — SIGNIFICANT CHANGE UP (ref 98–110)
CO2 SERPL-SCNC: 20 MMOL/L — SIGNIFICANT CHANGE UP (ref 17–32)
CREAT SERPL-MCNC: 1.3 MG/DL — SIGNIFICANT CHANGE UP (ref 0.7–1.5)
EGFR: 40 ML/MIN/1.73M2 — LOW
EGFR: 40 ML/MIN/1.73M2 — LOW
EOSINOPHIL # BLD AUTO: 0.24 K/UL — SIGNIFICANT CHANGE UP (ref 0–0.7)
EOSINOPHIL NFR BLD AUTO: 7.8 % — SIGNIFICANT CHANGE UP (ref 0–8)
ESTIMATED AVERAGE GLUCOSE: 100 MG/DL — SIGNIFICANT CHANGE UP (ref 68–114)
GLUCOSE SERPL-MCNC: 92 MG/DL — SIGNIFICANT CHANGE UP (ref 70–99)
HCT VFR BLD CALC: 29.6 % — LOW (ref 37–47)
HCT VFR BLD CALC: 29.9 % — LOW (ref 37–47)
HGB BLD-MCNC: 9.3 G/DL — LOW (ref 12–16)
HGB BLD-MCNC: 9.5 G/DL — LOW (ref 12–16)
IMM GRANULOCYTES NFR BLD AUTO: 1 % — HIGH (ref 0.1–0.3)
INR BLD: 1.35 RATIO — HIGH (ref 0.65–1.3)
INR BLD: 1.36 RATIO — HIGH (ref 0.65–1.3)
LYMPHOCYTES # BLD AUTO: 0.63 K/UL — LOW (ref 1.2–3.4)
LYMPHOCYTES # BLD AUTO: 20.5 % — SIGNIFICANT CHANGE UP (ref 20.5–51.1)
MAGNESIUM SERPL-MCNC: 1.9 MG/DL — SIGNIFICANT CHANGE UP (ref 1.8–2.4)
MCHC RBC-ENTMCNC: 29.3 PG — SIGNIFICANT CHANGE UP (ref 27–31)
MCHC RBC-ENTMCNC: 29.4 PG — SIGNIFICANT CHANGE UP (ref 27–31)
MCHC RBC-ENTMCNC: 31.4 G/DL — LOW (ref 32–37)
MCHC RBC-ENTMCNC: 31.8 G/DL — LOW (ref 32–37)
MCV RBC AUTO: 92.3 FL — SIGNIFICANT CHANGE UP (ref 81–99)
MCV RBC AUTO: 93.7 FL — SIGNIFICANT CHANGE UP (ref 81–99)
MONOCYTES # BLD AUTO: 0.34 K/UL — SIGNIFICANT CHANGE UP (ref 0.1–0.6)
MONOCYTES NFR BLD AUTO: 11 % — HIGH (ref 1.7–9.3)
MRSA PCR RESULT.: NEGATIVE — SIGNIFICANT CHANGE UP
NEUTROPHILS # BLD AUTO: 1.82 K/UL — SIGNIFICANT CHANGE UP (ref 1.4–6.5)
NEUTROPHILS NFR BLD AUTO: 59.1 % — SIGNIFICANT CHANGE UP (ref 42.2–75.2)
NRBC BLD AUTO-RTO: 0 /100 WBCS — SIGNIFICANT CHANGE UP (ref 0–0)
NRBC BLD AUTO-RTO: 0 /100 WBCS — SIGNIFICANT CHANGE UP (ref 0–0)
PHOSPHATE SERPL-MCNC: 3.5 MG/DL — SIGNIFICANT CHANGE UP (ref 2.1–4.9)
PLATELET # BLD AUTO: 45 K/UL — LOW (ref 130–400)
PLATELET # BLD AUTO: 74 K/UL — LOW (ref 130–400)
PMV BLD: 10.1 FL — SIGNIFICANT CHANGE UP (ref 7.4–10.4)
PMV BLD: SIGNIFICANT CHANGE UP (ref 7.4–10.4)
POTASSIUM SERPL-MCNC: 4.4 MMOL/L — SIGNIFICANT CHANGE UP (ref 3.5–5)
POTASSIUM SERPL-SCNC: 4.4 MMOL/L — SIGNIFICANT CHANGE UP (ref 3.5–5)
PROT SERPL-MCNC: 5.7 G/DL — LOW (ref 6–8)
PROTHROM AB SERPL-ACNC: 16 SEC — HIGH (ref 9.95–12.87)
PROTHROM AB SERPL-ACNC: 16.2 SEC — HIGH (ref 9.95–12.87)
RBC # BLD: 3.16 M/UL — LOW (ref 4.2–5.4)
RBC # BLD: 3.24 M/UL — LOW (ref 4.2–5.4)
RBC # FLD: 17.2 % — HIGH (ref 11.5–14.5)
RBC # FLD: 17.4 % — HIGH (ref 11.5–14.5)
SODIUM SERPL-SCNC: 130 MMOL/L — LOW (ref 135–146)
WBC # BLD: 3.08 K/UL — LOW (ref 4.8–10.8)
WBC # BLD: 3.4 K/UL — LOW (ref 4.8–10.8)
WBC # FLD AUTO: 3.08 K/UL — LOW (ref 4.8–10.8)
WBC # FLD AUTO: 3.4 K/UL — LOW (ref 4.8–10.8)

## 2025-07-02 PROCEDURE — 92610 EVALUATE SWALLOWING FUNCTION: CPT | Mod: GN

## 2025-07-02 PROCEDURE — 82570 ASSAY OF URINE CREATININE: CPT

## 2025-07-02 PROCEDURE — 87640 STAPH A DNA AMP PROBE: CPT

## 2025-07-02 PROCEDURE — 83935 ASSAY OF URINE OSMOLALITY: CPT

## 2025-07-02 PROCEDURE — 84300 ASSAY OF URINE SODIUM: CPT

## 2025-07-02 PROCEDURE — 84133 ASSAY OF URINE POTASSIUM: CPT

## 2025-07-02 PROCEDURE — 83036 HEMOGLOBIN GLYCOSYLATED A1C: CPT

## 2025-07-02 PROCEDURE — 93005 ELECTROCARDIOGRAM TRACING: CPT

## 2025-07-02 PROCEDURE — 94010 BREATHING CAPACITY TEST: CPT

## 2025-07-02 PROCEDURE — 97166 OT EVAL MOD COMPLEX 45 MIN: CPT | Mod: GO

## 2025-07-02 PROCEDURE — 82962 GLUCOSE BLOOD TEST: CPT

## 2025-07-02 PROCEDURE — 93010 ELECTROCARDIOGRAM REPORT: CPT

## 2025-07-02 PROCEDURE — 85610 PROTHROMBIN TIME: CPT

## 2025-07-02 PROCEDURE — 85027 COMPLETE CBC AUTOMATED: CPT

## 2025-07-02 PROCEDURE — 87641 MR-STAPH DNA AMP PROBE: CPT

## 2025-07-02 PROCEDURE — 99223 1ST HOSP IP/OBS HIGH 75: CPT

## 2025-07-02 PROCEDURE — 99284 EMERGENCY DEPT VISIT MOD MDM: CPT

## 2025-07-02 PROCEDURE — 85730 THROMBOPLASTIN TIME PARTIAL: CPT

## 2025-07-02 PROCEDURE — 84100 ASSAY OF PHOSPHORUS: CPT

## 2025-07-02 PROCEDURE — 36415 COLL VENOUS BLD VENIPUNCTURE: CPT

## 2025-07-02 PROCEDURE — 80048 BASIC METABOLIC PNL TOTAL CA: CPT

## 2025-07-02 PROCEDURE — 85025 COMPLETE CBC W/AUTO DIFF WBC: CPT

## 2025-07-02 PROCEDURE — 70450 CT HEAD/BRAIN W/O DYE: CPT

## 2025-07-02 PROCEDURE — 80053 COMPREHEN METABOLIC PANEL: CPT

## 2025-07-02 PROCEDURE — 84540 ASSAY OF URINE/UREA-N: CPT

## 2025-07-02 PROCEDURE — 83735 ASSAY OF MAGNESIUM: CPT

## 2025-07-02 PROCEDURE — 97162 PT EVAL MOD COMPLEX 30 MIN: CPT | Mod: GP

## 2025-07-02 PROCEDURE — 70450 CT HEAD/BRAIN W/O DYE: CPT | Mod: 26

## 2025-07-02 RX ORDER — FUROSEMIDE 10 MG/ML
40 INJECTION INTRAMUSCULAR; INTRAVENOUS DAILY
Refills: 0 | Status: DISCONTINUED | OUTPATIENT
Start: 2025-07-02 | End: 2025-07-02

## 2025-07-02 RX ORDER — SPIRONOLACTONE 25 MG
100 TABLET ORAL DAILY
Refills: 0 | Status: DISCONTINUED | OUTPATIENT
Start: 2025-07-02 | End: 2025-07-02

## 2025-07-02 RX ORDER — INSULIN LISPRO 100 U/ML
INJECTION, SOLUTION INTRAVENOUS; SUBCUTANEOUS EVERY 6 HOURS
Refills: 0 | Status: DISCONTINUED | OUTPATIENT
Start: 2025-07-02 | End: 2025-07-02

## 2025-07-02 RX ORDER — ACETAMINOPHEN 500 MG/5ML
650 LIQUID (ML) ORAL EVERY 6 HOURS
Refills: 0 | Status: DISCONTINUED | OUTPATIENT
Start: 2025-07-02 | End: 2025-07-03

## 2025-07-02 RX ORDER — OXYCODONE HYDROCHLORIDE 30 MG/1
5 TABLET ORAL EVERY 6 HOURS
Refills: 0 | Status: DISCONTINUED | OUTPATIENT
Start: 2025-07-02 | End: 2025-07-02

## 2025-07-02 RX ORDER — LEVOTHYROXINE SODIUM 300 MCG
37.5 TABLET ORAL DAILY
Refills: 0 | Status: DISCONTINUED | OUTPATIENT
Start: 2025-07-02 | End: 2025-07-03

## 2025-07-02 RX ORDER — FUROSEMIDE 10 MG/ML
40 INJECTION INTRAMUSCULAR; INTRAVENOUS DAILY
Refills: 0 | Status: DISCONTINUED | OUTPATIENT
Start: 2025-07-02 | End: 2025-07-03

## 2025-07-02 RX ORDER — TRAMADOL HYDROCHLORIDE 50 MG/1
50 TABLET, FILM COATED ORAL EVERY 6 HOURS
Refills: 0 | Status: DISCONTINUED | OUTPATIENT
Start: 2025-07-02 | End: 2025-07-03

## 2025-07-02 RX ORDER — TRAMADOL HYDROCHLORIDE 50 MG/1
50 TABLET, FILM COATED ORAL EVERY 8 HOURS
Refills: 0 | Status: DISCONTINUED | OUTPATIENT
Start: 2025-07-02 | End: 2025-07-02

## 2025-07-02 RX ORDER — INSULIN LISPRO 100 U/ML
INJECTION, SOLUTION INTRAVENOUS; SUBCUTANEOUS
Refills: 0 | Status: DISCONTINUED | OUTPATIENT
Start: 2025-07-02 | End: 2025-07-02

## 2025-07-02 RX ORDER — LEVOTHYROXINE SODIUM 300 MCG
37.5 TABLET ORAL AT BEDTIME
Refills: 0 | Status: DISCONTINUED | OUTPATIENT
Start: 2025-07-02 | End: 2025-07-02

## 2025-07-02 RX ORDER — TRAMADOL HYDROCHLORIDE 50 MG/1
25 TABLET, FILM COATED ORAL EVERY 6 HOURS
Refills: 0 | Status: DISCONTINUED | OUTPATIENT
Start: 2025-07-02 | End: 2025-07-03

## 2025-07-02 RX ADMIN — Medication 37.5 MICROGRAM(S): at 06:21

## 2025-07-02 RX ADMIN — Medication 40 MILLIGRAM(S): at 02:40

## 2025-07-02 RX ADMIN — Medication 100 MILLILITER(S): at 04:41

## 2025-07-02 RX ADMIN — Medication 650 MILLIGRAM(S): at 18:31

## 2025-07-02 RX ADMIN — Medication 650 MILLIGRAM(S): at 13:19

## 2025-07-02 RX ADMIN — Medication 102 MILLIGRAM(S): at 03:30

## 2025-07-02 RX ADMIN — Medication 50 MILLILITER(S): at 18:01

## 2025-07-02 RX ADMIN — Medication 50 MILLILITER(S): at 10:07

## 2025-07-02 RX ADMIN — Medication 650 MILLIGRAM(S): at 18:01

## 2025-07-02 RX ADMIN — FUROSEMIDE 40 MILLIGRAM(S): 10 INJECTION INTRAMUSCULAR; INTRAVENOUS at 18:02

## 2025-07-02 NOTE — CONSULT NOTE ADULT - ASSESSMENT
84yF transfer from [facility] with concern for brain bleed seen on CT scan w/ pmh of cirrhosis, hypothyroidism, thrombocytopenia, lymphedema seen as Trauma Alert s/p unwitnessed mechanical fall +HT, -LOC, -AC found down at [facility] home after getting up to use the bathroom. Pt found to have SAH and admitted to SICU. Medicine consulted for comanagement.      Problem list:  #Traumatic SAH (stable)  #Mechanical fall   #Chronic gait instability (ambulate with walker and intermittent assistance)  #Thrombocytopenia   #Normocytic anemia   #NORA on CKD3a (improving)  #Incidental 1cm pulmonary nodule and groundglass opacity  #Hx Cirrhosis w/ esophageal varices  #Hx BLE edema and venous stasis   #Hx Hypothyroidism  #Hx Pulmonary hypertension            Plan:  Management per SICU  All imaging, labs, and vitals personally reviewed   Repeat CTH x3 (7/2/25): Stable right temporal parietal focal subarachnoid hemorrhage.  Echo: (5/25): LVEF 60%. Mild LVH, G1DD, mild RAVI, Moderate LAE, Mild MR/TR/AR/AS, mild pHTN  - PT/OT/PMR/OOBTC/AAT w/ fall/asp precautions > plan to D/C to [facility]  - Multimodal pain regimen   - Bowel regimen PRN / stool count   - Monitor platelet count, H&H and for signs of bleeding   - Monitor for fever and WBC trend   - Incentive spirometer 10x/hr while awake  - Per NSGx: okay to start DVT PPx, liberalize neuro checks, and outpt f/u   - Supplemental oxygen PRN to maintain PO >92%  - Duonebs q6 PRN   - f/up PET-CT recommended for pulm nodule   - c/w Propranolol 10mg BID   - c/w PPI 40mg qAM  - c/w Levothyroxine 37.5 mcg qAM  - monitor renal fxn and electrolytes daily   - renally dose medications  - avoid nephrotoxic medications   - DVT PPx: HSQ  - CHG        If any questions, please send a message or call on [employer] Teams.    Management per SICU, Medicine for co-management.    Total time spent to complete patient's bedside assessment, reviewed medical chart, discussed medical plan of care with team was more than 75 minutes with >50% of time spent face to face with patient, discussion with patient/family and/or coordination of care. This time excludes teaching time and/or separately reported services.

## 2025-07-02 NOTE — CONSULT NOTE ADULT - SUBJECTIVE AND OBJECTIVE BOX
CC: Patient is a 84y old  Female who presents with a chief complaint of S/P mechanical fall +HT, -LOC, -AC (02 Jul 2025 11:13)      HPI:  84yF transfer from [facility] with concern for brain bleed seen on CT scan w/ pmh of cirrhosis, hypothyroidism, thrombocytopenia, lymphedema seen as Trauma Alert s/p unwitnessed mechanical fall +HT, -LOC, -AC found down at [facility] after getting up to use the bathroom. Trauma assessment in ED: ABCs intact , GCS 14 , AAOx2 (02 Jul 2025 01:35)      Patient seen and examined at bedside.   No acute overnight events.   No acute complaints this morning.   ROS otherwise negative on a 10-point assessment.     PAST MEDICAL & SURGICAL HISTORY:  GERD (gastroesophageal reflux disease)      Fatty liver      Malignant neoplasm of areola of left breast in female, unspecified estrogen receptor status      Blister of right lower leg      Osteoarthritis      Thrombocytopenia      Hypothyroidism      Cirrhosis      History of surgery  LEFT BREAST LUMPECTOMY  TUBIAL LIGATION  CHOLECYSTECTOMY  RIGHT & LEFT TKR      History of cholecystectomy        SOCIAL HISTORY:  Tobacco Usage:  (   ) never smoked   (   ) former smoker   (   ) current smoker  (     ) pack years    Tobacco Quit Date:  Substance Use (Street drugs): (  ) never used  (  ) other:  Alcohol Usage:    Family history reviewed and otherwise non-contributory  ALLERGIES: Levaquin (Hives; Rash)  aspirin (Other)  predniSONE (Swelling)  Cipro (Hives; Rash)    MEDICATIONS:  MEDICATIONS  (STANDING):  acetaminophen     Tablet .. 650 milliGRAM(s) Oral every 6 hours  chlorhexidine 2% Cloths 1 Application(s) Topical daily  furosemide    Tablet 40 milliGRAM(s) Oral daily  levothyroxine 37.5 MICROGram(s) Oral daily  pantoprazole    Tablet 40 milliGRAM(s) Oral before breakfast  propranolol 10 milliGRAM(s) Oral two times a day  sodium chloride 0.9%. 1000 milliLiter(s) (50 mL/Hr) IV Continuous <Continuous>    MEDICATIONS  (PRN):  traMADol 25 milliGRAM(s) Oral every 6 hours PRN Moderate Pain (4 - 6)  traMADol 50 milliGRAM(s) Oral every 6 hours PRN Severe Pain (7 - 10)      Home Medications:  Lasix 40 mg oral tablet: 1 tab(s) orally once a day (02 Jul 2025 01:41)  levothyroxine 37.5 mcg (0.0375 mg) oral capsule: 1 cap(s) orally once a day (02 Jul 2025 01:41)  spironolactone 100 mg oral tablet: 1 tab(s) orally once a day (02 Jul 2025 01:41)  traMADol 50 mg oral tablet: 1 tab(s) orally every 8 hours As needed Moderate Pain (4 - 6) (02 Jul 2025 01:41)      Vital Signs Last 24 Hrs  T(F): 97.4 (02 Jul 2025 07:34), Max: 97.6 (01 Jul 2025 19:48)  HR: 70 (02 Jul 2025 14:43) (60 - 99)  BP: 130/60 (02 Jul 2025 14:43) (110/59 - 130/60)  RR: 17 (02 Jul 2025 14:43) (17 - 20)  SpO2: 99% (02 Jul 2025 14:43) (96% - 100%)    I&O's Summary      PHYSICAL EXAM:  GENERAL: NAD  HEAD:  Atraumatic, Normocephalic  NECK: Supple, No JVD  CHEST/LUNG: Clear to auscultation bilaterally; No rales, rhonchi, wheezing, or rubs  HEART: Regular rate and rhythm; S1/S2, No murmurs, rubs, or gallops  ABDOMEN: Soft, Nontender, Nondistended; Bowel sounds present x4 quadrants  VASCULAR: Normal pulses, Normal capillary refill  EXTREMITIES:  2+ Peripheral Pulses, No cyanosis, stasis dermatitis   SKIN: Warm, Intact  NERVOUS SYSTEM:  AAOx3    LABS:                        9.5    3.08  )-----------( 45       ( 02 Jul 2025 07:12 )             29.9     07-02    130  |  101  |  49  ----------------------------<  92  4.4   |  20  |  1.3    Ca    8.5      02 Jul 2025 07:12  Phos  3.5     07-02  Mg     1.9     07-02    TPro  5.7  /  Alb  2.5  /  TBili  3.8  /  DBili  x   /  AST  29  /  ALT  20  /  AlkPhos  112  07-02      PT/INR - ( 02 Jul 2025 07:12 )   PT: 16.00 sec;   INR: 1.35 ratio         PTT - ( 02 Jul 2025 07:12 )  PTT:36.5 sec  Urinalysis Basic - ( 02 Jul 2025 07:12 )    Color: x / Appearance: x / SG: x / pH: x  Gluc: 92 mg/dL / Ketone: x  / Bili: x / Urobili: x   Blood: x / Protein: x / Nitrite: x   Leuk Esterase: x / RBC: x / WBC x   Sq Epi: x / Non Sq Epi: x / Bacteria: x          Lactate, Blood: 1.9 mmol/L (07-01 @ 16:25)        POCT Blood Glucose.: 112 mg/dL (02 Jul 2025 03:45)  POCT Blood Glucose.: 194 mg/dL (01 Jul 2025 20:01)  POCT Blood Glucose.: 181 mg/dL (01 Jul 2025 16:27)          RADIOLOGY & ADDITIONAL TESTS:    < from: CT Head No Cont (07.01.25 @ 23:44) >  Findings/impression:    Trace hyperdensity in the right posterior temporal parietal sulcus, which   appears more conspicuous than prior CT head 125. Findings are suggestive   of acute subarachnoid hemorrhage.    Remaining exam without significant change    < end of copied text >      < from: CT Cervical Spine No Cont (07.01.25 @ 17:56) >  FINDINGS:    CT HEAD:    There is suggestion of trace hyperdensity in the posterior right   temporoparietal sulci. For example series 2 image 38. There is no   associated mass effect or midline shift.    There is stable prominence of the sulci, sylvian fissures, and   ventricles, reflecting mild diffuse parenchymal volume loss.    Status post left cataract surgery. The visualized right intraorbital   contents are normal. There is scattered paranasal sinus mucosal   thickening noted. The mastoid air cells are clear. The visualized soft   tissues and osseous structures appear normal.      CT CERVICAL SPINE:    The alignment is within normal limits. There is diffuse osteopenia.    There is no evidence of acute fracture, compression deformity or facet   subluxation of the cervical spine.    The atlantoaxial relationships are maintained. The posterior elements are   intact. There is no significant prevertebral soft tissue swelling. The   visualized paraspinal soft tissues are unremarkable.    There is multilevel endplate degenerative changes with marginal   osteophyte formation, uncovertebral spurring, loss of normal disc space   height as well as facet joint space compartment narrowing.    There is no high-grade spinal canal stenosis. Please note spinal canal   contents are suboptimally evaluated inherent to CT technique.      IMPRESSION:    CT HEAD:  Suggestion of trace acute subarachnoid hemorrhage in the right   temporoparietal region without mass effect or midline shift.    CT CERVICAL SPINE:  No acute cervical fracture or facet subluxation.    Communication: The summary ofabove findings were discussed with readback   confirmation with Dr. Torres by resident Marion Contreras MD on 7/1/2025 at   6:30 PM.    < end of copied text >      < from: CT Abdomen and Pelvis w/ IV Cont (07.01.25 @ 17:58) >  FINDINGS:  CHEST:    TUBES AND LINES: None.    CHEST WALL, AXILLA, AND LOWER NECK: Unremarkable    MEDIASTINUM AND WM: There are no suspicious mediastinal, hilar, or   axillary lymph nodes. There is a 7.5 mm nodule in the right lobe of the   thyroid gland.    VESSELS: There is no evidence of central pulmonary or segmental embolism.   The pulmonary artery measures 3.4 cm, which may be seen with pulmonary   hypertension.. Normal caliber aorta.  There is no evidence of aortic   aneurysm or dissection.    HEART: The heart is normal in size. No pericardial effusion.    LUNGS AND LARGE AIRWAYS: The central tracheobronchial tree is patent.   Several previously noted pulmonary nodules are unchanged. However, a new   area of pulmonary nodules, measuring up to 1 cm, are noted in the region   of the lingula. A region of ground glass opacity measuring 1.6 x 1.6 x   3.8 cm is noted in the left upper lobe. No pneumothorax.    PLEURA: There is no pleural effusion.      ABDOMEN AND PELVIS:    LIVER: The liver is small with lobulated contours consistent with   cirrhosis. Hepatic steatosis is noted. No evidence of solid mass. The   portal and splenic veins are enlarged and paten, consistent with portal  hypertension. Recanalization of the umbilical vein is noted, as well as   esophageal varices. The hepatic veins are opacified.  BILE DUCTS: Normal caliber.  GALLBLADDER: Post cholecystectomy  SPLEEN: The spleen is enlarged measuring 15.6 cm in length.  PANCREAS: The pancreas is normal in size and configuration. No evidence   of mass or pancreatitis.  ADRENALS: Within normal limits.  KIDNEYS/URETERS: There is symmetric renal enhancement. No evidence of   hydronephrosis, calcified stones, or solid mass.    BLADDER: Unremarkable.  REPRODUCTIVE ORGANS: Unremarkable    BOWEL: No evidence of bowel obstruction, colitis, or inflammatory   process. Normal caliber appendix.  PERITONEUM/RETROPERITONEUM: Moderate ascites is noted in the abdomen and   pelvis. No free intraperitoneal air.  VESSELS: Normal caliber aorta.  LYMPH NODES: No abdominal or pelvic adenopathy.  ABDOMINAL WALL: Unremarkable.  BONES: Degenerative changes of the spine. No evidence of acute fracture.    IMPRESSION:    Several previously noted pulmonary nodules are unchanged. However, a new area of pulmonary nodules, measuring up to 1 cm, are noted in the region of the lingula. A region of ground glass opacity measuring 1.6 x 1.6 x 3.8 cm is noted in the left upper lobe. PET-CT may be obtained for further evaluation.    The liver is small with lobulated contours consistent with cirrhosis. Hepatic steatosis is noted. The spleen is enlarged measuring 15.6 cm in   length.    The portal and splenic veins are enlarged and patent, consistent with   portal hypertension. Recanalization of the umbilical vein is noted, as   well as esophageal varices.    Moderate ascites is noted in the abdomen and pelvis.    No evidence of thoracic, abdominal or pelvic visceral injury, laceration,   or hematoma.    No evidence of acute fracture.    < end of copied text >      Care Discussed with Consultants/Other Providers CC: Patient is a 84y old  Female who presents with a chief complaint of S/P mechanical fall +HT, -LOC, -AC (02 Jul 2025 11:13)      HPI:  84yF transfer from [facility] with concern for brain bleed seen on CT scan w/ pmh of cirrhosis, hypothyroidism, thrombocytopenia, lymphedema seen as Trauma Alert s/p unwitnessed mechanical fall +HT, -LOC, -AC found down at [facility] after getting up to use the bathroom. Trauma assessment in ED: ABCs intact , GCS 14 , AAOx2 (02 Jul 2025 01:35)      Patient seen and examined at bedside.   No acute overnight events.   No acute complaints this morning.   ROS otherwise negative on a 10-point assessment.   Discussed with pt and son (Ramón) at bedside.    PAST MEDICAL & SURGICAL HISTORY:  GERD (gastroesophageal reflux disease)      Fatty liver      Malignant neoplasm of areola of left breast in female, unspecified estrogen receptor status      Blister of right lower leg      Osteoarthritis      Thrombocytopenia      Hypothyroidism      Cirrhosis      History of surgery  LEFT BREAST LUMPECTOMY  TUBIAL LIGATION  CHOLECYSTECTOMY  RIGHT & LEFT TKR      History of cholecystectomy        SOCIAL HISTORY:  Tobacco Usage:  (   ) never smoked   (   ) former smoker   (   ) current smoker  (     ) pack years    Tobacco Quit Date:  Substance Use (Street drugs): (  ) never used  (  ) other:  Alcohol Usage:    Family history reviewed and otherwise non-contributory  ALLERGIES: Levaquin (Hives; Rash)  aspirin (Other)  predniSONE (Swelling)  Cipro (Hives; Rash)    MEDICATIONS:  MEDICATIONS  (STANDING):  acetaminophen     Tablet .. 650 milliGRAM(s) Oral every 6 hours  chlorhexidine 2% Cloths 1 Application(s) Topical daily  furosemide    Tablet 40 milliGRAM(s) Oral daily  levothyroxine 37.5 MICROGram(s) Oral daily  pantoprazole    Tablet 40 milliGRAM(s) Oral before breakfast  propranolol 10 milliGRAM(s) Oral two times a day  sodium chloride 0.9%. 1000 milliLiter(s) (50 mL/Hr) IV Continuous <Continuous>    MEDICATIONS  (PRN):  traMADol 25 milliGRAM(s) Oral every 6 hours PRN Moderate Pain (4 - 6)  traMADol 50 milliGRAM(s) Oral every 6 hours PRN Severe Pain (7 - 10)      Home Medications:  Lasix 40 mg oral tablet: 1 tab(s) orally once a day (02 Jul 2025 01:41)  levothyroxine 37.5 mcg (0.0375 mg) oral capsule: 1 cap(s) orally once a day (02 Jul 2025 01:41)  spironolactone 100 mg oral tablet: 1 tab(s) orally once a day (02 Jul 2025 01:41)  traMADol 50 mg oral tablet: 1 tab(s) orally every 8 hours As needed Moderate Pain (4 - 6) (02 Jul 2025 01:41)      Vital Signs Last 24 Hrs  T(F): 97.4 (02 Jul 2025 07:34), Max: 97.6 (01 Jul 2025 19:48)  HR: 70 (02 Jul 2025 14:43) (60 - 99)  BP: 130/60 (02 Jul 2025 14:43) (110/59 - 130/60)  RR: 17 (02 Jul 2025 14:43) (17 - 20)  SpO2: 99% (02 Jul 2025 14:43) (96% - 100%)    I&O's Summary      PHYSICAL EXAM:  GENERAL: NAD  HEAD:  Atraumatic, Normocephalic  NECK: Supple, No JVD  CHEST/LUNG: Clear to auscultation bilaterally; No rales, rhonchi, wheezing, or rubs  HEART: Regular rate and rhythm; S1/S2, No murmurs, rubs, or gallops  ABDOMEN: Soft, Nontender, Nondistended; Bowel sounds present x4 quadrants  VASCULAR: Normal pulses, Normal capillary refill  EXTREMITIES:  2+ Peripheral Pulses, No cyanosis, stasis dermatitis   SKIN: Warm, Intact  NERVOUS SYSTEM:  AAOx3    LABS:                        9.5    3.08  )-----------( 45       ( 02 Jul 2025 07:12 )             29.9     07-02    130  |  101  |  49  ----------------------------<  92  4.4   |  20  |  1.3    Ca    8.5      02 Jul 2025 07:12  Phos  3.5     07-02  Mg     1.9     07-02    TPro  5.7  /  Alb  2.5  /  TBili  3.8  /  DBili  x   /  AST  29  /  ALT  20  /  AlkPhos  112  07-02      PT/INR - ( 02 Jul 2025 07:12 )   PT: 16.00 sec;   INR: 1.35 ratio         PTT - ( 02 Jul 2025 07:12 )  PTT:36.5 sec  Urinalysis Basic - ( 02 Jul 2025 07:12 )    Color: x / Appearance: x / SG: x / pH: x  Gluc: 92 mg/dL / Ketone: x  / Bili: x / Urobili: x   Blood: x / Protein: x / Nitrite: x   Leuk Esterase: x / RBC: x / WBC x   Sq Epi: x / Non Sq Epi: x / Bacteria: x          Lactate, Blood: 1.9 mmol/L (07-01 @ 16:25)        POCT Blood Glucose.: 112 mg/dL (02 Jul 2025 03:45)  POCT Blood Glucose.: 194 mg/dL (01 Jul 2025 20:01)  POCT Blood Glucose.: 181 mg/dL (01 Jul 2025 16:27)          RADIOLOGY & ADDITIONAL TESTS:    < from: CT Head No Cont (07.01.25 @ 23:44) >  Findings/impression:    Trace hyperdensity in the right posterior temporal parietal sulcus, which   appears more conspicuous than prior CT head 125. Findings are suggestive   of acute subarachnoid hemorrhage.    Remaining exam without significant change    < end of copied text >      < from: CT Cervical Spine No Cont (07.01.25 @ 17:56) >  FINDINGS:    CT HEAD:    There is suggestion of trace hyperdensity in the posterior right   temporoparietal sulci. For example series 2 image 38. There is no   associated mass effect or midline shift.    There is stable prominence of the sulci, sylvian fissures, and   ventricles, reflecting mild diffuse parenchymal volume loss.    Status post left cataract surgery. The visualized right intraorbital   contents are normal. There is scattered paranasal sinus mucosal   thickening noted. The mastoid air cells are clear. The visualized soft   tissues and osseous structures appear normal.      CT CERVICAL SPINE:    The alignment is within normal limits. There is diffuse osteopenia.    There is no evidence of acute fracture, compression deformity or facet   subluxation of the cervical spine.    The atlantoaxial relationships are maintained. The posterior elements are   intact. There is no significant prevertebral soft tissue swelling. The   visualized paraspinal soft tissues are unremarkable.    There is multilevel endplate degenerative changes with marginal   osteophyte formation, uncovertebral spurring, loss of normal disc space   height as well as facet joint space compartment narrowing.    There is no high-grade spinal canal stenosis. Please note spinal canal   contents are suboptimally evaluated inherent to CT technique.      IMPRESSION:    CT HEAD:  Suggestion of trace acute subarachnoid hemorrhage in the right   temporoparietal region without mass effect or midline shift.    CT CERVICAL SPINE:  No acute cervical fracture or facet subluxation.    Communication: The summary ofabove findings were discussed with readback   confirmation with Dr. Torres by resident Marion Contreras MD on 7/1/2025 at   6:30 PM.    < end of copied text >      < from: CT Abdomen and Pelvis w/ IV Cont (07.01.25 @ 17:58) >  FINDINGS:  CHEST:    TUBES AND LINES: None.    CHEST WALL, AXILLA, AND LOWER NECK: Unremarkable    MEDIASTINUM AND WM: There are no suspicious mediastinal, hilar, or   axillary lymph nodes. There is a 7.5 mm nodule in the right lobe of the   thyroid gland.    VESSELS: There is no evidence of central pulmonary or segmental embolism.   The pulmonary artery measures 3.4 cm, which may be seen with pulmonary   hypertension.. Normal caliber aorta.  There is no evidence of aortic   aneurysm or dissection.    HEART: The heart is normal in size. No pericardial effusion.    LUNGS AND LARGE AIRWAYS: The central tracheobronchial tree is patent.   Several previously noted pulmonary nodules are unchanged. However, a new   area of pulmonary nodules, measuring up to 1 cm, are noted in the region   of the lingula. A region of ground glass opacity measuring 1.6 x 1.6 x   3.8 cm is noted in the left upper lobe. No pneumothorax.    PLEURA: There is no pleural effusion.      ABDOMEN AND PELVIS:    LIVER: The liver is small with lobulated contours consistent with   cirrhosis. Hepatic steatosis is noted. No evidence of solid mass. The   portal and splenic veins are enlarged and paten, consistent with portal  hypertension. Recanalization of the umbilical vein is noted, as well as   esophageal varices. The hepatic veins are opacified.  BILE DUCTS: Normal caliber.  GALLBLADDER: Post cholecystectomy  SPLEEN: The spleen is enlarged measuring 15.6 cm in length.  PANCREAS: The pancreas is normal in size and configuration. No evidence   of mass or pancreatitis.  ADRENALS: Within normal limits.  KIDNEYS/URETERS: There is symmetric renal enhancement. No evidence of   hydronephrosis, calcified stones, or solid mass.    BLADDER: Unremarkable.  REPRODUCTIVE ORGANS: Unremarkable    BOWEL: No evidence of bowel obstruction, colitis, or inflammatory   process. Normal caliber appendix.  PERITONEUM/RETROPERITONEUM: Moderate ascites is noted in the abdomen and   pelvis. No free intraperitoneal air.  VESSELS: Normal caliber aorta.  LYMPH NODES: No abdominal or pelvic adenopathy.  ABDOMINAL WALL: Unremarkable.  BONES: Degenerative changes of the spine. No evidence of acute fracture.    IMPRESSION:    Several previously noted pulmonary nodules are unchanged. However, a new area of pulmonary nodules, measuring up to 1 cm, are noted in the region of the lingula. A region of ground glass opacity measuring 1.6 x 1.6 x 3.8 cm is noted in the left upper lobe. PET-CT may be obtained for further evaluation.    The liver is small with lobulated contours consistent with cirrhosis. Hepatic steatosis is noted. The spleen is enlarged measuring 15.6 cm in   length.    The portal and splenic veins are enlarged and patent, consistent with   portal hypertension. Recanalization of the umbilical vein is noted, as   well as esophageal varices.    Moderate ascites is noted in the abdomen and pelvis.    No evidence of thoracic, abdominal or pelvic visceral injury, laceration,   or hematoma.    No evidence of acute fracture.    < end of copied text >      Care Discussed with Consultants/Other Providers

## 2025-07-02 NOTE — CHART NOTE - NSCHARTNOTEFT_GEN_A_CORE
84F PMH htn, thrombocytopenia, breast Ca in remission. Neurosurgery following for tSAH after mechanical fall. Pt seen and examined at bedside this AM with  brielle, pt remains neurologically intact. A&O x2 (@ baseline per ), no drift, FTN intact, MAEx4 5/5 strength. 3rd CTH reviewed below.     < from: CT Head No Cont (07.02.25 @ 06:03) >    FINDINGS:    Stable subarachnoid hemorrhage right temporal parietal sulcus.    Ventricles and sulci are appropriate for the age.    Chronic left dural thickening suggested adjacent to the coronal suture.    No intraparenchymal hemorrhage. Moderate microvascular ischemic change.    IMPRESSION:    Stable right temporal parietal focal subarachnoid hemorrhage.    --- End of Report ---      < end of copied text >      PLAN:   - No acute neurosurgical intervention at this time  - OK for DVT chemoppx  - OK to liberalize neurochecks  - Pt may follow up in the outpt office. Please provide pt with office #[phone] upon discharge.   - Remainder of care per primary team; f/u labs/coags   - D/w attending

## 2025-07-02 NOTE — CONSULT NOTE ADULT - ASSESSMENT
84y Female  w/       NEURO/PSYCH:  #acute subarachnoid hemorrhage s/p mechanical fall (+HT/-LOC/-AC)  - Neurosurgery recs - Q4h neuro checks, plts , vitamin K 10mg x1  -     #Acute pain  - acetaminophen 650mg q6h  - tramadol 25mg/50mg moderate/severe pain    RESP:   #Oxygenation  - 2L NC, wean as tolerated  - encourage incentive spirometry  #Activity  - increase as tolerated    CARDIAC:   #no acute issues    Imaging:   - EKG: pending  - Echo: 5/25 - EF 60%. Mild LV hypertrophy. G1DD. Mild RA enlargement. Moderate LA enlargement. Mild MR, TR, AR, AS. Mild pHTN    Labs:     furosemide    Tablet 40 milliGRAM(s) Oral daily  propranolol 10 milliGRAM(s) Oral two times a day      Home meds: Lasix 40 mg oral tablet: 1 tab(s) orally once a day  spironolactone 100 mg oral tablet: 1 tab(s) orally once a day      GI/NUTR:   #Diet, NPO except meds  - aspiration precautions, HOB 30    #GI Prophylaxis  - Indication: home medication  - pantoprazole 40 milliGRAM(s), Oral, before breakfast    #Bowel regimen  - n/a    #PMHx of cirrhosis  -     /RENAL:   #urine output in critically ill  - indwelling smith (placed **)  #IVF  -     Labs:   Labs:          BUN/Cr -  55/1.4  -->          [07-01 @ 16:25]Na  134 // K  4.6 // Mg  -- // Phos  --      HEME/ONC:   #DVT prophylaxis  - SCDs  - holding chemical PPx in the setting of SAH  #PMHx of thrombocytopenia  - Plt 73 on admission, goal of  per Neurosurgery, given 1u platelets in ED, pending repeat CBC    Labs:   Labs: Hgb/Hct:  10.1/32.6  (07-01 @ 16:25)  -->                      Platelets:  73  -->                 PTT/INR:  40.7/1.40  --->        T&S Expires: 7/4    ID:  #monitor for fever and leukocytosis    #MRSA prevention  - MRSA swab pending      WBC- 5.30  --->>  Temp trend- 24hrs T(F): 97.6 (07-01 @ 19:48), Max: 97.6 (07-01 @ 19:48)  Current antibiotics-    ENDO:  - Glucose goal 140-180. if above 180, start insulin sliding scale  - FS q6h while NPO    #PMHx of hypothyroidism  - levothyroxine 37.5 mcg PO QD      MSK:     Activity - Ambulate as Tolerated:     Time/Priority:  Routine (07-02-25 @ 02:02)  - PT consult pending    DERM:  - DTI screen pending    PALLIATIVE/GOALS OF CARE:  - Palliative care not required for pt at this time  - Code Status: full code    LINES/DRAINS:  PIV    Discontinued lines/drains:     HCP/Emergency Contact - Alexis Cleaninganasio, spouse [phone]    INDICATION FOR SDU:  q4h neuro checks s/p fall, SAH    DISPO:   SDU    Case discussed with attending Dr. Man 84y Female  w/ SAH       NEURO/PSYCH:  #acute subarachnoid hemorrhage s/p mechanical fall (+HT/-LOC/-AC)  - Neurosurgery recs - No acute surgical intervention, Q4h neuro checks, plts , vitamin K 10mg x1  - 1u platelets given in ED, pending rpt CBC  - rpt CTH at 6am    #Acute pain  - acetaminophen 650mg q6h  - tramadol 25mg/50mg moderate/severe pain    RESP:   #Oxygenation  - 2L NC, wean as tolerated  - encourage incentive spirometry  #Activity  - increase as tolerated  #incidental finding of new 1cm pulmonary nodule and groundglass opacity on CT chest  - f/up PET-CT recommended for further workup  #PMHx of pulmonary hypertension    CARDIAC:   #no acute issues    #PMHx of esophageal varices in the setting of cirrhosis  - propranolol 10 milliGRAM(s) Oral two times a day    Imaging:   - EKG: pending  - Echo: 5/25 - EF 60%. Mild LV hypertrophy. G1DD. Mild RA enlargement. Moderate LA enlargement. Mild MR, TR, AR, AS. Mild pHTN    GI/NUTR:   #Diet, NPO except meds  - aspiration precautions, HOB 30    #GI Prophylaxis  - Indication: home medication  - pantoprazole 40 milliGRAM(s), Oral, before breakfast    #Bowel regimen  - n/a    #PMHx of hepatic steatosis, cirrhosis, portal vein enlargement, esophageal varices  - home propranolol  - holding home spironolactone in the setting of NORA    #PSHx of cholecystectomy    /RENAL:   #urine output in critically ill  - voiding freely  #IVF  - NS at 100mL/hr  #NORA  - Cr 1.0 on admission in May 2025  - holding home lasix and spironolactone  - rpt BMP in AM    Labs:   Labs:          BUN/Cr -  55/1.4  -->          [07-01 @ 16:25]Na  134 // K  4.6 // Mg  -- // Phos  --      HEME/ONC:   #DVT prophylaxis  - SCDs  - holding chemical PPx in the setting of SAH  #PMHx of thrombocytopenia  - Plt 73 on admission, goal of  per Neurosurgery, given 1u platelets in ED and 10mg vit K x 1, pending repeat CBC    Labs:   Labs: Hgb/Hct:  10.1/32.6  (07-01 @ 16:25)  -->                      Platelets:  73  -->                 PTT/INR:  40.7/1.40  --->        T&S Expires: 7/4    ID:  #monitor for fever and leukocytosis    #MRSA prevention  - MRSA swab pending  - CHG 2% cloths    WBC- 5.30  --->>  Temp trend- 24hrs T(F): 97.6 (07-01 @ 19:48), Max: 97.6 (07-01 @ 19:48)  Current antibiotics-    ENDO:  - Glucose goal 140-180.   - insulin sliding scale  - FS q6h while NPO  - A1c pending    #PMHx of hypothyroidism  - levothyroxine 37.5 mcg PO QD    MSK:  - Activity - Ambulate as Tolerated:     Time/Priority:  Routine (07-02-25 @ 02:02)  - PT consult pending    DERM:  - DTI screen pending    PALLIATIVE/GOALS OF CARE:  - Palliative care not required for pt at this time  - Code Status: full code    LINES/DRAINS:  PIV    Discontinued lines/drains:     HCP/Emergency Contact - Alexis Attanasio, spouse [phone]    INDICATION FOR SDU:  q4h neuro checks s/p fall, SAH    DISPO:   SDU    Case discussed with attending Dr. Man 84y Female  w/ SAH       NEURO/PSYCH:  #acute subarachnoid hemorrhage s/p mechanical fall (+HT/-LOC/-AC)  - Neurosurgery recs - No acute surgical intervention, Q4h neuro checks, SBP < 140, plts , vitamin K 10mg x1  - 1u platelets given in ED, pending rpt CBC  - rpt CTH at 6am    #Acute pain  - acetaminophen 650mg q6h  - tramadol 25mg/50mg moderate/severe pain    RESP:   #Oxygenation  - 2L NC, wean as tolerated  - encourage incentive spirometry  #Activity  - increase as tolerated  #incidental finding of new 1cm pulmonary nodule and groundglass opacity on CT chest  - f/up PET-CT recommended for further workup  #PMHx of pulmonary hypertension    CARDIAC:   #no acute issues  - SBP < 140 per neurosurgery    #PMHx of esophageal varices in the setting of cirrhosis  - propranolol 10 milliGRAM(s) Oral two times a day    Imaging:   - EKG: pending  - Echo: 5/25 - EF 60%. Mild LV hypertrophy. G1DD. Mild RA enlargement. Moderate LA enlargement. Mild MR, TR, AR, AS. Mild pHTN    GI/NUTR:   #Diet, NPO except meds  - aspiration precautions, HOB 30    #GI Prophylaxis  - Indication: home medication  - pantoprazole 40 milliGRAM(s), Oral, before breakfast    #Bowel regimen  - n/a    #PMHx of hepatic steatosis, cirrhosis, portal vein enlargement, esophageal varices  - home propranolol  - holding home spironolactone in the setting of NORA    #PSHx of cholecystectomy    /RENAL:   #urine output in critically ill  - voiding freely  #IVF  - NS at 100mL/hr  #NORA  - Cr 1.0 on admission in May 2025  - holding home lasix and spironolactone  - rpt BMP in AM    Labs:   Labs:          BUN/Cr -  55/1.4  -->          [07-01 @ 16:25]Na  134 // K  4.6 // Mg  -- // Phos  --      HEME/ONC:   #DVT prophylaxis  - SCDs  - holding chemical PPx in the setting of SAH  #PMHx of thrombocytopenia  - Plt 73 on admission, goal of  per Neurosurgery, given 1u platelets in ED and 10mg vit K x 1, pending repeat CBC    Labs:   Labs: Hgb/Hct:  10.1/32.6  (07-01 @ 16:25)  -->                      Platelets:  73  -->                 PTT/INR:  40.7/1.40  --->        T&S Expires: 7/4    ID:  #monitor for fever and leukocytosis    #MRSA prevention  - MRSA swab pending  - CHG 2% cloths    WBC- 5.30  --->>  Temp trend- 24hrs T(F): 97.6 (07-01 @ 19:48), Max: 97.6 (07-01 @ 19:48)  Current antibiotics-    ENDO:  - Glucose goal 140-180.   - insulin sliding scale  - FS q6h while NPO  - A1c pending    #PMHx of hypothyroidism  - levothyroxine 37.5 mcg PO QD    MSK:  - Activity - Ambulate as Tolerated:     Time/Priority:  Routine (07-02-25 @ 02:02)  - PT consult pending    DERM:  - DTI screen pending    PALLIATIVE/GOALS OF CARE:  - Palliative care not required for pt at this time  - Code Status: full code    LINES/DRAINS:  PIV    Discontinued lines/drains:     HCP/Emergency Contact - Alexis Fongo, spouse [phone]    INDICATION FOR SDU:  q4h neuro checks s/p fall, SAH    DISPO:   SDU    Case discussed with attending Dr. Man

## 2025-07-02 NOTE — CHART NOTE - NSCHARTNOTEFT_GEN_A_CORE
Neurosurgery Note    Rpt HCT shows increased density of TSAH. Neuro exam unchanged - GCS 15 THOMAS x4  Recommend 1pk Plts and Vitamin K.. - Keep Plts levels above 85-100k; Keep INR below 1.4 - Currently not at  goal  Please Rpt Coags & Rpt HCT at 6am (Post intervention)  Continue Frequent Neuro checks / HOB at 30 and Strict Blood Pressure below 140

## 2025-07-02 NOTE — H&P ADULT - ASSESSMENT
Assessment:  84yF transfer from [facility] with concern for brain bleed seen on CT scan w/ pmh of cirrhosis, hypothyroidism, thrombocytopenia, lymphedema seen as Trauma Alert s/p unwitnessed mechanical fall +HT, -LOC, -AC. Findings consistent with acute subarachnoid hemorrhage     Plan:  - Admit to stepdown unit under Dr. Man  - Repeat CTH at 6 am  - Per nsx: ok for q4 neurochecks  - DVT ppx  - Multimodal pain regimen

## 2025-07-02 NOTE — PROGRESS NOTE ADULT - ASSESSMENT
Assessment:  84yF transfer from [facility] with concern for brain bleed seen on CT scan w/ pmh of cirrhosis, hypothyroidism, thrombocytopenia, lymphedema seen as Trauma Alert s/p unwitnessed mechanical fall +HT, -LOC, -AC. Findings consistent with acute subarachnoid hemorrhage     Plan:  - Per NSGY: ok for q4 neurochecks, follow up Opt (#[phone])  - DVT ppx  - Multimodal pain regimen   - Comanagement consult pending  - CM for return to facility    [id number]

## 2025-07-02 NOTE — H&P ADULT - NSHPLABSRESULTS_GEN_ALL_CORE
< from: CT Head No Cont (07.01.25 @ 23:44) >    impression:    Trace hyperdensity in the right posterior temporal parietal sulcus, which   appears more conspicuous than prior CT head 125. Findings are suggestive   of acute subarachnoid hemorrhage.    Remaining exam without significant change    < end of copied text >

## 2025-07-02 NOTE — H&P ADULT - HISTORY OF PRESENT ILLNESS
84yF transfer from [facility] with concern for brain bleed seen on CT scan w/ pmh of cirrhosis, hypothyroidism, thrombocytopenia, lymphedema seen as Trauma Alert s/p unwitnessed mechanical fall +HT, -LOC, -AC found down at [facility] after getting up to use the bathroom. Trauma assessment in ED: ABCs intact , GCS 14 , AAOx2

## 2025-07-02 NOTE — H&P ADULT - NSHPREVIEWOFSYSTEMS_GEN_ALL_CORE
General: NAD  HEENT: Normocephalic, atraumatic, EOMI, PEERLA. no scalp lacerations   Neck: Soft, midline trachea. no c-spine tenderness  Chest: No chest wall tenderness, no subcutaneous emphysema   Cardiac: S1, S2, RRR  Respiratory: Bilateral breath sounds, clear and equal bilaterally  Abdomen: Soft, non-distended, non-tender, no rebound, no guarding.  Groin: Normal appearing, pelvis stable   Ext:  Moving b/l upper and lower extremities. Palpable Radial b/l UE, b/l DP palpable in LE.   Back: No T/L/S spine tenderness, No palpable runoff/stepoff/deformity

## 2025-07-02 NOTE — PROGRESS NOTE ADULT - SUBJECTIVE AND OBJECTIVE BOX
TRAUMA SURGERY PROGRESS NOTE    Patient: BRAULIO ESPARZA , 84y ([birth year])Female   MRN: [id number]  Location: [facility] A  Visit: 07-02-25 Inpatient  Date: 07-02-25 @ 11:14    Hospital Day #: 2    Procedure/Dx/Injuries:  SAH    Events of past 24 hours: Pt denies headache. n/v, blurry vision. Presenting from [facility] facility where she gets PT 6 days/week. On 2L O2 at home.    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, unspecified estrogen receptor status  Blister of right lower leg  Osteoarthritis  Thrombocytopenia  Hypothyroidism  Cirrhosis    History of surgery  LEFT BREAST LUMPECTOMY  TUBIAL LIGATION  CHOLECYSTECTOMY  RIGHT & LEFT TKR  History of cholecystectomy    Vitals:   T(F): 97.4 (07-02-25 @ 07:34), Max: 97.6 (07-01-25 @ 19:48)  HR: 67 (07-02-25 @ 10:05)  BP: 110/59 (07-02-25 @ 10:05)  RR: 18 (07-02-25 @ 10:05)  SpO2: 99% (07-02-25 @ 10:05)    Fluids: sodium chloride 0.9%.: Solution, 1000 milliLiter(s) infuse at 50 mL/Hr    PHYSICAL EXAM:  GENERAL: NAD, well-appearing  CHEST/LUNG: Chest ride equal bilaterally, breathing easily on 2-3L NC  HEART: Regular rate and rhythm  ABDOMEN: Soft, Nontender, Nondistended;   EXTREMITIES:  No clubbing, cyanosis, or edema      MEDICATIONS  (STANDING):  acetaminophen     Tablet .. 650 milliGRAM(s) Oral every 6 hours  chlorhexidine 2% Cloths 1 Application(s) Topical daily  levothyroxine 37.5 MICROGram(s) Oral daily  pantoprazole    Tablet 40 milliGRAM(s) Oral before breakfast  propranolol 10 milliGRAM(s) Oral two times a day  sodium chloride 0.9%. 1000 milliLiter(s) (50 mL/Hr) IV Continuous <Continuous>    MEDICATIONS  (PRN):  traMADol 25 milliGRAM(s) Oral every 6 hours PRN Moderate Pain (4 - 6)  traMADol 50 milliGRAM(s) Oral every 6 hours PRN Severe Pain (7 - 10)      DVT PROPHYLAXIS:   GI PROPHYLAXIS: pantoprazole    Tablet 40 milliGRAM(s) Oral before breakfast    LAB/STUDIES:               9.5    3.08  )-----------( 45       ( 02 Jul 2025 07:12 )             29.9       Auto Immature Granulocyte %: 1.0 % (07-02-25 @ 07:12)  Auto Neutrophil %: 67.2 % (07-01-25 @ 16:25)  Auto Immature Granulocyte %: 0.6 % (07-01-25 @ 16:25)    07-02    130[L]  |  101  |  49[H]  ----------------------------<  92  4.4   |  20  |  1.3      Calcium: 8.5 mg/dL (07-02-25 @ 07:12)      LFTs:             5.7  | 3.8  | 29       ------------------[112     ( 02 Jul 2025 07:12 )  2.5  | x    | 20           Lactate, Blood: 1.9 mmol/L (07-01-25 @ 16:25)      Coags:     16.00  ----< 1.35    ( 02 Jul 2025 07:12 )     36.5     IMAGING: No new imaging      ACCESS/ DEVICES:  [x] Peripheral IV

## 2025-07-02 NOTE — CONSULT NOTE ADULT - SUBJECTIVE AND OBJECTIVE BOX
VILMA [place]   [id number]   [birth year]    84yF  ============================================================   DATE OF INITIAL SICU/SDU CONSULT: 07-02-25    INDICATION FOR SICU CONSULT:       SICU COURSE EVENTS :  07-02 - admitted to SICU service     24HOUR EVENTS  -Admission under SICU service    [X] A ten-point review of systems was negative except as expressed in note.  [X] History was obtained from patient. If unable to participate in their care, history obtained from review of the chart and collateral sources of information.  ============================================================      HPI   HPI:  84yF transfer from [facility] with concern for brain bleed seen on CT scan w/ pmh of cirrhosis, hypothyroidism, thrombocytopenia, lymphedema seen as Trauma Alert s/p unwitnessed mechanical fall +HT, -LOC, -AC found down at [facility] home after getting up to use the bathroom. Trauma assessment in ED: ABCs intact , GCS 14 , AAOx2 (02 Jul 2025 01:35)      Pertinent Imaging    OR Stats for 07-02-25    OR Time :   IV Fluids:  EBL:  Blood Products:  UOP:  Findings -    PAST MEDICAL & SURGICAL HISTORY  GERD (gastroesophageal reflux disease)    Arthritis  OA    Fatty liver    Malignant neoplasm of areola of left breast in female, unspecified estrogen receptor status    H/O thrombocytopenia    Blister of right lower leg    Osteoarthritis    Thrombocytopenia    Hypothyroidism    Cirrhosis        HOME MEDICATIONS    Lasix 40 mg oral tablet: 1 tab(s) orally once a day  levothyroxine 37.5 mcg (0.0375 mg) oral capsule: 1 cap(s) orally once a day  pantoprazole 40 mg oral delayed release tablet: 1 tab(s) orally once a day (before a meal)  propranolol 10 mg oral tablet: 1 tab(s) orally 2 times a day  spironolactone 100 mg oral tablet: 1 tab(s) orally once a day  traMADol 50 mg oral tablet: 1 tab(s) orally every 8 hours As needed Moderate Pain (4 - 6)    ACTIVE MEDICATIONS    acetaminophen     Tablet .. 650 milliGRAM(s) Oral every 6 hours  furosemide    Tablet 40 milliGRAM(s) Oral daily  levothyroxine Injectable 37.5 MICROGram(s) IV Push at bedtime  oxyCODONE    IR 5 milliGRAM(s) Oral every 6 hours PRN Moderate Pain (4 - 6)  pantoprazole    Tablet 40 milliGRAM(s) Oral before breakfast  propranolol 10 milliGRAM(s) Oral two times a day  sodium chloride 0.9%. 1000 milliLiter(s) IV Continuous <Continuous>  traMADol 50 milliGRAM(s) Oral every 8 hours PRN Moderate Pain (4 - 6)    ALLERGIES  Allergies    Levaquin (Hives; Rash)  aspirin (Other)  predniSONE (Swelling)  Cipro (Hives; Rash)    Intolerances         VITAL SIGNS (Last 24Hours)  ICU Vital Signs Last 24 Hrs  T(C): 36.4 (01 Jul 2025 19:48), Max: 36.4 (01 Jul 2025 19:48)  T(F): 97.6 (01 Jul 2025 19:48), Max: 97.6 (01 Jul 2025 19:48)  HR: 64 (01 Jul 2025 19:48) (64 - 99)  BP: 120/62 (01 Jul 2025 19:48) (120/62 - 127/67)  BP(mean): --  ABP: --  ABP(mean): --  RR: 20 (01 Jul 2025 19:48) (20 - 20)  SpO2: 96% (01 Jul 2025 19:48) (96% - 100%)    O2 Parameters below as of 01 Jul 2025 19:48  Patient On (Oxygen Delivery Method): nasal cannula  O2 Flow (L/min): 3          INTAKE/OUTPUT (Last 24Hours)  I&O's Summary      LABS (Last 24Hours)  [07-01 @ 16:25:] Na 134 K 4.6 Cl 101 Mg ___ b>Phos ___ BUN/Cr 55/1.4>>>     [07-01 @ 16:25] AST 28  ALT 20 DBili __  TBili 4.0 Alb 2.8      MECHANICAL VENT/BLOOD GAS  if indicated         PHYSICAL EXAM     General: Pt lying comfortably in bed.     Neuro: GCS:  14   = E 4  / V 4  / M  6    Neuro: Alert & oriented x 2, no focal deficits. CNs II-XII intact. PERRLA, EOMs intact. Spontaneously moving all extremities.     Lungs: Clear to auscultation bilaterally, normal expansion/effort. Oxygen delivery: 2L NC.    Cardiovascular : S1, S2.  Regular rate and rhythm. Cardiac Rhythm: NSR  Peripheral edema: 1+ pitting edema b/l LEs    GI: Abdomen soft, Non-tender, Non-distended.     Extremities: Extremities warm, pink, well-perfused. Chronic venous stasis noted bilaterally    Derm: Good skin turgor, no skin breakdown.     : Voiding freely.     ----------------------------------------------------------------------------------------------------------  Tubes/Lines/Drains    [x] Peripheral IV    [ ] Urinary Catheter Sesay  [ ]Present on Admission          Insertion Date:                                   [ ] Central Venous Line       [ ]IJ             [ ]Femoral     [ ]Subclavian   [ ]Left  [ ]Right      Insertion Date:          [ ] Arterial Line 	                [ ]Radial    [ ]Femoral     [ ]Axillary         [ ]Left  [ ]Right      Insertion Date:            VILMA, [place]   [id number]   [birth year]    84yF  ============================================================   DATE OF INITIAL SICU/SDU CONSULT: 07-02-25    INDICATION FOR SICU CONSULT:       SICU COURSE EVENTS :  07-02 - admitted to SICU service     24HOUR EVENTS  -Admission under SICU service    [X] A ten-point review of systems was negative except as expressed in note.  [X] History was obtained from patient. If unable to participate in their care, history obtained from review of the chart and collateral sources of information.  ============================================================      HPI   HPI:  84yF transfer from [facility] with concern for brain bleed seen on CT scan w/ pmh of cirrhosis, hypothyroidism, thrombocytopenia, lymphedema seen as Trauma Alert s/p unwitnessed mechanical fall +HT, -LOC, -AC found down at [place] [facility] home after getting up to use the bathroom. Trauma assessment in ED: ABCs intact , GCS 14 , AAOx2 (02 Jul 2025 01:35)    Initial CT head showed suggestion of trace right temporoparietal subarachnoid hemorrhage, more pronounced on repeat scan. CT neck, CT C/A/P negative for acute pathology. Neurosurgery consulted, no acute surgical intervention. Recommending q4h neuro checks, platelet goal of  and INR goal of < 1.4. 1u platelets given in ED. Rpt CTH in 6h pending.     SICU consulted for q4h neuro checks in the setting of subarachnoid hemorrhage. Pt examined in ED, see physical exam below. Approved for SDU by Dr. Man.    Pertinent Imaging    < from: CT Head No Cont (07.01.25 @ 23:44) >  Findings/impression:    Trace hyperdensity in the right posterior temporal parietal sulcus, which   appears more conspicuous than prior CT head 125. Findings are suggestive   of acute subarachnoid hemorrhage.    Remaining exam without significant change    < end of copied text >      < from: CT Cervical Spine No Cont (07.01.25 @ 17:56) >  FINDINGS:    CT HEAD:    There is suggestion of trace hyperdensity in the posterior right   temporoparietal sulci. For example series 2 image 38. There is no   associated mass effect or midline shift.    There is stable prominence of the sulci, sylvian fissures, and   ventricles, reflecting mild diffuse parenchymal volume loss.    Status post left cataract surgery. The visualized right intraorbital   contents are normal. There is scattered paranasal sinus mucosal   thickening noted. The mastoid air cells are clear. The visualized soft   tissues and osseous structures appear normal.      CT CERVICAL SPINE:    The alignment is within normal limits. There is diffuse osteopenia.    There is no evidence of acute fracture, compression deformity or facet   subluxation of the cervical spine.    The atlantoaxial relationships are maintained. The posterior elements are   intact. There is no significant prevertebral soft tissue swelling. The   visualized paraspinal soft tissues are unremarkable.    There is multilevel endplate degenerative changes with marginal   osteophyte formation, uncovertebral spurring, loss of normal disc space   height as well as facet joint space compartment narrowing.    There is no high-grade spinal canal stenosis. Please note spinal canal   contents are suboptimally evaluated inherent to CT technique.      IMPRESSION:    CT HEAD:  Suggestion of trace acute subarachnoid hemorrhage in the right   temporoparietal region without mass effect or midline shift.    CT CERVICAL SPINE:  No acute cervical fracture or facet subluxation.    Communication: The summary ofabove findings were discussed with readback   confirmation with Dr. Torres by resident Marion Contreras MD on 7/1/2025 at   6:30 PM.    < end of copied text >      < from: CT Abdomen and Pelvis w/ IV Cont (07.01.25 @ 17:58) >  FINDINGS:  CHEST:    TUBES AND LINES: None.    CHEST WALL, AXILLA, AND LOWER NECK: Unremarkable    MEDIASTINUM AND WM: There are no suspicious mediastinal, hilar, or   axillary lymph nodes. There is a 7.5 mm nodule in the right lobe of the   thyroid gland.    VESSELS: There is no evidence of central pulmonary or segmental embolism.   The pulmonary artery measures 3.4 cm, which may be seen with pulmonary   hypertension.. Normal caliber aorta.  There is no evidence of aortic   aneurysm or dissection.    HEART: The heart is normal in size. No pericardial effusion.    LUNGS AND LARGE AIRWAYS: The central tracheobronchial tree is patent.   Several previously noted pulmonary nodules are unchanged. However, a new   area of pulmonary nodules, measuring up to 1 cm, are noted in the region   of the lingula. A region of ground glass opacity measuring 1.6 x 1.6 x   3.8 cm is noted in the left upper lobe. No pneumothorax.    PLEURA: There is no pleural effusion.      ABDOMEN AND PELVIS:    LIVER: The liver is small with lobulated contours consistent with   cirrhosis. Hepatic steatosis is noted. No evidence of solid mass. The   portal and splenic veins are enlarged and paten, consistent with portal  hypertension. Recanalization of the umbilical vein is noted, as well as   esophageal varices. The hepatic veins are opacified.  BILE DUCTS: Normal caliber.  GALLBLADDER: Post cholecystectomy  SPLEEN: The spleen is enlarged measuring 15.6 cm in length.  PANCREAS: The pancreas is normal in size and configuration. No evidence   of mass or pancreatitis.  ADRENALS: Within normal limits.  KIDNEYS/URETERS: There is symmetric renal enhancement. No evidence of   hydronephrosis, calcified stones, or solid mass.    BLADDER: Unremarkable.  REPRODUCTIVE ORGANS: Unremarkable    BOWEL: No evidence of bowel obstruction, colitis, or inflammatory   process. Normal caliber appendix.  PERITONEUM/RETROPERITONEUM: Moderate ascites is noted in the abdomen and   pelvis. No free intraperitoneal air.  VESSELS: Normal caliber aorta.  LYMPH NODES: No abdominal or pelvic adenopathy.  ABDOMINAL WALL: Unremarkable.  BONES: Degenerative changes of the spine. No evidence of acute fracture.    IMPRESSION:    Several previously noted pulmonary nodules are unchanged. However, a new area of pulmonary nodules, measuring up to 1 cm, are noted in the region of the lingula. A region of ground glass opacity measuring 1.6 x 1.6 x 3.8 cm is noted in the left upper lobe. PET-CT may be obtained for further evaluation.    The liver is small with lobulated contours consistent with cirrhosis. Hepatic steatosis is noted. The spleen is enlarged measuring 15.6 cm in   length.    The portal and splenic veins are enlarged and patent, consistent with   portal hypertension. Recanalization of the umbilical vein is noted, as   well as esophageal varices.    Moderate ascites is noted in the abdomen and pelvis.    No evidence of thoracic, abdominal or pelvic visceral injury, laceration,   or hematoma.    No evidence of acute fracture.    < end of copied text >      PAST MEDICAL & SURGICAL HISTORY  GERD (gastroesophageal reflux disease)    Arthritis  OA    Fatty liver    Malignant neoplasm of areola of left breast in female, unspecified estrogen receptor status    H/O thrombocytopenia    Blister of right lower leg    Osteoarthritis    Thrombocytopenia    Hypothyroidism    Cirrhosis        HOME MEDICATIONS    Lasix 40 mg oral tablet: 1 tab(s) orally once a day  levothyroxine 37.5 mcg (0.0375 mg) oral capsule: 1 cap(s) orally once a day  pantoprazole 40 mg oral delayed release tablet: 1 tab(s) orally once a day (before a meal)  propranolol 10 mg oral tablet: 1 tab(s) orally 2 times a day  spironolactone 100 mg oral tablet: 1 tab(s) orally once a day  traMADol 50 mg oral tablet: 1 tab(s) orally every 8 hours As needed Moderate Pain (4 - 6)    ACTIVE MEDICATIONS    acetaminophen     Tablet .. 650 milliGRAM(s) Oral every 6 hours  furosemide    Tablet 40 milliGRAM(s) Oral daily  levothyroxine Injectable 37.5 MICROGram(s) IV Push at bedtime  oxyCODONE    IR 5 milliGRAM(s) Oral every 6 hours PRN Moderate Pain (4 - 6)  pantoprazole    Tablet 40 milliGRAM(s) Oral before breakfast  propranolol 10 milliGRAM(s) Oral two times a day  sodium chloride 0.9%. 1000 milliLiter(s) IV Continuous <Continuous>  traMADol 50 milliGRAM(s) Oral every 8 hours PRN Moderate Pain (4 - 6)    ALLERGIES  Allergies    Levaquin (Hives; Rash)  aspirin (Other)  predniSONE (Swelling)  Cipro (Hives; Rash)    Intolerances         VITAL SIGNS (Last 24Hours)  ICU Vital Signs Last 24 Hrs  T(C): 36.4 (01 Jul 2025 19:48), Max: 36.4 (01 Jul 2025 19:48)  T(F): 97.6 (01 Jul 2025 19:48), Max: 97.6 (01 Jul 2025 19:48)  HR: 64 (01 Jul 2025 19:48) (64 - 99)  BP: 120/62 (01 Jul 2025 19:48) (120/62 - 127/67)  BP(mean): --  ABP: --  ABP(mean): --  RR: 20 (01 Jul 2025 19:48) (20 - 20)  SpO2: 96% (01 Jul 2025 19:48) (96% - 100%)    O2 Parameters below as of 01 Jul 2025 19:48  Patient On (Oxygen Delivery Method): nasal cannula  O2 Flow (L/min): 3          INTAKE/OUTPUT (Last 24Hours)  I&O's Summary      LABS (Last 24Hours)  [07-01 @ 16:25:] Na 134 K 4.6 Cl 101 Mg ___ b>Phos ___ BUN/Cr 55/1.4>>>     [07-01 @ 16:25] AST 28  ALT 20 DBili __  TBili 4.0 Alb 2.8      MECHANICAL VENT/BLOOD GAS  if indicated         PHYSICAL EXAM     General: Pt lying comfortably in bed.     Neuro: GCS:  14   = E 4  / V 4  / M  6    Neuro: Alert & oriented x 2, no focal deficits. CNs II-XII intact. PERRLA, EOMs intact. Spontaneously moving all extremities.     Lungs: Clear to auscultation bilaterally, normal expansion/effort. Oxygen delivery: 2L NC.    Cardiovascular : S1, S2.  Regular rate and rhythm. Cardiac Rhythm: NSR  Peripheral edema: 1+ pitting edema b/l LEs    GI: Abdomen soft, Non-tender, Non-distended.     Extremities: Extremities warm, pink, well-perfused. Chronic venous stasis noted bilaterally    Derm: Good skin turgor, no skin breakdown.     : Voiding freely.     ----------------------------------------------------------------------------------------------------------  Tubes/Lines/Drains    [x] Peripheral IV    [ ] Urinary Catheter Sesay  [ ]Present on Admission          Insertion Date:                                   [ ] Central Venous Line       [ ]IJ             [ ]Femoral     [ ]Subclavian   [ ]Left  [ ]Right      Insertion Date:          [ ] Arterial Line 	                [ ]Radial    [ ]Femoral     [ ]Axillary         [ ]Left  [ ]Right      Insertion Date:

## 2025-07-03 ENCOUNTER — TRANSCRIPTION ENCOUNTER (OUTPATIENT)
Age: 85
End: 2025-07-03

## 2025-07-03 VITALS
HEART RATE: 66 BPM | DIASTOLIC BLOOD PRESSURE: 56 MMHG | RESPIRATION RATE: 29 BRPM | OXYGEN SATURATION: 98 % | SYSTOLIC BLOOD PRESSURE: 113 MMHG

## 2025-07-03 LAB
ANION GAP SERPL CALC-SCNC: 12 MMOL/L — SIGNIFICANT CHANGE UP (ref 7–14)
ANION GAP SERPL CALC-SCNC: 9 MMOL/L — SIGNIFICANT CHANGE UP (ref 7–14)
BASOPHILS # BLD AUTO: 0.01 K/UL — SIGNIFICANT CHANGE UP (ref 0–0.2)
BASOPHILS NFR BLD AUTO: 0.4 % — SIGNIFICANT CHANGE UP (ref 0–1)
BUN SERPL-MCNC: 52 MG/DL — HIGH (ref 10–20)
BUN SERPL-MCNC: 52 MG/DL — HIGH (ref 10–20)
CALCIUM SERPL-MCNC: 8.2 MG/DL — LOW (ref 8.4–10.5)
CALCIUM SERPL-MCNC: 8.3 MG/DL — LOW (ref 8.4–10.5)
CHLORIDE SERPL-SCNC: 103 MMOL/L — SIGNIFICANT CHANGE UP (ref 98–110)
CHLORIDE SERPL-SCNC: 105 MMOL/L — SIGNIFICANT CHANGE UP (ref 98–110)
CO2 SERPL-SCNC: 18 MMOL/L — SIGNIFICANT CHANGE UP (ref 17–32)
CO2 SERPL-SCNC: 20 MMOL/L — SIGNIFICANT CHANGE UP (ref 17–32)
CREAT ?TM UR-MCNC: 44 MG/DL — SIGNIFICANT CHANGE UP
CREAT SERPL-MCNC: 1.6 MG/DL — HIGH (ref 0.7–1.5)
CREAT SERPL-MCNC: 1.6 MG/DL — HIGH (ref 0.7–1.5)
EGFR: 31 ML/MIN/1.73M2 — LOW
EOSINOPHIL # BLD AUTO: 0.22 K/UL — SIGNIFICANT CHANGE UP (ref 0–0.7)
EOSINOPHIL NFR BLD AUTO: 7.9 % — SIGNIFICANT CHANGE UP (ref 0–8)
GLUCOSE SERPL-MCNC: 172 MG/DL — HIGH (ref 70–99)
GLUCOSE SERPL-MCNC: 177 MG/DL — HIGH (ref 70–99)
HCT VFR BLD CALC: 26.7 % — LOW (ref 37–47)
HCT VFR BLD CALC: 29.2 % — LOW (ref 37–47)
HGB BLD-MCNC: 8.4 G/DL — LOW (ref 12–16)
HGB BLD-MCNC: 9.1 G/DL — LOW (ref 12–16)
IMM GRANULOCYTES NFR BLD AUTO: 0.4 % — HIGH (ref 0.1–0.3)
LYMPHOCYTES # BLD AUTO: 0.62 K/UL — LOW (ref 1.2–3.4)
LYMPHOCYTES # BLD AUTO: 22.2 % — SIGNIFICANT CHANGE UP (ref 20.5–51.1)
MAGNESIUM SERPL-MCNC: 1.9 MG/DL — SIGNIFICANT CHANGE UP (ref 1.8–2.4)
MAGNESIUM SERPL-MCNC: 2 MG/DL — SIGNIFICANT CHANGE UP (ref 1.8–2.4)
MCHC RBC-ENTMCNC: 29.4 PG — SIGNIFICANT CHANGE UP (ref 27–31)
MCHC RBC-ENTMCNC: 29.4 PG — SIGNIFICANT CHANGE UP (ref 27–31)
MCHC RBC-ENTMCNC: 31.2 G/DL — LOW (ref 32–37)
MCHC RBC-ENTMCNC: 31.5 G/DL — LOW (ref 32–37)
MCV RBC AUTO: 93.4 FL — SIGNIFICANT CHANGE UP (ref 81–99)
MCV RBC AUTO: 94.2 FL — SIGNIFICANT CHANGE UP (ref 81–99)
MONOCYTES # BLD AUTO: 0.28 K/UL — SIGNIFICANT CHANGE UP (ref 0.1–0.6)
MONOCYTES NFR BLD AUTO: 10 % — HIGH (ref 1.7–9.3)
NEUTROPHILS # BLD AUTO: 1.65 K/UL — SIGNIFICANT CHANGE UP (ref 1.4–6.5)
NEUTROPHILS NFR BLD AUTO: 59.1 % — SIGNIFICANT CHANGE UP (ref 42.2–75.2)
NRBC BLD AUTO-RTO: 0 /100 WBCS — SIGNIFICANT CHANGE UP (ref 0–0)
NRBC BLD AUTO-RTO: 0 /100 WBCS — SIGNIFICANT CHANGE UP (ref 0–0)
OSMOLALITY UR: 395 MOS/KG — SIGNIFICANT CHANGE UP (ref 50–1200)
PHOSPHATE SERPL-MCNC: 3.4 MG/DL — SIGNIFICANT CHANGE UP (ref 2.1–4.9)
PHOSPHATE SERPL-MCNC: 3.5 MG/DL — SIGNIFICANT CHANGE UP (ref 2.1–4.9)
PLATELET # BLD AUTO: 56 K/UL — LOW (ref 130–400)
PLATELET # BLD AUTO: 65 K/UL — LOW (ref 130–400)
PMV BLD: 10.1 FL — SIGNIFICANT CHANGE UP (ref 7.4–10.4)
PMV BLD: 9.7 FL — SIGNIFICANT CHANGE UP (ref 7.4–10.4)
POTASSIUM SERPL-MCNC: 4.9 MMOL/L — SIGNIFICANT CHANGE UP (ref 3.5–5)
POTASSIUM SERPL-MCNC: 5.1 MMOL/L — HIGH (ref 3.5–5)
POTASSIUM SERPL-SCNC: 4.9 MMOL/L — SIGNIFICANT CHANGE UP (ref 3.5–5)
POTASSIUM SERPL-SCNC: 5.1 MMOL/L — HIGH (ref 3.5–5)
POTASSIUM UR-SCNC: 36 MMOL/L — SIGNIFICANT CHANGE UP
RBC # BLD: 2.86 M/UL — LOW (ref 4.2–5.4)
RBC # BLD: 3.1 M/UL — LOW (ref 4.2–5.4)
RBC # FLD: 17.5 % — HIGH (ref 11.5–14.5)
RBC # FLD: 17.6 % — HIGH (ref 11.5–14.5)
SODIUM SERPL-SCNC: 133 MMOL/L — LOW (ref 135–146)
SODIUM SERPL-SCNC: 134 MMOL/L — LOW (ref 135–146)
SODIUM UR-SCNC: 63 MMOL/L — SIGNIFICANT CHANGE UP
WBC # BLD: 2.79 K/UL — LOW (ref 4.8–10.8)
WBC # BLD: 3.9 K/UL — LOW (ref 4.8–10.8)
WBC # FLD AUTO: 2.79 K/UL — LOW (ref 4.8–10.8)
WBC # FLD AUTO: 3.9 K/UL — LOW (ref 4.8–10.8)

## 2025-07-03 PROCEDURE — 99231 SBSQ HOSP IP/OBS SF/LOW 25: CPT

## 2025-07-03 PROCEDURE — 99232 SBSQ HOSP IP/OBS MODERATE 35: CPT

## 2025-07-03 RX ORDER — ACETAMINOPHEN 500 MG/5ML
2 LIQUID (ML) ORAL
Qty: 0 | Refills: 0 | DISCHARGE
Start: 2025-07-03

## 2025-07-03 RX ORDER — HEPARIN SODIUM 1000 [USP'U]/ML
5000 INJECTION INTRAVENOUS; SUBCUTANEOUS EVERY 8 HOURS
Refills: 0 | Status: DISCONTINUED | OUTPATIENT
Start: 2025-07-03 | End: 2025-07-03

## 2025-07-03 RX ORDER — SENNA 187 MG
2 TABLET ORAL AT BEDTIME
Refills: 0 | Status: DISCONTINUED | OUTPATIENT
Start: 2025-07-03 | End: 2025-07-03

## 2025-07-03 RX ORDER — POLYETHYLENE GLYCOL 3350 17 G/17G
17 POWDER, FOR SOLUTION ORAL DAILY
Refills: 0 | Status: DISCONTINUED | OUTPATIENT
Start: 2025-07-03 | End: 2025-07-03

## 2025-07-03 RX ADMIN — FUROSEMIDE 40 MILLIGRAM(S): 10 INJECTION INTRAMUSCULAR; INTRAVENOUS at 06:19

## 2025-07-03 RX ADMIN — Medication 650 MILLIGRAM(S): at 11:45

## 2025-07-03 RX ADMIN — POLYETHYLENE GLYCOL 3350 17 GRAM(S): 17 POWDER, FOR SOLUTION ORAL at 11:45

## 2025-07-03 RX ADMIN — Medication 1 APPLICATION(S): at 12:46

## 2025-07-03 RX ADMIN — HEPARIN SODIUM 5000 UNIT(S): 1000 INJECTION INTRAVENOUS; SUBCUTANEOUS at 13:53

## 2025-07-03 RX ADMIN — Medication 37.5 MICROGRAM(S): at 06:19

## 2025-07-03 RX ADMIN — Medication 40 MILLIGRAM(S): at 06:19

## 2025-07-03 RX ADMIN — Medication 100 MILLILITER(S): at 11:45

## 2025-07-03 RX ADMIN — Medication 650 MILLIGRAM(S): at 06:19

## 2025-07-03 NOTE — PATIENT PROFILE ADULT - NSTRANSFERBELONGINGSDISPO_GEN_A_NUR
Start using albuterol inhaler with spacer 2 puffs every 4 hours for the first 24 hours and then every 4 to 6 hours as needed for cough/wheezing thereafter.    Start Orapred 6.5 ml two times a day for 5 days.    Follow up with your pediatrician this week.    with patient

## 2025-07-03 NOTE — PHYSICAL THERAPY INITIAL EVALUATION ADULT - PERTINENT HX OF CURRENT PROBLEM, REHAB EVAL
84yF transfer from [facility] with concern for brain bleed seen on CT scan w/ pmh of cirrhosis, hypothyroidism, thrombocytopenia, lymphedema seen as Trauma Alert s/p unwitnessed mechanical fall +HT, -LOC, -AC. Findings consistent with acute subarachnoid hemorrhage. SAH stable on repeat imaging

## 2025-07-03 NOTE — PHYSICAL THERAPY INITIAL EVALUATION ADULT - GAIT DEVIATIONS NOTED, PT EVAL
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 Lab order completed for UA with micro. Lab results were within parameters ordered by Dr. Walker. No fever, cough or chills. Patient seen by Dr. Walker prior to infusion appt and Ok to have BCG today. Patient prepped and draped in sterile manner. #16 Filipino - 5 ml  bateman inserted without difficulty, bladder emptied clear yellow urine. At 0844 AM 50 mg of Brenda BCG solution inserted via the bateman catheter into the bladder. Chemo precautions used.  Catheter removed. Patient tolerated treatment well. Patient reminded to retain BCG solution in the bladder for two hours and call office after voiding at home. Discharge instructions reviewed with patient. Patient will return on 7/22/19 for #5 BCG treatment. Supervised by Dr. Walker.     decreased kofi/decreased step length/decreased stride length

## 2025-07-03 NOTE — PROGRESS NOTE ADULT - SUBJECTIVE AND OBJECTIVE BOX
CC: Patient is a 84y old  Female who presents with a chief complaint of S/P mechanical fall +HT, -LOC, -AC (02 Jul 2025 11:13)      HPI:  84yF transfer from [facility] with concern for brain bleed seen on CT scan w/ pmh of cirrhosis, hypothyroidism, thrombocytopenia, lymphedema seen as Trauma Alert s/p unwitnessed mechanical fall +HT, -LOC, -AC found down at [facility] after getting up to use the bathroom. Trauma assessment in ED: ABCs intact , GCS 14 , AAOx2 (02 Jul 2025 01:35)      Patient seen and examined at bedside.   No acute overnight events.   No acute complaints this morning.   ROS otherwise negative on a 10-point assessment.   Discussed with pt and son (Ramón) at bedside.    PAST MEDICAL & SURGICAL HISTORY:  GERD (gastroesophageal reflux disease)      Fatty liver      Malignant neoplasm of areola of left breast in female, unspecified estrogen receptor status      Blister of right lower leg      Osteoarthritis      Thrombocytopenia      Hypothyroidism      Cirrhosis      History of surgery  LEFT BREAST LUMPECTOMY  TUBIAL LIGATION  CHOLECYSTECTOMY  RIGHT & LEFT TKR      History of cholecystectomy        SOCIAL HISTORY:  Tobacco Usage:  (   ) never smoked   (   ) former smoker   (   ) current smoker  (     ) pack years    Tobacco Quit Date:  Substance Use (Street drugs): (  ) never used  (  ) other:  Alcohol Usage:    Family history reviewed and otherwise non-contributory  ALLERGIES: Levaquin (Hives; Rash)  aspirin (Other)  predniSONE (Swelling)  Cipro (Hives; Rash)      MEDICATIONS  (STANDING):  acetaminophen     Tablet .. 650 milliGRAM(s) Oral every 6 hours  chlorhexidine 2% Cloths 1 Application(s) Topical daily  furosemide    Tablet 40 milliGRAM(s) Oral daily  heparin   Injectable 5000 Unit(s) SubCutaneous every 8 hours  levothyroxine 37.5 MICROGram(s) Oral daily  pantoprazole    Tablet 40 milliGRAM(s) Oral before breakfast  polyethylene glycol 3350 17 Gram(s) Oral daily  propranolol 10 milliGRAM(s) Oral two times a day  senna 2 Tablet(s) Oral at bedtime  sodium chloride 0.9%. 1000 milliLiter(s) (100 mL/Hr) IV Continuous <Continuous>    MEDICATIONS  (PRN):      Home Medications:  Lasix 40 mg oral tablet: 1 tab(s) orally once a day (02 Jul 2025 01:41)  levothyroxine 37.5 mcg (0.0375 mg) oral capsule: 1 cap(s) orally once a day (02 Jul 2025 01:41)  spironolactone 100 mg oral tablet: 1 tab(s) orally once a day (02 Jul 2025 01:41)  traMADol 50 mg oral tablet: 1 tab(s) orally every 8 hours As needed Moderate Pain (4 - 6) (02 Jul 2025 01:41)      Vital Signs Last 24 Hrs  T(C): 36.6 (03 Jul 2025 12:00), Max: 36.6 (03 Jul 2025 08:00)  T(F): 97.9 (03 Jul 2025 12:00), Max: 97.9 (03 Jul 2025 12:00)  HR: 76 (03 Jul 2025 12:00) (62 - 76)  BP: 97/65 (03 Jul 2025 12:00) (97/48 - 130/60)  BP(mean): 77 (03 Jul 2025 12:00) (69 - 86)  RR: 23 (03 Jul 2025 12:00) (15 - 28)  SpO2: 97% (03 Jul 2025 12:00) (96% - 99%)    Parameters below as of 03 Jul 2025 12:00  Patient On (Oxygen Delivery Method): nasal cannula  O2 Flow (L/min): 2      PHYSICAL EXAM:  GENERAL: NAD  HEAD:  Atraumatic, Normocephalic  NECK: Supple, No JVD  CHEST/LUNG: Clear to auscultation bilaterally; No rales, rhonchi, wheezing, or rubs  HEART: Regular rate and rhythm; S1/S2, No murmurs, rubs, or gallops  ABDOMEN: Soft, Nontender, Nondistended; Bowel sounds present x4 quadrants  VASCULAR: Normal pulses, Normal capillary refill  EXTREMITIES:  2+ Peripheral Pulses, No cyanosis, stasis dermatitis with ace bandages wrapped on BLE  SKIN: Warm, Intact  NERVOUS SYSTEM:  AAOx3    LABS:                                   9.1    3.90  )-----------( 65       ( 03 Jul 2025 11:08 )             29.2     07-03    133  |  103  |  52  ----------------------------<  177  5.1   |  18  |  1.6    Ca    8.3      03 Jul 2025 11:08  Phos  3.4     07-03  Mg     2.0     07-03    TPro  5.7  /  Alb  2.5  /  TBili  3.8  /  DBili  x   /  AST  29  /  ALT  20  /  AlkPhos  112  07-02    eGFR: 31 mL/min/1.73m2 (03 Jul 2025 11:08)  eGFR: 31 mL/min/1.73m2 (03 Jul 2025 01:24)        PT/INR - ( 02 Jul 2025 07:12 )   PT: 16.00 sec;   INR: 1.35 ratio         PTT - ( 02 Jul 2025 07:12 )  PTT:36.5 sec  Urinalysis Basic - ( 02 Jul 2025 07:12 )    Color: x / Appearance: x / SG: x / pH: x  Gluc: 92 mg/dL / Ketone: x  / Bili: x / Urobili: x   Blood: x / Protein: x / Nitrite: x   Leuk Esterase: x / RBC: x / WBC x   Sq Epi: x / Non Sq Epi: x / Bacteria: x          Lactate, Blood: 1.9 mmol/L (07-01 @ 16:25)        POCT Blood Glucose.: 112 mg/dL (02 Jul 2025 03:45)  POCT Blood Glucose.: 194 mg/dL (01 Jul 2025 20:01)  POCT Blood Glucose.: 181 mg/dL (01 Jul 2025 16:27)          RADIOLOGY & ADDITIONAL TESTS:    < from: CT Head No Cont (07.01.25 @ 23:44) >  Findings/impression:    Trace hyperdensity in the right posterior temporal parietal sulcus, which   appears more conspicuous than prior CT head 125. Findings are suggestive   of acute subarachnoid hemorrhage.    Remaining exam without significant change    < end of copied text >      < from: CT Cervical Spine No Cont (07.01.25 @ 17:56) >  FINDINGS:    CT HEAD:    There is suggestion of trace hyperdensity in the posterior right   temporoparietal sulci. For example series 2 image 38. There is no   associated mass effect or midline shift.    There is stable prominence of the sulci, sylvian fissures, and   ventricles, reflecting mild diffuse parenchymal volume loss.    Status post left cataract surgery. The visualized right intraorbital   contents are normal. There is scattered paranasal sinus mucosal   thickening noted. The mastoid air cells are clear. The visualized soft   tissues and osseous structures appear normal.      CT CERVICAL SPINE:    The alignment is within normal limits. There is diffuse osteopenia.    There is no evidence of acute fracture, compression deformity or facet   subluxation of the cervical spine.    The atlantoaxial relationships are maintained. The posterior elements are   intact. There is no significant prevertebral soft tissue swelling. The   visualized paraspinal soft tissues are unremarkable.    There is multilevel endplate degenerative changes with marginal   osteophyte formation, uncovertebral spurring, loss of normal disc space   height as well as facet joint space compartment narrowing.    There is no high-grade spinal canal stenosis. Please note spinal canal   contents are suboptimally evaluated inherent to CT technique.      IMPRESSION:    CT HEAD:  Suggestion of trace acute subarachnoid hemorrhage in the right   temporoparietal region without mass effect or midline shift.    CT CERVICAL SPINE:  No acute cervical fracture or facet subluxation.    Communication: The summary ofabove findings were discussed with readback   confirmation with Dr. Torres by resident Marion Contreras MD on 7/1/2025 at   6:30 PM.    < end of copied text >      < from: CT Abdomen and Pelvis w/ IV Cont (07.01.25 @ 17:58) >  FINDINGS:  CHEST:    TUBES AND LINES: None.    CHEST WALL, AXILLA, AND LOWER NECK: Unremarkable    MEDIASTINUM AND WM: There are no suspicious mediastinal, hilar, or   axillary lymph nodes. There is a 7.5 mm nodule in the right lobe of the   thyroid gland.    VESSELS: There is no evidence of central pulmonary or segmental embolism.   The pulmonary artery measures 3.4 cm, which may be seen with pulmonary   hypertension.. Normal caliber aorta.  There is no evidence of aortic   aneurysm or dissection.    HEART: The heart is normal in size. No pericardial effusion.    LUNGS AND LARGE AIRWAYS: The central tracheobronchial tree is patent.   Several previously noted pulmonary nodules are unchanged. However, a new   area of pulmonary nodules, measuring up to 1 cm, are noted in the region   of the lingula. A region of ground glass opacity measuring 1.6 x 1.6 x   3.8 cm is noted in the left upper lobe. No pneumothorax.    PLEURA: There is no pleural effusion.      ABDOMEN AND PELVIS:    LIVER: The liver is small with lobulated contours consistent with   cirrhosis. Hepatic steatosis is noted. No evidence of solid mass. The   portal and splenic veins are enlarged and paten, consistent with portal  hypertension. Recanalization of the umbilical vein is noted, as well as   esophageal varices. The hepatic veins are opacified.  BILE DUCTS: Normal caliber.  GALLBLADDER: Post cholecystectomy  SPLEEN: The spleen is enlarged measuring 15.6 cm in length.  PANCREAS: The pancreas is normal in size and configuration. No evidence   of mass or pancreatitis.  ADRENALS: Within normal limits.  KIDNEYS/URETERS: There is symmetric renal enhancement. No evidence of   hydronephrosis, calcified stones, or solid mass.    BLADDER: Unremarkable.  REPRODUCTIVE ORGANS: Unremarkable    BOWEL: No evidence of bowel obstruction, colitis, or inflammatory   process. Normal caliber appendix.  PERITONEUM/RETROPERITONEUM: Moderate ascites is noted in the abdomen and   pelvis. No free intraperitoneal air.  VESSELS: Normal caliber aorta.  LYMPH NODES: No abdominal or pelvic adenopathy.  ABDOMINAL WALL: Unremarkable.  BONES: Degenerative changes of the spine. No evidence of acute fracture.    IMPRESSION:    Several previously noted pulmonary nodules are unchanged. However, a new area of pulmonary nodules, measuring up to 1 cm, are noted in the region of the lingula. A region of ground glass opacity measuring 1.6 x 1.6 x 3.8 cm is noted in the left upper lobe. PET-CT may be obtained for further evaluation.    The liver is small with lobulated contours consistent with cirrhosis. Hepatic steatosis is noted. The spleen is enlarged measuring 15.6 cm in   length.    The portal and splenic veins are enlarged and patent, consistent with   portal hypertension. Recanalization of the umbilical vein is noted, as   well as esophageal varices.    Moderate ascites is noted in the abdomen and pelvis.    No evidence of thoracic, abdominal or pelvic visceral injury, laceration,   or hematoma.    No evidence of acute fracture.    < end of copied text >      Care Discussed with Consultants/Other Providers

## 2025-07-03 NOTE — OCCUPATIONAL THERAPY INITIAL EVALUATION ADULT - PATIENT PROFILE REVIEW, REHAB EVAL
yes Detail Level: Detailed Quality 110: Preventive Care And Screening: Influenza Immunization: Influenza Immunization Administered during Influenza season

## 2025-07-03 NOTE — PROGRESS NOTE ADULT - ASSESSMENT
Assessment:  84yF transfer from [facility] with concern for brain bleed seen on CT scan w/ pmh of cirrhosis, hypothyroidism, thrombocytopenia, lymphedema seen as Trauma Alert s/p unwitnessed mechanical fall +HT, -LOC, -AC. Findings consistent with acute subarachnoid hemorrhage. SAH stable on repeat imaging    Plan:    - Admitted to SDU  - Pending PT eval  - Per nsx: ok for q4 neurochecks  - Reassess initiating DVT ppx  - Multimodal pain regimen   - Medically cleared for discharge    [id number]

## 2025-07-03 NOTE — DISCHARGE NOTE PROVIDER - HOSPITAL COURSE
84yF transfer from [facility] with concern for brain bleed seen on CT scan w/ pmh of cirrhosis, hypothyroidism, thrombocytopenia, lymphedema seen as Trauma Alert s/p unwitnessed mechanical fall +HT, -LOC, -AC found down at [facility] after getting up to use the bathroom. Trauma assessment in ED: ABCs intact , GCS 14 , AAOx2. The patient was admitted to SDU for neurochecks, was given a unit of platelets in the ED. The patient remained neurologically intact, was started on IV Fluids for elevated creatinine and hydration will be encouraged after discharge. The patient is medically cleared for discharge on HD3.

## 2025-07-03 NOTE — PHYSICAL THERAPY INITIAL EVALUATION ADULT - GENERAL OBSERVATIONS, REHAB EVAL
9:00-9:25. chart reviewed. Pt received semi-farley at B/S, alert, oriented X 3, able to follow one step instructions and agreeable to PT evaluation.  + O2 2 L via NC SPo2 t/o session 98-96%, + IVF, + primafit, + monitoring, denies pain or dizziness, c/o dizziness with movement transition, VSS, initial BP supine 91/54 HR 72, sitting 90/53 HR 75, standing 94/53 HR 80. NAD.

## 2025-07-03 NOTE — DISCHARGE NOTE PROVIDER - CARE PROVIDER_API CALL
Karey Joseph  Neurological Surgery  [address] [place] [address], [address]  [place] [address]  Phone: [phone]  Fax: [phone]  Follow Up Time: 2 weeks

## 2025-07-03 NOTE — PHYSICAL THERAPY INITIAL EVALUATION ADULT - ADDITIONAL COMMENTS
Pt was admitted form [facility] for STR ??. Pt used to be able to ambulate for short distances using RW.

## 2025-07-03 NOTE — PROGRESS NOTE ADULT - SUBJECTIVE AND OBJECTIVE BOX
BRAULIO ESPARZA   [id number]   [birth year]    84yF  ============================================================   DATE OF INITIAL SICU/SDU CONSULT: 07-02-25    INDICATION FOR SICU CONSULT:       SICU COURSE EVENTS :  07-02 - admitted to SICU service, vitamin K and platelet x1, CTH stable      24HOUR EVENTS  7/2  NIGHT   -PM rounds: A&O x2, no focal deficits, motor and sensation intact in all extremities, sleeping, , NSR HR 60s, saturating 98% on NC 2L   -23:30 labs   -SLP c/s f/u    DAY  - A&Ox3, follows commands, no deficit  - CT head neg  - Start dvt ppx 7/3  - NS @ 50, Cr 1.3  - Plt 43 > hold off on transfusion unless neuro sx wants it  - Keep in SDU for 24 hr, poss DG 7/3  - PT/OT c/s  - Hospitalist C/S  - SLP kayce, f/u diet  - start Home lasix  - A1c 5.1       [X] A ten-point review of systems was negative except as expressed in note.  [X] History was obtained from patient. If unable to participate in their care, history obtained from review of the chart and collateral sources of information.  ============================================================    JOANAKEVINSTEPHANIE, VIRGINIA   [id number]   [birth year]    84yF  ============================================================   DATE OF INITIAL SICU/SDU CONSULT: 25    INDICATION FOR SICU CONSULT:       SICU COURSE EVENTS :   - admitted to SICU service, vitamin K and platelet x1, CTH stable      24HOUR EVENTS    NIGHT   -PM rounds: A&O x2, no focal deficits, motor and sensation intact in all extremities, sleeping, , NSR HR 60s, saturating 98% on NC 2L   -23:30 labs   -SLP c/s f/u    DAY  - A&Ox3, follows commands, no deficit  - CT head neg  - Start dvt ppx 7/3  - NS @ 50, Cr 1.3  - Plt 43 > hold off on transfusion unless neuro sx wants it  - Keep in SDU for 24 hr, poss DG 7/3  - PT/OT c/s  - Hospitalist C/S  - SLP eval, f/u diet  - start Home lasix  - A1c 5.1       [X] A ten-point review of systems was negative except as expressed in note.  [X] History was obtained from patient. If unable to participate in their care, history obtained from review of the chart and collateral sources of information.  ============================================================   Daily Height in cm: 157.5 (2025 15:09)    Daily Weight in k.5 (2025 04:00)    CURRENT MEDS:  Neurologic Medications  acetaminophen     Tablet .. 650 milliGRAM(s) Oral every 6 hours  traMADol 25 milliGRAM(s) Oral every 6 hours PRN Moderate Pain (4 - 6)  traMADol 50 milliGRAM(s) Oral every 6 hours PRN Severe Pain (7 - 10)    Respiratory Medications    Cardiovascular Medications  furosemide    Tablet 40 milliGRAM(s) Oral daily  propranolol 10 milliGRAM(s) Oral two times a day    Gastrointestinal Medications  pantoprazole    Tablet 40 milliGRAM(s) Oral before breakfast  sodium chloride 0.9%. 1000 milliLiter(s) IV Continuous <Continuous>    Genitourinary Medications    Hematologic/Oncologic Medications    Antimicrobial/Immunologic Medications    Endocrine/Metabolic Medications  levothyroxine 37.5 MICROGram(s) Oral daily    Topical/Other Medications  chlorhexidine 2% Cloths 1 Application(s) Topical daily    ICU Vital Signs Last 24 Hrs  T(C): 36.6 (2025 08:00), Max: 36.6 (2025 08:00)  T(F): 97.8 (2025 08:00), Max: 97.8 (2025 08:00)  HR: 67 (2025 08:00) (62 - 70)  BP: 125/56 (2025 08:00) (97/48 - 130/60)  BP(mean): 81 (2025 08:00) (69 - 86)  ABP: --  ABP(mean): --  RR: 22 (2025 08:00) (15 - 28)  SpO2: 98% (2025 08:00) (96% - 99%)    O2 Parameters below as of 2025 04:00  Patient On (Oxygen Delivery Method): nasal cannula  O2 Flow (L/min): 2    I&O's Summary    2025 07:01  -  2025 07:00  --------------------------------------------------------  IN: 1100 mL / OUT: 600 mL / NET: 500 mL      I&O's Detail    2025 07:01  -  2025 07:00  --------------------------------------------------------  IN:    Oral Fluid: 150 mL    sodium chloride 0.9%: 950 mL  Total IN: 1100 mL    OUT:    Voided (mL): 600 mL  Total OUT: 600 mL    Total NET: 500 mL    PHYSICAL EXAM:     a&ox3  follows commands, THOMAS, b/l pupils reactive, no motor or neuro deficits noted in all 4 extremities  no acute distress  equal chest rise b/l  abdomen soft  extremities soft

## 2025-07-03 NOTE — PROGRESS NOTE ADULT - REASON FOR ADMISSION
S/P mechanical fall +HT, -LOC, -AC

## 2025-07-03 NOTE — CHART NOTE - NSCHARTNOTEFT_GEN_A_CORE
Patient was seen and examined by writer prior to transfer from SICU to facility. Patient with no acute changes in clinical status since downgrade determination by SICU team and remains optimized for disposition.  T(F): 97.9 (07-03-25 @ 12:00)  HR: 66 (07-03-25 @ 15:00)  BP: 113/56 (07-03-25 @ 15:00)  BP(mean): 81 (07-03-25 @ 15:00)  RR: 29 (07-03-25 @ 15:00)  SpO2: 98% (07-03-25 @ 15:00)

## 2025-07-03 NOTE — OCCUPATIONAL THERAPY INITIAL EVALUATION ADULT - GENERAL OBSERVATIONS, REHAB EVAL
pt seen bedside in NAD @ 10-10:40AM sitting in chair pt seen bedside in NAD @ 10-10:40AM sitting comfortable in the chair, agreed to OT tx; + IV, +O2 NC; + tele

## 2025-07-03 NOTE — DISCHARGE NOTE NURSING/CASE MANAGEMENT/SOCIAL WORK - FINANCIAL ASSISTANCE
[facility] provides services at a reduced cost to those who are determined to be eligible through [facility]’s financial assistance program. Information regarding [facility]’s financial assistance program can be found by going to [email or web address] or by calling [phone].

## 2025-07-03 NOTE — DISCHARGE NOTE PROVIDER - NSDCMRMEDTOKEN_GEN_ALL_CORE_FT
acetaminophen 325 mg oral tablet: 2 tab(s) orally every 6 hours  Lasix 40 mg oral tablet: 1 tab(s) orally once a day  levothyroxine 37.5 mcg (0.0375 mg) oral capsule: 1 cap(s) orally once a day  pantoprazole 40 mg oral delayed release tablet: 1 tab(s) orally once a day (before a meal)  propranolol 10 mg oral tablet: 1 tab(s) orally 2 times a day

## 2025-07-03 NOTE — PHYSICAL THERAPY INITIAL EVALUATION ADULT - GAIT TRAINING, PT EVAL
Pt will ambulate using RW or least restrictive AD for 100 ft with CGA by discharge to facilitate return to PLOF.

## 2025-07-03 NOTE — PATIENT PROFILE ADULT - FALL HARM RISK - PATIENT NEEDS ASSISTANCE
Detail Level: Detailed
Quality 137: Melanoma: Continuity Of Care - Recall System: Patient information entered into a recall system that includes: target date for the next exam specified AND a process to follow up with patients regarding missed or unscheduled appointments
Standing/Walking/Toileting/Moving from bed to chair

## 2025-07-03 NOTE — PROGRESS NOTE ADULT - ASSESSMENT
84y Female  w/ SAH     NEURO/PSYCH:  #acute subarachnoid hemorrhage s/p mechanical fall (+HT/-LOC/-AC)  - Neurosurgery recs - No acute surgical intervention, Q4h neuro checks, SBP < 140, plts   - s/p vitamin K 10mg x1  - s/p 1u platelets given in ED  - CT head #2: stable    #Acute pain  - acetaminophen 650mg q6h  - tramadol 25mg/50mg moderate/severe pain    RESP:   #Oxygenation  - 2L NC, wean as tolerated  - encourage incentive spirometry  #Activity  - increase as tolerated  #incidental finding of new 1cm pulmonary nodule and groundglass opacity on CT chest  - f/up PET-CT recommended for further workup  #PMHx of pulmonary hypertension    CARDIAC:   #no acute issues  - SBP < 140 per neurosurgery  #PMHx of esophageal varices in the setting of cirrhosis  - propranolol 10 milliGRAM(s) Oral two times a day  Imaging:   - EKG: NSR  - Echo: 5/25 - EF 60%. Mild LV hypertrophy. G1DD. Mild RA enlargement. Moderate LA enlargement. Mild MR, TR, AR, AS. Mild pHTN    GI/NUTR:   #Diet, easy to chew regular  - SLP pending  - aspiration precautions, HOB 30  #GI Prophylaxis  - Indication: home medication  - pantoprazole 40 milliGRAM(s), Oral, before breakfast  #Bowel regimen  - none  - Last BM prior to admission   #PMHx of hepatic steatosis, cirrhosis, portal vein enlargement, esophageal varices  - home propranolol  - holding home spironolactone in the setting of NORA  #PSHx of cholecystectomy    /RENAL:   #urine output in critically ill  - voiding freely  #Maintain Euvolemia    - NS @ 50cc/hr  #NORA  - Cr 1.0 on admission in May 2025  - restarted home lasix    Labs:          BUN/Cr- 55/1.4  -->,  49/1.3  -->          [07-02 @ 07:12]Na  130 // K  4.4 // Mg  1.9 // Phos  3.5    HEME/ONC:   #DVT prophylaxis  - SCDs  - holding chemical PPx in the setting of SAH until 7/3  #PMHx of thrombocytopenia  - Plt 73 on admission, goal of  per Neurosurgery, given 1u platelets in ED and 10mg vit K x 1    Labs: Hb/Hct:  9.5/29.9  -->,  9.3/29.6  -->                      Plts:  45  -->,  74  -->                 PTT/INR:  36.5/1.35  --->,  35.8/1.36  --->       ID:  #monitor for fever and leukocytosis  #MRSA negative  WBC- 5.30  --->>,  3.08  --->>,  3.40  --->>  Temp trend- 24hrs T(F): 96.6 (07-03 @ 00:00), Max: 97.4 (07-02 @ 07:34)    ENDO:  - Glucose goal 140-180.   - A1c 5.1    #PMHx of hypothyroidism  - levothyroxine 37.5 mcg PO QD    MSK:  - Activity - Ambulate as Tolerated:     Time/Priority:  Routine (07-02-25 @ 02:02)  - PT/OT consult pending    DERM:  - DTI screen 7/2 > blanchabel redness on sacrum and b/l heels    PALLIATIVE/GOALS OF CARE:  - Palliative care not required for pt at this time  - Code Status: full code    LINES/DRAINS:  PIV    HCP/Emergency Contact - Alexis Cleaninganaanno, spouse [phone]    INDICATION FOR SDU:  q4h neuro checks s/p fall, SAH    DISPO:   SDU    Case to be discussed with attending Dr. Man 84y Female  w/ SAH     NEURO/PSYCH:  #acute subarachnoid hemorrhage s/p mechanical fall (+HT/-LOC/-AC)  - Neurosurgery recs - No acute surgical intervention, Q4h neuro checks, SBP < 140, plts   - s/p vitamin K 10mg x1  - s/p 1u platelets given in ED  - CT head #2: stable    #Acute pain  - acetaminophen 650mg q6h  - tramadol 25mg/50mg moderate/severe pain    RESP:   #Oxygenation  - 2L NC, wean as tolerated (at home baseline uses 2L NC)  - encourage incentive spirometry  #Activity  - increase as tolerated  #incidental finding of new 1cm pulmonary nodule and groundglass opacity on CT chest  - f/up PET-CT recommended for further workup  #PMHx of pulmonary hypertension    CARDIAC:   #no acute issues  - SBP < 140 per neurosurgery  #PMHx of esophageal varices in the setting of cirrhosis  - propranolol 10 milliGRAM(s) Oral two times a day  Imaging:   - EKG: NSR  - Echo: 5/25 - EF 60%. Mild LV hypertrophy. G1DD. Mild RA enlargement. Moderate LA enlargement. Mild MR, TR, AR, AS. Mild pHTN    GI/NUTR:   #Diet, easy to chew regular  - SLP pending  - aspiration precautions, HOB 30  #GI Prophylaxis  - Indication: home medication  - pantoprazole 40 milliGRAM(s), Oral, before breakfast  #Bowel regimen  - senna  - miralax  - Last BM prior to admission   #PMHx of hepatic steatosis, cirrhosis, portal vein enlargement, esophageal varices  - continue home propranolol  - holding home spironolactone in the setting of NORA  #PSHx of cholecystectomy    /RENAL:   #urine output in critically ill  - voiding freely  #Maintain Euvolemia    - NS @ 100cc/hr  #NORA  - Cr 1.0 on admission in May 2025  - restarted home lasix    Labs:          BUN/Cr- 49/1.3  -->,  52/1.6  -->          [07-03 @ 01:24]Na  134 // K  4.9 // Mg  1.9 // Phos  3.5    HEME/ONC:   #DVT prophylaxis  - SCDs  - holding chemical PPx in the setting of SAH until 7/3  #PMHx of thrombocytopenia  - Plt 73 on admission, goal of  per Neurosurgery, given 1u platelets in ED and 10mg vit K x 1    Labs: Hb/Hct:  9.3/29.6  -->,  8.4/26.7  -->                      Plts:  74  -->,  56  -->                 PTT/INR:  35.8/1.36  --->       ID:  #monitor for fever and leukocytosis  #MRSA negative  WBC- 3.08  --->>,  3.40  --->>,  2.79  --->>  Temp trend- 24hrs T(F): 97.8 (07-03 @ 08:00), Max: 97.8 (07-03 @ 08:00)    ENDO:  - Glucose goal 140-180.   - A1c 5.1    #PMHx of hypothyroidism  - levothyroxine 37.5 mcg PO QD    MSK:  - Activity - Ambulate as Tolerated:     Time/Priority:  Routine (07-02-25 @ 02:02)  - PT/OT consult pending    DERM:  - DTI screen 7/2 > blanchable redness on sacrum and b/l heels    PALLIATIVE/GOALS OF CARE:  - Palliative care not required for pt at this time  - Code Status: full code    LINES/DRAINS:  PIV    HCP/Emergency Contact - Alexis Cleaninganasio, spouse [phone]    INDICATION FOR SDU:  q4h neuro checks s/p fall, SAH    DISPO:   SDU    Case to be discussed with attending Dr. Man 84y Female  w/ SAH     NEURO/PSYCH:  #acute subarachnoid hemorrhage s/p mechanical fall (+HT/-LOC/-AC)  - Neurosurgery recs - No acute surgical intervention, Q4h neuro checks, SBP < 140, plts   - s/p vitamin K 10mg x1  - s/p 1u platelets given in ED  - CT head #2: stable    #Acute pain  - acetaminophen 650mg q6h    RESP:   #Oxygenation  - 2L NC, wean as tolerated (at home baseline uses 2L NC)  - encourage incentive spirometry  #Activity  - increase as tolerated  #incidental finding of new 1cm pulmonary nodule and groundglass opacity on CT chest  - f/up PET-CT recommended for further workup  #PMHx of pulmonary hypertension    CARDIAC:   #no acute issues  - SBP < 140 per neurosurgery  #PMHx of esophageal varices in the setting of cirrhosis  - propranolol 10 milliGRAM(s) Oral two times a day  Imaging:   - EKG: NSR  - Echo: 5/25 - EF 60%. Mild LV hypertrophy. G1DD. Mild RA enlargement. Moderate LA enlargement. Mild MR, TR, AR, AS. Mild pHTN    GI/NUTR:   #Diet, easy to chew regular  - SLP pending  - aspiration precautions, HOB 30  #GI Prophylaxis  - Indication: home medication  - pantoprazole 40 milliGRAM(s), Oral, before breakfast  #Bowel regimen  - senna  - miralax  - Last BM prior to admission   #PMHx of hepatic steatosis, cirrhosis, portal vein enlargement, esophageal varices  - continue home propranolol  - holding home spironolactone in the setting of NORA  #PSHx of cholecystectomy    /RENAL:   #urine output in critically ill  - voiding freely  #Maintain Euvolemia    - NS @ 100cc/hr  #NORA  - Cr 1.0 on admission in May 2025  - restarted home lasix  - f/u 11am labs    Labs:          BUN/Cr- 49/1.3  -->,  52/1.6  -->          [07-03 @ 01:24]Na  134 // K  4.9 // Mg  1.9 // Phos  3.5    HEME/ONC:   #DVT prophylaxis  - SCDs  - Heparin SQ  #PMHx of thrombocytopenia  - Plt 73 on admission, goal of  per Neurosurgery, given 1u platelets in ED and 10mg vit K x 1    Labs: Hb/Hct:  9.3/29.6  -->,  8.4/26.7  -->                      Plts:  74  -->,  56  -->                 PTT/INR:  35.8/1.36  --->       ID:  #monitor for fever and leukocytosis  #MRSA negative  WBC- 3.08  --->>,  3.40  --->>,  2.79  --->>  Temp trend- 24hrs T(F): 97.8 (07-03 @ 08:00), Max: 97.8 (07-03 @ 08:00)    ENDO:  - Glucose goal 140-180.   - A1c 5.1    #PMHx of hypothyroidism  - levothyroxine 37.5 mcg PO QD    MSK:  - Activity - Ambulate as Tolerated:     Time/Priority:  Routine (07-02-25 @ 02:02)  - PT/OT consult pending    DERM:  - DTI screen 7/2 > blanchable redness on sacrum and b/l heels    PALLIATIVE/GOALS OF CARE:  - Palliative care not required for pt at this time  - Code Status: full code    LINES/DRAINS:  PIV    HCP/Emergency Contact - Alexis Fongo, spouse [phone]    INDICATION FOR SDU:  q4h neuro checks s/p fall, SAH    DISPO:  D/C to nursing home    Case discussed with attending Dr. Man

## 2025-07-03 NOTE — SWALLOW BEDSIDE ASSESSMENT ADULT - SLP PERTINENT HISTORY OF CURRENT PROBLEM
84yF transfer from [facility] with concern for brain bleed seen on CT scan w/ pmh of cirrhosis, hypothyroidism, thrombocytopenia, lymphedema seen as Trauma Alert s/p unwitnessed mechanical fall +HT, -LOC, -AC found down at [place] nursing home after getting up to use the bathroom. SAH

## 2025-07-03 NOTE — PHYSICAL THERAPY INITIAL EVALUATION ADULT - TRANSFER TRAINING, PT EVAL
no vascular compromise Pt will transfer from bed to chair and reverse using RW with CGA to facilitate return to PLOF.

## 2025-07-03 NOTE — DISCHARGE NOTE NURSING/CASE MANAGEMENT/SOCIAL WORK - PATIENT PORTAL LINK FT
You can access the FollowMyHealth Patient Portal offered by [facility] by registering at the following website: [email or web address]. By joining [employer]’s FollowMyHealth portal, you will also be able to view your health information using other applications (apps) compatible with our system.

## 2025-07-03 NOTE — OCCUPATIONAL THERAPY INITIAL EVALUATION ADULT - PERTINENT HX OF CURRENT PROBLEM, REHAB EVAL
84yF transfer from [facility] with concern for brain bleed seen on CT scan w/ pmh of cirrhosis, hypothyroidism, thrombocytopenia, lymphedema seen as Trauma Alert s/p unwitnessed mechanical fall +HT, -LOC, -AC found down at [place] nursing home after getting up to use the bathroom. Trauma assessment in ED: ABCs intact , GCS 14 , AAOx2

## 2025-07-03 NOTE — DISCHARGE NOTE NURSING/CASE MANAGEMENT/SOCIAL WORK - NSDCPEFALRISK_GEN_ALL_CORE
For information on Fall & Injury Prevention, visit: [email or web address] OR  [email or web address] OR  https://www.cdc.gov/steadi/patient.html

## 2025-07-03 NOTE — PROGRESS NOTE ADULT - ASSESSMENT
84yF transfer from [facility] with concern for brain bleed seen on CT scan w/ pmh of cirrhosis, hypothyroidism, thrombocytopenia, lymphedema seen as Trauma Alert s/p unwitnessed mechanical fall +HT, -LOC, -AC found down at [facility] home after getting up to use the bathroom. Pt found to have SAH and admitted to SICU. Medicine consulted for comanagement.      Problem list:  #Traumatic SAH (stable)  #Mechanical fall   #Chronic gait instability (ambulate with walker and intermittent assistance)  #Thrombocytopenia   #Normocytic anemia   #NORA on CKD3a (improving)  #Incidental 1cm pulmonary nodule and groundglass opacity  #Hx Cirrhosis w/ esophageal varices  #Hx BLE edema and venous stasis   #Hx Hypothyroidism  #Hx Pulmonary hypertension            Plan:  Management per SICU  All imaging, labs, and vitals personally reviewed   Repeat CTH x3 (7/2/25): Stable right temporal parietal focal subarachnoid hemorrhage.  Echo: (5/25): LVEF 60%. Mild LVH, G1DD, mild RAVI, Moderate LAE, Mild MR/TR/AR/AS, mild pHTN  - PT/OT/PMR/OOBTC/AAT w/ fall/asp precautions > plan to D/C to [facility]  - Multimodal pain regimen   - Bowel regimen PRN / stool count   - Monitor platelet count, H&H and for signs of bleeding   - Monitor for fever and WBC trend   - Incentive spirometer 10x/hr while awake  - Per NSGx: okay to start DVT PPx, liberalize neuro checks, and outpt f/u   - Supplemental oxygen PRN to maintain PO >92%  - Duonebs q6 PRN   - f/up outpt PET-CT recommended for pulm nodule   - c/w Propranolol 10mg BID   - c/w PPI 40mg qAM  - c/w Levothyroxine 37.5 mcg qAM  - monitor renal fxn and electrolytes daily   - renally dose medications  - avoid nephrotoxic medications   - DVT PPx: HSQ  - CHG  - d/c planning       If any questions, please send a message or call on [employer] Teams.     Management per SICU, Medicine for co-management.    Total time spent to complete patient's bedside assessment, reviewed medical chart, discussed medical plan of care with team was more than 35 minutes with >50% of time spent face to face with patient, discussion with patient/family and/or coordination of care. This time excludes teaching time and/or separately reported services.

## 2025-07-03 NOTE — PATIENT PROFILE ADULT - FALL HARM RISK - HARM RISK INTERVENTIONS

## 2025-07-03 NOTE — PROGRESS NOTE ADULT - SUBJECTIVE AND OBJECTIVE BOX
TRAUMA SURGERY PROGRESS NOTE    Patient: BRAULIO ESPARZA , 84y ([birth year])Female   MRN: [id number]  Location: [address]  Visit: 07-02-25 Inpatient  Date: 07-03-25 @ 06:52    Hospital Day #: 3    Procedure/Dx/Injuries: SAH    Events of past 24 hours: Patient resting comfortably in bed, some pain to the back of her head responsive to meds. Plt drifting down, fluids increased to 100cc/hr for NORA.     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, unspecified estrogen receptor status  Blister of right lower leg  Osteoarthritis  Thrombocytopenia  Hypothyroidism  Cirrhosis    History of surgery  LEFT BREAST LUMPECTOMY  TUBIAL LIGATION  CHOLECYSTECTOMY  RIGHT & LEFT TKR  History of cholecystectomy    Vitals:   T(F): 96.8 (07-03-25 @ 04:00), Max: 97.4 (07-02-25 @ 07:34)  HR: 65 (07-03-25 @ 04:00)  BP: 100/50 (07-03-25 @ 04:00)  RR: 15 (07-03-25 @ 04:00)  SpO2: 98% (07-03-25 @ 04:00)    Fluids: sodium chloride 0.9%.: Solution, 1000 milliLiter(s) infuse at 100 mL/Hr    Physical Exam:   General: NAD  HEENT: Normocephalic, atraumatic. no scalp lacerations   Neck: Soft, midline trachea  Chest: No chest wall tenderness, no subcutaneous emphysema   Cardiac: Warm and well perfused, no extremity edema  Respiratory: chest rise equal bilaterally, NLB on RA  Abdomen: Soft, non-distended, non-tender, no rebound, no guarding.  Ext:  Moving b/l upper and lower extremities.    MEDICATIONS  (STANDING):  acetaminophen     Tablet .. 650 milliGRAM(s) Oral every 6 hours  chlorhexidine 2% Cloths 1 Application(s) Topical daily  furosemide    Tablet 40 milliGRAM(s) Oral daily  levothyroxine 37.5 MICROGram(s) Oral daily  pantoprazole    Tablet 40 milliGRAM(s) Oral before breakfast  propranolol 10 milliGRAM(s) Oral two times a day  sodium chloride 0.9%. 1000 milliLiter(s) (100 mL/Hr) IV Continuous <Continuous>    MEDICATIONS  (PRN):  traMADol 25 milliGRAM(s) Oral every 6 hours PRN Moderate Pain (4 - 6)  traMADol 50 milliGRAM(s) Oral every 6 hours PRN Severe Pain (7 - 10)    GI PROPHYLAXIS: pantoprazole    Tablet 40 milliGRAM(s) Oral before breakfast    LAB/STUDIES:               8.4    2.79  )-----------( 56       ( 03 Jul 2025 01:24 )             26.7       Auto Immature Granulocyte %: 0.4 % (07-03-25 @ 01:24)  Auto Immature Granulocyte %: 1.0 % (07-02-25 @ 07:12)    07-03    134[L]  |  105  |  52[H]  ----------------------------<  172[H]  4.9   |  20  |  1.6[H]      Calcium: 8.2 mg/dL (07-03-25 @ 01:24)    Coags:     16.20  ----< 1.36    ( 02 Jul 2025 18:59 )     35.8     IMAGING: No new imaging      ACCESS/ DEVICES:  [x] Peripheral IV

## 2025-07-03 NOTE — DISCHARGE NOTE PROVIDER - NSDCCPCAREPLAN_GEN_ALL_CORE_FT
PRINCIPAL DISCHARGE DIAGNOSIS  Diagnosis: Traumatic subarachnoid hemorrhage without loss of consciousness  Assessment and Plan of Treatment: Follow up with Dr. Josehp as an outpatient in 1-2 weeks.   You were given a transfusion of platelets during admission- please follow up with your primary care physician to inform them of your hospitalization.   Patient may follow up in the outpt office. Please provide pt with office #[phone] upon discharge.         SECONDARY DISCHARGE DIAGNOSES  Diagnosis: Elevated serum creatinine  Assessment and Plan of Treatment: Your kidney function was elevated during your hospital stay. Please drink plenty of water.  Spironolactone was held but lasix was continued.  Your creatinine level should be repeated and spironolactone should be restarted if your primary care physician thinks it is appropriate.

## 2025-07-03 NOTE — DISCHARGE NOTE PROVIDER - NSDCFUSCHEDAPPT_GEN_ALL_CORE_FT
Diego Martinez  [facility]  [facility]  Scheduled Appointment: 08/11/2025

## 2025-07-03 NOTE — SWALLOW BEDSIDE ASSESSMENT ADULT - SWALLOW EVAL: FUNCTIONAL LEVEL AT TIME OF EVAL
Awake, alert. reports mild dizziness when she stands. RN Aware. Denies difficulty swallowing. Son reports this is baseline mental status (A&OX2)

## 2025-07-04 LAB — UUN UR-MCNC: 407 MG/DL — SIGNIFICANT CHANGE UP

## 2025-07-05 ENCOUNTER — INPATIENT (INPATIENT)
Facility: HOSPITAL | Age: 85
LOS: 23 days | Discharge: NURSING, REHAB OR LONG-TERM CARE | DRG: 441 | End: 2025-07-29
Attending: HOSPITALIST | Admitting: FAMILY MEDICINE
Payer: MEDICARE

## 2025-07-05 VITALS
RESPIRATION RATE: 20 BRPM | DIASTOLIC BLOOD PRESSURE: 67 MMHG | SYSTOLIC BLOOD PRESSURE: 121 MMHG | TEMPERATURE: 99 F | HEART RATE: 70 BPM | OXYGEN SATURATION: 100 %

## 2025-07-05 DIAGNOSIS — Z98.890 OTHER SPECIFIED POSTPROCEDURAL STATES: Chronic | ICD-10-CM

## 2025-07-05 DIAGNOSIS — R41.82 ALTERED MENTAL STATUS, UNSPECIFIED: ICD-10-CM

## 2025-07-05 DIAGNOSIS — Z90.49 ACQUIRED ABSENCE OF OTHER SPECIFIED PARTS OF DIGESTIVE TRACT: Chronic | ICD-10-CM

## 2025-07-05 LAB
ALBUMIN SERPL ELPH-MCNC: 2.5 G/DL — LOW (ref 3.5–5.2)
ALP SERPL-CCNC: 97 U/L — SIGNIFICANT CHANGE UP (ref 30–115)
ALT FLD-CCNC: 20 U/L — SIGNIFICANT CHANGE UP (ref 0–41)
AMMONIA BLD-MCNC: 99 UMOL/L — HIGH (ref 11–55)
ANION GAP SERPL CALC-SCNC: 16 MMOL/L — HIGH (ref 7–14)
APPEARANCE UR: ABNORMAL
APTT BLD: 39.1 SEC — SIGNIFICANT CHANGE UP (ref 27–39.2)
AST SERPL-CCNC: 25 U/L — SIGNIFICANT CHANGE UP (ref 0–41)
BACTERIA # UR AUTO: ABNORMAL /HPF
BASOPHILS # BLD AUTO: 0.02 K/UL — SIGNIFICANT CHANGE UP (ref 0–0.2)
BASOPHILS NFR BLD AUTO: 0.5 % — SIGNIFICANT CHANGE UP (ref 0–2)
BILIRUB SERPL-MCNC: 4.2 MG/DL — HIGH (ref 0.2–1.2)
BILIRUB UR-MCNC: NEGATIVE — SIGNIFICANT CHANGE UP
BUN SERPL-MCNC: 52 MG/DL — HIGH (ref 10–20)
CALCIUM SERPL-MCNC: 8.7 MG/DL — SIGNIFICANT CHANGE UP (ref 8.4–10.5)
CHLORIDE SERPL-SCNC: 106 MMOL/L — SIGNIFICANT CHANGE UP (ref 98–110)
CO2 SERPL-SCNC: 18 MMOL/L — SIGNIFICANT CHANGE UP (ref 17–32)
COLOR SPEC: YELLOW — SIGNIFICANT CHANGE UP
CREAT SERPL-MCNC: 1.6 MG/DL — HIGH (ref 0.7–1.5)
DIFF PNL FLD: ABNORMAL
EGFR: 31 ML/MIN/1.73M2 — LOW
EGFR: 31 ML/MIN/1.73M2 — LOW
EOSINOPHIL # BLD AUTO: 0.35 K/UL — SIGNIFICANT CHANGE UP (ref 0–0.5)
EOSINOPHIL NFR BLD AUTO: 8.2 % — HIGH (ref 0–6)
EPI CELLS # UR: PRESENT
GLUCOSE SERPL-MCNC: 92 MG/DL — SIGNIFICANT CHANGE UP (ref 70–99)
GLUCOSE UR QL: NEGATIVE MG/DL — SIGNIFICANT CHANGE UP
HCT VFR BLD CALC: 29.9 % — LOW (ref 34.5–45)
HGB BLD-MCNC: 9.4 G/DL — LOW (ref 11.5–15.5)
IMM GRANULOCYTES # BLD AUTO: 0.02 K/UL — SIGNIFICANT CHANGE UP (ref 0–0.07)
IMM GRANULOCYTES NFR BLD AUTO: 0.5 % — SIGNIFICANT CHANGE UP (ref 0–0.9)
IMMATURE PLATELET FRACTION #: 0.5 K/UL — LOW (ref 4.7–11.1)
IMMATURE PLATELET FRACTION %: 0.7 % — LOW (ref 1.6–4.9)
INR BLD: 1.32 RATIO — HIGH (ref 0.65–1.3)
IRON SATN MFR SERPL: 142 UG/DL — SIGNIFICANT CHANGE UP (ref 35–150)
IRON SATN MFR SERPL: 65 % — HIGH (ref 15–50)
KETONES UR QL: NEGATIVE MG/DL — SIGNIFICANT CHANGE UP
LACTATE SERPL-SCNC: 1.7 MMOL/L — SIGNIFICANT CHANGE UP (ref 0.7–2)
LEUKOCYTE ESTERASE UR-ACNC: ABNORMAL
LYMPHOCYTES # BLD AUTO: 0.8 K/UL — LOW (ref 1–3.3)
LYMPHOCYTES NFR BLD AUTO: 18.6 % — SIGNIFICANT CHANGE UP (ref 13–44)
MCHC RBC-ENTMCNC: 29 PG — SIGNIFICANT CHANGE UP (ref 27–34)
MCHC RBC-ENTMCNC: 31.4 G/DL — LOW (ref 32–36)
MCV RBC AUTO: 92.3 FL — SIGNIFICANT CHANGE UP (ref 80–100)
MONOCYTES # BLD AUTO: 0.44 K/UL — SIGNIFICANT CHANGE UP (ref 0–0.9)
MONOCYTES NFR BLD AUTO: 10.3 % — SIGNIFICANT CHANGE UP (ref 2–14)
NEUTROPHILS # BLD AUTO: 2.66 K/UL — SIGNIFICANT CHANGE UP (ref 1.8–7.4)
NEUTROPHILS NFR BLD AUTO: 61.9 % — SIGNIFICANT CHANGE UP (ref 43–77)
NITRITE UR-MCNC: POSITIVE
NRBC # BLD AUTO: 0 K/UL — SIGNIFICANT CHANGE UP (ref 0–0)
NRBC # FLD: 0 K/UL — SIGNIFICANT CHANGE UP (ref 0–0)
NRBC BLD AUTO-RTO: 0 /100 WBCS — SIGNIFICANT CHANGE UP (ref 0–0)
PH UR: 7.5 — SIGNIFICANT CHANGE UP (ref 5–8)
PLATELET # BLD AUTO: 74 K/UL — LOW (ref 150–400)
PMV BLD: 9.9 FL — SIGNIFICANT CHANGE UP (ref 7–13)
POTASSIUM SERPL-MCNC: 4.5 MMOL/L — SIGNIFICANT CHANGE UP (ref 3.5–5)
POTASSIUM SERPL-SCNC: 4.5 MMOL/L — SIGNIFICANT CHANGE UP (ref 3.5–5)
PROT SERPL-MCNC: 5.8 G/DL — LOW (ref 6–8)
PROT UR-MCNC: NEGATIVE MG/DL — SIGNIFICANT CHANGE UP
PROTHROM AB SERPL-ACNC: 15.7 SEC — HIGH (ref 9.95–12.87)
RBC # BLD: 3.24 M/UL — LOW (ref 3.8–5.2)
RBC # FLD: 18.4 % — HIGH (ref 10.3–14.5)
RBC CASTS # UR COMP ASSIST: 20 /HPF — HIGH (ref 0–4)
SODIUM SERPL-SCNC: 140 MMOL/L — SIGNIFICANT CHANGE UP (ref 135–146)
SP GR SPEC: 1.02 — SIGNIFICANT CHANGE UP (ref 1–1.03)
SQUAMOUS # UR AUTO: 10 /HPF — HIGH (ref 0–5)
TIBC SERPL-MCNC: 218 UG/DL — LOW (ref 220–430)
TROPONIN T, HIGH SENSITIVITY RESULT: 37 NG/L — HIGH (ref 6–13)
TROPONIN T, HIGH SENSITIVITY RESULT: 37 NG/L — HIGH (ref 6–13)
UIBC SERPL-MCNC: 76 UG/DL — LOW (ref 110–370)
UROBILINOGEN FLD QL: 1 MG/DL — SIGNIFICANT CHANGE UP (ref 0.2–1)
WBC # BLD: 4.29 K/UL — SIGNIFICANT CHANGE UP (ref 3.8–10.5)
WBC # FLD AUTO: 4.29 K/UL — SIGNIFICANT CHANGE UP (ref 3.8–10.5)
WBC UR QL: 35 /HPF — HIGH (ref 0–5)

## 2025-07-05 PROCEDURE — 83935 ASSAY OF URINE OSMOLALITY: CPT

## 2025-07-05 PROCEDURE — 83550 IRON BINDING TEST: CPT

## 2025-07-05 PROCEDURE — 87205 SMEAR GRAM STAIN: CPT

## 2025-07-05 PROCEDURE — 92610 EVALUATE SWALLOWING FUNCTION: CPT | Mod: GN

## 2025-07-05 PROCEDURE — 85610 PROTHROMBIN TIME: CPT

## 2025-07-05 PROCEDURE — 97530 THERAPEUTIC ACTIVITIES: CPT | Mod: GP

## 2025-07-05 PROCEDURE — 70450 CT HEAD/BRAIN W/O DYE: CPT | Mod: 26

## 2025-07-05 PROCEDURE — 84133 ASSAY OF URINE POTASSIUM: CPT

## 2025-07-05 PROCEDURE — 84157 ASSAY OF PROTEIN OTHER: CPT

## 2025-07-05 PROCEDURE — 36430 TRANSFUSION BLD/BLD COMPNT: CPT

## 2025-07-05 PROCEDURE — 76700 US EXAM ABDOM COMPLETE: CPT

## 2025-07-05 PROCEDURE — 86901 BLOOD TYPING SEROLOGIC RH(D): CPT

## 2025-07-05 PROCEDURE — 82784 ASSAY IGA/IGD/IGG/IGM EACH: CPT

## 2025-07-05 PROCEDURE — 84443 ASSAY THYROID STIM HORMONE: CPT

## 2025-07-05 PROCEDURE — 87040 BLOOD CULTURE FOR BACTERIA: CPT

## 2025-07-05 PROCEDURE — 87102 FUNGUS ISOLATION CULTURE: CPT

## 2025-07-05 PROCEDURE — 86923 COMPATIBILITY TEST ELECTRIC: CPT

## 2025-07-05 PROCEDURE — 84156 ASSAY OF PROTEIN URINE: CPT

## 2025-07-05 PROCEDURE — 80053 COMPREHEN METABOLIC PANEL: CPT

## 2025-07-05 PROCEDURE — 83521 IG LIGHT CHAINS FREE EACH: CPT

## 2025-07-05 PROCEDURE — 84540 ASSAY OF URINE/UREA-N: CPT

## 2025-07-05 PROCEDURE — 70496 CT ANGIOGRAPHY HEAD: CPT | Mod: 26

## 2025-07-05 PROCEDURE — 83540 ASSAY OF IRON: CPT

## 2025-07-05 PROCEDURE — 89051 BODY FLUID CELL COUNT: CPT

## 2025-07-05 PROCEDURE — 85730 THROMBOPLASTIN TIME PARTIAL: CPT

## 2025-07-05 PROCEDURE — 71045 X-RAY EXAM CHEST 1 VIEW: CPT | Mod: 26

## 2025-07-05 PROCEDURE — 83735 ASSAY OF MAGNESIUM: CPT

## 2025-07-05 PROCEDURE — 86335 IMMUNFIX E-PHORSIS/URINE/CSF: CPT

## 2025-07-05 PROCEDURE — 84165 PROTEIN E-PHORESIS SERUM: CPT

## 2025-07-05 PROCEDURE — 99285 EMERGENCY DEPT VISIT HI MDM: CPT | Mod: FS

## 2025-07-05 PROCEDURE — 86334 IMMUNOFIX E-PHORESIS SERUM: CPT

## 2025-07-05 PROCEDURE — 93005 ELECTROCARDIOGRAM TRACING: CPT

## 2025-07-05 PROCEDURE — 70498 CT ANGIOGRAPHY NECK: CPT | Mod: 26

## 2025-07-05 PROCEDURE — 86850 RBC ANTIBODY SCREEN: CPT

## 2025-07-05 PROCEDURE — 92526 ORAL FUNCTION THERAPY: CPT | Mod: GN

## 2025-07-05 PROCEDURE — 82042 OTHER SOURCE ALBUMIN QUAN EA: CPT

## 2025-07-05 PROCEDURE — 88112 CYTOPATH CELL ENHANCE TECH: CPT

## 2025-07-05 PROCEDURE — 71045 X-RAY EXAM CHEST 1 VIEW: CPT

## 2025-07-05 PROCEDURE — 82746 ASSAY OF FOLIC ACID SERUM: CPT

## 2025-07-05 PROCEDURE — 74018 RADEX ABDOMEN 1 VIEW: CPT

## 2025-07-05 PROCEDURE — 87070 CULTURE OTHR SPECIMN AEROBIC: CPT

## 2025-07-05 PROCEDURE — 70450 CT HEAD/BRAIN W/O DYE: CPT | Mod: 26,77

## 2025-07-05 PROCEDURE — 82945 GLUCOSE OTHER FLUID: CPT

## 2025-07-05 PROCEDURE — 82570 ASSAY OF URINE CREATININE: CPT

## 2025-07-05 PROCEDURE — 70450 CT HEAD/BRAIN W/O DYE: CPT

## 2025-07-05 PROCEDURE — 97163 PT EVAL HIGH COMPLEX 45 MIN: CPT | Mod: GP

## 2025-07-05 PROCEDURE — 93970 EXTREMITY STUDY: CPT

## 2025-07-05 PROCEDURE — 86900 BLOOD TYPING SEROLOGIC ABO: CPT

## 2025-07-05 PROCEDURE — 87086 URINE CULTURE/COLONY COUNT: CPT

## 2025-07-05 PROCEDURE — P9016: CPT

## 2025-07-05 PROCEDURE — 85670 THROMBIN TIME PLASMA: CPT

## 2025-07-05 PROCEDURE — 82728 ASSAY OF FERRITIN: CPT

## 2025-07-05 PROCEDURE — 87015 SPECIMEN INFECT AGNT CONCNTJ: CPT

## 2025-07-05 PROCEDURE — P9047: CPT | Mod: JZ

## 2025-07-05 PROCEDURE — 83010 ASSAY OF HAPTOGLOBIN QUANT: CPT

## 2025-07-05 PROCEDURE — 81001 URINALYSIS AUTO W/SCOPE: CPT

## 2025-07-05 PROCEDURE — 94640 AIRWAY INHALATION TREATMENT: CPT

## 2025-07-05 PROCEDURE — 87075 CULTR BACTERIA EXCEPT BLOOD: CPT

## 2025-07-05 PROCEDURE — 88305 TISSUE EXAM BY PATHOLOGIST: CPT

## 2025-07-05 PROCEDURE — P9100: CPT

## 2025-07-05 PROCEDURE — 85027 COMPLETE CBC AUTOMATED: CPT

## 2025-07-05 PROCEDURE — 0042T: CPT

## 2025-07-05 PROCEDURE — 80048 BASIC METABOLIC PNL TOTAL CA: CPT

## 2025-07-05 PROCEDURE — 99223 1ST HOSP IP/OBS HIGH 75: CPT

## 2025-07-05 PROCEDURE — 84300 ASSAY OF URINE SODIUM: CPT

## 2025-07-05 PROCEDURE — 97110 THERAPEUTIC EXERCISES: CPT | Mod: GP

## 2025-07-05 PROCEDURE — P9037: CPT

## 2025-07-05 PROCEDURE — 87077 CULTURE AEROBIC IDENTIFY: CPT

## 2025-07-05 PROCEDURE — 82140 ASSAY OF AMMONIA: CPT

## 2025-07-05 PROCEDURE — 83615 LACTATE (LD) (LDH) ENZYME: CPT

## 2025-07-05 PROCEDURE — 36415 COLL VENOUS BLD VENIPUNCTURE: CPT

## 2025-07-05 PROCEDURE — 82533 TOTAL CORTISOL: CPT

## 2025-07-05 PROCEDURE — 84100 ASSAY OF PHOSPHORUS: CPT

## 2025-07-05 PROCEDURE — 85025 COMPLETE CBC W/AUTO DIFF WBC: CPT

## 2025-07-05 PROCEDURE — 84166 PROTEIN E-PHORESIS/URINE/CSF: CPT

## 2025-07-05 PROCEDURE — 82607 VITAMIN B-12: CPT

## 2025-07-05 PROCEDURE — 94010 BREATHING CAPACITY TEST: CPT

## 2025-07-05 PROCEDURE — 82962 GLUCOSE BLOOD TEST: CPT

## 2025-07-05 PROCEDURE — 83605 ASSAY OF LACTIC ACID: CPT

## 2025-07-05 PROCEDURE — 85045 AUTOMATED RETICULOCYTE COUNT: CPT

## 2025-07-05 PROCEDURE — 86880 COOMBS TEST DIRECT: CPT

## 2025-07-05 PROCEDURE — 97116 GAIT TRAINING THERAPY: CPT | Mod: GP

## 2025-07-05 PROCEDURE — 85384 FIBRINOGEN ACTIVITY: CPT

## 2025-07-05 PROCEDURE — 84155 ASSAY OF PROTEIN SERUM: CPT

## 2025-07-05 RX ORDER — LIDOCAINE HYDROCHLORIDE 20 MG/ML
1 JELLY TOPICAL
Refills: 0 | DISCHARGE

## 2025-07-05 RX ORDER — ONDANSETRON HCL/PF 4 MG/2 ML
4 VIAL (ML) INJECTION EVERY 8 HOURS
Refills: 0 | Status: DISCONTINUED | OUTPATIENT
Start: 2025-07-05 | End: 2025-07-29

## 2025-07-05 RX ORDER — LACTULOSE 10 G/15ML
30 SOLUTION ORAL
Refills: 0 | DISCHARGE

## 2025-07-05 RX ORDER — CEFTRIAXONE 500 MG/1
1000 INJECTION, POWDER, FOR SOLUTION INTRAMUSCULAR; INTRAVENOUS ONCE
Refills: 0 | Status: COMPLETED | OUTPATIENT
Start: 2025-07-05 | End: 2025-07-05

## 2025-07-05 RX ORDER — LACTULOSE 10 G/15ML
20 SOLUTION ORAL EVERY 8 HOURS
Refills: 0 | Status: DISCONTINUED | OUTPATIENT
Start: 2025-07-05 | End: 2025-07-06

## 2025-07-05 RX ORDER — CEFTRIAXONE 500 MG/1
1000 INJECTION, POWDER, FOR SOLUTION INTRAMUSCULAR; INTRAVENOUS EVERY 24 HOURS
Refills: 0 | Status: DISCONTINUED | OUTPATIENT
Start: 2025-07-06 | End: 2025-07-08

## 2025-07-05 RX ORDER — MEDPURA VITAMIN A AND D 95 G/100G
1 OINTMENT TOPICAL EVERY 12 HOURS
Refills: 0 | Status: DISCONTINUED | OUTPATIENT
Start: 2025-07-05 | End: 2025-07-29

## 2025-07-05 RX ORDER — SPIRONOLACTONE 25 MG
25 TABLET ORAL DAILY
Refills: 0 | Status: DISCONTINUED | OUTPATIENT
Start: 2025-07-06 | End: 2025-07-11

## 2025-07-05 RX ORDER — LIDOCAINE HYDROCHLORIDE 20 MG/ML
1 JELLY TOPICAL DAILY
Refills: 0 | Status: DISCONTINUED | OUTPATIENT
Start: 2025-07-05 | End: 2025-07-29

## 2025-07-05 RX ORDER — LEVOTHYROXINE SODIUM 300 MCG
37.5 TABLET ORAL DAILY
Refills: 0 | Status: DISCONTINUED | OUTPATIENT
Start: 2025-07-06 | End: 2025-07-06

## 2025-07-05 RX ORDER — SODIUM CHLORIDE 9 G/1000ML
1000 INJECTION, SOLUTION INTRAVENOUS
Refills: 0 | Status: DISCONTINUED | OUTPATIENT
Start: 2025-07-05 | End: 2025-07-09

## 2025-07-05 RX ORDER — MEDPURA VITAMIN A AND D 95 G/100G
1 OINTMENT TOPICAL
Refills: 0 | DISCHARGE

## 2025-07-05 RX ORDER — ACETAMINOPHEN 500 MG/5ML
650 LIQUID (ML) ORAL EVERY 6 HOURS
Refills: 0 | Status: DISCONTINUED | OUTPATIENT
Start: 2025-07-05 | End: 2025-07-29

## 2025-07-05 RX ADMIN — MEDPURA VITAMIN A AND D 1 APPLICATION(S): 95 OINTMENT TOPICAL at 18:18

## 2025-07-05 RX ADMIN — SODIUM CHLORIDE 50 MILLILITER(S): 9 INJECTION, SOLUTION INTRAVENOUS at 14:52

## 2025-07-05 RX ADMIN — CEFTRIAXONE 100 MILLIGRAM(S): 500 INJECTION, POWDER, FOR SOLUTION INTRAMUSCULAR; INTRAVENOUS at 12:26

## 2025-07-06 LAB
ANION GAP SERPL CALC-SCNC: 14 MMOL/L — SIGNIFICANT CHANGE UP (ref 7–14)
BASOPHILS # BLD AUTO: 0.01 K/UL — SIGNIFICANT CHANGE UP (ref 0–0.2)
BASOPHILS NFR BLD AUTO: 0.2 % — SIGNIFICANT CHANGE UP (ref 0–2)
BUN SERPL-MCNC: 53 MG/DL — HIGH (ref 10–20)
CALCIUM SERPL-MCNC: 8.9 MG/DL — SIGNIFICANT CHANGE UP (ref 8.4–10.5)
CHLORIDE SERPL-SCNC: 106 MMOL/L — SIGNIFICANT CHANGE UP (ref 98–110)
CO2 SERPL-SCNC: 18 MMOL/L — SIGNIFICANT CHANGE UP (ref 17–32)
CREAT SERPL-MCNC: 1.6 MG/DL — HIGH (ref 0.7–1.5)
EGFR: 31 ML/MIN/1.73M2 — LOW
EGFR: 31 ML/MIN/1.73M2 — LOW
EOSINOPHIL # BLD AUTO: 0.3 K/UL — SIGNIFICANT CHANGE UP (ref 0–0.5)
EOSINOPHIL NFR BLD AUTO: 6.8 % — HIGH (ref 0–6)
FERRITIN SERPL-MCNC: 121 NG/ML — SIGNIFICANT CHANGE UP (ref 13–330)
FOLATE SERPL-MCNC: 13 NG/ML — SIGNIFICANT CHANGE UP
GLUCOSE SERPL-MCNC: 88 MG/DL — SIGNIFICANT CHANGE UP (ref 70–99)
HCT VFR BLD CALC: 30.3 % — LOW (ref 34.5–45)
HGB BLD-MCNC: 9.6 G/DL — LOW (ref 11.5–15.5)
IMM GRANULOCYTES # BLD AUTO: 0.01 K/UL — SIGNIFICANT CHANGE UP (ref 0–0.07)
IMM GRANULOCYTES NFR BLD AUTO: 0.2 % — SIGNIFICANT CHANGE UP (ref 0–0.9)
IMMATURE PLATELET FRACTION #: 0.4 K/UL — LOW (ref 4.7–11.1)
IMMATURE PLATELET FRACTION %: 0.6 % — LOW (ref 1.6–4.9)
LYMPHOCYTES # BLD AUTO: 0.62 K/UL — LOW (ref 1–3.3)
LYMPHOCYTES NFR BLD AUTO: 14.1 % — SIGNIFICANT CHANGE UP (ref 13–44)
MCHC RBC-ENTMCNC: 29.4 PG — SIGNIFICANT CHANGE UP (ref 27–34)
MCHC RBC-ENTMCNC: 31.7 G/DL — LOW (ref 32–36)
MCV RBC AUTO: 92.7 FL — SIGNIFICANT CHANGE UP (ref 80–100)
MONOCYTES # BLD AUTO: 0.34 K/UL — SIGNIFICANT CHANGE UP (ref 0–0.9)
MONOCYTES NFR BLD AUTO: 7.7 % — SIGNIFICANT CHANGE UP (ref 2–14)
NEUTROPHILS # BLD AUTO: 3.12 K/UL — SIGNIFICANT CHANGE UP (ref 1.8–7.4)
NEUTROPHILS NFR BLD AUTO: 71 % — SIGNIFICANT CHANGE UP (ref 43–77)
NRBC # BLD AUTO: 0 K/UL — SIGNIFICANT CHANGE UP (ref 0–0)
NRBC # FLD: 0 K/UL — SIGNIFICANT CHANGE UP (ref 0–0)
NRBC BLD AUTO-RTO: 0 /100 WBCS — SIGNIFICANT CHANGE UP (ref 0–0)
PLATELET # BLD AUTO: 62 K/UL — LOW (ref 150–400)
PMV BLD: 10.5 FL — SIGNIFICANT CHANGE UP (ref 7–13)
POTASSIUM SERPL-MCNC: 4.5 MMOL/L — SIGNIFICANT CHANGE UP (ref 3.5–5)
POTASSIUM SERPL-SCNC: 4.5 MMOL/L — SIGNIFICANT CHANGE UP (ref 3.5–5)
RBC # BLD: 3.27 M/UL — LOW (ref 3.8–5.2)
RBC # FLD: 18.7 % — HIGH (ref 10.3–14.5)
SODIUM SERPL-SCNC: 138 MMOL/L — SIGNIFICANT CHANGE UP (ref 135–146)
VIT B12 SERPL-MCNC: >2000 PG/ML — HIGH (ref 232–1245)
WBC # BLD: 4.4 K/UL — SIGNIFICANT CHANGE UP (ref 3.8–10.5)
WBC # FLD AUTO: 4.4 K/UL — SIGNIFICANT CHANGE UP (ref 3.8–10.5)

## 2025-07-06 PROCEDURE — 74018 RADEX ABDOMEN 1 VIEW: CPT | Mod: 26

## 2025-07-06 PROCEDURE — 99497 ADVNCD CARE PLAN 30 MIN: CPT

## 2025-07-06 PROCEDURE — 99233 SBSQ HOSP IP/OBS HIGH 50: CPT | Mod: 25

## 2025-07-06 RX ORDER — LACTULOSE 10 G/15ML
200 SOLUTION ORAL EVERY 6 HOURS
Refills: 0 | Status: DISCONTINUED | OUTPATIENT
Start: 2025-07-06 | End: 2025-07-06

## 2025-07-06 RX ORDER — LACTULOSE 10 G/15ML
200 SOLUTION ORAL EVERY 8 HOURS
Refills: 0 | Status: COMPLETED | OUTPATIENT
Start: 2025-07-06 | End: 2025-07-07

## 2025-07-06 RX ORDER — LACTULOSE 10 G/15ML
200 SOLUTION ORAL ONCE
Refills: 0 | Status: COMPLETED | OUTPATIENT
Start: 2025-07-06 | End: 2025-07-06

## 2025-07-06 RX ORDER — LEVOTHYROXINE SODIUM 300 MCG
25 TABLET ORAL AT BEDTIME
Refills: 0 | Status: DISCONTINUED | OUTPATIENT
Start: 2025-07-06 | End: 2025-07-08

## 2025-07-06 RX ADMIN — Medication 1 APPLICATION(S): at 11:26

## 2025-07-06 RX ADMIN — SODIUM CHLORIDE 50 MILLILITER(S): 9 INJECTION, SOLUTION INTRAVENOUS at 19:55

## 2025-07-06 RX ADMIN — LACTULOSE 200 GRAM(S): 10 SOLUTION ORAL at 23:09

## 2025-07-06 RX ADMIN — CEFTRIAXONE 100 MILLIGRAM(S): 500 INJECTION, POWDER, FOR SOLUTION INTRAMUSCULAR; INTRAVENOUS at 11:24

## 2025-07-06 RX ADMIN — SODIUM CHLORIDE 50 MILLILITER(S): 9 INJECTION, SOLUTION INTRAVENOUS at 13:27

## 2025-07-06 RX ADMIN — LACTULOSE 200 GRAM(S): 10 SOLUTION ORAL at 09:05

## 2025-07-06 RX ADMIN — LIDOCAINE HYDROCHLORIDE 1 PATCH: 20 JELLY TOPICAL at 11:25

## 2025-07-06 RX ADMIN — MEDPURA VITAMIN A AND D 1 APPLICATION(S): 95 OINTMENT TOPICAL at 05:17

## 2025-07-06 RX ADMIN — MEDPURA VITAMIN A AND D 1 APPLICATION(S): 95 OINTMENT TOPICAL at 17:41

## 2025-07-06 RX ADMIN — Medication 25 MICROGRAM(S): at 06:39

## 2025-07-06 RX ADMIN — LACTULOSE 200 GRAM(S): 10 SOLUTION ORAL at 15:44

## 2025-07-07 DIAGNOSIS — G92.8 OTHER TOXIC ENCEPHALOPATHY: ICD-10-CM

## 2025-07-07 DIAGNOSIS — Z51.5 ENCOUNTER FOR PALLIATIVE CARE: ICD-10-CM

## 2025-07-07 DIAGNOSIS — Z71.89 OTHER SPECIFIED COUNSELING: ICD-10-CM

## 2025-07-07 DIAGNOSIS — N39.0 URINARY TRACT INFECTION, SITE NOT SPECIFIED: ICD-10-CM

## 2025-07-07 DIAGNOSIS — C50.919 MALIGNANT NEOPLASM OF UNSPECIFIED SITE OF UNSPECIFIED FEMALE BREAST: ICD-10-CM

## 2025-07-07 LAB
-  AMOXICILLIN/CLAVULANIC ACID: SIGNIFICANT CHANGE UP
-  AMPICILLIN/SULBACTAM: SIGNIFICANT CHANGE UP
-  AMPICILLIN: SIGNIFICANT CHANGE UP
-  AZTREONAM: SIGNIFICANT CHANGE UP
-  CEFAZOLIN: SIGNIFICANT CHANGE UP
-  CEFEPIME: SIGNIFICANT CHANGE UP
-  CEFOXITIN: SIGNIFICANT CHANGE UP
-  CEFTRIAXONE: SIGNIFICANT CHANGE UP
-  CEFUROXIME: SIGNIFICANT CHANGE UP
-  CIPROFLOXACIN: SIGNIFICANT CHANGE UP
-  ERTAPENEM: SIGNIFICANT CHANGE UP
-  GENTAMICIN: SIGNIFICANT CHANGE UP
-  IMIPENEM: SIGNIFICANT CHANGE UP
-  LEVOFLOXACIN: SIGNIFICANT CHANGE UP
-  MEROPENEM: SIGNIFICANT CHANGE UP
-  NITROFURANTOIN: SIGNIFICANT CHANGE UP
-  PIPERACILLIN/TAZOBACTAM: SIGNIFICANT CHANGE UP
-  RESISTANCE GENE OXA: SIGNIFICANT CHANGE UP
-  TIGECYCLINE: SIGNIFICANT CHANGE UP
-  TOBRAMYCIN: SIGNIFICANT CHANGE UP
-  TRIMETHOPRIM/SULFAMETHOXAZOLE: SIGNIFICANT CHANGE UP
AMMONIA BLD-MCNC: 50 UMOL/L — SIGNIFICANT CHANGE UP (ref 11–55)
ANION GAP SERPL CALC-SCNC: 17 MMOL/L — HIGH (ref 7–14)
BASOPHILS # BLD AUTO: 0.02 K/UL — SIGNIFICANT CHANGE UP (ref 0–0.2)
BASOPHILS NFR BLD AUTO: 0.3 % — SIGNIFICANT CHANGE UP (ref 0–2)
BLANDM BLD POS QL PROBE: SIGNIFICANT CHANGE UP
BUN SERPL-MCNC: 50 MG/DL — HIGH (ref 10–20)
CALCIUM SERPL-MCNC: 8.8 MG/DL — SIGNIFICANT CHANGE UP (ref 8.4–10.5)
CHLORIDE SERPL-SCNC: 108 MMOL/L — SIGNIFICANT CHANGE UP (ref 98–110)
CO2 SERPL-SCNC: 17 MMOL/L — SIGNIFICANT CHANGE UP (ref 17–32)
CREAT SERPL-MCNC: 1.6 MG/DL — HIGH (ref 0.7–1.5)
CULTURE RESULTS: ABNORMAL
EGFR: 31 ML/MIN/1.73M2 — LOW
EGFR: 31 ML/MIN/1.73M2 — LOW
EOSINOPHIL # BLD AUTO: 0.28 K/UL — SIGNIFICANT CHANGE UP (ref 0–0.5)
EOSINOPHIL NFR BLD AUTO: 4.4 % — SIGNIFICANT CHANGE UP (ref 0–6)
GLUCOSE SERPL-MCNC: 100 MG/DL — HIGH (ref 70–99)
HCT VFR BLD CALC: 31.3 % — LOW (ref 34.5–45)
HGB BLD-MCNC: 9.8 G/DL — LOW (ref 11.5–15.5)
IMM GRANULOCYTES # BLD AUTO: 0.03 K/UL — SIGNIFICANT CHANGE UP (ref 0–0.07)
IMM GRANULOCYTES NFR BLD AUTO: 0.5 % — SIGNIFICANT CHANGE UP (ref 0–0.9)
IMMATURE PLATELET FRACTION #: 0.5 K/UL — LOW (ref 4.7–11.1)
IMMATURE PLATELET FRACTION %: 0.9 % — LOW (ref 1.6–4.9)
LYMPHOCYTES # BLD AUTO: 0.43 K/UL — LOW (ref 1–3.3)
LYMPHOCYTES NFR BLD AUTO: 6.8 % — LOW (ref 13–44)
MCHC RBC-ENTMCNC: 29.6 PG — SIGNIFICANT CHANGE UP (ref 27–34)
MCHC RBC-ENTMCNC: 31.3 G/DL — LOW (ref 32–36)
MCV RBC AUTO: 94.6 FL — SIGNIFICANT CHANGE UP (ref 80–100)
METHOD TYPE: SIGNIFICANT CHANGE UP
METHOD TYPE: SIGNIFICANT CHANGE UP
MONOCYTES # BLD AUTO: 0.3 K/UL — SIGNIFICANT CHANGE UP (ref 0–0.9)
MONOCYTES NFR BLD AUTO: 4.7 % — SIGNIFICANT CHANGE UP (ref 2–14)
NEUTROPHILS # BLD AUTO: 5.3 K/UL — SIGNIFICANT CHANGE UP (ref 1.8–7.4)
NEUTROPHILS NFR BLD AUTO: 83.3 % — HIGH (ref 43–77)
NRBC # BLD AUTO: 0 K/UL — SIGNIFICANT CHANGE UP (ref 0–0)
NRBC # FLD: 0 K/UL — SIGNIFICANT CHANGE UP (ref 0–0)
NRBC BLD AUTO-RTO: 0 /100 WBCS — SIGNIFICANT CHANGE UP (ref 0–0)
ORGANISM # SPEC MICROSCOPIC CNT: ABNORMAL
ORGANISM # SPEC MICROSCOPIC CNT: ABNORMAL
ORGANISM # SPEC MICROSCOPIC CNT: SIGNIFICANT CHANGE UP
PLATELET # BLD AUTO: 59 K/UL — LOW (ref 150–400)
PMV BLD: 9.3 FL — SIGNIFICANT CHANGE UP (ref 7–13)
POTASSIUM SERPL-MCNC: 4.2 MMOL/L — SIGNIFICANT CHANGE UP (ref 3.5–5)
POTASSIUM SERPL-SCNC: 4.2 MMOL/L — SIGNIFICANT CHANGE UP (ref 3.5–5)
RBC # BLD: 3.31 M/UL — LOW (ref 3.8–5.2)
RBC # FLD: 19 % — HIGH (ref 10.3–14.5)
SODIUM SERPL-SCNC: 142 MMOL/L — SIGNIFICANT CHANGE UP (ref 135–146)
SPECIMEN SOURCE: SIGNIFICANT CHANGE UP
WBC # BLD: 6.36 K/UL — SIGNIFICANT CHANGE UP (ref 3.8–10.5)
WBC # FLD AUTO: 6.36 K/UL — SIGNIFICANT CHANGE UP (ref 3.8–10.5)

## 2025-07-07 PROCEDURE — 99497 ADVNCD CARE PLAN 30 MIN: CPT | Mod: 25,2W

## 2025-07-07 PROCEDURE — 74018 RADEX ABDOMEN 1 VIEW: CPT | Mod: 26

## 2025-07-07 PROCEDURE — 99223 1ST HOSP IP/OBS HIGH 75: CPT | Mod: 2W

## 2025-07-07 PROCEDURE — 70450 CT HEAD/BRAIN W/O DYE: CPT | Mod: 26

## 2025-07-07 PROCEDURE — 99233 SBSQ HOSP IP/OBS HIGH 50: CPT

## 2025-07-07 RX ORDER — LACTULOSE 10 G/15ML
20 SOLUTION ORAL THREE TIMES A DAY
Refills: 0 | Status: DISCONTINUED | OUTPATIENT
Start: 2025-07-07 | End: 2025-07-09

## 2025-07-07 RX ADMIN — LACTULOSE 20 GRAM(S): 10 SOLUTION ORAL at 16:07

## 2025-07-07 RX ADMIN — Medication 4 MILLIGRAM(S): at 00:09

## 2025-07-07 RX ADMIN — LACTULOSE 200 GRAM(S): 10 SOLUTION ORAL at 06:51

## 2025-07-07 RX ADMIN — MEDPURA VITAMIN A AND D 1 APPLICATION(S): 95 OINTMENT TOPICAL at 06:50

## 2025-07-07 RX ADMIN — Medication 1 APPLICATION(S): at 12:01

## 2025-07-07 RX ADMIN — LACTULOSE 20 GRAM(S): 10 SOLUTION ORAL at 23:08

## 2025-07-07 RX ADMIN — SODIUM CHLORIDE 100 MILLILITER(S): 9 INJECTION, SOLUTION INTRAVENOUS at 18:00

## 2025-07-07 RX ADMIN — MEDPURA VITAMIN A AND D 1 APPLICATION(S): 95 OINTMENT TOPICAL at 18:11

## 2025-07-07 RX ADMIN — LIDOCAINE HYDROCHLORIDE 1 PATCH: 20 JELLY TOPICAL at 11:56

## 2025-07-07 RX ADMIN — CEFTRIAXONE 100 MILLIGRAM(S): 500 INJECTION, POWDER, FOR SOLUTION INTRAMUSCULAR; INTRAVENOUS at 11:56

## 2025-07-07 RX ADMIN — SODIUM CHLORIDE 100 MILLILITER(S): 9 INJECTION, SOLUTION INTRAVENOUS at 16:07

## 2025-07-07 RX ADMIN — SODIUM CHLORIDE 100 MILLILITER(S): 9 INJECTION, SOLUTION INTRAVENOUS at 13:50

## 2025-07-08 LAB
ALBUMIN SERPL ELPH-MCNC: 2.3 G/DL — LOW (ref 3.5–5.2)
ALP SERPL-CCNC: 82 U/L — SIGNIFICANT CHANGE UP (ref 30–115)
ALT FLD-CCNC: 17 U/L — SIGNIFICANT CHANGE UP (ref 0–41)
AMMONIA BLD-MCNC: 44 UMOL/L — SIGNIFICANT CHANGE UP (ref 11–55)
ANION GAP SERPL CALC-SCNC: 11 MMOL/L — SIGNIFICANT CHANGE UP (ref 7–14)
AST SERPL-CCNC: 22 U/L — SIGNIFICANT CHANGE UP (ref 0–41)
BASOPHILS # BLD AUTO: 0.01 K/UL — SIGNIFICANT CHANGE UP (ref 0–0.2)
BASOPHILS # BLD AUTO: 0.01 K/UL — SIGNIFICANT CHANGE UP (ref 0–0.2)
BASOPHILS NFR BLD AUTO: 0.4 % — SIGNIFICANT CHANGE UP (ref 0–1)
BASOPHILS NFR BLD AUTO: 0.4 % — SIGNIFICANT CHANGE UP (ref 0–1)
BILIRUB SERPL-MCNC: 3.2 MG/DL — HIGH (ref 0.2–1.2)
BLD GP AB SCN SERPL QL: SIGNIFICANT CHANGE UP
BUN SERPL-MCNC: 53 MG/DL — HIGH (ref 10–20)
CALCIUM SERPL-MCNC: 8.1 MG/DL — LOW (ref 8.4–10.5)
CHLORIDE SERPL-SCNC: 104 MMOL/L — SIGNIFICANT CHANGE UP (ref 98–110)
CO2 SERPL-SCNC: 19 MMOL/L — SIGNIFICANT CHANGE UP (ref 17–32)
CREAT SERPL-MCNC: 1.6 MG/DL — HIGH (ref 0.7–1.5)
EGFR: 31 ML/MIN/1.73M2 — LOW
EGFR: 31 ML/MIN/1.73M2 — LOW
EOSINOPHIL # BLD AUTO: 0.27 K/UL — SIGNIFICANT CHANGE UP (ref 0–0.7)
EOSINOPHIL # BLD AUTO: 0.27 K/UL — SIGNIFICANT CHANGE UP (ref 0–0.7)
EOSINOPHIL NFR BLD AUTO: 10.1 % — HIGH (ref 0–8)
EOSINOPHIL NFR BLD AUTO: 10.1 % — HIGH (ref 0–8)
GLUCOSE SERPL-MCNC: 118 MG/DL — HIGH (ref 70–99)
HCT VFR BLD CALC: 25.1 % — LOW (ref 37–47)
HCT VFR BLD CALC: 25.1 % — LOW (ref 37–47)
HGB BLD-MCNC: 7.8 G/DL — LOW (ref 12–16)
HGB BLD-MCNC: 7.8 G/DL — LOW (ref 12–16)
IMM GRANULOCYTES NFR BLD AUTO: 0.4 % — HIGH (ref 0.1–0.3)
IMM GRANULOCYTES NFR BLD AUTO: 0.4 % — HIGH (ref 0.1–0.3)
LYMPHOCYTES # BLD AUTO: 0.51 K/UL — LOW (ref 1.2–3.4)
LYMPHOCYTES # BLD AUTO: 0.51 K/UL — LOW (ref 1.2–3.4)
LYMPHOCYTES # BLD AUTO: 19 % — LOW (ref 20.5–51.1)
LYMPHOCYTES # BLD AUTO: 19 % — LOW (ref 20.5–51.1)
MAGNESIUM SERPL-MCNC: 1.7 MG/DL — LOW (ref 1.8–2.4)
MCHC RBC-ENTMCNC: 29.9 PG — SIGNIFICANT CHANGE UP (ref 27–31)
MCHC RBC-ENTMCNC: 29.9 PG — SIGNIFICANT CHANGE UP (ref 27–31)
MCHC RBC-ENTMCNC: 31.1 G/DL — LOW (ref 32–37)
MCHC RBC-ENTMCNC: 31.1 G/DL — LOW (ref 32–37)
MCV RBC AUTO: 96.2 FL — SIGNIFICANT CHANGE UP (ref 81–99)
MCV RBC AUTO: 96.2 FL — SIGNIFICANT CHANGE UP (ref 81–99)
MONOCYTES # BLD AUTO: 0.26 K/UL — SIGNIFICANT CHANGE UP (ref 0.1–0.6)
MONOCYTES # BLD AUTO: 0.26 K/UL — SIGNIFICANT CHANGE UP (ref 0.1–0.6)
MONOCYTES NFR BLD AUTO: 9.7 % — HIGH (ref 1.7–9.3)
MONOCYTES NFR BLD AUTO: 9.7 % — HIGH (ref 1.7–9.3)
NEUTROPHILS # BLD AUTO: 1.62 K/UL — SIGNIFICANT CHANGE UP (ref 1.4–6.5)
NEUTROPHILS # BLD AUTO: 1.62 K/UL — SIGNIFICANT CHANGE UP (ref 1.4–6.5)
NEUTROPHILS NFR BLD AUTO: 60.4 % — SIGNIFICANT CHANGE UP (ref 42.2–75.2)
NEUTROPHILS NFR BLD AUTO: 60.4 % — SIGNIFICANT CHANGE UP (ref 42.2–75.2)
NRBC BLD AUTO-RTO: 0 /100 WBCS — SIGNIFICANT CHANGE UP (ref 0–0)
NRBC BLD AUTO-RTO: 0 /100 WBCS — SIGNIFICANT CHANGE UP (ref 0–0)
PLATELET # BLD AUTO: 38 K/UL — LOW (ref 130–400)
PLATELET # BLD AUTO: 38 K/UL — LOW (ref 130–400)
PMV BLD: 11.4 FL — HIGH (ref 7.4–10.4)
PMV BLD: 11.4 FL — HIGH (ref 7.4–10.4)
POTASSIUM SERPL-MCNC: 3.7 MMOL/L — SIGNIFICANT CHANGE UP (ref 3.5–5)
POTASSIUM SERPL-SCNC: 3.7 MMOL/L — SIGNIFICANT CHANGE UP (ref 3.5–5)
PROT SERPL-MCNC: 4.9 G/DL — LOW (ref 6–8)
RBC # BLD: 2.61 M/UL — LOW (ref 4.2–5.4)
RBC # BLD: 2.61 M/UL — LOW (ref 4.2–5.4)
RBC # FLD: 18.6 % — HIGH (ref 11.5–14.5)
RBC # FLD: 18.6 % — HIGH (ref 11.5–14.5)
SODIUM SERPL-SCNC: 134 MMOL/L — LOW (ref 135–146)
WBC # BLD: 2.68 K/UL — LOW (ref 4.8–10.8)
WBC # BLD: 2.68 K/UL — LOW (ref 4.8–10.8)
WBC # FLD AUTO: 2.68 K/UL — LOW (ref 4.8–10.8)
WBC # FLD AUTO: 2.68 K/UL — LOW (ref 4.8–10.8)

## 2025-07-08 PROCEDURE — 99233 SBSQ HOSP IP/OBS HIGH 50: CPT

## 2025-07-08 PROCEDURE — 99222 1ST HOSP IP/OBS MODERATE 55: CPT | Mod: FS

## 2025-07-08 RX ORDER — LEVOTHYROXINE SODIUM 300 MCG
37.5 TABLET ORAL DAILY
Refills: 0 | Status: DISCONTINUED | OUTPATIENT
Start: 2025-07-09 | End: 2025-07-29

## 2025-07-08 RX ORDER — MAGNESIUM SULFATE 500 MG/ML
1 SYRINGE (ML) INJECTION ONCE
Refills: 0 | Status: COMPLETED | OUTPATIENT
Start: 2025-07-08 | End: 2025-07-08

## 2025-07-08 RX ORDER — SULFAMETHOXAZOLE/TRIMETHOPRIM 800-160 MG
1 TABLET ORAL
Refills: 0 | Status: DISCONTINUED | OUTPATIENT
Start: 2025-07-08 | End: 2025-07-09

## 2025-07-08 RX ADMIN — SODIUM CHLORIDE 100 MILLILITER(S): 9 INJECTION, SOLUTION INTRAVENOUS at 03:38

## 2025-07-08 RX ADMIN — LIDOCAINE HYDROCHLORIDE 1 PATCH: 20 JELLY TOPICAL at 11:50

## 2025-07-08 RX ADMIN — LACTULOSE 20 GRAM(S): 10 SOLUTION ORAL at 22:24

## 2025-07-08 RX ADMIN — Medication 100 GRAM(S): at 09:31

## 2025-07-08 RX ADMIN — Medication 1 TABLET(S): at 17:53

## 2025-07-08 RX ADMIN — Medication 40 MILLIGRAM(S): at 05:29

## 2025-07-08 RX ADMIN — MEDPURA VITAMIN A AND D 1 APPLICATION(S): 95 OINTMENT TOPICAL at 05:30

## 2025-07-08 RX ADMIN — LIDOCAINE HYDROCHLORIDE 1 PATCH: 20 JELLY TOPICAL at 19:30

## 2025-07-08 RX ADMIN — LACTULOSE 20 GRAM(S): 10 SOLUTION ORAL at 05:29

## 2025-07-08 RX ADMIN — MEDPURA VITAMIN A AND D 1 APPLICATION(S): 95 OINTMENT TOPICAL at 17:52

## 2025-07-08 RX ADMIN — Medication 1 APPLICATION(S): at 11:50

## 2025-07-08 RX ADMIN — Medication 25 MILLIGRAM(S): at 05:33

## 2025-07-08 RX ADMIN — LIDOCAINE HYDROCHLORIDE 1 PATCH: 20 JELLY TOPICAL at 23:00

## 2025-07-08 RX ADMIN — Medication 25 MICROGRAM(S): at 01:49

## 2025-07-08 NOTE — ED ADULT NURSE NOTE - NSFALLHARMRISKINTERV_ED_ALL_ED
Lab Results   Component Value Date    EGFR 13 07/08/2025    EGFR 15 07/07/2025    EGFR 26 07/06/2025    CREATININE 4.26 (H) 07/08/2025    CREATININE 3.98 (H) 07/07/2025    CREATININE 2.49 (H) 07/06/2025      Communicate risk of Fall with Harm to all staff, patient, and family/Provide visual cue: red socks, yellow wristband, yellow gown, etc/Reinforce activity limits and safety measures with patient and family/Bed in lowest position, wheels locked, appropriate side rails in place/Call bell, personal items and telephone in reach/Instruct patient to call for assistance before getting out of bed/chair/stretcher/Non-slip footwear applied when patient is off stretcher/[place] to call system/Physically safe environment - no spills, clutter or unnecessary equipment/Purposeful Proactive Rounding/Room/bathroom lighting operational, light cord in reach

## 2025-07-09 LAB
ALBUMIN SERPL ELPH-MCNC: 2.4 G/DL — LOW (ref 3.5–5.2)
ALBUMIN SERPL ELPH-MCNC: 2.6 G/DL — LOW (ref 3.5–5.2)
ALP SERPL-CCNC: 78 U/L — SIGNIFICANT CHANGE UP (ref 30–115)
ALP SERPL-CCNC: 86 U/L — SIGNIFICANT CHANGE UP (ref 30–115)
ALT FLD-CCNC: 18 U/L — SIGNIFICANT CHANGE UP (ref 0–41)
ALT FLD-CCNC: 20 U/L — SIGNIFICANT CHANGE UP (ref 0–41)
ANION GAP SERPL CALC-SCNC: 14 MMOL/L — SIGNIFICANT CHANGE UP (ref 7–14)
ANION GAP SERPL CALC-SCNC: 16 MMOL/L — HIGH (ref 7–14)
APTT BLD: 38.8 SEC — SIGNIFICANT CHANGE UP (ref 27–39.2)
AST SERPL-CCNC: 25 U/L — SIGNIFICANT CHANGE UP (ref 0–41)
AST SERPL-CCNC: 29 U/L — SIGNIFICANT CHANGE UP (ref 0–41)
BASOPHILS # BLD AUTO: 0.01 K/UL — SIGNIFICANT CHANGE UP (ref 0–0.2)
BASOPHILS NFR BLD AUTO: 0.3 % — SIGNIFICANT CHANGE UP (ref 0–1)
BILIRUB SERPL-MCNC: 3.9 MG/DL — HIGH (ref 0.2–1.2)
BILIRUB SERPL-MCNC: 4.1 MG/DL — HIGH (ref 0.2–1.2)
BUN SERPL-MCNC: 43 MG/DL — HIGH (ref 10–20)
BUN SERPL-MCNC: 45 MG/DL — HIGH (ref 10–20)
CALCIUM SERPL-MCNC: 8.2 MG/DL — LOW (ref 8.4–10.5)
CALCIUM SERPL-MCNC: 8.5 MG/DL — SIGNIFICANT CHANGE UP (ref 8.4–10.5)
CHLORIDE SERPL-SCNC: 101 MMOL/L — SIGNIFICANT CHANGE UP (ref 98–110)
CHLORIDE SERPL-SCNC: 104 MMOL/L — SIGNIFICANT CHANGE UP (ref 98–110)
CO2 SERPL-SCNC: 16 MMOL/L — LOW (ref 17–32)
CO2 SERPL-SCNC: 17 MMOL/L — SIGNIFICANT CHANGE UP (ref 17–32)
CREAT SERPL-MCNC: 1.3 MG/DL — SIGNIFICANT CHANGE UP (ref 0.7–1.5)
CREAT SERPL-MCNC: 1.5 MG/DL — SIGNIFICANT CHANGE UP (ref 0.7–1.5)
EGFR: 34 ML/MIN/1.73M2 — LOW
EGFR: 34 ML/MIN/1.73M2 — LOW
EGFR: 40 ML/MIN/1.73M2 — LOW
EGFR: 40 ML/MIN/1.73M2 — LOW
EOSINOPHIL # BLD AUTO: 0.38 K/UL — SIGNIFICANT CHANGE UP (ref 0–0.7)
EOSINOPHIL NFR BLD AUTO: 10.6 % — HIGH (ref 0–8)
FIBRINOGEN PPP-MCNC: 161 MG/DL — LOW (ref 200–435)
GLUCOSE SERPL-MCNC: 113 MG/DL — HIGH (ref 70–99)
GLUCOSE SERPL-MCNC: 92 MG/DL — SIGNIFICANT CHANGE UP (ref 70–99)
HCT VFR BLD CALC: 29.4 % — LOW (ref 37–47)
HCT VFR BLD CALC: 29.4 % — LOW (ref 37–47)
HGB BLD-MCNC: 9.2 G/DL — LOW (ref 12–16)
HGB BLD-MCNC: 9.4 G/DL — LOW (ref 12–16)
IMM GRANULOCYTES NFR BLD AUTO: 0.3 % — SIGNIFICANT CHANGE UP (ref 0.1–0.3)
INR BLD: 1.37 RATIO — HIGH (ref 0.65–1.3)
LYMPHOCYTES # BLD AUTO: 0.48 K/UL — LOW (ref 1.2–3.4)
LYMPHOCYTES # BLD AUTO: 13.3 % — LOW (ref 20.5–51.1)
MAGNESIUM SERPL-MCNC: 1.8 MG/DL — SIGNIFICANT CHANGE UP (ref 1.8–2.4)
MAGNESIUM SERPL-MCNC: 2 MG/DL — SIGNIFICANT CHANGE UP (ref 1.8–2.4)
MCHC RBC-ENTMCNC: 29.7 PG — SIGNIFICANT CHANGE UP (ref 27–31)
MCHC RBC-ENTMCNC: 30.2 PG — SIGNIFICANT CHANGE UP (ref 27–31)
MCHC RBC-ENTMCNC: 31.3 G/DL — LOW (ref 32–37)
MCHC RBC-ENTMCNC: 32 G/DL — SIGNIFICANT CHANGE UP (ref 32–37)
MCV RBC AUTO: 94.5 FL — SIGNIFICANT CHANGE UP (ref 81–99)
MCV RBC AUTO: 94.8 FL — SIGNIFICANT CHANGE UP (ref 81–99)
MONOCYTES # BLD AUTO: 0.23 K/UL — SIGNIFICANT CHANGE UP (ref 0.1–0.6)
MONOCYTES NFR BLD AUTO: 6.4 % — SIGNIFICANT CHANGE UP (ref 1.7–9.3)
NEUTROPHILS # BLD AUTO: 2.49 K/UL — SIGNIFICANT CHANGE UP (ref 1.4–6.5)
NEUTROPHILS NFR BLD AUTO: 69.1 % — SIGNIFICANT CHANGE UP (ref 42.2–75.2)
NRBC BLD AUTO-RTO: 0 /100 WBCS — SIGNIFICANT CHANGE UP (ref 0–0)
NRBC BLD AUTO-RTO: 0 /100 WBCS — SIGNIFICANT CHANGE UP (ref 0–0)
PLATELET # BLD AUTO: 44 K/UL — LOW (ref 130–400)
PLATELET # BLD AUTO: 52 K/UL — LOW (ref 130–400)
PMV BLD: 10.2 FL — SIGNIFICANT CHANGE UP (ref 7.4–10.4)
PMV BLD: 10.4 FL — SIGNIFICANT CHANGE UP (ref 7.4–10.4)
POTASSIUM SERPL-MCNC: 3.9 MMOL/L — SIGNIFICANT CHANGE UP (ref 3.5–5)
POTASSIUM SERPL-MCNC: 3.9 MMOL/L — SIGNIFICANT CHANGE UP (ref 3.5–5)
POTASSIUM SERPL-SCNC: 3.9 MMOL/L — SIGNIFICANT CHANGE UP (ref 3.5–5)
POTASSIUM SERPL-SCNC: 3.9 MMOL/L — SIGNIFICANT CHANGE UP (ref 3.5–5)
PROT SERPL-MCNC: 5.2 G/DL — LOW (ref 6–8)
PROT SERPL-MCNC: 5.7 G/DL — LOW (ref 6–8)
PROTHROM AB SERPL-ACNC: 16.3 SEC — HIGH (ref 9.95–12.87)
RBC # BLD: 3.1 M/UL — LOW (ref 4.2–5.4)
RBC # BLD: 3.11 M/UL — LOW (ref 4.2–5.4)
RBC # FLD: 17.8 % — HIGH (ref 11.5–14.5)
RBC # FLD: 18.1 % — HIGH (ref 11.5–14.5)
SODIUM SERPL-SCNC: 133 MMOL/L — LOW (ref 135–146)
SODIUM SERPL-SCNC: 135 MMOL/L — SIGNIFICANT CHANGE UP (ref 135–146)
WBC # BLD: 3.6 K/UL — LOW (ref 4.8–10.8)
WBC # BLD: 4.93 K/UL — SIGNIFICANT CHANGE UP (ref 4.8–10.8)
WBC # FLD AUTO: 3.6 K/UL — LOW (ref 4.8–10.8)
WBC # FLD AUTO: 4.93 K/UL — SIGNIFICANT CHANGE UP (ref 4.8–10.8)

## 2025-07-09 PROCEDURE — 71045 X-RAY EXAM CHEST 1 VIEW: CPT | Mod: 26

## 2025-07-09 PROCEDURE — 99233 SBSQ HOSP IP/OBS HIGH 50: CPT

## 2025-07-09 PROCEDURE — 99233 SBSQ HOSP IP/OBS HIGH 50: CPT | Mod: GC

## 2025-07-09 RX ORDER — IPRATROPIUM BROMIDE AND ALBUTEROL SULFATE .5; 2.5 MG/3ML; MG/3ML
3 SOLUTION RESPIRATORY (INHALATION) EVERY 6 HOURS
Refills: 0 | Status: DISCONTINUED | OUTPATIENT
Start: 2025-07-09 | End: 2025-07-29

## 2025-07-09 RX ORDER — IPRATROPIUM BROMIDE AND ALBUTEROL SULFATE .5; 2.5 MG/3ML; MG/3ML
3 SOLUTION RESPIRATORY (INHALATION) ONCE
Refills: 0 | Status: COMPLETED | OUTPATIENT
Start: 2025-07-09 | End: 2025-07-09

## 2025-07-09 RX ORDER — SULFAMETHOXAZOLE/TRIMETHOPRIM 800-160 MG
1 TABLET ORAL EVERY 12 HOURS
Refills: 0 | Status: DISCONTINUED | OUTPATIENT
Start: 2025-07-09 | End: 2025-07-11

## 2025-07-09 RX ADMIN — MEDPURA VITAMIN A AND D 1 APPLICATION(S): 95 OINTMENT TOPICAL at 05:44

## 2025-07-09 RX ADMIN — Medication 40 MILLIGRAM(S): at 05:44

## 2025-07-09 RX ADMIN — MEDPURA VITAMIN A AND D 1 APPLICATION(S): 95 OINTMENT TOPICAL at 18:07

## 2025-07-09 RX ADMIN — Medication 1 APPLICATION(S): at 12:56

## 2025-07-09 RX ADMIN — LACTULOSE 20 GRAM(S): 10 SOLUTION ORAL at 05:44

## 2025-07-09 RX ADMIN — IPRATROPIUM BROMIDE AND ALBUTEROL SULFATE 3 MILLILITER(S): .5; 2.5 SOLUTION RESPIRATORY (INHALATION) at 10:24

## 2025-07-09 RX ADMIN — Medication 37.5 MICROGRAM(S): at 05:48

## 2025-07-09 RX ADMIN — Medication 25 MILLIGRAM(S): at 05:45

## 2025-07-09 RX ADMIN — Medication 1 TABLET(S): at 18:46

## 2025-07-09 RX ADMIN — IPRATROPIUM BROMIDE AND ALBUTEROL SULFATE 3 MILLILITER(S): .5; 2.5 SOLUTION RESPIRATORY (INHALATION) at 18:06

## 2025-07-09 RX ADMIN — Medication 1 TABLET(S): at 05:45

## 2025-07-10 LAB
ALBUMIN SERPL ELPH-MCNC: 2.9 G/DL — LOW (ref 3.5–5.2)
ALP SERPL-CCNC: 124 U/L — HIGH (ref 30–115)
ALT FLD-CCNC: 19 U/L — SIGNIFICANT CHANGE UP (ref 0–41)
ANION GAP SERPL CALC-SCNC: 12 MMOL/L — SIGNIFICANT CHANGE UP (ref 7–14)
ANION GAP SERPL CALC-SCNC: 13 MMOL/L — SIGNIFICANT CHANGE UP (ref 7–14)
APTT BLD: 35.4 SEC — SIGNIFICANT CHANGE UP (ref 27–39.2)
AST SERPL-CCNC: 27 U/L — SIGNIFICANT CHANGE UP (ref 0–41)
BASOPHILS # BLD AUTO: 0.01 K/UL — SIGNIFICANT CHANGE UP (ref 0–0.2)
BASOPHILS NFR BLD AUTO: 0.2 % — SIGNIFICANT CHANGE UP (ref 0–1)
BILIRUB SERPL-MCNC: 4.3 MG/DL — HIGH (ref 0.2–1.2)
BUN SERPL-MCNC: 44 MG/DL — HIGH (ref 10–20)
BUN SERPL-MCNC: 45 MG/DL — HIGH (ref 10–20)
CALCIUM SERPL-MCNC: 8.6 MG/DL — SIGNIFICANT CHANGE UP (ref 8.4–10.5)
CALCIUM SERPL-MCNC: 8.7 MG/DL — SIGNIFICANT CHANGE UP (ref 8.4–10.5)
CHLORIDE SERPL-SCNC: 100 MMOL/L — SIGNIFICANT CHANGE UP (ref 98–110)
CHLORIDE SERPL-SCNC: 101 MMOL/L — SIGNIFICANT CHANGE UP (ref 98–110)
CO2 SERPL-SCNC: 16 MMOL/L — LOW (ref 17–32)
CO2 SERPL-SCNC: 19 MMOL/L — SIGNIFICANT CHANGE UP (ref 17–32)
CREAT SERPL-MCNC: 1.7 MG/DL — HIGH (ref 0.7–1.5)
CREAT SERPL-MCNC: 1.8 MG/DL — HIGH (ref 0.7–1.5)
CULTURE RESULTS: SIGNIFICANT CHANGE UP
CULTURE RESULTS: SIGNIFICANT CHANGE UP
DIR ANTIGLOB POLYSPECIFIC INTERPRETATION: SIGNIFICANT CHANGE UP
EGFR: 27 ML/MIN/1.73M2 — LOW
EGFR: 27 ML/MIN/1.73M2 — LOW
EGFR: 29 ML/MIN/1.73M2 — LOW
EGFR: 29 ML/MIN/1.73M2 — LOW
EOSINOPHIL # BLD AUTO: 0.37 K/UL — SIGNIFICANT CHANGE UP (ref 0–0.7)
EOSINOPHIL NFR BLD AUTO: 7.7 % — SIGNIFICANT CHANGE UP (ref 0–8)
GLUCOSE SERPL-MCNC: 149 MG/DL — HIGH (ref 70–99)
GLUCOSE SERPL-MCNC: 95 MG/DL — SIGNIFICANT CHANGE UP (ref 70–99)
HAPTOGLOB SERPL-MCNC: <20 MG/DL — LOW (ref 34–200)
HCT VFR BLD CALC: 32.7 % — LOW (ref 37–47)
HGB BLD-MCNC: 10.5 G/DL — LOW (ref 12–16)
IMM GRANULOCYTES NFR BLD AUTO: 0.6 % — HIGH (ref 0.1–0.3)
INR BLD: 1.42 RATIO — HIGH (ref 0.65–1.3)
LDH SERPL L TO P-CCNC: 272 — HIGH (ref 50–242)
LYMPHOCYTES # BLD AUTO: 0.74 K/UL — LOW (ref 1.2–3.4)
LYMPHOCYTES # BLD AUTO: 15.4 % — LOW (ref 20.5–51.1)
MAGNESIUM SERPL-MCNC: 2.1 MG/DL — SIGNIFICANT CHANGE UP (ref 1.8–2.4)
MCHC RBC-ENTMCNC: 29.8 PG — SIGNIFICANT CHANGE UP (ref 27–31)
MCHC RBC-ENTMCNC: 32.1 G/DL — SIGNIFICANT CHANGE UP (ref 32–37)
MCV RBC AUTO: 92.9 FL — SIGNIFICANT CHANGE UP (ref 81–99)
MONOCYTES # BLD AUTO: 0.3 K/UL — SIGNIFICANT CHANGE UP (ref 0.1–0.6)
MONOCYTES NFR BLD AUTO: 6.2 % — SIGNIFICANT CHANGE UP (ref 1.7–9.3)
NEUTROPHILS # BLD AUTO: 3.36 K/UL — SIGNIFICANT CHANGE UP (ref 1.4–6.5)
NEUTROPHILS NFR BLD AUTO: 69.9 % — SIGNIFICANT CHANGE UP (ref 42.2–75.2)
NRBC BLD AUTO-RTO: 0 /100 WBCS — SIGNIFICANT CHANGE UP (ref 0–0)
PLATELET # BLD AUTO: 54 K/UL — LOW (ref 130–400)
PMV BLD: 9.8 FL — SIGNIFICANT CHANGE UP (ref 7.4–10.4)
POTASSIUM SERPL-MCNC: 3.8 MMOL/L — SIGNIFICANT CHANGE UP (ref 3.5–5)
POTASSIUM SERPL-MCNC: 4.2 MMOL/L — SIGNIFICANT CHANGE UP (ref 3.5–5)
POTASSIUM SERPL-SCNC: 3.8 MMOL/L — SIGNIFICANT CHANGE UP (ref 3.5–5)
POTASSIUM SERPL-SCNC: 4.2 MMOL/L — SIGNIFICANT CHANGE UP (ref 3.5–5)
PROT SERPL-MCNC: 5.9 G/DL — LOW (ref 6–8)
PROT SERPL-MCNC: 6 G/DL — SIGNIFICANT CHANGE UP (ref 6–8.3)
PROTHROM AB SERPL-ACNC: 16.9 SEC — HIGH (ref 9.95–12.87)
RBC # BLD: 3.52 M/UL — LOW (ref 4.2–5.4)
RBC # BLD: 3.52 M/UL — LOW (ref 4.2–5.4)
RBC # FLD: 18.6 % — HIGH (ref 11.5–14.5)
RETICS #: 152.4 K/UL — HIGH (ref 25–125)
RETICS/RBC NFR: 4.3 % — HIGH (ref 0.5–1.5)
SODIUM SERPL-SCNC: 129 MMOL/L — LOW (ref 135–146)
SODIUM SERPL-SCNC: 132 MMOL/L — LOW (ref 135–146)
SPECIMEN SOURCE: SIGNIFICANT CHANGE UP
SPECIMEN SOURCE: SIGNIFICANT CHANGE UP
THROMBIN TIME: 20.4 SEC — HIGH (ref 11–16.4)
WBC # BLD: 4.81 K/UL — SIGNIFICANT CHANGE UP (ref 4.8–10.8)
WBC # FLD AUTO: 4.81 K/UL — SIGNIFICANT CHANGE UP (ref 4.8–10.8)

## 2025-07-10 PROCEDURE — 70450 CT HEAD/BRAIN W/O DYE: CPT | Mod: 26

## 2025-07-10 PROCEDURE — 93970 EXTREMITY STUDY: CPT | Mod: 26

## 2025-07-10 PROCEDURE — 99233 SBSQ HOSP IP/OBS HIGH 50: CPT

## 2025-07-10 PROCEDURE — 76700 US EXAM ABDOM COMPLETE: CPT | Mod: 26

## 2025-07-10 PROCEDURE — 99232 SBSQ HOSP IP/OBS MODERATE 35: CPT | Mod: GC

## 2025-07-10 RX ORDER — LACTULOSE 10 G/15ML
20 SOLUTION ORAL THREE TIMES A DAY
Refills: 0 | Status: DISCONTINUED | OUTPATIENT
Start: 2025-07-10 | End: 2025-07-11

## 2025-07-10 RX ORDER — FUROSEMIDE 10 MG/ML
20 INJECTION INTRAMUSCULAR; INTRAVENOUS ONCE
Refills: 0 | Status: COMPLETED | OUTPATIENT
Start: 2025-07-10 | End: 2025-07-10

## 2025-07-10 RX ADMIN — FUROSEMIDE 20 MILLIGRAM(S): 10 INJECTION INTRAMUSCULAR; INTRAVENOUS at 09:20

## 2025-07-10 RX ADMIN — LIDOCAINE HYDROCHLORIDE 1 PATCH: 20 JELLY TOPICAL at 11:06

## 2025-07-10 RX ADMIN — Medication 1 TABLET(S): at 17:45

## 2025-07-10 RX ADMIN — Medication 1 APPLICATION(S): at 11:05

## 2025-07-10 RX ADMIN — Medication 25 MILLIGRAM(S): at 06:32

## 2025-07-10 RX ADMIN — Medication 37.5 MICROGRAM(S): at 06:28

## 2025-07-10 RX ADMIN — Medication 40 MILLIGRAM(S): at 06:30

## 2025-07-10 RX ADMIN — MEDPURA VITAMIN A AND D 1 APPLICATION(S): 95 OINTMENT TOPICAL at 17:45

## 2025-07-10 RX ADMIN — MEDPURA VITAMIN A AND D 1 APPLICATION(S): 95 OINTMENT TOPICAL at 06:26

## 2025-07-10 RX ADMIN — Medication 1 TABLET(S): at 06:37

## 2025-07-11 LAB
ALBUMIN SERPL ELPH-MCNC: 2.6 G/DL — LOW (ref 3.5–5.2)
ALBUMIN SERPL ELPH-MCNC: 2.6 G/DL — LOW (ref 3.5–5.2)
ALP SERPL-CCNC: 116 U/L — HIGH (ref 30–115)
ALP SERPL-CCNC: 136 U/L — HIGH (ref 30–115)
ALT FLD-CCNC: 18 U/L — SIGNIFICANT CHANGE UP (ref 0–41)
ALT FLD-CCNC: 19 U/L — SIGNIFICANT CHANGE UP (ref 0–41)
ANION GAP SERPL CALC-SCNC: 10 MMOL/L — SIGNIFICANT CHANGE UP (ref 7–14)
ANION GAP SERPL CALC-SCNC: 12 MMOL/L — SIGNIFICANT CHANGE UP (ref 7–14)
APPEARANCE UR: ABNORMAL
AST SERPL-CCNC: 24 U/L — SIGNIFICANT CHANGE UP (ref 0–41)
AST SERPL-CCNC: 25 U/L — SIGNIFICANT CHANGE UP (ref 0–41)
BASOPHILS # BLD AUTO: 0.01 K/UL — SIGNIFICANT CHANGE UP (ref 0–0.2)
BASOPHILS NFR BLD AUTO: 0.2 % — SIGNIFICANT CHANGE UP (ref 0–1)
BILIRUB SERPL-MCNC: 3.5 MG/DL — HIGH (ref 0.2–1.2)
BILIRUB SERPL-MCNC: 4 MG/DL — HIGH (ref 0.2–1.2)
BILIRUB UR-MCNC: NEGATIVE — SIGNIFICANT CHANGE UP
BUN SERPL-MCNC: 48 MG/DL — HIGH (ref 10–20)
BUN SERPL-MCNC: 49 MG/DL — HIGH (ref 10–20)
CALCIUM SERPL-MCNC: 8.3 MG/DL — LOW (ref 8.4–10.5)
CALCIUM SERPL-MCNC: 8.5 MG/DL — SIGNIFICANT CHANGE UP (ref 8.4–10.5)
CHLORIDE SERPL-SCNC: 101 MMOL/L — SIGNIFICANT CHANGE UP (ref 98–110)
CHLORIDE SERPL-SCNC: 101 MMOL/L — SIGNIFICANT CHANGE UP (ref 98–110)
CO2 SERPL-SCNC: 18 MMOL/L — SIGNIFICANT CHANGE UP (ref 17–32)
CO2 SERPL-SCNC: 19 MMOL/L — SIGNIFICANT CHANGE UP (ref 17–32)
COLOR SPEC: SIGNIFICANT CHANGE UP
CREAT SERPL-MCNC: 1.9 MG/DL — HIGH (ref 0.7–1.5)
CREAT SERPL-MCNC: 2 MG/DL — HIGH (ref 0.7–1.5)
DIFF PNL FLD: ABNORMAL
EGFR: 24 ML/MIN/1.73M2 — LOW
EGFR: 24 ML/MIN/1.73M2 — LOW
EGFR: 26 ML/MIN/1.73M2 — LOW
EGFR: 26 ML/MIN/1.73M2 — LOW
EOSINOPHIL # BLD AUTO: 0.39 K/UL — SIGNIFICANT CHANGE UP (ref 0–0.7)
EOSINOPHIL NFR BLD AUTO: 8.9 % — HIGH (ref 0–8)
GLUCOSE BLDC GLUCOMTR-MCNC: 175 MG/DL — HIGH (ref 70–99)
GLUCOSE SERPL-MCNC: 108 MG/DL — HIGH (ref 70–99)
GLUCOSE SERPL-MCNC: 159 MG/DL — HIGH (ref 70–99)
GLUCOSE UR QL: NEGATIVE MG/DL — SIGNIFICANT CHANGE UP
HCT VFR BLD CALC: 30 % — LOW (ref 37–47)
HCT VFR BLD CALC: 31.8 % — LOW (ref 37–47)
HGB BLD-MCNC: 10.2 G/DL — LOW (ref 12–16)
HGB BLD-MCNC: 9.7 G/DL — LOW (ref 12–16)
IGA FLD-MCNC: 332 MG/DL — SIGNIFICANT CHANGE UP (ref 84–499)
IGG FLD-MCNC: 1858 MG/DL — HIGH (ref 610–1660)
IGM SERPL-MCNC: 231 MG/DL — SIGNIFICANT CHANGE UP (ref 35–242)
IMM GRANULOCYTES NFR BLD AUTO: 0.2 % — SIGNIFICANT CHANGE UP (ref 0.1–0.3)
KAPPA LC SER QL IFE: 9.53 MG/DL — HIGH (ref 0.33–1.94)
KAPPA/LAMBDA FREE LIGHT CHAIN RATIO, SERUM: 1.24 RATIO — SIGNIFICANT CHANGE UP (ref 0.26–1.65)
KETONES UR QL: ABNORMAL MG/DL
LACTATE SERPL-SCNC: 1.5 MMOL/L — SIGNIFICANT CHANGE UP (ref 0.7–2)
LAMBDA LC SER QL IFE: 7.66 MG/DL — HIGH (ref 0.57–2.63)
LEUKOCYTE ESTERASE UR-ACNC: ABNORMAL
LYMPHOCYTES # BLD AUTO: 0.74 K/UL — LOW (ref 1.2–3.4)
LYMPHOCYTES # BLD AUTO: 16.9 % — LOW (ref 20.5–51.1)
MAGNESIUM SERPL-MCNC: 1.8 MG/DL — SIGNIFICANT CHANGE UP (ref 1.8–2.4)
MAGNESIUM SERPL-MCNC: 2 MG/DL — SIGNIFICANT CHANGE UP (ref 1.8–2.4)
MCHC RBC-ENTMCNC: 29.8 PG — SIGNIFICANT CHANGE UP (ref 27–31)
MCHC RBC-ENTMCNC: 29.9 PG — SIGNIFICANT CHANGE UP (ref 27–31)
MCHC RBC-ENTMCNC: 32.1 G/DL — SIGNIFICANT CHANGE UP (ref 32–37)
MCHC RBC-ENTMCNC: 32.3 G/DL — SIGNIFICANT CHANGE UP (ref 32–37)
MCV RBC AUTO: 92 FL — SIGNIFICANT CHANGE UP (ref 81–99)
MCV RBC AUTO: 93.3 FL — SIGNIFICANT CHANGE UP (ref 81–99)
MONOCYTES # BLD AUTO: 0.42 K/UL — SIGNIFICANT CHANGE UP (ref 0.1–0.6)
MONOCYTES NFR BLD AUTO: 9.6 % — HIGH (ref 1.7–9.3)
NEUTROPHILS # BLD AUTO: 2.8 K/UL — SIGNIFICANT CHANGE UP (ref 1.4–6.5)
NEUTROPHILS NFR BLD AUTO: 64.2 % — SIGNIFICANT CHANGE UP (ref 42.2–75.2)
NITRITE UR-MCNC: NEGATIVE — SIGNIFICANT CHANGE UP
NRBC BLD AUTO-RTO: 0 /100 WBCS — SIGNIFICANT CHANGE UP (ref 0–0)
NRBC BLD AUTO-RTO: 0 /100 WBCS — SIGNIFICANT CHANGE UP (ref 0–0)
PH UR: 5.5 — SIGNIFICANT CHANGE UP (ref 5–8)
PLATELET # BLD AUTO: 43 K/UL — LOW (ref 130–400)
PLATELET # BLD AUTO: 51 K/UL — LOW (ref 130–400)
PMV BLD: 10.8 FL — HIGH (ref 7.4–10.4)
PMV BLD: 9.8 FL — SIGNIFICANT CHANGE UP (ref 7.4–10.4)
POTASSIUM SERPL-MCNC: 4.4 MMOL/L — SIGNIFICANT CHANGE UP (ref 3.5–5)
POTASSIUM SERPL-MCNC: 4.4 MMOL/L — SIGNIFICANT CHANGE UP (ref 3.5–5)
POTASSIUM SERPL-SCNC: 4.4 MMOL/L — SIGNIFICANT CHANGE UP (ref 3.5–5)
POTASSIUM SERPL-SCNC: 4.4 MMOL/L — SIGNIFICANT CHANGE UP (ref 3.5–5)
PROT SERPL-MCNC: 5.6 G/DL — LOW (ref 6–8)
PROT SERPL-MCNC: 5.6 G/DL — LOW (ref 6–8)
PROT UR-MCNC: SIGNIFICANT CHANGE UP MG/DL
RBC # BLD: 3.26 M/UL — LOW (ref 4.2–5.4)
RBC # BLD: 3.41 M/UL — LOW (ref 4.2–5.4)
RBC # FLD: 18.8 % — HIGH (ref 11.5–14.5)
RBC # FLD: 19 % — HIGH (ref 11.5–14.5)
SODIUM SERPL-SCNC: 129 MMOL/L — LOW (ref 135–146)
SODIUM SERPL-SCNC: 132 MMOL/L — LOW (ref 135–146)
SP GR SPEC: 1.02 — SIGNIFICANT CHANGE UP (ref 1–1.03)
UROBILINOGEN FLD QL: 0.2 MG/DL — SIGNIFICANT CHANGE UP (ref 0.2–1)
WBC # BLD: 4.12 K/UL — LOW (ref 4.8–10.8)
WBC # BLD: 4.37 K/UL — LOW (ref 4.8–10.8)
WBC # FLD AUTO: 4.12 K/UL — LOW (ref 4.8–10.8)
WBC # FLD AUTO: 4.37 K/UL — LOW (ref 4.8–10.8)

## 2025-07-11 PROCEDURE — 99233 SBSQ HOSP IP/OBS HIGH 50: CPT

## 2025-07-11 PROCEDURE — 71045 X-RAY EXAM CHEST 1 VIEW: CPT | Mod: 26

## 2025-07-11 PROCEDURE — 70450 CT HEAD/BRAIN W/O DYE: CPT | Mod: 26

## 2025-07-11 RX ORDER — LACTULOSE 10 G/15ML
20 SOLUTION ORAL THREE TIMES A DAY
Refills: 0 | Status: DISCONTINUED | OUTPATIENT
Start: 2025-07-11 | End: 2025-07-14

## 2025-07-11 RX ORDER — SODIUM CHLORIDE 9 G/1000ML
250 INJECTION, SOLUTION INTRAVENOUS ONCE
Refills: 0 | Status: COMPLETED | OUTPATIENT
Start: 2025-07-11 | End: 2025-07-11

## 2025-07-11 RX ORDER — MEROPENEM 1 G/30ML
INJECTION INTRAVENOUS
Refills: 0 | Status: DISCONTINUED | OUTPATIENT
Start: 2025-07-11 | End: 2025-07-12

## 2025-07-11 RX ORDER — MEROPENEM 1 G/30ML
1000 INJECTION INTRAVENOUS ONCE
Refills: 0 | Status: COMPLETED | OUTPATIENT
Start: 2025-07-11 | End: 2025-07-11

## 2025-07-11 RX ORDER — TIGECYCLINE 50 MG/10ML
INJECTION, POWDER, LYOPHILIZED, FOR SOLUTION INTRAVENOUS
Refills: 0 | Status: DISCONTINUED | OUTPATIENT
Start: 2025-07-11 | End: 2025-07-11

## 2025-07-11 RX ORDER — MEROPENEM 1 G/30ML
1000 INJECTION INTRAVENOUS EVERY 12 HOURS
Refills: 0 | Status: DISCONTINUED | OUTPATIENT
Start: 2025-07-12 | End: 2025-07-12

## 2025-07-11 RX ADMIN — SODIUM CHLORIDE 250 MILLILITER(S): 9 INJECTION, SOLUTION INTRAVENOUS at 14:35

## 2025-07-11 RX ADMIN — LACTULOSE 20 GRAM(S): 10 SOLUTION ORAL at 21:56

## 2025-07-11 RX ADMIN — Medication 40 MILLIGRAM(S): at 05:24

## 2025-07-11 RX ADMIN — MEROPENEM 100 MILLIGRAM(S): 1 INJECTION INTRAVENOUS at 18:20

## 2025-07-11 RX ADMIN — MEDPURA VITAMIN A AND D 1 APPLICATION(S): 95 OINTMENT TOPICAL at 05:23

## 2025-07-11 RX ADMIN — MEDPURA VITAMIN A AND D 1 APPLICATION(S): 95 OINTMENT TOPICAL at 18:20

## 2025-07-11 RX ADMIN — Medication 1 TABLET(S): at 05:24

## 2025-07-11 RX ADMIN — Medication 1 APPLICATION(S): at 12:31

## 2025-07-11 RX ADMIN — Medication 25 MILLIGRAM(S): at 05:24

## 2025-07-11 RX ADMIN — Medication 37.5 MICROGRAM(S): at 05:24

## 2025-07-12 LAB
ALBUMIN SERPL ELPH-MCNC: 2.8 G/DL — LOW (ref 3.5–5.2)
ALP SERPL-CCNC: 121 U/L — HIGH (ref 30–115)
ALT FLD-CCNC: 21 U/L — SIGNIFICANT CHANGE UP (ref 0–41)
AMMONIA BLD-MCNC: 54 UMOL/L — SIGNIFICANT CHANGE UP (ref 11–55)
ANION GAP SERPL CALC-SCNC: 10 MMOL/L — SIGNIFICANT CHANGE UP (ref 7–14)
APPEARANCE UR: ABNORMAL
AST SERPL-CCNC: 30 U/L — SIGNIFICANT CHANGE UP (ref 0–41)
BASOPHILS # BLD AUTO: 0.01 K/UL — SIGNIFICANT CHANGE UP (ref 0–0.2)
BASOPHILS NFR BLD AUTO: 0.2 % — SIGNIFICANT CHANGE UP (ref 0–1)
BILIRUB SERPL-MCNC: 4 MG/DL — HIGH (ref 0.2–1.2)
BILIRUB UR-MCNC: NEGATIVE — SIGNIFICANT CHANGE UP
BUN SERPL-MCNC: 49 MG/DL — HIGH (ref 10–20)
CALCIUM SERPL-MCNC: 8.5 MG/DL — SIGNIFICANT CHANGE UP (ref 8.4–10.5)
CHLORIDE SERPL-SCNC: 101 MMOL/L — SIGNIFICANT CHANGE UP (ref 98–110)
CO2 SERPL-SCNC: 19 MMOL/L — SIGNIFICANT CHANGE UP (ref 17–32)
COLOR SPEC: YELLOW — SIGNIFICANT CHANGE UP
CREAT ?TM UR-MCNC: 84 MG/DL — SIGNIFICANT CHANGE UP
CREAT SERPL-MCNC: 2.1 MG/DL — HIGH (ref 0.7–1.5)
CREAT UR-MCNC: 93 MG/DL — SIGNIFICANT CHANGE UP
DIFF PNL FLD: ABNORMAL
EGFR: 23 ML/MIN/1.73M2 — LOW
EGFR: 23 ML/MIN/1.73M2 — LOW
EOSINOPHIL # BLD AUTO: 0.39 K/UL — SIGNIFICANT CHANGE UP (ref 0–0.7)
EOSINOPHIL NFR BLD AUTO: 8.2 % — HIGH (ref 0–8)
GLUCOSE BLDC GLUCOMTR-MCNC: 196 MG/DL — HIGH (ref 70–99)
GLUCOSE SERPL-MCNC: 94 MG/DL — SIGNIFICANT CHANGE UP (ref 70–99)
GLUCOSE UR QL: NEGATIVE MG/DL — SIGNIFICANT CHANGE UP
HCT VFR BLD CALC: 33.3 % — LOW (ref 37–47)
HGB BLD-MCNC: 10.7 G/DL — LOW (ref 12–16)
IMM GRANULOCYTES NFR BLD AUTO: 0.6 % — HIGH (ref 0.1–0.3)
KETONES UR QL: ABNORMAL MG/DL
LEUKOCYTE ESTERASE UR-ACNC: ABNORMAL
LYMPHOCYTES # BLD AUTO: 0.83 K/UL — LOW (ref 1.2–3.4)
LYMPHOCYTES # BLD AUTO: 17.5 % — LOW (ref 20.5–51.1)
MAGNESIUM SERPL-MCNC: 1.9 MG/DL — SIGNIFICANT CHANGE UP (ref 1.8–2.4)
MCHC RBC-ENTMCNC: 29.8 PG — SIGNIFICANT CHANGE UP (ref 27–31)
MCHC RBC-ENTMCNC: 32.1 G/DL — SIGNIFICANT CHANGE UP (ref 32–37)
MCV RBC AUTO: 92.8 FL — SIGNIFICANT CHANGE UP (ref 81–99)
MONOCYTES # BLD AUTO: 0.42 K/UL — SIGNIFICANT CHANGE UP (ref 0.1–0.6)
MONOCYTES NFR BLD AUTO: 8.9 % — SIGNIFICANT CHANGE UP (ref 1.7–9.3)
NEUTROPHILS # BLD AUTO: 3.05 K/UL — SIGNIFICANT CHANGE UP (ref 1.4–6.5)
NEUTROPHILS NFR BLD AUTO: 64.6 % — SIGNIFICANT CHANGE UP (ref 42.2–75.2)
NITRITE UR-MCNC: NEGATIVE — SIGNIFICANT CHANGE UP
NRBC BLD AUTO-RTO: 0 /100 WBCS — SIGNIFICANT CHANGE UP (ref 0–0)
OSMOLALITY UR: 463 MOS/KG — SIGNIFICANT CHANGE UP (ref 50–1200)
PH UR: 5.5 — SIGNIFICANT CHANGE UP (ref 5–8)
PLATELET # BLD AUTO: 45 K/UL — LOW (ref 130–400)
PMV BLD: 9.1 FL — SIGNIFICANT CHANGE UP (ref 7.4–10.4)
POTASSIUM SERPL-MCNC: 4.2 MMOL/L — SIGNIFICANT CHANGE UP (ref 3.5–5)
POTASSIUM SERPL-SCNC: 4.2 MMOL/L — SIGNIFICANT CHANGE UP (ref 3.5–5)
POTASSIUM UR-SCNC: 26 MMOL/L — SIGNIFICANT CHANGE UP
PROT ?TM UR-MCNC: 14 MG/DL — SIGNIFICANT CHANGE UP
PROT ?TM UR-MCNC: 20 MG/DL — HIGH (ref 0–12)
PROT SERPL-MCNC: 5.5 G/DL — LOW (ref 6–8.3)
PROT SERPL-MCNC: 5.9 G/DL — LOW (ref 6–8)
PROT UR-MCNC: SIGNIFICANT CHANGE UP MG/DL
PROT/CREAT UR-RTO: 0.2 RATIO — SIGNIFICANT CHANGE UP (ref 0–0.2)
RBC # BLD: 3.59 M/UL — LOW (ref 4.2–5.4)
RBC # FLD: 19.2 % — HIGH (ref 11.5–14.5)
SODIUM SERPL-SCNC: 130 MMOL/L — LOW (ref 135–146)
SODIUM UR-SCNC: 33 MMOL/L — SIGNIFICANT CHANGE UP
SP GR SPEC: 1.02 — SIGNIFICANT CHANGE UP (ref 1–1.03)
UROBILINOGEN FLD QL: 1 MG/DL — SIGNIFICANT CHANGE UP (ref 0.2–1)
UUN UR-MCNC: 786 MG/DL — SIGNIFICANT CHANGE UP
WBC # BLD: 4.73 K/UL — LOW (ref 4.8–10.8)
WBC # FLD AUTO: 4.73 K/UL — LOW (ref 4.8–10.8)

## 2025-07-12 PROCEDURE — 99233 SBSQ HOSP IP/OBS HIGH 50: CPT

## 2025-07-12 PROCEDURE — 71045 X-RAY EXAM CHEST 1 VIEW: CPT | Mod: 26

## 2025-07-12 RX ORDER — MEROPENEM 1 G/30ML
500 INJECTION INTRAVENOUS EVERY 12 HOURS
Refills: 0 | Status: DISCONTINUED | OUTPATIENT
Start: 2025-07-12 | End: 2025-07-16

## 2025-07-12 RX ADMIN — Medication 40 MILLIGRAM(S): at 06:32

## 2025-07-12 RX ADMIN — LACTULOSE 20 GRAM(S): 10 SOLUTION ORAL at 14:47

## 2025-07-12 RX ADMIN — IPRATROPIUM BROMIDE AND ALBUTEROL SULFATE 3 MILLILITER(S): .5; 2.5 SOLUTION RESPIRATORY (INHALATION) at 03:38

## 2025-07-12 RX ADMIN — LACTULOSE 20 GRAM(S): 10 SOLUTION ORAL at 06:32

## 2025-07-12 RX ADMIN — MEROPENEM 100 MILLIGRAM(S): 1 INJECTION INTRAVENOUS at 06:32

## 2025-07-12 RX ADMIN — MEDPURA VITAMIN A AND D 1 APPLICATION(S): 95 OINTMENT TOPICAL at 06:40

## 2025-07-12 RX ADMIN — Medication 37.5 MICROGRAM(S): at 06:32

## 2025-07-12 RX ADMIN — Medication 1 APPLICATION(S): at 12:22

## 2025-07-12 RX ADMIN — IPRATROPIUM BROMIDE AND ALBUTEROL SULFATE 3 MILLILITER(S): .5; 2.5 SOLUTION RESPIRATORY (INHALATION) at 14:47

## 2025-07-12 RX ADMIN — MEDPURA VITAMIN A AND D 1 APPLICATION(S): 95 OINTMENT TOPICAL at 18:40

## 2025-07-12 RX ADMIN — MEROPENEM 100 MILLIGRAM(S): 1 INJECTION INTRAVENOUS at 18:41

## 2025-07-13 LAB
ALBUMIN SERPL ELPH-MCNC: 2.4 G/DL — LOW (ref 3.5–5.2)
ALP SERPL-CCNC: 97 U/L — SIGNIFICANT CHANGE UP (ref 30–115)
ALT FLD-CCNC: 19 U/L — SIGNIFICANT CHANGE UP (ref 0–41)
ANION GAP SERPL CALC-SCNC: 11 MMOL/L — SIGNIFICANT CHANGE UP (ref 7–14)
AST SERPL-CCNC: 25 U/L — SIGNIFICANT CHANGE UP (ref 0–41)
BASOPHILS # BLD AUTO: 0.01 K/UL — SIGNIFICANT CHANGE UP (ref 0–0.2)
BASOPHILS NFR BLD AUTO: 0.3 % — SIGNIFICANT CHANGE UP (ref 0–1)
BILIRUB SERPL-MCNC: 3.6 MG/DL — HIGH (ref 0.2–1.2)
BUN SERPL-MCNC: 47 MG/DL — HIGH (ref 10–20)
CALCIUM SERPL-MCNC: 8.5 MG/DL — SIGNIFICANT CHANGE UP (ref 8.4–10.5)
CHLORIDE SERPL-SCNC: 106 MMOL/L — SIGNIFICANT CHANGE UP (ref 98–110)
CO2 SERPL-SCNC: 18 MMOL/L — SIGNIFICANT CHANGE UP (ref 17–32)
CREAT SERPL-MCNC: 2.1 MG/DL — HIGH (ref 0.7–1.5)
EGFR: 23 ML/MIN/1.73M2 — LOW
EGFR: 23 ML/MIN/1.73M2 — LOW
EOSINOPHIL # BLD AUTO: 0.26 K/UL — SIGNIFICANT CHANGE UP (ref 0–0.7)
EOSINOPHIL NFR BLD AUTO: 6.6 % — SIGNIFICANT CHANGE UP (ref 0–8)
GLUCOSE BLDC GLUCOMTR-MCNC: 137 MG/DL — HIGH (ref 70–99)
GLUCOSE BLDC GLUCOMTR-MCNC: 139 MG/DL — HIGH (ref 70–99)
GLUCOSE BLDC GLUCOMTR-MCNC: 198 MG/DL — HIGH (ref 70–99)
GLUCOSE BLDC GLUCOMTR-MCNC: 200 MG/DL — HIGH (ref 70–99)
GLUCOSE SERPL-MCNC: 116 MG/DL — HIGH (ref 70–99)
HCT VFR BLD CALC: 28.7 % — LOW (ref 37–47)
HGB BLD-MCNC: 9.3 G/DL — LOW (ref 12–16)
IMM GRANULOCYTES NFR BLD AUTO: 0.3 % — SIGNIFICANT CHANGE UP (ref 0.1–0.3)
LYMPHOCYTES # BLD AUTO: 0.71 K/UL — LOW (ref 1.2–3.4)
LYMPHOCYTES # BLD AUTO: 18 % — LOW (ref 20.5–51.1)
MAGNESIUM SERPL-MCNC: 1.9 MG/DL — SIGNIFICANT CHANGE UP (ref 1.8–2.4)
MCHC RBC-ENTMCNC: 29.8 PG — SIGNIFICANT CHANGE UP (ref 27–31)
MCHC RBC-ENTMCNC: 32.4 G/DL — SIGNIFICANT CHANGE UP (ref 32–37)
MCV RBC AUTO: 92 FL — SIGNIFICANT CHANGE UP (ref 81–99)
MONOCYTES # BLD AUTO: 0.46 K/UL — SIGNIFICANT CHANGE UP (ref 0.1–0.6)
MONOCYTES NFR BLD AUTO: 11.6 % — HIGH (ref 1.7–9.3)
NEUTROPHILS # BLD AUTO: 2.5 K/UL — SIGNIFICANT CHANGE UP (ref 1.4–6.5)
NEUTROPHILS NFR BLD AUTO: 63.2 % — SIGNIFICANT CHANGE UP (ref 42.2–75.2)
NRBC BLD AUTO-RTO: 0 /100 WBCS — SIGNIFICANT CHANGE UP (ref 0–0)
PLATELET # BLD AUTO: 39 K/UL — LOW (ref 130–400)
PMV BLD: 10 FL — SIGNIFICANT CHANGE UP (ref 7.4–10.4)
POTASSIUM SERPL-MCNC: 4.5 MMOL/L — SIGNIFICANT CHANGE UP (ref 3.5–5)
POTASSIUM SERPL-SCNC: 4.5 MMOL/L — SIGNIFICANT CHANGE UP (ref 3.5–5)
PROT SERPL-MCNC: 5.3 G/DL — LOW (ref 6–8)
RBC # BLD: 3.12 M/UL — LOW (ref 4.2–5.4)
RBC # FLD: 19.5 % — HIGH (ref 11.5–14.5)
SODIUM SERPL-SCNC: 135 MMOL/L — SIGNIFICANT CHANGE UP (ref 135–146)
WBC # BLD: 3.95 K/UL — LOW (ref 4.8–10.8)
WBC # FLD AUTO: 3.95 K/UL — LOW (ref 4.8–10.8)

## 2025-07-13 PROCEDURE — 99233 SBSQ HOSP IP/OBS HIGH 50: CPT

## 2025-07-13 PROCEDURE — 74018 RADEX ABDOMEN 1 VIEW: CPT | Mod: 26

## 2025-07-13 RX ADMIN — Medication 37.5 MICROGRAM(S): at 07:05

## 2025-07-13 RX ADMIN — Medication 650 MILLIGRAM(S): at 21:57

## 2025-07-13 RX ADMIN — MEROPENEM 100 MILLIGRAM(S): 1 INJECTION INTRAVENOUS at 09:20

## 2025-07-13 RX ADMIN — MEDPURA VITAMIN A AND D 1 APPLICATION(S): 95 OINTMENT TOPICAL at 18:30

## 2025-07-13 RX ADMIN — IPRATROPIUM BROMIDE AND ALBUTEROL SULFATE 3 MILLILITER(S): .5; 2.5 SOLUTION RESPIRATORY (INHALATION) at 18:30

## 2025-07-13 RX ADMIN — MEDPURA VITAMIN A AND D 1 APPLICATION(S): 95 OINTMENT TOPICAL at 07:05

## 2025-07-13 RX ADMIN — LACTULOSE 20 GRAM(S): 10 SOLUTION ORAL at 21:51

## 2025-07-13 RX ADMIN — LACTULOSE 20 GRAM(S): 10 SOLUTION ORAL at 15:41

## 2025-07-13 RX ADMIN — Medication 40 MILLIGRAM(S): at 07:05

## 2025-07-13 RX ADMIN — LIDOCAINE HYDROCHLORIDE 1 PATCH: 20 JELLY TOPICAL at 12:39

## 2025-07-13 RX ADMIN — LACTULOSE 20 GRAM(S): 10 SOLUTION ORAL at 07:04

## 2025-07-13 RX ADMIN — Medication 1 APPLICATION(S): at 12:38

## 2025-07-13 RX ADMIN — MEROPENEM 100 MILLIGRAM(S): 1 INJECTION INTRAVENOUS at 21:50

## 2025-07-14 LAB
% ALBUMIN: 47 % — SIGNIFICANT CHANGE UP
% ALPHA 1: 4.5 % — SIGNIFICANT CHANGE UP
% ALPHA 2: 7.4 % — SIGNIFICANT CHANGE UP
% BETA: 9.4 % — SIGNIFICANT CHANGE UP
% GAMMA, URINE: 9.5 % — SIGNIFICANT CHANGE UP
% GAMMA: 31.7 % — SIGNIFICANT CHANGE UP
% M SPIKE: 20.2 % — SIGNIFICANT CHANGE UP
ALBUMIN 24H MFR UR ELPH: 6 % — SIGNIFICANT CHANGE UP
ALBUMIN SERPL ELPH-MCNC: 2.6 G/DL — LOW (ref 3.5–5.2)
ALBUMIN SERPL ELPH-MCNC: 2.8 G/DL — LOW (ref 3.6–5.5)
ALBUMIN/GLOB SERPL ELPH: 0.9 RATIO — SIGNIFICANT CHANGE UP
ALP SERPL-CCNC: 100 U/L — SIGNIFICANT CHANGE UP (ref 30–115)
ALPHA1 GLOB 24H MFR UR ELPH: 18.6 % — SIGNIFICANT CHANGE UP
ALPHA1 GLOB SERPL ELPH-MCNC: 0.3 G/DL — SIGNIFICANT CHANGE UP (ref 0.1–0.4)
ALPHA2 GLOB 24H MFR UR ELPH: 10 % — SIGNIFICANT CHANGE UP
ALPHA2 GLOB SERPL ELPH-MCNC: 0.4 G/DL — LOW (ref 0.5–1)
ALT FLD-CCNC: 20 U/L — SIGNIFICANT CHANGE UP (ref 0–41)
ANION GAP SERPL CALC-SCNC: 10 MMOL/L — SIGNIFICANT CHANGE UP (ref 7–14)
AST SERPL-CCNC: 25 U/L — SIGNIFICANT CHANGE UP (ref 0–41)
B-GLOBULIN 24H MFR UR ELPH: 55.9 % — SIGNIFICANT CHANGE UP
B-GLOBULIN SERPL ELPH-MCNC: 0.6 G/DL — SIGNIFICANT CHANGE UP (ref 0.5–1)
BASOPHILS # BLD AUTO: 0.02 K/UL — SIGNIFICANT CHANGE UP (ref 0–0.2)
BASOPHILS NFR BLD AUTO: 0.4 % — SIGNIFICANT CHANGE UP (ref 0–1)
BILIRUB SERPL-MCNC: 3.4 MG/DL — HIGH (ref 0.2–1.2)
BUN SERPL-MCNC: 48 MG/DL — HIGH (ref 10–20)
CALCIUM SERPL-MCNC: 8.2 MG/DL — LOW (ref 8.4–10.5)
CHLORIDE SERPL-SCNC: 106 MMOL/L — SIGNIFICANT CHANGE UP (ref 98–110)
CO2 SERPL-SCNC: 18 MMOL/L — SIGNIFICANT CHANGE UP (ref 17–32)
CREAT SERPL-MCNC: 2.2 MG/DL — HIGH (ref 0.7–1.5)
CULTURE RESULTS: ABNORMAL
EGFR: 21 ML/MIN/1.73M2 — LOW
EGFR: 21 ML/MIN/1.73M2 — LOW
EOSINOPHIL # BLD AUTO: 0.33 K/UL — SIGNIFICANT CHANGE UP (ref 0–0.7)
EOSINOPHIL NFR BLD AUTO: 7.3 % — SIGNIFICANT CHANGE UP (ref 0–8)
GAMMA GLOBULIN: 1.9 G/DL — HIGH (ref 0.6–1.6)
GLUCOSE SERPL-MCNC: 128 MG/DL — HIGH (ref 70–99)
HCT VFR BLD CALC: 30 % — LOW (ref 37–47)
HGB BLD-MCNC: 9.6 G/DL — LOW (ref 12–16)
IGA FLD-MCNC: 303 MG/DL — SIGNIFICANT CHANGE UP (ref 84–499)
IGG FLD-MCNC: 1692 MG/DL — HIGH (ref 610–1660)
IGM SERPL-MCNC: 206 MG/DL — SIGNIFICANT CHANGE UP (ref 35–242)
IMM GRANULOCYTES NFR BLD AUTO: 0.4 % — HIGH (ref 0.1–0.3)
INTERPRETATION 24H UR IFE-IMP: SIGNIFICANT CHANGE UP
INTERPRETATION SERPL IFE-IMP: SIGNIFICANT CHANGE UP
KAPPA LC SER QL IFE: 8.25 MG/DL — HIGH (ref 0.33–1.94)
KAPPA LC SER QL IFE: 8.25 MG/DL — HIGH (ref 0.33–1.94)
KAPPA/LAMBDA FREE LIGHT CHAIN RATIO, SERUM: 1.45 RATIO — SIGNIFICANT CHANGE UP (ref 0.26–1.65)
KAPPA/LAMBDA FREE LIGHT CHAIN RATIO, SERUM: 1.45 RATIO — SIGNIFICANT CHANGE UP (ref 0.26–1.65)
LAMBDA LC SER QL IFE: 5.68 MG/DL — HIGH (ref 0.57–2.63)
LAMBDA LC SER QL IFE: 5.68 MG/DL — HIGH (ref 0.57–2.63)
LYMPHOCYTES # BLD AUTO: 0.94 K/UL — LOW (ref 1.2–3.4)
LYMPHOCYTES # BLD AUTO: 20.8 % — SIGNIFICANT CHANGE UP (ref 20.5–51.1)
M PROTEIN 24H UR ELPH-MRATE: SIGNIFICANT CHANGE UP
M-SPIKE: 1.2 G/DL — HIGH (ref 0–0)
MAGNESIUM SERPL-MCNC: 2 MG/DL — SIGNIFICANT CHANGE UP (ref 1.8–2.4)
MCHC RBC-ENTMCNC: 29.9 PG — SIGNIFICANT CHANGE UP (ref 27–31)
MCHC RBC-ENTMCNC: 32 G/DL — SIGNIFICANT CHANGE UP (ref 32–37)
MCV RBC AUTO: 93.5 FL — SIGNIFICANT CHANGE UP (ref 81–99)
MONOCYTES # BLD AUTO: 0.6 K/UL — SIGNIFICANT CHANGE UP (ref 0.1–0.6)
MONOCYTES NFR BLD AUTO: 13.3 % — HIGH (ref 1.7–9.3)
NEUTROPHILS # BLD AUTO: 2.61 K/UL — SIGNIFICANT CHANGE UP (ref 1.4–6.5)
NEUTROPHILS NFR BLD AUTO: 57.8 % — SIGNIFICANT CHANGE UP (ref 42.2–75.2)
NRBC BLD AUTO-RTO: 0 /100 WBCS — SIGNIFICANT CHANGE UP (ref 0–0)
PLATELET # BLD AUTO: 40 K/UL — LOW (ref 130–400)
PMV BLD: 10.3 FL — SIGNIFICANT CHANGE UP (ref 7.4–10.4)
POTASSIUM SERPL-MCNC: 4.1 MMOL/L — SIGNIFICANT CHANGE UP (ref 3.5–5)
POTASSIUM SERPL-SCNC: 4.1 MMOL/L — SIGNIFICANT CHANGE UP (ref 3.5–5)
PROT ?TM UR-MCNC: 20 MG/DL — HIGH (ref 0–12)
PROT PATTERN 24H UR ELPH-IMP: SIGNIFICANT CHANGE UP
PROT PATTERN SERPL ELPH-IMP: SIGNIFICANT CHANGE UP
PROT SERPL-MCNC: 5.4 G/DL — LOW (ref 6–8)
PROT SERPL-MCNC: 6 G/DL — SIGNIFICANT CHANGE UP (ref 6–8.3)
RBC # BLD: 3.21 M/UL — LOW (ref 4.2–5.4)
RBC # FLD: 19.9 % — HIGH (ref 11.5–14.5)
SODIUM SERPL-SCNC: 134 MMOL/L — LOW (ref 135–146)
SPECIMEN SOURCE: SIGNIFICANT CHANGE UP
TOTAL VOLUME - URINE: SIGNIFICANT CHANGE UP ML
TSH SERPL-MCNC: 5.92 UIU/ML — HIGH (ref 0.27–4.2)
URINE CREATININE CALCULATION: SIGNIFICANT CHANGE UP G/24 H (ref 0.8–1.8)
WBC # BLD: 4.52 K/UL — LOW (ref 4.8–10.8)
WBC # FLD AUTO: 4.52 K/UL — LOW (ref 4.8–10.8)

## 2025-07-14 PROCEDURE — 99233 SBSQ HOSP IP/OBS HIGH 50: CPT

## 2025-07-14 PROCEDURE — 71045 X-RAY EXAM CHEST 1 VIEW: CPT | Mod: 26

## 2025-07-14 RX ORDER — LACTULOSE 10 G/15ML
30 SOLUTION ORAL EVERY 8 HOURS
Refills: 0 | Status: DISCONTINUED | OUTPATIENT
Start: 2025-07-14 | End: 2025-07-20

## 2025-07-14 RX ORDER — MIDODRINE HYDROCHLORIDE 5 MG/1
5 TABLET ORAL THREE TIMES A DAY
Refills: 0 | Status: DISCONTINUED | OUTPATIENT
Start: 2025-07-14 | End: 2025-07-19

## 2025-07-14 RX ORDER — SODIUM BICARBONATE 1 MEQ/ML
650 SYRINGE (ML) INTRAVENOUS EVERY 8 HOURS
Refills: 0 | Status: DISCONTINUED | OUTPATIENT
Start: 2025-07-14 | End: 2025-07-20

## 2025-07-14 RX ORDER — LACTULOSE 10 G/15ML
20 SOLUTION ORAL THREE TIMES A DAY
Refills: 0 | Status: DISCONTINUED | OUTPATIENT
Start: 2025-07-14 | End: 2025-07-14

## 2025-07-14 RX ADMIN — MEDPURA VITAMIN A AND D 1 APPLICATION(S): 95 OINTMENT TOPICAL at 17:57

## 2025-07-14 RX ADMIN — MIDODRINE HYDROCHLORIDE 5 MILLIGRAM(S): 5 TABLET ORAL at 17:57

## 2025-07-14 RX ADMIN — LACTULOSE 20 GRAM(S): 10 SOLUTION ORAL at 06:03

## 2025-07-14 RX ADMIN — Medication 1 LOZENGE: at 16:00

## 2025-07-14 RX ADMIN — LACTULOSE 20 GRAM(S): 10 SOLUTION ORAL at 22:56

## 2025-07-14 RX ADMIN — Medication 1 APPLICATION(S): at 14:02

## 2025-07-14 RX ADMIN — MEDPURA VITAMIN A AND D 1 APPLICATION(S): 95 OINTMENT TOPICAL at 06:01

## 2025-07-14 RX ADMIN — Medication 37.5 MICROGRAM(S): at 06:00

## 2025-07-14 RX ADMIN — LACTULOSE 30 GRAM(S): 10 SOLUTION ORAL at 15:58

## 2025-07-14 RX ADMIN — Medication 40 MILLIGRAM(S): at 06:00

## 2025-07-14 RX ADMIN — Medication 650 MILLIGRAM(S): at 13:58

## 2025-07-14 RX ADMIN — Medication 650 MILLIGRAM(S): at 22:55
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
## 2025-07-15 DIAGNOSIS — S06.6X0A TRAUMATIC SUBARACHNOID HEMORRHAGE WITHOUT LOSS OF CONSCIOUSNESS, INITIAL ENCOUNTER: ICD-10-CM

## 2025-07-15 DIAGNOSIS — N17.9 ACUTE KIDNEY FAILURE, UNSPECIFIED: ICD-10-CM

## 2025-07-15 DIAGNOSIS — D69.6 THROMBOCYTOPENIA, UNSPECIFIED: ICD-10-CM

## 2025-07-15 DIAGNOSIS — K74.69 OTHER CIRRHOSIS OF LIVER: ICD-10-CM

## 2025-07-15 DIAGNOSIS — K75.81 NONALCOHOLIC STEATOHEPATITIS (NASH): ICD-10-CM

## 2025-07-15 DIAGNOSIS — Z98.890 OTHER SPECIFIED POSTPROCEDURAL STATES: ICD-10-CM

## 2025-07-15 DIAGNOSIS — I27.20 PULMONARY HYPERTENSION, UNSPECIFIED: ICD-10-CM
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
## 2025-07-15 DIAGNOSIS — Z96.653 PRESENCE OF ARTIFICIAL KNEE JOINT, BILATERAL: ICD-10-CM

## 2025-07-15 DIAGNOSIS — Z90.49 ACQUIRED ABSENCE OF OTHER SPECIFIED PARTS OF DIGESTIVE TRACT: ICD-10-CM

## 2025-07-15 LAB
% ALBUMIN: 48.6 % — SIGNIFICANT CHANGE UP
% ALPHA 1: 4.7 % — SIGNIFICANT CHANGE UP
% ALPHA 2: 6.5 % — SIGNIFICANT CHANGE UP
% BETA: 8.6 % — SIGNIFICANT CHANGE UP
% GAMMA: 31.6 % — SIGNIFICANT CHANGE UP
% M SPIKE: 19.9 % — SIGNIFICANT CHANGE UP
ALBUMIN SERPL ELPH-MCNC: 2.4 G/DL — LOW (ref 3.5–5.2)
ALBUMIN SERPL ELPH-MCNC: 2.7 G/DL — LOW (ref 3.6–5.5)
ALBUMIN/GLOB SERPL ELPH: 1 RATIO — SIGNIFICANT CHANGE UP
ALP SERPL-CCNC: 122 U/L — HIGH (ref 30–115)
ALPHA1 GLOB SERPL ELPH-MCNC: 0.3 G/DL — SIGNIFICANT CHANGE UP (ref 0.1–0.4)
ALPHA2 GLOB SERPL ELPH-MCNC: 0.4 G/DL — LOW (ref 0.5–1)
ALT FLD-CCNC: 19 U/L — SIGNIFICANT CHANGE UP (ref 0–41)
ANION GAP SERPL CALC-SCNC: 9 MMOL/L — SIGNIFICANT CHANGE UP (ref 7–14)
AST SERPL-CCNC: 24 U/L — SIGNIFICANT CHANGE UP (ref 0–41)
B-GLOBULIN SERPL ELPH-MCNC: 0.5 G/DL — SIGNIFICANT CHANGE UP (ref 0.5–1)
BILIRUB SERPL-MCNC: 3.3 MG/DL — HIGH (ref 0.2–1.2)
BUN SERPL-MCNC: 52 MG/DL — HIGH (ref 10–20)
CALCIUM SERPL-MCNC: 8 MG/DL — LOW (ref 8.4–10.5)
CHLORIDE SERPL-SCNC: 104 MMOL/L — SIGNIFICANT CHANGE UP (ref 98–110)
CO2 SERPL-SCNC: 18 MMOL/L — SIGNIFICANT CHANGE UP (ref 17–32)
CREAT SERPL-MCNC: 2.1 MG/DL — HIGH (ref 0.7–1.5)
EGFR: 23 ML/MIN/1.73M2 — LOW
EGFR: 23 ML/MIN/1.73M2 — LOW
GAMMA GLOBULIN: 1.7 G/DL — HIGH (ref 0.6–1.6)
GLUCOSE SERPL-MCNC: 120 MG/DL — HIGH (ref 70–99)
HCT VFR BLD CALC: 30.2 % — LOW (ref 37–47)
HGB BLD-MCNC: 9.8 G/DL — LOW (ref 12–16)
INTERPRETATION SERPL IFE-IMP: SIGNIFICANT CHANGE UP
M-SPIKE: 1.1 G/DL — HIGH (ref 0–0)
MAGNESIUM SERPL-MCNC: 2.3 MG/DL — SIGNIFICANT CHANGE UP (ref 1.8–2.4)
MCHC RBC-ENTMCNC: 30 PG — SIGNIFICANT CHANGE UP (ref 27–31)
MCHC RBC-ENTMCNC: 32.5 G/DL — SIGNIFICANT CHANGE UP (ref 32–37)
MCV RBC AUTO: 92.4 FL — SIGNIFICANT CHANGE UP (ref 81–99)
NRBC BLD AUTO-RTO: 0 /100 WBCS — SIGNIFICANT CHANGE UP (ref 0–0)
PLATELET # BLD AUTO: 44 K/UL — LOW (ref 130–400)
PMV BLD: 10.1 FL — SIGNIFICANT CHANGE UP (ref 7.4–10.4)
POTASSIUM SERPL-MCNC: 4.2 MMOL/L — SIGNIFICANT CHANGE UP (ref 3.5–5)
POTASSIUM SERPL-SCNC: 4.2 MMOL/L — SIGNIFICANT CHANGE UP (ref 3.5–5)
PROT PATTERN SERPL ELPH-IMP: SIGNIFICANT CHANGE UP
PROT SERPL-MCNC: 5.1 G/DL — LOW (ref 6–8)
PROT SERPL-MCNC: 5.5 G/DL — LOW (ref 6–8.3)
RBC # BLD: 3.27 M/UL — LOW (ref 4.2–5.4)
RBC # FLD: 19.5 % — HIGH (ref 11.5–14.5)
SODIUM SERPL-SCNC: 131 MMOL/L — LOW (ref 135–146)
WBC # BLD: 4.43 K/UL — LOW (ref 4.8–10.8)
WBC # FLD AUTO: 4.43 K/UL — LOW (ref 4.8–10.8)

## 2025-07-15 PROCEDURE — 99223 1ST HOSP IP/OBS HIGH 75: CPT

## 2025-07-15 PROCEDURE — 99233 SBSQ HOSP IP/OBS HIGH 50: CPT

## 2025-07-15 RX ADMIN — MEDPURA VITAMIN A AND D 1 APPLICATION(S): 95 OINTMENT TOPICAL at 06:44

## 2025-07-15 RX ADMIN — Medication 1 APPLICATION(S): at 11:01

## 2025-07-15 RX ADMIN — MEDPURA VITAMIN A AND D 1 APPLICATION(S): 95 OINTMENT TOPICAL at 17:08

## 2025-07-15 RX ADMIN — MIDODRINE HYDROCHLORIDE 5 MILLIGRAM(S): 5 TABLET ORAL at 11:01

## 2025-07-15 RX ADMIN — Medication 650 MILLIGRAM(S): at 06:43

## 2025-07-15 RX ADMIN — LIDOCAINE HYDROCHLORIDE 1 PATCH: 20 JELLY TOPICAL at 11:10

## 2025-07-15 RX ADMIN — LACTULOSE 30 GRAM(S): 10 SOLUTION ORAL at 22:02

## 2025-07-15 RX ADMIN — Medication 650 MILLIGRAM(S): at 13:31

## 2025-07-15 RX ADMIN — LACTULOSE 30 GRAM(S): 10 SOLUTION ORAL at 06:43

## 2025-07-15 RX ADMIN — MIDODRINE HYDROCHLORIDE 5 MILLIGRAM(S): 5 TABLET ORAL at 06:42

## 2025-07-15 RX ADMIN — MIDODRINE HYDROCHLORIDE 5 MILLIGRAM(S): 5 TABLET ORAL at 17:08

## 2025-07-15 RX ADMIN — Medication 40 MILLIGRAM(S): at 06:43

## 2025-07-15 RX ADMIN — Medication 37.5 MICROGRAM(S): at 06:43

## 2025-07-15 RX ADMIN — LACTULOSE 30 GRAM(S): 10 SOLUTION ORAL at 13:30

## 2025-07-15 RX ADMIN — Medication 650 MILLIGRAM(S): at 22:02

## 2025-07-16 ENCOUNTER — RESULT REVIEW (OUTPATIENT)
Age: 85
End: 2025-07-16

## 2025-07-16 LAB
ALBUMIN SERPL ELPH-MCNC: 2.3 G/DL — LOW (ref 3.5–5.2)
ALP SERPL-CCNC: 105 U/L — SIGNIFICANT CHANGE UP (ref 30–115)
ALT FLD-CCNC: 23 U/L — SIGNIFICANT CHANGE UP (ref 0–41)
ANION GAP SERPL CALC-SCNC: 12 MMOL/L — SIGNIFICANT CHANGE UP (ref 7–14)
APTT BLD: 41.3 SEC — HIGH (ref 27–39.2)
AST SERPL-CCNC: 27 U/L — SIGNIFICANT CHANGE UP (ref 0–41)
B PERT IGG+IGM PNL SER: CLEAR — SIGNIFICANT CHANGE UP
BILIRUB SERPL-MCNC: 4 MG/DL — HIGH (ref 0.2–1.2)
BUN SERPL-MCNC: 57 MG/DL — HIGH (ref 10–20)
CALCIUM SERPL-MCNC: 8.3 MG/DL — LOW (ref 8.4–10.5)
CHLORIDE SERPL-SCNC: 104 MMOL/L — SIGNIFICANT CHANGE UP (ref 98–110)
CO2 SERPL-SCNC: 17 MMOL/L — SIGNIFICANT CHANGE UP (ref 17–32)
COLOR FLD: YELLOW — SIGNIFICANT CHANGE UP
CREAT SERPL-MCNC: 2.1 MG/DL — HIGH (ref 0.7–1.5)
CULTURE RESULTS: SIGNIFICANT CHANGE UP
EGFR: 23 ML/MIN/1.73M2 — LOW
EGFR: 23 ML/MIN/1.73M2 — LOW
FLUID INTAKE SUBSTANCE CLASS: SIGNIFICANT CHANGE UP
GLUCOSE SERPL-MCNC: 119 MG/DL — HIGH (ref 70–99)
HCT VFR BLD CALC: 31.8 % — LOW (ref 37–47)
HGB BLD-MCNC: 10.2 G/DL — LOW (ref 12–16)
INR BLD: 1.38 RATIO — HIGH (ref 0.65–1.3)
LYMPHOCYTES # FLD: 77 — SIGNIFICANT CHANGE UP
MAGNESIUM SERPL-MCNC: 2 MG/DL — SIGNIFICANT CHANGE UP (ref 1.8–2.4)
MCHC RBC-ENTMCNC: 29.9 PG — SIGNIFICANT CHANGE UP (ref 27–31)
MCHC RBC-ENTMCNC: 32.1 G/DL — SIGNIFICANT CHANGE UP (ref 32–37)
MCV RBC AUTO: 93.3 FL — SIGNIFICANT CHANGE UP (ref 81–99)
MONOS+MACROS # FLD: 21 % — SIGNIFICANT CHANGE UP
NEUTROPHILS-BODY FLUID: 2 % — SIGNIFICANT CHANGE UP
NRBC BLD AUTO-RTO: 0 /100 WBCS — SIGNIFICANT CHANGE UP (ref 0–0)
PLATELET # BLD AUTO: 37 K/UL — LOW (ref 130–400)
PMV BLD: 10.3 FL — SIGNIFICANT CHANGE UP (ref 7.4–10.4)
POTASSIUM SERPL-MCNC: 3.9 MMOL/L — SIGNIFICANT CHANGE UP (ref 3.5–5)
POTASSIUM SERPL-SCNC: 3.9 MMOL/L — SIGNIFICANT CHANGE UP (ref 3.5–5)
PROT SERPL-MCNC: 5.3 G/DL — LOW (ref 6–8)
PROTHROM AB SERPL-ACNC: 16.4 SEC — HIGH (ref 9.95–12.87)
RBC # BLD: 3.41 M/UL — LOW (ref 4.2–5.4)
RBC # FLD: 19.9 % — HIGH (ref 11.5–14.5)
RCV VOL RI: 3000 /UL — HIGH (ref 0–0)
SODIUM SERPL-SCNC: 133 MMOL/L — LOW (ref 135–146)
SPECIMEN SOURCE: SIGNIFICANT CHANGE UP
TOTAL NUCLEATED CELL COUNT, BODY FLUID: 47 /UL — SIGNIFICANT CHANGE UP
TUBE TYPE: SIGNIFICANT CHANGE UP
WBC # BLD: 4.86 K/UL — SIGNIFICANT CHANGE UP (ref 4.8–10.8)
WBC # FLD AUTO: 4.86 K/UL — SIGNIFICANT CHANGE UP (ref 4.8–10.8)

## 2025-07-16 PROCEDURE — 88112 CYTOPATH CELL ENHANCE TECH: CPT | Mod: 26

## 2025-07-16 PROCEDURE — 99233 SBSQ HOSP IP/OBS HIGH 50: CPT

## 2025-07-16 PROCEDURE — 49082 ABD PARACENTESIS: CPT

## 2025-07-16 PROCEDURE — 88305 TISSUE EXAM BY PATHOLOGIST: CPT | Mod: 26

## 2025-07-16 RX ORDER — ALBUMIN (HUMAN) 12.5 G/50ML
100 INJECTION, SOLUTION INTRAVENOUS ONCE
Refills: 0 | Status: COMPLETED | OUTPATIENT
Start: 2025-07-16 | End: 2025-07-16

## 2025-07-16 RX ADMIN — LIDOCAINE HYDROCHLORIDE 1 PATCH: 20 JELLY TOPICAL at 22:55

## 2025-07-16 RX ADMIN — Medication 650 MILLIGRAM(S): at 05:29

## 2025-07-16 RX ADMIN — MIDODRINE HYDROCHLORIDE 5 MILLIGRAM(S): 5 TABLET ORAL at 05:29

## 2025-07-16 RX ADMIN — LIDOCAINE HYDROCHLORIDE 1 PATCH: 20 JELLY TOPICAL at 11:50

## 2025-07-16 RX ADMIN — LACTULOSE 30 GRAM(S): 10 SOLUTION ORAL at 05:29

## 2025-07-16 RX ADMIN — Medication 650 MILLIGRAM(S): at 21:11

## 2025-07-16 RX ADMIN — MIDODRINE HYDROCHLORIDE 5 MILLIGRAM(S): 5 TABLET ORAL at 11:51

## 2025-07-16 RX ADMIN — LACTULOSE 20 GRAM(S): 10 SOLUTION ORAL at 21:11

## 2025-07-16 RX ADMIN — MEDPURA VITAMIN A AND D 1 APPLICATION(S): 95 OINTMENT TOPICAL at 18:06

## 2025-07-16 RX ADMIN — LACTULOSE 30 GRAM(S): 10 SOLUTION ORAL at 14:30

## 2025-07-16 RX ADMIN — MIDODRINE HYDROCHLORIDE 5 MILLIGRAM(S): 5 TABLET ORAL at 18:07

## 2025-07-16 RX ADMIN — Medication 40 MILLIGRAM(S): at 05:29

## 2025-07-16 RX ADMIN — LIDOCAINE HYDROCHLORIDE 1 PATCH: 20 JELLY TOPICAL at 18:57

## 2025-07-16 RX ADMIN — MEDPURA VITAMIN A AND D 1 APPLICATION(S): 95 OINTMENT TOPICAL at 05:29

## 2025-07-16 RX ADMIN — Medication 650 MILLIGRAM(S): at 14:30

## 2025-07-16 RX ADMIN — Medication 37.5 MICROGRAM(S): at 05:28

## 2025-07-16 RX ADMIN — Medication 1 APPLICATION(S): at 11:52

## 2025-07-17 LAB
ALBUMIN FLD-MCNC: 0.3 G/DL — SIGNIFICANT CHANGE UP
ALBUMIN SERPL ELPH-MCNC: 2.5 G/DL — LOW (ref 3.5–5.2)
ALP SERPL-CCNC: 133 U/L — HIGH (ref 30–115)
ALT FLD-CCNC: 25 U/L — SIGNIFICANT CHANGE UP (ref 0–41)
ANION GAP SERPL CALC-SCNC: 10 MMOL/L — SIGNIFICANT CHANGE UP (ref 7–14)
APTT BLD: 43.5 SEC — HIGH (ref 27–39.2)
AST SERPL-CCNC: 32 U/L — SIGNIFICANT CHANGE UP (ref 0–41)
BILIRUB SERPL-MCNC: 3.7 MG/DL — HIGH (ref 0.2–1.2)
BUN SERPL-MCNC: 59 MG/DL — HIGH (ref 10–20)
CALCIUM SERPL-MCNC: 8 MG/DL — LOW (ref 8.4–10.5)
CHLORIDE SERPL-SCNC: 105 MMOL/L — SIGNIFICANT CHANGE UP (ref 98–110)
CO2 SERPL-SCNC: 20 MMOL/L — SIGNIFICANT CHANGE UP (ref 17–32)
CREAT SERPL-MCNC: 2.1 MG/DL — HIGH (ref 0.7–1.5)
EGFR: 23 ML/MIN/1.73M2 — LOW
EGFR: 23 ML/MIN/1.73M2 — LOW
GLUCOSE SERPL-MCNC: 133 MG/DL — HIGH (ref 70–99)
GRAM STN FLD: SIGNIFICANT CHANGE UP
HCT VFR BLD CALC: 29.9 % — LOW (ref 37–47)
HGB BLD-MCNC: 9.7 G/DL — LOW (ref 12–16)
INR BLD: 1.41 RATIO — HIGH (ref 0.65–1.3)
MAGNESIUM SERPL-MCNC: 2 MG/DL — SIGNIFICANT CHANGE UP (ref 1.8–2.4)
MCHC RBC-ENTMCNC: 29.9 PG — SIGNIFICANT CHANGE UP (ref 27–31)
MCHC RBC-ENTMCNC: 32.4 G/DL — SIGNIFICANT CHANGE UP (ref 32–37)
MCV RBC AUTO: 92.3 FL — SIGNIFICANT CHANGE UP (ref 81–99)
NRBC BLD AUTO-RTO: 0 /100 WBCS — SIGNIFICANT CHANGE UP (ref 0–0)
PLATELET # BLD AUTO: 30 K/UL — LOW (ref 130–400)
PMV BLD: 9.8 FL — SIGNIFICANT CHANGE UP (ref 7.4–10.4)
POTASSIUM SERPL-MCNC: 4.3 MMOL/L — SIGNIFICANT CHANGE UP (ref 3.5–5)
POTASSIUM SERPL-SCNC: 4.3 MMOL/L — SIGNIFICANT CHANGE UP (ref 3.5–5)
PROT FLD-MCNC: <1 G/DL — SIGNIFICANT CHANGE UP
PROT SERPL-MCNC: 5.1 G/DL — LOW (ref 6–8)
PROTHROM AB SERPL-ACNC: 16.7 SEC — HIGH (ref 9.95–12.87)
RBC # BLD: 3.24 M/UL — LOW (ref 4.2–5.4)
RBC # FLD: 19.5 % — HIGH (ref 11.5–14.5)
SODIUM SERPL-SCNC: 135 MMOL/L — SIGNIFICANT CHANGE UP (ref 135–146)
SPECIMEN SOURCE: SIGNIFICANT CHANGE UP
WBC # BLD: 3.76 K/UL — LOW (ref 4.8–10.8)
WBC # FLD AUTO: 3.76 K/UL — LOW (ref 4.8–10.8)

## 2025-07-17 PROCEDURE — 99233 SBSQ HOSP IP/OBS HIGH 50: CPT

## 2025-07-17 RX ORDER — ALBUMIN (HUMAN) 12.5 G/50ML
100 INJECTION, SOLUTION INTRAVENOUS EVERY 12 HOURS
Refills: 0 | Status: COMPLETED | OUTPATIENT
Start: 2025-07-17 | End: 2025-07-18

## 2025-07-17 RX ADMIN — LACTULOSE 30 GRAM(S): 10 SOLUTION ORAL at 14:42

## 2025-07-17 RX ADMIN — MIDODRINE HYDROCHLORIDE 5 MILLIGRAM(S): 5 TABLET ORAL at 05:01

## 2025-07-17 RX ADMIN — Medication 650 MILLIGRAM(S): at 14:42

## 2025-07-17 RX ADMIN — MIDODRINE HYDROCHLORIDE 5 MILLIGRAM(S): 5 TABLET ORAL at 12:57

## 2025-07-17 RX ADMIN — Medication 1 APPLICATION(S): at 12:57

## 2025-07-17 RX ADMIN — LACTULOSE 30 GRAM(S): 10 SOLUTION ORAL at 05:00

## 2025-07-17 RX ADMIN — Medication 37.5 MICROGRAM(S): at 05:01

## 2025-07-17 RX ADMIN — ALBUMIN (HUMAN) 50 MILLILITER(S): 12.5 INJECTION, SOLUTION INTRAVENOUS at 08:01

## 2025-07-17 RX ADMIN — ALBUMIN (HUMAN) 50 MILLILITER(S): 12.5 INJECTION, SOLUTION INTRAVENOUS at 18:34

## 2025-07-17 RX ADMIN — Medication 650 MILLIGRAM(S): at 05:02

## 2025-07-17 RX ADMIN — LACTULOSE 30 GRAM(S): 10 SOLUTION ORAL at 21:15

## 2025-07-17 RX ADMIN — MEDPURA VITAMIN A AND D 1 APPLICATION(S): 95 OINTMENT TOPICAL at 18:12

## 2025-07-17 RX ADMIN — MEDPURA VITAMIN A AND D 1 APPLICATION(S): 95 OINTMENT TOPICAL at 05:02

## 2025-07-17 RX ADMIN — Medication 650 MILLIGRAM(S): at 21:15

## 2025-07-17 RX ADMIN — MIDODRINE HYDROCHLORIDE 5 MILLIGRAM(S): 5 TABLET ORAL at 18:11

## 2025-07-18 LAB
ALBUMIN SERPL ELPH-MCNC: 3 G/DL — LOW (ref 3.5–5.2)
ALP SERPL-CCNC: 133 U/L — HIGH (ref 30–115)
ALT FLD-CCNC: 24 U/L — SIGNIFICANT CHANGE UP (ref 0–41)
ANION GAP SERPL CALC-SCNC: 14 MMOL/L — SIGNIFICANT CHANGE UP (ref 7–14)
AST SERPL-CCNC: 33 U/L — SIGNIFICANT CHANGE UP (ref 0–41)
BASOPHILS # BLD AUTO: 0.02 K/UL — SIGNIFICANT CHANGE UP (ref 0–0.2)
BASOPHILS NFR BLD AUTO: 0.5 % — SIGNIFICANT CHANGE UP (ref 0–1)
BILIRUB SERPL-MCNC: 3.9 MG/DL — HIGH (ref 0.2–1.2)
BUN SERPL-MCNC: 62 MG/DL — CRITICAL HIGH (ref 10–20)
CALCIUM SERPL-MCNC: 8.3 MG/DL — LOW (ref 8.4–10.5)
CHLORIDE SERPL-SCNC: 102 MMOL/L — SIGNIFICANT CHANGE UP (ref 98–110)
CO2 SERPL-SCNC: 19 MMOL/L — SIGNIFICANT CHANGE UP (ref 17–32)
CREAT SERPL-MCNC: 2 MG/DL — HIGH (ref 0.7–1.5)
EGFR: 24 ML/MIN/1.73M2 — LOW
EGFR: 24 ML/MIN/1.73M2 — LOW
EOSINOPHIL # BLD AUTO: 0.36 K/UL — SIGNIFICANT CHANGE UP (ref 0–0.7)
EOSINOPHIL NFR BLD AUTO: 8.4 % — HIGH (ref 0–8)
GLUCOSE SERPL-MCNC: 111 MG/DL — HIGH (ref 70–99)
HCT VFR BLD CALC: 26 % — LOW (ref 37–47)
HCT VFR BLD CALC: 28.1 % — LOW (ref 37–47)
HGB BLD-MCNC: 8.3 G/DL — LOW (ref 12–16)
HGB BLD-MCNC: 9 G/DL — LOW (ref 12–16)
IMM GRANULOCYTES NFR BLD AUTO: 0.2 % — SIGNIFICANT CHANGE UP (ref 0.1–0.3)
LYMPHOCYTES # BLD AUTO: 0.76 K/UL — LOW (ref 1.2–3.4)
LYMPHOCYTES # BLD AUTO: 17.8 % — LOW (ref 20.5–51.1)
MAGNESIUM SERPL-MCNC: 2.4 MG/DL — SIGNIFICANT CHANGE UP (ref 1.8–2.4)
MCHC RBC-ENTMCNC: 29.9 PG — SIGNIFICANT CHANGE UP (ref 27–31)
MCHC RBC-ENTMCNC: 30.2 PG — SIGNIFICANT CHANGE UP (ref 27–31)
MCHC RBC-ENTMCNC: 31.9 G/DL — LOW (ref 32–37)
MCHC RBC-ENTMCNC: 32 G/DL — SIGNIFICANT CHANGE UP (ref 32–37)
MCV RBC AUTO: 93.4 FL — SIGNIFICANT CHANGE UP (ref 81–99)
MCV RBC AUTO: 94.5 FL — SIGNIFICANT CHANGE UP (ref 81–99)
MONOCYTES # BLD AUTO: 0.53 K/UL — SIGNIFICANT CHANGE UP (ref 0.1–0.6)
MONOCYTES NFR BLD AUTO: 12.4 % — HIGH (ref 1.7–9.3)
NEUTROPHILS # BLD AUTO: 2.59 K/UL — SIGNIFICANT CHANGE UP (ref 1.4–6.5)
NEUTROPHILS NFR BLD AUTO: 60.7 % — SIGNIFICANT CHANGE UP (ref 42.2–75.2)
NRBC BLD AUTO-RTO: 0 /100 WBCS — SIGNIFICANT CHANGE UP (ref 0–0)
NRBC BLD AUTO-RTO: 0 /100 WBCS — SIGNIFICANT CHANGE UP (ref 0–0)
PHOSPHATE SERPL-MCNC: 3.6 MG/DL — SIGNIFICANT CHANGE UP (ref 2.1–4.9)
PLATELET # BLD AUTO: 29 K/UL — LOW (ref 130–400)
PLATELET # BLD AUTO: 41 K/UL — LOW (ref 130–400)
PMV BLD: 10.6 FL — HIGH (ref 7.4–10.4)
PMV BLD: 10.9 FL — HIGH (ref 7.4–10.4)
POTASSIUM SERPL-MCNC: 4.1 MMOL/L — SIGNIFICANT CHANGE UP (ref 3.5–5)
POTASSIUM SERPL-SCNC: 4.1 MMOL/L — SIGNIFICANT CHANGE UP (ref 3.5–5)
PROT SERPL-MCNC: 5.2 G/DL — LOW (ref 6–8)
RBC # BLD: 2.75 M/UL — LOW (ref 4.2–5.4)
RBC # BLD: 3.01 M/UL — LOW (ref 4.2–5.4)
RBC # FLD: 19.2 % — HIGH (ref 11.5–14.5)
RBC # FLD: 19.4 % — HIGH (ref 11.5–14.5)
SODIUM SERPL-SCNC: 135 MMOL/L — SIGNIFICANT CHANGE UP (ref 135–146)
WBC # BLD: 2.78 K/UL — LOW (ref 4.8–10.8)
WBC # BLD: 4.27 K/UL — LOW (ref 4.8–10.8)
WBC # FLD AUTO: 2.78 K/UL — LOW (ref 4.8–10.8)
WBC # FLD AUTO: 4.27 K/UL — LOW (ref 4.8–10.8)

## 2025-07-18 PROCEDURE — 99232 SBSQ HOSP IP/OBS MODERATE 35: CPT

## 2025-07-18 PROCEDURE — 99233 SBSQ HOSP IP/OBS HIGH 50: CPT

## 2025-07-18 RX ADMIN — MIDODRINE HYDROCHLORIDE 5 MILLIGRAM(S): 5 TABLET ORAL at 11:20

## 2025-07-18 RX ADMIN — ALBUMIN (HUMAN) 50 MILLILITER(S): 12.5 INJECTION, SOLUTION INTRAVENOUS at 17:20

## 2025-07-18 RX ADMIN — MIDODRINE HYDROCHLORIDE 5 MILLIGRAM(S): 5 TABLET ORAL at 05:10

## 2025-07-18 RX ADMIN — Medication 37.5 MICROGRAM(S): at 05:10

## 2025-07-18 RX ADMIN — Medication 40 MILLIGRAM(S): at 05:11

## 2025-07-18 RX ADMIN — Medication 650 MILLIGRAM(S): at 13:04

## 2025-07-18 RX ADMIN — LACTULOSE 30 GRAM(S): 10 SOLUTION ORAL at 21:15

## 2025-07-18 RX ADMIN — MEDPURA VITAMIN A AND D 1 APPLICATION(S): 95 OINTMENT TOPICAL at 17:19

## 2025-07-18 RX ADMIN — ALBUMIN (HUMAN) 50 MILLILITER(S): 12.5 INJECTION, SOLUTION INTRAVENOUS at 06:03

## 2025-07-18 RX ADMIN — LACTULOSE 30 GRAM(S): 10 SOLUTION ORAL at 05:17

## 2025-07-18 RX ADMIN — Medication 650 MILLIGRAM(S): at 21:15

## 2025-07-18 RX ADMIN — MIDODRINE HYDROCHLORIDE 5 MILLIGRAM(S): 5 TABLET ORAL at 17:14

## 2025-07-18 RX ADMIN — Medication 650 MILLIGRAM(S): at 05:16

## 2025-07-18 RX ADMIN — Medication 1 APPLICATION(S): at 11:20

## 2025-07-18 RX ADMIN — MEDPURA VITAMIN A AND D 1 APPLICATION(S): 95 OINTMENT TOPICAL at 05:10

## 2025-07-18 RX ADMIN — LACTULOSE 30 GRAM(S): 10 SOLUTION ORAL at 13:04

## 2025-07-19 LAB
ANION GAP SERPL CALC-SCNC: 10 MMOL/L — SIGNIFICANT CHANGE UP (ref 7–14)
BASOPHILS # BLD AUTO: 0.02 K/UL — SIGNIFICANT CHANGE UP (ref 0–0.2)
BASOPHILS NFR BLD AUTO: 0.5 % — SIGNIFICANT CHANGE UP (ref 0–1)
BUN SERPL-MCNC: 66 MG/DL — CRITICAL HIGH (ref 10–20)
CALCIUM SERPL-MCNC: 8.2 MG/DL — LOW (ref 8.4–10.5)
CHLORIDE SERPL-SCNC: 102 MMOL/L — SIGNIFICANT CHANGE UP (ref 98–110)
CO2 SERPL-SCNC: 21 MMOL/L — SIGNIFICANT CHANGE UP (ref 17–32)
CREAT SERPL-MCNC: 1.9 MG/DL — HIGH (ref 0.7–1.5)
EGFR: 26 ML/MIN/1.73M2 — LOW
EGFR: 26 ML/MIN/1.73M2 — LOW
EOSINOPHIL # BLD AUTO: 0.3 K/UL — SIGNIFICANT CHANGE UP (ref 0–0.7)
EOSINOPHIL NFR BLD AUTO: 8.1 % — HIGH (ref 0–8)
GLUCOSE SERPL-MCNC: 132 MG/DL — HIGH (ref 70–99)
HCT VFR BLD CALC: 28.1 % — LOW (ref 37–47)
HGB BLD-MCNC: 8.7 G/DL — LOW (ref 12–16)
IMM GRANULOCYTES NFR BLD AUTO: 0.5 % — HIGH (ref 0.1–0.3)
LYMPHOCYTES # BLD AUTO: 0.61 K/UL — LOW (ref 1.2–3.4)
LYMPHOCYTES # BLD AUTO: 16.4 % — LOW (ref 20.5–51.1)
MAGNESIUM SERPL-MCNC: 2 MG/DL — SIGNIFICANT CHANGE UP (ref 1.8–2.4)
MCHC RBC-ENTMCNC: 29.7 PG — SIGNIFICANT CHANGE UP (ref 27–31)
MCHC RBC-ENTMCNC: 31 G/DL — LOW (ref 32–37)
MCV RBC AUTO: 95.9 FL — SIGNIFICANT CHANGE UP (ref 81–99)
MONOCYTES # BLD AUTO: 0.41 K/UL — SIGNIFICANT CHANGE UP (ref 0.1–0.6)
MONOCYTES NFR BLD AUTO: 11.1 % — HIGH (ref 1.7–9.3)
NEUTROPHILS # BLD AUTO: 2.35 K/UL — SIGNIFICANT CHANGE UP (ref 1.4–6.5)
NEUTROPHILS NFR BLD AUTO: 63.4 % — SIGNIFICANT CHANGE UP (ref 42.2–75.2)
NRBC BLD AUTO-RTO: 0 /100 WBCS — SIGNIFICANT CHANGE UP (ref 0–0)
PHOSPHATE SERPL-MCNC: 3.8 MG/DL — SIGNIFICANT CHANGE UP (ref 2.1–4.9)
PLATELET # BLD AUTO: 35 K/UL — LOW (ref 130–400)
PMV BLD: 10 FL — SIGNIFICANT CHANGE UP (ref 7.4–10.4)
POTASSIUM SERPL-MCNC: 3.9 MMOL/L — SIGNIFICANT CHANGE UP (ref 3.5–5)
POTASSIUM SERPL-SCNC: 3.9 MMOL/L — SIGNIFICANT CHANGE UP (ref 3.5–5)
RBC # BLD: 2.93 M/UL — LOW (ref 4.2–5.4)
RBC # FLD: 19.4 % — HIGH (ref 11.5–14.5)
SODIUM SERPL-SCNC: 133 MMOL/L — LOW (ref 135–146)
WBC # BLD: 3.71 K/UL — LOW (ref 4.8–10.8)
WBC # FLD AUTO: 3.71 K/UL — LOW (ref 4.8–10.8)

## 2025-07-19 PROCEDURE — 71045 X-RAY EXAM CHEST 1 VIEW: CPT | Mod: 26

## 2025-07-19 PROCEDURE — 99233 SBSQ HOSP IP/OBS HIGH 50: CPT

## 2025-07-19 RX ORDER — MIDODRINE HYDROCHLORIDE 5 MG/1
10 TABLET ORAL THREE TIMES A DAY
Refills: 0 | Status: DISCONTINUED | OUTPATIENT
Start: 2025-07-19 | End: 2025-07-29

## 2025-07-19 RX ORDER — ALBUMIN (HUMAN) 12.5 G/50ML
80 INJECTION, SOLUTION INTRAVENOUS
Refills: 0 | Status: DISCONTINUED | OUTPATIENT
Start: 2025-07-19 | End: 2025-07-19

## 2025-07-19 RX ORDER — OCTREOTIDE ACETATE 500 UG/ML
100 INJECTION, SOLUTION INTRAVENOUS; SUBCUTANEOUS THREE TIMES A DAY
Refills: 0 | Status: DISCONTINUED | OUTPATIENT
Start: 2025-07-19 | End: 2025-07-28

## 2025-07-19 RX ORDER — ALBUMIN (HUMAN) 12.5 G/50ML
100 INJECTION, SOLUTION INTRAVENOUS
Refills: 0 | Status: DISCONTINUED | OUTPATIENT
Start: 2025-07-19 | End: 2025-07-22

## 2025-07-19 RX ADMIN — MEDPURA VITAMIN A AND D 1 APPLICATION(S): 95 OINTMENT TOPICAL at 05:12

## 2025-07-19 RX ADMIN — OCTREOTIDE ACETATE 100 MICROGRAM(S): 500 INJECTION, SOLUTION INTRAVENOUS; SUBCUTANEOUS at 22:13

## 2025-07-19 RX ADMIN — MIDODRINE HYDROCHLORIDE 10 MILLIGRAM(S): 5 TABLET ORAL at 17:31

## 2025-07-19 RX ADMIN — ALBUMIN (HUMAN) 50 MILLILITER(S): 12.5 INJECTION, SOLUTION INTRAVENOUS at 13:34

## 2025-07-19 RX ADMIN — Medication 650 MILLIGRAM(S): at 22:08

## 2025-07-19 RX ADMIN — LACTULOSE 30 GRAM(S): 10 SOLUTION ORAL at 05:19

## 2025-07-19 RX ADMIN — Medication 37.5 MICROGRAM(S): at 05:12

## 2025-07-19 RX ADMIN — OCTREOTIDE ACETATE 100 MICROGRAM(S): 500 INJECTION, SOLUTION INTRAVENOUS; SUBCUTANEOUS at 13:34

## 2025-07-19 RX ADMIN — MIDODRINE HYDROCHLORIDE 10 MILLIGRAM(S): 5 TABLET ORAL at 11:04

## 2025-07-19 RX ADMIN — Medication 650 MILLIGRAM(S): at 05:12

## 2025-07-19 RX ADMIN — MEDPURA VITAMIN A AND D 1 APPLICATION(S): 95 OINTMENT TOPICAL at 17:34

## 2025-07-19 RX ADMIN — Medication 650 MILLIGRAM(S): at 13:34

## 2025-07-19 RX ADMIN — Medication 40 MILLIGRAM(S): at 05:12

## 2025-07-19 RX ADMIN — MIDODRINE HYDROCHLORIDE 5 MILLIGRAM(S): 5 TABLET ORAL at 05:12

## 2025-07-19 RX ADMIN — LACTULOSE 30 GRAM(S): 10 SOLUTION ORAL at 21:59

## 2025-07-19 RX ADMIN — Medication 1 APPLICATION(S): at 11:04

## 2025-07-19 RX ADMIN — Medication 650 MILLIGRAM(S): at 23:08

## 2025-07-19 RX ADMIN — Medication 650 MILLIGRAM(S): at 21:59

## 2025-07-20 LAB
ALBUMIN SERPL ELPH-MCNC: 2.9 G/DL — LOW (ref 3.5–5.2)
ALP SERPL-CCNC: 116 U/L — HIGH (ref 30–115)
ALT FLD-CCNC: 23 U/L — SIGNIFICANT CHANGE UP (ref 0–41)
ANION GAP SERPL CALC-SCNC: 13 MMOL/L — SIGNIFICANT CHANGE UP (ref 7–14)
AST SERPL-CCNC: 31 U/L — SIGNIFICANT CHANGE UP (ref 0–41)
BASOPHILS # BLD AUTO: 0.03 K/UL — SIGNIFICANT CHANGE UP (ref 0–0.2)
BASOPHILS NFR BLD AUTO: 0.8 % — SIGNIFICANT CHANGE UP (ref 0–1)
BILIRUB SERPL-MCNC: 4.7 MG/DL — HIGH (ref 0.2–1.2)
BUN SERPL-MCNC: 69 MG/DL — CRITICAL HIGH (ref 10–20)
CALCIUM SERPL-MCNC: 8.2 MG/DL — LOW (ref 8.4–10.5)
CHLORIDE SERPL-SCNC: 103 MMOL/L — SIGNIFICANT CHANGE UP (ref 98–110)
CO2 SERPL-SCNC: 16 MMOL/L — LOW (ref 17–32)
CREAT SERPL-MCNC: 2.1 MG/DL — HIGH (ref 0.7–1.5)
EGFR: 23 ML/MIN/1.73M2 — LOW
EGFR: 23 ML/MIN/1.73M2 — LOW
EOSINOPHIL # BLD AUTO: 0.21 K/UL — SIGNIFICANT CHANGE UP (ref 0–0.7)
EOSINOPHIL NFR BLD AUTO: 5.5 % — SIGNIFICANT CHANGE UP (ref 0–8)
GLUCOSE SERPL-MCNC: 93 MG/DL — SIGNIFICANT CHANGE UP (ref 70–99)
HCT VFR BLD CALC: 29.5 % — LOW (ref 37–47)
HGB BLD-MCNC: 9.4 G/DL — LOW (ref 12–16)
IMM GRANULOCYTES NFR BLD AUTO: 0.5 % — HIGH (ref 0.1–0.3)
LYMPHOCYTES # BLD AUTO: 0.67 K/UL — LOW (ref 1.2–3.4)
LYMPHOCYTES # BLD AUTO: 17.5 % — LOW (ref 20.5–51.1)
MAGNESIUM SERPL-MCNC: 1.9 MG/DL — SIGNIFICANT CHANGE UP (ref 1.8–2.4)
MCHC RBC-ENTMCNC: 30.3 PG — SIGNIFICANT CHANGE UP (ref 27–31)
MCHC RBC-ENTMCNC: 31.9 G/DL — LOW (ref 32–37)
MCV RBC AUTO: 95.2 FL — SIGNIFICANT CHANGE UP (ref 81–99)
MONOCYTES # BLD AUTO: 0.35 K/UL — SIGNIFICANT CHANGE UP (ref 0.1–0.6)
MONOCYTES NFR BLD AUTO: 9.1 % — SIGNIFICANT CHANGE UP (ref 1.7–9.3)
NEUTROPHILS # BLD AUTO: 2.55 K/UL — SIGNIFICANT CHANGE UP (ref 1.4–6.5)
NEUTROPHILS NFR BLD AUTO: 66.6 % — SIGNIFICANT CHANGE UP (ref 42.2–75.2)
NRBC BLD AUTO-RTO: 0 /100 WBCS — SIGNIFICANT CHANGE UP (ref 0–0)
PHOSPHATE SERPL-MCNC: 4 MG/DL — SIGNIFICANT CHANGE UP (ref 2.1–4.9)
PLATELET # BLD AUTO: 44 K/UL — LOW (ref 130–400)
PMV BLD: 10 FL — SIGNIFICANT CHANGE UP (ref 7.4–10.4)
POTASSIUM SERPL-MCNC: 4 MMOL/L — SIGNIFICANT CHANGE UP (ref 3.5–5)
POTASSIUM SERPL-SCNC: 4 MMOL/L — SIGNIFICANT CHANGE UP (ref 3.5–5)
PROT SERPL-MCNC: 4.9 G/DL — LOW (ref 6–8)
RBC # BLD: 3.1 M/UL — LOW (ref 4.2–5.4)
RBC # FLD: 19.5 % — HIGH (ref 11.5–14.5)
SODIUM SERPL-SCNC: 132 MMOL/L — LOW (ref 135–146)
WBC # BLD: 3.83 K/UL — LOW (ref 4.8–10.8)
WBC # FLD AUTO: 3.83 K/UL — LOW (ref 4.8–10.8)

## 2025-07-20 PROCEDURE — 99233 SBSQ HOSP IP/OBS HIGH 50: CPT

## 2025-07-20 PROCEDURE — 71045 X-RAY EXAM CHEST 1 VIEW: CPT | Mod: 26

## 2025-07-20 RX ORDER — LACTULOSE 10 G/15ML
20 SOLUTION ORAL THREE TIMES A DAY
Refills: 0 | Status: DISCONTINUED | OUTPATIENT
Start: 2025-07-20 | End: 2025-07-22

## 2025-07-20 RX ORDER — SODIUM BICARBONATE 1 MEQ/ML
1300 SYRINGE (ML) INTRAVENOUS EVERY 8 HOURS
Refills: 0 | Status: DISCONTINUED | OUTPATIENT
Start: 2025-07-20 | End: 2025-07-24

## 2025-07-20 RX ADMIN — Medication 1300 MILLIGRAM(S): at 21:51

## 2025-07-20 RX ADMIN — MIDODRINE HYDROCHLORIDE 10 MILLIGRAM(S): 5 TABLET ORAL at 17:15

## 2025-07-20 RX ADMIN — Medication 650 MILLIGRAM(S): at 06:22

## 2025-07-20 RX ADMIN — ALBUMIN (HUMAN) 50 MILLILITER(S): 12.5 INJECTION, SOLUTION INTRAVENOUS at 13:15

## 2025-07-20 RX ADMIN — Medication 650 MILLIGRAM(S): at 13:07

## 2025-07-20 RX ADMIN — OCTREOTIDE ACETATE 100 MICROGRAM(S): 500 INJECTION, SOLUTION INTRAVENOUS; SUBCUTANEOUS at 21:52

## 2025-07-20 RX ADMIN — OCTREOTIDE ACETATE 100 MICROGRAM(S): 500 INJECTION, SOLUTION INTRAVENOUS; SUBCUTANEOUS at 06:22

## 2025-07-20 RX ADMIN — MEDPURA VITAMIN A AND D 1 APPLICATION(S): 95 OINTMENT TOPICAL at 06:29

## 2025-07-20 RX ADMIN — OCTREOTIDE ACETATE 100 MICROGRAM(S): 500 INJECTION, SOLUTION INTRAVENOUS; SUBCUTANEOUS at 13:07

## 2025-07-20 RX ADMIN — Medication 37.5 MICROGRAM(S): at 06:22

## 2025-07-20 RX ADMIN — MIDODRINE HYDROCHLORIDE 10 MILLIGRAM(S): 5 TABLET ORAL at 06:22

## 2025-07-20 RX ADMIN — MEDPURA VITAMIN A AND D 1 APPLICATION(S): 95 OINTMENT TOPICAL at 17:22

## 2025-07-20 RX ADMIN — Medication 1 APPLICATION(S): at 11:04

## 2025-07-20 RX ADMIN — MIDODRINE HYDROCHLORIDE 10 MILLIGRAM(S): 5 TABLET ORAL at 11:05

## 2025-07-20 RX ADMIN — Medication 40 MILLIGRAM(S): at 06:22

## 2025-07-21 LAB
ALBUMIN SERPL ELPH-MCNC: 2.9 G/DL — LOW (ref 3.5–5.2)
ALP SERPL-CCNC: 117 U/L — HIGH (ref 30–115)
ALT FLD-CCNC: 17 U/L — SIGNIFICANT CHANGE UP (ref 0–41)
ANION GAP SERPL CALC-SCNC: 12 MMOL/L — SIGNIFICANT CHANGE UP (ref 7–14)
AST SERPL-CCNC: 22 U/L — SIGNIFICANT CHANGE UP (ref 0–41)
BASOPHILS # BLD AUTO: 0.02 K/UL — SIGNIFICANT CHANGE UP (ref 0–0.2)
BASOPHILS NFR BLD AUTO: 0.4 % — SIGNIFICANT CHANGE UP (ref 0–1)
BILIRUB SERPL-MCNC: 4.9 MG/DL — HIGH (ref 0.2–1.2)
BUN SERPL-MCNC: 69 MG/DL — CRITICAL HIGH (ref 10–20)
CALCIUM SERPL-MCNC: 7.9 MG/DL — LOW (ref 8.4–10.5)
CHLORIDE SERPL-SCNC: 103 MMOL/L — SIGNIFICANT CHANGE UP (ref 98–110)
CO2 SERPL-SCNC: 18 MMOL/L — SIGNIFICANT CHANGE UP (ref 17–32)
CREAT SERPL-MCNC: 1.8 MG/DL — HIGH (ref 0.7–1.5)
CULTURE RESULTS: SIGNIFICANT CHANGE UP
EGFR: 27 ML/MIN/1.73M2 — LOW
EGFR: 27 ML/MIN/1.73M2 — LOW
EOSINOPHIL # BLD AUTO: 0.42 K/UL — SIGNIFICANT CHANGE UP (ref 0–0.7)
EOSINOPHIL NFR BLD AUTO: 7.5 % — SIGNIFICANT CHANGE UP (ref 0–8)
GLUCOSE SERPL-MCNC: 131 MG/DL — HIGH (ref 70–99)
HCT VFR BLD CALC: 26.6 % — LOW (ref 37–47)
HGB BLD-MCNC: 8.7 G/DL — LOW (ref 12–16)
IMM GRANULOCYTES NFR BLD AUTO: 0.7 % — HIGH (ref 0.1–0.3)
LYMPHOCYTES # BLD AUTO: 1.21 K/UL — SIGNIFICANT CHANGE UP (ref 1.2–3.4)
LYMPHOCYTES # BLD AUTO: 21.7 % — SIGNIFICANT CHANGE UP (ref 20.5–51.1)
MAGNESIUM SERPL-MCNC: 1.8 MG/DL — SIGNIFICANT CHANGE UP (ref 1.8–2.4)
MCHC RBC-ENTMCNC: 30.5 PG — SIGNIFICANT CHANGE UP (ref 27–31)
MCHC RBC-ENTMCNC: 32.7 G/DL — SIGNIFICANT CHANGE UP (ref 32–37)
MCV RBC AUTO: 93.3 FL — SIGNIFICANT CHANGE UP (ref 81–99)
MONOCYTES # BLD AUTO: 0.52 K/UL — SIGNIFICANT CHANGE UP (ref 0.1–0.6)
MONOCYTES NFR BLD AUTO: 9.3 % — SIGNIFICANT CHANGE UP (ref 1.7–9.3)
NEUTROPHILS # BLD AUTO: 3.36 K/UL — SIGNIFICANT CHANGE UP (ref 1.4–6.5)
NEUTROPHILS NFR BLD AUTO: 60.4 % — SIGNIFICANT CHANGE UP (ref 42.2–75.2)
NRBC BLD AUTO-RTO: 0 /100 WBCS — SIGNIFICANT CHANGE UP (ref 0–0)
PLATELET # BLD AUTO: 47 K/UL — LOW (ref 130–400)
PMV BLD: 9 FL — SIGNIFICANT CHANGE UP (ref 7.4–10.4)
POTASSIUM SERPL-MCNC: 4.1 MMOL/L — SIGNIFICANT CHANGE UP (ref 3.5–5)
POTASSIUM SERPL-SCNC: 4.1 MMOL/L — SIGNIFICANT CHANGE UP (ref 3.5–5)
PROT SERPL-MCNC: 4.7 G/DL — LOW (ref 6–8)
RBC # BLD: 2.85 M/UL — LOW (ref 4.2–5.4)
RBC # FLD: 19.2 % — HIGH (ref 11.5–14.5)
SODIUM SERPL-SCNC: 133 MMOL/L — LOW (ref 135–146)
SPECIMEN SOURCE: SIGNIFICANT CHANGE UP
WBC # BLD: 5.57 K/UL — SIGNIFICANT CHANGE UP (ref 4.8–10.8)
WBC # FLD AUTO: 5.57 K/UL — SIGNIFICANT CHANGE UP (ref 4.8–10.8)

## 2025-07-21 PROCEDURE — 99233 SBSQ HOSP IP/OBS HIGH 50: CPT

## 2025-07-21 RX ADMIN — MEDPURA VITAMIN A AND D 1 APPLICATION(S): 95 OINTMENT TOPICAL at 18:31

## 2025-07-21 RX ADMIN — MIDODRINE HYDROCHLORIDE 10 MILLIGRAM(S): 5 TABLET ORAL at 18:31

## 2025-07-21 RX ADMIN — Medication 1 APPLICATION(S): at 12:23

## 2025-07-21 RX ADMIN — OCTREOTIDE ACETATE 100 MICROGRAM(S): 500 INJECTION, SOLUTION INTRAVENOUS; SUBCUTANEOUS at 14:07

## 2025-07-21 RX ADMIN — LACTULOSE 20 GRAM(S): 10 SOLUTION ORAL at 22:10

## 2025-07-21 RX ADMIN — MIDODRINE HYDROCHLORIDE 10 MILLIGRAM(S): 5 TABLET ORAL at 05:45

## 2025-07-21 RX ADMIN — Medication 40 MILLIGRAM(S): at 05:54

## 2025-07-21 RX ADMIN — MIDODRINE HYDROCHLORIDE 10 MILLIGRAM(S): 5 TABLET ORAL at 12:24

## 2025-07-21 RX ADMIN — Medication 1300 MILLIGRAM(S): at 05:45

## 2025-07-21 RX ADMIN — Medication 37.5 MICROGRAM(S): at 05:45

## 2025-07-21 RX ADMIN — Medication 1300 MILLIGRAM(S): at 22:10

## 2025-07-21 RX ADMIN — ALBUMIN (HUMAN) 50 MILLILITER(S): 12.5 INJECTION, SOLUTION INTRAVENOUS at 14:08

## 2025-07-21 RX ADMIN — OCTREOTIDE ACETATE 100 MICROGRAM(S): 500 INJECTION, SOLUTION INTRAVENOUS; SUBCUTANEOUS at 22:10

## 2025-07-21 RX ADMIN — MEDPURA VITAMIN A AND D 1 APPLICATION(S): 95 OINTMENT TOPICAL at 05:44

## 2025-07-21 RX ADMIN — Medication 1300 MILLIGRAM(S): at 14:06

## 2025-07-21 RX ADMIN — OCTREOTIDE ACETATE 100 MICROGRAM(S): 500 INJECTION, SOLUTION INTRAVENOUS; SUBCUTANEOUS at 05:44

## 2025-07-22 LAB
ALBUMIN SERPL ELPH-MCNC: 2.9 G/DL — LOW (ref 3.5–5.2)
ALP SERPL-CCNC: 114 U/L — SIGNIFICANT CHANGE UP (ref 30–115)
ALT FLD-CCNC: 15 U/L — SIGNIFICANT CHANGE UP (ref 0–41)
AMMONIA BLD-MCNC: 114 UMOL/L — HIGH (ref 11–55)
ANION GAP SERPL CALC-SCNC: 11 MMOL/L — SIGNIFICANT CHANGE UP (ref 7–14)
ANION GAP SERPL CALC-SCNC: 15 MMOL/L — HIGH (ref 7–14)
APTT BLD: 46.4 SEC — HIGH (ref 27–39.2)
AST SERPL-CCNC: 21 U/L — SIGNIFICANT CHANGE UP (ref 0–41)
BASOPHILS # BLD AUTO: 0.02 K/UL — SIGNIFICANT CHANGE UP (ref 0–0.2)
BASOPHILS NFR BLD AUTO: 0.4 % — SIGNIFICANT CHANGE UP (ref 0–1)
BILIRUB SERPL-MCNC: 5 MG/DL — HIGH (ref 0.2–1.2)
BUN SERPL-MCNC: 68 MG/DL — CRITICAL HIGH (ref 10–20)
BUN SERPL-MCNC: 69 MG/DL — CRITICAL HIGH (ref 10–20)
CALCIUM SERPL-MCNC: 7.9 MG/DL — LOW (ref 8.4–10.5)
CALCIUM SERPL-MCNC: 8.2 MG/DL — LOW (ref 8.4–10.5)
CHLORIDE SERPL-SCNC: 104 MMOL/L — SIGNIFICANT CHANGE UP (ref 98–110)
CHLORIDE SERPL-SCNC: 105 MMOL/L — SIGNIFICANT CHANGE UP (ref 98–110)
CO2 SERPL-SCNC: 17 MMOL/L — SIGNIFICANT CHANGE UP (ref 17–32)
CO2 SERPL-SCNC: 19 MMOL/L — SIGNIFICANT CHANGE UP (ref 17–32)
CREAT SERPL-MCNC: 1.6 MG/DL — HIGH (ref 0.7–1.5)
CREAT SERPL-MCNC: 1.8 MG/DL — HIGH (ref 0.7–1.5)
EGFR: 27 ML/MIN/1.73M2 — LOW
EGFR: 27 ML/MIN/1.73M2 — LOW
EGFR: 31 ML/MIN/1.73M2 — LOW
EGFR: 31 ML/MIN/1.73M2 — LOW
EOSINOPHIL # BLD AUTO: 0.32 K/UL — SIGNIFICANT CHANGE UP (ref 0–0.7)
EOSINOPHIL NFR BLD AUTO: 6.7 % — SIGNIFICANT CHANGE UP (ref 0–8)
GLUCOSE SERPL-MCNC: 113 MG/DL — HIGH (ref 70–99)
GLUCOSE SERPL-MCNC: 124 MG/DL — HIGH (ref 70–99)
HCT VFR BLD CALC: 27.8 % — LOW (ref 37–47)
HGB BLD-MCNC: 9 G/DL — LOW (ref 12–16)
IMM GRANULOCYTES NFR BLD AUTO: 0.4 % — HIGH (ref 0.1–0.3)
INR BLD: 1.73 RATIO — HIGH (ref 0.65–1.3)
LYMPHOCYTES # BLD AUTO: 0.9 K/UL — LOW (ref 1.2–3.4)
LYMPHOCYTES # BLD AUTO: 18.9 % — LOW (ref 20.5–51.1)
MAGNESIUM SERPL-MCNC: 1.7 MG/DL — LOW (ref 1.8–2.4)
MCHC RBC-ENTMCNC: 29.7 PG — SIGNIFICANT CHANGE UP (ref 27–31)
MCHC RBC-ENTMCNC: 32.4 G/DL — SIGNIFICANT CHANGE UP (ref 32–37)
MCV RBC AUTO: 91.7 FL — SIGNIFICANT CHANGE UP (ref 81–99)
MONOCYTES # BLD AUTO: 0.44 K/UL — SIGNIFICANT CHANGE UP (ref 0.1–0.6)
MONOCYTES NFR BLD AUTO: 9.3 % — SIGNIFICANT CHANGE UP (ref 1.7–9.3)
NEUTROPHILS # BLD AUTO: 3.05 K/UL — SIGNIFICANT CHANGE UP (ref 1.4–6.5)
NEUTROPHILS NFR BLD AUTO: 64.3 % — SIGNIFICANT CHANGE UP (ref 42.2–75.2)
NRBC BLD AUTO-RTO: 0 /100 WBCS — SIGNIFICANT CHANGE UP (ref 0–0)
PLATELET # BLD AUTO: 59 K/UL — LOW (ref 130–400)
PMV BLD: 9.2 FL — SIGNIFICANT CHANGE UP (ref 7.4–10.4)
POTASSIUM SERPL-MCNC: 3.9 MMOL/L — SIGNIFICANT CHANGE UP (ref 3.5–5)
POTASSIUM SERPL-MCNC: 4 MMOL/L — SIGNIFICANT CHANGE UP (ref 3.5–5)
POTASSIUM SERPL-SCNC: 3.9 MMOL/L — SIGNIFICANT CHANGE UP (ref 3.5–5)
POTASSIUM SERPL-SCNC: 4 MMOL/L — SIGNIFICANT CHANGE UP (ref 3.5–5)
PROT SERPL-MCNC: 4.6 G/DL — LOW (ref 6–8)
PROTHROM AB SERPL-ACNC: 20.7 SEC — HIGH (ref 9.95–12.87)
RBC # BLD: 3.03 M/UL — LOW (ref 4.2–5.4)
RBC # FLD: 19.2 % — HIGH (ref 11.5–14.5)
SODIUM SERPL-SCNC: 134 MMOL/L — LOW (ref 135–146)
SODIUM SERPL-SCNC: 137 MMOL/L — SIGNIFICANT CHANGE UP (ref 135–146)
WBC # BLD: 4.75 K/UL — LOW (ref 4.8–10.8)
WBC # FLD AUTO: 4.75 K/UL — LOW (ref 4.8–10.8)

## 2025-07-22 PROCEDURE — 99233 SBSQ HOSP IP/OBS HIGH 50: CPT

## 2025-07-22 PROCEDURE — 70450 CT HEAD/BRAIN W/O DYE: CPT | Mod: 26

## 2025-07-22 PROCEDURE — 71045 X-RAY EXAM CHEST 1 VIEW: CPT | Mod: 26

## 2025-07-22 PROCEDURE — 74018 RADEX ABDOMEN 1 VIEW: CPT | Mod: 26

## 2025-07-22 RX ORDER — LACTULOSE 10 G/15ML
200 SOLUTION ORAL ONCE
Refills: 0 | Status: COMPLETED | OUTPATIENT
Start: 2025-07-22 | End: 2025-07-22

## 2025-07-22 RX ORDER — MAGNESIUM SULFATE 500 MG/ML
2 SYRINGE (ML) INJECTION ONCE
Refills: 0 | Status: COMPLETED | OUTPATIENT
Start: 2025-07-22 | End: 2025-07-22

## 2025-07-22 RX ORDER — FUROSEMIDE 10 MG/ML
20 INJECTION INTRAMUSCULAR; INTRAVENOUS ONCE
Refills: 0 | Status: COMPLETED | OUTPATIENT
Start: 2025-07-22 | End: 2025-07-22

## 2025-07-22 RX ORDER — LACTULOSE 10 G/15ML
20 SOLUTION ORAL EVERY 6 HOURS
Refills: 0 | Status: DISCONTINUED | OUTPATIENT
Start: 2025-07-22 | End: 2025-07-23

## 2025-07-22 RX ADMIN — LIDOCAINE HYDROCHLORIDE 1 PATCH: 20 JELLY TOPICAL at 19:46

## 2025-07-22 RX ADMIN — Medication 1300 MILLIGRAM(S): at 21:50

## 2025-07-22 RX ADMIN — Medication 25 GRAM(S): at 09:50

## 2025-07-22 RX ADMIN — FUROSEMIDE 20 MILLIGRAM(S): 10 INJECTION INTRAMUSCULAR; INTRAVENOUS at 09:48

## 2025-07-22 RX ADMIN — LACTULOSE 20 GRAM(S): 10 SOLUTION ORAL at 11:37

## 2025-07-22 RX ADMIN — Medication 37.5 MICROGRAM(S): at 05:53

## 2025-07-22 RX ADMIN — LIDOCAINE HYDROCHLORIDE 1 PATCH: 20 JELLY TOPICAL at 11:37

## 2025-07-22 RX ADMIN — OCTREOTIDE ACETATE 100 MICROGRAM(S): 500 INJECTION, SOLUTION INTRAVENOUS; SUBCUTANEOUS at 13:27

## 2025-07-22 RX ADMIN — LACTULOSE 20 GRAM(S): 10 SOLUTION ORAL at 05:52

## 2025-07-22 RX ADMIN — Medication 1300 MILLIGRAM(S): at 13:23

## 2025-07-22 RX ADMIN — OCTREOTIDE ACETATE 100 MICROGRAM(S): 500 INJECTION, SOLUTION INTRAVENOUS; SUBCUTANEOUS at 21:50

## 2025-07-22 RX ADMIN — OCTREOTIDE ACETATE 100 MICROGRAM(S): 500 INJECTION, SOLUTION INTRAVENOUS; SUBCUTANEOUS at 05:53

## 2025-07-22 RX ADMIN — MIDODRINE HYDROCHLORIDE 10 MILLIGRAM(S): 5 TABLET ORAL at 16:43

## 2025-07-22 RX ADMIN — LACTULOSE 20 GRAM(S): 10 SOLUTION ORAL at 23:50

## 2025-07-22 RX ADMIN — LACTULOSE 200 GRAM(S): 10 SOLUTION ORAL at 15:41

## 2025-07-22 RX ADMIN — Medication 40 MILLIGRAM(S): at 05:53

## 2025-07-22 RX ADMIN — MEDPURA VITAMIN A AND D 1 APPLICATION(S): 95 OINTMENT TOPICAL at 17:12

## 2025-07-22 RX ADMIN — MEDPURA VITAMIN A AND D 1 APPLICATION(S): 95 OINTMENT TOPICAL at 05:53

## 2025-07-22 RX ADMIN — MIDODRINE HYDROCHLORIDE 10 MILLIGRAM(S): 5 TABLET ORAL at 05:53

## 2025-07-22 RX ADMIN — Medication 1 APPLICATION(S): at 11:38

## 2025-07-22 RX ADMIN — MIDODRINE HYDROCHLORIDE 10 MILLIGRAM(S): 5 TABLET ORAL at 11:37

## 2025-07-22 RX ADMIN — Medication 1300 MILLIGRAM(S): at 05:52

## 2025-07-23 ENCOUNTER — TRANSCRIPTION ENCOUNTER (OUTPATIENT)
Age: 85
End: 2025-07-23

## 2025-07-23 LAB
ALBUMIN SERPL ELPH-MCNC: 3 G/DL — LOW (ref 3.5–5.2)
ALP SERPL-CCNC: 95 U/L — SIGNIFICANT CHANGE UP (ref 30–115)
ALT FLD-CCNC: 20 U/L — SIGNIFICANT CHANGE UP (ref 0–41)
ANION GAP SERPL CALC-SCNC: 15 MMOL/L — HIGH (ref 7–14)
APTT BLD: 44.3 SEC — HIGH (ref 27–39.2)
AST SERPL-CCNC: 28 U/L — SIGNIFICANT CHANGE UP (ref 0–41)
BASOPHILS # BLD AUTO: 0.02 K/UL — SIGNIFICANT CHANGE UP (ref 0–0.2)
BASOPHILS NFR BLD AUTO: 0.4 % — SIGNIFICANT CHANGE UP (ref 0–1)
BILIRUB SERPL-MCNC: 6.4 MG/DL — HIGH (ref 0.2–1.2)
BUN SERPL-MCNC: 70 MG/DL — CRITICAL HIGH (ref 10–20)
CALCIUM SERPL-MCNC: 8.4 MG/DL — SIGNIFICANT CHANGE UP (ref 8.4–10.5)
CHLORIDE SERPL-SCNC: 105 MMOL/L — SIGNIFICANT CHANGE UP (ref 98–110)
CO2 SERPL-SCNC: 18 MMOL/L — SIGNIFICANT CHANGE UP (ref 17–32)
CREAT SERPL-MCNC: 1.7 MG/DL — HIGH (ref 0.7–1.5)
EGFR: 29 ML/MIN/1.73M2 — LOW
EGFR: 29 ML/MIN/1.73M2 — LOW
EOSINOPHIL # BLD AUTO: 0.41 K/UL — SIGNIFICANT CHANGE UP (ref 0–0.7)
EOSINOPHIL NFR BLD AUTO: 7.5 % — SIGNIFICANT CHANGE UP (ref 0–8)
GLUCOSE SERPL-MCNC: 120 MG/DL — HIGH (ref 70–99)
HCT VFR BLD CALC: 33.4 % — LOW (ref 37–47)
HGB BLD-MCNC: 10.7 G/DL — LOW (ref 12–16)
IMM GRANULOCYTES NFR BLD AUTO: 0.4 % — HIGH (ref 0.1–0.3)
INR BLD: 1.54 RATIO — HIGH (ref 0.65–1.3)
LYMPHOCYTES # BLD AUTO: 0.84 K/UL — LOW (ref 1.2–3.4)
LYMPHOCYTES # BLD AUTO: 15.4 % — LOW (ref 20.5–51.1)
MAGNESIUM SERPL-MCNC: 2 MG/DL — SIGNIFICANT CHANGE UP (ref 1.8–2.4)
MCHC RBC-ENTMCNC: 30.1 PG — SIGNIFICANT CHANGE UP (ref 27–31)
MCHC RBC-ENTMCNC: 32 G/DL — SIGNIFICANT CHANGE UP (ref 32–37)
MCV RBC AUTO: 93.8 FL — SIGNIFICANT CHANGE UP (ref 81–99)
MELD SCORE WITH DIALYSIS: 32 POINTS — SIGNIFICANT CHANGE UP
MELD SCORE WITHOUT DIALYSIS: 23 POINTS — SIGNIFICANT CHANGE UP
MONOCYTES # BLD AUTO: 0.4 K/UL — SIGNIFICANT CHANGE UP (ref 0.1–0.6)
MONOCYTES NFR BLD AUTO: 7.3 % — SIGNIFICANT CHANGE UP (ref 1.7–9.3)
NEUTROPHILS # BLD AUTO: 3.76 K/UL — SIGNIFICANT CHANGE UP (ref 1.4–6.5)
NEUTROPHILS NFR BLD AUTO: 69 % — SIGNIFICANT CHANGE UP (ref 42.2–75.2)
NRBC BLD AUTO-RTO: 0 /100 WBCS — SIGNIFICANT CHANGE UP (ref 0–0)
PLATELET # BLD AUTO: 49 K/UL — LOW (ref 130–400)
PMV BLD: 8.9 FL — SIGNIFICANT CHANGE UP (ref 7.4–10.4)
POTASSIUM SERPL-MCNC: 3.8 MMOL/L — SIGNIFICANT CHANGE UP (ref 3.5–5)
POTASSIUM SERPL-SCNC: 3.8 MMOL/L — SIGNIFICANT CHANGE UP (ref 3.5–5)
PROT SERPL-MCNC: 5.2 G/DL — LOW (ref 6–8)
PROTHROM AB SERPL-ACNC: 18.3 SEC — HIGH (ref 9.95–12.87)
RBC # BLD: 3.56 M/UL — LOW (ref 4.2–5.4)
RBC # FLD: 19.9 % — HIGH (ref 11.5–14.5)
SODIUM SERPL-SCNC: 138 MMOL/L — SIGNIFICANT CHANGE UP (ref 135–146)
WBC # BLD: 5.45 K/UL — SIGNIFICANT CHANGE UP (ref 4.8–10.8)
WBC # FLD AUTO: 5.45 K/UL — SIGNIFICANT CHANGE UP (ref 4.8–10.8)

## 2025-07-23 PROCEDURE — 99233 SBSQ HOSP IP/OBS HIGH 50: CPT

## 2025-07-23 PROCEDURE — 99232 SBSQ HOSP IP/OBS MODERATE 35: CPT

## 2025-07-23 RX ORDER — ALBUMIN (HUMAN) 12.5 G/50ML
100 INJECTION, SOLUTION INTRAVENOUS ONCE
Refills: 0 | Status: COMPLETED | OUTPATIENT
Start: 2025-07-23 | End: 2025-07-23

## 2025-07-23 RX ORDER — LACTULOSE 10 G/15ML
30 SOLUTION ORAL THREE TIMES A DAY
Refills: 0 | Status: DISCONTINUED | OUTPATIENT
Start: 2025-07-23 | End: 2025-07-29

## 2025-07-23 RX ADMIN — OCTREOTIDE ACETATE 100 MICROGRAM(S): 500 INJECTION, SOLUTION INTRAVENOUS; SUBCUTANEOUS at 22:30

## 2025-07-23 RX ADMIN — MIDODRINE HYDROCHLORIDE 10 MILLIGRAM(S): 5 TABLET ORAL at 18:39

## 2025-07-23 RX ADMIN — OCTREOTIDE ACETATE 100 MICROGRAM(S): 500 INJECTION, SOLUTION INTRAVENOUS; SUBCUTANEOUS at 06:10

## 2025-07-23 RX ADMIN — OCTREOTIDE ACETATE 100 MICROGRAM(S): 500 INJECTION, SOLUTION INTRAVENOUS; SUBCUTANEOUS at 14:15

## 2025-07-23 RX ADMIN — MIDODRINE HYDROCHLORIDE 10 MILLIGRAM(S): 5 TABLET ORAL at 06:10

## 2025-07-23 RX ADMIN — MIDODRINE HYDROCHLORIDE 10 MILLIGRAM(S): 5 TABLET ORAL at 13:00

## 2025-07-23 RX ADMIN — MEDPURA VITAMIN A AND D 1 APPLICATION(S): 95 OINTMENT TOPICAL at 18:39

## 2025-07-23 RX ADMIN — Medication 1300 MILLIGRAM(S): at 06:09

## 2025-07-23 RX ADMIN — Medication 37.5 MICROGRAM(S): at 06:08

## 2025-07-23 RX ADMIN — Medication 1300 MILLIGRAM(S): at 14:15

## 2025-07-23 RX ADMIN — Medication 1300 MILLIGRAM(S): at 22:30

## 2025-07-23 RX ADMIN — MEDPURA VITAMIN A AND D 1 APPLICATION(S): 95 OINTMENT TOPICAL at 06:08

## 2025-07-23 RX ADMIN — Medication 1 APPLICATION(S): at 13:00

## 2025-07-23 RX ADMIN — LIDOCAINE HYDROCHLORIDE 1 PATCH: 20 JELLY TOPICAL at 13:00

## 2025-07-23 RX ADMIN — LACTULOSE 20 GRAM(S): 10 SOLUTION ORAL at 06:08

## 2025-07-23 RX ADMIN — LACTULOSE 30 GRAM(S): 10 SOLUTION ORAL at 22:29

## 2025-07-23 RX ADMIN — Medication 40 MILLIGRAM(S): at 06:09

## 2025-07-23 RX ADMIN — LACTULOSE 30 GRAM(S): 10 SOLUTION ORAL at 14:15

## 2025-07-23 RX ADMIN — ALBUMIN (HUMAN) 50 MILLILITER(S): 12.5 INJECTION, SOLUTION INTRAVENOUS at 14:20

## 2025-07-24 LAB
ALBUMIN SERPL ELPH-MCNC: 3.1 G/DL — LOW (ref 3.5–5.2)
ALP SERPL-CCNC: 92 U/L — SIGNIFICANT CHANGE UP (ref 30–115)
ALT FLD-CCNC: 17 U/L — SIGNIFICANT CHANGE UP (ref 0–41)
AMMONIA BLD-MCNC: 68 UMOL/L — HIGH (ref 11–55)
ANION GAP SERPL CALC-SCNC: 14 MMOL/L — SIGNIFICANT CHANGE UP (ref 7–14)
ANION GAP SERPL CALC-SCNC: 17 MMOL/L — HIGH (ref 7–14)
APTT BLD: 47 SEC — HIGH (ref 27–39.2)
AST SERPL-CCNC: 28 U/L — SIGNIFICANT CHANGE UP (ref 0–41)
BASOPHILS # BLD AUTO: 0.02 K/UL — SIGNIFICANT CHANGE UP (ref 0–0.2)
BASOPHILS NFR BLD AUTO: 0.6 % — SIGNIFICANT CHANGE UP (ref 0–1)
BILIRUB SERPL-MCNC: 7.2 MG/DL — HIGH (ref 0.2–1.2)
BUN SERPL-MCNC: 68 MG/DL — CRITICAL HIGH (ref 10–20)
BUN SERPL-MCNC: 71 MG/DL — CRITICAL HIGH (ref 10–20)
CALCIUM SERPL-MCNC: 8.5 MG/DL — SIGNIFICANT CHANGE UP (ref 8.4–10.5)
CALCIUM SERPL-MCNC: 8.6 MG/DL — SIGNIFICANT CHANGE UP (ref 8.4–10.5)
CHLORIDE SERPL-SCNC: 105 MMOL/L — SIGNIFICANT CHANGE UP (ref 98–110)
CHLORIDE SERPL-SCNC: 106 MMOL/L — SIGNIFICANT CHANGE UP (ref 98–110)
CO2 SERPL-SCNC: 17 MMOL/L — SIGNIFICANT CHANGE UP (ref 17–32)
CO2 SERPL-SCNC: 19 MMOL/L — SIGNIFICANT CHANGE UP (ref 17–32)
CORTIS SERPL-MCNC: 12.3 UG/DL — SIGNIFICANT CHANGE UP
CREAT SERPL-MCNC: 1.5 MG/DL — SIGNIFICANT CHANGE UP (ref 0.7–1.5)
CREAT SERPL-MCNC: 1.6 MG/DL — HIGH (ref 0.7–1.5)
EGFR: 31 ML/MIN/1.73M2 — LOW
EGFR: 31 ML/MIN/1.73M2 — LOW
EGFR: 34 ML/MIN/1.73M2 — LOW
EGFR: 34 ML/MIN/1.73M2 — LOW
EOSINOPHIL # BLD AUTO: 0.28 K/UL — SIGNIFICANT CHANGE UP (ref 0–0.7)
EOSINOPHIL NFR BLD AUTO: 8.1 % — HIGH (ref 0–8)
GLUCOSE SERPL-MCNC: 129 MG/DL — HIGH (ref 70–99)
GLUCOSE SERPL-MCNC: 153 MG/DL — HIGH (ref 70–99)
HCT VFR BLD CALC: 28.6 % — LOW (ref 37–47)
HGB BLD-MCNC: 9.2 G/DL — LOW (ref 12–16)
IMM GRANULOCYTES NFR BLD AUTO: 0.3 % — SIGNIFICANT CHANGE UP (ref 0.1–0.3)
INR BLD: 1.61 RATIO — HIGH (ref 0.65–1.3)
LYMPHOCYTES # BLD AUTO: 0.65 K/UL — LOW (ref 1.2–3.4)
LYMPHOCYTES # BLD AUTO: 18.9 % — LOW (ref 20.5–51.1)
MAGNESIUM SERPL-MCNC: 1.9 MG/DL — SIGNIFICANT CHANGE UP (ref 1.8–2.4)
MCHC RBC-ENTMCNC: 30.4 PG — SIGNIFICANT CHANGE UP (ref 27–31)
MCHC RBC-ENTMCNC: 32.2 G/DL — SIGNIFICANT CHANGE UP (ref 32–37)
MCV RBC AUTO: 94.4 FL — SIGNIFICANT CHANGE UP (ref 81–99)
MELD SCORE WITH DIALYSIS: 32 POINTS — SIGNIFICANT CHANGE UP
MELD SCORE WITHOUT DIALYSIS: 24 POINTS — SIGNIFICANT CHANGE UP
MONOCYTES # BLD AUTO: 0.3 K/UL — SIGNIFICANT CHANGE UP (ref 0.1–0.6)
MONOCYTES NFR BLD AUTO: 8.7 % — SIGNIFICANT CHANGE UP (ref 1.7–9.3)
NEUTROPHILS # BLD AUTO: 2.18 K/UL — SIGNIFICANT CHANGE UP (ref 1.4–6.5)
NEUTROPHILS NFR BLD AUTO: 63.4 % — SIGNIFICANT CHANGE UP (ref 42.2–75.2)
NRBC BLD AUTO-RTO: 0 /100 WBCS — SIGNIFICANT CHANGE UP (ref 0–0)
PLATELET # BLD AUTO: 32 K/UL — LOW (ref 130–400)
PMV BLD: 9 FL — SIGNIFICANT CHANGE UP (ref 7.4–10.4)
POTASSIUM SERPL-MCNC: 3.5 MMOL/L — SIGNIFICANT CHANGE UP (ref 3.5–5)
POTASSIUM SERPL-MCNC: 4.1 MMOL/L — SIGNIFICANT CHANGE UP (ref 3.5–5)
POTASSIUM SERPL-SCNC: 3.5 MMOL/L — SIGNIFICANT CHANGE UP (ref 3.5–5)
POTASSIUM SERPL-SCNC: 4.1 MMOL/L — SIGNIFICANT CHANGE UP (ref 3.5–5)
PROT SERPL-MCNC: 5 G/DL — LOW (ref 6–8)
PROTHROM AB SERPL-ACNC: 19.2 SEC — HIGH (ref 9.95–12.87)
RBC # BLD: 3.03 M/UL — LOW (ref 4.2–5.4)
RBC # FLD: 19.9 % — HIGH (ref 11.5–14.5)
SODIUM SERPL-SCNC: 139 MMOL/L — SIGNIFICANT CHANGE UP (ref 135–146)
SODIUM SERPL-SCNC: 139 MMOL/L — SIGNIFICANT CHANGE UP (ref 135–146)
WBC # BLD: 3.44 K/UL — LOW (ref 4.8–10.8)
WBC # FLD AUTO: 3.44 K/UL — LOW (ref 4.8–10.8)

## 2025-07-24 PROCEDURE — 71045 X-RAY EXAM CHEST 1 VIEW: CPT | Mod: 26

## 2025-07-24 PROCEDURE — 93010 ELECTROCARDIOGRAM REPORT: CPT

## 2025-07-24 PROCEDURE — 74018 RADEX ABDOMEN 1 VIEW: CPT | Mod: 26

## 2025-07-24 PROCEDURE — 99232 SBSQ HOSP IP/OBS MODERATE 35: CPT

## 2025-07-24 PROCEDURE — 99233 SBSQ HOSP IP/OBS HIGH 50: CPT

## 2025-07-24 RX ORDER — POTASSIUM CHLORIDE, DEXTROSE MONOHYDRATE AND SODIUM CHLORIDE 150; 5; 900 MG/100ML; G/100ML; MG/100ML
1000 INJECTION, SOLUTION INTRAVENOUS
Refills: 0 | Status: DISCONTINUED | OUTPATIENT
Start: 2025-07-24 | End: 2025-07-25

## 2025-07-24 RX ORDER — FUROSEMIDE 10 MG/ML
40 INJECTION INTRAMUSCULAR; INTRAVENOUS ONCE
Refills: 0 | Status: DISCONTINUED | OUTPATIENT
Start: 2025-07-24 | End: 2025-07-24

## 2025-07-24 RX ORDER — FUROSEMIDE 10 MG/ML
20 INJECTION INTRAMUSCULAR; INTRAVENOUS ONCE
Refills: 0 | Status: DISCONTINUED | OUTPATIENT
Start: 2025-07-24 | End: 2025-07-24

## 2025-07-24 RX ADMIN — Medication 1 APPLICATION(S): at 11:38

## 2025-07-24 RX ADMIN — OCTREOTIDE ACETATE 100 MICROGRAM(S): 500 INJECTION, SOLUTION INTRAVENOUS; SUBCUTANEOUS at 05:49

## 2025-07-24 RX ADMIN — MIDODRINE HYDROCHLORIDE 10 MILLIGRAM(S): 5 TABLET ORAL at 05:49

## 2025-07-24 RX ADMIN — LACTULOSE 30 GRAM(S): 10 SOLUTION ORAL at 05:47

## 2025-07-24 RX ADMIN — OCTREOTIDE ACETATE 100 MICROGRAM(S): 500 INJECTION, SOLUTION INTRAVENOUS; SUBCUTANEOUS at 13:26

## 2025-07-24 RX ADMIN — IPRATROPIUM BROMIDE AND ALBUTEROL SULFATE 3 MILLILITER(S): .5; 2.5 SOLUTION RESPIRATORY (INHALATION) at 20:00

## 2025-07-24 RX ADMIN — OCTREOTIDE ACETATE 100 MICROGRAM(S): 500 INJECTION, SOLUTION INTRAVENOUS; SUBCUTANEOUS at 22:14

## 2025-07-24 RX ADMIN — MEDPURA VITAMIN A AND D 1 APPLICATION(S): 95 OINTMENT TOPICAL at 18:08

## 2025-07-24 RX ADMIN — MIDODRINE HYDROCHLORIDE 10 MILLIGRAM(S): 5 TABLET ORAL at 11:30

## 2025-07-24 RX ADMIN — Medication 37.5 MICROGRAM(S): at 05:47

## 2025-07-24 RX ADMIN — LACTULOSE 30 GRAM(S): 10 SOLUTION ORAL at 22:15

## 2025-07-24 RX ADMIN — POTASSIUM CHLORIDE, DEXTROSE MONOHYDRATE AND SODIUM CHLORIDE 50 MILLILITER(S): 150; 5; 900 INJECTION, SOLUTION INTRAVENOUS at 16:53

## 2025-07-24 RX ADMIN — Medication 40 MILLIGRAM(S): at 05:49

## 2025-07-24 RX ADMIN — LIDOCAINE HYDROCHLORIDE 1 PATCH: 20 JELLY TOPICAL at 01:32

## 2025-07-24 RX ADMIN — Medication 1300 MILLIGRAM(S): at 13:26

## 2025-07-24 RX ADMIN — Medication 1300 MILLIGRAM(S): at 05:47

## 2025-07-24 RX ADMIN — LIDOCAINE HYDROCHLORIDE 1 PATCH: 20 JELLY TOPICAL at 11:29

## 2025-07-24 RX ADMIN — MEDPURA VITAMIN A AND D 1 APPLICATION(S): 95 OINTMENT TOPICAL at 05:46

## 2025-07-25 LAB
ALBUMIN SERPL ELPH-MCNC: 3 G/DL — LOW (ref 3.5–5.2)
ALP SERPL-CCNC: 85 U/L — SIGNIFICANT CHANGE UP (ref 30–115)
ALT FLD-CCNC: 18 U/L — SIGNIFICANT CHANGE UP (ref 0–41)
AMMONIA BLD-MCNC: 66 UMOL/L — HIGH (ref 11–55)
ANION GAP SERPL CALC-SCNC: 11 MMOL/L — SIGNIFICANT CHANGE UP (ref 7–14)
APTT BLD: 44.9 SEC — HIGH (ref 27–39.2)
AST SERPL-CCNC: 27 U/L — SIGNIFICANT CHANGE UP (ref 0–41)
B PERT IGG+IGM PNL SER: CLEAR — SIGNIFICANT CHANGE UP
BASOPHILS # BLD AUTO: 0.01 K/UL — SIGNIFICANT CHANGE UP (ref 0–0.2)
BASOPHILS NFR BLD AUTO: 0.2 % — SIGNIFICANT CHANGE UP (ref 0–1)
BILIRUB SERPL-MCNC: 7 MG/DL — HIGH (ref 0.2–1.2)
BUN SERPL-MCNC: 72 MG/DL — CRITICAL HIGH (ref 10–20)
CALCIUM SERPL-MCNC: 8.3 MG/DL — LOW (ref 8.4–10.5)
CHLORIDE SERPL-SCNC: 103 MMOL/L — SIGNIFICANT CHANGE UP (ref 98–110)
CO2 SERPL-SCNC: 22 MMOL/L — SIGNIFICANT CHANGE UP (ref 17–32)
COLOR FLD: YELLOW — SIGNIFICANT CHANGE UP
CREAT SERPL-MCNC: 1.7 MG/DL — HIGH (ref 0.7–1.5)
EGFR: 29 ML/MIN/1.73M2 — LOW
EGFR: 29 ML/MIN/1.73M2 — LOW
EOSINOPHIL # BLD AUTO: 0.25 K/UL — SIGNIFICANT CHANGE UP (ref 0–0.7)
EOSINOPHIL NFR BLD AUTO: 4.9 % — SIGNIFICANT CHANGE UP (ref 0–8)
FLUID INTAKE SUBSTANCE CLASS: SIGNIFICANT CHANGE UP
GLUCOSE SERPL-MCNC: 130 MG/DL — HIGH (ref 70–99)
HCT VFR BLD CALC: 26 % — LOW (ref 37–47)
HGB BLD-MCNC: 8.3 G/DL — LOW (ref 12–16)
IMM GRANULOCYTES NFR BLD AUTO: 0.2 % — SIGNIFICANT CHANGE UP (ref 0.1–0.3)
INR BLD: 1.72 RATIO — HIGH (ref 0.65–1.3)
LYMPHOCYTES # BLD AUTO: 0.74 K/UL — LOW (ref 1.2–3.4)
LYMPHOCYTES # BLD AUTO: 14.6 % — LOW (ref 20.5–51.1)
LYMPHOCYTES # FLD: 81 — SIGNIFICANT CHANGE UP
MAGNESIUM SERPL-MCNC: 1.8 MG/DL — SIGNIFICANT CHANGE UP (ref 1.8–2.4)
MCHC RBC-ENTMCNC: 30.1 PG — SIGNIFICANT CHANGE UP (ref 27–31)
MCHC RBC-ENTMCNC: 31.9 G/DL — LOW (ref 32–37)
MCV RBC AUTO: 94.2 FL — SIGNIFICANT CHANGE UP (ref 81–99)
MELD SCORE WITH DIALYSIS: 33 POINTS — SIGNIFICANT CHANGE UP
MELD SCORE WITHOUT DIALYSIS: 25 POINTS — SIGNIFICANT CHANGE UP
MONOCYTES # BLD AUTO: 0.35 K/UL — SIGNIFICANT CHANGE UP (ref 0.1–0.6)
MONOCYTES NFR BLD AUTO: 6.9 % — SIGNIFICANT CHANGE UP (ref 1.7–9.3)
MONOS+MACROS # FLD: 7 % — SIGNIFICANT CHANGE UP
NEUTROPHILS # BLD AUTO: 3.7 K/UL — SIGNIFICANT CHANGE UP (ref 1.4–6.5)
NEUTROPHILS NFR BLD AUTO: 73.2 % — SIGNIFICANT CHANGE UP (ref 42.2–75.2)
NEUTROPHILS-BODY FLUID: 12 % — SIGNIFICANT CHANGE UP
NRBC BLD AUTO-RTO: 0 /100 WBCS — SIGNIFICANT CHANGE UP (ref 0–0)
PHOSPHATE SERPL-MCNC: 3.5 MG/DL — SIGNIFICANT CHANGE UP (ref 2.1–4.9)
PLATELET # BLD AUTO: 34 K/UL — LOW (ref 130–400)
PMV BLD: 9.2 FL — SIGNIFICANT CHANGE UP (ref 7.4–10.4)
POTASSIUM SERPL-MCNC: 4.6 MMOL/L — SIGNIFICANT CHANGE UP (ref 3.5–5)
POTASSIUM SERPL-SCNC: 4.6 MMOL/L — SIGNIFICANT CHANGE UP (ref 3.5–5)
PROT SERPL-MCNC: 4.8 G/DL — LOW (ref 6–8)
PROTHROM AB SERPL-ACNC: 20.5 SEC — HIGH (ref 9.95–12.87)
RBC # BLD: 2.76 M/UL — LOW (ref 4.2–5.4)
RBC # FLD: 20 % — HIGH (ref 11.5–14.5)
RCV VOL RI: 5000 /UL — HIGH (ref 0–0)
SODIUM SERPL-SCNC: 136 MMOL/L — SIGNIFICANT CHANGE UP (ref 135–146)
TOTAL NUCLEATED CELL COUNT, BODY FLUID: 120 /UL — SIGNIFICANT CHANGE UP
TUBE TYPE: SIGNIFICANT CHANGE UP
WBC # BLD: 5.06 K/UL — SIGNIFICANT CHANGE UP (ref 4.8–10.8)
WBC # FLD AUTO: 5.06 K/UL — SIGNIFICANT CHANGE UP (ref 4.8–10.8)

## 2025-07-25 PROCEDURE — 74018 RADEX ABDOMEN 1 VIEW: CPT | Mod: 26

## 2025-07-25 PROCEDURE — 99233 SBSQ HOSP IP/OBS HIGH 50: CPT

## 2025-07-25 PROCEDURE — 99232 SBSQ HOSP IP/OBS MODERATE 35: CPT

## 2025-07-25 PROCEDURE — 49082 ABD PARACENTESIS: CPT

## 2025-07-25 RX ORDER — ALBUMIN (HUMAN) 12.5 G/50ML
12 INJECTION, SOLUTION INTRAVENOUS ONCE
Refills: 0 | Status: DISCONTINUED | OUTPATIENT
Start: 2025-07-25 | End: 2025-07-25

## 2025-07-25 RX ORDER — ALBUMIN (HUMAN) 12.5 G/50ML
50 INJECTION, SOLUTION INTRAVENOUS ONCE
Refills: 0 | Status: COMPLETED | OUTPATIENT
Start: 2025-07-25 | End: 2025-07-25

## 2025-07-25 RX ADMIN — OCTREOTIDE ACETATE 100 MICROGRAM(S): 500 INJECTION, SOLUTION INTRAVENOUS; SUBCUTANEOUS at 05:30

## 2025-07-25 RX ADMIN — MEDPURA VITAMIN A AND D 1 APPLICATION(S): 95 OINTMENT TOPICAL at 17:25

## 2025-07-25 RX ADMIN — LIDOCAINE HYDROCHLORIDE 1 PATCH: 20 JELLY TOPICAL at 14:11

## 2025-07-25 RX ADMIN — Medication 37.5 MICROGRAM(S): at 05:30

## 2025-07-25 RX ADMIN — OCTREOTIDE ACETATE 100 MICROGRAM(S): 500 INJECTION, SOLUTION INTRAVENOUS; SUBCUTANEOUS at 21:48

## 2025-07-25 RX ADMIN — Medication 1 APPLICATION(S): at 14:12

## 2025-07-25 RX ADMIN — ALBUMIN (HUMAN) 50 MILLILITER(S): 12.5 INJECTION, SOLUTION INTRAVENOUS at 17:19

## 2025-07-25 RX ADMIN — MIDODRINE HYDROCHLORIDE 10 MILLIGRAM(S): 5 TABLET ORAL at 14:12

## 2025-07-25 RX ADMIN — LACTULOSE 30 GRAM(S): 10 SOLUTION ORAL at 21:48

## 2025-07-25 RX ADMIN — MIDODRINE HYDROCHLORIDE 10 MILLIGRAM(S): 5 TABLET ORAL at 05:30

## 2025-07-25 RX ADMIN — LACTULOSE 30 GRAM(S): 10 SOLUTION ORAL at 05:31

## 2025-07-25 RX ADMIN — Medication 40 MILLIGRAM(S): at 06:16

## 2025-07-25 RX ADMIN — OCTREOTIDE ACETATE 100 MICROGRAM(S): 500 INJECTION, SOLUTION INTRAVENOUS; SUBCUTANEOUS at 14:12

## 2025-07-25 RX ADMIN — MEDPURA VITAMIN A AND D 1 APPLICATION(S): 95 OINTMENT TOPICAL at 05:30

## 2025-07-25 RX ADMIN — MIDODRINE HYDROCHLORIDE 10 MILLIGRAM(S): 5 TABLET ORAL at 17:25

## 2025-07-26 LAB
ALBUMIN FLD-MCNC: 0.6 G/DL — SIGNIFICANT CHANGE UP
ALBUMIN SERPL ELPH-MCNC: 3 G/DL — LOW (ref 3.5–5.2)
ALP SERPL-CCNC: 92 U/L — SIGNIFICANT CHANGE UP (ref 30–115)
ALT FLD-CCNC: 17 U/L — SIGNIFICANT CHANGE UP (ref 0–41)
ANION GAP SERPL CALC-SCNC: 11 MMOL/L — SIGNIFICANT CHANGE UP (ref 7–14)
APTT BLD: 48.1 SEC — HIGH (ref 27–39.2)
AST SERPL-CCNC: 26 U/L — SIGNIFICANT CHANGE UP (ref 0–41)
BASOPHILS # BLD AUTO: 0.01 K/UL — SIGNIFICANT CHANGE UP (ref 0–0.2)
BASOPHILS NFR BLD AUTO: 0.3 % — SIGNIFICANT CHANGE UP (ref 0–1)
BILIRUB SERPL-MCNC: 6.4 MG/DL — HIGH (ref 0.2–1.2)
BUN SERPL-MCNC: 71 MG/DL — CRITICAL HIGH (ref 10–20)
CALCIUM SERPL-MCNC: 8.2 MG/DL — LOW (ref 8.4–10.5)
CHLORIDE SERPL-SCNC: 103 MMOL/L — SIGNIFICANT CHANGE UP (ref 98–110)
CO2 SERPL-SCNC: 21 MMOL/L — SIGNIFICANT CHANGE UP (ref 17–32)
CREAT SERPL-MCNC: 1.7 MG/DL — HIGH (ref 0.7–1.5)
EGFR: 29 ML/MIN/1.73M2 — LOW
EGFR: 29 ML/MIN/1.73M2 — LOW
EOSINOPHIL # BLD AUTO: 0.29 K/UL — SIGNIFICANT CHANGE UP (ref 0–0.7)
EOSINOPHIL NFR BLD AUTO: 8.3 % — HIGH (ref 0–8)
GLUCOSE FLD-MCNC: 201 MG/DL — SIGNIFICANT CHANGE UP
GLUCOSE SERPL-MCNC: 129 MG/DL — HIGH (ref 70–99)
GRAM STN FLD: SIGNIFICANT CHANGE UP
HCT VFR BLD CALC: 26 % — LOW (ref 37–47)
HGB BLD-MCNC: 8.2 G/DL — LOW (ref 12–16)
IMM GRANULOCYTES NFR BLD AUTO: 0.3 % — SIGNIFICANT CHANGE UP (ref 0.1–0.3)
INR BLD: 1.71 RATIO — HIGH (ref 0.65–1.3)
LDH SERPL L TO P-CCNC: 51 U/L — SIGNIFICANT CHANGE UP
LYMPHOCYTES # BLD AUTO: 0.67 K/UL — LOW (ref 1.2–3.4)
LYMPHOCYTES # BLD AUTO: 19.1 % — LOW (ref 20.5–51.1)
MAGNESIUM SERPL-MCNC: 1.8 MG/DL — SIGNIFICANT CHANGE UP (ref 1.8–2.4)
MCHC RBC-ENTMCNC: 30.5 PG — SIGNIFICANT CHANGE UP (ref 27–31)
MCHC RBC-ENTMCNC: 31.5 G/DL — LOW (ref 32–37)
MCV RBC AUTO: 96.7 FL — SIGNIFICANT CHANGE UP (ref 81–99)
MELD SCORE WITH DIALYSIS: 33 POINTS — SIGNIFICANT CHANGE UP
MELD SCORE WITHOUT DIALYSIS: 26 POINTS — SIGNIFICANT CHANGE UP
MONOCYTES # BLD AUTO: 0.37 K/UL — SIGNIFICANT CHANGE UP (ref 0.1–0.6)
MONOCYTES NFR BLD AUTO: 10.6 % — HIGH (ref 1.7–9.3)
NEUTROPHILS # BLD AUTO: 2.15 K/UL — SIGNIFICANT CHANGE UP (ref 1.4–6.5)
NEUTROPHILS NFR BLD AUTO: 61.4 % — SIGNIFICANT CHANGE UP (ref 42.2–75.2)
NRBC BLD AUTO-RTO: 0 /100 WBCS — SIGNIFICANT CHANGE UP (ref 0–0)
PLATELET # BLD AUTO: 27 K/UL — LOW (ref 130–400)
PMV BLD: 9.9 FL — SIGNIFICANT CHANGE UP (ref 7.4–10.4)
POTASSIUM SERPL-MCNC: 5.2 MMOL/L — HIGH (ref 3.5–5)
POTASSIUM SERPL-SCNC: 5.2 MMOL/L — HIGH (ref 3.5–5)
PROT FLD-MCNC: <1 G/DL — SIGNIFICANT CHANGE UP
PROT SERPL-MCNC: 4.9 G/DL — LOW (ref 6–8)
PROTHROM AB SERPL-ACNC: 20.4 SEC — HIGH (ref 9.95–12.87)
RBC # BLD: 2.69 M/UL — LOW (ref 4.2–5.4)
RBC # FLD: 20.6 % — HIGH (ref 11.5–14.5)
SODIUM SERPL-SCNC: 135 MMOL/L — SIGNIFICANT CHANGE UP (ref 135–146)
SPECIMEN SOURCE: SIGNIFICANT CHANGE UP
WBC # BLD: 3.5 K/UL — LOW (ref 4.8–10.8)
WBC # FLD AUTO: 3.5 K/UL — LOW (ref 4.8–10.8)

## 2025-07-26 PROCEDURE — 99232 SBSQ HOSP IP/OBS MODERATE 35: CPT

## 2025-07-26 RX ADMIN — MIDODRINE HYDROCHLORIDE 10 MILLIGRAM(S): 5 TABLET ORAL at 17:44

## 2025-07-26 RX ADMIN — OCTREOTIDE ACETATE 100 MICROGRAM(S): 500 INJECTION, SOLUTION INTRAVENOUS; SUBCUTANEOUS at 21:28

## 2025-07-26 RX ADMIN — LACTULOSE 30 GRAM(S): 10 SOLUTION ORAL at 14:08

## 2025-07-26 RX ADMIN — OCTREOTIDE ACETATE 100 MICROGRAM(S): 500 INJECTION, SOLUTION INTRAVENOUS; SUBCUTANEOUS at 14:08

## 2025-07-26 RX ADMIN — OCTREOTIDE ACETATE 100 MICROGRAM(S): 500 INJECTION, SOLUTION INTRAVENOUS; SUBCUTANEOUS at 05:36

## 2025-07-26 RX ADMIN — LIDOCAINE HYDROCHLORIDE 1 PATCH: 20 JELLY TOPICAL at 11:57

## 2025-07-26 RX ADMIN — Medication 37.5 MICROGRAM(S): at 05:35

## 2025-07-26 RX ADMIN — LIDOCAINE HYDROCHLORIDE 1 PATCH: 20 JELLY TOPICAL at 23:29

## 2025-07-26 RX ADMIN — MIDODRINE HYDROCHLORIDE 10 MILLIGRAM(S): 5 TABLET ORAL at 05:35

## 2025-07-26 RX ADMIN — Medication 40 MILLIGRAM(S): at 05:38

## 2025-07-26 RX ADMIN — LACTULOSE 30 GRAM(S): 10 SOLUTION ORAL at 05:37

## 2025-07-26 RX ADMIN — MIDODRINE HYDROCHLORIDE 10 MILLIGRAM(S): 5 TABLET ORAL at 11:57

## 2025-07-26 RX ADMIN — MEDPURA VITAMIN A AND D 1 APPLICATION(S): 95 OINTMENT TOPICAL at 05:37

## 2025-07-26 RX ADMIN — Medication 1 APPLICATION(S): at 11:54

## 2025-07-26 RX ADMIN — MEDPURA VITAMIN A AND D 1 APPLICATION(S): 95 OINTMENT TOPICAL at 17:44

## 2025-07-26 RX ADMIN — LIDOCAINE HYDROCHLORIDE 1 PATCH: 20 JELLY TOPICAL at 19:31

## 2025-07-26 RX ADMIN — LACTULOSE 30 GRAM(S): 10 SOLUTION ORAL at 21:27

## 2025-07-27 LAB
ALBUMIN SERPL ELPH-MCNC: 2.7 G/DL — LOW (ref 3.5–5.2)
ALP SERPL-CCNC: 90 U/L — SIGNIFICANT CHANGE UP (ref 30–115)
ALT FLD-CCNC: 17 U/L — SIGNIFICANT CHANGE UP (ref 0–41)
ANION GAP SERPL CALC-SCNC: 13 MMOL/L — SIGNIFICANT CHANGE UP (ref 7–14)
APTT BLD: 48.1 SEC — HIGH (ref 27–39.2)
AST SERPL-CCNC: 26 U/L — SIGNIFICANT CHANGE UP (ref 0–41)
BASOPHILS # BLD AUTO: 0.01 K/UL — SIGNIFICANT CHANGE UP (ref 0–0.2)
BASOPHILS NFR BLD AUTO: 0.3 % — SIGNIFICANT CHANGE UP (ref 0–1)
BILIRUB SERPL-MCNC: 6.4 MG/DL — HIGH (ref 0.2–1.2)
BUN SERPL-MCNC: 72 MG/DL — CRITICAL HIGH (ref 10–20)
CALCIUM SERPL-MCNC: 8.4 MG/DL — SIGNIFICANT CHANGE UP (ref 8.4–10.5)
CHLORIDE SERPL-SCNC: 106 MMOL/L — SIGNIFICANT CHANGE UP (ref 98–110)
CO2 SERPL-SCNC: 21 MMOL/L — SIGNIFICANT CHANGE UP (ref 17–32)
CREAT SERPL-MCNC: 1.7 MG/DL — HIGH (ref 0.7–1.5)
EGFR: 29 ML/MIN/1.73M2 — LOW
EGFR: 29 ML/MIN/1.73M2 — LOW
EOSINOPHIL # BLD AUTO: 0.31 K/UL — SIGNIFICANT CHANGE UP (ref 0–0.7)
EOSINOPHIL NFR BLD AUTO: 9.2 % — HIGH (ref 0–8)
GLUCOSE SERPL-MCNC: 98 MG/DL — SIGNIFICANT CHANGE UP (ref 70–99)
HCT VFR BLD CALC: 31.7 % — LOW (ref 37–47)
HGB BLD-MCNC: 9.7 G/DL — LOW (ref 12–16)
IMM GRANULOCYTES NFR BLD AUTO: 0.3 % — SIGNIFICANT CHANGE UP (ref 0.1–0.3)
INR BLD: 1.55 RATIO — HIGH (ref 0.65–1.3)
LYMPHOCYTES # BLD AUTO: 0.71 K/UL — LOW (ref 1.2–3.4)
LYMPHOCYTES # BLD AUTO: 21.1 % — SIGNIFICANT CHANGE UP (ref 20.5–51.1)
MAGNESIUM SERPL-MCNC: 1.7 MG/DL — LOW (ref 1.8–2.4)
MCHC RBC-ENTMCNC: 30 PG — SIGNIFICANT CHANGE UP (ref 27–31)
MCHC RBC-ENTMCNC: 30.6 G/DL — LOW (ref 32–37)
MCV RBC AUTO: 98.1 FL — SIGNIFICANT CHANGE UP (ref 81–99)
MELD SCORE WITH DIALYSIS: 32 POINTS — SIGNIFICANT CHANGE UP
MELD SCORE WITHOUT DIALYSIS: 23 POINTS — SIGNIFICANT CHANGE UP
MONOCYTES # BLD AUTO: 0.3 K/UL — SIGNIFICANT CHANGE UP (ref 0.1–0.6)
MONOCYTES NFR BLD AUTO: 8.9 % — SIGNIFICANT CHANGE UP (ref 1.7–9.3)
NEUTROPHILS # BLD AUTO: 2.02 K/UL — SIGNIFICANT CHANGE UP (ref 1.4–6.5)
NEUTROPHILS NFR BLD AUTO: 60.2 % — SIGNIFICANT CHANGE UP (ref 42.2–75.2)
NRBC BLD AUTO-RTO: 0 /100 WBCS — SIGNIFICANT CHANGE UP (ref 0–0)
PLATELET # BLD AUTO: 27 K/UL — LOW (ref 130–400)
PMV BLD: 9.9 FL — SIGNIFICANT CHANGE UP (ref 7.4–10.4)
POTASSIUM SERPL-MCNC: 4.8 MMOL/L — SIGNIFICANT CHANGE UP (ref 3.5–5)
POTASSIUM SERPL-SCNC: 4.8 MMOL/L — SIGNIFICANT CHANGE UP (ref 3.5–5)
PROT SERPL-MCNC: 4.7 G/DL — LOW (ref 6–8)
PROTHROM AB SERPL-ACNC: 18.5 SEC — HIGH (ref 9.95–12.87)
RBC # BLD: 3.23 M/UL — LOW (ref 4.2–5.4)
RBC # FLD: 20.6 % — HIGH (ref 11.5–14.5)
SODIUM SERPL-SCNC: 140 MMOL/L — SIGNIFICANT CHANGE UP (ref 135–146)
WBC # BLD: 3.36 K/UL — LOW (ref 4.8–10.8)
WBC # FLD AUTO: 3.36 K/UL — LOW (ref 4.8–10.8)

## 2025-07-27 PROCEDURE — 99232 SBSQ HOSP IP/OBS MODERATE 35: CPT

## 2025-07-27 PROCEDURE — 74018 RADEX ABDOMEN 1 VIEW: CPT | Mod: 26

## 2025-07-27 RX ADMIN — OCTREOTIDE ACETATE 100 MICROGRAM(S): 500 INJECTION, SOLUTION INTRAVENOUS; SUBCUTANEOUS at 13:45

## 2025-07-27 RX ADMIN — MEDPURA VITAMIN A AND D 1 APPLICATION(S): 95 OINTMENT TOPICAL at 05:48

## 2025-07-27 RX ADMIN — OCTREOTIDE ACETATE 100 MICROGRAM(S): 500 INJECTION, SOLUTION INTRAVENOUS; SUBCUTANEOUS at 05:49

## 2025-07-27 RX ADMIN — Medication 37.5 MICROGRAM(S): at 05:49

## 2025-07-27 RX ADMIN — MEDPURA VITAMIN A AND D 1 APPLICATION(S): 95 OINTMENT TOPICAL at 17:40

## 2025-07-27 RX ADMIN — MIDODRINE HYDROCHLORIDE 10 MILLIGRAM(S): 5 TABLET ORAL at 17:37

## 2025-07-27 RX ADMIN — LIDOCAINE HYDROCHLORIDE 1 PATCH: 20 JELLY TOPICAL at 11:30

## 2025-07-27 RX ADMIN — Medication 40 MILLIGRAM(S): at 05:48

## 2025-07-27 RX ADMIN — LACTULOSE 30 GRAM(S): 10 SOLUTION ORAL at 21:49

## 2025-07-27 RX ADMIN — MIDODRINE HYDROCHLORIDE 10 MILLIGRAM(S): 5 TABLET ORAL at 11:30

## 2025-07-27 RX ADMIN — OCTREOTIDE ACETATE 100 MICROGRAM(S): 500 INJECTION, SOLUTION INTRAVENOUS; SUBCUTANEOUS at 21:49

## 2025-07-27 RX ADMIN — Medication 1 APPLICATION(S): at 11:30

## 2025-07-27 RX ADMIN — LACTULOSE 30 GRAM(S): 10 SOLUTION ORAL at 14:55

## 2025-07-28 DIAGNOSIS — K76.82 HEPATIC ENCEPHALOPATHY: ICD-10-CM

## 2025-07-28 DIAGNOSIS — K74.60 UNSPECIFIED CIRRHOSIS OF LIVER: ICD-10-CM

## 2025-07-28 LAB
ALBUMIN SERPL ELPH-MCNC: 3 G/DL — LOW (ref 3.5–5.2)
ALP SERPL-CCNC: 148 U/L — HIGH (ref 30–115)
ALT FLD-CCNC: 16 U/L — SIGNIFICANT CHANGE UP (ref 0–41)
ANION GAP SERPL CALC-SCNC: 11 MMOL/L — SIGNIFICANT CHANGE UP (ref 7–14)
APTT BLD: 40.2 SEC — HIGH (ref 27–39.2)
AST SERPL-CCNC: 21 U/L — SIGNIFICANT CHANGE UP (ref 0–41)
BASOPHILS # BLD AUTO: 0.02 K/UL — SIGNIFICANT CHANGE UP (ref 0–0.2)
BASOPHILS NFR BLD AUTO: 0.5 % — SIGNIFICANT CHANGE UP (ref 0–1)
BILIRUB SERPL-MCNC: 6.1 MG/DL — HIGH (ref 0.2–1.2)
BUN SERPL-MCNC: 71 MG/DL — CRITICAL HIGH (ref 10–20)
CALCIUM SERPL-MCNC: 8.5 MG/DL — SIGNIFICANT CHANGE UP (ref 8.4–10.5)
CHLORIDE SERPL-SCNC: 107 MMOL/L — SIGNIFICANT CHANGE UP (ref 98–110)
CO2 SERPL-SCNC: 23 MMOL/L — SIGNIFICANT CHANGE UP (ref 17–32)
CREAT SERPL-MCNC: 1.6 MG/DL — HIGH (ref 0.7–1.5)
EGFR: 31 ML/MIN/1.73M2 — LOW
EGFR: 31 ML/MIN/1.73M2 — LOW
EOSINOPHIL # BLD AUTO: 0.46 K/UL — SIGNIFICANT CHANGE UP (ref 0–0.7)
EOSINOPHIL NFR BLD AUTO: 11.4 % — HIGH (ref 0–8)
GLUCOSE SERPL-MCNC: 172 MG/DL — HIGH (ref 70–99)
HCT VFR BLD CALC: 30.4 % — LOW (ref 37–47)
HGB BLD-MCNC: 9.5 G/DL — LOW (ref 12–16)
IMM GRANULOCYTES NFR BLD AUTO: 0.5 % — HIGH (ref 0.1–0.3)
INR BLD: 1.44 RATIO — HIGH (ref 0.65–1.3)
LYMPHOCYTES # BLD AUTO: 0.99 K/UL — LOW (ref 1.2–3.4)
LYMPHOCYTES # BLD AUTO: 24.6 % — SIGNIFICANT CHANGE UP (ref 20.5–51.1)
MAGNESIUM SERPL-MCNC: 2.1 MG/DL — SIGNIFICANT CHANGE UP (ref 1.8–2.4)
MCHC RBC-ENTMCNC: 29.8 PG — SIGNIFICANT CHANGE UP (ref 27–31)
MCHC RBC-ENTMCNC: 31.3 G/DL — LOW (ref 32–37)
MCV RBC AUTO: 95.3 FL — SIGNIFICANT CHANGE UP (ref 81–99)
MELD SCORE WITH DIALYSIS: 31 POINTS — SIGNIFICANT CHANGE UP
MELD SCORE WITHOUT DIALYSIS: 22 POINTS — SIGNIFICANT CHANGE UP
MONOCYTES # BLD AUTO: 0.35 K/UL — SIGNIFICANT CHANGE UP (ref 0.1–0.6)
MONOCYTES NFR BLD AUTO: 8.7 % — SIGNIFICANT CHANGE UP (ref 1.7–9.3)
NEUTROPHILS # BLD AUTO: 2.18 K/UL — SIGNIFICANT CHANGE UP (ref 1.4–6.5)
NEUTROPHILS NFR BLD AUTO: 54.3 % — SIGNIFICANT CHANGE UP (ref 42.2–75.2)
NRBC BLD AUTO-RTO: 0 /100 WBCS — SIGNIFICANT CHANGE UP (ref 0–0)
PLATELET # BLD AUTO: 40 K/UL — LOW (ref 130–400)
PMV BLD: 10.2 FL — SIGNIFICANT CHANGE UP (ref 7.4–10.4)
POTASSIUM SERPL-MCNC: 4.6 MMOL/L — SIGNIFICANT CHANGE UP (ref 3.5–5)
POTASSIUM SERPL-SCNC: 4.6 MMOL/L — SIGNIFICANT CHANGE UP (ref 3.5–5)
PROT SERPL-MCNC: 5.1 G/DL — LOW (ref 6–8)
PROTHROM AB SERPL-ACNC: 17.1 SEC — HIGH (ref 9.95–12.87)
RBC # BLD: 3.19 M/UL — LOW (ref 4.2–5.4)
RBC # FLD: 20.4 % — HIGH (ref 11.5–14.5)
SODIUM SERPL-SCNC: 141 MMOL/L — SIGNIFICANT CHANGE UP (ref 135–146)
WBC # BLD: 4.02 K/UL — LOW (ref 4.8–10.8)
WBC # FLD AUTO: 4.02 K/UL — LOW (ref 4.8–10.8)

## 2025-07-28 PROCEDURE — 99232 SBSQ HOSP IP/OBS MODERATE 35: CPT

## 2025-07-28 PROCEDURE — 74018 RADEX ABDOMEN 1 VIEW: CPT | Mod: 26

## 2025-07-28 PROCEDURE — 99233 SBSQ HOSP IP/OBS HIGH 50: CPT

## 2025-07-28 PROCEDURE — 99497 ADVNCD CARE PLAN 30 MIN: CPT

## 2025-07-28 PROCEDURE — 99233 SBSQ HOSP IP/OBS HIGH 50: CPT | Mod: 25

## 2025-07-28 RX ORDER — LACTULOSE 10 G/15ML
200 SOLUTION ORAL ONCE
Refills: 0 | Status: DISCONTINUED | OUTPATIENT
Start: 2025-07-28 | End: 2025-07-29

## 2025-07-28 RX ORDER — BUMETANIDE 1 MG/1
0.5 TABLET ORAL DAILY
Refills: 0 | Status: DISCONTINUED | OUTPATIENT
Start: 2025-07-28 | End: 2025-07-29

## 2025-07-28 RX ADMIN — Medication 40 MILLIGRAM(S): at 06:03

## 2025-07-28 RX ADMIN — LIDOCAINE HYDROCHLORIDE 1 PATCH: 20 JELLY TOPICAL at 11:40

## 2025-07-28 RX ADMIN — LACTULOSE 30 GRAM(S): 10 SOLUTION ORAL at 21:28

## 2025-07-28 RX ADMIN — MIDODRINE HYDROCHLORIDE 10 MILLIGRAM(S): 5 TABLET ORAL at 17:25

## 2025-07-28 RX ADMIN — MIDODRINE HYDROCHLORIDE 10 MILLIGRAM(S): 5 TABLET ORAL at 11:39

## 2025-07-28 RX ADMIN — Medication 37.5 MICROGRAM(S): at 06:01

## 2025-07-28 RX ADMIN — MEDPURA VITAMIN A AND D 1 APPLICATION(S): 95 OINTMENT TOPICAL at 17:25

## 2025-07-28 RX ADMIN — LACTULOSE 30 GRAM(S): 10 SOLUTION ORAL at 13:24

## 2025-07-28 RX ADMIN — LIDOCAINE HYDROCHLORIDE 1 PATCH: 20 JELLY TOPICAL at 23:43

## 2025-07-28 RX ADMIN — OCTREOTIDE ACETATE 100 MICROGRAM(S): 500 INJECTION, SOLUTION INTRAVENOUS; SUBCUTANEOUS at 13:24

## 2025-07-28 RX ADMIN — MEDPURA VITAMIN A AND D 1 APPLICATION(S): 95 OINTMENT TOPICAL at 07:39

## 2025-07-28 RX ADMIN — Medication 1 APPLICATION(S): at 11:40

## 2025-07-28 RX ADMIN — LACTULOSE 30 GRAM(S): 10 SOLUTION ORAL at 06:04

## 2025-07-28 RX ADMIN — OCTREOTIDE ACETATE 100 MICROGRAM(S): 500 INJECTION, SOLUTION INTRAVENOUS; SUBCUTANEOUS at 06:03

## 2025-07-28 RX ADMIN — MIDODRINE HYDROCHLORIDE 10 MILLIGRAM(S): 5 TABLET ORAL at 06:03

## 2025-07-29 ENCOUNTER — TRANSCRIPTION ENCOUNTER (OUTPATIENT)
Age: 85
End: 2025-07-29

## 2025-07-29 VITALS
OXYGEN SATURATION: 97 % | DIASTOLIC BLOOD PRESSURE: 61 MMHG | HEART RATE: 58 BPM | TEMPERATURE: 98 F | RESPIRATION RATE: 18 BRPM | SYSTOLIC BLOOD PRESSURE: 125 MMHG

## 2025-07-29 LAB
ANION GAP SERPL CALC-SCNC: 11 MMOL/L — SIGNIFICANT CHANGE UP (ref 7–14)
BUN SERPL-MCNC: 71 MG/DL — CRITICAL HIGH (ref 10–20)
CALCIUM SERPL-MCNC: 8.1 MG/DL — LOW (ref 8.4–10.5)
CHLORIDE SERPL-SCNC: 109 MMOL/L — SIGNIFICANT CHANGE UP (ref 98–110)
CO2 SERPL-SCNC: 21 MMOL/L — SIGNIFICANT CHANGE UP (ref 17–32)
CREAT SERPL-MCNC: 1.5 MG/DL — SIGNIFICANT CHANGE UP (ref 0.7–1.5)
EGFR: 34 ML/MIN/1.73M2 — LOW
EGFR: 34 ML/MIN/1.73M2 — LOW
GLUCOSE SERPL-MCNC: 108 MG/DL — HIGH (ref 70–99)
HCT VFR BLD CALC: 28.9 % — LOW (ref 37–47)
HGB BLD-MCNC: 8.9 G/DL — LOW (ref 12–16)
MCHC RBC-ENTMCNC: 30.3 PG — SIGNIFICANT CHANGE UP (ref 27–31)
MCHC RBC-ENTMCNC: 30.8 G/DL — LOW (ref 32–37)
MCV RBC AUTO: 98.3 FL — SIGNIFICANT CHANGE UP (ref 81–99)
NRBC BLD AUTO-RTO: 0 /100 WBCS — SIGNIFICANT CHANGE UP (ref 0–0)
PLATELET # BLD AUTO: 33 K/UL — LOW (ref 130–400)
PMV BLD: 9 FL — SIGNIFICANT CHANGE UP (ref 7.4–10.4)
POTASSIUM SERPL-MCNC: 4.2 MMOL/L — SIGNIFICANT CHANGE UP (ref 3.5–5)
POTASSIUM SERPL-SCNC: 4.2 MMOL/L — SIGNIFICANT CHANGE UP (ref 3.5–5)
RBC # BLD: 2.94 M/UL — LOW (ref 4.2–5.4)
RBC # FLD: 20.7 % — HIGH (ref 11.5–14.5)
SODIUM SERPL-SCNC: 141 MMOL/L — SIGNIFICANT CHANGE UP (ref 135–146)
WBC # BLD: 4.1 K/UL — LOW (ref 4.8–10.8)
WBC # FLD AUTO: 4.1 K/UL — LOW (ref 4.8–10.8)

## 2025-07-29 PROCEDURE — 99232 SBSQ HOSP IP/OBS MODERATE 35: CPT

## 2025-07-29 PROCEDURE — 74018 RADEX ABDOMEN 1 VIEW: CPT | Mod: 26

## 2025-07-29 PROCEDURE — 99239 HOSP IP/OBS DSCHRG MGMT >30: CPT

## 2025-07-29 RX ORDER — OXYCODONE HYDROCHLORIDE 30 MG/1
1 TABLET ORAL
Refills: 0 | DISCHARGE

## 2025-07-29 RX ORDER — MIDODRINE HYDROCHLORIDE 5 MG/1
1 TABLET ORAL
Qty: 0 | Refills: 0 | DISCHARGE
Start: 2025-07-29

## 2025-07-29 RX ORDER — BUMETANIDE 1 MG/1
1 TABLET ORAL
Refills: 0
Start: 2025-07-29 | End: 2025-08-27

## 2025-07-29 RX ORDER — RIFAXIMIN 550 MG/1
1 TABLET ORAL
Qty: 0 | Refills: 0 | DISCHARGE
Start: 2025-07-29

## 2025-07-29 RX ORDER — SPIRONOLACTONE 25 MG
1 TABLET ORAL
Refills: 0 | DISCHARGE

## 2025-07-29 RX ADMIN — LIDOCAINE HYDROCHLORIDE 1 PATCH: 20 JELLY TOPICAL at 11:44

## 2025-07-29 RX ADMIN — LACTULOSE 30 GRAM(S): 10 SOLUTION ORAL at 05:07

## 2025-07-29 RX ADMIN — MIDODRINE HYDROCHLORIDE 10 MILLIGRAM(S): 5 TABLET ORAL at 11:43

## 2025-07-29 RX ADMIN — BUMETANIDE 0.5 MILLIGRAM(S): 1 TABLET ORAL at 05:19

## 2025-07-29 RX ADMIN — LACTULOSE 30 GRAM(S): 10 SOLUTION ORAL at 13:08

## 2025-07-29 RX ADMIN — Medication 1 APPLICATION(S): at 11:44

## 2025-07-29 RX ADMIN — Medication 37.5 MICROGRAM(S): at 05:10

## 2025-07-29 RX ADMIN — Medication 40 MILLIGRAM(S): at 05:12

## 2025-07-29 RX ADMIN — MIDODRINE HYDROCHLORIDE 10 MILLIGRAM(S): 5 TABLET ORAL at 17:19

## 2025-07-29 RX ADMIN — MEDPURA VITAMIN A AND D 1 APPLICATION(S): 95 OINTMENT TOPICAL at 17:19

## 2025-07-29 RX ADMIN — MEDPURA VITAMIN A AND D 1 APPLICATION(S): 95 OINTMENT TOPICAL at 05:18

## 2025-07-29 RX ADMIN — MIDODRINE HYDROCHLORIDE 10 MILLIGRAM(S): 5 TABLET ORAL at 05:10

## 2025-07-31 LAB
CULTURE RESULTS: NO GROWTH — SIGNIFICANT CHANGE UP
SPECIMEN SOURCE: SIGNIFICANT CHANGE UP

## 2025-08-03 DIAGNOSIS — I95.89 OTHER HYPOTENSION: ICD-10-CM

## 2025-08-03 DIAGNOSIS — E80.4 GILBERT SYNDROME: ICD-10-CM

## 2025-08-03 DIAGNOSIS — E43 UNSPECIFIED SEVERE PROTEIN-CALORIE MALNUTRITION: ICD-10-CM

## 2025-08-03 DIAGNOSIS — Z79.890 HORMONE REPLACEMENT THERAPY: ICD-10-CM

## 2025-08-03 DIAGNOSIS — M19.90 UNSPECIFIED OSTEOARTHRITIS, UNSPECIFIED SITE: ICD-10-CM

## 2025-08-03 DIAGNOSIS — K76.82 HEPATIC ENCEPHALOPATHY: ICD-10-CM

## 2025-08-03 DIAGNOSIS — N17.9 ACUTE KIDNEY FAILURE, UNSPECIFIED: ICD-10-CM
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
## 2025-08-03 DIAGNOSIS — Z79.899 OTHER LONG TERM (CURRENT) DRUG THERAPY: ICD-10-CM

## 2025-08-03 DIAGNOSIS — Z66 DO NOT RESUSCITATE: ICD-10-CM

## 2025-08-03 DIAGNOSIS — Z85.3 PERSONAL HISTORY OF MALIGNANT NEOPLASM OF BREAST: ICD-10-CM

## 2025-08-03 DIAGNOSIS — S06.5X0A TRAUMATIC SUBDURAL HEMORRHAGE WITHOUT LOSS OF CONSCIOUSNESS, INITIAL ENCOUNTER: ICD-10-CM

## 2025-08-03 DIAGNOSIS — J96.11 CHRONIC RESPIRATORY FAILURE WITH HYPOXIA: ICD-10-CM

## 2025-08-03 DIAGNOSIS — E03.9 HYPOTHYROIDISM, UNSPECIFIED: ICD-10-CM

## 2025-08-03 DIAGNOSIS — Z16.13 RESISTANCE TO CARBAPENEM: ICD-10-CM

## 2025-08-03 DIAGNOSIS — K74.02 HEPATIC FIBROSIS, ADVANCED FIBROSIS: ICD-10-CM

## 2025-08-03 DIAGNOSIS — R33.9 RETENTION OF URINE, UNSPECIFIED: ICD-10-CM

## 2025-08-03 DIAGNOSIS — K76.7 HEPATORENAL SYNDROME: ICD-10-CM

## 2025-08-03 DIAGNOSIS — D61.818 OTHER PANCYTOPENIA: ICD-10-CM

## 2025-08-03 DIAGNOSIS — Z88.1 ALLERGY STATUS TO OTHER ANTIBIOTIC AGENTS: ICD-10-CM

## 2025-08-03 DIAGNOSIS — G92.8 OTHER TOXIC ENCEPHALOPATHY: ICD-10-CM
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
## 2025-08-03 DIAGNOSIS — N30.00 ACUTE CYSTITIS WITHOUT HEMATURIA: ICD-10-CM

## 2025-08-03 DIAGNOSIS — K76.6 PORTAL HYPERTENSION: ICD-10-CM

## 2025-08-03 DIAGNOSIS — R18.8 OTHER ASCITES: ICD-10-CM

## 2025-08-03 DIAGNOSIS — N18.30 CHRONIC KIDNEY DISEASE, STAGE 3 UNSPECIFIED: ICD-10-CM

## 2025-08-03 DIAGNOSIS — Z88.6 ALLERGY STATUS TO ANALGESIC AGENT: ICD-10-CM

## 2025-08-03 DIAGNOSIS — Z90.49 ACQUIRED ABSENCE OF OTHER SPECIFIED PARTS OF DIGESTIVE TRACT: ICD-10-CM

## 2025-08-03 DIAGNOSIS — E87.20 ACIDOSIS, UNSPECIFIED: ICD-10-CM

## 2025-08-03 DIAGNOSIS — E87.1 HYPO-OSMOLALITY AND HYPONATREMIA: ICD-10-CM

## 2025-08-03 DIAGNOSIS — Z98.51 TUBAL LIGATION STATUS: ICD-10-CM

## 2025-08-03 DIAGNOSIS — I12.9 HYPERTENSIVE CHRONIC KIDNEY DISEASE WITH STAGE 1 THROUGH STAGE 4 CHRONIC KIDNEY DISEASE, OR UNSPECIFIED CHRONIC KIDNEY DISEASE: ICD-10-CM

## 2025-08-03 DIAGNOSIS — D84.81 IMMUNODEFICIENCY DUE TO CONDITIONS CLASSIFIED ELSEWHERE: ICD-10-CM

## 2025-08-11 ENCOUNTER — APPOINTMENT (OUTPATIENT)
Dept: PULMONOLOGY | Facility: CLINIC | Age: 85
End: 2025-08-11

## 2025-08-23 LAB
CULTURE RESULTS: SIGNIFICANT CHANGE UP
SPECIMEN SOURCE: SIGNIFICANT CHANGE UP